# Patient Record
Sex: MALE | Race: WHITE | NOT HISPANIC OR LATINO | Employment: OTHER | URBAN - METROPOLITAN AREA
[De-identification: names, ages, dates, MRNs, and addresses within clinical notes are randomized per-mention and may not be internally consistent; named-entity substitution may affect disease eponyms.]

---

## 2017-02-08 ENCOUNTER — HOSPITAL ENCOUNTER (INPATIENT)
Facility: HOSPITAL | Age: 82
LOS: 2 days | Discharge: HOME | DRG: 683 | End: 2017-02-10
Attending: EMERGENCY MEDICINE | Admitting: HOSPITALIST
Payer: COMMERCIAL

## 2017-02-08 ENCOUNTER — GENERIC CONVERSION - ENCOUNTER (OUTPATIENT)
Dept: OTHER | Facility: OTHER | Age: 82
End: 2017-02-08

## 2017-02-08 ENCOUNTER — APPOINTMENT (EMERGENCY)
Dept: RADIOLOGY | Facility: HOSPITAL | Age: 82
DRG: 683 | End: 2017-02-08
Payer: COMMERCIAL

## 2017-02-08 DIAGNOSIS — R77.8 ELEVATED TROPONIN LEVEL: ICD-10-CM

## 2017-02-08 DIAGNOSIS — R53.1 GENERALIZED WEAKNESS: Primary | ICD-10-CM

## 2017-02-08 DIAGNOSIS — R19.7 DIARRHEA IN ADULT PATIENT: ICD-10-CM

## 2017-02-08 PROBLEM — N40.0 BPH (BENIGN PROSTATIC HYPERPLASIA): Status: ACTIVE | Noted: 2017-02-08

## 2017-02-08 PROBLEM — I10 ESSENTIAL HYPERTENSION: Chronic | Status: ACTIVE | Noted: 2017-02-08

## 2017-02-08 PROBLEM — N40.0 BPH (BENIGN PROSTATIC HYPERPLASIA): Chronic | Status: ACTIVE | Noted: 2017-02-08

## 2017-02-08 PROBLEM — I42.9 CARDIOMYOPATHY (HCC): Status: ACTIVE | Noted: 2017-02-08

## 2017-02-08 PROBLEM — I10 ESSENTIAL HYPERTENSION: Status: ACTIVE | Noted: 2017-02-08

## 2017-02-08 PROBLEM — N17.9 ACUTE RENAL FAILURE SUPERIMPOSED ON STAGE 3 CHRONIC KIDNEY DISEASE (HCC): Status: ACTIVE | Noted: 2017-02-08

## 2017-02-08 PROBLEM — I42.9 CARDIOMYOPATHY (HCC): Chronic | Status: ACTIVE | Noted: 2017-02-08

## 2017-02-08 PROBLEM — E86.0 DEHYDRATION: Status: ACTIVE | Noted: 2017-02-08

## 2017-02-08 PROBLEM — N18.30 ACUTE RENAL FAILURE SUPERIMPOSED ON STAGE 3 CHRONIC KIDNEY DISEASE (HCC): Status: ACTIVE | Noted: 2017-02-08

## 2017-02-08 PROBLEM — I48.91 A-FIB (HCC): Status: ACTIVE | Noted: 2017-02-08

## 2017-02-08 LAB
ALBUMIN SERPL BCP-MCNC: 3.8 G/DL (ref 3.5–5)
ALP SERPL-CCNC: 60 U/L (ref 46–116)
ALT SERPL W P-5'-P-CCNC: 33 U/L (ref 12–78)
ANION GAP SERPL CALCULATED.3IONS-SCNC: 12 MMOL/L (ref 4–13)
AST SERPL W P-5'-P-CCNC: 38 U/L (ref 5–45)
BACTERIA UR QL AUTO: ABNORMAL /HPF
BASOPHILS # BLD AUTO: 0.02 THOUSANDS/ΜL (ref 0–0.1)
BASOPHILS NFR BLD AUTO: 0 % (ref 0–1)
BILIRUB SERPL-MCNC: 0.7 MG/DL (ref 0.2–1)
BILIRUB UR QL STRIP: NEGATIVE
BUN SERPL-MCNC: 27 MG/DL (ref 5–25)
CALCIUM SERPL-MCNC: 8.9 MG/DL (ref 8.3–10.1)
CHLORIDE SERPL-SCNC: 98 MMOL/L (ref 100–108)
CLARITY UR: CLEAR
CO2 SERPL-SCNC: 25 MMOL/L (ref 21–32)
COLOR UR: YELLOW
CREAT SERPL-MCNC: 2.07 MG/DL (ref 0.6–1.3)
EOSINOPHIL # BLD AUTO: 0.01 THOUSAND/ΜL (ref 0–0.61)
EOSINOPHIL NFR BLD AUTO: 0 % (ref 0–6)
ERYTHROCYTE [DISTWIDTH] IN BLOOD BY AUTOMATED COUNT: 14 % (ref 11.6–15.1)
FINE GRAN CASTS URNS QL MICRO: ABNORMAL /LPF
FLUAV AG SPEC QL IA: NEGATIVE
FLUBV AG SPEC QL IA: NEGATIVE
GFR SERPL CREATININE-BSD FRML MDRD: 30.4 ML/MIN/1.73SQ M
GLUCOSE SERPL-MCNC: 111 MG/DL (ref 65–140)
GLUCOSE UR STRIP-MCNC: NEGATIVE MG/DL
HCT VFR BLD AUTO: 47.2 % (ref 36.5–49.3)
HGB BLD-MCNC: 15.3 G/DL (ref 12–17)
HGB UR QL STRIP.AUTO: ABNORMAL
HYALINE CASTS #/AREA URNS LPF: ABNORMAL /LPF
KETONES UR STRIP-MCNC: NEGATIVE MG/DL
LACTATE SERPL-SCNC: 2.1 MMOL/L (ref 0.5–2)
LEUKOCYTE ESTERASE UR QL STRIP: NEGATIVE
LYMPHOCYTES # BLD AUTO: 1.25 THOUSANDS/ΜL (ref 0.6–4.47)
LYMPHOCYTES NFR BLD AUTO: 13 % (ref 14–44)
MCH RBC QN AUTO: 32.4 PG (ref 26.8–34.3)
MCHC RBC AUTO-ENTMCNC: 32.4 G/DL (ref 31.4–37.4)
MCV RBC AUTO: 100 FL (ref 82–98)
MONOCYTES # BLD AUTO: 0.92 THOUSAND/ΜL (ref 0.17–1.22)
MONOCYTES NFR BLD AUTO: 9 % (ref 4–12)
MUCOUS THREADS UR QL AUTO: ABNORMAL
NEUTROPHILS # BLD AUTO: 7.65 THOUSANDS/ΜL (ref 1.85–7.62)
NEUTS SEG NFR BLD AUTO: 78 % (ref 43–75)
NITRITE UR QL STRIP: NEGATIVE
NON-SQ EPI CELLS URNS QL MICRO: ABNORMAL /HPF
PH UR STRIP.AUTO: 5 [PH] (ref 4.5–8)
PLATELET # BLD AUTO: 138 THOUSANDS/UL (ref 149–390)
PMV BLD AUTO: 10 FL (ref 8.9–12.7)
POTASSIUM SERPL-SCNC: 4.2 MMOL/L (ref 3.5–5.3)
PROT SERPL-MCNC: 8.2 G/DL (ref 6.4–8.2)
PROT UR STRIP-MCNC: ABNORMAL MG/DL
RBC # BLD AUTO: 4.72 MILLION/UL (ref 3.88–5.62)
RBC #/AREA URNS AUTO: ABNORMAL /HPF
SODIUM SERPL-SCNC: 135 MMOL/L (ref 136–145)
SP GR UR STRIP.AUTO: >=1.03 (ref 1–1.03)
TROPONIN I SERPL-MCNC: 0.14 NG/ML
UROBILINOGEN UR QL STRIP.AUTO: 0.2 E.U./DL
WBC # BLD AUTO: 9.85 THOUSAND/UL (ref 4.31–10.16)
WBC #/AREA URNS AUTO: ABNORMAL /HPF

## 2017-02-08 PROCEDURE — 96360 HYDRATION IV INFUSION INIT: CPT

## 2017-02-08 PROCEDURE — 81001 URINALYSIS AUTO W/SCOPE: CPT | Performed by: EMERGENCY MEDICINE

## 2017-02-08 PROCEDURE — 36415 COLL VENOUS BLD VENIPUNCTURE: CPT | Performed by: EMERGENCY MEDICINE

## 2017-02-08 PROCEDURE — 93005 ELECTROCARDIOGRAM TRACING: CPT | Performed by: EMERGENCY MEDICINE

## 2017-02-08 PROCEDURE — 99285 EMERGENCY DEPT VISIT HI MDM: CPT

## 2017-02-08 PROCEDURE — 80053 COMPREHEN METABOLIC PANEL: CPT | Performed by: EMERGENCY MEDICINE

## 2017-02-08 PROCEDURE — 85025 COMPLETE CBC W/AUTO DIFF WBC: CPT | Performed by: EMERGENCY MEDICINE

## 2017-02-08 PROCEDURE — 87400 INFLUENZA A/B EACH AG IA: CPT | Performed by: EMERGENCY MEDICINE

## 2017-02-08 PROCEDURE — 87086 URINE CULTURE/COLONY COUNT: CPT | Performed by: EMERGENCY MEDICINE

## 2017-02-08 PROCEDURE — 84484 ASSAY OF TROPONIN QUANT: CPT | Performed by: EMERGENCY MEDICINE

## 2017-02-08 PROCEDURE — 87798 DETECT AGENT NOS DNA AMP: CPT | Performed by: EMERGENCY MEDICINE

## 2017-02-08 PROCEDURE — 71020 HB CHEST X-RAY 2VW FRONTAL&LATL: CPT

## 2017-02-08 PROCEDURE — 83605 ASSAY OF LACTIC ACID: CPT | Performed by: EMERGENCY MEDICINE

## 2017-02-08 RX ORDER — ONDANSETRON 2 MG/ML
4 INJECTION INTRAMUSCULAR; INTRAVENOUS EVERY 6 HOURS PRN
Status: DISCONTINUED | OUTPATIENT
Start: 2017-02-08 | End: 2017-02-10 | Stop reason: HOSPADM

## 2017-02-08 RX ORDER — HYDROCHLOROTHIAZIDE 12.5 MG/1
6.25 TABLET ORAL DAILY
Status: DISCONTINUED | OUTPATIENT
Start: 2017-02-09 | End: 2017-02-10 | Stop reason: HOSPADM

## 2017-02-08 RX ORDER — FINASTERIDE 5 MG/1
5 TABLET, FILM COATED ORAL DAILY
COMMUNITY

## 2017-02-08 RX ORDER — FINASTERIDE 5 MG/1
5 TABLET, FILM COATED ORAL DAILY
Status: DISCONTINUED | OUTPATIENT
Start: 2017-02-09 | End: 2017-02-10 | Stop reason: HOSPADM

## 2017-02-08 RX ORDER — SODIUM CHLORIDE 9 MG/ML
75 INJECTION, SOLUTION INTRAVENOUS CONTINUOUS
Status: DISPENSED | OUTPATIENT
Start: 2017-02-09 | End: 2017-02-09

## 2017-02-08 RX ORDER — BISOPROLOL FUMARATE 5 MG/1
2.5 TABLET ORAL DAILY
Status: DISCONTINUED | OUTPATIENT
Start: 2017-02-09 | End: 2017-02-10 | Stop reason: HOSPADM

## 2017-02-08 RX ORDER — BISOPROLOL FUMARATE AND HYDROCHLOROTHIAZIDE 2.5; 6.25 MG/1; MG/1
1 TABLET ORAL DAILY
Status: DISCONTINUED | OUTPATIENT
Start: 2017-02-09 | End: 2017-02-08 | Stop reason: SDUPTHER

## 2017-02-08 RX ORDER — BISOPROLOL FUMARATE AND HYDROCHLOROTHIAZIDE 2.5; 6.25 MG/1; MG/1
1 TABLET ORAL DAILY
COMMUNITY
End: 2018-05-03

## 2017-02-08 RX ADMIN — SODIUM CHLORIDE 1000 ML: 0.9 INJECTION, SOLUTION INTRAVENOUS at 19:27

## 2017-02-09 LAB
ANION GAP SERPL CALCULATED.3IONS-SCNC: 7 MMOL/L (ref 4–13)
ATRIAL RATE: 288 BPM
BACTERIA UR CULT: NORMAL
BUN SERPL-MCNC: 27 MG/DL (ref 5–25)
C DIFF TOX GENS STL QL NAA+PROBE: NORMAL
CALCIUM SERPL-MCNC: 7.9 MG/DL (ref 8.3–10.1)
CHLORIDE SERPL-SCNC: 103 MMOL/L (ref 100–108)
CO2 SERPL-SCNC: 26 MMOL/L (ref 21–32)
CREAT SERPL-MCNC: 1.81 MG/DL (ref 0.6–1.3)
ERYTHROCYTE [DISTWIDTH] IN BLOOD BY AUTOMATED COUNT: 13.9 % (ref 11.6–15.1)
FLUAV AG SPEC QL: DETECTED
FLUBV AG SPEC QL: ABNORMAL
GFR SERPL CREATININE-BSD FRML MDRD: 35.5 ML/MIN/1.73SQ M
GLUCOSE SERPL-MCNC: 76 MG/DL (ref 65–140)
GLUCOSE SERPL-MCNC: 96 MG/DL (ref 65–140)
HCT VFR BLD AUTO: 39.4 % (ref 36.5–49.3)
HGB BLD-MCNC: 12.5 G/DL (ref 12–17)
INR PPP: 1.28 (ref 0.86–1.16)
MCH RBC QN AUTO: 32.2 PG (ref 26.8–34.3)
MCHC RBC AUTO-ENTMCNC: 31.7 G/DL (ref 31.4–37.4)
MCV RBC AUTO: 102 FL (ref 82–98)
PLATELET # BLD AUTO: 115 THOUSANDS/UL (ref 149–390)
PMV BLD AUTO: 9.6 FL (ref 8.9–12.7)
POTASSIUM SERPL-SCNC: 3.8 MMOL/L (ref 3.5–5.3)
PROTHROMBIN TIME: 15.7 SECONDS (ref 12–14.3)
QRS AXIS: -72 DEGREES
QRSD INTERVAL: 152 MS
QT INTERVAL: 428 MS
QTC INTERVAL: 458 MS
RBC # BLD AUTO: 3.88 MILLION/UL (ref 3.88–5.62)
RSV B RNA SPEC QL NAA+PROBE: ABNORMAL
SODIUM SERPL-SCNC: 136 MMOL/L (ref 136–145)
T WAVE AXIS: 90 DEGREES
TROPONIN I SERPL-MCNC: 0.14 NG/ML
TROPONIN I SERPL-MCNC: 0.18 NG/ML
TROPONIN I SERPL-MCNC: 0.19 NG/ML
VENTRICULAR RATE: 69 BPM
WBC # BLD AUTO: 6.4 THOUSAND/UL (ref 4.31–10.16)
WBC STL QL MICRO: NORMAL

## 2017-02-09 PROCEDURE — 85610 PROTHROMBIN TIME: CPT | Performed by: HOSPITALIST

## 2017-02-09 PROCEDURE — 84484 ASSAY OF TROPONIN QUANT: CPT | Performed by: HOSPITALIST

## 2017-02-09 PROCEDURE — G8979 MOBILITY GOAL STATUS: HCPCS

## 2017-02-09 PROCEDURE — 97163 PT EVAL HIGH COMPLEX 45 MIN: CPT

## 2017-02-09 PROCEDURE — G8978 MOBILITY CURRENT STATUS: HCPCS

## 2017-02-09 PROCEDURE — 82948 REAGENT STRIP/BLOOD GLUCOSE: CPT

## 2017-02-09 PROCEDURE — 85027 COMPLETE CBC AUTOMATED: CPT | Performed by: HOSPITALIST

## 2017-02-09 PROCEDURE — 97116 GAIT TRAINING THERAPY: CPT

## 2017-02-09 PROCEDURE — 80048 BASIC METABOLIC PNL TOTAL CA: CPT | Performed by: HOSPITALIST

## 2017-02-09 PROCEDURE — 87493 C DIFF AMPLIFIED PROBE: CPT | Performed by: HOSPITALIST

## 2017-02-09 PROCEDURE — 87205 SMEAR GRAM STAIN: CPT | Performed by: HOSPITALIST

## 2017-02-09 RX ORDER — CIPROFLOXACIN 2 MG/ML
200 INJECTION, SOLUTION INTRAVENOUS EVERY 24 HOURS
Status: DISCONTINUED | OUTPATIENT
Start: 2017-02-09 | End: 2017-02-10

## 2017-02-09 RX ADMIN — BISOPROLOL FUMARATE 2.5 MG: 5 TABLET ORAL at 09:08

## 2017-02-09 RX ADMIN — SODIUM CHLORIDE 75 ML/HR: 0.9 INJECTION, SOLUTION INTRAVENOUS at 00:00

## 2017-02-09 RX ADMIN — APIXABAN 2.5 MG: 2.5 TABLET, FILM COATED ORAL at 09:08

## 2017-02-09 RX ADMIN — HYDROCHLOROTHIAZIDE 6.25 MG: 12.5 TABLET ORAL at 09:08

## 2017-02-09 RX ADMIN — METRONIDAZOLE 500 MG: 500 INJECTION, SOLUTION INTRAVENOUS at 06:43

## 2017-02-09 RX ADMIN — METRONIDAZOLE 500 MG: 500 INJECTION, SOLUTION INTRAVENOUS at 10:53

## 2017-02-09 RX ADMIN — APIXABAN 2.5 MG: 2.5 TABLET, FILM COATED ORAL at 17:10

## 2017-02-09 RX ADMIN — CIPROFLOXACIN 200 MG: 2 INJECTION, SOLUTION INTRAVENOUS at 04:39

## 2017-02-09 RX ADMIN — FINASTERIDE 5 MG: 5 TABLET, FILM COATED ORAL at 09:08

## 2017-02-10 VITALS
TEMPERATURE: 98.2 F | RESPIRATION RATE: 18 BRPM | BODY MASS INDEX: 29.71 KG/M2 | SYSTOLIC BLOOD PRESSURE: 122 MMHG | WEIGHT: 200.62 LBS | HEIGHT: 69 IN | HEART RATE: 86 BPM | DIASTOLIC BLOOD PRESSURE: 67 MMHG | OXYGEN SATURATION: 94 %

## 2017-02-10 LAB
ANION GAP SERPL CALCULATED.3IONS-SCNC: 9 MMOL/L (ref 4–13)
BUN SERPL-MCNC: 27 MG/DL (ref 5–25)
CALCIUM SERPL-MCNC: 8.2 MG/DL (ref 8.3–10.1)
CHLORIDE SERPL-SCNC: 103 MMOL/L (ref 100–108)
CO2 SERPL-SCNC: 23 MMOL/L (ref 21–32)
CREAT SERPL-MCNC: 1.61 MG/DL (ref 0.6–1.3)
GFR SERPL CREATININE-BSD FRML MDRD: 40.6 ML/MIN/1.73SQ M
GLUCOSE SERPL-MCNC: 81 MG/DL (ref 65–140)
POTASSIUM SERPL-SCNC: 3.9 MMOL/L (ref 3.5–5.3)
SODIUM SERPL-SCNC: 135 MMOL/L (ref 136–145)

## 2017-02-10 PROCEDURE — 80048 BASIC METABOLIC PNL TOTAL CA: CPT | Performed by: FAMILY MEDICINE

## 2017-02-10 RX ORDER — OSELTAMIVIR PHOSPHATE 30 MG/1
30 CAPSULE ORAL EVERY 24 HOURS
Qty: 5 CAPSULE | Refills: 0 | Status: SHIPPED | OUTPATIENT
Start: 2017-02-10 | End: 2017-02-15

## 2017-02-10 RX ORDER — OSELTAMIVIR PHOSPHATE 30 MG/1
30 CAPSULE ORAL EVERY 24 HOURS
Status: DISCONTINUED | OUTPATIENT
Start: 2017-02-10 | End: 2017-02-10 | Stop reason: HOSPADM

## 2017-02-10 RX ADMIN — FINASTERIDE 5 MG: 5 TABLET, FILM COATED ORAL at 08:18

## 2017-02-10 RX ADMIN — OSELTAMIVIR PHOSPHATE 30 MG: 30 CAPSULE ORAL at 12:56

## 2017-02-10 RX ADMIN — BISOPROLOL FUMARATE 2.5 MG: 5 TABLET ORAL at 08:17

## 2017-02-10 RX ADMIN — APIXABAN 2.5 MG: 2.5 TABLET, FILM COATED ORAL at 08:17

## 2017-02-10 RX ADMIN — HYDROCHLOROTHIAZIDE 6.25 MG: 12.5 TABLET ORAL at 08:18

## 2017-02-13 LAB
ATRIAL RATE: 86 BPM
QRS AXIS: -51 DEGREES
QRSD INTERVAL: 166 MS
QT INTERVAL: 448 MS
QTC INTERVAL: 536 MS
T WAVE AXIS: 84 DEGREES
VENTRICULAR RATE: 86 BPM

## 2017-03-08 ENCOUNTER — ALLSCRIPTS OFFICE VISIT (OUTPATIENT)
Dept: OTHER | Facility: OTHER | Age: 82
End: 2017-03-08

## 2017-04-12 ENCOUNTER — ALLSCRIPTS OFFICE VISIT (OUTPATIENT)
Dept: OTHER | Facility: OTHER | Age: 82
End: 2017-04-12

## 2017-05-12 ENCOUNTER — ALLSCRIPTS OFFICE VISIT (OUTPATIENT)
Dept: OTHER | Facility: OTHER | Age: 82
End: 2017-05-12

## 2017-05-30 ENCOUNTER — ALLSCRIPTS OFFICE VISIT (OUTPATIENT)
Dept: OTHER | Facility: OTHER | Age: 82
End: 2017-05-30

## 2017-06-13 ENCOUNTER — ALLSCRIPTS OFFICE VISIT (OUTPATIENT)
Dept: OTHER | Facility: OTHER | Age: 82
End: 2017-06-13

## 2017-08-01 ENCOUNTER — ALLSCRIPTS OFFICE VISIT (OUTPATIENT)
Dept: OTHER | Facility: OTHER | Age: 82
End: 2017-08-01

## 2017-09-21 ENCOUNTER — ALLSCRIPTS OFFICE VISIT (OUTPATIENT)
Dept: OTHER | Facility: OTHER | Age: 82
End: 2017-09-21

## 2017-09-21 ENCOUNTER — GENERIC CONVERSION - ENCOUNTER (OUTPATIENT)
Dept: OTHER | Facility: OTHER | Age: 82
End: 2017-09-21

## 2017-09-26 ENCOUNTER — GENERIC CONVERSION - ENCOUNTER (OUTPATIENT)
Dept: OTHER | Facility: OTHER | Age: 82
End: 2017-09-26

## 2017-09-28 LAB
A/G RATIO (HISTORICAL): 1.2 (CALC) (ref 1–2.5)
ALBUMIN SERPL BCP-MCNC: 4.2 G/DL (ref 3.6–5.1)
ALP SERPL-CCNC: 59 U/L (ref 40–115)
ALT SERPL W P-5'-P-CCNC: 14 U/L (ref 9–46)
AST SERPL W P-5'-P-CCNC: 23 U/L (ref 10–35)
BILIRUB SERPL-MCNC: 0.5 MG/DL (ref 0.2–1.2)
BILIRUBIN DIRECT (HISTORICAL): 0.1 MG/DL
GAMMA GLOBULIN (HISTORICAL): 3.4 G/DL (CALC) (ref 1.9–3.7)
INDIRECT BILIRUBIN (HISTORICAL): 0.4 MG/DL (CALC) (ref 0.2–1.2)
TOTAL PROTEIN (HISTORICAL): 7.6 G/DL (ref 6.1–8.1)

## 2017-12-14 ENCOUNTER — ALLSCRIPTS OFFICE VISIT (OUTPATIENT)
Dept: OTHER | Facility: OTHER | Age: 82
End: 2017-12-14

## 2017-12-14 ENCOUNTER — GENERIC CONVERSION - ENCOUNTER (OUTPATIENT)
Dept: OTHER | Facility: OTHER | Age: 82
End: 2017-12-14

## 2018-01-09 NOTE — MISCELLANEOUS
To Whom It May Concern:    Mr Israel Pruitt is under my professional care for management of his cardiac status  He recently underwent a carotid Doppler which was unremarkable for significant findings  Mr Patito Sahni  is cleared to return to the gym from a cardiac perspective  If you have any further questions, please do not hesitate to contact my office        Sincerely,    DO [facility]  Cardiac Electrophysiology      Electronically signed by:Kan Kapoor DO  Aug 26 2016  5:09PM EST

## 2018-01-13 VITALS
BODY MASS INDEX: 30.78 KG/M2 | SYSTOLIC BLOOD PRESSURE: 140 MMHG | WEIGHT: 203.06 LBS | DIASTOLIC BLOOD PRESSURE: 68 MMHG | HEIGHT: 68 IN | HEART RATE: 70 BPM

## 2018-01-22 VITALS
DIASTOLIC BLOOD PRESSURE: 76 MMHG | HEIGHT: 68 IN | WEIGHT: 204.5 LBS | SYSTOLIC BLOOD PRESSURE: 142 MMHG | BODY MASS INDEX: 30.99 KG/M2 | HEART RATE: 72 BPM

## 2018-02-01 ENCOUNTER — TELEPHONE (OUTPATIENT)
Dept: NON INVASIVE DIAGNOSTICS | Facility: HOSPITAL | Age: 83
End: 2018-02-01

## 2018-02-01 ENCOUNTER — CLINICAL SUPPORT (OUTPATIENT)
Dept: CARDIOLOGY CLINIC | Facility: CLINIC | Age: 83
End: 2018-02-01
Payer: COMMERCIAL

## 2018-02-01 DIAGNOSIS — Z95.0 PRESENCE OF PERMANENT CARDIAC PACEMAKER: ICD-10-CM

## 2018-02-01 DIAGNOSIS — I50.22 CHRONIC SYSTOLIC CONGESTIVE HEART FAILURE (HCC): Primary | ICD-10-CM

## 2018-02-01 PROCEDURE — 93294 REM INTERROG EVL PM/LDLS PM: CPT | Performed by: INTERNAL MEDICINE

## 2018-02-01 PROCEDURE — 93296 REM INTERROG EVL PM/IDS: CPT | Performed by: INTERNAL MEDICINE

## 2018-02-01 NOTE — PROGRESS NOTES
MERLIN TRANSMISSION - CORVUE ONLY:  CORVUE IMPEDANCE THRESHOLD CROSSED LAST 10 DAYS   TASKED TO NP   BATTERY VOLTAGE ADEQUATE (7 3 YR)   BP 98%   ALL AVAILABLE LEAD PARAMETERS WITHIN NORMAL LIMITS   2 VHR EPISODES WITH EGMS SHOWING NSVT (7 @ 187 BPM, 7 @ 194 BPM)   EF "NORMAL" (ECHO 2016)   PT TAKES BISOPROLOL AND ELIQUIS   NORMAL DEVICE FUNCTION   RG       Current Outpatient Prescriptions:     apixaban (ELIQUIS) 2 5 mg, Take by mouth 2 (two) times a day, Disp: , Rfl:     bisoprolol-hydrochlorothiazide (ZIAC) 2 5-6 25 MG per tablet, Take 1 tablet by mouth daily, Disp: , Rfl:     finasteride (PROSCAR) 5 mg tablet, Take 5 mg by mouth daily, Disp: , Rfl:        Study date:  2016     Patient: Deja Horne  MR number: [id number]  Account number: [[id number]]  : 1927  Age: 80 years  Gender: Male  Status: Outpatient  Location: [address] [place] [address] [facility]  Height: 68 in  Weight: 204 lb  BP: 128/ 68 mmHg     Indications: Cardiomyopathy     Diagnoses: I42 0 - Dilated cardiomyopathy     Sonographer:  Sandie Jalloh  Primary Physician:  Jesusita Shaikh MD  Referring Physician:  Jose Antonio Rojas DO  Group:  [facility]  Interpreting Physician:  Elaine Cheng MD     SUMMARY     LEFT VENTRICLE:  Size was at the upper limits of normal   Systolic function was at the lower limits of normal   There were no regional wall motion abnormalities  Wall thickness was mildly increased

## 2018-03-07 NOTE — PROGRESS NOTES
"  Discussion/Summary  Normal device function      Results/Data  Results   Cardiac Device Remote [id number] 06:00AM Neita Halsted     Test Name Result Flag Reference   MISCELLANEOUS COMMENT      MERLIN TRANSMISSION: BATTERY VOLTAGE ADEQUATE  (8 1 YRS)  NO SIGNIFICANT HIGH RATE EPISODES  ALL AVAILABLE LEAD PARAMETERS WITHIN NORMAL LIMITS  CORVUE IMPEDANCE MONITORING WITHIN NORMAL LIMITS  BVP 99%  NORMAL DEVICE FUNCTION  ---GUILLEN   Cardiac Electrophysiology Report      mctfqcwuvlbbyxzmynrqrnirhe4jevb5j62hz9f30j4e48ty4zmd70hprfld6237v63f33s271105cw4pt8532c4tdddimb  pdf   DEVICE TYPE CRT-P       Cardiac Electrophysiology Report [id number] 06:00AM Neita Halsted     Test Name Result Flag Reference   Cardiac Electrophysiology Report      hxcvleecyivqgyribwwuqioggt8oype5d18ru7y09t3q65ys1qqk82jlyxvr8257i49m25x417369qg5am0883s4k  pdf     Signatures   Electronically signed by : Husam Marte, ; Apr 15 2016 10:23AM EST                       (Author)    Electronically signed by : Geri Alejandra DO;  Apr 17 2016 11:18AM EST                       (Author)    "

## 2018-03-07 NOTE — PROGRESS NOTES
"  Discussion/Summary  Normal device function      Results/Data  Cardiac Device Remote [id number] 08:53AM Darien Boyer     Test Name Result Flag Reference   MISCELLANEOUS COMMENT      MERLIN TRANSMISSION - CORVUE ONLY: CORVUE IMPEDANCE MONITORING WITHIN NORMAL LIMITS  BATTERY VOLTAGE ADEQUATE (7 2-7 8 YRS)  BVP>99%  NO SIGNIFICANT HIGH RATE EPISODES  ALL AVAILABLE LEAD PARAMETERS WITHIN NORMAL LIMITS  NORMAL DEVICE FUNCTION  GV   Cardiac Electrophysiology Report      mmuxiurawenugfsnxqlueccnhp7buoi7c96tu6k93b6m21qr7eeo08cxyz36eixq5ll518v92wmb3948e4n79866mdinttll  pdf   DEVICE TYPE CRT-P       Cardiac Electrophysiology Report [id number] 08:53AM Darien Boyer     Test Name Result Flag Reference   Cardiac Electrophysiology Report      cfpmpmfguummvjerwqabklqmrj4cjnz9c49xe6n03f6f81np9rmy78ptwh16ejci1ux324u09bll4078i7x54841f  pdf     Signatures   Electronically signed by : Golden Wilkinson RN; Aug  1 2016 10:23AM EST                       (Author)    Electronically signed by : Eliza Coronado DO; Aug  1 2016  3:23PM EST                       (Author)    "

## 2018-03-07 NOTE — PROGRESS NOTES
"  Discussion/Summary  Normal device function      Results/Data  Cardiac Device Remote [id number] 05:12AM Jimmy Car     Test Name Result Flag Reference   MISCELLANEOUS COMMENT (Report)     MERLIN TRANSMISSION - CORVUE ONLY: CORVUE IMPEDANCE MONITORING WITHIN NORMAL LIMITS  BVP 97%  ALL AVAILABLE LEAD PARAMETERS WITHIN NORMAL LIMITS  1 VHR EPISODE DETECTED 7 BEATS @ 309ms  EF 29% (ECHO 3/3/15)  PATIENT ON ELIQUIS AND BISOPROLOL  NORMAL DEVICE FUNCTION  ---GUILLEN   Cardiac Electrophysiology Report      sriwodzfvxdhyllewbjfrnpiyv8bwou1e28kc5w05g6g89wl6zoc64riy35e550h727z56h9q2e60fr5b8w52i98moyikluo  pdf   DEVICE TYPE CRT-P       Cardiac Electrophysiology Report [id number] 05:12AM Jimmy Car     Test Name Result Flag Reference   Cardiac Electrophysiology Report      rjpwemlnthgpxalmqeqalhlzhe5rjxz8v49zv6s13y5o15ev5xpp85zvl46b501y699z56u5u3p07uf2w5i21g63w  pdf     Signatures   Electronically signed by : Gela Machuca, ; Jun 13 2017  8:56AM EST                       (Author)    Electronically signed by : Steffen Rodríguez DO; Jun 14 2017  7:59AM EST                       (Author)    "

## 2018-03-07 NOTE — PROGRESS NOTES
"  Discussion/Summary  Normal device function      Results/Data  Results   Cardiac Device Remote [id number] 07:00AM Michelle Hoar     Test Name Result Flag Reference   MISCELLANEOUS COMMENT (Report)     MERLIN TRANSMISSION - CORVUE ONLY: CORVUE IMPEDANCE MONITORING WITHIN NORMAL LIMITS  X0A0A*** BATTERY VOLTAGE ADEQUATE [ 7 4-8 YEARS]  ALL AVAILABLE LEAD PARAMETERS WITHIN NORMAL LIMITS  BVP = 99%  NO SIGNIFICANT HIGH RATE EPISODES  NORMAL DEVICE FUNCTION  DL/CP   Cardiac Electrophysiology Report      sunyjrepsibvishihdxahctruz5jipf7r91rl7x82h3x29yl1nwv50mst3l9972z0r1jx3y14877sby234752gb47dpynmih donald  pdf   DEVICE TYPE CRT-P       Cardiac Electrophysiology Report [id number] 07:00AM Michelle Hoar     Test Name Result Flag Reference   Cardiac Electrophysiology Report      xolbarwyubfhlfnzstjdgwrfcw2qkvi7d71rh8v05j2w94pm5nta18wlf9x4807v8y3ye0u50605dkr923035wb62  pdf     Signatures   Electronically signed by : Veda Vasquez, ; Feb 25 2016  9:51AM EST                       (Author)    Electronically signed by : GEE Kuo ; Feb 25 2016  1:47PM EST                       (Author)    "

## 2018-03-07 NOTE — PROGRESS NOTES
"  Discussion/Summary  Normal device function      Results/Data  Results   Cardiac Device In Clinic 28Jun2016 02:11PM Deepak Wilsonpiter     Test Name Result Flag Reference   MISCELLANEOUS COMMENT (Report)     DEVICE INTERROGATED IN THE [facility] OFFICE  BATTERY VOLTAGE ADEQUATE  (8 3 YRS)  BVP 99%  ALL LEAD PARAMETERS WITHIN NORMAL LIMITS  NO SIGNIFICANT HIGH RATE EPISODES  CORVUE IMPEDANCE MONITORING WITHIN NORMAL LIMITS HOWEVER SLIGHTLY ELEVATED  RECHECK IN ONE MONTH  NO PROGRAMMING CHANGES MADE TO DEVICE PARAMETERS  NORMAL DEVICE FUNCTION  ----GUILLEN   Cardiac Electrophysiology Report      njqreylyijufdmucnkyjxmlbvf6aowv1e77ea6x15q2u60oa8oox43kkc5n1n69266oc56ngvd989oqj6l5q11b62Cqnamy-Bycdgj([id number]  pdf   DEVICE TYPE CRT-P       [facility] Electrophysiology [facility] [id number] 02:11PM [facility]     Test Name Result Flag Reference   Cardiac Electrophysiology Report      qbtlfnkhxiwqfcwfifryakycjo2paxg5o07wr2l44y0n10un6yxo70qta1z4p92953kx85odjt341kpu5a7f37g18  pdf     Signatures   Electronically signed by : Mary Parr, ; Jun 28 2016 10:14AM EST                       (Author)    Electronically signed by : Evan Gaytan DO; Jun 29 2016  3:20PM EST                       (Author)    "

## 2018-03-07 NOTE — PROGRESS NOTES
"  Discussion/Summary  Normal device function      Results/Data  Results   Cardiac Device Remote [id number] 07:00AM Marielos Mattson     Test Name Result Flag Reference   MISCELLANEOUS COMMENT      MERLIN TRANSMISSION - CORVUE ONLY: CORVUE IMPEDANCE MONITORING WITHIN NORMAL LIMITS  BVP>99%  ALL AVAILABLE LEAD PARAMETERS WITHIN NORMAL LIMITS  NORMAL DEVICE FUNCTION  GV   Cardiac Electrophysiology Report      lrqvnlbdujrbxdmxnuvrdoyyba4rwpi5x23mu4g40o5z57rr1rsr42knv6308j7s67g826c87sw02932fzdhklp3spbpoqnu  pdf   DEVICE TYPE CRT-P       Cardiac Electrophysiology Report [id number] 07:00AM Marielos Mattson     Test Name Result Flag Reference   Cardiac Electrophysiology Report      kduonxueyshxhhhwjffrhldukw5ghok6w93wt0u58p6j61jm4vdg01esf1675x5t35j753j96tj01487lauynag4l pdf     Signatures   Electronically signed by : Sarah Pizarro RN; Mar 10 2016 12:25PM EST                       (Author)    Electronically signed by : Jamie Marti DO; Mar 25 2016  2:15PM EST                       (Author)    "

## 2018-03-07 NOTE — PROGRESS NOTES
"  Discussion/Summary  Normal device function      Results/Data  Cardiac Device Remote [id number] 07:24AM Greenlots Golds     Test Name Result Flag Reference   MISCELLANEOUS COMMENT      MERLIN TRANSMISSION: BATTERY STATUS "OK"  %  NO SIGNIFICANT HIGH RATE EPISODES  CORVUE IMPEDANCE MONITORING WITHIN NORMAL LIMITS  ALL LEAD PARAMETERS WITHIN NORMAL LIMITS  NORMAL DEVICE FUNCTION  NC   Cardiac Electrophysiology Report      jiccjnanvcaijpjfxdwbmilztw2ezuu6h48lj2o82g0a74bu1pam13hkpojj5req18i5r47934fr7f0v4ro843rt1qqlhmdql  pdf   DEVICE TYPE CRT-P       Cardiac Electrophysiology Report [id number] 07:24AM [facility] G[facility]     Test Name Result Flag Reference   Cardiac Electrophysiology Report      czliymoitgtvqfvpeshybebubw5xque5q22lk3n46y1w03fq1bjz63liqwrs9qrz01k2w49417iv7y2d5rv290rt2  pdf     Signatures   Electronically signed by : Tiffanie Dunne, ; Oct 12 2016  3:06PM EST                       (Author)    Electronically signed by : GEE Smith ; Oct 22 2016  9:37AM EST                       (Author)    "

## 2018-03-07 NOTE — PROGRESS NOTES
"  Discussion/Summary  Normal device function      Results/Data  Cardiac Device Remote [id number] 07:00AM Olga Mcdermott     Test Name Result Flag Reference   MISCELLANEOUS COMMENT (Report)     MERLIN TRANSMISSION: BATTERY VOLTAGE ADEQUATE (7 6-8 2 YRS)  BVP-99%  1 NSVT EPISODE FOR 14 BEATS- HR ACCELERATING (CL FROM 395MS TO 281MS)  EF-29% (ECHO 3/3/15)  PT ON ELIQUIS & BISOPROLOL  CORVUE IMPEDANCE MONITORING WITHIN NORMAL LIMITS  ALL AVAILABLE LEAD PARAMETERS WITHIN NORMAL LIMITS  NORMAL DEVICE FUNCTION   GV   Cardiac Electrophysiology Report      gutnsghtqmtbcxkmitmmsmkbhn5bgwq9o12py5q56i7v54sd3fwj58mrh3s2xbclf9z7129w0o4579z61140j6y6aKROLBNT pdf   DEVICE TYPE CRT-P       Cardiac Electrophysiology Report [id number] 07:00AM Olga Mcdermott     Test Name Result Flag Reference   Cardiac Electrophysiology Report      qiphmuqdwuasvoiucwyamnhgyj1hpfh2l56cz2f17a4k26se0hdh80smt6m3ydnqr5t9422i6c1593i28999r7c2h pdf     Signatures   Electronically signed by : Alexia Harper RN; Mar  8 2017  2:22PM EST                       (Author)    Electronically signed by : Mel Barba DO; Mar 11 2017  4:29PM EST                       (Author)    "

## 2018-03-07 NOTE — PROGRESS NOTES
"  Discussion/Summary  Normal device function      Results/Data  Cardiac Device Remote [id number] 08:59AM Zmartinez Panda     Test Name Result Flag Reference   MISCELLANEOUS COMMENT (Report)     MERLIN TRANSMISSION - CORVUE ONLY: CORVUE IMPEDANCE MONITORING CROSSED BUT NOW WNL  WILL RECHECK IN 31 DAYS  NORMAL DEVICE FUNCTION  eb X0A** BATTERY VOLTAGE ADEQUATE (7 6 YRS)  BVP-89% (<90%)  ALL AVAILABLE LEAD PARAMETERS WITHIN NORMAL LIMITS  NO SIGNIFICANT HIGH RATE EPISODES  AF NOTED - (HX = AVN ABLATION) & PT ON ELIQUIS & BISOPROLOL  LMTKGDGFLW9LJDWSFUDMPJQ APPROPRIATELY  eb   Cardiac Electrophysiology Report      vlqambbkeajwmuvsuqztmpklct6nhfk2z29nq7d49o5g99lm5ipe83pof589d9093791v8w0i4489v8f3408z7155EDqsgjyw merlin  5 12 17 pdf   DEVICE TYPE CRT-P       Cardiac Electrophysiology Report [id number] 08:59AM Mulu Youngerin     Test Name Result Flag Reference   Cardiac Electrophysiology Report      chwkantkybuazhwowhrhcqxifd1rznt8e22tt0m46j9j68xb9zez17eas591y7657956y6e1z1297f0n1072z4563  pdf     Signatures   Electronically signed by : John Sepulveda, ; May 15 2017 12:25PM EST                       (Author)    Electronically signed by : Luis Gomez DO; May 24 2017  8:10AM EST                       (Author)    "

## 2018-03-07 NOTE — PROGRESS NOTES
"  Discussion/Summary  Normal device function      Results/Data  Cardiac Device Remote [id number] 08:03AM Mulu Mosqueda     Test Name Result Flag Reference   MISCELLANEOUS COMMENT (Report)     NONBILLABLE- MERLIN TRANSMISSION ALERT FOR 12 BEAT NSVT, AVG CL~320MS  EF-44% (ECHO 7/13/16)  PT ON ELIQUIS & BISOPROLOL  BATTERY VOLTAGE ADEQUATE (7 5-8 1 YRS)  BVP-96%  ALL AVAILABLE LEAD PARAMETERS WITHIN NORMAL LIMITS  CORVUE IMPEDANCE MONITORING WITHIN NORMAL LIMITS  NORMAL DEVICE FUNCTION   GV   Cardiac Electrophysiology Report      KGZOQCVNTCVK8ciqesrcsnqrnc58hy51a7789a5q4gfz6691hp2n59gqk8XUFSKPX pdf   DEVICE TYPE CRT-P       Cardiac Electrophysiology Report [id number] 08:03AM Mulu Mosqueda     Test Name Result Flag Reference   Cardiac Electrophysiology Report      NBXUEJZFGVND1hpbklgwphrdxv98qn06h8974m4w8kop9244np9x82ewf8 pdf     Signatures   Electronically signed by : Starr Patiño RN; Sep 26 2017  8:21AM EST                       (Author)    Electronically signed by : Luis Gomez DO; Oct  3 2017  5:58PM EST                       (Author)    "

## 2018-03-07 NOTE — PROGRESS NOTES
"  Discussion/Summary  Normal device function      Results/Data  Results   Cardiac Device Remote [id number] 01:49PM Olga Mcdermott     Test Name Result Flag Reference   MISCELLANEOUS COMMENT (Report)     MERLIN TRANSMISSION - CORVUE ONLY: CORVUE IMPEDANCE THRESHOLD CROSSED LAST 11 DAYS  PT NOT ON ANY DIURETIC  WILL RECHECK IN 1 MONTH  TASKED NP  BATTERY VOLTAGE ADEQUATE (7 3-7 9 YRS)  BVP>99%  NO SIGNIFICANT HIGH RATE EPISODES  ALL AVAILABLE LEAD PARAMETERS WITHIN NORMAL LIMITS  NORMAL DEVICE FUNCTION  GV   Cardiac Electrophysiology Report      swwvmwimxsskpqulxvtfigbfbp8bjfh7k64tv4s73z5u39mg2pvx20snodu6p5853t1716bv4gn2yo7qt5g78395zomaeeio  pdf   DEVICE TYPE CRT-P       Cardiac Electrophysiology Report [id number] 01:49PM Olga Mcdermott     Test Name Result Flag Reference   Cardiac Electrophysiology Report      ereyzmpqkkpewovlkhcheexzhn9jxsi2b25nk4v61x4b81aj7gxb37irgcu5f9083c5338dx5ui0lf5gl7h04354a  pdf     Signatures   Electronically signed by : Alexia Harper RN; May 24 2016 12:59PM EST                       (Author)    Electronically signed by : Mel Barba DO; May 28 2016  2:36PM EST                       (Author)    "

## 2018-03-07 NOTE — PROGRESS NOTES
"  Discussion/Summary  Normal device function      Results/Data  Cardiac Device Remote [id number] 06:00AM Sonia First     Test Name Result Flag Reference   MISCELLANEOUS COMMENT (Report)     MERLIN TRANSMISSION - CORVUE ONLY: CORVUE IMPEDANCE MONITORING WITHIN NORMAL LIMITS; BVP = >99%; NORMAL DEVICE FUNCTION  eb X0A0A**BATTERY VOLTAGE ADEQUATE (7 7 YRS); NO SIGNIFICANT HIGH RATE EPISODES; ALL AVAILABLE LEAD PARAMETERS APPEAR WITHIN NORMAL LIMITS/STABLE  eb   Cardiac Electrophysiology Report      hrbrfiaojtrfkicscmzonzvnpd8afwu9l64pj7f91a4t16tt6zhg42ehbx9526mm5n49w5657j548oz4222343608HNdugdof merlin  9 7  16 pdf   DEVICE TYPE CRT-P       Cardiac Electrophysiology Report [id number] 06:00AM Sonia First     Test Name Result Flag Reference   Cardiac Electrophysiology Report      mzzkhbpdjpmyxjxupuqncjndtj2eplf4t52jw6a62g6a10ti8akm12q[id number]  pdf     Signatures   Electronically signed by : Brittanie Ortiz, ; Sep  7 2016  9:48AM EST                       (Author)    Electronically signed by : GEE Waite ; Sep 17 2016 11:06PM EST                       (Author)    "

## 2018-03-07 NOTE — PROGRESS NOTES
"  Discussion/Summary  Normal device function      Results/Data  Cardiac Device Remote [id number] 07:00AM Sina Valencia     Test Name Result Flag Reference   MISCELLANEOUS COMMENT (Report)     MERLIN TRANSMISSION: BATTERY VOLTAGE ADEQUATE  (8 YRS)  BVP 98%  ALL AVAILABLE LEAD PARAMETERS WITHIN NORMAL LIMITS  1 VHR EPISODE DETECTED 7 BEATS @ 305ms  EF 44% (2016)  PATIENT IS ON ELIQUIS AND BISOPROLOL  CORVUE IMPEDANCE MONITORING WITHIN NORMAL LIMITS  NORMAL DEVICE FUNCTION  ---GUILLEN   Cardiac Electrophysiology Report      LGUGIMLAD[id number]mtrycf45-02-59  pdf   DEVICE TYPE CRT-P       Cardiac Electrophysiology Report [id number] 07:00AM Sina Valencia     Test Name Result Flag Reference   Cardiac Electrophysiology Report      KZGYIQVIMLCF0lwktwdwwkgoxd5[id number]  pdf     Signatures   Electronically signed by : Gonzalo Roger, ; Dec 18 2017  2:48PM EST                       (Author)    Electronically signed by : GEE Ferro ; Dec 18 2017  3:15PM EST                       (Author)    "

## 2018-03-07 NOTE — PROGRESS NOTES
"  Discussion/Summary  Normal device function      Results/Data  Cardiac Device Remote [id number] 06:00AM Marielos Mattson     Test Name Result Flag Reference   MISCELLANEOUS COMMENT (Report)     MERLIN TRANSMISSION - CORVUE ONLY: CORVUE IMPEDANCE MONITORING WITHIN NORMAL LIMITS  PACEMAKER FUNCTIONING APPROPRIATELY  EB X0A** BATTERY VOLTAGE ADEQUATE (7 4 YRS)  BVP-97%  3 NSVT EPISODE WITH MOST RECENT EPISODE @ 14 BEATS/AVG  MS  EF-29% (ECHO 3/3/15)  PT ON ELIQUIS & BISOPROLOL  ALL AVAILABLE LEAD PARAMETERS WITHIN NORMAL LIMITS  eb   Cardiac Electrophysiology Report      cptveqwpajqqmxcupaofadmwlq0uzgx3s26qn4i78l1h14hm4air05ebd39w739j35ib7386b4m1dqk4002510549CAkbidlh merlin  4 12 17 pdf   DEVICE TYPE CRT-P       Cardiac Electrophysiology Report [id number] 06:00AM Marielos Mattson     Test Name Result Flag Reference   Cardiac Electrophysiology Report      xdiauhaonkrcmosnisydxmrpkb8wrxg9r92zv6y74c0q11ck0jmr86s[id number]  pdf     Signatures   Electronically signed by : Quirino Davis, ; Apr 12 2017  3:48PM EST                       (Author)    Electronically signed by : Jamie Marti DO;  Apr 15 2017  9:10AM EST                       (Author)    "

## 2018-03-07 NOTE — PROGRESS NOTES
"  Discussion/Summary  Normal device function     Brief 7 beat run of VT  on beta blocker  Results/Data  Cardiac Device Remote [id number] 08:01AM Abdifatah Gross     Test Name Result Flag Reference   MISCELLANEOUS COMMENT      MERLIN TRANSMISSION - CORVUE ONLY: CORVUE IMPEDANCE MONITORING WITHIN NORMAL LIMITS  BVP 99%  ALL AVAILABLE LEAD PARAMETERS WITHIN NORMAL LIMITS  2 NSVT EPISODES DETECTED  6 BEATS @ 230ms  NO THERAPIES GIVEN  NORMAL DEVICE FUNCTION  ---GUILLEN   Cardiac Electrophysiology Report      hjwejkiskmnkwlmlnvrtlkrcze6dbyn7j21sp5b04k4d04qr7ltw37cxhb89j7s1733in7yc5999xss78w68uw0z6xuecbmcX  pdf   DEVICE TYPE CRT-P       Cardiac Electrophysiology Report [id number] 08:01AM Abdifatah Gross     Test Name Result Flag Reference   Cardiac Electrophysiology Report      uxhrazdfnaiepuncocohdnrqig2pbje3c65ax8d17q0g64mx5xul74yxuu68g5y5965gd8kb4244uhs28w54mt1z5  pdf     Signatures   Electronically signed by : Roxana Martinez, ; Jan 4 2017  3:48PM EST                       (Author)    Electronically signed by : GEE Mendoza ; Jan 4 2017  8:40PM EST                       (Author)    "

## 2018-03-07 NOTE — PROGRESS NOTES
"  Discussion/Summary  Normal device function     Adequate BiV pacing  Results/Data  Cardiac Device In Clinic [id number] 05:13PM Zachary Setswana     Test Name Result Flag Reference   MISCELLANEOUS COMMENT (Report)     DEVICE INTERROGATED IN THE [facility] OFFICE  BATTERY VOLTAGE ADEQUATE (7 1 YR)  BP 97%  ALL LEAD PARAMETERS WITHIN NORMAL LIMITS  NO SIGNIFICANT HIGH RATE EPISODES  CORVUE IMPEDANCE MONITORING WITHIN NORMAL LIMITS  NO PROGRAMMING CHANGES MADE TO DEVICE PARAMETERS  NORMAL BiV ICD FUNCTION  RG   Cardiac Electrophysiology Report      slhbiome[id number]-Quadra([id number]  pdf   DEVICE TYPE CRT-P       Cardiac Electrophysiology Report [id number] 05:13PM Zachary Setswana     Test Name Result Flag Reference   Cardiac Electrophysiology Report      dpclqezhtwijnfzbctepvaxmxj6sxge9c05uy9t[id number]  pdf     Signatures   Electronically signed by : Jojo Green, ; Aug  1 2017  1:25PM EST                       (Author)    Electronically signed by : GEE Haji ; Aug  6 2017  2:59PM EST                       (Author)    "

## 2018-04-24 PROCEDURE — 88305 TISSUE EXAM BY PATHOLOGIST: CPT | Performed by: PATHOLOGY

## 2018-04-25 RX ORDER — FUROSEMIDE 20 MG/1
20 TABLET ORAL DAILY
COMMUNITY
End: 2018-05-03 | Stop reason: SDUPTHER

## 2018-04-26 ENCOUNTER — LAB REQUISITION (OUTPATIENT)
Dept: LAB | Facility: HOSPITAL | Age: 83
End: 2018-04-26
Payer: COMMERCIAL

## 2018-04-26 DIAGNOSIS — D48.5 NEOPLASM OF UNCERTAIN BEHAVIOR OF SKIN: ICD-10-CM

## 2018-04-28 DIAGNOSIS — I48.0 PAROXYSMAL ATRIAL FIBRILLATION (HCC): Primary | ICD-10-CM

## 2018-04-28 RX ORDER — APIXABAN 2.5 MG/1
TABLET, FILM COATED ORAL
Qty: 180 TABLET | Refills: 2 | Status: SHIPPED | OUTPATIENT
Start: 2018-04-28 | End: 2018-05-03 | Stop reason: SDUPTHER

## 2018-05-01 ENCOUNTER — IN-CLINIC DEVICE VISIT (OUTPATIENT)
Dept: CARDIOLOGY CLINIC | Facility: CLINIC | Age: 83
End: 2018-05-01
Payer: COMMERCIAL

## 2018-05-01 DIAGNOSIS — I50.22 CHRONIC SYSTOLIC CONGESTIVE HEART FAILURE (HCC): ICD-10-CM

## 2018-05-01 DIAGNOSIS — I25.5 ISCHEMIC CARDIOMYOPATHY: ICD-10-CM

## 2018-05-01 DIAGNOSIS — Z95.0 PRESENCE OF PERMANENT CARDIAC PACEMAKER: ICD-10-CM

## 2018-05-01 DIAGNOSIS — I48.20 CHRONIC ATRIAL FIBRILLATION (HCC): Primary | ICD-10-CM

## 2018-05-01 PROCEDURE — 93294 REM INTERROG EVL PM/LDLS PM: CPT | Performed by: INTERNAL MEDICINE

## 2018-05-01 PROCEDURE — 93296 REM INTERROG EVL PM/IDS: CPT | Performed by: INTERNAL MEDICINE

## 2018-05-01 NOTE — PROGRESS NOTES
KOMAL CRT-P (VVIR - 70 PPM)  MERLIN CRT-P TRANSMISSION:  BATTERY VOLTAGE ADEQUATE (7 2 YR)   BP >99% (VVIR 70 PPM)   ALL AVAILABLE LEAD PARAMETERS WITHIN NORMAL LIMITS   1 NEW VHR FOR NSVT VS RVR (7 @ 190 BPM)    PT TAKES ELIQUIS AND FUROSEMIDE   CORVUE IMPEDANCE MONITORING WITHIN NORMAL LIMITS   NORMAL DEVICE FUNCTION  Rosio Urbina

## 2018-05-03 ENCOUNTER — OFFICE VISIT (OUTPATIENT)
Dept: CARDIOLOGY CLINIC | Facility: CLINIC | Age: 83
End: 2018-05-03
Payer: COMMERCIAL

## 2018-05-03 VITALS
DIASTOLIC BLOOD PRESSURE: 74 MMHG | SYSTOLIC BLOOD PRESSURE: 148 MMHG | BODY MASS INDEX: 31.4 KG/M2 | HEIGHT: 68 IN | WEIGHT: 207.2 LBS | HEART RATE: 70 BPM

## 2018-05-03 DIAGNOSIS — N17.9 ACUTE RENAL FAILURE SUPERIMPOSED ON STAGE 3 CHRONIC KIDNEY DISEASE, UNSPECIFIED ACUTE RENAL FAILURE TYPE: ICD-10-CM

## 2018-05-03 DIAGNOSIS — Z95.0 BIVENTRICULAR CARDIAC PACEMAKER IN SITU: ICD-10-CM

## 2018-05-03 DIAGNOSIS — N18.3 ACUTE RENAL FAILURE SUPERIMPOSED ON STAGE 3 CHRONIC KIDNEY DISEASE, UNSPECIFIED ACUTE RENAL FAILURE TYPE: ICD-10-CM

## 2018-05-03 DIAGNOSIS — Z79.01 ANTICOAGULATION ADEQUATE: ICD-10-CM

## 2018-05-03 DIAGNOSIS — I48.91 ATRIAL FIBRILLATION, UNSPECIFIED TYPE (HCC): Primary | ICD-10-CM

## 2018-05-03 DIAGNOSIS — I10 ESSENTIAL HYPERTENSION: Chronic | ICD-10-CM

## 2018-05-03 DIAGNOSIS — I48.0 PAROXYSMAL ATRIAL FIBRILLATION (HCC): ICD-10-CM

## 2018-05-03 DIAGNOSIS — I42.0 DILATED CARDIOMYOPATHY (HCC): ICD-10-CM

## 2018-05-03 PROCEDURE — 99214 OFFICE O/P EST MOD 30 MIN: CPT | Performed by: INTERNAL MEDICINE

## 2018-05-03 PROCEDURE — 93000 ELECTROCARDIOGRAM COMPLETE: CPT | Performed by: INTERNAL MEDICINE

## 2018-05-03 RX ORDER — FUROSEMIDE 20 MG/1
20 TABLET ORAL DAILY
Qty: 90 TABLET | Refills: 3 | Status: SHIPPED | OUTPATIENT
Start: 2018-05-03 | End: 2019-03-25 | Stop reason: SDUPTHER

## 2018-05-03 RX ORDER — BISOPROLOL FUMARATE AND HYDROCHLOROTHIAZIDE 2.5; 6.25 MG/1; MG/1
1 TABLET ORAL DAILY
Qty: 90 TABLET | Refills: 3 | Status: CANCELLED | OUTPATIENT
Start: 2018-05-03

## 2018-05-03 RX ORDER — BISOPROLOL FUMARATE 5 MG/1
5 TABLET ORAL DAILY
Qty: 90 TABLET | Refills: 3 | Status: SHIPPED | OUTPATIENT
Start: 2018-05-03 | End: 2018-12-31

## 2018-05-03 NOTE — PROGRESS NOTES
Cardiology Follow Up    Daren Lagunas  6/16/1927  [id number]  HEART & VASCULAR [facility]  [address] [place] [address] [address]    1  Atrial fibrillation, unspecified type ([place] [address] )  POCT ECG    bisoprolol-hydrochlorothiazide (ZIAC) 2 5-6 25 MG per tablet   2  Dilated cardiomyopathy ([facility] )  POCT ECG   3  Paroxysmal atrial fibrillation (HCC)  apixaban (ELIQUIS) 2 5 mg   4  Essential hypertension     5  Biventricular cardiac pacemaker in situ     6  Anticoagulation adequate         Interval History: f/u for cardiomyopathy  Is getting more swelling  Patient Active Problem List   Diagnosis    Acute renal failure superimposed on stage 3 chronic kidney disease ([place] [address] )    Dehydration    Diarrhea    Essential hypertension    A-fib ([place] [address] )    Cardiomyopathy ([place] [address] )    BPH (benign prostatic hyperplasia)    Biventricular cardiac pacemaker in situ    Anticoagulation adequate     Past Medical History:   Diagnosis Date    BPH (benign prostatic hypertrophy)     Hypertension      Social History     Social History    Marital status:      Spouse name: N/A    Number of children: N/A    Years of education: N/A     Occupational History    Not on file  Social History Main Topics    Smoking status: Never Smoker    Smokeless tobacco: Never Used    Alcohol use Yes    Drug use: No    Sexual activity: Not on file     Other Topics Concern    Not on file     Social History Narrative    No narrative on file      History reviewed  No pertinent family history    Past Surgical History:   Procedure Laterality Date    CARDIAC PACEMAKER PLACEMENT      CARDIAC SURGERY         Current Outpatient Prescriptions:     bisoprolol-hydrochlorothiazide (ZIAC) 2 5-6 25 MG per tablet, Take 1 tablet by mouth daily, Disp: , Rfl:     ELIQUIS 2 5 MG, TAKE 1 TABLET TWICE A DAY, Disp: 180 tablet, Rfl: 2    finasteride (PROSCAR) 5 mg tablet, Take 5 mg by mouth daily, Disp: , Rfl:     furosemide (LASIX) 20 mg tablet, Take 20 mg by mouth Daily  , Disp: , Rfl:     loratadine (CLARITIN REDITABS) 10 MG dissolvable tablet, Take 1 tablet by mouth daily, Disp: , Rfl:     Multiple Vitamins-Minerals (CENTRUM SILVER 50+MEN PO), Take 1 tablet by mouth daily, Disp: , Rfl:     Omega-3 Fatty Acids (FISH OIL PO), Take 1 tablet by mouth daily, Disp: , Rfl:   Allergies   Allergen Reactions    Adhesive  [Medical Tape]     Neomycin-Bacitracin Zn-Polymyx     Neosporin [Neomycin-Polymyxin-Gramicidin]        Labs:  Lab Requisition on 04/24/2018   Component Date Value    Case Report 04/24/2018                      Value:Surgical Pathology Report                         Case: [id number]                                   Authorizing Provider:  Quentin Paiz MD            Collected:           04/24/2018                   Pathologist:           Dede Sweeney MD           Received:            04/26/2018 0943              Specimen:    Skin, Other, Lt back                                                                       Final Diagnosis 04/24/2018                      Value: This result contains rich text formatting which cannot be displayed here   Additional Information 04/24/2018                      Value: This result contains rich text formatting which cannot be displayed here  Cleo Joyce Gross Description 04/24/2018                      Value: This result contains rich text formatting which cannot be displayed here   Clinical Information 04/24/2018                      Value:D48 5     Imaging: No results found  Review of Systems:  Review of Systems  12 point ROS positive swelling otherwise no complaints  Physical Exam:  Physical Exam    GEN: NAD, Alert and oriented, well appearing  HEENT:Head, neck, ears, oral pharynx: Mucus membranes moist, oral pharynx clear, nares clear   External ears normal  EYES: Pupils equal, sclera anicteric  NECK: No JVD  CARDIOVASCULAR: RRR, No murmur, rub, gallops S1,S2  LUNGS: Clear To auscultation bilaterally, no rales, no rhonchi, no wheezes  ABDOMEN: Soft, non-distended, non-tender, positive bowel sounds  EXTREMITIES/VASCULAR: 1-2+ edema  PSYCH: Normal Affect  NEURO: Grossly intact, moving all extremiteis equal, face symetric  HEME: No bleeding, bruising, petechia  SKIN: No significant rashes      Discussion/Summary: 1) afib on eliquis s/p av node ablation 2) LE swelling will start lasix daily patient will monitor BP and if too low will skip 3) biv pacer in situ working appropriately  4) CKD - 1 61  Has RX from [facility] in one month 5) anticoagulation - continue eliquis

## 2018-05-22 PROCEDURE — 88305 TISSUE EXAM BY PATHOLOGIST: CPT | Performed by: PATHOLOGY

## 2018-05-23 ENCOUNTER — LAB REQUISITION (OUTPATIENT)
Dept: LAB | Facility: HOSPITAL | Age: 83
End: 2018-05-23
Payer: COMMERCIAL

## 2018-05-23 DIAGNOSIS — D23.9 OTHER BENIGN NEOPLASM OF SKIN, UNSPECIFIED: ICD-10-CM

## 2018-08-02 ENCOUNTER — IN-CLINIC DEVICE VISIT (OUTPATIENT)
Dept: CARDIOLOGY CLINIC | Facility: CLINIC | Age: 83
End: 2018-08-02
Payer: COMMERCIAL

## 2018-08-02 DIAGNOSIS — I50.22 CHRONIC SYSTOLIC HEART FAILURE (HCC): ICD-10-CM

## 2018-08-02 DIAGNOSIS — I48.20 CHRONIC ATRIAL FIBRILLATION (HCC): Primary | ICD-10-CM

## 2018-08-02 DIAGNOSIS — Z95.0 PRESENCE OF PERMANENT CARDIAC PACEMAKER: ICD-10-CM

## 2018-08-02 DIAGNOSIS — I25.5 ISCHEMIC CARDIOMYOPATHY: ICD-10-CM

## 2018-08-02 PROCEDURE — 93280 PM DEVICE PROGR EVAL DUAL: CPT | Performed by: INTERNAL MEDICINE

## 2018-08-02 NOTE — PROGRESS NOTES
Results for orders placed or performed in visit on 08/02/18   Cardiac EP device report    Narrative    SJM CRT-P (VVIR - 70 PPM)  DEVICE INTERROGATED IN THE [place] OFFICE  BATTERY VOLTAGE ADEQUATE  (7 1 YRS)  BVP 99%  ALL LEAD PARAMETERS WITHIN NORMAL LIMITS  2 NSVT EPISODES DETECTED 7 BEATS@ 305ms  CORVUE IMPEDANCE THRESHOLD CROSSED FOR 9 DAYS HOWEVER RETURNING TO NORMAL  WILL RECHECK IN 1 MONTH  NO PROGRAMMING CHANGES MADE TO DEVICE PARAMETERS  NORMAL DEVICE FUNCTION  ---GUILLEN

## 2018-09-06 ENCOUNTER — REMOTE DEVICE CLINIC VISIT (OUTPATIENT)
Dept: CARDIOLOGY CLINIC | Facility: CLINIC | Age: 83
End: 2018-09-06
Payer: COMMERCIAL

## 2018-09-06 DIAGNOSIS — I50.42 CHRONIC COMBINED SYSTOLIC AND DIASTOLIC CONGESTIVE HEART FAILURE (HCC): ICD-10-CM

## 2018-09-06 DIAGNOSIS — Z95.0 PACEMAKER: ICD-10-CM

## 2018-09-06 DIAGNOSIS — I48.20 CHRONIC ATRIAL FIBRILLATION (HCC): Primary | ICD-10-CM

## 2018-09-06 PROCEDURE — 93299 PR REM INTERROG ICPMS/SCRMS <30 D TECH REVIEW: CPT | Performed by: INTERNAL MEDICINE

## 2018-09-06 PROCEDURE — 93297 REM INTERROG DEV EVAL ICPMS: CPT | Performed by: INTERNAL MEDICINE

## 2018-09-06 NOTE — PROGRESS NOTES
Results for orders placed or performed in visit on 09/06/18   Cardiac EP device report    Narrative    SJGEE CRT-P (VVIR - 70 PPM)  [facility] TRANSMISSION - CORVUE ONLY: CORVUE IMPEDANCE MONITORING WITHIN NORMAL LIMITS  BATTERY ADEQUATE (7 YRS)  BVP 99%  ALL AVAILABLE LEAD PARAMETERS WITHIN NORMAL LIMITS  4 HVR EPISODES, 2 EGMs AVAILABLE SUGGESTING AF/RVR; CANNOT RULE OUT NSVT (LONGEST 18 B; HIGHEST RATE 195 BPM)  PT TAKES BISOPROLOL & ELIQUIS  NORMAL DEVICE FUNCTION   PL

## 2018-10-03 LAB
ALBUMIN SERPL-MCNC: 3.9 G/DL (ref 3.6–5.1)
ALBUMIN/GLOB SERPL: 1.2 (CALC) (ref 1–2.5)
ALP SERPL-CCNC: 62 U/L (ref 40–115)
ALT SERPL-CCNC: 16 U/L (ref 9–46)
AST SERPL-CCNC: 21 U/L (ref 10–35)
BASOPHILS # BLD AUTO: 47 CELLS/UL (ref 0–200)
BASOPHILS NFR BLD AUTO: 0.6 %
BILIRUB SERPL-MCNC: 0.6 MG/DL (ref 0.2–1.2)
BUN SERPL-MCNC: 29 MG/DL (ref 7–25)
BUN/CREAT SERPL: 16 (CALC) (ref 6–22)
CALCIUM SERPL-MCNC: 9 MG/DL (ref 8.6–10.3)
CHLORIDE SERPL-SCNC: 103 MMOL/L (ref 98–110)
CHOLEST SERPL-MCNC: 136 MG/DL
CHOLEST/HDLC SERPL: 4.9 (CALC)
CO2 SERPL-SCNC: 24 MMOL/L (ref 20–32)
CREAT SERPL-MCNC: 1.81 MG/DL (ref 0.7–1.11)
EOSINOPHIL # BLD AUTO: 450 CELLS/UL (ref 15–500)
EOSINOPHIL NFR BLD AUTO: 5.7 %
ERYTHROCYTE [DISTWIDTH] IN BLOOD BY AUTOMATED COUNT: 13.2 % (ref 11–15)
GLOBULIN SER CALC-MCNC: 3.2 G/DL (CALC) (ref 1.9–3.7)
GLUCOSE SERPL-MCNC: 108 MG/DL (ref 65–99)
HCT VFR BLD AUTO: 31.7 % (ref 38.5–50)
HDLC SERPL-MCNC: 28 MG/DL
HGB BLD-MCNC: 10 G/DL (ref 13.2–17.1)
LDLC SERPL CALC-MCNC: 85 MG/DL (CALC)
LYMPHOCYTES # BLD AUTO: 2291 CELLS/UL (ref 850–3900)
LYMPHOCYTES NFR BLD AUTO: 29 %
MCH RBC QN AUTO: 27.6 PG (ref 27–33)
MCHC RBC AUTO-ENTMCNC: 31.5 G/DL (ref 32–36)
MCV RBC AUTO: 87.6 FL (ref 80–100)
MONOCYTES # BLD AUTO: 766 CELLS/UL (ref 200–950)
MONOCYTES NFR BLD AUTO: 9.7 %
NEUTROPHILS # BLD AUTO: 4345 CELLS/UL (ref 1500–7800)
NEUTROPHILS NFR BLD AUTO: 55 %
NONHDLC SERPL-MCNC: 108 MG/DL (CALC)
PLATELET # BLD AUTO: 289 THOUSAND/UL (ref 140–400)
PMV BLD REES-ECKER: 9.9 FL (ref 7.5–12.5)
POTASSIUM SERPL-SCNC: 4.3 MMOL/L (ref 3.5–5.3)
PROT SERPL-MCNC: 7.1 G/DL (ref 6.1–8.1)
PSA SERPL-MCNC: 1.8 NG/ML
RBC # BLD AUTO: 3.62 MILLION/UL (ref 4.2–5.8)
SL AMB EGFR AFRICAN AMERICAN: 37 ML/MIN/1.73M2
SL AMB EGFR NON AFRICAN AMERICAN: 32 ML/MIN/1.73M2
SODIUM SERPL-SCNC: 136 MMOL/L (ref 135–146)
T4 FREE SERPL-MCNC: 1.2 NG/DL (ref 0.8–1.8)
TRIGL SERPL-MCNC: 126 MG/DL
TSH SERPL-ACNC: 2.29 MIU/L (ref 0.4–4.5)
WBC # BLD AUTO: 7.9 THOUSAND/UL (ref 3.8–10.8)

## 2018-10-08 ENCOUNTER — TELEPHONE (OUTPATIENT)
Dept: NON INVASIVE DIAGNOSTICS | Facility: HOSPITAL | Age: 83
End: 2018-10-08

## 2018-10-10 ENCOUNTER — TELEPHONE (OUTPATIENT)
Dept: CARDIOLOGY CLINIC | Facility: CLINIC | Age: 83
End: 2018-10-10

## 2018-10-10 ENCOUNTER — TELEPHONE (OUTPATIENT)
Dept: NON INVASIVE DIAGNOSTICS | Facility: HOSPITAL | Age: 83
End: 2018-10-10

## 2018-10-12 LAB
FERRITIN SERPL-MCNC: 9 NG/ML (ref 20–380)
FOLATE SERPL-MCNC: 16.6 NG/ML
IRON SATN MFR SERPL: 4 % (CALC) (ref 15–60)
IRON SERPL-MCNC: 21 MCG/DL (ref 50–180)
TIBC SERPL-MCNC: 509 MCG/DL (CALC) (ref 250–425)
TRANSFERRIN SERPL-MCNC: 363 MG/DL (ref 188–341)
VIT B12 SERPL-MCNC: 312 PG/ML (ref 200–1100)

## 2018-10-12 NOTE — TELEPHONE ENCOUNTER
Pt called back-said he should be home all day  Also, has some questions about his meds  Thanks    Call back # [phone]

## 2018-10-17 ENCOUNTER — TELEPHONE (OUTPATIENT)
Dept: NON INVASIVE DIAGNOSTICS | Facility: HOSPITAL | Age: 83
End: 2018-10-17

## 2018-10-18 PROBLEM — D50.9 IRON DEFICIENCY ANEMIA: Status: ACTIVE | Noted: 2018-10-18

## 2018-10-18 PROBLEM — Z12.11 SPECIAL SCREENING FOR MALIGNANT NEOPLASMS, COLON: Status: ACTIVE | Noted: 2018-10-18

## 2018-10-18 PROBLEM — C18.9 MALIGNANT NEOPLASM OF COLON (HCC): Status: ACTIVE | Noted: 2018-10-18

## 2018-10-31 ENCOUNTER — TELEPHONE (OUTPATIENT)
Dept: NON INVASIVE DIAGNOSTICS | Facility: HOSPITAL | Age: 83
End: 2018-10-31

## 2018-11-01 LAB
BASOPHILS # BLD AUTO: 37 CELLS/UL (ref 0–200)
BASOPHILS NFR BLD AUTO: 0.5 %
EOSINOPHIL # BLD AUTO: 540 CELLS/UL (ref 15–500)
EOSINOPHIL NFR BLD AUTO: 7.3 %
ERYTHROCYTE [DISTWIDTH] IN BLOOD BY AUTOMATED COUNT: 17.8 % (ref 11–15)
HCT VFR BLD AUTO: 33.1 % (ref 38.5–50)
HGB BLD-MCNC: 10.4 G/DL (ref 13.2–17.1)
LYMPHOCYTES # BLD AUTO: 1628 CELLS/UL (ref 850–3900)
LYMPHOCYTES NFR BLD AUTO: 22 %
MCH RBC QN AUTO: 28.4 PG (ref 27–33)
MCHC RBC AUTO-ENTMCNC: 31.4 G/DL (ref 32–36)
MCV RBC AUTO: 90.4 FL (ref 80–100)
MONOCYTES # BLD AUTO: 918 CELLS/UL (ref 200–950)
MONOCYTES NFR BLD AUTO: 12.4 %
NEUTROPHILS # BLD AUTO: 4277 CELLS/UL (ref 1500–7800)
NEUTROPHILS NFR BLD AUTO: 57.8 %
PLATELET # BLD AUTO: 274 THOUSAND/UL (ref 140–400)
PMV BLD REES-ECKER: 9.8 FL (ref 7.5–12.5)
RBC # BLD AUTO: 3.66 MILLION/UL (ref 4.2–5.8)
WBC # BLD AUTO: 7.4 THOUSAND/UL (ref 3.8–10.8)

## 2018-11-08 ENCOUNTER — REMOTE DEVICE CLINIC VISIT (OUTPATIENT)
Dept: CARDIOLOGY CLINIC | Facility: CLINIC | Age: 83
End: 2018-11-08
Payer: COMMERCIAL

## 2018-11-08 DIAGNOSIS — I48.21 PERMANENT ATRIAL FIBRILLATION (HCC): Primary | ICD-10-CM

## 2018-11-08 DIAGNOSIS — I50.22 CHRONIC SYSTOLIC CONGESTIVE HEART FAILURE (HCC): ICD-10-CM

## 2018-11-08 DIAGNOSIS — I25.5 ISCHEMIC CARDIOMYOPATHY: ICD-10-CM

## 2018-11-08 DIAGNOSIS — Z95.0 PRESENCE OF PERMANENT CARDIAC PACEMAKER: ICD-10-CM

## 2018-11-08 PROCEDURE — 93294 REM INTERROG EVL PM/LDLS PM: CPT | Performed by: INTERNAL MEDICINE

## 2018-11-08 PROCEDURE — 93296 REM INTERROG EVL PM/IDS: CPT | Performed by: INTERNAL MEDICINE

## 2018-11-08 NOTE — PROGRESS NOTES
KOMAL CRT-P (VVIR 70)  [facility] TRANSMISSION:  BATTERY VOLTAGE ADEQUATE 6 9 YR)   BP 99%   ALL AVAILABLE LEAD PARAMETERS WITHIN NORMAL LIMITS   NO NEW HIGH RATE EPISODES (3 HVR EPISODES PREVIOUSLY REPORTED)    CORVUE IMPEDANCE MONITORING WITHIN NORMAL LIMITS   NORMAL DEVICE FUNCTION  Rosio Urbina

## 2018-11-14 RX ORDER — TRIAMCINOLONE ACETONIDE 1 MG/G
CREAM TOPICAL
Refills: 0 | COMMUNITY
Start: 2018-11-13 | End: 2021-01-01

## 2018-11-19 ENCOUNTER — OFFICE VISIT (OUTPATIENT)
Dept: CARDIOLOGY CLINIC | Facility: CLINIC | Age: 83
End: 2018-11-19
Payer: COMMERCIAL

## 2018-11-19 VITALS
BODY MASS INDEX: 28.85 KG/M2 | HEIGHT: 70 IN | DIASTOLIC BLOOD PRESSURE: 82 MMHG | WEIGHT: 201.5 LBS | SYSTOLIC BLOOD PRESSURE: 126 MMHG | HEART RATE: 70 BPM

## 2018-11-19 DIAGNOSIS — I10 ESSENTIAL HYPERTENSION: Chronic | ICD-10-CM

## 2018-11-19 DIAGNOSIS — I48.91 ATRIAL FIBRILLATION, UNSPECIFIED TYPE (HCC): Primary | ICD-10-CM

## 2018-11-19 DIAGNOSIS — Z79.01 ANTICOAGULATION ADEQUATE: ICD-10-CM

## 2018-11-19 DIAGNOSIS — Z95.0 BIVENTRICULAR CARDIAC PACEMAKER IN SITU: ICD-10-CM

## 2018-11-19 DIAGNOSIS — I42.0 DILATED CARDIOMYOPATHY (HCC): Chronic | ICD-10-CM

## 2018-11-19 PROCEDURE — 99214 OFFICE O/P EST MOD 30 MIN: CPT | Performed by: INTERNAL MEDICINE

## 2018-11-19 PROCEDURE — 93000 ELECTROCARDIOGRAM COMPLETE: CPT | Performed by: INTERNAL MEDICINE

## 2018-11-19 NOTE — LETTER
November 19, 2018     Referral [address] [place] [id number]    Patient: Bacilio Peter   YOB: 1927   Date of Visit: 11/19/2018       Dear Dr Sarah Marcelo:    Thank you for referring Olga Samaniego to me for evaluation  Below are my notes for this consultation  If you have questions, please do not hesitate to call me  I look forward to following your patient along with you  Sincerely,        Maco Sharif DO        CC: MD Justin Miller MD Volney Goody, DO  11/19/2018 10:08 AM  Sign at close encounter  EPS Progress Note - Bacilio Peter 80 y o  male MRN: [id number]           ASSESSMENT:  1  Atrial fibrillation, unspecified type ([facility] )  POCT ECG   2  Essential hypertension     3  Dilated [facility] ([facility] )     4  Biventricular cardiac pacemaker in situ     5  Anticoagulation adequate             PLAN:  Lasix as needed for leg swelling  biv pacer in situ continue eliquis  For colonoscopy and EGD hold 48 hours prior to procedures  HPI:   Interim history blood count has been low and is due for colonoscopy and egd        ROS:  negative, palpitations, tachycardia, irregular heart beat, stable dyspnea on exertion  fatigue  Trouble sleeping, lack of energy, skin rash and anemia  All other 12 point ros negative     Objective:     Vitals: Blood pressure 126/82, pulse 70, height 5' 9 5" (1 765 m), weight 91 4 kg (201 lb 8 oz)  , Body mass index is 29 33 kg/m²  ,        Physical Exam:    GEN: Bacilio Peter appears well, alert and oriented x 3, pleasant and cooperative   HEENT: pupils equal, round, and reactive to light; extraocular muscles intact  NECK: supple, no carotid bruits   HEART: regular rhythm, normal S1 and S2, no murmurs, clicks, gallops or rubs   LUNGS: clear to auscultation bilaterally; no wheezes, rales, or rhonchi   ABDOMEN: normal bowel sounds, soft, no tenderness, no distention  EXTREMITIES: peripheral pulses normal; no clubbing, cyanosis, or edema  NEURO: no focal findings   SKIN: normal without suspicious lesions on exposed skin    Medications:      Current Outpatient Prescriptions:     apixaban (ELIQUIS) 2 5 mg, Take 1 tablet (2 5 mg total) by mouth 2 (two) times a day, Disp: 180 tablet, Rfl: 3    bisoprolol (ZEBETA) 5 mg tablet, Take 1 tablet (5 mg total) by mouth daily (Patient taking differently: Take 2 5 mg by mouth daily  ), Disp: 90 tablet, Rfl: 3    finasteride (PROSCAR) 5 mg tablet, Take 5 mg by mouth daily, Disp: , Rfl:     IRON PO, Take 1 tablet by mouth 2 (two) times a day  , Disp: , Rfl:     loratadine (CLARITIN REDITABS) 10 MG dissolvable tablet, Take 1 tablet by mouth daily, Disp: , Rfl:     Multiple Vitamins-Minerals (CENTRUM SILVER 50+MEN PO), Take 1 tablet by mouth daily, Disp: , Rfl:     Omega-3 Fatty Acids (FISH OIL PO), Take 1 tablet by mouth daily, Disp: , Rfl:     triamcinolone (KENALOG) 0 1 % cream, APPLY TO SKIN TWICE DAILY TO AFFECTED AREAS ON BODY THROUGHOUT, Disp: , Rfl: 0    furosemide (LASIX) 20 mg tablet, Take 1 tablet (20 mg total) by mouth daily (Patient not taking: Reported on 11/19/2018 ), Disp: 90 tablet, Rfl: 3     Family History   Problem Relation Age of Onset    Heart disease Father     Heart disease Brother      Social History     Social History    Marital status:      Spouse name: N/A    Number of children: N/A    Years of education: N/A     Occupational History    Not on file       Social History Main Topics    Smoking status: Never Smoker    Smokeless tobacco: Never Used    Alcohol use Yes    Drug use: No    Sexual activity: Not on file     Other Topics Concern    Not on file     Social History Narrative    No narrative on file     History   Smoking Status    Never Smoker   Smokeless Tobacco    Never Used     History   Alcohol Use    Yes       Labs & Results:  Below is the patient's most recent value for Albumin, ALT, AST, BUN, Calcium, Chloride, Cholesterol, CO2, Creatinine, GFR, Glucose, HDL, Hematocrit, Hemoglobin, Hemoglobin A1C, LDL, Magnesium, Phosphorus, Platelets, Potassium, PSA, Sodium, Triglycerides, and WBC  Lab Results   Component Value Date    ALT 14 2017    AST 23 2017    BUN 29 (H) 10/02/2018    CALCIUM 9 0 10/02/2018     10/02/2018    CHOL 157 2015    CO2 24 10/02/2018    CREATININE 1 61 (H) 02/10/2017    HDL 28 (L) 10/02/2018    HCT 33 1 (L) 10/31/2018    HGB 10 4 (L) 10/31/2018    MG 2 0 2015     10/31/2018    K 4 3 10/02/2018    PSA 1 8 10/02/2018     2015    TRIG 126 10/02/2018    WBC 7 4 10/31/2018     Note: for a comprehensive list of the patient's lab results, access the Results Review activity  Results for orders placed during the hospital encounter of 16   Echo complete with contrast if indicated    Narrative [facility]  [facility], [address] [facility]  [phone]    Transthoracic Echocardiogram  2D, M-mode, Doppler, and Color Doppler    Study date:  2016    Patient: Valentina Trevino  MR number: [id number]  Account number: [de-identified]  : 1927  Age: 80 years  Gender: Male  Status: Outpatient  Location: [address] [place] [address] [facility] [place]  Height: 68 in  Weight: 204 lb  BP: 128/ 68 mmHg    Indications: Cardiomyopathy    Diagnoses: I42 0 - Dilated cardiomyopathy    Sonographer:  Florencio Gray  Primary Physician:  Christian Bailey MD  Referring Physician:  Mel Barba DO  Group:  [facility]  Interpreting Physician:  Silver Rodrigez MD    SUMMARY    LEFT VENTRICLE:  Size was at the upper limits of normal   Systolic function was at the lower limits of normal   There were no regional wall motion abnormalities  Wall thickness was mildly increased  LEFT ATRIUM:  The atrium was dilated  RIGHT ATRIUM:  The atrium was mildly dilated  MITRAL VALVE:  There was trace regurgitation      AORTIC VALVE:  There was trace regurgitation  TRICUSPID VALVE:  There was trace regurgitation  PULMONIC VALVE:  There was trace regurgitation  HISTORY: PRIOR HISTORY: HTN, dyslipidemia, atrial fibrillation, ventricular  tachycardia, pacemaker, cardiomyopathy, chronic kidney disease    PROCEDURE: The study was performed in the [address] [facility] [place]  This was a routine study  The transthoracic approach was used  The study  included complete 2D imaging, M-mode, complete spectral Doppler, and color  Doppler  Image quality was adequate  LEFT VENTRICLE: Size was at the upper limits of normal  Systolic function was  at the lower limits of normal  There were no regional wall motion  abnormalities  Wall thickness was mildly increased  DOPPLER: The study was not  technically sufficient to allow evaluation of LV diastolic function  RIGHT VENTRICLE: The size was normal  Systolic function was normal  Wall  thickness was normal     LEFT ATRIUM: The atrium was dilated  RIGHT ATRIUM: The atrium was mildly dilated  MITRAL VALVE: There was mild thickening  DOPPLER: There was trace  regurgitation  AORTIC VALVE: Leaflets exhibited mildly increased thickness and mild  calcification  DOPPLER: There was trace regurgitation  TRICUSPID VALVE: DOPPLER: There was trace regurgitation  PULMONIC VALVE: DOPPLER: There was trace regurgitation  PERICARDIUM: There was no pericardial effusion  The pericardium was normal in  appearance  AORTA: The root exhibited upper limit of normal size      SYSTEM MEASUREMENT TABLES    2D  %FS: 22 19 %  Ao Diam: 3 8 cm  EDV(Teich): 130 15 ml  EF Biplane: 48 34 %  EF(Cube): 52 89 %  EF(Teich): 44 45 %  ESV(Cube): 66 66 ml  ESV(Teich): 72 29 ml  IVSd: 1 26 cm  LA Area: 23 66 cm2  LA Diam: 4 12 cm  LVEDV MOD A2C: 210 13 ml  LVEDV MOD A4C: 162 58 ml  LVEDV MOD BP: 186 05 ml  LVEF MOD A2C: 53 95 %  LVEF MOD A4C: 44 19 %  LVESV MOD A2C: 96 76 ml  LVESV MOD A4C: 90 73 ml  LVESV MOD BP: 96 12 ml  LVIDd: 5 21 cm  LVIDs: 4 05 cm  LVLd A2C: 8 91 cm  LVLd A4C: 9 17 cm  LVLs A2C: 7 63 cm  LVLs A4C: 8 15 cm  LVPWd: 1 31 cm  RA Area: 24 24 cm2  RV Diam : 4 19 cm  SV MOD A2C: 113 37 ml  SV MOD A4C: 71 84 ml  SV(Cube): 74 85 ml  SV(Teich): 57 86 ml    CW  TR Vmax: 2 75 m/s  TR maxP 29 mmHg    MM  TAPSE: 2 37 cm    PW  AVC: 365 04 ms  E': 0 07 m/s  E/E': 9 52  MV A Jacob: 0 26 m/s  MV Dec Barrow: 3 81 m/s2  MV DecT: 173 14 ms  MV E Jacob: 0 66 m/s  MV E/A Ratio: 2 5    [facility] Accredited Echocardiography Laboratory    Prepared and electronically signed by    Marina Barakat MD  Signed 2016 09:55:04           Cardiac testing:   Results for orders placed during the hospital encounter of 16   Echo complete with contrast if indicated    Narrative [facility] 175  [facility] [place] [place], [address] [facility]  [phone]    Transthoracic Echocardiogram  2D, M-mode, Doppler, and Color Doppler    Study date:  2016    Patient: Shiloh Oneil  MR number: [id number]  Account number: [de-identified]  : 1927  Age: 80 years  Gender: Male  Status: Outpatient  Location: [address] [place] [address] [facility] [place]  Height: 68 in  Weight: 204 lb  BP: 128/ 68 mmHg    Indications: Cardiomyopathy    Diagnoses: I42 0 - Dilated cardiomyopathy    Sonographer:  Teofilo Doherty  Primary Physician:  Letty Fernandez MD  Referring Physician:  Steffen Rodríguez DO  Group:  [facility]  Interpreting Physician:  Marina Barakat MD    SUMMARY    LEFT VENTRICLE:  Size was at the upper limits of normal   Systolic function was at the lower limits of normal   There were no regional wall motion abnormalities  Wall thickness was mildly increased  LEFT ATRIUM:  The atrium was dilated  RIGHT ATRIUM:  The atrium was mildly dilated  MITRAL VALVE:  There was trace regurgitation  AORTIC VALVE:  There was trace regurgitation      TRICUSPID VALVE:  There was trace regurgitation  PULMONIC VALVE:  There was trace regurgitation  HISTORY: PRIOR HISTORY: HTN, dyslipidemia, atrial fibrillation, ventricular  tachycardia, pacemaker, cardiomyopathy, chronic kidney disease    PROCEDURE: The study was performed in the [address] [facility] [place]  This was a routine study  The transthoracic approach was used  The study  included complete 2D imaging, M-mode, complete spectral Doppler, and color  Doppler  Image quality was adequate  LEFT VENTRICLE: Size was at the upper limits of normal  Systolic function was  at the lower limits of normal  There were no regional wall motion  abnormalities  Wall thickness was mildly increased  DOPPLER: The study was not  technically sufficient to allow evaluation of LV diastolic function  RIGHT VENTRICLE: The size was normal  Systolic function was normal  Wall  thickness was normal     LEFT ATRIUM: The atrium was dilated  RIGHT ATRIUM: The atrium was mildly dilated  MITRAL VALVE: There was mild thickening  DOPPLER: There was trace  regurgitation  AORTIC VALVE: Leaflets exhibited mildly increased thickness and mild  calcification  DOPPLER: There was trace regurgitation  TRICUSPID VALVE: DOPPLER: There was trace regurgitation  PULMONIC VALVE: DOPPLER: There was trace regurgitation  PERICARDIUM: There was no pericardial effusion  The pericardium was normal in  appearance  AORTA: The root exhibited upper limit of normal size      SYSTEM MEASUREMENT TABLES    2D  %FS: 22 19 %  Ao Diam: 3 8 cm  EDV(Teich): 130 15 ml  EF Biplane: 48 34 %  EF(Cube): 52 89 %  EF(Teich): 44 45 %  ESV(Cube): 66 66 ml  ESV(Teich): 72 29 ml  IVSd: 1 26 cm  LA Area: 23 66 cm2  LA Diam: 4 12 cm  LVEDV MOD A2C: 210 13 ml  LVEDV MOD A4C: 162 58 ml  LVEDV MOD BP: 186 05 ml  LVEF MOD A2C: 53 95 %  LVEF MOD A4C: 44 19 %  LVESV MOD A2C: 96 76 ml  LVESV MOD A4C: 90 73 ml  LVESV MOD BP: 96 12 ml  LVIDd: 5 21 cm  LVIDs: 4 05 cm  LVLd A2C: 8 91 cm  LVLd A4C: 9 17 cm  LVLs A2C: 7 63 cm  LVLs A4C: 8 15 cm  LVPWd: 1 31 cm  RA Area: 24 24 cm2  RV Diam : 4 19 cm  SV MOD A2C: 113 37 ml  SV MOD A4C: 71 84 ml  SV(Cube): 74 85 ml  SV(Teich): 57 86 ml    CW  TR Vmax: 2 75 m/s  TR maxP 29 mmHg    MM  TAPSE: 2 37 cm    PW  AVC: 365 04 ms  E': 0 07 m/s  E/E': 9 52  MV A Jacob: 0 26 m/s  MV Dec Tucker: 3 81 m/s2  MV DecT: 173 14 ms  MV E Jacob: 0 66 m/s  MV E/A Ratio: 2 5    [employer] Accredited Echocardiography Laboratory    Prepared and electronically signed by    Asmita Andersen MD  Signed 2016 09:55:04       No results found for this or any previous visit  No results found for this or any previous visit  No results found for this or any previous visit        EKG personally reviewed by Royce Blas DO  afib with biv pacing

## 2018-11-19 NOTE — PROGRESS NOTES
EPS Progress Note - Onofre Luther 80 y o  male MRN: [id number]           ASSESSMENT:  1  Atrial fibrillation, unspecified type ([facility] [address] )  POCT ECG   2  Essential hypertension     3  Dilated cardiomyopathy ([facility] )     4  Biventricular cardiac pacemaker in situ     5  Anticoagulation adequate             PLAN:  Lasix as needed for leg swelling  biv pacer in situ continue eliquis  For colonoscopy and EGD hold 48 hours prior to procedures  HPI:   Interim history blood count has been low and is due for colonoscopy and egd        ROS:  negative, palpitations, tachycardia, irregular heart beat, stable dyspnea on exertion  fatigue  Trouble sleeping, lack of energy, skin rash and anemia  All other 12 point ros negative     Objective:     Vitals: Blood pressure 126/82, pulse 70, height 5' 9 5" (1 765 m), weight 91 4 kg (201 lb 8 oz)  , Body mass index is 29 33 kg/m²  ,        Physical Exam:    GEN: Onofre Luther appears well, alert and oriented x 3, pleasant and cooperative   HEENT: pupils equal, round, and reactive to light; extraocular muscles intact  NECK: supple, no carotid bruits   HEART: regular rhythm, normal S1 and S2, no murmurs, clicks, gallops or rubs   LUNGS: clear to auscultation bilaterally; no wheezes, rales, or rhonchi   ABDOMEN: normal bowel sounds, soft, no tenderness, no distention  EXTREMITIES: peripheral pulses normal; no clubbing, cyanosis, or edema  NEURO: no focal findings   SKIN: normal without suspicious lesions on exposed skin    Medications:      Current Outpatient Prescriptions:     apixaban (ELIQUIS) 2 5 mg, Take 1 tablet (2 5 mg total) by mouth 2 (two) times a day, Disp: 180 tablet, Rfl: 3    bisoprolol (ZEBETA) 5 mg tablet, Take 1 tablet (5 mg total) by mouth daily (Patient taking differently: Take 2 5 mg by mouth daily  ), Disp: 90 tablet, Rfl: 3    finasteride (PROSCAR) 5 mg tablet, Take 5 mg by mouth daily, Disp: , Rfl:     IRON PO, Take 1 tablet by mouth 2 (two) times a day  , Disp: , Rfl:     loratadine (CLARITIN REDITABS) 10 MG dissolvable tablet, Take 1 tablet by mouth daily, Disp: , Rfl:     Multiple Vitamins-Minerals (CENTRUM SILVER 50+MEN PO), Take 1 tablet by mouth daily, Disp: , Rfl:     Omega-3 Fatty Acids (FISH OIL PO), Take 1 tablet by mouth daily, Disp: , Rfl:     triamcinolone (KENALOG) 0 1 % cream, APPLY TO SKIN TWICE DAILY TO AFFECTED AREAS ON BODY THROUGHOUT, Disp: , Rfl: 0    furosemide (LASIX) 20 mg tablet, Take 1 tablet (20 mg total) by mouth daily (Patient not taking: Reported on 11/19/2018 ), Disp: 90 tablet, Rfl: 3     Family History   Problem Relation Age of Onset    Heart disease Father     Heart disease Brother      Social History     Social History    Marital status:      Spouse name: N/A    Number of children: N/A    Years of education: N/A     Occupational History    Not on file  Social History Main Topics    Smoking status: Never Smoker    Smokeless tobacco: Never Used    Alcohol use Yes    Drug use: No    Sexual activity: Not on file     Other Topics Concern    Not on file     Social History Narrative    No narrative on file     History   Smoking Status    Never Smoker   Smokeless Tobacco    Never Used     History   Alcohol Use    Yes       Labs & Results:  Below is the patient's most recent value for Albumin, ALT, AST, BUN, Calcium, Chloride, Cholesterol, CO2, Creatinine, GFR, Glucose, HDL, Hematocrit, Hemoglobin, Hemoglobin A1C, LDL, Magnesium, Phosphorus, Platelets, Potassium, PSA, Sodium, Triglycerides, and WBC     Lab Results   Component Value Date    ALT 14 09/27/2017    AST 23 09/27/2017    BUN 29 (H) 10/02/2018    CALCIUM 9 0 10/02/2018     10/02/2018    CHOL 157 01/09/2015    CO2 24 10/02/2018    CREATININE 1 61 (H) 02/10/2017    HDL 28 (L) 10/02/2018    HCT 33 1 (L) 10/31/2018    HGB 10 4 (L) 10/31/2018    MG 2 0 03/04/2015     10/31/2018    K 4 3 10/02/2018    PSA 1 8 10/02/2018     03/05/2015 TRIG 126 10/02/2018    WBC 7 4 10/31/2018     Note: for a comprehensive list of the patient's lab results, access the Results Review activity  Results for orders placed during the hospital encounter of 16   Echo complete with contrast if indicated    Narrative Jeannie 175  [facility], [address] [facility]  [phone]    Transthoracic Echocardiogram  2D, M-mode, Doppler, and Color Doppler    Study date:  2016    Patient: Hilary Stokes  MR number: [id number]  Account number: [de-identified]  : 1927  Age: 80 years  Gender: Male  Status: Outpatient  Location: [address] [place] [address] [facility] [place]  Height: 68 in  Weight: 204 lb  BP: 128/ 68 mmHg    Indications: Cardiomyopathy    Diagnoses: I42 0 - Dilated cardiomyopathy    Sonographer:  Delmy Savage  Primary Physician:  Lulu Fernandes MD  Referring Physician:  Cleveland James DO  Group:  [facility]  Interpreting Physician:  Wendy Vela MD    SUMMARY    LEFT VENTRICLE:  Size was at the upper limits of normal   Systolic function was at the lower limits of normal   There were no regional wall motion abnormalities  Wall thickness was mildly increased  LEFT ATRIUM:  The atrium was dilated  RIGHT ATRIUM:  The atrium was mildly dilated  MITRAL VALVE:  There was trace regurgitation  AORTIC VALVE:  There was trace regurgitation  TRICUSPID VALVE:  There was trace regurgitation  PULMONIC VALVE:  There was trace regurgitation  HISTORY: PRIOR HISTORY: HTN, dyslipidemia, atrial fibrillation, ventricular  tachycardia, pacemaker, cardiomyopathy, chronic kidney disease    PROCEDURE: The study was performed in the [address]  This was a routine study  The transthoracic approach was used  The study  included complete 2D imaging, M-mode, complete spectral Doppler, and color  Doppler  Image quality was adequate      LEFT VENTRICLE: Size was at the upper limits of normal  Systolic function was  at the lower limits of normal  There were no regional wall motion  abnormalities  Wall thickness was mildly increased  DOPPLER: The study was not  technically sufficient to allow evaluation of LV diastolic function  RIGHT VENTRICLE: The size was normal  Systolic function was normal  Wall  thickness was normal     LEFT ATRIUM: The atrium was dilated  RIGHT ATRIUM: The atrium was mildly dilated  MITRAL VALVE: There was mild thickening  DOPPLER: There was trace  regurgitation  AORTIC VALVE: Leaflets exhibited mildly increased thickness and mild  calcification  DOPPLER: There was trace regurgitation  TRICUSPID VALVE: DOPPLER: There was trace regurgitation  PULMONIC VALVE: DOPPLER: There was trace regurgitation  PERICARDIUM: There was no pericardial effusion  The pericardium was normal in  appearance  AORTA: The root exhibited upper limit of normal size      SYSTEM MEASUREMENT TABLES    2D  %FS: 22 19 %  Ao Diam: 3 8 cm  EDV(Teich): 130 15 ml  EF Biplane: 48 34 %  EF(Cube): 52 89 %  EF(Teich): 44 45 %  ESV(Cube): 66 66 ml  ESV(Teich): 72 29 ml  IVSd: 1 26 cm  LA Area: 23 66 cm2  LA Diam: 4 12 cm  LVEDV MOD A2C: 210 13 ml  LVEDV MOD A4C: 162 58 ml  LVEDV MOD BP: 186 05 ml  LVEF MOD A2C: 53 95 %  LVEF MOD A4C: 44 19 %  LVESV MOD A2C: 96 76 ml  LVESV MOD A4C: 90 73 ml  LVESV MOD BP: 96 12 ml  LVIDd: 5 21 cm  LVIDs: 4 05 cm  LVLd A2C: 8 91 cm  LVLd A4C: 9 17 cm  LVLs A2C: 7 63 cm  LVLs A4C: 8 15 cm  LVPWd: 1 31 cm  RA Area: 24 24 cm2  RV Diam : 4 19 cm  SV MOD A2C: 113 37 ml  SV MOD A4C: 71 84 ml  SV(Cube): 74 85 ml  SV(Teich): 57 86 ml    CW  TR Vmax: 2 75 m/s  TR maxP 29 mmHg    MM  TAPSE: 2 37 cm    PW  AVC: 365 04 ms  E': 0 07 m/s  E/E': 9 52  MV A Jacob: 0 26 m/s  MV Dec [place]: 3 81 m/s2  MV DecT: 173 14 ms  MV E Jacob: 0 66 m/s  MV E/A Ratio: 2 5    [employer] Accredited Echocardiography Laboratory    Prepared and electronically signed by    Emma Mcconnell MD  Signed 2016 09:55:04           Cardiac testing:   Results for orders placed during the hospital encounter of 16   Echo complete with contrast if indicated    Narrative Jeannie 175  [facility] [place], [address] [facility]  [phone]    Transthoracic Echocardiogram  2D, M-mode, Doppler, and Color Doppler    Study date:  2016    Patient: Jun Hicks  MR number: [id number]  Account number: [de-identified]  : 1927  Age: 80 years  Gender: Male  Status: Outpatient  Location: [address] [place] [address] [facility] [place]  Height: 68 in  Weight: 204 lb  BP: 128/ 68 mmHg    Indications: Cardiomyopathy    Diagnoses: I42 0 - Dilated cardiomyopathy    Sonographer:  Frieda Stewart  Primary Physician:  Phyllis Castorena MD  Referring Physician:  Patricia Carnes DO  Group:  [facility]  Interpreting Physician:  Emma Mcconnell MD    SUMMARY    LEFT VENTRICLE:  Size was at the upper limits of normal   Systolic function was at the lower limits of normal   There were no regional wall motion abnormalities  Wall thickness was mildly increased  LEFT ATRIUM:  The atrium was dilated  RIGHT ATRIUM:  The atrium was mildly dilated  MITRAL VALVE:  There was trace regurgitation  AORTIC VALVE:  There was trace regurgitation  TRICUSPID VALVE:  There was trace regurgitation  PULMONIC VALVE:  There was trace regurgitation  HISTORY: PRIOR HISTORY: HTN, dyslipidemia, atrial fibrillation, ventricular  tachycardia, pacemaker, cardiomyopathy, chronic kidney disease    PROCEDURE: The study was performed in the [address]  This was a routine study  The transthoracic approach was used  The study  included complete 2D imaging, M-mode, complete spectral Doppler, and color  Doppler  Image quality was adequate      LEFT VENTRICLE: Size was at the upper limits of normal  Systolic function was  at the lower limits of normal  There were no regional wall motion  abnormalities  Wall thickness was mildly increased  DOPPLER: The study was not  technically sufficient to allow evaluation of LV diastolic function  RIGHT VENTRICLE: The size was normal  Systolic function was normal  Wall  thickness was normal     LEFT ATRIUM: The atrium was dilated  RIGHT ATRIUM: The atrium was mildly dilated  MITRAL VALVE: There was mild thickening  DOPPLER: There was trace  regurgitation  AORTIC VALVE: Leaflets exhibited mildly increased thickness and mild  calcification  DOPPLER: There was trace regurgitation  TRICUSPID VALVE: DOPPLER: There was trace regurgitation  PULMONIC VALVE: DOPPLER: There was trace regurgitation  PERICARDIUM: There was no pericardial effusion  The pericardium was normal in  appearance  AORTA: The root exhibited upper limit of normal size      SYSTEM MEASUREMENT TABLES    2D  %FS: 22 19 %  Ao Diam: 3 8 cm  EDV(Teich): 130 15 ml  EF Biplane: 48 34 %  EF(Cube): 52 89 %  EF(Teich): 44 45 %  ESV(Cube): 66 66 ml  ESV(Teich): 72 29 ml  IVSd: 1 26 cm  LA Area: 23 66 cm2  LA Diam: 4 12 cm  LVEDV MOD A2C: 210 13 ml  LVEDV MOD A4C: 162 58 ml  LVEDV MOD BP: 186 05 ml  LVEF MOD A2C: 53 95 %  LVEF MOD A4C: 44 19 %  LVESV MOD A2C: 96 76 ml  LVESV MOD A4C: 90 73 ml  LVESV MOD BP: 96 12 ml  LVIDd: 5 21 cm  LVIDs: 4 05 cm  LVLd A2C: 8 91 cm  LVLd A4C: 9 17 cm  LVLs A2C: 7 63 cm  LVLs A4C: 8 15 cm  LVPWd: 1 31 cm  RA Area: 24 24 cm2  RV Diam : 4 19 cm  SV MOD A2C: 113 37 ml  SV MOD A4C: 71 84 ml  SV(Cube): 74 85 ml  SV(Teich): 57 86 ml    CW  TR Vmax: 2 75 m/s  TR maxP 29 mmHg    MM  TAPSE: 2 37 cm    PW  AVC: 365 04 ms  E': 0 07 m/s  E/E': 9 52  MV A Jacob: 0 26 m/s  MV Dec [place]: 3 81 m/s2  MV DecT: 173 14 ms  MV E Jacob: 0 66 m/s  MV E/A Ratio: 2 5    [employer] Accredited Echocardiography Laboratory    Prepared and electronically signed by    Nano Bradford MD  Signed 2016 09:55:04       No results found for this or any previous visit  No results found for this or any previous visit  No results found for this or any previous visit        EKG personally reviewed by Royce Blas, DO  afib with biv pacing

## 2018-11-29 ENCOUNTER — TELEPHONE (OUTPATIENT)
Dept: GASTROENTEROLOGY | Facility: CLINIC | Age: 83
End: 2018-11-29

## 2018-11-30 ENCOUNTER — HOSPITAL ENCOUNTER (OUTPATIENT)
Facility: HOSPITAL | Age: 83
Setting detail: OUTPATIENT SURGERY
Discharge: HOME | End: 2018-11-30
Attending: COLON & RECTAL SURGERY | Admitting: COLON & RECTAL SURGERY
Payer: COMMERCIAL

## 2018-11-30 ENCOUNTER — ANESTHESIA (OUTPATIENT)
Dept: GASTROENTEROLOGY | Facility: HOSPITAL | Age: 83
End: 2018-11-30
Payer: COMMERCIAL

## 2018-11-30 ENCOUNTER — ANESTHESIA EVENT (OUTPATIENT)
Dept: GASTROENTEROLOGY | Facility: HOSPITAL | Age: 83
End: 2018-11-30
Payer: COMMERCIAL

## 2018-11-30 VITALS
SYSTOLIC BLOOD PRESSURE: 120 MMHG | HEIGHT: 70 IN | BODY MASS INDEX: 28.63 KG/M2 | TEMPERATURE: 97.1 F | DIASTOLIC BLOOD PRESSURE: 60 MMHG | HEART RATE: 70 BPM | RESPIRATION RATE: 16 BRPM | OXYGEN SATURATION: 97 % | WEIGHT: 200 LBS

## 2018-11-30 DIAGNOSIS — C18.9 MALIGNANT NEOPLASM OF COLON, UNSPECIFIED PART OF COLON (HCC): ICD-10-CM

## 2018-11-30 DIAGNOSIS — C18.5 MALIGNANT NEOPLASM OF SPLENIC FLEXURE (HCC): Primary | ICD-10-CM

## 2018-11-30 DIAGNOSIS — Z12.11 SPECIAL SCREENING FOR MALIGNANT NEOPLASMS, COLON: ICD-10-CM

## 2018-11-30 PROCEDURE — 88305 TISSUE EXAM BY PATHOLOGIST: CPT | Performed by: PATHOLOGY

## 2018-11-30 PROCEDURE — 88342 IMHCHEM/IMCYTCHM 1ST ANTB: CPT | Performed by: PATHOLOGY

## 2018-11-30 PROCEDURE — 43239 EGD BIOPSY SINGLE/MULTIPLE: CPT | Performed by: INTERNAL MEDICINE

## 2018-11-30 PROCEDURE — 88341 IMHCHEM/IMCYTCHM EA ADD ANTB: CPT | Performed by: PATHOLOGY

## 2018-11-30 PROCEDURE — 45381 COLONOSCOPY SUBMUCOUS NJX: CPT | Performed by: COLON & RECTAL SURGERY

## 2018-11-30 PROCEDURE — 45380 COLONOSCOPY AND BIOPSY: CPT | Performed by: COLON & RECTAL SURGERY

## 2018-11-30 RX ORDER — SODIUM CHLORIDE, SODIUM LACTATE, POTASSIUM CHLORIDE, CALCIUM CHLORIDE 600; 310; 30; 20 MG/100ML; MG/100ML; MG/100ML; MG/100ML
INJECTION, SOLUTION INTRAVENOUS CONTINUOUS PRN
Status: DISCONTINUED | OUTPATIENT
Start: 2018-11-30 | End: 2018-11-30 | Stop reason: SURG

## 2018-11-30 RX ORDER — PROPOFOL 10 MG/ML
INJECTION, EMULSION INTRAVENOUS AS NEEDED
Status: DISCONTINUED | OUTPATIENT
Start: 2018-11-30 | End: 2018-11-30 | Stop reason: SURG

## 2018-11-30 RX ADMIN — PROPOFOL 20 MG: 10 INJECTION, EMULSION INTRAVENOUS at 07:39

## 2018-11-30 RX ADMIN — PROPOFOL 100 MG: 10 INJECTION, EMULSION INTRAVENOUS at 08:00

## 2018-11-30 RX ADMIN — PROPOFOL 70 MG: 10 INJECTION, EMULSION INTRAVENOUS at 07:29

## 2018-11-30 RX ADMIN — SODIUM CHLORIDE, SODIUM LACTATE, POTASSIUM CHLORIDE, AND CALCIUM CHLORIDE: .6; .31; .03; .02 INJECTION, SOLUTION INTRAVENOUS at 07:00

## 2018-11-30 RX ADMIN — PROPOFOL 30 MG: 10 INJECTION, EMULSION INTRAVENOUS at 07:45

## 2018-11-30 RX ADMIN — PROPOFOL 30 MG: 10 INJECTION, EMULSION INTRAVENOUS at 07:35

## 2018-11-30 NOTE — H&P (VIEW-ONLY)
EPS Progress Note - Twin Hurtado 80 y o  male MRN: [id number]           ASSESSMENT:  1  Atrial fibrillation, unspecified type ([facility] [address] )  POCT ECG   2  Essential hypertension     3  Dilated cardiomyopathy ([facility] 75 )     4  Biventricular cardiac pacemaker in situ     5  Anticoagulation adequate             PLAN:  Lasix as needed for leg swelling  biv pacer in situ continue eliquis  For colonoscopy and EGD hold 48 hours prior to procedures  HPI:   Interim history blood count has been low and is due for colonoscopy and egd        ROS:  negative, palpitations, tachycardia, irregular heart beat, stable dyspnea on exertion  fatigue  Trouble sleeping, lack of energy, skin rash and anemia  All other 12 point ros negative     Objective:     Vitals: Blood pressure 126/82, pulse 70, height 5' 9 5" (1 765 m), weight 91 4 kg (201 lb 8 oz)  , Body mass index is 29 33 kg/m²  ,        Physical Exam:    GEN: Twin Hurtado appears well, alert and oriented x 3, pleasant and cooperative   HEENT: pupils equal, round, and reactive to light; extraocular muscles intact  NECK: supple, no carotid bruits   HEART: regular rhythm, normal S1 and S2, no murmurs, clicks, gallops or rubs   LUNGS: clear to auscultation bilaterally; no wheezes, rales, or rhonchi   ABDOMEN: normal bowel sounds, soft, no tenderness, no distention  EXTREMITIES: peripheral pulses normal; no clubbing, cyanosis, or edema  NEURO: no focal findings   SKIN: normal without suspicious lesions on exposed skin    Medications:      Current Outpatient Prescriptions:     apixaban (ELIQUIS) 2 5 mg, Take 1 tablet (2 5 mg total) by mouth 2 (two) times a day, Disp: 180 tablet, Rfl: 3    bisoprolol (ZEBETA) 5 mg tablet, Take 1 tablet (5 mg total) by mouth daily (Patient taking differently: Take 2 5 mg by mouth daily  ), Disp: 90 tablet, Rfl: 3    finasteride (PROSCAR) 5 mg tablet, Take 5 mg by mouth daily, Disp: , Rfl:     IRON PO, Take 1 tablet by mouth 2 (two) times a day  , Disp: , Rfl:     loratadine (CLARITIN REDITABS) 10 MG dissolvable tablet, Take 1 tablet by mouth daily, Disp: , Rfl:     Multiple Vitamins-Minerals (CENTRUM SILVER 50+MEN PO), Take 1 tablet by mouth daily, Disp: , Rfl:     Omega-3 Fatty Acids (FISH OIL PO), Take 1 tablet by mouth daily, Disp: , Rfl:     triamcinolone (KENALOG) 0 1 % cream, APPLY TO SKIN TWICE DAILY TO AFFECTED AREAS ON BODY THROUGHOUT, Disp: , Rfl: 0    furosemide (LASIX) 20 mg tablet, Take 1 tablet (20 mg total) by mouth daily (Patient not taking: Reported on 11/19/2018 ), Disp: 90 tablet, Rfl: 3     Family History   Problem Relation Age of Onset    Heart disease Father     Heart disease Brother      Social History     Social History    Marital status:      Spouse name: N/A    Number of children: N/A    Years of education: N/A     Occupational History    Not on file  Social History Main Topics    Smoking status: Never Smoker    Smokeless tobacco: Never Used    Alcohol use Yes    Drug use: No    Sexual activity: Not on file     Other Topics Concern    Not on file     Social History Narrative    No narrative on file     History   Smoking Status    Never Smoker   Smokeless Tobacco    Never Used     History   Alcohol Use    Yes       Labs & Results:  Below is the patient's most recent value for Albumin, ALT, AST, BUN, Calcium, Chloride, Cholesterol, CO2, Creatinine, GFR, Glucose, HDL, Hematocrit, Hemoglobin, Hemoglobin A1C, LDL, Magnesium, Phosphorus, Platelets, Potassium, PSA, Sodium, Triglycerides, and WBC     Lab Results   Component Value Date    ALT 14 09/27/2017    AST 23 09/27/2017    BUN 29 (H) 10/02/2018    CALCIUM 9 0 10/02/2018     10/02/2018    CHOL 157 01/09/2015    CO2 24 10/02/2018    CREATININE 1 61 (H) 02/10/2017    HDL 28 (L) 10/02/2018    HCT 33 1 (L) 10/31/2018    HGB 10 4 (L) 10/31/2018    MG 2 0 03/04/2015     10/31/2018    K 4 3 10/02/2018    PSA 1 8 10/02/2018     03/05/2015 TRIG 126 10/02/2018    WBC 7 4 10/31/2018     Note: for a comprehensive list of the patient's lab results, access the Results Review activity  Results for orders placed during the hospital encounter of 16   Echo complete with contrast if indicated    Narrative Jeannie 175  [facility], 210 [facility]  [phone]    Transthoracic Echocardiogram  2D, M-mode, Doppler, and Color Doppler    Study date:  2016    Patient: Tri Joy  MR number: [id number]  Account number: [de-identified]  : 1927  Age: 80 years  Gender: Male  Status: Outpatient  Location: [address] [place] [address] [facility] [place]  Height: 68 in  Weight: 204 lb  BP: 128/ 68 mmHg    Indications: Cardiomyopathy    Diagnoses: I42 0 - Dilated cardiomyopathy    Sonographer:  Aamdo Hugo  Primary Physician:  Keenan Stark MD  Referring Physician:  Fuentes Kim DO  [facility]  [facility]  Interpreting Physician:  Randy Bonilla MD    SUMMARY    LEFT VENTRICLE:  Size was at the upper limits of normal   Systolic function was at the lower limits of normal   There were no regional wall motion abnormalities  Wall thickness was mildly increased  LEFT ATRIUM:  The atrium was dilated  RIGHT ATRIUM:  The atrium was mildly dilated  MITRAL VALVE:  There was trace regurgitation  AORTIC VALVE:  There was trace regurgitation  TRICUSPID VALVE:  There was trace regurgitation  PULMONIC VALVE:  There was trace regurgitation  HISTORY: PRIOR HISTORY: HTN, dyslipidemia, atrial fibrillation, ventricular  tachycardia, pacemaker, cardiomyopathy, chronic kidney disease    PROCEDURE: The study was performed in the [address]  This was a routine study  The transthoracic approach was used  The study  included complete 2D imaging, M-mode, complete spectral Doppler, and color  Doppler  Image quality was adequate      LEFT VENTRICLE: Size was at the upper limits of normal  Systolic function was  at the lower limits of normal  There were no regional wall motion  abnormalities  Wall thickness was mildly increased  DOPPLER: The study was not  technically sufficient to allow evaluation of LV diastolic function  RIGHT VENTRICLE: The size was normal  Systolic function was normal  Wall  thickness was normal     LEFT ATRIUM: The atrium was dilated  RIGHT ATRIUM: The atrium was mildly dilated  MITRAL VALVE: There was mild thickening  DOPPLER: There was trace  regurgitation  AORTIC VALVE: Leaflets exhibited mildly increased thickness and mild  calcification  DOPPLER: There was trace regurgitation  TRICUSPID VALVE: DOPPLER: There was trace regurgitation  PULMONIC VALVE: DOPPLER: There was trace regurgitation  PERICARDIUM: There was no pericardial effusion  The pericardium was normal in  appearance  AORTA: The root exhibited upper limit of normal size      SYSTEM MEASUREMENT TABLES    2D  %FS: 22 19 %  Ao Diam: 3 8 cm  EDV(Teich): 130 15 ml  EF Biplane: 48 34 %  EF(Cube): 52 89 %  EF(Teich): 44 45 %  ESV(Cube): 66 66 ml  ESV(Teich): 72 29 ml  IVSd: 1 26 cm  LA Area: 23 66 cm2  LA Diam: 4 12 cm  LVEDV MOD A2C: 210 13 ml  LVEDV MOD A4C: 162 58 ml  LVEDV MOD BP: 186 05 ml  LVEF MOD A2C: 53 95 %  LVEF MOD A4C: 44 19 %  LVESV MOD A2C: 96 76 ml  LVESV MOD A4C: 90 73 ml  LVESV MOD BP: 96 12 ml  LVIDd: 5 21 cm  LVIDs: 4 05 cm  LVLd A2C: 8 91 cm  LVLd A4C: 9 17 cm  LVLs A2C: 7 63 cm  LVLs A4C: 8 15 cm  LVPWd: 1 31 cm  RA Area: 24 24 cm2  RV Diam : 4 19 cm  SV MOD A2C: 113 37 ml  SV MOD A4C: 71 84 ml  SV(Cube): 74 85 ml  SV(Teich): 57 86 ml    CW  TR Vmax: 2 75 m/s  TR maxP 29 mmHg    MM  TAPSE: 2 37 cm    PW  AVC: 365 04 ms  E': 0 07 m/s  E/E': 9 52  MV A Jacob: 0 26 m/s  MV Dec [place]: 3 81 m/s2  MV DecT: 173 14 ms  MV E Jacob: 0 66 m/s  MV E/A Ratio: 2 5    [employer] Accredited Echocardiography Laboratory    Prepared and electronically signed by    Marina Barakat MD  Signed 2016 09:55:04           Cardiac testing:   Results for orders placed during the hospital encounter of 16   Echo complete with contrast if indicated    Narrative Jeannie 175  [facility], 210 [facility]  [phone]    Transthoracic Echocardiogram  2D, M-mode, Doppler, and Color Doppler    Study date:  2016    Patient: Shiloh Oneil  MR number: [id number]  Account number: [de-identified]  : 1927  Age: 80 years  Gender: Male  Status: Outpatient  Location: [address] [place] [address] [facility] [place]  Height: 68 in  Weight: 204 lb  BP: 128/ 68 mmHg    Indications: Cardiomyopathy    Diagnoses: I42 0 - Dilated cardiomyopathy    Sonographer:  Teofilo Doherty  Primary Physician:  Letty Fernandez MD  Referring Physician:  Steffen Rodríguez DO  Group:  [facility]  Interpreting Physician:  Marina Barakat MD    SUMMARY    LEFT VENTRICLE:  Size was at the upper limits of normal   Systolic function was at the lower limits of normal   There were no regional wall motion abnormalities  Wall thickness was mildly increased  LEFT ATRIUM:  The atrium was dilated  RIGHT ATRIUM:  The atrium was mildly dilated  MITRAL VALVE:  There was trace regurgitation  AORTIC VALVE:  There was trace regurgitation  TRICUSPID VALVE:  There was trace regurgitation  PULMONIC VALVE:  There was trace regurgitation  HISTORY: PRIOR HISTORY: HTN, dyslipidemia, atrial fibrillation, ventricular  tachycardia, pacemaker, cardiomyopathy, chronic kidney disease    PROCEDURE: The study was performed in the [address]  This was a routine study  The transthoracic approach was used  The study  included complete 2D imaging, M-mode, complete spectral Doppler, and color  Doppler  Image quality was adequate      LEFT VENTRICLE: Size was at the upper limits of normal  Systolic function was  at the lower limits of normal  There were no regional wall motion  abnormalities  Wall thickness was mildly increased  DOPPLER: The study was not  technically sufficient to allow evaluation of LV diastolic function  RIGHT VENTRICLE: The size was normal  Systolic function was normal  Wall  thickness was normal     LEFT ATRIUM: The atrium was dilated  RIGHT ATRIUM: The atrium was mildly dilated  MITRAL VALVE: There was mild thickening  DOPPLER: There was trace  regurgitation  AORTIC VALVE: Leaflets exhibited mildly increased thickness and mild  calcification  DOPPLER: There was trace regurgitation  TRICUSPID VALVE: DOPPLER: There was trace regurgitation  PULMONIC VALVE: DOPPLER: There was trace regurgitation  PERICARDIUM: There was no pericardial effusion  The pericardium was normal in  appearance  AORTA: The root exhibited upper limit of normal size      SYSTEM MEASUREMENT TABLES    2D  %FS: 22 19 %  Ao Diam: 3 8 cm  EDV(Teich): 130 15 ml  EF Biplane: 48 34 %  EF(Cube): 52 89 %  EF(Teich): 44 45 %  ESV(Cube): 66 66 ml  ESV(Teich): 72 29 ml  IVSd: 1 26 cm  LA Area: 23 66 cm2  LA Diam: 4 12 cm  LVEDV MOD A2C: 210 13 ml  LVEDV MOD A4C: 162 58 ml  LVEDV MOD BP: 186 05 ml  LVEF MOD A2C: 53 95 %  LVEF MOD A4C: 44 19 %  LVESV MOD A2C: 96 76 ml  LVESV MOD A4C: 90 73 ml  LVESV MOD BP: 96 12 ml  LVIDd: 5 21 cm  LVIDs: 4 05 cm  LVLd A2C: 8 91 cm  LVLd A4C: 9 17 cm  LVLs A2C: 7 63 cm  LVLs A4C: 8 15 cm  LVPWd: 1 31 cm  RA Area: 24 24 cm2  RV Diam : 4 19 cm  SV MOD A2C: 113 37 ml  SV MOD A4C: 71 84 ml  SV(Cube): 74 85 ml  SV(Teich): 57 86 ml    CW  TR Vmax: 2 75 m/s  TR maxP 29 mmHg    MM  TAPSE: 2 37 cm    PW  AVC: 365 04 ms  E': 0 07 m/s  E/E': 9 52  MV A Jacob: 0 26 m/s  MV Dec [place]: 3 81 m/s2  MV DecT: 173 14 ms  MV E Jacob: 0 66 m/s  MV E/A Ratio: 2 5    [employer] Accredited Echocardiography Laboratory    Prepared and electronically signed by    Seven Barrientos MD  Signed 2016 09:55:04       No results found for this or any previous visit  No results found for this or any previous visit  No results found for this or any previous visit        EKG personally reviewed by Zelda James DO  afib with biv pacing

## 2018-11-30 NOTE — ANESTHESIA PREPROCEDURE EVALUATION
Review of Systems/Medical History  Patient summary reviewed  Chart reviewed  No history of anesthetic complications     Cardiovascular  EKG reviewed, Exercise tolerance (METS): <4,  Hypertension , Dysrhythmias (s/p ablation and PPM) , atrial fibrillation,    Pulmonary  Negative pulmonary ROS        GI/Hepatic      Comment: H/o R colectomy for adenoCA      Negative  ROS Chronic kidney disease ,        Endo/Other  Negative endo/other ROS      GYN       Hematology  Anemia ,     Musculoskeletal       Neurology  Negative neurology ROS      Psychology         Lab Results   Component Value Date    WBC 7 4 10/31/2018    HGB 10 4 (L) 10/31/2018    HCT 33 1 (L) 10/31/2018    MCV 90 4 10/31/2018     10/31/2018     Lab Results   Component Value Date     03/05/2015    K 4 3 10/02/2018    CO2 24 10/02/2018     10/02/2018    BUN 29 (H) 10/02/2018    CREATININE 1 61 (H) 02/10/2017     Lab Results   Component Value Date    INR 1 28 (H) 02/09/2017    INR 1 26 (H) 03/04/2015    INR 1 23 (H) 03/02/2015    PROTIME 15 7 (H) 02/09/2017    PROTIME 15 5 (H) 03/04/2015    PROTIME 15 2 (H) 03/02/2015     No results found for: PTT    Lab Results   Component Value Date    GLUCOSE 93 03/05/2015    GLUCOSE 98 03/04/2015    GLUCOSE 86 03/02/2015       No results found for: HGBA1C    TTE 7/13/2016  SUMMARY     LEFT VENTRICLE:  Size was at the upper limits of normal   Systolic function was at the lower limits of normal   There were no regional wall motion abnormalities  Wall thickness was mildly increased      LEFT ATRIUM:  The atrium was dilated      RIGHT ATRIUM:  The atrium was mildly dilated      MITRAL VALVE:  There was trace regurgitation      AORTIC VALVE:  There was trace regurgitation      TRICUSPID VALVE:  There was trace regurgitation      PULMONIC VALVE:  There was trace regurgitation      Physical Exam    Airway    Mallampati score: II  TM Distance: >3 FB  Neck ROM: full     Dental   lower dentures, Cardiovascular  Rhythm: regular, Rate: normal,     Pulmonary      Other Findings  Partial lower dentures      Anesthesia Plan  ASA Score- 2     Anesthesia Type- IV sedation with anesthesia with ASA Monitors  Additional Monitors:   Airway Plan:         Plan Factors-    Induction- intravenous  Postoperative Plan-     Informed Consent- Anesthetic plan and risks discussed with patient  I personally reviewed this patient with the CRNA  Discussed and agreed on the Anesthesia Plan with the CRNA  Debbie Hernandez

## 2018-11-30 NOTE — INTERVAL H&P NOTE
H&P reviewed  After examining the patient I find no changes in the patients condition since the H&P had been written  This any me a hemoglobin 10 scheduled for colonoscopy an EGD on further evaluation for cause of potential occult GI bleed as cause of the anemia  Dr Tavon Mercado will do the EGD after the colonoscopy is completed  Patient agrees and consented  Karrie Nissen

## 2018-11-30 NOTE — DISCHARGE INSTRUCTIONS
opneGastroesophageal Reflux Disease   Colonoscopy Procedure Note  PATIENT NAME: Cris Gilbert    :  1927  MRN: [id number]  Pt Location: [facility]    SURGERY DATE: 2018    Surgeon(s) and Role:     * Renata Ortiz MD - Primary     * Day Kent MD    Preop Diagnosis:  Special screening for malignant neoplasms, colon [Z12 11]  Malignant neoplasm of colon, unspecified part of colon ([address] ) [C18 9]    Post-Op Diagnosis Codes: * Special screening for malignant neoplasms, colon [Z12 11]     * Malignant neoplasm of colon, unspecified part of colon ([address] ) [C18 9]    Procedure(s) (LRB):  COLONOSCOPY w egd  by dr Mary Kong (N/A)    Specimen(s):  ID Type Source Tests Collected by Time Destination   1 : splenic flexure mass Bx Tissue Colon TISSUE EXAM Renata Ortiz MD 2018 [id number]        Estimated Blood Loss:   Minimal    Drains:       Anesthesia Type:   IV Sedation with Anesthesia    Operative Indications:  Special screening for malignant neoplasms, colon [Z12 11]  Malignant neoplasm of colon, unspecified part of colon ([facility] [address] ) [C18 9]    Procedure Details     Informed consent was obtained for the procedure, including sedation  Risks of perforation, hemorrhage, adverse drug reaction and aspiration were discussed  The patient was placed in the left lateral decubitus position  Based on the pre-procedure assessment, including review of the patient's medical history, medications, allergies, and review of systems, he had been deemed to be an appropriate candidate for conscious sedation; he was therefore sedated with the medications listed below  The patient was monitored continuously with ECG tracing, pulse oximetry, blood pressure monitoring, and direct observations  A rectal examination was performed  The variable-stiffness pediatric colonoscope was inserted into the rectum and advanced under direct vision to the mid transverse colon ileo colon anastomosis and into terminal ileum    The quality of the colonic preparation was excellent  A careful inspection was made as the colonoscope was withdrawn, including a retroflexed view of the rectum; findings and interventions are described below  Appropriate photodocumentation was obtained  Findings:  -diverticulosis, mild in degree, involving the sigmoid  -tumor/mass located in the splenic flexure ; biopsies were obtained tumor 3/4 circumference of the colon involved with bleeding on contact  After biopsies the site was tattooed for preparation of laparoscopic surgery   Complications:  None; patient tolerated the procedure well  Disposition: PACU            Condition: stable    Attending Attestation: I was present for the entire procedure    Impression:    -grossly malignant looking tumor splenic flexure biopsied and tattooed for preparation for surgery as will be discussed with the patient  -mild diverticular disease sigmoid colon    Recommendations:  -Await pathology  ,   -CT chest abdomen and pelvis, CEA, cardiac clearance, preparation for surgery on a later date as will be discussed with the patient  SIGNATURE: Dwayne Childers MD  DATE: 2018  TIME: 7:50 AM            OPERATIVE REPORT  PATIENT NAME: Michael Munroe    :  1927  MRN: [id number]  Pt Location:  GI ROOM 03    SURGERY DATE: 2018    Surgeon(s) and Role:     * Addison Owen MD  ESOPHAGOGASTRODUODENOSCOPY    PROCEDURE: EGD    SEDATION: Monitored anesthesia care, check anesthesia records    ASA Class: 3    INDICATIONS:  Iron deficiency anemia    CONSENT:  Informed consent was obtained for the procedure, including sedation after explaining the risks and benefits of the procedure  Risks including but not limited to bleeding, perforation, infection, and missed lesion  PREPARATION:   Telemetry, pulse oximetry, blood pressure were monitored throughout the procedure    Patient was identified by myself both verbally and by visual inspection of ID band  DESCRIPTION:   Patient was placed in the left lateral decubitus position and was sedated with the above medication  The gastroscope was introduced in to the oropharynx and the esophagus was intubated under direct visualization  Scope was passed down the esophagus up to 2nd part of the duodenum  A careful inspection was made as the gastroscope was withdrawn, including a retroflexed view of the stomach; findings and interventions are described below  FINDINGS:    #1  Esophagus- LA grade B esophagitis in the distal esophagus  Nodular mucosa at GE junction likely secondary to inflammatory bowel polyp  Multiple cold biopsies were taken  2 cm hiatal hernia noted  #2  Stomach- normal stomach    #3  Duodenum- normal bulb and 2nd portion of the duodenum         IMPRESSIONS:      Mild esophagitis with nodular mucosa at the GE junction likely secondary to uncontrolled reflux disease  Multiple cold biopsies were taken from this nodular area  Small hiatal hernia  Normal stomach and duodenum  RECOMMENDATIONS:     Start taking Protonix 40 mg daily for 2 months  Reflux precautions  Follow-up biopsy  COMPLICATIONS:  None; patient tolerated the procedure well  DISPOSITION: PACU           CONDITION: Stable        SIGNATURE: Debora Avila MD  DATE: November 30, 2018  TIME: 8:08 AM        AMBULATORY CARE:   Gastroesophageal reflux  reflux occurs when acid and food in the stomach back up into the esophagus  Gastroesophageal reflux disease (GERD) is reflux that occurs more than twice a week for a few weeks  It usually causes heartburn and other symptoms  GERD can cause other health problems over time if it is not treated  Common symptoms include:  Heartburn is the most common symptom of GERD  You may feel burning pain in your chest or below the breast bone  This usually occurs after meals and spreads to your neck, jaw, or shoulder   The pain gets better when you change positions  You may also have any of the following:  · Bitter or acid taste in your mouth    · Dry cough    · Trouble swallowing or pain with swallowing    · Hoarseness or sore throat    · Frequent burping or hiccups    · Feeling of fullness soon after you start eating  Seek care immediately if:  · You feel full and cannot burp or vomit  · You have severe chest pain and sudden trouble breathing  · Your bowel movements are black, bloody, or tarry-looking  · Your vomit looks like coffee grounds or has blood in it  Contact your healthcare provider if:   · You vomit large amounts, or you vomit often  · You have trouble breathing after you vomit  · You have trouble swallowing, or pain with swallowing  · You are losing weight without trying  · Your symptoms get worse or do not improve with treatment  · You have questions or concerns about your condition or care  Treatment for GERD:  Your healthcare provider may prescribe medicine to decrease stomach acid  He may also prescribe medicine that help your esophagus and stomach move food and liquid to your intestines  Surgery may be done if other treatments do not work  You may need surgery to wrap the upper part of the stomach around the esophageal sphincter  This will strengthen the sphincter and prevent reflux  Manage GERD:   · Do not have foods or drinks that may increase heartburn  These include chocolate, peppermint, fried or fatty foods, drinks that contain caffeine, or carbonated drinks (soda)  Other foods include spicy foods, onions, tomatoes, and tomato-based foods  Do not have foods or drinks that can irritate your esophagus, such as citrus fruits, juices, and alcohol  · Do not eat large meals  When you eat a lot of food at one time, your stomach needs more acid to digest it  Eat 6 small meals each day instead of 3 large ones, and eat slowly  Do not eat meals 2 to 3 hours before bedtime  · Elevate the head of your bed    Place 6-inch blocks under the head of your bed frame  You may also use more than one pillow under your head and shoulders while you sleep  · Maintain a healthy weight  If you are overweight, weight loss may help relieve symptoms of GERD  · Do not smoke  Smoking weakens the lower esophageal sphincter and increases the risk of GERD  Ask your healthcare provider for information if you currently smoke and need help to quit  E-cigarettes or smokeless tobacco still contain nicotine  Talk to your healthcare provider before you use these products  · Do not wear clothing that is tight around your waist   Tight clothing can put pressure on your stomach and cause or worsen GERD symptoms  Follow up with your healthcare provider as directed:  Write down your questions so you remember to ask them during your visits  © 2017 [facility] Information is for End User's use only and may not be sold, redistributed or otherwise used for commercial purposes  All illustrations and images included in CareNotes® are the copyrighted property of A D A M [employer] Inc  or Olayinka Cortez  The above information is an  only  It is not intended as medical advice for individual conditions or treatments  Talk to your doctor, nurse or pharmacist before following any medical regimen to see if it is safe and effective for you

## 2018-11-30 NOTE — OP NOTE
Colonoscopy Procedure Note  PATIENT NAME: Sharri Deng    :  1927  MRN: [id number]  Pt Location: [facility] 03    SURGERY DATE: 2018    Surgeon(s) and Role:     * Agnieszka Ryder MD - Primary     * Luanne Pierce MD    Preop Diagnosis:  Special screening for malignant neoplasms, colon [Z12 11]  Malignant neoplasm of colon, unspecified part of colon ([facility] [address] ) [C18 9]    Post-Op Diagnosis Codes: * Special screening for malignant neoplasms, colon [Z12 11]     * Malignant neoplasm of colon, unspecified part of colon ([facility] [address] ) [C18 9]    Procedure(s) (LRB):  COLONOSCOPY w egd  by dr Chau Modi (N/A)    Specimen(s):  ID Type Source Tests Collected by Time Destination   1 : splenic flexure mass Bx Tissue Colon TISSUE EXAM Agnieszka Ryder MD 2018 [id number]        Estimated Blood Loss:   Minimal    Drains:       Anesthesia Type:   IV Sedation with Anesthesia    Operative Indications:  Special screening for malignant neoplasms, colon [Z12 11]  Malignant neoplasm of colon, unspecified part of [facility] ([facility] ) [C18 9]    Procedure Details     Informed consent was obtained for the procedure, including sedation  Risks of perforation, hemorrhage, adverse drug reaction and aspiration were discussed  The patient was placed in the left lateral decubitus position  Based on the pre-procedure assessment, including review of the patient's medical history, medications, allergies, and review of systems, he had been deemed to be an appropriate candidate for conscious sedation; he was therefore sedated with the medications listed below  The patient was monitored continuously with ECG tracing, pulse oximetry, blood pressure monitoring, and direct observations  A rectal examination was performed  The variable-stiffness pediatric colonoscope was inserted into the rectum and advanced under direct vision to the mid transverse colon ileo colon anastomosis and into terminal ileum    The quality of the colonic preparation was excellent  A careful inspection was made as the colonoscope was withdrawn, including a retroflexed view of the rectum; findings and interventions are described below  Appropriate photodocumentation was obtained  Findings:  -diverticulosis, mild in degree, involving the sigmoid  -tumor/mass located in the splenic flexure ; biopsies were obtained tumor 3/4 circumference of the colon involved with bleeding on contact  After biopsies the site was tattooed for preparation of laparoscopic surgery   Complications:  None; patient tolerated the procedure well  Disposition: PACU            Condition: stable    Attending Attestation: I was present for the entire procedure    Impression:    -grossly malignant looking tumor splenic flexure biopsied and tattooed for preparation for surgery as will be discussed with the patient  -mild diverticular disease sigmoid colon    Recommendations:  -Await pathology  ,   -CT chest abdomen and pelvis, CEA, cardiac clearance, preparation for surgery on a later date as will be discussed with the patient        SIGNATURE: Olga Gibbs MD  DATE: November 30, 2018  TIME: 7:50 AM

## 2018-11-30 NOTE — OP NOTE
OPERATIVE REPORT  PATIENT NAME: Onofre Luther    :  1927  MRN: [id number]  Pt Location: [facility] 03    SURGERY DATE: 2018    Surgeon(s) and Role:     * Claudette Asher MD  ESOPHAGOGASTRODUODENOSCOPY    PROCEDURE: EGD    SEDATION: Monitored anesthesia care, check anesthesia records    ASA Class: 3    INDICATIONS:  Iron deficiency anemia    CONSENT:  Informed consent was obtained for the procedure, including sedation after explaining the risks and benefits of the procedure  Risks including but not limited to bleeding, perforation, infection, and missed lesion  PREPARATION:   Telemetry, pulse oximetry, blood pressure were monitored throughout the procedure  Patient was identified by myself both verbally and by visual inspection of ID band  DESCRIPTION:   Patient was placed in the left lateral decubitus position and was sedated with the above medication  The gastroscope was introduced in to the oropharynx and the esophagus was intubated under direct visualization  Scope was passed down the esophagus up to 2nd part of the duodenum  A careful inspection was made as the gastroscope was withdrawn, including a retroflexed view of the stomach; findings and interventions are described below  FINDINGS:    #1  Esophagus- LA grade B esophagitis in the distal esophagus  Nodular mucosa at GE junction likely secondary to inflammatory bowel polyp  Multiple cold biopsies were taken  2 cm hiatal hernia noted  #2  Stomach- normal stomach    #3  Duodenum- normal bulb and 2nd portion of the duodenum         IMPRESSIONS:      Mild esophagitis with nodular mucosa at the GE junction likely secondary to uncontrolled reflux disease  Multiple cold biopsies were taken from this nodular area  Small hiatal hernia  Normal stomach and duodenum  RECOMMENDATIONS:     Start taking Protonix 40 mg daily for 2 months  Reflux precautions  Follow-up biopsy      COMPLICATIONS:  None; patient tolerated the procedure well           DISPOSITION: PACU           CONDITION: Stable        SIGNATURE: Shani Morales MD  DATE: November 30, 2018  TIME: 8:08 AM

## 2018-12-01 ENCOUNTER — TRANSCRIBE ORDERS (OUTPATIENT)
Dept: ADMINISTRATIVE | Age: 83
End: 2018-12-01

## 2018-12-01 ENCOUNTER — APPOINTMENT (OUTPATIENT)
Dept: LAB | Age: 83
End: 2018-12-01
Payer: COMMERCIAL

## 2018-12-01 DIAGNOSIS — N18.9 CHRONIC KIDNEY DISEASE, UNSPECIFIED CKD STAGE: ICD-10-CM

## 2018-12-01 DIAGNOSIS — D64.9 ANEMIA, UNSPECIFIED TYPE: ICD-10-CM

## 2018-12-01 DIAGNOSIS — D64.9 ANEMIA, UNSPECIFIED TYPE: Primary | ICD-10-CM

## 2018-12-01 DIAGNOSIS — C18.5 MALIGNANT NEOPLASM OF SPLENIC FLEXURE (HCC): ICD-10-CM

## 2018-12-01 LAB
ANION GAP SERPL CALCULATED.3IONS-SCNC: 7 MMOL/L (ref 4–13)
BASOPHILS # BLD AUTO: 0.04 THOUSANDS/ΜL (ref 0–0.1)
BASOPHILS NFR BLD AUTO: 1 % (ref 0–1)
BUN SERPL-MCNC: 17 MG/DL (ref 5–25)
CALCIUM SERPL-MCNC: 9 MG/DL (ref 8.3–10.1)
CEA SERPL-MCNC: 1.3 NG/ML (ref 0–3)
CHLORIDE SERPL-SCNC: 107 MMOL/L (ref 100–108)
CO2 SERPL-SCNC: 27 MMOL/L (ref 21–32)
CREAT SERPL-MCNC: 1.6 MG/DL (ref 0.6–1.3)
EOSINOPHIL # BLD AUTO: 0.43 THOUSAND/ΜL (ref 0–0.61)
EOSINOPHIL NFR BLD AUTO: 6 % (ref 0–6)
ERYTHROCYTE [DISTWIDTH] IN BLOOD BY AUTOMATED COUNT: 20 % (ref 11.6–15.1)
GFR SERPL CREATININE-BSD FRML MDRD: 37 ML/MIN/1.73SQ M
GLUCOSE SERPL-MCNC: 81 MG/DL (ref 65–140)
HCT VFR BLD AUTO: 41.2 % (ref 36.5–49.3)
HGB BLD-MCNC: 12.3 G/DL (ref 12–17)
IMM GRANULOCYTES # BLD AUTO: 0.03 THOUSAND/UL (ref 0–0.2)
IMM GRANULOCYTES NFR BLD AUTO: 0 % (ref 0–2)
LYMPHOCYTES # BLD AUTO: 1.55 THOUSANDS/ΜL (ref 0.6–4.47)
LYMPHOCYTES NFR BLD AUTO: 22 % (ref 14–44)
MCH RBC QN AUTO: 29.6 PG (ref 26.8–34.3)
MCHC RBC AUTO-ENTMCNC: 29.9 G/DL (ref 31.4–37.4)
MCV RBC AUTO: 99 FL (ref 82–98)
MONOCYTES # BLD AUTO: 0.72 THOUSAND/ΜL (ref 0.17–1.22)
MONOCYTES NFR BLD AUTO: 10 % (ref 4–12)
NEUTROPHILS # BLD AUTO: 4.38 THOUSANDS/ΜL (ref 1.85–7.62)
NEUTS SEG NFR BLD AUTO: 61 % (ref 43–75)
NRBC BLD AUTO-RTO: 0 /100 WBCS
PLATELET # BLD AUTO: 223 THOUSANDS/UL (ref 149–390)
PMV BLD AUTO: 9.8 FL (ref 8.9–12.7)
POTASSIUM SERPL-SCNC: 3.8 MMOL/L (ref 3.5–5.3)
RBC # BLD AUTO: 4.16 MILLION/UL (ref 3.88–5.62)
SODIUM SERPL-SCNC: 141 MMOL/L (ref 136–145)
WBC # BLD AUTO: 7.15 THOUSAND/UL (ref 4.31–10.16)

## 2018-12-01 PROCEDURE — 36415 COLL VENOUS BLD VENIPUNCTURE: CPT

## 2018-12-01 PROCEDURE — 85025 COMPLETE CBC W/AUTO DIFF WBC: CPT

## 2018-12-01 PROCEDURE — 82378 CARCINOEMBRYONIC ANTIGEN: CPT

## 2018-12-01 PROCEDURE — 80048 BASIC METABOLIC PNL TOTAL CA: CPT

## 2018-12-04 ENCOUNTER — HOSPITAL ENCOUNTER (OUTPATIENT)
Dept: RADIOLOGY | Age: 83
Discharge: HOME | End: 2018-12-04
Payer: COMMERCIAL

## 2018-12-04 DIAGNOSIS — C18.5 MALIGNANT NEOPLASM OF SPLENIC FLEXURE (HCC): ICD-10-CM

## 2018-12-04 PROCEDURE — 71250 CT THORAX DX C-: CPT

## 2018-12-04 PROCEDURE — 74150 CT ABDOMEN W/O CONTRAST: CPT

## 2018-12-13 ENCOUNTER — TELEPHONE (OUTPATIENT)
Dept: CARDIOLOGY CLINIC | Facility: CLINIC | Age: 83
End: 2018-12-13

## 2018-12-13 PROBLEM — C18.5 CANCER OF SPLENIC FLEXURE (HCC): Status: ACTIVE | Noted: 2018-12-13

## 2018-12-13 NOTE — TELEPHONE ENCOUNTER
P/C from [address] from Dr Marinda Kocher office-pt needs cardiac clearance for colon resection laparoscopic surgery  He was just here on 11/19/18-can he be cleared without another visit? LMOM for pt to call me back to see how he is doing  Thank you

## 2018-12-21 ENCOUNTER — HOSPITAL ENCOUNTER (OUTPATIENT)
Dept: NON INVASIVE DIAGNOSTICS | Facility: CLINIC | Age: 83
Discharge: HOME | End: 2018-12-21
Payer: COMMERCIAL

## 2018-12-21 DIAGNOSIS — I48.91 ATRIAL FIBRILLATION, UNSPECIFIED TYPE (HCC): ICD-10-CM

## 2018-12-21 DIAGNOSIS — I42.0 DILATED CARDIOMYOPATHY (HCC): Chronic | ICD-10-CM

## 2018-12-21 DIAGNOSIS — I10 ESSENTIAL HYPERTENSION: Chronic | ICD-10-CM

## 2018-12-21 PROCEDURE — 93306 TTE W/DOPPLER COMPLETE: CPT | Performed by: INTERNAL MEDICINE

## 2018-12-21 PROCEDURE — 93306 TTE W/DOPPLER COMPLETE: CPT

## 2018-12-26 ENCOUNTER — APPOINTMENT (OUTPATIENT)
Dept: LAB | Facility: HOSPITAL | Age: 83
End: 2018-12-26
Attending: COLON & RECTAL SURGERY
Payer: COMMERCIAL

## 2018-12-26 DIAGNOSIS — C18.5 CANCER OF SPLENIC FLEXURE (HCC): ICD-10-CM

## 2018-12-26 LAB
ABO GROUP BLD: NORMAL
BLD GP AB SCN SERPL QL: NEGATIVE
EST. AVERAGE GLUCOSE BLD GHB EST-MCNC: 108 MG/DL
HBA1C MFR BLD: 5.4 % (ref 4.2–6.3)
RH BLD: POSITIVE
SPECIMEN EXPIRATION DATE: NORMAL

## 2018-12-26 PROCEDURE — 36415 COLL VENOUS BLD VENIPUNCTURE: CPT

## 2018-12-26 PROCEDURE — 86901 BLOOD TYPING SEROLOGIC RH(D): CPT

## 2018-12-26 PROCEDURE — 86850 RBC ANTIBODY SCREEN: CPT

## 2018-12-26 PROCEDURE — 83036 HEMOGLOBIN GLYCOSYLATED A1C: CPT

## 2018-12-26 PROCEDURE — 86900 BLOOD TYPING SEROLOGIC ABO: CPT

## 2018-12-28 ENCOUNTER — TELEPHONE (OUTPATIENT)
Dept: PREADMISSION TESTING | Facility: HOSPITAL | Age: 83
End: 2018-12-28

## 2018-12-31 ENCOUNTER — ANESTHESIA EVENT (OUTPATIENT)
Dept: PERIOP | Facility: HOSPITAL | Age: 83
DRG: 330 | End: 2018-12-31
Payer: COMMERCIAL

## 2018-12-31 NOTE — PRE-PROCEDURE INSTRUCTIONS
Pre-Surgery Instructions:   Medication Instructions    apixaban (ELIQUIS) 2 5 mg Patient was instructed by Physician and understands   BISOPROLOL FUMARATE PO Instructed patient per Anesthesia Guidelines   CHLORPHENIRAMINE MALEATE PO Instructed patient per Anesthesia Guidelines   finasteride (PROSCAR) 5 mg tablet Instructed patient per Anesthesia Guidelines   furosemide (LASIX) 20 mg tablet Instructed patient per Anesthesia Guidelines   IRON PO Instructed patient per Anesthesia Guidelines   Multiple Vitamins-Minerals (CENTRUM SILVER 50+MEN PO) Instructed patient per Anesthesia Guidelines   triamcinolone (KENALOG) 0 1 % cream Instructed patient per Anesthesia Guidelines

## 2018-12-31 NOTE — PRE-PROCEDURE INSTRUCTIONS
Pre-Surgery Instructions:   Medication Instructions    apixaban (ELIQUIS) 2 5 mg Patient was instructed by Physician and understands   BISOPROLOL FUMARATE PO Instructed patient per Anesthesia Guidelines   CHLORPHENIRAMINE MALEATE PO Instructed patient per Anesthesia Guidelines   finasteride (PROSCAR) 5 mg tablet Instructed patient per Anesthesia Guidelines   furosemide (LASIX) 20 mg tablet Instructed patient per Anesthesia Guidelines   IRON PO Instructed patient per Anesthesia Guidelines   Multiple Vitamins-Minerals (CENTRUM SILVER 50+MEN PO) Instructed patient per Anesthesia Guidelines   triamcinolone (KENALOG) 0 1 % cream Instructed patient per Anesthesia Guidelines  Continue this medication up to the evening before surgery/procedure, but do not take the morning of the day of surgery  Beta blocker Med Class     Continue to take this heart medication on your normal schedule  If this is an oral medication and you take it in the morning, then you may take this medicine with a sip of water  Direct Xa Inhibitor Med Class     Stop taking this medication at least 3 days prior to surgery/procedure with prescribing Physician and Surgeon consultation

## 2019-01-08 ENCOUNTER — ANESTHESIA (OUTPATIENT)
Dept: PERIOP | Facility: HOSPITAL | Age: 84
DRG: 330 | End: 2019-01-08
Payer: COMMERCIAL

## 2019-01-08 ENCOUNTER — HOSPITAL ENCOUNTER (INPATIENT)
Facility: HOSPITAL | Age: 84
LOS: 5 days | Discharge: NURSING, REHAB OR LONG-TERM CARE | DRG: 330 | End: 2019-01-13
Attending: COLON & RECTAL SURGERY | Admitting: COLON & RECTAL SURGERY
Payer: COMMERCIAL

## 2019-01-08 DIAGNOSIS — C18.5 CANCER OF SPLENIC FLEXURE (HCC): ICD-10-CM

## 2019-01-08 DIAGNOSIS — Z98.890 STATUS POST SURGERY: ICD-10-CM

## 2019-01-08 DIAGNOSIS — N18.3 ACUTE RENAL FAILURE SUPERIMPOSED ON STAGE 3 CHRONIC KIDNEY DISEASE, UNSPECIFIED ACUTE RENAL FAILURE TYPE: Primary | ICD-10-CM

## 2019-01-08 DIAGNOSIS — N17.9 ACUTE RENAL FAILURE SUPERIMPOSED ON STAGE 3 CHRONIC KIDNEY DISEASE, UNSPECIFIED ACUTE RENAL FAILURE TYPE: Primary | ICD-10-CM

## 2019-01-08 LAB — GLUCOSE SERPL-MCNC: 142 MG/DL (ref 65–140)

## 2019-01-08 PROCEDURE — 44207 L COLECTOMY/COLOPROCTOSTOMY: CPT | Performed by: COLON & RECTAL SURGERY

## 2019-01-08 PROCEDURE — C9290 INJ, BUPIVACAINE LIPOSOME: HCPCS | Performed by: ANESTHESIOLOGY

## 2019-01-08 PROCEDURE — 45330 DIAGNOSTIC SIGMOIDOSCOPY: CPT | Performed by: COLON & RECTAL SURGERY

## 2019-01-08 PROCEDURE — 82948 REAGENT STRIP/BLOOD GLUCOSE: CPT

## 2019-01-08 PROCEDURE — 44213 LAP MOBIL SPLENIC FL ADD-ON: CPT | Performed by: COLON & RECTAL SURGERY

## 2019-01-08 PROCEDURE — 0DTG0ZZ RESECTION OF LEFT LARGE INTESTINE, OPEN APPROACH: ICD-10-PCS | Performed by: COLON & RECTAL SURGERY

## 2019-01-08 PROCEDURE — 99223 1ST HOSP IP/OBS HIGH 75: CPT | Performed by: SURGERY

## 2019-01-08 PROCEDURE — 88309 TISSUE EXAM BY PATHOLOGIST: CPT | Performed by: PATHOLOGY

## 2019-01-08 RX ORDER — SODIUM CHLORIDE 9 MG/ML
INJECTION, SOLUTION INTRAVENOUS CONTINUOUS PRN
Status: DISCONTINUED | OUTPATIENT
Start: 2019-01-08 | End: 2019-01-08 | Stop reason: SURG

## 2019-01-08 RX ORDER — SODIUM CHLORIDE, SODIUM LACTATE, POTASSIUM CHLORIDE, CALCIUM CHLORIDE 600; 310; 30; 20 MG/100ML; MG/100ML; MG/100ML; MG/100ML
50 INJECTION, SOLUTION INTRAVENOUS CONTINUOUS
Status: DISCONTINUED | OUTPATIENT
Start: 2019-01-08 | End: 2019-01-09

## 2019-01-08 RX ORDER — PROPOFOL 10 MG/ML
INJECTION, EMULSION INTRAVENOUS CONTINUOUS PRN
Status: DISCONTINUED | OUTPATIENT
Start: 2019-01-08 | End: 2019-01-08 | Stop reason: SURG

## 2019-01-08 RX ORDER — HYDROMORPHONE HCL/PF 1 MG/ML
1 SYRINGE (ML) INJECTION
Status: DISCONTINUED | OUTPATIENT
Start: 2019-01-08 | End: 2019-01-09

## 2019-01-08 RX ORDER — ONDANSETRON 2 MG/ML
INJECTION INTRAMUSCULAR; INTRAVENOUS AS NEEDED
Status: DISCONTINUED | OUTPATIENT
Start: 2019-01-08 | End: 2019-01-08 | Stop reason: SURG

## 2019-01-08 RX ORDER — NEOSTIGMINE METHYLSULFATE 1 MG/ML
INJECTION INTRAVENOUS AS NEEDED
Status: DISCONTINUED | OUTPATIENT
Start: 2019-01-08 | End: 2019-01-08 | Stop reason: SURG

## 2019-01-08 RX ORDER — HYDROMORPHONE HCL/PF 1 MG/ML
0.2 SYRINGE (ML) INJECTION
Status: DISCONTINUED | OUTPATIENT
Start: 2019-01-08 | End: 2019-01-08 | Stop reason: HOSPADM

## 2019-01-08 RX ORDER — FENTANYL CITRATE 50 UG/ML
INJECTION, SOLUTION INTRAMUSCULAR; INTRAVENOUS AS NEEDED
Status: DISCONTINUED | OUTPATIENT
Start: 2019-01-08 | End: 2019-01-08 | Stop reason: SURG

## 2019-01-08 RX ORDER — SODIUM CHLORIDE 9 MG/ML
100 INJECTION, SOLUTION INTRAVENOUS CONTINUOUS
Status: DISCONTINUED | OUTPATIENT
Start: 2019-01-08 | End: 2019-01-09

## 2019-01-08 RX ORDER — ROCURONIUM BROMIDE 10 MG/ML
INJECTION, SOLUTION INTRAVENOUS AS NEEDED
Status: DISCONTINUED | OUTPATIENT
Start: 2019-01-08 | End: 2019-01-08 | Stop reason: SURG

## 2019-01-08 RX ORDER — MAGNESIUM HYDROXIDE 1200 MG/15ML
LIQUID ORAL AS NEEDED
Status: DISCONTINUED | OUTPATIENT
Start: 2019-01-08 | End: 2019-01-08 | Stop reason: HOSPADM

## 2019-01-08 RX ORDER — HEPARIN SODIUM 5000 [USP'U]/ML
5000 INJECTION, SOLUTION INTRAVENOUS; SUBCUTANEOUS EVERY 8 HOURS SCHEDULED
Status: DISCONTINUED | OUTPATIENT
Start: 2019-01-08 | End: 2019-01-13 | Stop reason: HOSPADM

## 2019-01-08 RX ORDER — CEFAZOLIN SODIUM 2 G/50ML
2000 SOLUTION INTRAVENOUS ONCE
Status: COMPLETED | OUTPATIENT
Start: 2019-01-08 | End: 2019-01-09

## 2019-01-08 RX ORDER — PROPOFOL 10 MG/ML
INJECTION, EMULSION INTRAVENOUS AS NEEDED
Status: DISCONTINUED | OUTPATIENT
Start: 2019-01-08 | End: 2019-01-08 | Stop reason: SURG

## 2019-01-08 RX ORDER — HEPARIN SODIUM 5000 [USP'U]/ML
5000 INJECTION, SOLUTION INTRAVENOUS; SUBCUTANEOUS ONCE
Status: COMPLETED | OUTPATIENT
Start: 2019-01-08 | End: 2019-01-08

## 2019-01-08 RX ORDER — LIDOCAINE HYDROCHLORIDE 10 MG/ML
INJECTION, SOLUTION INFILTRATION; PERINEURAL AS NEEDED
Status: DISCONTINUED | OUTPATIENT
Start: 2019-01-08 | End: 2019-01-08 | Stop reason: SURG

## 2019-01-08 RX ORDER — ONDANSETRON 2 MG/ML
4 INJECTION INTRAMUSCULAR; INTRAVENOUS EVERY 6 HOURS PRN
Status: DISCONTINUED | OUTPATIENT
Start: 2019-01-08 | End: 2019-01-13 | Stop reason: HOSPADM

## 2019-01-08 RX ORDER — FENTANYL CITRATE/PF 50 MCG/ML
25 SYRINGE (ML) INJECTION
Status: DISCONTINUED | OUTPATIENT
Start: 2019-01-08 | End: 2019-01-08 | Stop reason: HOSPADM

## 2019-01-08 RX ORDER — GLYCOPYRROLATE 0.2 MG/ML
INJECTION INTRAMUSCULAR; INTRAVENOUS AS NEEDED
Status: DISCONTINUED | OUTPATIENT
Start: 2019-01-08 | End: 2019-01-08 | Stop reason: SURG

## 2019-01-08 RX ORDER — HYDROMORPHONE HCL/PF 1 MG/ML
0.5 SYRINGE (ML) INJECTION
Status: DISCONTINUED | OUTPATIENT
Start: 2019-01-08 | End: 2019-01-09

## 2019-01-08 RX ORDER — HYDROMORPHONE HYDROCHLORIDE 2 MG/ML
INJECTION, SOLUTION INTRAMUSCULAR; INTRAVENOUS; SUBCUTANEOUS AS NEEDED
Status: DISCONTINUED | OUTPATIENT
Start: 2019-01-08 | End: 2019-01-08 | Stop reason: SURG

## 2019-01-08 RX ADMIN — SODIUM CHLORIDE: 0.9 INJECTION, SOLUTION INTRAVENOUS at 18:00

## 2019-01-08 RX ADMIN — CEFAZOLIN SODIUM 2000 MG: 2 SOLUTION INTRAVENOUS at 18:12

## 2019-01-08 RX ADMIN — FENTANYL CITRATE 25 MCG: 50 INJECTION INTRAMUSCULAR; INTRAVENOUS at 21:48

## 2019-01-08 RX ADMIN — SODIUM CHLORIDE, SODIUM LACTATE, POTASSIUM CHLORIDE, AND CALCIUM CHLORIDE: .6; .31; .03; .02 INJECTION, SOLUTION INTRAVENOUS at 17:41

## 2019-01-08 RX ADMIN — PROPOFOL 90 MCG/KG/MIN: 10 INJECTION, EMULSION INTRAVENOUS at 18:26

## 2019-01-08 RX ADMIN — ROCURONIUM BROMIDE 20 MG: 10 INJECTION INTRAVENOUS at 19:27

## 2019-01-08 RX ADMIN — FENTANYL CITRATE 25 MCG: 50 INJECTION INTRAMUSCULAR; INTRAVENOUS at 22:22

## 2019-01-08 RX ADMIN — SODIUM CHLORIDE 100 ML/HR: 0.9 INJECTION, SOLUTION INTRAVENOUS at 21:53

## 2019-01-08 RX ADMIN — PROPOFOL 30 MG: 10 INJECTION, EMULSION INTRAVENOUS at 18:10

## 2019-01-08 RX ADMIN — ROCURONIUM BROMIDE 30 MG: 10 INJECTION INTRAVENOUS at 18:07

## 2019-01-08 RX ADMIN — ROCURONIUM BROMIDE 10 MG: 10 INJECTION INTRAVENOUS at 19:57

## 2019-01-08 RX ADMIN — DEXAMETHASONE SODIUM PHOSPHATE 4 MG: 10 INJECTION INTRAMUSCULAR; INTRAVENOUS at 20:41

## 2019-01-08 RX ADMIN — HEPARIN SODIUM 5000 UNITS: 5000 INJECTION INTRAVENOUS; SUBCUTANEOUS at 18:26

## 2019-01-08 RX ADMIN — ROCURONIUM BROMIDE 20 MG: 10 INJECTION INTRAVENOUS at 18:30

## 2019-01-08 RX ADMIN — METRONIDAZOLE 500 MG: 500 INJECTION, SOLUTION INTRAVENOUS at 18:12

## 2019-01-08 RX ADMIN — ROCURONIUM BROMIDE 20 MG: 10 INJECTION INTRAVENOUS at 18:48

## 2019-01-08 RX ADMIN — FENTANYL CITRATE 50 MCG: 50 INJECTION, SOLUTION INTRAMUSCULAR; INTRAVENOUS at 18:07

## 2019-01-08 RX ADMIN — HYDROMORPHONE HYDROCHLORIDE 0.5 MG: 2 INJECTION, SOLUTION INTRAMUSCULAR; INTRAVENOUS; SUBCUTANEOUS at 20:45

## 2019-01-08 RX ADMIN — ONDANSETRON 4 MG: 2 INJECTION INTRAMUSCULAR; INTRAVENOUS at 20:41

## 2019-01-08 RX ADMIN — PROPOFOL 50 MCG/KG/MIN: 10 INJECTION, EMULSION INTRAVENOUS at 18:07

## 2019-01-08 RX ADMIN — PROPOFOL 80 MG: 10 INJECTION, EMULSION INTRAVENOUS at 18:07

## 2019-01-08 RX ADMIN — SODIUM CHLORIDE, SODIUM LACTATE, POTASSIUM CHLORIDE, AND CALCIUM CHLORIDE 50 ML/HR: .6; .31; .03; .02 INJECTION, SOLUTION INTRAVENOUS at 14:10

## 2019-01-08 RX ADMIN — GLYCOPYRROLATE 0.7 MG: 0.2 INJECTION, SOLUTION INTRAMUSCULAR; INTRAVENOUS at 21:00

## 2019-01-08 RX ADMIN — NEOSTIGMINE METHYLSULFATE 4 MG: 1 INJECTION INTRAVENOUS at 21:00

## 2019-01-08 RX ADMIN — LIDOCAINE HYDROCHLORIDE 50 MG: 10 INJECTION, SOLUTION INFILTRATION; PERINEURAL at 18:07

## 2019-01-08 RX ADMIN — NOREPINEPHRINE BITARTRATE 1 MCG/MIN: 1 INJECTION, SOLUTION, CONCENTRATE INTRAVENOUS at 18:07

## 2019-01-08 NOTE — H&P (VIEW-ONLY)
Colon and Rectal Surgery   Bacilio Peter 80 y o  male MRN: [id number]   Encounter: [id number]  12/12/18   11:49 AM      ASSESSMENT:  Bacilio Peter is a 80 y o  male who presents with known ca of splenic flexure, with MMR with mismatch repair in 2/4  Family informed to get genetic counseling and repeat colonoscopy  We discussed the surgery laparoscopic left hemicolectomy with anastomosis, with daughter present, considering risks and benefits involved, and the short coming of his age of 80  To hold Eliquis three days prior to surgery nce he gets clearance from cardiogist, Dr Sandrine Hughes  PLAN:    Laparoscopic left hemicolectomy as risks and benefits described        HPI  Bacilio Peter is a 80 y o  male here today to discuss surgery  Today he reports feeling well  He denies any abdominal pain, constipation, diarrhea, rectal bleeding, fever, nausea or vomiting  He has 2-3 bowel movements daily with soft and formed stool but black due to iron pills  He had a colonoscopy/EGD on 11/30/18 which showed grossly malignant looking tumor splenic flexure biopsied and tattooed for preparation for surgery and mild diverticular disease in the sigmoid colon  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  Mismatch repair noted in MLH1 and PMS2, MMR study and informed to the patient and daughter    His most recent CEA on 12/1/18 was 1 3  CT of the chest and abdomen on 12/4/18, showed mid descending colonic mass   No signs of metastatic disease to the chest or abdomen        Historical Information   Past Medical History:   Diagnosis Date    Anemia     BPH (benign prostatic hypertrophy)     Hypertension     Irregular heart beat     afib     Past Surgical History:   Procedure Laterality Date    CARDIAC PACEMAKER PLACEMENT      CARDIAC SURGERY      CARDIOVERSION      CARPAL TUNNEL RELEASE      CATARACT EXTRACTION      COLON SURGERY      COLONOSCOPY      AR COLONOSCOPY FLX DX W/COLLJ SPEC WHEN PFRMD N/A 11/30/2018    Procedure: COLONOSCOPY w egd  by dr Bunny Alba;  Surgeon: Leilani Diaz MD;  Location: [facility]; Service: Colorectal       Meds/Allergies       Current Outpatient Prescriptions:     apixaban (ELIQUIS) 2 5 mg, Take 1 tablet (2 5 mg total) by mouth 2 (two) times a day, Disp: 180 tablet, Rfl: 3    bisoprolol (ZEBETA) 5 mg tablet, Take 1 tablet (5 mg total) by mouth daily (Patient taking differently: Take 2 5 mg by mouth daily  ), Disp: 90 tablet, Rfl: 3    finasteride (PROSCAR) 5 mg tablet, Take 5 mg by mouth daily, Disp: , Rfl:     furosemide (LASIX) 20 mg tablet, Take 1 tablet (20 mg total) by mouth daily (Patient not taking: Reported on 11/19/2018 ), Disp: 90 tablet, Rfl: 3    IRON PO, Take 1 tablet by mouth 2 (two) times a day  , Disp: , Rfl:     loratadine (CLARITIN REDITABS) 10 MG dissolvable tablet, Take 1 tablet by mouth daily, Disp: , Rfl:     Multiple Vitamins-Minerals (CENTRUM SILVER 50+MEN PO), Take 1 tablet by mouth daily, Disp: , Rfl:     Omega-3 Fatty Acids (FISH OIL PO), Take 1 tablet by mouth daily, Disp: , Rfl:     triamcinolone (KENALOG) 0 1 % cream, APPLY TO SKIN TWICE DAILY TO AFFECTED AREAS ON BODY THROUGHOUT, Disp: , Rfl: 0      Allergies   Allergen Reactions    Adhesive [Medical Tape] Rash    Neomycin-Bacitracin Zn-Polymyx Rash    Neosporin [Neomycin-Polymyxin-Gramicidin] Rash         Social History   History   Alcohol Use    Yes     History   Drug Use No     History   Smoking Status    Never Smoker   Smokeless Tobacco    Never Used         Family History:   Family History   Problem Relation Age of Onset    Heart disease Father     Heart disease Brother        Review of Systems    Objective     Current Vitals: There were no vitals filed for this visit        Physical Exam:  General:no distress  Eyes:perrla/eomi  ENT:moist mucus membranes  Neck:supple  Pulm:no increased work of breathing  CV:sinus  Abdomen:soft,nontender  Rectal:normal perianal skin/sphincter tone, no masses palpated  Extremities:no edema  Lymphatics:no neck/axillary/groin lymphadenopathy

## 2019-01-08 NOTE — ANESTHESIA PREPROCEDURE EVALUATION
Review of Systems/Medical History    Chart reviewed  No history of anesthetic complications     Cardiovascular  EKG reviewed, Exercise tolerance (METS): >4,  Pacemaker/AICD, Hypertension , Dysrhythmias , atrial fibrillation,   Comment: 12/2018:  SUMMARY     LEFT VENTRICLE:  Systolic function was mildly reduced  Ejection fraction was estimated to be 45 %  There was mild diffuse hypokinesis with regional variations including basal to mid inferolateral and basal inferior hypokinesis  There was mild concentric hypertrophy      RIGHT VENTRICLE:  The ventricle was mildly dilated  Systolic function was reduced      LEFT ATRIUM:  The atrium was mildly to moderately dilated      RIGHT ATRIUM:  The atrium was moderately dilated      TRICUSPID VALVE:  There was moderate regurgitation  Estimated peak PA pressure was 42 mmHg ,  Pulmonary  Negative pulmonary ROS        GI/Hepatic    GI malignancy,        Chronic kidney disease , Prostatic disorder, benign prostatic hyperplasia       Endo/Other     GYN       Hematology  No anemia ,    Comment: eliquis 3 days ago Musculoskeletal       Neurology   Psychology           Physical Exam    Airway    Mallampati score: II  TM Distance: >3 FB  Neck ROM: limited     Dental   Comment: No loose teeth  + crowns  Partial dentures,     Cardiovascular  Rhythm: regular, Rate: normal, Peripheral edema,     Pulmonary  Breath sounds clear to auscultation, No wheezes, No rales,     Other Findings        Anesthesia Plan  ASA Score- 4     Anesthesia Type- general with ASA Monitors  Additional Monitors: arterial line  Airway Plan: ETT  Comment: +/- CVL  Plan Factors-    Induction- intravenous  Postoperative Plan-     Informed Consent- Anesthetic plan and risks discussed with patient  I personally reviewed this patient with the CRNA  Discussed and agreed on the Anesthesia Plan with the CRNA  Erwin Ferrell

## 2019-01-08 NOTE — ANESTHESIA PROCEDURE NOTES
Arterial Line Insertion  Date/Time: 1/8/2019 6:00 PM  Performed by: Gabriela Munroe by: Tee Munoz   Consent: Written consent obtained  Risks and benefits: risks, benefits and alternatives were discussed (anesthesia consent)  Consent given by: patient  Required items: required blood products, implants, devices, and special equipment available  Patient identity confirmed: verbally with patient and arm band  Time out: Immediately prior to procedure a "time out" was called to verify the correct patient, procedure, equipment, support staff and site/side marked as required  Preparation: Patient was prepped and draped in the usual sterile fashion    Indications: multiple ABGs and hemodynamic monitoring  Orientation:  Left  Location: radial artery  Anesthesia: local infiltration  Local Anesthetic: lidocaine 1% without epinephrine    Sedation:  Patient sedated: no    Procedure Details:  Needle gauge: 20  Number of attempts: 2    Post-procedure:  Post-procedure: dressing applied  Waveform: good waveform  Post-procedure CNS: normal  Patient tolerance: Patient tolerated the procedure well with no immediate complications

## 2019-01-09 PROBLEM — N18.30 HYPERTENSIVE KIDNEY DISEASE WITH CHRONIC KIDNEY DISEASE STAGE III (HCC): Status: ACTIVE | Noted: 2017-02-08

## 2019-01-09 PROBLEM — I12.9 HYPERTENSIVE KIDNEY DISEASE WITH CHRONIC KIDNEY DISEASE STAGE III (HCC): Status: ACTIVE | Noted: 2017-02-08

## 2019-01-09 LAB
ANION GAP SERPL CALCULATED.3IONS-SCNC: 12 MMOL/L (ref 4–13)
ANION GAP SERPL CALCULATED.3IONS-SCNC: 9 MMOL/L (ref 4–13)
ATRIAL RATE: 71 BPM
BASOPHILS # BLD AUTO: 0.01 THOUSANDS/ΜL (ref 0–0.1)
BASOPHILS # BLD AUTO: 0.02 THOUSANDS/ΜL (ref 0–0.1)
BASOPHILS NFR BLD AUTO: 0 % (ref 0–1)
BASOPHILS NFR BLD AUTO: 0 % (ref 0–1)
BUN SERPL-MCNC: 22 MG/DL (ref 5–25)
BUN SERPL-MCNC: 23 MG/DL (ref 5–25)
CALCIUM SERPL-MCNC: 8.1 MG/DL (ref 8.3–10.1)
CALCIUM SERPL-MCNC: 8.4 MG/DL (ref 8.3–10.1)
CHLORIDE SERPL-SCNC: 106 MMOL/L (ref 100–108)
CHLORIDE SERPL-SCNC: 108 MMOL/L (ref 100–108)
CO2 SERPL-SCNC: 21 MMOL/L (ref 21–32)
CO2 SERPL-SCNC: 21 MMOL/L (ref 21–32)
CREAT SERPL-MCNC: 1.71 MG/DL (ref 0.6–1.3)
CREAT SERPL-MCNC: 1.79 MG/DL (ref 0.6–1.3)
EOSINOPHIL # BLD AUTO: 0 THOUSAND/ΜL (ref 0–0.61)
EOSINOPHIL # BLD AUTO: 0.01 THOUSAND/ΜL (ref 0–0.61)
EOSINOPHIL NFR BLD AUTO: 0 % (ref 0–6)
EOSINOPHIL NFR BLD AUTO: 0 % (ref 0–6)
ERYTHROCYTE [DISTWIDTH] IN BLOOD BY AUTOMATED COUNT: 16.3 % (ref 11.6–15.1)
ERYTHROCYTE [DISTWIDTH] IN BLOOD BY AUTOMATED COUNT: 16.4 % (ref 11.6–15.1)
GFR SERPL CREATININE-BSD FRML MDRD: 32 ML/MIN/1.73SQ M
GFR SERPL CREATININE-BSD FRML MDRD: 34 ML/MIN/1.73SQ M
GLUCOSE SERPL-MCNC: 129 MG/DL (ref 65–140)
GLUCOSE SERPL-MCNC: 136 MG/DL (ref 65–140)
HCT VFR BLD AUTO: 38.4 % (ref 36.5–49.3)
HCT VFR BLD AUTO: 38.5 % (ref 36.5–49.3)
HGB BLD-MCNC: 11.8 G/DL (ref 12–17)
HGB BLD-MCNC: 11.9 G/DL (ref 12–17)
IMM GRANULOCYTES # BLD AUTO: 0.03 THOUSAND/UL (ref 0–0.2)
IMM GRANULOCYTES # BLD AUTO: 0.04 THOUSAND/UL (ref 0–0.2)
IMM GRANULOCYTES NFR BLD AUTO: 0 % (ref 0–2)
IMM GRANULOCYTES NFR BLD AUTO: 0 % (ref 0–2)
LYMPHOCYTES # BLD AUTO: 0.5 THOUSANDS/ΜL (ref 0.6–4.47)
LYMPHOCYTES # BLD AUTO: 0.64 THOUSANDS/ΜL (ref 0.6–4.47)
LYMPHOCYTES NFR BLD AUTO: 4 % (ref 14–44)
LYMPHOCYTES NFR BLD AUTO: 7 % (ref 14–44)
MAGNESIUM SERPL-MCNC: 2.3 MG/DL (ref 1.6–2.6)
MCH RBC QN AUTO: 31.3 PG (ref 26.8–34.3)
MCH RBC QN AUTO: 31.8 PG (ref 26.8–34.3)
MCHC RBC AUTO-ENTMCNC: 30.6 G/DL (ref 31.4–37.4)
MCHC RBC AUTO-ENTMCNC: 31 G/DL (ref 31.4–37.4)
MCV RBC AUTO: 102 FL (ref 82–98)
MCV RBC AUTO: 103 FL (ref 82–98)
MONOCYTES # BLD AUTO: 0.51 THOUSAND/ΜL (ref 0.17–1.22)
MONOCYTES # BLD AUTO: 0.65 THOUSAND/ΜL (ref 0.17–1.22)
MONOCYTES NFR BLD AUTO: 5 % (ref 4–12)
MONOCYTES NFR BLD AUTO: 6 % (ref 4–12)
NEUTROPHILS # BLD AUTO: 10.28 THOUSANDS/ΜL (ref 1.85–7.62)
NEUTROPHILS # BLD AUTO: 8.44 THOUSANDS/ΜL (ref 1.85–7.62)
NEUTS SEG NFR BLD AUTO: 88 % (ref 43–75)
NEUTS SEG NFR BLD AUTO: 90 % (ref 43–75)
NRBC BLD AUTO-RTO: 0 /100 WBCS
NRBC BLD AUTO-RTO: 0 /100 WBCS
PLATELET # BLD AUTO: 191 THOUSANDS/UL (ref 149–390)
PLATELET # BLD AUTO: 194 THOUSANDS/UL (ref 149–390)
PMV BLD AUTO: 9.4 FL (ref 8.9–12.7)
PMV BLD AUTO: 9.8 FL (ref 8.9–12.7)
POTASSIUM SERPL-SCNC: 4.4 MMOL/L (ref 3.5–5.3)
POTASSIUM SERPL-SCNC: 4.6 MMOL/L (ref 3.5–5.3)
QRS AXIS: -80 DEGREES
QRSD INTERVAL: 167 MS
QT INTERVAL: 504 MS
QTC INTERVAL: 548 MS
RBC # BLD AUTO: 3.74 MILLION/UL (ref 3.88–5.62)
RBC # BLD AUTO: 3.77 MILLION/UL (ref 3.88–5.62)
SODIUM SERPL-SCNC: 138 MMOL/L (ref 136–145)
SODIUM SERPL-SCNC: 139 MMOL/L (ref 136–145)
T WAVE AXIS: 89 DEGREES
VENTRICULAR RATE: 71 BPM
WBC # BLD AUTO: 11.5 THOUSAND/UL (ref 4.31–10.16)
WBC # BLD AUTO: 9.63 THOUSAND/UL (ref 4.31–10.16)

## 2019-01-09 PROCEDURE — G8979 MOBILITY GOAL STATUS: HCPCS

## 2019-01-09 PROCEDURE — 93005 ELECTROCARDIOGRAM TRACING: CPT

## 2019-01-09 PROCEDURE — 80048 BASIC METABOLIC PNL TOTAL CA: CPT | Performed by: SURGERY

## 2019-01-09 PROCEDURE — 85025 COMPLETE CBC W/AUTO DIFF WBC: CPT | Performed by: SURGERY

## 2019-01-09 PROCEDURE — 99222 1ST HOSP IP/OBS MODERATE 55: CPT | Performed by: INTERNAL MEDICINE

## 2019-01-09 PROCEDURE — 99232 SBSQ HOSP IP/OBS MODERATE 35: CPT | Performed by: SURGERY

## 2019-01-09 PROCEDURE — 80048 BASIC METABOLIC PNL TOTAL CA: CPT | Performed by: EMERGENCY MEDICINE

## 2019-01-09 PROCEDURE — 94762 N-INVAS EAR/PLS OXIMTRY CONT: CPT

## 2019-01-09 PROCEDURE — 85025 COMPLETE CBC W/AUTO DIFF WBC: CPT | Performed by: EMERGENCY MEDICINE

## 2019-01-09 PROCEDURE — 97116 GAIT TRAINING THERAPY: CPT

## 2019-01-09 PROCEDURE — 93010 ELECTROCARDIOGRAM REPORT: CPT | Performed by: INTERNAL MEDICINE

## 2019-01-09 PROCEDURE — 97163 PT EVAL HIGH COMPLEX 45 MIN: CPT

## 2019-01-09 PROCEDURE — G8978 MOBILITY CURRENT STATUS: HCPCS

## 2019-01-09 PROCEDURE — 83735 ASSAY OF MAGNESIUM: CPT | Performed by: SURGERY

## 2019-01-09 RX ORDER — SODIUM CHLORIDE 9 MG/ML
40 INJECTION, SOLUTION INTRAVENOUS CONTINUOUS
Status: DISCONTINUED | OUTPATIENT
Start: 2019-01-09 | End: 2019-01-10

## 2019-01-09 RX ORDER — ACETAMINOPHEN 325 MG/1
650 TABLET ORAL EVERY 6 HOURS PRN
Status: DISCONTINUED | OUTPATIENT
Start: 2019-01-09 | End: 2019-01-13 | Stop reason: HOSPADM

## 2019-01-09 RX ORDER — OXYCODONE HYDROCHLORIDE 10 MG/1
10 TABLET ORAL EVERY 6 HOURS PRN
Status: DISCONTINUED | OUTPATIENT
Start: 2019-01-09 | End: 2019-01-13 | Stop reason: HOSPADM

## 2019-01-09 RX ORDER — HYDROMORPHONE HCL/PF 1 MG/ML
0.5 SYRINGE (ML) INJECTION
Status: DISCONTINUED | OUTPATIENT
Start: 2019-01-09 | End: 2019-01-13 | Stop reason: HOSPADM

## 2019-01-09 RX ORDER — OXYCODONE HYDROCHLORIDE 5 MG/1
5 TABLET ORAL EVERY 6 HOURS PRN
Status: DISCONTINUED | OUTPATIENT
Start: 2019-01-09 | End: 2019-01-13 | Stop reason: HOSPADM

## 2019-01-09 RX ORDER — SODIUM CHLORIDE, SODIUM GLUCONATE, SODIUM ACETATE, POTASSIUM CHLORIDE, MAGNESIUM CHLORIDE, SODIUM PHOSPHATE, DIBASIC, AND POTASSIUM PHOSPHATE .53; .5; .37; .037; .03; .012; .00082 G/100ML; G/100ML; G/100ML; G/100ML; G/100ML; G/100ML; G/100ML
500 INJECTION, SOLUTION INTRAVENOUS ONCE
Status: COMPLETED | OUTPATIENT
Start: 2019-01-09 | End: 2019-01-09

## 2019-01-09 RX ADMIN — HEPARIN SODIUM 5000 UNITS: 5000 INJECTION INTRAVENOUS; SUBCUTANEOUS at 00:10

## 2019-01-09 RX ADMIN — HEPARIN SODIUM 5000 UNITS: 5000 INJECTION INTRAVENOUS; SUBCUTANEOUS at 21:15

## 2019-01-09 RX ADMIN — HEPARIN SODIUM 5000 UNITS: 5000 INJECTION INTRAVENOUS; SUBCUTANEOUS at 15:00

## 2019-01-09 RX ADMIN — SODIUM CHLORIDE, SODIUM GLUCONATE, SODIUM ACETATE, POTASSIUM CHLORIDE, MAGNESIUM CHLORIDE, SODIUM PHOSPHATE, DIBASIC, AND POTASSIUM PHOSPHATE 500 ML: .53; .5; .37; .037; .03; .012; .00082 INJECTION, SOLUTION INTRAVENOUS at 05:24

## 2019-01-09 RX ADMIN — HYDROMORPHONE HYDROCHLORIDE 0.5 MG: 1 INJECTION, SOLUTION INTRAMUSCULAR; INTRAVENOUS; SUBCUTANEOUS at 07:30

## 2019-01-09 RX ADMIN — HEPARIN SODIUM 5000 UNITS: 5000 INJECTION INTRAVENOUS; SUBCUTANEOUS at 05:25

## 2019-01-09 RX ADMIN — SODIUM CHLORIDE 75 ML/HR: 0.9 INJECTION, SOLUTION INTRAVENOUS at 19:48

## 2019-01-09 NOTE — PHYSICAL THERAPY NOTE
Physical Therapy Evaluation     Patient's Name: Fernanda Cota    Admitting Diagnosis  Cancer of splenic flexure [facility]) [C18 5]    Problem List  Patient Active Problem List   Diagnosis    Stage 3 chronic kidney disease ([place] [address] )    Dehydration    Diarrhea    Hypertensive kidney disease with chronic kidney disease stage III ([place] [address] )    A-fib ([place] [address] )    Cardiomyopathy ([place] [address] )    BPH (benign prostatic hyperplasia)    Biventricular cardiac pacemaker in situ    Anticoagulation adequate    Iron deficiency anemia    Special screening for malignant neoplasms, colon    Malignant neoplasm of colon ([place] [address] )    Cancer of splenic flexure ([place] [address] )       Past Medical History  Past Medical History:   Diagnosis Date    Anemia     Atrial fibrillation ([place] [address] )     BPH (benign prostatic hypertrophy)     Chronic kidney disease     Hypertension     Irregular heart beat     afib       Past Surgical History  Past Surgical History:   Procedure Laterality Date    BASAL CELL CARCINOMA EXCISION      CARDIAC PACEMAKER PLACEMENT      CARDIAC SURGERY      CARDIOVERSION      CARPAL TUNNEL RELEASE      CATARACT EXTRACTION      COLON SURGERY      COLONOSCOPY      W[facility] COLONOSCOPY FLX DX W/COLLJ SPEC WHEN PFRMD N/A 11/30/2018    Procedure: COLONOSCOPY w egd  by dr Jefe Ngo;  Surgeon: Brain Torres MD;  [facility]: [facility]; Service: Colorectal    [place] LAP,SURG,COLECTOMY, PARTIAL, W/ANAST N/A 1/8/2019    Procedure: Left hemicolectomy, takedown splenic flexure, end to end transverse to sigmoid colon anastamosis;   Surgeon: Brain Torres MD;  [facility]: [facility];  Service: Colorectal        01/09/19 1526   Note Type   Note type Eval/Treat   Pain Assessment   Pain Assessment FLACC   Pain Score No Pain   Pain Type Surgical pain   Pain Location Abdomen   Pain Rating: FLACC (Rest) - Face 0   Pain Rating: FLACC (Rest) - Legs 0   Pain Rating: FLACC (Rest) - Activity 0   Pain Rating: FLACC (Rest) - Cry 0   Pain Rating: FLACC (Rest) - Consolability 0   Score: FLACC (Rest) 0   Pain Rating: FLACC (Activity) - Face 1   Pain Rating: FLACC (Activity) - Legs 1   Pain Rating: FLACC (Activity) - Activity 0   Pain Rating: FLACC (Activity) - Cry 1   Pain Rating: FLACC (Activity) - Consolability 0   Score: FLACC (Activity) 3   Home Living   Type of Home House   Home Layout Two level  (2 CELESTINO)   Bathroom Shower/Tub Walk-in shower  (tub/shower)   Bathroom Toilet Standard   Bathroom Equipment Grab bars in shower;Grab bars around toilet  (vertical grab bar between shower and toilet)   [address] [facility]  (Belonged to his wife, doesn't use at baseline)   Additional Comments Pt reports he prefers to use the tub/shower with the grab bar to the walk-in shower, but sponge bathes the majority of the time  Prior Function   Level of [place] Independent with ADLs and functional mobility   Lives With Alone   Receives Help From Family  (One son resides in the area but limited in availability)   ADL Assistance Independent   IADLs Independent   Falls in the last 6 months 0   Vocational Retired   Comments PTA, pt was I with ADLs, IADls, and functional mobility without the use of an AD  He drives and enjoys golfing 2-3 days per week  Restrictions/Precautions   Weight Bearing Precautions Per Order No   Other Precautions Cognitive;Multiple lines;Telemetry; Fall Risk;Pain   General   Family/Caregiver Present No   Cognition   Overall Cognitive Status Impaired   Arousal/Participation Alert   Orientation Level Oriented X4   Memory Decreased recall of precautions   Following Commands Follows multistep commands with increased time or repetition   RLE Assessment   RLE Assessment WFL  (3+/5)   LLE Assessment   LLE Assessment WFL  (3+/5)   Bed Mobility   Additional Comments Seated in bedside chair on arrival   Transfers   Sit to Stand 4  Minimal assistance   Additional items Assist x 1; Increased time required;Verbal cues   Stand to Sit 4  Minimal assistance   Additional items Assist x 1; Increased time required;Verbal cues   Ambulation/Elevation   Gait pattern Excessively slow; Short stride; Shuffling;Decreased foot clearance; Wide KRISH   Gait Assistance 4  Minimal assist   Additional items Assist x 1;Assist x 3  (A x1 for stability, A x2 for lines and chair follow)   Assistive Device Rolling walker   Distance 100'x2  (seated rest break)   Balance   Static Sitting Fair +   Dynamic Sitting Fair   Static Standing Fair -   Dynamic Standing Poor +   Ambulatory Poor +   Endurance Deficit   Endurance Deficit Yes   Endurance Deficit Description fatigue, weakness   Activity Tolerance   Activity Tolerance Patient tolerated treatment well   Nurse Made Aware Yes, RN cleared pt for PT eval   Assessment   Prognosis Good   Problem List Decreased strength;Decreased endurance; Impaired balance;Decreased mobility; Decreased safety awareness;Pain   Assessment Pt is 80 y o  male seen for PT evaluation s/p admit to [facility] on 1/8/2019 w/ Cancer of splenic flexure ([facility] [address] )  Pt with hx of splenic flexure tumor and mild diverticular disease presented for elective surgery  S/p left hemicolectomy, takedown of splenic flexure, end to end transverse to sigmoid colon anastomosis on 1/8/2019  PT consulted to assess pt's functional mobility and d/c needs  Order placed for PT eval and tx, w/ up w/ A order  Comorbidities affecting pt's physical performance at time of assessment include: malignant neoplasm of colon, BPH, cardiomyopathy, Afib, CKD stage 3  PTA, pt was ambulates community distances and elevations, lives alone in 2 level house and retired  Personal factors affecting pt at time of IE include: ambulating w/ assistive device, stairs to enter home, inability to navigate community distances, limited home support, inability to perform IADLs, inability to perform ADLs and inability to live alone   Please find objective findings from PT assessment regarding body systems outlined above with impairments and limitations including weakness, impaired balance, decreased endurance, gait deviations, pain, decreased activity tolerance, decreased functional mobility tolerance and fall risk  The following objective measures performed on IE also reveal limitations: Barthel Index: 45/100  Pt's clinical presentation is currently unstable/unpredictable seen in pt's presentation of abnormal labs, urinary catheter, lines, telemetry  Pt demonstrated impaired balance and mobility through ambulation requiring min A x1 with chair follow  He is expected to progress very well and may been appropriate for d/c to home with HHPT, but as of now, pt resides alone without family support and would benefit from STR  Pt to benefit from continued PT tx to address deficits as defined above and maximize level of functional independent mobility and consistency  From PT/mobility standpoint, recommendation at time of d/c would be STR vs  Home PT pending progress in order to facilitate return to PLOF  Barriers to Discharge Inaccessible home environment;Decreased caregiver support  (Alone in [address])   Goals   Patient Goals To get back to playing golf   STG Expiration Date 01/19/19   Short Term Goal #1 Pt will demonstrate: 1) increased BLE strength >/= 1 grade for improved transfers 2) supine <> sit transfer with mod I 3) sit <> stand transfer with mod I 4) increased dynamic balance >/= 1 grade to decrease risk for falls 5) Amb >/= 600' with mod I using least restrictive AD for return to golf 6) up/down 1 flight of stairs with mod I   Treatment Day 1   Plan   Treatment/Interventions Functional transfer training;LE strengthening/ROM; Elevations; Therapeutic exercise; Endurance training;Patient/family training;Equipment eval/education; Bed mobility;Gait training;Spoke to nursing   PT Frequency (3-5x/wk)   Recommendation   Recommendation (STR vs HHPT pending progress)   Equipment Recommended Walker   PT - OK to Discharge No   Additional Comments Pending progress Barthel Index   Feeding 10   Bathing 0   Grooming Score 5   Dressing Score 5   Bladder Score 0   Bowels Score 10   Toilet Use Score 5   Transfers (Bed/Chair) Score 10   Mobility (Level Surface) Score 0   Stairs Score 0   Barthel Index Score 45     PT Treatment  Time In: 1502  Time Out: 1526  Total Time: 24    Pt was agreeable to PT treatment session  Pt ambulated with A x1-3 for stability, lines, and chair follow  He demonstrated an excessively slow chantelle with forward flexion and poor posture and required a seated rest break at 100'  For the return to his room, he was educated on improved postures, increased chantelle, and increased heel strike  He demonstrated improved mobility with less A for stability  Pt would continue to benefit from skilled PT to improve strength, endurance, balance, and mobility to return to [facility]      Malik Ashford, PT, DPT

## 2019-01-09 NOTE — PLAN OF CARE
Problem: PHYSICAL THERAPY ADULT  Goal: Performs mobility at highest level of function for planned discharge setting  See evaluation for individualized goals  Treatment/Interventions: Functional transfer training, LE strengthening/ROM, Elevations, Therapeutic exercise, Endurance training, Patient/family training, Equipment eval/education, Bed mobility, Gait training, Spoke to nursing  Equipment Recommended: Scarlett Zimmer       See flowsheet documentation for full assessment, interventions and recommendations  Prognosis: Good  Problem List: Decreased strength, Decreased endurance, Impaired balance, Decreased mobility, Decreased safety awareness, Pain  Assessment: Pt is 80 y o  male seen for PT evaluation s/p admit to [facility] on 1/8/2019 w/ Cancer of splenic flexure ([facility] [address] )  Pt with hx of splenic flexure tumor and mild diverticular disease presented for elective surgery  S/p left hemicolectomy, takedown of splenic flexure, end to end transverse to sigmoid colon anastomosis on 1/8/2019  PT consulted to assess pt's functional mobility and d/c needs  Order placed for PT eval and tx, w/ up w/ A order  Comorbidities affecting pt's physical performance at time of assessment include: malignant neoplasm of colon, BPH, cardiomyopathy, Afib, CKD stage 3  PTA, pt was ambulates community distances and elevations, lives alone in 2 level house and retired  Personal factors affecting pt at time of IE include: ambulating w/ assistive device, stairs to enter home, inability to navigate community distances, limited home support, inability to perform IADLs, inability to perform ADLs and inability to live alone  Please find objective findings from PT assessment regarding body systems outlined above with impairments and limitations including weakness, impaired balance, decreased endurance, gait deviations, pain, decreased activity tolerance, decreased functional mobility tolerance and fall risk   The following objective measures performed on IE also reveal limitations: Barthel Index: 45/100  Pt's clinical presentation is currently unstable/unpredictable seen in pt's presentation of abnormal labs, urinary catheter, lines, telemetry  Pt demonstrated impaired balance and mobility through ambulation requiring min A x1 with chair follow  He is expected to progress very well and may been appropriate for d/c to home with HHPT, but as of now, pt resides alone without family support and would benefit from STR  Pt to benefit from continued PT tx to address deficits as defined above and maximize level of functional independent mobility and consistency  From PT/mobility standpoint, recommendation at time of d/c would be STR vs  Home PT pending progress in order to facilitate return to PLOF  Barriers to Discharge: Inaccessible home environment, Decreased caregiver support (Alone in 2 SH)     Recommendation:  (STR vs HHPT pending progress)     PT - OK to Discharge: No    See flowsheet documentation for full assessment

## 2019-01-09 NOTE — PROGRESS NOTES
01/09/19 1300   Clinical Encounter Type   Visited With Patient   Routine Visit Introduction   Continue Visiting Yes   Sacramental Encounters   Communion Patient wants communion

## 2019-01-09 NOTE — PROGRESS NOTES
Progress Note - Colorectal Surgery  Swetha Bobby 80 y o  male MRN: [id number]  Unit/Bed#: [facility] 513-01 Encounter: [id number]    Assessment:  80 y o  male w/splenic flexure colon CA s/p L hemicolectomy, takedown splenic flexure, end to end hand sewn anastomosis 1/8  Plan:  - d/c NGT  - advance to clears  - maintenance IVF at 80  - consider d/c crockett later today  - prn pain control, will add PO meds once NGT removed  - f/u nephrology  - OOB/ambulate  - PT/OT  - SQH/SCDs  - ok to come out of unit today to med surg      Subjective/Objective   Subjective: doing well, denies nausea/vomiting    Objective:    Blood pressure 149/80, pulse 68, temperature 97 7 °F (36 5 °C), temperature source Axillary, resp  rate 17, height 5' 10" (1 778 m), weight 91 5 kg (201 lb 11 5 oz), SpO2 99 %  ,Body mass index is 28 94 kg/m²  I/O last 24 hours: In: 2650 4 [I V :2500 4; IV Piggyback:150]  Out: 700 [Urine:700]    Invasive Devices     Peripheral Intravenous Line            Peripheral IV 01/08/19 Left Hand less than 1 day    Peripheral IV 01/08/19 Left Wrist less than 1 day          Arterial Line            Arterial Line 01/08/19 Left Radial less than 1 day          Drain            NG/OG/Enteral Tube Nasogastric Right nares less than 1 day    Urethral Catheter Non-latex; Double-lumen 16 Fr  less than 1 day                Physical Exam:   NAD, alert and oriented x3  Normocephalic, atraumatic  MMM, EOMI, PERRLA  Norm resp effort on RA  RRR  Abd soft, appropriately tender, ND, incisions cdi closed with histoacryl  Crockett in place with yellow urine  No calf tenderness or peripheral edema  Motor/sensation intact in distal extremities  CN grossly intact  -rash/lesions      Lab, Imaging and other studies:  Lab Results   Component Value Date    WBC 9 63 01/09/2019    HGB 11 8 (L) 01/09/2019    HCT 38 5 01/09/2019     (H) 01/09/2019     01/09/2019      Lab Results   Component Value Date    GLUCOSE 93 03/05/2015    CALCIUM 8 1 (L) 01/09/2019     03/05/2015    K 4 6 01/09/2019    CO2 21 01/09/2019     01/09/2019    BUN 22 01/09/2019    CREATININE 1 71 (H) 01/09/2019       VTE Pharmacologic Prophylaxis: Sequential compression device (Venodyne)   VTE Mechanical Prophylaxis: sequential compression device

## 2019-01-09 NOTE — PROGRESS NOTES
ICU Accept Note - Critical Care   Vladimir Espinoza 80 y o  male MRN: [id number]  Unit/Bed#: [facility] 756-53 Encounter: [id number]    Assessment: 81yo M with h/o a  Fib on eliquis and splenc flexure ca coming to stepdown unit after laprascopic flexure resection with transverse-sigmoid anastamosis  Plan:          Neuro:    Pain Control:   - dilaudid 0 5mg and 1mg   - CAM-ICU   - sleep-wake cycle                 CV:    - in a  Fib, eliquis held 3d preop   - no acute issues                 Lung:    - successfully extubated post-op   - no acute issues                 GI:    - s/p laprascopic splenic flexure resection                 FEN:    - LR 50cc/hr   - repeat lytes in am   - NPO per colorectal                 :    - no acute issues                 ID:    - check wbc in am, afebrile                 Heme:    - check cbc in am   - heparin post operative                 Endo:    - no issues                            Msk/Skin:    - frequent turning, OOB as tolerated with PT/OT                 Disposition: stepdown overnight, potentially floors in am    Chief Complaint: minimal L sided abd pain    HPI/24hr events: laprascopic colon resection    Physical Exam:   Physical Exam   Constitutional: He is oriented to person, place, and time  He appears well-developed and well-nourished  HENT:   Head: Normocephalic and atraumatic  Eyes: Conjunctivae and EOM are normal  No scleral icterus  Neck: Normal range of motion  Neck supple  Cardiovascular: Normal rate and regular rhythm  No murmur heard  Pulmonary/Chest: Effort normal and breath sounds normal    Abdominal: Soft  Bowel sounds are normal  There is tenderness  Musculoskeletal: Normal range of motion  Neurological: He is alert and oriented to person, place, and time  Skin: Skin is warm and dry  Psychiatric: He has a normal mood and affect  His behavior is normal    Nursing note and vitals reviewed          Vitals:    01/08/19 2245 01/08/19 2300 01/08/19 2315 19 0002   BP: 148/79 153/75 152/83 155/83   BP Location:    Right arm   Pulse: 68 68 68    Resp: 12 18 12 20   Temp: (!) 97 1 °F (36 2 °C)   (!) 97 3 °F (36 3 °C)   TempSrc:    Oral   SpO2: 99% 100% 100% 100%   Weight:    91 5 kg (201 lb 11 5 oz)   Height:    5' 10" (1 778 m)     Arterial Line BP: 162/68  Arterial Line MAP (mmHg): 102 mmHg    Temperature:   Temp (24hrs), Av 5 °F (36 4 °C), Min:96 8 °F (36 °C), Max:99 °F (37 2 °C)    Current: Temperature: (!) 97 3 °F (36 3 °C)    Weights:   IBW: 73 kg    Body mass index is 28 94 kg/m²  Weight (last 2 days)     Date/Time   Weight    19 0002  91 5 (201 72)    19 1332  93 (205)              Hemodynamic Monitoring:  N/A     Non-Invasive/Invasive Ventilation Settings:  Respiratory    Lab Data (Last 4 hours)    None         O2/Vent Data (Last 4 hours)    None              No results found for: PHART, CGS2ISC, PO2ART, LQI8TOH, V6FUSZYR, BEART, SOURCE  SpO2: SpO2: 100 %    Intake and Outputs:  I/O        07 -  0700 701 -  0700    I V  (mL/kg)  1500 (16 1)    Total Intake(mL/kg)  1500 (16 1)    Urine (mL/kg/hr)  450    Total Output   450    Net   +1050                 Nutrition:        Diet Orders            Start     Ordered    19 0009  Diet NPO  Diet effective now     Question Answer Comment   Diet Type NPO    RD to adjust diet per protocol? Yes        19 000          Labs: Invalid input(s):  EOSPCT       Invalid input(s): LABALBU                     0  Lab Value Date/Time   TROPONINI 0 14 (H) 2017 0920   TROPONINI 0 18 (H) 2017 0753   TROPONINI 0 19 (H) 2017 0446   TROPONINI 0 14 (H) 2017 1925       Imaging:  I have personally reviewed pertinent reports  Micro:  Lab Results   Component Value Date    URINECX <10,000 cfu/ml Mixed Contaminants X2 2017       Allergies:    Allergies   Allergen Reactions    Adhesive [Medical Tape] Rash    Neomycin-Bacitracin Zn-Polymyx Rash    Neosporin [Neomycin-Polymyxin-Gramicidin] Rash       Medications:   Scheduled Meds:    Current Facility-Administered Medications:  bupivacaine liposomal 20 mL Infiltration Once Mark Rogers MD    heparin (porcine) 5,000 Units Subcutaneous Q8H [address] Semaj Vikas Burnham    HYDROmorphone 0 5 mg Intravenous Q3H PRN Celi Lissette    HYDROmorphone 1 mg Intravenous Q3H PRN Semaj Bean Burnham    lactated ringers 50 mL/hr Intravenous Continuous Mark Rogers MD Last Rate: Stopped (01/09/19 0000)   ondansetron 4 mg Intravenous Q6H PRN Semaj Bean Burnham    sodium chloride 100 mL/hr Intravenous Continuous Celi Lissette Last Rate: 100 mL/hr (01/08/19 2153)     Continuous Infusions:    lactated ringers 50 mL/hr Last Rate: Stopped (01/09/19 0000)   sodium chloride 100 mL/hr Last Rate: 100 mL/hr (01/08/19 2153)     PRN Meds:    HYDROmorphone 0 5 mg Q3H PRN   HYDROmorphone 1 mg Q3H PRN   ondansetron 4 mg Q6H PRN       VTE Pharmacologic Prophylaxis: Heparin  VTE Mechanical Prophylaxis: sequential compression device    Invasive lines and devices: Invasive Devices     Peripheral Intravenous Line            Peripheral IV 01/08/19 Left Hand less than 1 day    Peripheral IV 01/08/19 Left Wrist less than 1 day          Arterial Line            Arterial Line 01/08/19 Left Radial less than 1 day          Drain            NG/OG/Enteral Tube Nasogastric Right nares less than 1 day    Urethral Catheter Non-latex; Double-lumen 16 Fr  less than 1 day                   Counseling / Coordination of Care  Total time spent today 30 minutes  Greater than 50% of total time was spent with the patient and / or family counseling and / or coordination of care  A description of the counseling / coordination of care: 30     Code Status: Level 1 - Full Code     Portions of the record may have been created with voice recognition software    Occasional wrong word or "sound a like" substitutions may have occurred due to the inherent limitations of voice recognition software  Read the chart carefully and recognize, using context, where substitutions have occurred       Mya Miller MD

## 2019-01-09 NOTE — PROGRESS NOTES
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, splenic flexure takedown and end to end hand sewn anastamosis  2  Atrial fibrillation with biventricular pacemaker  3  Dilated cardiomyopathy  4  Iron deficiency anemia  5  Hypertension  6  BPH  7  Chronic kidney disease stage 3    Neuro:  Frequent neurologic monitoring, continue current pain management    CV:  Close hemodynamic monitoring, could consider beginning beta-blocker given history of bisoprolol, presently appears euvolemic can hold home Lasix    Pulm:  Encourage good pulmonary hygiene    GI:  Serial abdominal exams, postoperative management per the colorectal service    :  Close intake and output, daily weights, trend serum creatinine, maintain Alas per primary service    F/E/N:  Continue gentle fluid hydration while NPO, replete electrolytes as necessary    ID:  Follow temperature and white count    Heme:  Continue heparin for DVT prophylaxis, would resume Eliquis when able to take oral and appropriate with primary service    Endo: Follow blood sugars Q 6 while NPO     Msk/Skin:  Frequent turning and repositioning, out of bed as tolerated    Disposition:  Improving    Code Status: Level 1 - Full Code    Counseling / Coordination of Care  Total time spent today 25 minutes  Greater than 50% of total time was spent with the patient and / or family counseling and / or coordination of care   A description of the counseling / coordination of care: Plan of care for today  ______________________________________________________________________    Chief Complaint:  I am having a little bit of pain still, specially when I get moving, however all in all I got the best of the worst    24 Hour Events:  Patient is a 45-year-old male presenting on  for a left hemicolectomy, splenic flexure takedown, and hand-sewn anastomosis for known splenic flexure cancer  He has a past medical history of hypertension, atrial fibrillation on Eliquis with a biventricular pacer, dilated cardiomyopathy, and BPH  Referred to the step-down for close postoperative management and has had no acute issues overnight   ______________________________________________________________________    Physical Exam:   Gen:  Awake, alert, cooperative, pleasant, no acute distress  Neuro:  GCS 15  HEENT:  Atraumatic, pupils equal and reactive, conjugate gaze, trachea midline, no JVD  CV:  Regular rate and rhythm  Pulm:  Lungs clear to auscultation bilaterally  GI:  Abdomen soft, distended, diffusely tender, worse in the bilateral lower quadrants, midline incision with minimal surrounding erythema, NG tube in place with 50 mL of drainage  :  Alas in place with minimal yellow urine  MSK:  Atraumatic with no clubbing/cyanosis/edema  Skin:  Warm, dry  ______________________________________________________________________  Vitals:    19 0002 19 0100 19 0235 19 0400   BP: 155/83 149/80     BP Location: Right arm      Pulse:  70  68   Resp: 20 12  17   Temp: (!) 97 3 °F (36 3 °C)  97 7 °F (36 5 °C)    TempSrc: Oral  Axillary    SpO2: 100% 95%  99%   Weight: 91 5 kg (201 lb 11 5 oz)      Height: 5' 10" (1 778 m)          Temperature:   Temp (24hrs), Av 6 °F (36 4 °C), Min:96 8 °F (36 °C), Max:99 °F (37 2 °C)    Current Temperature: 97 7 °F (36 5 °C)  Weights:   IBW: 73 kg    Body mass index is 28 94 kg/m²    Weight (last 2 days)     Date/Time   Weight    19 0002  91 5 (201 72)    19 1332  93 (205)            Hemodynamic Monitoring:  N/A     Non-Invasive/Invasive Ventilation Settings:  Respiratory    Lab Data (Last 4 hours)    None         O2/Vent Data (Last 4 hours)    None              No results found for: PHART, PPK4ESV, PO2ART, ZCW9AYS, K3NAHBJC, BEART, SOURCE  SpO2: SpO2: 99 %, SpO2 Activity: SpO2 Activity: At Rest, SpO2 Device: O2 Device: None (Room air)  Intake and Outputs:  I/O       01/07 0701 - 01/08 0700 01/08 0701 - 01/09 0700    I V  (mL/kg)  2500 4 (27 3)    NG/GT  0    IV Piggyback  150    Total Intake(mL/kg)  2650 4 (29)    Urine (mL/kg/hr)  700    Emesis/NG output  0    Total Output   700    Net   +1950 4                UOP: 30/hour     Nutrition:        Diet Orders            Start     Ordered    01/09/19 0009  Diet NPO  Diet effective now     Question Answer Comment   Diet Type NPO    RD to adjust diet per protocol? Yes        01/09/19 0008        Labs:     Results from last 7 days  Lab Units 01/09/19  0523 01/09/19  0058   WBC Thousand/uL 9 63 11 50*   HEMOGLOBIN g/dL 11 8* 11 9*   HEMATOCRIT % 38 5 38 4   PLATELETS Thousands/uL 191 194   NEUTROS PCT % 88* 90*   MONOS PCT % 5 6       Results from last 7 days  Lab Units 01/09/19  0523 01/09/19  0058   POTASSIUM mmol/L 4 6 4 4   CHLORIDE mmol/L 108 106   CO2 mmol/L 21 21   BUN mg/dL 22 23   CREATININE mg/dL 1 71* 1 79*   CALCIUM mg/dL 8 1* 8 4       Results from last 7 days  Lab Units 01/09/19  0523   MAGNESIUM mg/dL 2 3     No results found for: PHOS         0  Lab Value Date/Time   TROPONINI 0 14 (H) 02/09/2017 0920   TROPONINI 0 18 (H) 02/09/2017 0753   TROPONINI 0 19 (H) 02/09/2017 0446   TROPONINI 0 14 (H) 02/08/2017 1925         ABG:No results found for: PHART, VSH5ACX, PO2ART, DJU6BZV, I7NCAAKL, BEART, SOURCE    Imaging:  None    EKG:  Review of telemetry demonstrates a paced rhythm    Micro:  Lab Results   Component Value Date    URINECX <10,000 cfu/ml Mixed Contaminants X2 02/08/2017       Allergies:    Allergies   Allergen Reactions    Adhesive [Medical Tape] Rash    Neomycin-Bacitracin Zn-Polymyx Rash    Neosporin [Neomycin-Polymyxin-Gramicidin] Rash       Medications:   Scheduled Meds:  Current Facility-Administered Medications:  bupivacaine liposomal 20 mL Infiltration Once Elizabeth Rodriguez MD    heparin (porcine) 5,000 Units Subcutaneous Q8H [facility] Malvern Burnham    HYDROmorphone 0 5 mg Intravenous Q3H PRN Lizette Belts    HYDROmorphone 1 mg Intravenous Q3H PRN Malvern Burnham    lactated ringers 50 mL/hr Intravenous Continuous Elizabeth Rodriguez MD Last Rate: Stopped (01/09/19 0000)   multi-electrolyte 500 mL Intravenous Once Brittney Kay MD    ondansetron 4 mg Intravenous Q6H PRN Malvern Burnham    sodium chloride 100 mL/hr Intravenous Continuous Lizette Belts Last Rate: Stopped (01/09/19 0523)     Continuous Infusions:  lactated ringers 50 mL/hr Last Rate: Stopped (01/09/19 0000)   sodium chloride 100 mL/hr Last Rate: Stopped (01/09/19 0523)     PRN Meds:    HYDROmorphone 0 5 mg Q3H PRN   HYDROmorphone 1 mg Q3H PRN   ondansetron 4 mg Q6H PRN       VTE Pharmacologic Prophylaxis: Heparin  VTE Mechanical Prophylaxis: sequential compression device    Invasive lines and devices: Invasive Devices     Peripheral Intravenous Line            Peripheral IV 01/08/19 Left Hand less than 1 day    Peripheral IV 01/08/19 Left Wrist less than 1 day          Arterial Line            Arterial Line 01/08/19 Left Radial less than 1 day          Drain            NG/OG/Enteral Tube Nasogastric Right nares less than 1 day    Urethral Catheter Non-latex; Double-lumen 16 Fr  less than 1 day                     Portions of the record may have been created with voice recognition software  Occasional wrong word or "sound a like" substitutions may have occurred due to the inherent limitations of voice recognition software  Read the chart carefully and recognize, using context, where substitutions have occurred      IAIN Andres

## 2019-01-09 NOTE — PERIOPERATIVE NURSING NOTE
VSS, pt denies pain or nausea, resting quietly, assessment unchanged, report called, no questions, pt transferred to floor via bed with monitor

## 2019-01-09 NOTE — ANESTHESIA POSTPROCEDURE EVALUATION
Post-Op Assessment Note      CV Status:  Stable    Mental Status:  Alert and awake    Hydration Status:  Euvolemic    PONV Controlled:  Controlled    Airway Patency:  Patent    Post Op Vitals Reviewed: Yes          Staff: CRNA           BP   142/61   Temp 96 8   Pulse 70   Resp 12   SpO2 99

## 2019-01-09 NOTE — OP NOTE
OPERATIVE REPORT  PATIENT NAME: Jorge Castorena    :  1927  MRN: [id number]  Pt Location: BE OR ROOM 10    SURGERY DATE: 2019    Surgeon(s) and Role:     * Coretta Wilson MD - Primary     * Pedro Tran - Assisting    Preop Diagnosis:  Cancer of splenic flexure ([address] ) [C18 5]    Post-Op Diagnosis Codes:     * Cancer of splenic flexure ([address] ) [C18 5]    Procedure(s) (LRB):  Left hemicolectomy, takedown splenic flexure, end to end transverse to sigmoid colon anastamosis (N/A)    Specimen(s):  ID Type Source Tests Collected by Time Destination   1 : stitch at rectosigmoid distal side  Tissue Large Intestine, Splenic Flexure TISSUE EXAM Coretta Wilson MD 2019        Estimated Blood Loss:   Minimal    Drains:  Urethral Catheter Non-latex; Double-lumen 16 Fr  (Active)   Number of days: 0       Anesthesia Type:   General    Operative Indications:  Cancer of splenic flexure ([address] ) [C18 5]      Operative Findings:  Tumor was seen laparoscopic evaluation at the splenic flexure by the tattoo marked on colonoscopy  Once the tumor was dissected free from the splenic area descending and sigmoid colon were freed up as well as the transverse colon  Resection between the transverse colon and sigmoid colon was completed and end-to-end anastomosis was performed using 4 0 PDS running suture in 1 layer  Both ends of the bowel showed satisfactory arterial blood supply and no tension was noted on the anastomosis  The colon was replaced anatomically behind the small bowel mesentery without any tension  Complications:   None    Procedure and Technique:  Patient after proper facial nerve identification on supine position was placed under satisfactory general anesthesia with endotracheal tube  Abdomen was prepped by ChloraPrep and he was draped in sterile usual manner  Time-out was performed  Through a midline umbilical 7 cm incision abdominal cavity was opened and the hand port was inserted    Three other 5 mm ports were placed in the right upper quadrant right lower quadrant and left lower quadrant area  Through these ports using a 30 degree 5 mm camera dissection of the sigmoid colon was started and extended to the retroperitoneal area and and descending colon was freed up from the retroperitoneal area and the kidney  The left ureter was identified throughout the length and protected  The tumor was at the splenic flexure very close to the spleen  This was dissected free from the retroperitoneal area as well as from the spleen maintaining the omentum on the tumor on dissection and excision  The gastrocolic ligament was opened and the dissection of the transverse colon was completed  The transverse and descending colon as well as the splenic flexure area could be brought out through the Port-A-Cath at resection was made between the mid transverse colon and the sigmoid colon area with good arterial blood supply  Corresponding omentum was also removed with the bowel  The 2 ends of the bowel was anastomosed using 4 0 PDS running suture in 1 layer satisfactorily without any tension  Once the anastomosis was completed was replaced anatomically in the abdominal cavity  Sponge and counts were all correct gloves were changed  Abdominal cavity was irrigated clean with saline solution and suctioned out  No bleeding was encountered  Abdominal cavity was closed using 1  PDS running suture on the midline incision and after irrigation of the wound all wounds were closed using 4 O Monocryl subcuticular sutures       I was present for the entire procedure    Patient Disposition:  PACU     SIGNATURE: Landrum Kayser, MD  DATE: January 8, 2019  TIME: 9:17 PM

## 2019-01-09 NOTE — PROGRESS NOTES
01/09/19 [address]  [employer])   Spiritual Beliefs/Perceptions   Concept of God Accepting   Support Systems Children   Stress Factors   Patient Stress Factors None identified   Coping Responses   Patient Coping Accepting   Plan of Care   Comments Cultivated a caring and supportive presence     Assessment Completed by: Unit visit

## 2019-01-09 NOTE — UTILIZATION REVIEW
[address]  Utilization Review Department  Phone: [phone]; Fax [phone]  [email or web address]  ATTENTION: Please call with any questions or concerns to [phone]  and carefully listen to the prompts so that you are directed to the right person  Send all requests for admission clinical reviews, approved or denied determinations and any other requests to fax [phone]  All voicemails are confidential         Initial Clinical Review  SCHEDULED INPATIENT PROCEDURE 19 / Charmayne Flores [id number] / Antonella Goncalves     Age/Sex: 80 y o  male    PATIENT NAME: Jose Cunha    :  1927  MRN: [id number]  Pt Location: BE OR ROOM 10     SURGERY DATE: 2019     Preop Diagnosis:  Cancer of splenic flexure ([address] ) [C18 5]     Post-Op Diagnosis Codes:     * Cancer of splenic flexure ([address] ) [C18 5]     Procedure(s) (LRB):  Left hemicolectomy, takedown splenic flexure, end to end transverse to sigmoid colon anastamosis (N/A)       Anesthesia Type: General     Operative Indications:  Cancer of splenic flexure ([address] ) [C18 5]        Operative Findings:  Tumor was seen laparoscopic evaluation at the splenic flexure by the tattoo marked on colonoscopy  Once the tumor was dissected free from the splenic area descending and sigmoid colon were freed up as well as the transverse colon  Resection between the transverse colon and sigmoid colon was completed and end-to-end anastomosis was performed using 4 0 PDS running suture in 1 layer  Both ends of the bowel showed satisfactory arterial blood supply and no tension was noted on the anastomosis    The colon was replaced anatomically behind the small bowel mesentery without any tension      Complications: None           Admission Orders: Date/Time/Statement: 19 AT 2114 ADMIT INPATIENT TO LEVEL 1 STEPDOWN    Orders Placed This Encounter   Procedures    Inpatient Admission     Standing Status:   Standing Number of Occurrences:   1     Order Specific Question:   Admitting Physician     Answer:   Ruben Piper [[id number]]     Order Specific Question:   Level of Care     Answer:   Level 1 Stepdown [13]     Order Specific Question:   Estimated length of stay     Answer:   More than 2 Midnights     Order Specific Question:   Certification     Answer:   I certify that inpatient services are medically necessary for this patient for a duration of greater than two midnights  See H&P and MD Progress Notes for additional information about the patient's course of treatment  Vital Signs: /80   Pulse 70   Temp 97 7 °F (36 5 °C) (Oral)   Resp (!) 42   Ht 5' 10" (1 778 m)   Wt 91 5 kg (201 lb 11 5 oz)   SpO2 97%   BMI 28 94 kg/m²     Diet:        Diet Orders            Start     Ordered    01/09/19 5221  Diet Clear Liquid  Diet effective now     Question Answer Comment   Diet Type Clear Liquid    RD to adjust diet per protocol? Yes        01/09/19 0721          Continuous IV Infusions:  sodium chloride 100 mL/hr Last Rate: Stopped (01/09/19 0523)       Mobility:  Up with Assistance      DVT Prophylaxis: Heparin SQ; SCD      Scheduled Meds:  Current Facility-Administered Medications:  acetaminophen 650 mg Oral Q6H PRN     bupivacaine liposomal 20 mL Infiltration Once     heparin (porcine) 5,000 Units Subcutaneous Q8H [address]     HYDROmorphone 0 5 mg Intravenous Q3H PRN     ondansetron 4 mg Intravenous Q6H PRN     oxyCODONE 10 mg Oral Q6H PRN     oxyCODONE 5 mg Oral Q6H PRN     sodium chloride 100 mL/hr Intravenous Continuous  Last Rate: Stopped (01/09/19 0523)       PRN Meds:     acetaminophen    IV HYDROmorphone 0 5 mg q3hrs prn given x 1    ondansetron    oxyCODONE    oxyCODONE      Colorectal  Progress Notes  POD # 1 Date of Service: 1/9/2019  6:26 AM      Assessment:  80 y  o  male w/splenic flexure colon CA s/p L hemicolectomy, takedown splenic flexure, end to end hand sewn anastomosis 1/8      Plan:  - d/c NGT  - advance to clears  - maintenance IVF at 80  - consider d/c crockett later today  - prn pain control, will add PO meds once NGT removed  - f/u nephrology  - OOB/ambulate  - PT/OT  - SQH/SCDs  - ok to come out of unit today to med surg      Subjective/Objective   Subjective: doing well, denies nausea/vomiting     Objective:   Blood pressure 149/80, pulse 68, temperature 97 7 °F (36 5 °C), temperature source Axillary, resp  rate 17, height 5' 10" (1 778 m), weight 91 5 kg (201 lb 11 5 oz), SpO2 99 %  ,Body mass index is 28 94 kg/m²      I/O last 24 hours: In: 2650 4 [I V :2500 4; IV Piggyback:150]  Out: 700 [Urine:700]         Invasive Devices            Peripheral Intravenous Line                     Peripheral IV 01/08/19 Left Hand less than 1 day      Peripheral IV 01/08/19 Left Wrist less than 1 day                   Arterial Line                     Arterial Line 01/08/19 Left Radial less than 1 day                   Drain                     NG/OG/Enteral Tube Nasogastric Right nares less than 1 day      Urethral Catheter Non-latex; Double-lumen 16 Fr  less than 1 day                     Physical Exam:   NAD, alert and oriented x3  Normocephalic, atraumatic  MMM, EOMI, PERRLA  Norm resp effort on RA  RRR  Abd soft, appropriately tender, ND, incisions cdi closed with histoacryl  Crockett in place with yellow urine  No calf tenderness or peripheral edema  Motor/sensation intact in distal extremities  CN grossly intact  -rash/lesions        Lab, Imaging and other studies:        Lab Results   Component Value Date     WBC 9 63 01/09/2019     HGB 11 8 (L) 01/09/2019     HCT 38 5 01/09/2019      (H) 01/09/2019      01/09/2019            Lab Results   Component Value Date     GLUCOSE 93 03/05/2015     CALCIUM 8 1 (L) 01/09/2019      03/05/2015     K 4 6 01/09/2019     CO2 21 01/09/2019      01/09/2019     BUN 22 01/09/2019     CREATININE 1 71 (H) 01/09/2019         VTE Pharmacologic Prophylaxis: Sequential compression device (Venodyne)   VTE Mechanical Prophylaxis: sequential compression device

## 2019-01-09 NOTE — ANESTHESIA PROCEDURE NOTES
Peripheral Block    Patient location during procedure: OR  Start time: 1/8/2019 9:15 PM  Reason for block: at surgeon's request and post-op pain management  Staffing  Resident/CRNA: Bita Pérez  Performed: resident/CRNA   Preanesthetic Checklist  Completed: patient identified, site marked, surgical consent, pre-op evaluation, timeout performed, IV checked, risks and benefits discussed and monitors and equipment checked  Peripheral Block  Patient position: supine  Prep: ChloraPrep  Patient monitoring: heart rate, cardiac monitor, continuous pulse ox and frequent blood pressure checks  Block type: TAP  Laterality: bilateral  Injection technique: single-shot  Procedures: ultrasound guided  Ultrasound permanent image saved  Anesthesia block local: Exparal 10ml + 10ml 0 25% bupiv on each side    Needle  Needle type: Stimuplex   Needle gauge: 25 G  Needle localization: ultrasound guidance  Assessment  Injection assessment: incremental injection, local visualized surrounding nerve on ultrasound and negative aspiration for heme  Slow fractionated injection: yes  Post-procedure:  site cleaned  patient tolerated the procedure well with no immediate complications

## 2019-01-09 NOTE — SOCIAL WORK
CM met with pt to discuss DCP and cm role  Pt lives alone in a capecod house with 2+3 jacki  Prior to admission pt was independent with ambulation and with ADLS  No prior hx of VNA  Pt's preference for pharmacy is [facility] in [facility]  Pt states that daughter Evelyn Nieves is his POA  Pt denies any hx of drug/ETOH or inpt psych stays  Patient/caregiver received discharge checklist  Content reviewed  Patient/caregiver encouraged to participate in discharge plan of care prior to discharge home  Cm reviewed d/c planning process including the following: identifying help at home, patient preference for d/c planning needs, Discharge Lounge, [facility] Meds to Bed program, availability of treatment team to discuss questions or concerns patient and/or family may have regarding understanding medications and recognizing signs and symptoms once discharged  Cm also encouraged patient to follow up with all recommended appointments after discharge  Patient advised of importance for patient and family to participate in managing patients medical well being  Pt would like to go to rehab provided choices:  1  [facility]  2  [facility]  3  [facility]  4  [facility]  Awaiting therapy evaluations

## 2019-01-09 NOTE — CONSULTS
Consultation - Nephrology   Virgil Moreno 80 y o  male MRN: [id number]  Unit/Bed#: [facility] 513-01 Encounter: [id number]      ASSESSMENT / PLAN:     1  Chronic kidney disease stage 3 with baseline creatinine of 1 6-1 8  · Workup:  Urinalysis (02/08/2017)-moderate heme, trace protein // urinalysis in 2015 had no hematuria or proteinuria // CT (12/04/2018) scan revealed stable cyst in subcentimeter hypodensities  · Creatinine at baseline  · Avoid relative hypotension  · Avoid nephrotoxins  · Continue intravenous fluids and monitor volume status closely given his decreased ejection fraction  Will decrease intravenous fluid rate later today  · Keep Alas catheter in place for now  2  Hypertension  · Given his advanced age, CKD and periods of MAP less than 70 during his procedure, he is at high risk for SUDHIR  Would therefore, keep his systolic blood pressure [phone] if acceptable to the surgical team  3  Status post left hemicolectomy  4  Anemia with elevated MCV and RDW  · TSH 2 29/ferritin 9/iron 21 and iron saturation 4%/folate 16 6 and vitamin B12 312 in October of this year  · Recheck iron stores                  History of Present Illness   Physician Requesting Consult: Umm Matute MD  Reason for Consult / Principal Problem -  chronic kidney disease stage III with baseline creatinine of 1 6-1 8  HPI- Virgil Moreno is a 80 y o  y o  male who we are asked to see because of chronic kidney disease stage 3 with baseline creatinine of 1 6-1 8  The patient is 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  He has done well postoperatively  His urine output has intermittently been decreased  He has received maintenance fluids as well as fluid boluses  He does have a history of a decreased ejection fraction  Currently, the patient denies any shortness of breath, paroxysmal nocturnal dyspnea or orthopnea    He does have chronic peripheral edema and states that his legs are at or better than baseline at present  He denies any chills, sweats, vomiting, diarrhea  His overnight course was reviewed with his bedside nurse and is and seizure records were reviewed  The patient follows with his PCP for his chronic kidney disease  The etiology is uncertain to him  He denies nonsteroidal usage, proteinuria or hematuria  He does have a history of enlarged prostate but has not had major difficulty urinating  History obtained from chart review and the patient    Consults    Review of Systems    Historical Information   Patient Active Problem List   Diagnosis    Stage 3 chronic kidney disease ([place] [address] )    Dehydration    Diarrhea    Essential hypertension    A-fib ([place] [address] )    Cardiomyopathy ([place] [address] )    BPH (benign prostatic hyperplasia)    Biventricular cardiac pacemaker in situ    Anticoagulation adequate    Iron deficiency anemia    Special screening for malignant neoplasms, colon    Malignant neoplasm of colon ([place] [address] )    Cancer of splenic flexure ([place] [address] )     Past Medical History:   Diagnosis Date    Anemia     Atrial fibrillation ([place] [address] )     BPH (benign prostatic hypertrophy)     Chronic kidney disease     Hypertension     Irregular heart beat     afib     Past Surgical History:   Procedure Laterality Date    BASAL CELL CARCINOMA EXCISION      CARDIAC PACEMAKER PLACEMENT      CARDIAC SURGERY      CARDIOVERSION      CARPAL TUNNEL RELEASE      CATARACT EXTRACTION      COLON SURGERY      COLONOSCOPY      IN COLONOSCOPY FLX DX W/COLLJ SPEC WHEN PFRMD N/A 11/30/2018    Procedure: COLONOSCOPY w egd  by dr Janelle Ortega;  Surgeon: Nikolai Mg MD;  Location: [facility]; Service: Colorectal    IN LAP,SURG,COLECTOMY, PARTIAL, W/ANAST N/A 1/8/2019    Procedure: Left hemicolectomy, takedown splenic flexure, end to end transverse to sigmoid colon anastamosis;   Surgeon: Nikolai Mg MD;  Location: [facility];  Service: Colorectal     Social History   History   Alcohol Use No     History   Drug Use No     History   Smoking Status    Never Smoker   Smokeless Tobacco    Never Used     Family History   Problem Relation Age of Onset    Heart disease Father     Heart disease Brother        Meds/Allergies   all current active meds have been reviewed, current meds:   Current Facility-Administered Medications   Medication Dose Route Frequency    acetaminophen (TYLENOL) tablet 650 mg  650 mg Oral Q6H PRN    bupivacaine liposomal (EXPAREL) 1 3 % injection 20 mL  20 mL Infiltration Once    heparin (porcine) subcutaneous injection 5,000 Units  5,000 Units Subcutaneous Q8H [address]    HYDROmorphone (DILAUDID) injection 0 5 mg  0 5 mg Intravenous Q3H PRN    ondansetron (ZOFRAN) injection 4 mg  4 mg Intravenous Q6H PRN    oxyCODONE (ROXICODONE) immediate release tablet 10 mg  10 mg Oral Q6H PRN    oxyCODONE (ROXICODONE) IR tablet 5 mg  5 mg Oral Q6H PRN    sodium chloride 0 9 % infusion  100 mL/hr Intravenous Continuous    and PTA meds:   Prior to Admission Medications   Prescriptions Last Dose Informant Patient Reported? Taking?    BISOPROLOL FUMARATE PO 1/6/2019 at 2100  Yes Yes   Sig: Take 2 5 mg by mouth daily   CHLORPHENIRAMINE MALEATE PO 1/1/2019  Yes Yes   Sig: Take by mouth every 4 (four) hours as needed   IRON PO 1/7/2019 Self Yes Yes   Sig: Take 1 tablet by mouth 2 (two) times a day     Multiple Vitamins-Minerals (CENTRUM SILVER 50+MEN PO) 1/1/2019 Self Yes Yes   Sig: Take 1 tablet by mouth daily   apixaban (ELIQUIS) 2 5 mg 1/4/2019 at 0900 Self No Yes   Sig: Take 1 tablet (2 5 mg total) by mouth 2 (two) times a day   finasteride (PROSCAR) 5 mg tablet 1/4/2019 at 0900 Self Yes Yes   Sig: Take 5 mg by mouth daily   furosemide (LASIX) 20 mg tablet 1/7/2019 at 0900 Self No Yes   Sig: Take 1 tablet (20 mg total) by mouth daily   Patient taking differently: Take 20 mg by mouth as needed     triamcinolone (KENALOG) 0 1 % cream More than a month at Unknown time Self Yes No   Sig: APPLY TO SKIN TWICE DAILY TO AFFECTED AREAS ON BODY THROUGHOUT      Facility-Administered Medications: None       Scheduled Meds:  Current Facility-Administered Medications:  acetaminophen 650 mg Oral Q6H PRN Debbe Karyn, CRNP    bupivacaine liposomal 20 mL Infiltration Once Cade Rivas MD    heparin (porcine) 5,000 Units Subcutaneous Q8H [address] Kellen Rupinder Burnham    HYDROmorphone 0 5 mg Intravenous Q3H PRN Debbe Solian, CRNP    ondansetron 4 mg Intravenous Q6H PRN Florance Arms    oxyCODONE 10 mg Oral Q6H PRN Debbe Solian, CRNP    oxyCODONE 5 mg Oral Q6H PRN Debbe Solian, CRNP    sodium chloride 100 mL/hr Intravenous Continuous Florance Arms Last Rate: 100 mL/hr (19 100)       PRN Meds:   acetaminophen    HYDROmorphone    ondansetron    oxyCODONE    oxyCODONE    Continuous Infusions:  sodium chloride 100 mL/hr Last Rate: 100 mL/hr (19 1001)       Allergies   Allergen Reactions    Adhesive [Medical Tape] Rash    Neomycin-Bacitracin Zn-Polymyx Rash    Neosporin [Neomycin-Polymyxin-Gramicidin] Rash             /67   Pulse 72   Temp 97 7 °F (36 5 °C) (Oral)   Resp (!) 31   Ht 5' 10" (1 778 m)   Wt 91 5 kg (201 lb 11 5 oz)   SpO2 97%   BMI 28 94 kg/m²   Temp (24hrs), Av 6 °F (36 4 °C), Min:96 8 °F (36 °C), Max:99 °F (37 2 °C)      Objective     Intake/Output Summary (Last 24 hours) at 19 1244  Last data filed at 19 1201   Gross per 24 hour   Intake          3863 69 ml   Output              985 ml   Net          2878 69 ml       I/O last 24 hours: In: 3863 7 [P O :275;  I V :3438 7; IV Piggyback:150]  Out: 985 [Urine:935; Emesis/NG output:50]      Current Weight: Weight - Scale: 91 5 kg (201 lb 11 5 oz)  First Weight: Weight - Scale: 93 kg (205 lb)    PHYSICAL EXAM:     General -      the patient is awake, appears younger than the stated age, no apparent distress, cooperative and pleasant  HENT  -        normocephalic/atraumatic, mucous membranes were dry  Eyes    - extraocular movements were intact, no scleral icterus was noted  Neck    -        no overt jugular venous distention, trachea midline, no overt thyromegaly was noted  Chest  -         clear to auscultation and percussion, decreased breath sounds at bases, no rales/rhonchi or wheezing was noted  CVS  -           S1-S2, no pericardial friction rub or S3 were appreciated  Abdomen -    soft, slight tenderness with palpation, no rebound or guarding, decreased bowel sounds  Extremities-   there was pitting edema noted bilaterally, 1-2 mm 1/3 the way up to the knees bilaterally  Skin   -           no hives were noted  Neuro   -        alert and oriented  Psych   -        mood affect were appropriate      Invasive Devices     Peripheral Intravenous Line            Peripheral IV 01/08/19 Left Hand less than 1 day    Peripheral IV 01/08/19 Left Wrist less than 1 day          Drain            Urethral Catheter Non-latex; Double-lumen 16 Fr  less than 1 day                Lab Results:      Results from last 7 days  Lab Units 01/09/19  0523 01/09/19  0058   WBC Thousand/uL 9 63 11 50*   HEMOGLOBIN g/dL 11 8* 11 9*   HEMATOCRIT % 38 5 38 4   PLATELETS Thousands/uL 191 194   NEUTROS PCT % 88* 90*   LYMPHS PCT % 7* 4*   MONOS PCT % 5 6   EOS PCT % 0 0   POTASSIUM mmol/L 4 6 4 4   CHLORIDE mmol/L 108 106   CO2 mmol/L 21 21   BUN mg/dL 22 23   CREATININE mg/dL 1 71* 1 79*   CALCIUM mg/dL 8 1* 8 4   MAGNESIUM mg/dL 2 3  --        CUTURES:  Lab Results   Component Value Date    URINECX <10,000 cfu/ml Mixed Contaminants X2 02/08/2017         Pertinent studies were reviewed          Portions of the record may have been created with voice recognition software  Occasional wrong word or "sound a like" substitutions may have occurred due to the inherent limitations of voice recognition software  Read the chart carefully and recognize, using context, where substitutions have occurred  If you have any questions, please contact the dictating provider

## 2019-01-09 NOTE — PROGRESS NOTES
PGY2 Post-Op Check Note    S: complains only of pain when stretching and dry mouth  Denies nausea/vomiting, no flatus or BM yet    O:   Vitals:    01/09/19 0100   BP: 149/80   Pulse: 70   Resp: 12   Temp:    SpO2: 95%       No intake/output data recorded    I/O this shift:  In: 2193 7 [I V :2043 7; IV Piggyback:150]  Out: 500 [Urine:500]    PE:  NAD, alert and oriented x3  Normocephalic, atraumatic  MMM, EOMI, PERRLA  Norm resp effort on 3LNC  RRR  Abd soft, appropriately tender, ND, incisions cdi  Crockett in place with marc urine  No calf tenderness or peripheral edema  Motor/sensation intact in distal extremities  CN grossly intact  -rash/lesions      Lab Results   Component Value Date    WBC 11 50 (H) 01/09/2019    HGB 11 9 (L) 01/09/2019    HCT 38 4 01/09/2019     (H) 01/09/2019     01/09/2019     Lab Results   Component Value Date    GLUCOSE 93 03/05/2015    CALCIUM 8 4 01/09/2019     03/05/2015    K 4 4 01/09/2019    CO2 21 01/09/2019     01/09/2019    BUN 23 01/09/2019    CREATININE 1 79 (H) 01/09/2019         A/P: 80 y o  male w/splenic flexure colon CA s/p L hemicolectomy, takedown splenic flexure, end to end hand sewn anastomosis  - NPO/NGT  - IVF hydration  - prn pain control IV  - crockett until at least tomorrow  - f/u nephro consult for CKD  - OOB/ambulate  - PT/OT  - SQH/SCDs

## 2019-01-10 LAB
ALBUMIN SERPL BCP-MCNC: 3.3 G/DL (ref 3.5–5)
ALP SERPL-CCNC: 60 U/L (ref 46–116)
ALT SERPL W P-5'-P-CCNC: 18 U/L (ref 12–78)
ANION GAP SERPL CALCULATED.3IONS-SCNC: 8 MMOL/L (ref 4–13)
AST SERPL W P-5'-P-CCNC: 21 U/L (ref 5–45)
BASOPHILS # BLD AUTO: 0.02 THOUSANDS/ΜL (ref 0–0.1)
BASOPHILS NFR BLD AUTO: 0 % (ref 0–1)
BILIRUB SERPL-MCNC: 0.65 MG/DL (ref 0.2–1)
BUN SERPL-MCNC: 23 MG/DL (ref 5–25)
CALCIUM SERPL-MCNC: 8.5 MG/DL (ref 8.3–10.1)
CHLORIDE SERPL-SCNC: 106 MMOL/L (ref 100–108)
CO2 SERPL-SCNC: 24 MMOL/L (ref 21–32)
CREAT SERPL-MCNC: 1.64 MG/DL (ref 0.6–1.3)
EOSINOPHIL # BLD AUTO: 0.11 THOUSAND/ΜL (ref 0–0.61)
EOSINOPHIL NFR BLD AUTO: 1 % (ref 0–6)
ERYTHROCYTE [DISTWIDTH] IN BLOOD BY AUTOMATED COUNT: 16.4 % (ref 11.6–15.1)
FERRITIN SERPL-MCNC: 46 NG/ML (ref 8–388)
GFR SERPL CREATININE-BSD FRML MDRD: 36 ML/MIN/1.73SQ M
GLUCOSE SERPL-MCNC: 88 MG/DL (ref 65–140)
HCT VFR BLD AUTO: 38.2 % (ref 36.5–49.3)
HGB BLD-MCNC: 11.4 G/DL (ref 12–17)
IMM GRANULOCYTES # BLD AUTO: 0.04 THOUSAND/UL (ref 0–0.2)
IMM GRANULOCYTES NFR BLD AUTO: 0 % (ref 0–2)
IRON SATN MFR SERPL: 7 %
IRON SERPL-MCNC: 27 UG/DL (ref 65–175)
LYMPHOCYTES # BLD AUTO: 1.53 THOUSANDS/ΜL (ref 0.6–4.47)
LYMPHOCYTES NFR BLD AUTO: 17 % (ref 14–44)
MAGNESIUM SERPL-MCNC: 2.4 MG/DL (ref 1.6–2.6)
MCH RBC QN AUTO: 31.2 PG (ref 26.8–34.3)
MCHC RBC AUTO-ENTMCNC: 29.8 G/DL (ref 31.4–37.4)
MCV RBC AUTO: 105 FL (ref 82–98)
MONOCYTES # BLD AUTO: 1.1 THOUSAND/ΜL (ref 0.17–1.22)
MONOCYTES NFR BLD AUTO: 12 % (ref 4–12)
NEUTROPHILS # BLD AUTO: 6.13 THOUSANDS/ΜL (ref 1.85–7.62)
NEUTS SEG NFR BLD AUTO: 70 % (ref 43–75)
NRBC BLD AUTO-RTO: 0 /100 WBCS
PHOSPHATE SERPL-MCNC: 2.4 MG/DL (ref 2.3–4.1)
PLATELET # BLD AUTO: 175 THOUSANDS/UL (ref 149–390)
PMV BLD AUTO: 10.1 FL (ref 8.9–12.7)
POTASSIUM SERPL-SCNC: 4.4 MMOL/L (ref 3.5–5.3)
PROT SERPL-MCNC: 6.8 G/DL (ref 6.4–8.2)
RBC # BLD AUTO: 3.65 MILLION/UL (ref 3.88–5.62)
SODIUM SERPL-SCNC: 138 MMOL/L (ref 136–145)
TIBC SERPL-MCNC: 398 UG/DL (ref 250–450)
WBC # BLD AUTO: 8.93 THOUSAND/UL (ref 4.31–10.16)

## 2019-01-10 PROCEDURE — 97110 THERAPEUTIC EXERCISES: CPT

## 2019-01-10 PROCEDURE — 83540 ASSAY OF IRON: CPT | Performed by: INTERNAL MEDICINE

## 2019-01-10 PROCEDURE — 97167 OT EVAL HIGH COMPLEX 60 MIN: CPT

## 2019-01-10 PROCEDURE — 83735 ASSAY OF MAGNESIUM: CPT | Performed by: PHYSICIAN ASSISTANT

## 2019-01-10 PROCEDURE — 97116 GAIT TRAINING THERAPY: CPT

## 2019-01-10 PROCEDURE — 99232 SBSQ HOSP IP/OBS MODERATE 35: CPT | Performed by: INTERNAL MEDICINE

## 2019-01-10 PROCEDURE — G8988 SELF CARE GOAL STATUS: HCPCS

## 2019-01-10 PROCEDURE — 85025 COMPLETE CBC W/AUTO DIFF WBC: CPT | Performed by: PHYSICIAN ASSISTANT

## 2019-01-10 PROCEDURE — 80053 COMPREHEN METABOLIC PANEL: CPT | Performed by: INTERNAL MEDICINE

## 2019-01-10 PROCEDURE — G8987 SELF CARE CURRENT STATUS: HCPCS

## 2019-01-10 PROCEDURE — 83550 IRON BINDING TEST: CPT | Performed by: INTERNAL MEDICINE

## 2019-01-10 PROCEDURE — 84100 ASSAY OF PHOSPHORUS: CPT | Performed by: INTERNAL MEDICINE

## 2019-01-10 PROCEDURE — 82728 ASSAY OF FERRITIN: CPT | Performed by: INTERNAL MEDICINE

## 2019-01-10 RX ADMIN — HEPARIN SODIUM 5000 UNITS: 5000 INJECTION INTRAVENOUS; SUBCUTANEOUS at 21:45

## 2019-01-10 RX ADMIN — ACETAMINOPHEN 650 MG: 325 TABLET, FILM COATED ORAL at 08:31

## 2019-01-10 RX ADMIN — HEPARIN SODIUM 5000 UNITS: 5000 INJECTION INTRAVENOUS; SUBCUTANEOUS at 05:20

## 2019-01-10 RX ADMIN — HEPARIN SODIUM 5000 UNITS: 5000 INJECTION INTRAVENOUS; SUBCUTANEOUS at 13:54

## 2019-01-10 RX ADMIN — IRON SUCROSE 100 MG: 20 INJECTION, SOLUTION INTRAVENOUS at 12:29

## 2019-01-10 NOTE — PLAN OF CARE
Problem: OCCUPATIONAL THERAPY ADULT  Goal: Performs self-care activities at highest level of function for planned discharge setting  See evaluation for individualized goals  Treatment Interventions: ADL retraining, Functional transfer training, UE strengthening/ROM, Endurance training, Patient/family training, Equipment evaluation/education, Compensatory technique education, Fine motor coordination activities, Activityengagement, Energy conservation          See flowsheet documentation for full assessment, interventions and recommendations  Limitation: Decreased ADL status, Decreased UE ROM, Decreased UE strength, Decreased Safe judgement during ADL, Decreased cognition, Decreased endurance, Decreased high-level ADLs  Prognosis: Fair  Assessment: Pt is a 81 yo male seen for OT eval s/p adm to [facility] dx'd w/ cancer of splenis flexure  Pt with hx of splenic flexure tumor and mild diverticular disease presented for elective surgery  S/p left hemicolectomy, takedown of splenic flexure, end to end transverse to sigmoid colon anastomosis on 1/8/2019  Comorbidities include a h/o CKD stage 3, A-fib, cardiomyopathy, BPH, malignant neoplasm of colon  Pt with active OT orders and up with assistance  orders  Pt is retired and lives alone in [facility] w/ 3STE  Pt was I w/  ADLS and IADLS, drove, & required no use of DME PTA  Pt is currently demonstrating the following occupational deficits: Min-Mod A ADLS and Min A functional mobility w/ RW  These deficits that are impacting pt's baseline areas of occupation are a result of the following impairments: pain, endurance, activity tolerance, functional mobility, forward functional reach, balance, functional standing tolerance, unsupportive home environment, decreased I w/ ADLS/IADLS, strength and ROM   The following Occupational Performance Areas to address include: grooming, bathing/shower, toilet hygiene, dressing, health maintenance, functional mobility, clothing management, cleaning, meal prep and household maintenance  Pt scored overall 50/100 on the Barthel Index  Based on the aforementioned OT evaluation, functional performance deficits, and assessments, pt has been identified as a high complexity evaluation  Recommend STR upon D/C   Pt to continue to benefit from acute immediate OT services to address the following goals 3-5x/week to  w/in 7-10 days:      OT Discharge Recommendation: Short Term Rehab  OT - OK to Discharge: Yes (When medically cleared)

## 2019-01-10 NOTE — PROGRESS NOTES
Progress Note - Nephrology   Julio Currie 80 y o  male MRN: [id number]  Unit/Bed#: [facility] 806-01 Encounter: [id number]      Assessment / Plan:    1  Chronic kidney disease stage 3 with baseline creatinine of 1 6-1 8  · Workup:  Urinalysis (2017)-moderate heme, trace protein // urinalysis in  had no hematuria or proteinuria // CT (2018) scan revealed stable cyst in subcentimeter hypodensities  · Creatinine at baseline and has improved over the last 48 hr  · Avoid relative hypotension- blood pressure is acceptable  · Avoid nephrotoxins  · Hep-Lock intravenous fluids  · Alas discontinued  · Check postvoid residual  2  Hypertension  3  Given his advanced age, CKD and periods of MAP less than 70 during his procedure, he is at high risk for SUDHIR  blood pressure currently acceptable in renal function stable  4  Status post left hemicolectomy  5  Anemia with elevated MCV and RDW  · TSH 2 29/ferritin 9/iron 21 and iron saturation 4%/folate 16 6 and vitamin B12 312 in October of this year  · Current iron stores:  Ferritin 46/iron sat 7/iron 27  · Will give 1 dose of intravenous iron  He is unlikely to tolerate oral iron at this time           Subjective: The patient was seen examined  He denied any shortness of breath, chest pain or pressure, vomiting or diarrhea  He does feel weak  He denied any paroxysmal nocturnal dyspnea orthopnea  Objective:     Vitals: Blood pressure 131/76, pulse 70, temperature 97 9 °F (36 6 °C), temperature source Oral, resp  rate 18, height 5' 10" (1 778 m), weight 91 5 kg (201 lb 11 5 oz), SpO2 95 %  ,Body mass index is 28 94 kg/m²  Temp (24hrs), Av 9 °F (36 6 °C), Min:97 4 °F (36 3 °C), Max:98 6 °F (37 °C)      Weight (last 2 days)     Date/Time   Weight    19 0002  91 5 (201 72)    19 1332  93 (205)                     Intake/Output Summary (Last 24 hours) at 01/10/19 1042  Last data filed at 01/10/19 0800   Gross per 24 hour   Intake 1873 ml   Output             1270 ml   Net              603 ml     I/O last 24 hours: In: 2886 3 [P O :393; I V :2493 3]  Out: 1460 [Urine:1460]        Physical Exam    Physical Exam   Constitutional: He appears well-developed  No distress  Neck: No JVD present  Cardiovascular: Normal heart sounds  Exam reveals no gallop and no friction rub  Pulmonary/Chest: Effort normal and breath sounds normal  No respiratory distress  He has no wheezes  He has no rales  Abdominal: Soft  He exhibits distension  There is no tenderness  There is no rebound and no guarding  Musculoskeletal: He exhibits edema  Neurological: He is alert  Skin: He is not diaphoretic  Vitals reviewed  Invasive Devices     Peripheral Intravenous Line            Peripheral IV 01/08/19 Left Hand 1 day    Peripheral IV 01/08/19 Left Wrist 1 day          Drain            Urethral Catheter Non-latex; Double-lumen 16 Fr  1 day                Medications:    Scheduled Meds:  Current Facility-Administered Medications:  acetaminophen 650 mg Oral Q6H PRN Lees Deems, CRNP   bupivacaine liposomal 20 mL Infiltration Once Margret Arshad MD   heparin (porcine) 5,000 Units Subcutaneous Q8H [address] Ruth Ann Loach [place]   HYDROmorphone 0 5 mg Intravenous Q3H PRN Lees Deems, CRNP   ondansetron 4 mg Intravenous Q6H PRN Yoni Sequin   oxyCODONE 10 mg Oral Q6H PRN Lees Deems, CRNP   oxyCODONE 5 mg Oral Q6H PRN Lees Deems, CRNP       PRN Meds:   acetaminophen    HYDROmorphone    ondansetron    oxyCODONE    oxyCODONE    Continuous Infusions:         LAB RESULTS:        Results from last 7 days  Lab Units 01/10/19  0533 01/09/19  0523 01/09/19  0058   WBC Thousand/uL 8 93 9 63 11 50*   HEMOGLOBIN g/dL 11 4* 11 8* 11 9*   HEMATOCRIT % 38 2 38 5 38 4   PLATELETS Thousands/uL 175 191 194   NEUTROS PCT % 70 88* 90*   LYMPHS PCT % 17 7* 4*   MONOS PCT % 12 5 6   EOS PCT % 1 0 0   POTASSIUM mmol/L 4 4 4 6 4 4   CHLORIDE mmol/L 106 108 106   CO2 mmol/L 24 21 21   BUN mg/dL 23 22 23   CREATININE mg/dL 1 64* 1 71* 1 79*   CALCIUM mg/dL 8 5 8 1* 8 4   ALK PHOS U/L 60  --   --    ALT U/L 18  --   --    AST U/L 21  --   --    MAGNESIUM mg/dL 2 4 2 3  --    PHOSPHORUS mg/dL 2 4  --   --        CUTURES:  Lab Results   Component Value Date    URINECX <10,000 cfu/ml Mixed Contaminants X2 02/08/2017                 Portions of the record may have been created with voice recognition software  Occasional wrong word or "sound a like" substitutions may have occurred due to the inherent limitations of voice recognition software  Read the chart carefully and recognize, using context, where substitutions have occurred  If you have any questions, please contact the dictating provider

## 2019-01-10 NOTE — OCCUPATIONAL THERAPY NOTE
Occupational Therapy Evaluation      Elinor Salomon    1/10/2019    Patient Active Problem List   Diagnosis    Stage 3 chronic kidney disease ([facility] )    Dehydration    Diarrhea    Hypertensive kidney disease with chronic kidney disease stage III (HCC)    A-fib (HCC)    Cardiomyopathy ([facility] )    BPH (benign prostatic hyperplasia)    Biventricular cardiac pacemaker in situ    Anticoagulation adequate    Iron deficiency anemia    Special screening for malignant neoplasms, colon    Malignant neoplasm of colon ([facility] )    Cancer of splenic flexure ([facility] )       Past Medical History:   Diagnosis Date    Anemia     Atrial fibrillation ([facility] )     BPH (benign prostatic hypertrophy)     Chronic kidney disease     Hypertension     Irregular heart beat     afib       Past Surgical History:   Procedure Laterality Date    BASAL CELL CARCINOMA EXCISION      CARDIAC PACEMAKER PLACEMENT      CARDIAC SURGERY      CARDIOVERSION      CARPAL TUNNEL RELEASE      CATARACT EXTRACTION      COLON SURGERY      COLONOSCOPY      ME COLONOSCOPY FLX DX W/COLLJ SPEC WHEN PFRMD N/A 11/30/2018    Procedure: COLONOSCOPY w egd  by dr Kate Samuel;  Surgeon: Edson Gary MD;  [facility]: [facility]; Service: Colorectal    ME LAP,SURG,COLECTOMY, PARTIAL, W/ANAST N/A 1/8/2019    Procedure: Left hemicolectomy, takedown splenic flexure, end to end transverse to sigmoid colon anastamosis; Surgeon: Edson Gary MD;  [facility];  Service: Colorectal      01/10/19 1450   Note Type   Note type Eval/Treat   Restrictions/Precautions   Weight Bearing Precautions Per Order No   Other Precautions Multiple lines;Telemetry; Fall Risk;Pain   Pain Assessment   Pain Assessment No/denies pain   Pain Score No Pain   Home Living   Type of Home House   Home Layout Two level;Performs ADLs on one level; Able to live on main level with bedroom/bathroom;Stairs to enter with rails  (3STE w/ HR)   Bathroom Shower/Tub Walk-in shower   Bathroom Toilet Raised   Bathroom Equipment Grab bars in shower;Commode; Shower chair   [address] Walker   Additional Comments Pt denies utilizing DME PTA   Prior Function   Level of [place] Independent with ADLs and functional mobility   Lives With Alone   Receives Help From Family   ADL Assistance Independent   IADLs Independent   Falls in the last 6 months 0   Vocational Retired   Lifestyle   Autonomy Pt reports being I w/ all ADLS and IADLS and (+) drives PTA   Reciprocal Relationships Pt reports living alone  Pt reports limited social support  Service to Others Pt is retired   Intrinsic Gratification Pt reports enjoying staying active and socializing   Psychosocial   Psychosocial (WDL) WDL   ADL   Where Keri Kieran Valentin [address] 7  3 [facility] 5  [address] [facility] 4  Minimal Assistance   [address] 3  Moderate Assistance   [address] 4  Minimal Assistance    [facility] 3  Moderate [address]  3  Moderate Assistance   Bed Mobility   Additional Comments Pt found OOB in chair upon OT arrival  Pt left in bed w/ all needs in reach s/p OT session   Transfers   Sit to Stand 4  Minimal assistance   Additional items Assist x 1; Increased time required;Verbal cues;Armrests   Stand to Sit 4  Minimal assistance   Additional items Assist x 1; Increased time required;Verbal cues;Armrests   Stand pivot 4  Minimal assistance   Additional items Assist x 1; Increased time required;Verbal cues   Additional Comments All transfers completed w/ RW  Pt required VC for safe/proper hand placement and for controlled descent during transfers  Functional Mobility   Functional Mobility 4  Minimal assistance   Additional Comments Pt demonstrated ability to perform short household distance ambulation into hallway w/ Min A w/ RW  Slow gait pattern     Additional items Rolling walker   Balance Static Sitting Good   Dynamic Sitting Fair +   Static Standing Fair   Dynamic Standing Fair -   Ambulatory Fair -   Activity Tolerance   Activity Tolerance Patient limited by fatigue   Nurse Made Aware KEY Spence cleared Pt for OT eval   RUE Assessment   RUE Assessment X  (AROM <90 degrees 2' abdominal incision pain)   LUE Assessment   LUE Assessment X  (AROM <90* 2' abdominal incision pain )   Hand Function   Gross Motor Coordination Functional   Fine Motor Coordination Functional   Sensation   Light Touch Partial deficits in the RUE;Partial deficits in the LUE   Additional Comments Reports chronic intermittent numbness and R and L hands 2' previous CTS   Vision-Basic Assessment   Current Vision No visual deficits   Cognition   Overall Cognitive Status WFL   Arousal/Participation Alert; Cooperative   Attention Within functional limits   Orientation Level Oriented X4   Memory Within functional limits   Following Commands Follows multistep commands with increased time or repetition   Comments Pt pleasant and agreeable to participate in therapy   Assessment   Limitation Decreased ADL status; Decreased UE ROM; Decreased UE strength;Decreased Safe judgement during ADL;Decreased cognition;Decreased endurance;Decreased high-level ADLs   Prognosis Fair   Assessment Pt is a 79 yo male seen for OT eval s/p adm to [facility] dx'd w/ cancer of splenis flexure  Pt with hx of splenic flexure tumor and mild diverticular disease presented for elective surgery  S/p left hemicolectomy, takedown of splenic flexure, end to end transverse to sigmoid colon anastomosis on 1/8/2019  Comorbidities include a h/o CKD stage 3, A-fib, cardiomyopathy, BPH, malignant neoplasm of colon  Pt with active OT orders and up with assistance  orders  Pt is retired and lives alone in [facility] w/ 3STE  Pt was I w/  ADLS and IADLS, drove, & required no use of DME PTA   Pt is currently demonstrating the following occupational deficits: Min-Mod A ADLS and Min A functional mobility w/ RW  These deficits that are impacting pt's baseline areas of occupation are a result of the following impairments: pain, endurance, activity tolerance, functional mobility, forward functional reach, balance, functional standing tolerance, unsupportive home environment, decreased I w/ ADLS/IADLS, strength and ROM  The following Occupational Performance Areas to address include: grooming, bathing/shower, toilet hygiene, dressing, health maintenance, functional mobility, clothing management, cleaning, meal prep and household maintenance  Pt scored overall 50/100 on the Barthel Index  Based on the aforementioned OT evaluation, functional performance deficits, and assessments, pt has been identified as a high complexity evaluation  Recommend STR upon D/C  Pt to continue to benefit from acute immediate OT services to address the following goals 3-5x/week to  w/in 7-10 days:    Goals   Patient Goals To increase strength   LTG Time Frame 7-10   Long Term Goal #1 Refer to goals below   Plan   Treatment Interventions ADL retraining;Functional transfer training;UE strengthening/ROM; Endurance training;Patient/family training;Equipment evaluation/education; Compensatory technique education; Fine motor coordination activities; Activityengagement; Energy conservation   Goal Expiration Date 19   OT Frequency 3-5x/wk   Recommendation   OT Discharge Recommendation Short Term Rehab   OT - OK to Discharge Yes  (When medically cleared)   Barthel Index   Feeding 10   Bathing 0   Grooming Score 0   Dressing Score 5   Bladder Score 10   Bowels Score 10   Toilet Use Score 5   Transfers (Bed/Chair) Score 10   Mobility (Level Surface) Score 0   Stairs Score 0   Barthel Index Score 50   Modified [place] Scale   Modified [place] Scale 4       GOALS    1) Pt will improve activity tolerance to G for min 30 min txment sessions for increase engagement in functional tasks    2) Pt will complete UB/LB dressing/self care w/ mod I using adaptive device and DME as needed    3) Pt will complete bathing w/ Mod I w/ use of AE and DME as needed    4) Pt will complete toileting w/ mod I w/ G hygiene/thoroughness using DME as needed    5) Pt will improve functional transfers to Mod I on/off all surfaces using DME as needed w/ G balance/safety     6) Pt will improve functional mobility during ADL/IADL/leisure tasks to Mod I using DME as needed w/ G balance/safety     7) Pt will participate in simulated IADL management task to increase independence to Mod I w/ G safety and endurance    8) Pt will be attentive 100% of the time during ongoing cognitive assessment w/ G participation to assist w/ safe d/c planning/recommendations    9) Pt will demonstrate G carryover of pt/caregiver education and training as appropriate w/o cues w/ good tolerance to increase safety during functional tasks    10) Pt will demonstrate 100% carryover of energy conservation techniques t/o functional I/ADL/leisure tasks w/o cues s/p skilled education to increase endurance during functional tasks       Chel Zimmerman MS, OTR/L

## 2019-01-10 NOTE — PROGRESS NOTES
Spoke with Shakir Velasquez MD that patient had only voided 150 mL since crockett catheter eight hours earlier  Patient bladder scanned for 244 mL  Per Andria Robert, no intervention at this time but continue to monitor  Bladder scan again if patient has not voided in eight hours

## 2019-01-10 NOTE — SOCIAL WORK
Cm reviewed patient during care coordination rounds with [facility]  Patient not stable for discharge today  Patient is on house diet and crockett to be discontinued  Patient will need SNF placement upon discharge and requested referrals to [facility], [address] [place] [address], [facility], and [facility] placed referrals and [facility] is 1st choice  Awaiting determinations  Patient will need Mae Lozano was bed is available  Cm following

## 2019-01-10 NOTE — PROGRESS NOTES
Progress Note - Colorectal   Stephanie Shantanu 80 y o  male MRN: [id number]  Unit/Bed#: [facility] 806-01 Encounter: [id number]      Objective:  Patient is doing extremely well  He was out of bed from 10:00 a m  To 10:00 p m  yesterday patient needs assistance ambulating  No nausea, no vomiting, tolerating clear liquids  Feels better since he has had the Lasix and is able to dorsiflex his feet and walk better  Patient has been pain controlled  No flatus as of yet, no bowel movements  [address]    Blood pressure 142/67, pulse 71, temperature 97 8 °F (36 6 °C), temperature source Oral, resp  rate 20, height 5' 10" (1 778 m), weight 91 5 kg (201 lb 11 5 oz), SpO2 92 %  ,Body mass index is 28 94 kg/m²  Intake/Output Summary (Last 24 hours) at 01/10/19 0517  Last data filed at 01/10/19 0300   Gross per 24 hour   Intake          2267 17 ml   Output             1510 ml   Net           757 17 ml       Invasive Devices     Peripheral Intravenous Line            Peripheral IV 01/08/19 Left Hand 1 day    Peripheral IV 01/08/19 Left Wrist 1 day          Drain            Urethral Catheter Non-latex; Double-lumen 16 Fr  1 day                Physical Exam:   Abdomen:  Soft, does not appear distended, midline surgical wound has some crusted dried blood which we will wash off today, incisional tenderness  Extremities:  1+ pedal edema bilaterally, no calf tenderness, vena dynes on and functioning    Lab, Imaging and other studies:  Pending  VTE Pharmacologic Prophylaxis: Heparin  VTE Mechanical Prophylaxis: sequential compression device    Assessment:  POD # 2 laparoscopic hand assisted left hemicolectomy  AFib  Pacemaker AICD  Chronic kidney disease  Right hemicolectomy in the 1990s    Plan:  1  House diet  2  Continue with PT OT  3  Out of bed and ambulating  4  Continue incentive spirometry  5  Saline lock IV fluids unless Nephrology wants fluids on board  6  Case management working on the rehab choices patient requested  7  Creatine down to 1 64 this am (baseline appears to be 1 7-1 8)  8  Telemetry  9   Off step down 2 to med/surg

## 2019-01-10 NOTE — PLAN OF CARE
Problem: PHYSICAL THERAPY ADULT  Goal: Performs mobility at highest level of function for planned discharge setting  See evaluation for individualized goals  Treatment/Interventions: Functional transfer training, LE strengthening/ROM, Elevations, Therapeutic exercise, Endurance training, Patient/family training, Equipment eval/education, Bed mobility, Gait training, Spoke to nursing  Equipment Recommended: Velia Cooper       See flowsheet documentation for full assessment, interventions and recommendations  Outcome: Progressing  Prognosis: Good  Problem List: Decreased strength, Decreased endurance, Impaired balance, Decreased mobility  Assessment: Pt presents to PT treatment session on 1/10/2019 sitting in bedside chair  Pt is pleasant & agreeable to working with PT  Pt initiated PT session with bilateral LE AROM exercises to promote LE strengthening & functional mobility  Pt performed seated LAQ (2 sets of 10) to promote LE strengthening  Pt required verbal instructions to complete these exercises with proper form  Pt performed sit<->stand transfer with min A x1 due to LE weakness & trunk weakness  Pt ambulated 80 ft x2 with the use of a RW with S & a chair follow  Pt required chair follow due to pt fatigue & feeling SOB at times  Pt gait pattern can be described as excessively slow, decreased foot clearance, forward flexed, & short stride  Pt required seated rest break following ambulation due to feeling fatigued  Pt required ~3 min seated rest break  Pt ambulated 50 ft x2 with the use of a RW with S  Pt was educated to stand with upright posture to proper better breathing mechanics  Pt performed 3x sit-to-stand from bedside chair with the use of his UE for support  Pt sit<->stand progressed from min Ax1 to S  Pt required increased time to perform transfer due to LE weakness & decreased aerobic endurance  Pt was educated to perform HEP 2-3x a day to promote functional mobility & strength   Pt was educated to take rest breaks when he fatigues due to his report of SOB with ambulation at times  Pt was left in bedside chair with all needs within reach & denies any further questions from PT at this time  PT will continue to follow to promote functional mobility  Once medically cleared PT recommends STR for D/C  Barriers to Discharge: Inaccessible home environment, Decreased caregiver support (CELESTINO & lives alone)     Recommendation: Short-term skilled PT     PT - OK to Discharge: Yes (once medically cleared to rehab)    See flowsheet documentation for full assessment

## 2019-01-10 NOTE — PHYSICAL THERAPY NOTE
PHYSICAL THERAPY TREATMENT          Patient Name: Sandie Cruz  EFTVB'V Date: 1/10/2019     01/10/19 4871   Pain Assessment   Pain Assessment 0-10   Pain Score No Pain   Restrictions/Precautions   Weight Bearing Precautions Per Order No   Other Precautions Telemetry; Fall Risk   General   Chart Reviewed Yes   Response to Previous Treatment Patient with no complaints from previous session  Family/Caregiver Present No   Cognition   Overall Cognitive Status WFL   Arousal/Participation Alert   Attention Within functional limits   Orientation Level Oriented X4   Memory Within functional limits   Following Commands Follows one step commands without difficulty   Comments pt is pleasant & agreeable to work with PT   Subjective   Subjective I would like to go for a walk   Bed Mobility   Supine to Sit Unable to assess  (pt initiated PT session sitting in bedside chair)   Sit to Supine Unable to assess  (pt completed PT session in bedside chair )   Transfers   Sit to Stand 4  Minimal assistance   Additional items Assist x 1; Increased time required;Armrests   Stand to Sit 4  Minimal assistance   Additional items Assist x 1; Increased time required;Armrests   Additional Comments pt required increased time to perform transfer   Ambulation/Elevation   Gait pattern Excessively slow; Short stride; Foward flexed; Forward Flexion;Decreased foot clearance   Gait Assistance 5  Supervision  (with a chair follow)   Additional items Assist x 1   Assistive Device Rolling walker   Distance 80 ft x2, 50 ft x2   Stair Management Assistance Not tested   Balance   Static Sitting Good   Dynamic Sitting Fair +   Static Standing Fair   Dynamic Standing Fair -   Ambulatory Fair -   Endurance Deficit   Endurance Deficit Yes   Endurance Deficit Description primarily limited by fatigue   Activity Tolerance   Activity Tolerance Patient limited by fatigue   Nurse Made Kat Hurtado RN   Exercises   Knee AROM Long Arc Quad Sitting;10 reps;AROM; Bilateral  (2 sets)   Balance training  3x sit-to-stand with S    Assessment   Prognosis Good   Problem List Decreased strength;Decreased endurance; Impaired balance;Decreased mobility   Assessment Pt presents to PT treatment session on 1/10/2019 sitting in bedside chair  Pt is pleasant & agreeable to working with PT  Pt initiated PT session with bilateral LE AROM exercises to promote LE strengthening & functional mobility  Pt performed seated LAQ (2 sets of 10) to promote LE strengthening  Pt required verbal instructions to complete these exercises with proper form  Pt performed sit<->stand transfer with min A x1 due to LE weakness & trunk weakness  Pt ambulated 80 ft x2 with the use of a RW with S & a chair follow  Pt required chair follow due to pt fatigue & feeling SOB at times  Pt gait pattern can be described as excessively slow, decreased foot clearance, forward flexed, & short stride  Pt required seated rest break following ambulation due to feeling fatigued  Pt required ~3 min seated rest break  Pt ambulated 50 ft x2 with the use of a RW with S  Pt was educated to stand with upright posture to proper better breathing mechanics  Pt performed 3x sit-to-stand from bedside chair with the use of his UE for support  Pt sit<->stand progressed from min Ax1 to S  Pt required increased time to perform transfer due to LE weakness & decreased aerobic endurance  Pt was educated to perform HEP 2-3x a day to promote functional mobility & strength  Pt was educated to take rest breaks when he fatigues due to his report of SOB with ambulation at times  Pt was left in bedside chair with all needs within reach & denies any further questions from PT at this time  PT will continue to follow to promote functional mobility  Once medically cleared PT recommends STR for D/C      Barriers to Discharge Inaccessible home environment;Decreased caregiver support  (CELESTINO & lives alone)   Goals   Patient Goals to get stronger   STG Expiration Date 01/19/19   Treatment Day 2   Plan   Treatment/Interventions Functional transfer training;LE strengthening/ROM; Elevations; Therapeutic exercise; Endurance training;Patient/family training;Equipment eval/education; Bed mobility;Gait training;Spoke to nursing   Progress Progressing toward goals   PT Frequency Other (Comment)  (3-5x a week)   Recommendation   Recommendation Short-term skilled PT   Equipment Recommended Walker  (RW)   PT - OK to Discharge Yes  (once medically cleared to rehab)   Ameena Shepard, SPT

## 2019-01-11 LAB
ANION GAP SERPL CALCULATED.3IONS-SCNC: 6 MMOL/L (ref 4–13)
BUN SERPL-MCNC: 24 MG/DL (ref 5–25)
CALCIUM SERPL-MCNC: 8.3 MG/DL (ref 8.3–10.1)
CHLORIDE SERPL-SCNC: 106 MMOL/L (ref 100–108)
CO2 SERPL-SCNC: 25 MMOL/L (ref 21–32)
CREAT SERPL-MCNC: 1.59 MG/DL (ref 0.6–1.3)
GFR SERPL CREATININE-BSD FRML MDRD: 37 ML/MIN/1.73SQ M
GLUCOSE SERPL-MCNC: 89 MG/DL (ref 65–140)
POTASSIUM SERPL-SCNC: 4.2 MMOL/L (ref 3.5–5.3)
SODIUM SERPL-SCNC: 137 MMOL/L (ref 136–145)

## 2019-01-11 PROCEDURE — 97116 GAIT TRAINING THERAPY: CPT

## 2019-01-11 PROCEDURE — 97110 THERAPEUTIC EXERCISES: CPT

## 2019-01-11 PROCEDURE — 99232 SBSQ HOSP IP/OBS MODERATE 35: CPT | Performed by: INTERNAL MEDICINE

## 2019-01-11 PROCEDURE — 80048 BASIC METABOLIC PNL TOTAL CA: CPT | Performed by: INTERNAL MEDICINE

## 2019-01-11 PROCEDURE — 97535 SELF CARE MNGMENT TRAINING: CPT

## 2019-01-11 PROCEDURE — 97112 NEUROMUSCULAR REEDUCATION: CPT

## 2019-01-11 RX ADMIN — HEPARIN SODIUM 5000 UNITS: 5000 INJECTION INTRAVENOUS; SUBCUTANEOUS at 13:08

## 2019-01-11 RX ADMIN — IRON SUCROSE 100 MG: 20 INJECTION, SOLUTION INTRAVENOUS at 08:52

## 2019-01-11 RX ADMIN — ACETAMINOPHEN 650 MG: 325 TABLET, FILM COATED ORAL at 13:09

## 2019-01-11 RX ADMIN — HEPARIN SODIUM 5000 UNITS: 5000 INJECTION INTRAVENOUS; SUBCUTANEOUS at 21:31

## 2019-01-11 RX ADMIN — HEPARIN SODIUM 5000 UNITS: 5000 INJECTION INTRAVENOUS; SUBCUTANEOUS at 05:36

## 2019-01-11 NOTE — PROGRESS NOTES
Upon arrival to shift pt still had active order for telemetry, but per report given by Reba Lino RN tele was d/c'd already on nightshift prior to her taking pt  Paged and spoke with India Bonnerdale from surgery and made her aware of situation, will await d/c order

## 2019-01-11 NOTE — PLAN OF CARE
Problem: PHYSICAL THERAPY ADULT  Goal: Performs mobility at highest level of function for planned discharge setting  See evaluation for individualized goals  Treatment/Interventions: Functional transfer training, LE strengthening/ROM, Elevations, Therapeutic exercise, Endurance training, Patient/family training, Equipment eval/education, Bed mobility, Gait training, Spoke to nursing  Equipment Recommended: Hugh Kapadia       See flowsheet documentation for full assessment, interventions and recommendations  Outcome: Progressing  Prognosis: Good  Problem List: Decreased strength, Decreased endurance, Impaired balance, Decreased mobility  Assessment: PT INITIATED TREATMENT SESSION IN ORDER TO ASSIST PATIENT IN ACHIEVING GOALS TO IMPROVE TRANSFERS, AMBULATION, AND STRENGTH  PER PATIENT HIS GOAL IS TO GO TO REHAB  DENIES PAIN  DURING TRANSFER TRAINING PATIENT PERFORMED TWO SIT<-->STAND TRANSFERS S LEVEL REQUIRING VERBAL CUING FOR SAFE MECHANICS/SEQUENCING (PUSH FROM CHAIR SIT-->STAND AND REACH BACK FOR CHAIR STAND-->SIT)  DURING GAIT TRAINING PATIENT AMBULATED 50 FEET X 2 W/ RW PRESENTING WITH REDUCED GAIT SPEED  VC TO ENCOURAGE UPRIGHT POSTURE AND INCREASED GAIT SPEED  PATIENT PERFORMED TIMED UP AND GO (TUG) ASSESSMENT IN 30 SECONDS DURING 1ST ATTEMPT AND 26 SECONDS DURING 2ND ATTEMPT (>13 5 INDICATIVE OF FALLS RISK)  HE DEMONSTRATED GOOD TOLERANCE FOR ACTIVE PERFORMANCE OF B/L LE THERE-EX  PT D/C RECOMMENDATION FOR STR UPON D/C REMAINS APPROPRIATE AS THIS PATIENT CONTINUES TO FUNCTION BELOW BASELINE POST-OP AND RESIDES ALONE  HE WILL BENEFIT FROM CONTINUED SKILLED PT THIS ADMISSION TO ACHIEVE MAXIMAL FUNCTION AND SAFETY  Barriers to Discharge: Inaccessible home environment, Decreased caregiver support (CELESTINO & lives alone)     Recommendation: (S) Short-term skilled PT     PT - OK to Discharge: (S) Yes (TO STR WHEN MED KRISTY )    See flowsheet documentation for full assessment     Brad Manning, PT

## 2019-01-11 NOTE — OCCUPATIONAL THERAPY NOTE
[address] Progress Note     Patient Name: Meka FITZGERALD Date: 1/11/2019  Problem List  Patient Active Problem List   Diagnosis    Stage 3 chronic kidney disease ([place] [address] )    Dehydration    Diarrhea    Hypertensive kidney disease with chronic kidney disease stage III ([place] [address] )    A-fib ([place] [address] )    Cardiomyopathy ([place] [address] )    BPH (benign prostatic hyperplasia)    Biventricular cardiac pacemaker in situ    Anticoagulation adequate    Iron deficiency anemia    Special screening for malignant neoplasms, colon    Malignant neoplasm of colon ([place] [address] )    Cancer of splenic flexure ([place] [address] )             01/11/19 1542   Restrictions/Precautions   Weight Bearing Precautions Per Order No   Other Precautions Fall Risk   Lifestyle   Autonomy Pt reports being I w/ all ADLS and IADLS and (+) drives PTA   Reciprocal Relationships Pt reports living alone  Pt reports limited social support  Service to Others Pt is retired   Intrinsic Gratification Pt reports enjoying staying active and socializing   Pain Assessment   Pain Assessment 0-10   Pain Score No Pain   ADL   Grooming Assistance 5  Supervision/Setup   Grooming Deficit Supervision/safety; Increased time to complete;Standing with assistive device  (Grooming standing at sink)   LB Dressing Assistance 3  Moderate Assistance   LB Dressing Deficit Don/doff R sock; Don/doff L sock; Requires assistive device for steadying   Toileting Assistance  5  Supervision/Setup   Toileting Deficit Supervison/safety; Increased time to complete;Use of adaptive equipment  (Use of commode over toilet; RW)   Bed Mobility   Additional Comments Pt OOB in chair upon arrival   Transfers   Sit to Stand 5  Supervision   Additional items Increased time required   Stand to Sit 5  Supervision   Additional items Increased time required   Additional Comments RW   Functional Mobility   Functional Mobility 4  Minimal assistance   Additional Comments CGA; to bathroom   Additional items Rolling walker Toilet Transfers   Toilet Transfers Supervision   Toilet Transfers Comments RW; increased time; to commode over toilet   Cognition   Overall Cognitive Status WFL   Assessment   Assessment Patient participated in Skilled OT session this date with interventions consisting of ADL re-training and functional transfer training  Patient agreeable to OT treatment session, upon arrival patient was found seated OOB to Chair  In comparison to previous session, patient with improvements in functional transfers and toileting  Pt performed toileting and toilet transfer with SUP, LB dressing with MOD A 2' to pain during activity, and grooming standing at sink with SUP  Pt performed all ADLs with RW - pt demonstrated safe use of RW  Pt able to tolerate 5 minute grooming activity standing at sink  Patient continues to be functioning below baseline level, occupational performance remains limited secondary to factors listed above and increased risk for falls and injury  From OT standpoint, recommendation at time of d/c would be Short Term Rehab  Patient to benefit from continued Occupational Therapy treatment while in the hospital to address deficits as defined above and maximize level of functional independence with ADLs and functional mobility  Plan   Treatment Interventions ADL retraining;Functional transfer training;UE strengthening/ROM; Endurance training;Patient/family training;Equipment evaluation/education; Energy conservation; Activityengagement   Goal Expiration Date 01/20/19   Treatment Day 1   OT Frequency 3-5x/wk   Recommendation   OT Discharge Recommendation Short Term Rehab   OT - OK to Discharge Yes  (when medically appropriate)         Iram Gipson OT

## 2019-01-11 NOTE — PLAN OF CARE
Problem: OCCUPATIONAL THERAPY ADULT  Goal: Performs self-care activities at highest level of function for planned discharge setting  See evaluation for individualized goals  Treatment Interventions: ADL retraining, Functional transfer training, UE strengthening/ROM, Endurance training, Patient/family training, Equipment evaluation/education, Compensatory technique education, Fine motor coordination activities, Activityengagement, Energy conservation          See flowsheet documentation for full assessment, interventions and recommendations  Outcome: Progressing  Limitation: Decreased ADL status, Decreased UE ROM, Decreased UE strength, Decreased Safe judgement during ADL, Decreased cognition, Decreased endurance, Decreased high-level ADLs  Prognosis: Fair  Assessment: Patient participated in Skilled OT session this date with interventions consisting of ADL re-training and functional transfer training  Patient agreeable to OT treatment session, upon arrival patient was found seated OOB to Chair  In comparison to previous session, patient with improvements in functional transfers and toileting  Pt performed toileting and toilet transfer with SUP, LB dressing with MOD A 2' to pain during activity, and grooming standing at sink with SUP  Pt performed all ADLs with RW - pt demonstrated safe use of RW  Pt able to tolerate 5 minute grooming activity standing at sink  Patient continues to be functioning below baseline level, occupational performance remains limited secondary to factors listed above and increased risk for falls and injury  From OT standpoint, recommendation at time of d/c would be Short Term Rehab  Patient to benefit from continued Occupational Therapy treatment while in the hospital to address deficits as defined above and maximize level of functional independence with ADLs and functional mobility        OT Discharge Recommendation: Short Term Rehab  OT - OK to Discharge: Yes (when medically appropriate)

## 2019-01-11 NOTE — PROGRESS NOTES
Progress Note - Colorectal   Bette Desai 80 y o  male MRN: [id number]  Unit/Bed#: [facility] 806-01 Encounter: [id number]      Objective:  Patient is doing extremely well  He is tolerating a house diet without nausea, no emesis  Passing flatus, small bowel movements  Working with physical therapy and occupational therapy, recommending short-term rehab  Patient states he has very little incisional pain  1000 + 2 UA  2 BM's     Blood pressure 134/63, pulse 70, temperature 98 °F (36 7 °C), temperature source Oral, resp  rate 20, height 5' 10" (1 778 m), weight 91 5 kg (201 lb 11 5 oz), SpO2 96 %  ,Body mass index is 28 94 kg/m²  Intake/Output Summary (Last 24 hours) at 01/11/19 0537  Last data filed at 01/10/19 2201   Gross per 24 hour   Intake           791 67 ml   Output             1000 ml   Net          -208 33 ml       Invasive Devices     Peripheral Intravenous Line            Peripheral IV 01/08/19 Left Hand 2 days    Peripheral IV 01/08/19 Left Wrist 2 days                Physical Exam:   Abdomen:  Soft, nondistended, small midline incision is clean and dry, washed and dried this morning to remove crusted blood    Minimal incisional tenderness  Extremities:  1+ pedal edema bilaterally, no calf edema, no calf tenderness    Lab, Imaging and other studies:    VTE Pharmacologic Prophylaxis: Heparin  VTE Mechanical Prophylaxis: sequential compression device    Assessment:  POD # 3  Laparoscopic hand assisted left hemicolectomy  AFib  Pacemaker AICD  Chronic kidney disease  Right hemicolectomy in the 1990s    Plan:  Awaiting authorization for discharge to [facility] possible discharge Saturday 1/12/19

## 2019-01-11 NOTE — SOCIAL WORK
Cm reviewed patient during care coordination rounds with [facility]  Patient not stable for discharge today but anticipated for discharge tomorrow  Patient will need [facility] and [facility] able to accept  Cm updated referral for [facility] as Mike Winn with [address] informed they are able to submit for [facility] aware patient is anticipated for discharge tomorrow  Patient will need [facility] transport arranged vs family to transport  Cm to discuss with patient  Cm received update from NATASHA with [facility] that authorization has been obtained  (REF # [id number] 100 BED DAYS [address] P: [phone] H:[phone])  Cm following  No [facility] transports available  Cm informed patient and patient to call family to see if children able to transport around 2PM   Cm following

## 2019-01-11 NOTE — PHYSICAL THERAPY NOTE
PT TREATMENT       01/11/19 1505   Pain Assessment   Pain Assessment No/denies pain   Pain Score No Pain   Restrictions/Precautions   Other Precautions Fall Risk   General   Chart Reviewed Yes   Response to Previous Treatment Patient with no complaints from previous session  Family/Caregiver Present No   Cognition   Overall Cognitive Status WFL   Subjective   Subjective "IM GOING TO REHAB TOMORROW"   Transfers   Sit to Stand 5  Supervision   Additional items Increased time required;Verbal cues   Stand to Sit 5  Supervision   Additional items Increased time required;Verbal cues   Ambulation/Elevation   Gait pattern Excessively slow; Foward flexed   Gait Assistance 4  Minimal assist  (CONTACT GUARD)   Additional items Assist x 1   Assistive Device Rolling walker   Distance 10 FEET + 20 FEET X 2 + 50 FEET X 2    Balance   Static Sitting Good   Static Standing Fair   Ambulatory Fair -   Endurance Deficit   Endurance Deficit Yes   Endurance Deficit Description DECONDITIONED   Activity Tolerance   Activity Tolerance Patient limited by fatigue   Nurse Made Aware RN JUDSON    Exercises   Knee AROM Long Arc Quad Sitting;20 reps;Bilateral;AROM   Ankle Pumps Sitting;20 reps;Bilateral;AROM   Marching Sitting;20 reps;Bilateral;AROM   Assessment   Prognosis Good   Problem List Decreased strength;Decreased endurance; Impaired balance;Decreased mobility   Assessment PT INITIATED TREATMENT SESSION IN ORDER TO ASSIST PATIENT IN ACHIEVING GOALS TO IMPROVE TRANSFERS, AMBULATION, AND STRENGTH  PER PATIENT HIS GOAL IS TO GO TO REHAB  DENIES PAIN  DURING TRANSFER TRAINING PATIENT PERFORMED TWO SIT<-->STAND TRANSFERS S LEVEL REQUIRING VERBAL CUING FOR SAFE MECHANICS/SEQUENCING (PUSH FROM CHAIR SIT-->STAND AND REACH BACK FOR CHAIR STAND-->SIT)  DURING GAIT TRAINING PATIENT AMBULATED 50 FEET X 2 W/ RW PRESENTING WITH REDUCED GAIT SPEED  VC TO ENCOURAGE UPRIGHT POSTURE AND INCREASED GAIT SPEED   PATIENT PERFORMED TIMED UP AND GO (TUG) ASSESSMENT IN 30 SECONDS DURING 1ST ATTEMPT AND 26 SECONDS DURING 2ND ATTEMPT (>13 5 INDICATIVE OF FALLS RISK)  HE DEMONSTRATED GOOD TOLERANCE FOR ACTIVE PERFORMANCE OF B/L LE THERE-EX  PT D/C RECOMMENDATION FOR STR UPON D/C REMAINS APPROPRIATE AS THIS PATIENT CONTINUES TO FUNCTION BELOW BASELINE POST-OP AND RESIDES ALONE  HE WILL BENEFIT FROM CONTINUED SKILLED PT THIS ADMISSION TO ACHIEVE MAXIMAL FUNCTION AND SAFETY  Goals   Patient Goals TO GO TO REHAB TOMORROW    STG Expiration Date 01/19/19   Treatment Day 3   Plan   Treatment/Interventions Functional transfer training;LE strengthening/ROM; Therapeutic exercise; Endurance training;Patient/family training;Equipment eval/education; Bed mobility;Gait training;OT;Spoke to nursing;Elevations   Progress Progressing toward goals   PT Frequency Other (Comment)  (3-5X/WK)   Recommendation   Recommendation Short-term skilled PT   Equipment Recommended Walker  (RW)   PT - OK to Discharge Yes  (TO STR WHEN MED KRISTY )   Po Matute, PT

## 2019-01-11 NOTE — PROGRESS NOTES
Progress Note - Nephrology   Devon Inman 80 y o  male MRN: [id number]  Unit/Bed#: [facility] 806-01 Encounter: [id number]      Assessment / Plan:    1  Chronic kidney disease stage 3 with baseline creatinine of 1 6-1 8  · Workup:  Urinalysis (2017)-moderate heme, trace protein // urinalysis in  had no hematuria or proteinuria // CT (2018) scan revealed stable cyst in subcentimeter hypodensities  · Creatinine at baseline and has improved over the last 48 hr  · Avoid relative hypotension- blood pressure is acceptable  · Avoid nephrotoxins  · Elevated postvoid residual - 244 mL after Alas catheter discontinued  · Check follow-up PVRs - in-situ urinary retention protocol  2  Hypertension  · Given his advanced age, CKD and periods of MAP less than 70 during his procedure, he is at high risk for SUDHIR    blood pressure currently acceptable in renal function stable  4  Status post left hemicolectomy  5  Anemia with elevated MCV and RDW  · TSH 2 29/ferritin 9/iron 21 and iron saturation 4%/folate 16 6 and vitamin B12 312 in October of this year  · Current iron stores:  Ferritin 46/iron sat 7/iron 27  · Status post 1 dose of intravenous iron, 100 mg,   He is unlikely to tolerate oral iron at this time    Disposition:  Okay to discharge to rehab as planned  Should receive a folic G follow up as an outpatient  He has not seen a nephrologist in the past    Subjective: The patient was seen examined  He denied any shortness of breath, chest pain or pressure  He does feel weak and has difficulty ambulating  Denies any chills or sweats  He denied any paroxysmal nocturnal dyspnea orthopnea      Objective:     Vitals: Blood pressure 150/70, pulse 76, temperature 98 5 °F (36 9 °C), temperature source Oral, resp  rate 18, height 5' 10" (1 778 m), weight 91 5 kg (201 lb 11 5 oz), SpO2 98 %  ,Body mass index is 28 94 kg/m²  Temp (24hrs), Av 3 °F (36 8 °C), Min:97 6 °F (36 4 °C), Max:99 1 °F (37 3 °C)      Weight (last 2 days)     Date/Time   Weight    01/09/19 0002  91 5 (201 72)                     Intake/Output Summary (Last 24 hours) at 01/11/19 1516  Last data filed at 01/11/19 1349   Gross per 24 hour   Intake              920 ml   Output             1500 ml   Net             -580 ml     I/O last 24 hours: In: 1491 7 [P O :1280; I V :106 7; IV Piggyback:105]  Out: 1850 [Urine:1850]        Physical Exam    Physical Exam   Constitutional: He appears well-developed  No distress  Neck: JVD present  Cardiovascular: Normal heart sounds  Exam reveals no gallop and no friction rub  Pulmonary/Chest: Effort normal and breath sounds normal  No respiratory distress  He has no wheezes  He has no rales  Abdominal: Soft  He exhibits no distension  There is no tenderness  There is no rebound and no guarding  Musculoskeletal: He exhibits edema (Edema seems to have worsened somewhat today)  Neurological: He is alert  Skin: He is not diaphoretic  Vitals reviewed                Invasive Devices     Peripheral Intravenous Line            Peripheral IV 01/08/19 Left Wrist 2 days                Medications:    Scheduled Meds:  Current Facility-Administered Medications:  acetaminophen 650 mg Oral Q6H PRN IAIN Cuevas    bupivacaine liposomal 20 mL Infiltration Once Betzaida Bradford MD    heparin (porcine) 5,000 Units Subcutaneous Q8H [address]    HYDROmorphone 0 5 mg Intravenous Q3H PRN IAIN Cuevas    iron sucrose 100 mg Intravenous Daily Mireya Ortiz MD Last Rate: 100 mg (01/11/19 0852)   ondansetron 4 mg Intravenous Q6H PRN Derotha Given    oxyCODONE 10 mg Oral Q6H PRN IAIN Cuevas    oxyCODONE 5 mg Oral Q6H PRN IAIN Cuevas        PRN Meds:   acetaminophen    HYDROmorphone    ondansetron    oxyCODONE    oxyCODONE    Continuous Infusions:         LAB RESULTS:        Results from last 7 days  Lab Units 01/11/19  0443 01/10/19  0533 01/09/19  4937 01/09/19  0058   WBC Thousand/uL  --  8 93 9 63 11 50*   HEMOGLOBIN g/dL  --  11 4* 11 8* 11 9*   HEMATOCRIT %  --  38 2 38 5 38 4   PLATELETS Thousands/uL  --  175 191 194   NEUTROS PCT %  --  70 88* 90*   LYMPHS PCT %  --  17 7* 4*   MONOS PCT %  --  12 5 6   EOS PCT %  --  1 0 0   POTASSIUM mmol/L 4 2 4 4 4 6 4 4   CHLORIDE mmol/L 106 106 108 106   CO2 mmol/L 25 24 21 21   BUN mg/dL 24 23 22 23   CREATININE mg/dL 1 59* 1 64* 1 71* 1 79*   CALCIUM mg/dL 8 3 8 5 8 1* 8 4   ALK PHOS U/L  --  60  --   --    ALT U/L  --  18  --   --    AST U/L  --  21  --   --    MAGNESIUM mg/dL  --  2 4 2 3  --    PHOSPHORUS mg/dL  --  2 4  --   --        CUTURES:  Lab Results   Component Value Date    URINECX <10,000 cfu/ml Mixed Contaminants X2 02/08/2017                 Portions of the record may have been created with voice recognition software  Occasional wrong word or "sound a like" substitutions may have occurred due to the inherent limitations of voice recognition software  Read the chart carefully and recognize, using context, where substitutions have occurred  If you have any questions, please contact the dictating provider

## 2019-01-12 LAB
ANION GAP SERPL CALCULATED.3IONS-SCNC: 7 MMOL/L (ref 4–13)
BUN SERPL-MCNC: 22 MG/DL (ref 5–25)
CALCIUM SERPL-MCNC: 8 MG/DL (ref 8.3–10.1)
CHLORIDE SERPL-SCNC: 108 MMOL/L (ref 100–108)
CO2 SERPL-SCNC: 24 MMOL/L (ref 21–32)
CREAT SERPL-MCNC: 1.38 MG/DL (ref 0.6–1.3)
GFR SERPL CREATININE-BSD FRML MDRD: 44 ML/MIN/1.73SQ M
GLUCOSE SERPL-MCNC: 82 MG/DL (ref 65–140)
POTASSIUM SERPL-SCNC: 3.9 MMOL/L (ref 3.5–5.3)
SODIUM SERPL-SCNC: 139 MMOL/L (ref 136–145)

## 2019-01-12 PROCEDURE — 80048 BASIC METABOLIC PNL TOTAL CA: CPT | Performed by: INTERNAL MEDICINE

## 2019-01-12 PROCEDURE — 99232 SBSQ HOSP IP/OBS MODERATE 35: CPT | Performed by: INTERNAL MEDICINE

## 2019-01-12 RX ADMIN — HEPARIN SODIUM 5000 UNITS: 5000 INJECTION INTRAVENOUS; SUBCUTANEOUS at 22:34

## 2019-01-12 RX ADMIN — HEPARIN SODIUM 5000 UNITS: 5000 INJECTION INTRAVENOUS; SUBCUTANEOUS at 05:31

## 2019-01-12 RX ADMIN — HEPARIN SODIUM 5000 UNITS: 5000 INJECTION INTRAVENOUS; SUBCUTANEOUS at 13:42

## 2019-01-12 RX ADMIN — IRON SUCROSE 100 MG: 20 INJECTION, SOLUTION INTRAVENOUS at 08:58

## 2019-01-12 NOTE — DISCHARGE SUMMARY
Discharge Summary - Gissel Sample 80 y o  male MRN: [id number]    Unit/Bed#: [facility] 806-01 Encounter: [id number]    Admission Date:   Admission Orders     Ordered        01/08/19 2114  Inpatient Admission  Once             Discharge Date: 1/13    Admitting Diagnosis: Cancer of splenic flexure ([facility] [address] ) [C18 5]    HPI:  89M s/p colonoscopy/EGD on 11/30/18 which showed grossly malignant looking tumor splenic flexure biopsied and tattooed for preparation for surgery and mild diverticular disease in the sigmoid colon  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  Mismatch repair noted in MLH1 and PMS2, MMR study     Procedures Performed:   1/8 Left hemicolectomy with splenic flexure takedown, with an end to end transverse to sigmoid colon anastomosis (Dr Nahid Oliva)    Summary of Hospital Course: The patient underwent a Left hemicolectomy with splenic flexure takedown, with an end to end transverse to sigmoid colon anastomosis  There were no immediate post op complications  His NGT remained in place until POD 1 until he was started on liquids  Nephrology was consulted during his admission to help manage his CKD  His creatinine remained at his baseline of 1 4 - 1 6  His home lasix was resumed on POD 2 (20 mg PRN) and crockett removed as output was adequate  He began to pass gas and had small BMs, and was tolerating a regular diet  He was evaluated by PT/OT and deemed appropriate for rehab  CM was consulted and he was placed at [facility], with transportation available on 1/13  He was discharged on this date in good condition, with instructions to follow up in 4 weeks  He is to resume Eliquis on discharge      Significant Findings, Care, Treatment and Services Provided: As above    Complications: none    Discharge Diagnosis: Colon Ca of the splenic flexure    Resolved Problems  Date Reviewed: 1/9/2019    None          Condition at Discharge: good         Discharge instructions/Information to patient and family:   See after visit summary for information provided to patient and family  Provisions for Follow-Up Care:  See after visit summary for information related to follow-up care and any pertinent home health orders  PCP: Alcides Rod MD    Disposition: Short-term rehab at [facility] Readmission: No    Discharge Statement   I spent 30 minutes discharging the patient  This time was spent on the day of discharge  I had direct contact with the patient on the day of discharge  Additional documentation is required if more than 30 minutes were spent on discharge  Discharge Medications:  See after visit summary for reconciled discharge medications provided to patient and family

## 2019-01-12 NOTE — SOCIAL WORK
CM met with patient regarding d/c transportation, per pt he does not have anyone to transport him to [facility], and he would like to go via [address]  CM explained the cost of the [address], and explained the insurance company may not cover cost, pt acknowledge understanding, and was in agreement of continuing to set up transport  CM arranged transport via [address] with [place] on 1/13/2019 at 4pm  CM notified pt, RN, physician and facility  Report# [phone], Fax# [phone]

## 2019-01-12 NOTE — PROGRESS NOTES
NEPHROLOGY PROGRESS NOTE   Virgil Moreno 80 y o  male MRN: [id number]  Unit/Bed#: [facility] 806-01 Encounter: [id number]      ASSESSMENT & PLAN:    1  Stage 3 chronic kidney disease with a baseline creatinine 1 6-1 point  2  Hypertension  3  Status post left hemicolectomy  4   Macrocytic anemia    -creatinine continues to improve and is now down less than 1 4  -blood pressures are acceptable keeping mean arterial pressures greater than 70  -currently off blood pressure medication  -symptomatically stable  -continue to avoid hypotension given age  -okay for rehab and discharge planning on going  -outpatient renal follow-up  -urine output acceptable    SUBJECTIVE:    Patient seen today denies any chest pain shortness of breath fevers or chills    OBJECTIVE:  Current Weight: Weight - Scale: 91 5 kg (201 lb 11 5 oz)  Vitals:    01/12/19 0844   BP: 146/67   Pulse: 74   Resp: 18   Temp: 97 5 °F (36 4 °C)   SpO2: 97%       Intake/Output Summary (Last 24 hours) at 01/12/19 1311  Last data filed at 01/12/19 7182   Gross per 24 hour   Intake              470 ml   Output             1300 ml   Net             -830 ml       General: conscious, cooperative, in not acute distress  Eyes: conjunctivae pink, anicteric sclerae  ENT: lips and mucous membranes moist  Neck: supple, no JVD  Chest: clear breath sounds bilateral, no crackles, ronchus or wheezings  CVS: distinct S1 & S2, normal rate, regular rhythm  Abdomen: soft, non-tender, non-distended, normoactive bowel sounds  Extremities: no edema of both legs  Skin: no rash  Neuro: awake, alert, oriented        Medications:    Current Facility-Administered Medications:     acetaminophen (TYLENOL) tablet 650 mg, 650 mg, Oral, Q6H PRN, IAIN Roland, 650 mg at 01/11/19 1309    bupivacaine liposomal (EXPAREL) 1 3 % injection 20 mL, 20 mL, Infiltration, Once, Nanette Martins MD    heparin (porcine) subcutaneous injection 5,000 Units, 5,000 Units, Subcutaneous, Q8H [address], 5,000 Units at 01/12/19 0531 **AND** [CANCELED] Platelet count, , , Once, Kamran Cramp    HYDROmorphone (DILAUDID) injection 0 5 mg, 0 5 mg, Intravenous, Q3H PRN, Samella Phyllis, CRNP, 0 5 mg at 01/09/19 0730    ondansetron (ZOFRAN) injection 4 mg, 4 mg, Intravenous, Q6H PRN, Kamran Cramp    oxyCODONE (ROXICODONE) immediate release tablet 10 mg, 10 mg, Oral, Q6H PRN, Samella Phyllis, CRNP    oxyCODONE (ROXICODONE) IR tablet 5 mg, 5 mg, Oral, Q6H PRN, Samella Phyllis, CRNP    Invasive Devices:      Lab Results:     Results from last 7 days  Lab Units 01/12/19  0509 01/11/19  0443 01/10/19  0533 01/09/19  0523 01/09/19  0058   WBC Thousand/uL  --   --  8 93 9 63 11 50*   HEMOGLOBIN g/dL  --   --  11 4* 11 8* 11 9*   HEMATOCRIT %  --   --  38 2 38 5 38 4   PLATELETS Thousands/uL  --   --  175 191 194   POTASSIUM mmol/L 3 9 4 2 4 4 4 6 4 4   CHLORIDE mmol/L 108 106 106 108 106   CO2 mmol/L 24 25 24 21 21   BUN mg/dL 22 24 23 22 23   CREATININE mg/dL 1 38* 1 59* 1 64* 1 71* 1 79*   CALCIUM mg/dL 8 0* 8 3 8 5 8 1* 8 4   MAGNESIUM mg/dL  --   --  2 4 2 3  --    PHOSPHORUS mg/dL  --   --  2 4  --   --    ALK PHOS U/L  --   --  60  --   --    ALT U/L  --   --  18  --   --    AST U/L  --   --  21  --   --        Previous work up:  Please see previous notes

## 2019-01-12 NOTE — PROGRESS NOTES
Progress Note - Colorectal Surgery   Vladimir Espinoza 80 y o  male MRN: [id number]  Unit/Bed#: [facility] 806-01 Encounter: [id number]    Assessment/Plan:  80 y o  male with splenic flexure colon Ca s/p laparoscopic hand assisted left hemicolectomy 1/8     - diet as tolerated  - OOB, ambulate with walker  - analgesia  - cleared by nephrology to go to rehab  - discharge planning    Subjective/Objective     Subjective: Patient reports feeling abdominal soreness, had difficulty getting to wheelchair yesterday but ambulated with walker  Tolerating diet and passing gas  Objective:     Vitals: Blood pressure 152/72, pulse 71, temperature 97 9 °F (36 6 °C), temperature source Oral, resp  rate 16, height 5' 10" (1 778 m), weight 91 5 kg (201 lb 11 5 oz), SpO2 97 %  ,Body mass index is 28 94 kg/m²  I/O       01/10 0701 - 01/11 0700 01/11 0701 - 01/12 0700    P  O  580 990    I V  (mL/kg) 106 7 (1 2)     IV Piggyback 105     Total Intake(mL/kg) 791 7 (8 7) 990 (10 8)    Urine (mL/kg/hr) 1000 (0 5) 1750 (0 8)    Total Output 1000 1750    Net -208 3 -760          Unmeasured Urine Occurrence 2 x 1 x    Unmeasured Stool Occurrence 2 x 1 x          Physical Exam:  GEN: NAD  HEENT: MMM  CV: RRR  Lung: Normal effort  Ab: Soft, NT/ND, incisions C/D/I  Extrem: No CCE  Neuro:  A+Ox3    Lab, Imaging and other studies: CBC with diff: No results found for: WBC, HGB, HCT, MCV, PLT, ADJUSTEDWBC, MCH, MCHC, RDW, MPV, NRBC, BMP/CMP: No results found for: SODIUM, K, CL, CO2, ANIONGAP, BUN, CREATININE, GLUCOSE, CALCIUM, AST, ALT, ALKPHOS, PROT, BILITOT, EGFR, Magnesium: No components found for: MAG  VTE Pharmacologic Prophylaxis: Heparin  VTE Mechanical Prophylaxis: sequential compression device

## 2019-01-12 NOTE — PLAN OF CARE
Nutrition/Hydration-ADULT     Nutrient/Hydration intake appropriate for improving, restoring or maintaining nutritional needs Completed          DISCHARGE PLANNING     Discharge to home or other facility with appropriate resources Progressing        INFECTION - ADULT     Absence or prevention of progression during hospitalization Progressing     Absence of fever/infection during neutropenic period Progressing        Knowledge Deficit     Patient/family/caregiver demonstrates understanding of disease process, treatment plan, medications, and discharge instructions Progressing        PAIN - ADULT     Verbalizes/displays adequate comfort level or baseline comfort level Progressing        Potential for Falls     Patient will remain free of falls Progressing        Prexisting or High Potential for Compromised Skin Integrity     Skin integrity is maintained or improved Progressing        SAFETY ADULT     Patient will remain free of falls Progressing     Maintain or return to baseline ADL function Progressing     Maintain or return mobility status to optimal level Progressing

## 2019-01-13 VITALS
SYSTOLIC BLOOD PRESSURE: 137 MMHG | HEART RATE: 70 BPM | RESPIRATION RATE: 18 BRPM | DIASTOLIC BLOOD PRESSURE: 75 MMHG | BODY MASS INDEX: 28.88 KG/M2 | HEIGHT: 70 IN | OXYGEN SATURATION: 96 % | WEIGHT: 201.72 LBS | TEMPERATURE: 98.5 F

## 2019-01-13 RX ADMIN — HEPARIN SODIUM 5000 UNITS: 5000 INJECTION INTRAVENOUS; SUBCUTANEOUS at 13:53

## 2019-01-13 RX ADMIN — HEPARIN SODIUM 5000 UNITS: 5000 INJECTION INTRAVENOUS; SUBCUTANEOUS at 06:05

## 2019-01-13 NOTE — PROGRESS NOTES
Progress Note - Colorectal Surgery   Nancyann Goodpasture 80 y o  male MRN: [id number]  Unit/Bed#: [facility] 806-01 Encounter: [id number]    Assessment:  80 y o  male with splenic flexure colon Ca s/p laparoscopic hand assisted left hemicolectomy 1/8  On regular diet and with bowel function    Plan:  Regular diet  PRN pain control  Planned for discharge to [facility] today at 4 PM  Resume plavix on discharge    Subjective/Objective   Chief Complaint:     Subjective: No complaints, tolerating PO  Ambulating w/ walker  Feels that LE swelling has decreased w/ elevation  Had a large BM overnight    Objective:     Blood pressure 137/75, pulse 70, temperature 98 5 °F (36 9 °C), temperature source Oral, resp  rate 18, height 5' 10" (1 778 m), weight 91 5 kg (201 lb 11 5 oz), SpO2 96 %  ,Body mass index is 28 94 kg/m²        Intake/Output Summary (Last 24 hours) at 01/13/19 0755  Last data filed at 01/13/19 0300   Gross per 24 hour   Intake              600 ml   Output             1075 ml   Net             -475 ml       Invasive Devices          No matching active lines, drains, or airways          Physical Exam:   Gen: A&O, NAD  Cardio: RRR  Lungs: CTAB  Abd: Soft, non distended, minimally tender      Lab, Imaging and other studies:CBC: No results found for: WBC, HGB, HCT, MCV, PLT, ADJUSTEDWBC, MCH, MCHC, RDW, MPV, NRBC, CMP: No results found for: SODIUM, K, CL, CO2, ANIONGAP, BUN, CREATININE, GLUCOSE, CALCIUM, AST, ALT, ALKPHOS, PROT, BILITOT, EGFR  VTE Pharmacologic Prophylaxis: Heparin  VTE Mechanical Prophylaxis: sequential compression device

## 2019-02-07 ENCOUNTER — REMOTE DEVICE CLINIC VISIT (OUTPATIENT)
Dept: CARDIOLOGY CLINIC | Facility: CLINIC | Age: 84
End: 2019-02-07
Payer: COMMERCIAL

## 2019-02-07 DIAGNOSIS — I50.22 CHRONIC SYSTOLIC CONGESTIVE HEART FAILURE (HCC): ICD-10-CM

## 2019-02-07 DIAGNOSIS — I48.21 PERMANENT ATRIAL FIBRILLATION (HCC): ICD-10-CM

## 2019-02-07 DIAGNOSIS — Z95.0 CARDIAC PACEMAKER: ICD-10-CM

## 2019-02-07 DIAGNOSIS — I47.2 NSVT (NONSUSTAINED VENTRICULAR TACHYCARDIA) (HCC): ICD-10-CM

## 2019-02-07 DIAGNOSIS — I42.0 DILATED CARDIOMYOPATHY (HCC): Primary | ICD-10-CM

## 2019-02-07 PROCEDURE — 93294 REM INTERROG EVL PM/LDLS PM: CPT | Performed by: INTERNAL MEDICINE

## 2019-02-07 PROCEDURE — 93296 REM INTERROG EVL PM/IDS: CPT | Performed by: INTERNAL MEDICINE

## 2019-02-07 NOTE — PROGRESS NOTES
Results for orders placed or performed in visit on 02/07/19   Cardiac EP device report    Narrative    SJGEE CRT-P (VVIR 70)  MERLIN TRANSMISSION: BATTERY VOLTAGE ADEQUATE (6 9-7 5 YRS)  BVP - 99%  ALL AVAILABLE LEAD PARAMETERS WITHIN NORMAL LIMITS  [facility] NOTED WAS PREV  ADDRESSED ON 1/24/19  NO OTHER SIGNIFICANT HIGH RATE EPISODES  CORVUE IMPEDANCE THRESHOLD CROSSED x7 DAYS  PT ON FUROSEMIDE  TASK TO HF RN  WILL NISA IN 31 DAYS  BI-V PACEMAKER FUNCTIONING APPROPRIATELY    EB

## 2019-02-10 NOTE — PROGRESS NOTES
Heart Failure Follow Up Office Visit Note -     Keaton Rosales   80 y o    male   MRN: [id number]  [address]  [address]  [phone]    PCP: Son Thomas MD  Cardiologist: Dr Grayson Motley        Impression/plan  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 ;Recent  hosp 1/8/19-1/13/19 for hemicolectomy  DCd to Son for [address]  He was discharged to home, about 3 weeks ago, with VNA/Pt/OT  VNA on board through the end of the month  Chronic combined (systolic, Rt)  heart failure  EF 45   12/18  --Dry weight:   201 lb  Wt today 199  I suspect he may have lost some caloric weight given #1  --Baseline creatinine: 1 7  --Last labs: None since discharge  --Educated regarding adherence to a 1 5g-- 2g sodium diet/ 1500 ml fluid restriction, daily weights and to call us if she gains 2-3 lbs in    2-3 days or 5 lbs in 1 day  --Beta-Blocker: bisporolol  --ACEi, ARB or ARNi:  None  --Aldosterone Receptor Blocker:None  --Diuretic:Lasix 20 mg/ d p r n  However, he has not been taking it and has not been taking it for some time  Afib  S/P AV hiram ablation with resynchronization therapy /SJM BiV pacemaker  On [address] [place] [address] with Eliquis 2 5 BID  Normal device fxn checked 2/7  CHB, S/P AVN ablation  DCM, EF 45%   TTE 12/18 Tachycardia induced  --There was mild diffuse hypokinesis with regional variations including basal to mid inferolateral and basal inferior hypokinesis with mild concentric hypertrophy  The RV was mildly dilated with mildly reduced function  + MAREK  CKD 3  Baseline Cr 1 7  Creatinine was better than his baseline, at 1 38   1/12/19  Hypertension  On bisoprolol BP  130/62, controlled today        Recommendations  Lasix 20 mg daily x3 days, with BMP in about 10 days  I suspect he had some volume overload related to colon surgery  He is essentially diuretic naive so should diurese fairly well  Daily weights    He is advised he could take the Lasix 20 mg p r n  For weight gain of 2-3 lb in 1 day or 5 lb in 2 days  VNA to monitor his lower extremity edema and call if no improvement  I gave him a brochure about how to monitor his food intake to adhere to a low-sodium diet  Advised to call me if he has any symptoms    Follow up will be scheduled with Dr Ronaldo Pimentel in 2 months, with a BMP prior to           HPI:   Mr Solange Mejia is a 79 yo gentleman with a hx of atrial fibrillation with rapid ventricular response and a tachycardia-induced cardiomyopathy, S/P AV node ablation with cardiac resynchronization therapy pacemaker  He also has CKD3 ( 1 7 baseline)  He has a hx of chronic systolic HF, maintained on furosemide 20 mg po/d PRN  In the past, he has had some LE edema related to Na dietary indiscretion  He follows with cardiologist Dr Ronaldo Pimentel  He recently has been anemic, and underwent a colonoscopy and EGD 11/30/18  This showed a splenic flexure tumor; pathology was consistent with a splenic flexure colon Ca  He was cleared for surgery by Dr Ronaldo Pimentel and his [address] was held for 48 hrs  He was  admitted 1/8 and underwent a laparoscopic left hemicolectomy by Dr Ne Trevino  Chemotherapy was not felt to be needed  Nephrology was consulted; his home lasix  ( 20 mg/d PRN) was restarted on POD 2  He recovered uneventfully and was DCd to [facility] 1/13/19; he returned home about 3 weeks ago, with services/VNA,PT/PT  Eliquis was resumed upon discharge  His device was checked remotely 2/7/19- with normal device fxn, BVP 99%  The corvue impedance threshold was crossed x 7 days, for which he is here today  He has company by his son who came up from [place] [address] to care for him after his surgery  He is planning on leaving in a few days  He and his wife have been doing the cooking; adhering to a low-sodium diet recently    He denies chest pain or shortness of breath  No PND or orthopnea    Reports he has been coughing a fair amount last 24 hr after having difficulty swallowing an iron pill  Reports his weight are stable  He has had significant lower extremity edema, but since his weight been stable he did report this  He has not been taking any Lasix in several months  After he took it for about 10 days last May he developed some itching and so he stopped it  He has chronic itching as noted by scarring on his arms          Assessment  Diagnoses and all orders for this visit:    Chronic systolic HF (heart failure) (HCC)    Biventricular cardiac pacemaker in situ    Hypertensive kidney disease with chronic kidney disease stage III ([facility] )    Stage 3 chronic kidney disease ([place] [address] )    Dilated cardiomyopathy (HCC)    Chronic atrial fibrillation (HCC)    Cancer of splenic flexure ([facility] )        Past Medical History:   Diagnosis Date    Anemia     Atrial fibrillation (HCC)     BPH (benign prostatic hypertrophy)     Chronic kidney disease     Hypertension     Irregular heart beat     afib       Review of Systems   Constitution: Negative for chills  Cardiovascular: Negative for chest pain, claudication, cyanosis, dyspnea on exertion, irregular heartbeat, leg swelling, near-syncope, orthopnea, palpitations, paroxysmal nocturnal dyspnea and syncope  Respiratory: Negative for cough and shortness of breath  Musculoskeletal:        He is walking with a walker   Gastrointestinal: Negative for heartburn and nausea  Neurological: Negative for dizziness, focal weakness, headaches, light-headedness and weakness  All other systems reviewed and are negative  Allergies   Allergen Reactions    Lasix [Furosemide]     Adhesive [Medical Tape] Rash    Neomycin-Bacitracin Zn-Polymyx Rash    Neosporin [Neomycin-Polymyxin-Gramicidin] Rash           Current Outpatient Medications:     apixaban (ELIQUIS) 2 5 mg, Take 1 tablet (2 5 mg total) by mouth 2 (two) times a day, Disp: 180 tablet, Rfl: 3    BISOPROLOL FUMARATE PO, Take 2 5 mg by mouth daily, Disp: , Rfl:     CHLORPHENIRAMINE MALEATE PO, Take by mouth every 4 (four) hours as needed, Disp: , Rfl:     finasteride (PROSCAR) 5 mg tablet, Take 5 mg by mouth daily, Disp: , Rfl:     furosemide (LASIX) 20 mg tablet, Take 1 tablet (20 mg total) by mouth daily (Patient taking differently: Take 20 mg by mouth as needed  ), Disp: 90 tablet, Rfl: 3    IRON PO, Take 1 tablet by mouth 2 (two) times a day  , Disp: , Rfl:     Multiple Vitamins-Minerals (CENTRUM SILVER 50+MEN PO), Take 1 tablet by mouth daily, Disp: , Rfl:     triamcinolone (KENALOG) 0 1 % cream, APPLY TO SKIN TWICE DAILY TO AFFECTED AREAS ON BODY THROUGHOUT, Disp: , Rfl: 0    Social History     Socioeconomic History    Marital status:       Spouse name: Not on file    Number of children: Not on file    Years of education: Not on file    Highest education level: Not on file   Occupational History    Not on file   Social Needs    Financial resource strain: Not on file    Food insecurity:     Worry: Not on file     Inability: Not on file    Transportation needs:     Medical: Not on file     Non-medical: Not on file   Tobacco Use    Smoking status: Never Smoker    Smokeless tobacco: Never Used   Substance and Sexual Activity    Alcohol use: No    Drug use: No    Sexual activity: Not on file   Lifestyle    Physical activity:     Days per week: Not on file     Minutes per session: Not on file    Stress: Not on file   Relationships    Social connections:     Talks on phone: Not on file     Gets together: Not on file     Attends Worship service: Not on file     Active member of club or organization: Not on file     Attends meetings of clubs or organizations: Not on file     Relationship status: Not on file    Intimate partner violence:     Fear of current or ex partner: Not on file     Emotionally abused: Not on file     Physically abused: Not on file     Forced sexual activity: Not on file   Other Topics Concern    Not on file   Social History Narrative    Not on file       Family History   Problem Relation Age of Onset    Heart disease Father     Heart disease Brother        Physical Exam   Constitutional: He is oriented to person, place, and time  No distress  HENT:   Head: Normocephalic and atraumatic  Eyes: Conjunctivae and EOM are normal    Neck: Normal range of motion  Neck supple  Cardiovascular: Normal rate, regular rhythm, S1 normal, S2 normal, normal heart sounds and intact distal pulses  Pulmonary/Chest: Effort normal  He has rhonchi  He has  a few coarse breath sounds at bilateral bases   Abdominal: Soft  Bowel sounds are normal    Musculoskeletal: Normal range of motion  He exhibits edema  Plus three bilateral pitting edema knees down   Neurological: He is alert and oriented to person, place, and time  Skin: Skin is warm and dry  Some excoriations noted on both of his arms   Psychiatric: He has a normal mood and affect  Nursing note and vitals reviewed  Vitals: There were no vitals taken for this visit     Wt Readings from Last 3 Encounters:   19 88 9 kg (196 lb)   19 91 5 kg (201 lb 11 5 oz)   18 91 6 kg (202 lb)         Labs & Results:  Lab Results   Component Value Date    WBC 8 93 01/10/2019    HGB 11 4 (L) 01/10/2019    HCT 38 2 01/10/2019     (H) 01/10/2019     01/10/2019     No results found for: BNP  No components found for: CHEM    Results for orders placed during the hospital encounter of 18   Echo complete with contrast if indicated    Narrative [address]  [phone]    Transthoracic Echocardiogram  2D, M-mode, Doppler, and Color Doppler    Study date:  21-Dec-2018    Patient: Misty Quiroz  MR number: [id number]  Account number: [de-identified]  : 1927  Age: 80 years  Gender: Male  Status: Outpatient  [facility]: [address] [place] [address] [facility]  Height: 69 in  Weight: 202 lb  BP: 102/ 64 mmHg    Indications: Atrial fibrillation    Diagnoses: I48 0 - Atrial fibrillation    Sonographer:  JOSEFINA Thompson  Primary Physician:  Aida Rodriguez MD  Referring Physician:  Giovanny Ross DO  Group:  [id number]  Cardiology Fellow:  Magy Carmona MD  Interpreting Physician:  Any Rivera DO    SUMMARY    LEFT VENTRICLE:  Systolic function was mildly reduced  Ejection fraction was estimated to be 45 %  There was mild diffuse hypokinesis with regional variations including basal to mid inferolateral and basal inferior hypokinesis  There was mild concentric hypertrophy  RIGHT VENTRICLE:  The ventricle was mildly dilated  Systolic function was reduced  LEFT ATRIUM:  The atrium was mildly to moderately dilated  RIGHT ATRIUM:  The atrium was moderately dilated  TRICUSPID VALVE:  There was moderate regurgitation  Estimated peak PA pressure was 42 mmHg  HISTORY: PRIOR HISTORY: Hypertension, atrial fibrillation, pacemaker, cardiomyopathy, chronic kidney disease    PROCEDURE: The study was performed in the [address] [facility]  This was a routine study  The transthoracic approach was used  The study included complete 2D imaging, M-mode, complete spectral Doppler, and color Doppler  Image  quality was adequate  LEFT VENTRICLE: Size was normal  Systolic function was mildly reduced  Ejection fraction was estimated to be 45 %  There was mild diffuse hypokinesis with regional variations including basal to mid inferolateral and basal inferior  hypokinesis Wall thickness was mildly increased  There was mild concentric hypertrophy  DOPPLER: Transmitral flow pattern: atrial fibrillation  RIGHT VENTRICLE: The ventricle was mildly dilated  Systolic function was reduced  Wall thickness was normal  A pacing wire was present  LEFT ATRIUM: The atrium was mildly to moderately dilated  RIGHT ATRIUM: The atrium was moderately dilated  A pacing wire was present      MITRAL VALVE: Valve structure was normal  There was normal leaflet separation  DOPPLER: The transmitral velocity was within the normal range  There was no evidence for stenosis  There was no significant regurgitation  AORTIC VALVE: The valve was trileaflet  Leaflets exhibited mildly increased thickness, mild calcification, and normal cuspal separation  DOPPLER: Transaortic velocity was within the normal range  There was no evidence for stenosis  There  was no significant regurgitation  TRICUSPID VALVE: The valve structure was normal  There was normal leaflet separation  DOPPLER: The transtricuspid velocity was within the normal range  There was no evidence for stenosis  There was moderate regurgitation  Estimated peak PA  pressure was 42 mmHg  PULMONIC VALVE: Leaflets exhibited normal thickness, no calcification, and normal cuspal separation  DOPPLER: The transpulmonic velocity was within the normal range  There was trace regurgitation  PERICARDIUM: There was no pericardial effusion  The pericardium was normal in appearance  AORTA: The root exhibited normal size  The ascending aorta was upper normal in size  SYSTEMIC VEINS: IVC: The inferior vena cava was normal in size   Respirophasic changes were normal     SYSTEM MEASUREMENT TABLES    2D  %FS: 17 03 %  Ao Diam: 3 79 cm  EDV(Teich): 129 52 ml  EF Biplane: 42 45 %  EF(Cube): 42 88 %  EF(Teich): 35 35 %  ESV(Cube): 80 33 ml  ESV(Teich): 83 74 ml  IVSd: 1 31 cm  LA Area: 24 22 cm2  LA Diam: 4 28 cm  LVEDV MOD A2C: 220 9 ml  LVEDV MOD A4C: 205 11 ml  LVEDV MOD BP: 215 2 ml  LVEF MOD A2C: 40 58 %  LVEF MOD A4C: 44 97 %  LVESV MOD A2C: 131 27 ml  LVESV MOD A4C: 112 87 ml  LVESV MOD BP: 123 84 ml  LVIDd: 5 2 cm  LVIDs: 4 31 cm  LVLd A2C: 9 46 cm  LVLd A4C: 9 25 cm  LVLs A2C: 8 73 cm  LVLs A4C: 8 41 cm  LVPWd: 1 31 cm  RA Area: 23 9 cm2  RV Diam : 5 16 cm  SV MOD A2C: 89 63 ml  SV MOD A4C: 92 23 ml  SV(Cube): 60 3 ml  SV(Teich): 45 78 ml    CW  TR Vmax: 3 08 m/s  TR maxPG: 37 97 mmHg    MM  TAPSE: 1 41 cm    [facility] Accredited Echocardiography Laboratory    Prepared and electronically signed by    Natacha Casey DO  Signed 21-Dec-2018 13:06:44

## 2019-02-11 ENCOUNTER — OFFICE VISIT (OUTPATIENT)
Dept: CARDIOLOGY CLINIC | Facility: CLINIC | Age: 84
End: 2019-02-11
Payer: COMMERCIAL

## 2019-02-11 VITALS
BODY MASS INDEX: 29.49 KG/M2 | WEIGHT: 199.1 LBS | SYSTOLIC BLOOD PRESSURE: 130 MMHG | HEIGHT: 69 IN | HEART RATE: 77 BPM | DIASTOLIC BLOOD PRESSURE: 62 MMHG

## 2019-02-11 DIAGNOSIS — I42.0 DILATED CARDIOMYOPATHY (HCC): ICD-10-CM

## 2019-02-11 DIAGNOSIS — I48.20 CHRONIC ATRIAL FIBRILLATION (HCC): ICD-10-CM

## 2019-02-11 DIAGNOSIS — I12.9 HYPERTENSIVE KIDNEY DISEASE WITH CHRONIC KIDNEY DISEASE STAGE III (HCC): ICD-10-CM

## 2019-02-11 DIAGNOSIS — Z95.0 BIVENTRICULAR CARDIAC PACEMAKER IN SITU: ICD-10-CM

## 2019-02-11 DIAGNOSIS — N18.30 STAGE 3 CHRONIC KIDNEY DISEASE (HCC): ICD-10-CM

## 2019-02-11 DIAGNOSIS — C18.5 CANCER OF SPLENIC FLEXURE (HCC): ICD-10-CM

## 2019-02-11 DIAGNOSIS — I50.22 CHRONIC SYSTOLIC HF (HEART FAILURE) (HCC): Primary | ICD-10-CM

## 2019-02-11 DIAGNOSIS — N18.30 HYPERTENSIVE KIDNEY DISEASE WITH CHRONIC KIDNEY DISEASE STAGE III (HCC): ICD-10-CM

## 2019-02-11 PROCEDURE — 99214 OFFICE O/P EST MOD 30 MIN: CPT | Performed by: NURSE PRACTITIONER

## 2019-02-11 PROCEDURE — 93000 ELECTROCARDIOGRAM COMPLETE: CPT | Performed by: NURSE PRACTITIONER

## 2019-02-11 NOTE — PATIENT INSTRUCTIONS
1  Lasix 20 mg daily x3 days  2  Daily weights  3  If her weight goes up 2-3 lb in 1 day or 5 lb in a week, he can take Lasix 20 mg daily x3 days and call me  4  Blood work around the 21st of February  5  Blood work in about 2 months, prior to seeing Dr Zoltan Colby  6  Visiting nurses can call me if your swelling persists  7   Adhere to low-sodium diet

## 2019-02-11 NOTE — LETTER
February 11, 2019     Van Pate MD  [facility]  [address] [address]  [address] [address]    Patient: Concepcion Zarco   YOB: 1927   Date of Visit: 2/11/2019       Dear Dr Kj Goodman: Thank you for referring Ekaterina Grubbs to me for evaluation  Below are my notes for this consultation  If you have questions, please do not hesitate to call me  I look forward to following your patient along with you  Sincerely,        IAIN Bailey        CC: DO Shyanne Capps CRNP Doyne Pastel, [address]  2/11/2019  3:35 PM  Sign at close encounter  Heart Failure Follow Up Office Visit Note -     Concepcion Zarco   80 y o    male   MRN: [id number]  [address]  [address] [place] [address]  [phone]  [phone]    PCP: Van Pate MD  Cardiologist: Dr Gonsalo Peña        Impression/plan  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 ;Recent  hosp 1/8/19-1/13/19 for hemicolectomy  DCd to Son for [address] [place]  He was discharged to home, about 3 weeks ago, with VNA/Pt/OT  VNA on board through the end of the month  Chronic combined (systolic, Rt)  heart failure  EF 45   12/18  --Dry weight:   201 lb  Wt today 199  I suspect he may have lost some caloric weight given #1  --Baseline creatinine: 1 7  --Last labs: None since discharge  --Educated regarding adherence to a 1 5g-- 2g sodium diet/ 1500 ml fluid restriction, daily weights and to call us if she gains 2-3 lbs in    2-3 days or 5 lbs in 1 day  --Beta-Blocker: bisporolol  --ACEi, ARB or ARNi:  None  --Aldosterone Receptor Blocker:None  --Diuretic:Lasix 20 mg/ d p r n  However, he has not been taking it and has not been taking it for some time  Afib  S/P AV hiram ablation with resynchronization therapy /SJM BiV pacemaker  On [address] [place] [address] with Eliquis 2 5 BID  Normal device fxn checked 2/7  CHB, S/P AVN ablation  DCM, EF 45%    TTE 12/18 Tachycardia induced  --There was mild diffuse hypokinesis with regional variations including basal to mid inferolateral and basal inferior hypokinesis with mild concentric hypertrophy  The RV was mildly dilated with mildly reduced function  + MAREK  CKD 3  Baseline Cr 1 7  Creatinine was better than his baseline, at 1 38   1/12/19  Hypertension  On bisoprolol BP  130/62, controlled today        Recommendations  Lasix 20 mg daily x3 days, with BMP in about 10 days  I suspect he had some volume overload related to colon surgery  He is essentially diuretic naive so should diurese fairly well  Daily weights  He is advised he could take the Lasix 20 mg p r n  For weight gain of 2-3 lb in 1 day or 5 lb in 2 days  VNA to monitor his lower extremity edema and call if no improvement  I gave him a brochure about how to monitor his food intake to adhere to a low-sodium diet  Advised to call me if he has any symptoms    Follow up will be scheduled with Dr Amira Guadalupe in 2 months, with a BMP prior to           HPI:   Mr Rasheeda Galicia is a 81 yo gentleman with a hx of atrial fibrillation with rapid ventricular response and a tachycardia-induced cardiomyopathy, S/P AV node ablation with cardiac resynchronization therapy pacemaker  He also has CKD3 ( 1 7 baseline)  He has a hx of chronic systolic HF, maintained on furosemide 20 mg po/d PRN  In the past, he has had some LE edema related to Na dietary indiscretion  He follows with cardiologist Dr Amira Guadalupe  He recently has been anemic, and underwent a colonoscopy and EGD 11/30/18  This showed a splenic flexure tumor; pathology was consistent with a splenic flexure colon Ca  He was cleared for surgery by Dr Amira Guadalupe and his [facility] AUTHORITY was held for 48 hrs  He was  admitted 1/8 and underwent a laparoscopic left hemicolectomy by Dr Makayla Zepeda  Chemotherapy was not felt to be needed  Nephrology was consulted; his home lasix  ( 20 mg/d PRN) was restarted on POD 2   He recovered uneventfully and was DCd to [facility] 1[facility]13[facility]; he returned home about 3 weeks ago, with services/VNA,PT/PT  Eliquis was resumed upon discharge  His device was checked remotely 2/7/19- with normal device fxn, BVP 99%  The corvue impedance threshold was crossed x 7 days, for which he is here today  He has company by his son who came up from [place] [id number] to care for him after his surgery  He is planning on leaving in a few days  He and his wife have been doing the cooking; adhering to a low-sodium diet recently    He denies chest pain or shortness of breath  No PND or orthopnea  Reports he has been coughing a fair amount last 24 hr after having difficulty swallowing an iron pill  Reports his weight are stable  He has had significant lower extremity edema, but since his weight been stable he did report this  He has not been taking any Lasix in several months  After he took it for about 10 days last May he developed some itching and so he stopped it  He has chronic itching as noted by scarring on his arms          Assessment  Diagnoses and all orders for this visit:    Chronic systolic HF (heart failure) (HCC)    Biventricular cardiac pacemaker in situ    Hypertensive kidney disease with chronic kidney disease stage III ([facility] )    Stage 3 chronic kidney disease ([facility] )    Dilated cardiomyopathy (HCC)    Chronic atrial fibrillation (HCC)    Cancer of spl[facility] flexure ([facility] )        Past Medical History:   Diagnosis Date    Anemia     Atrial fibrillation (HCC)     BPH (benign prostatic hypertrophy)     Chronic kidney disease     Hypertension     Irregular heart beat     afib       Review of Systems   Constitution: Negative for chills  Cardiovascular: Negative for chest pain, claudication, cyanosis, dyspnea on exertion, irregular heartbeat, leg swelling, near-syncope, orthopnea, palpitations, paroxysmal nocturnal dyspnea and syncope  Respiratory: Negative for cough and shortness of breath      Musculoskeletal:        He is walking with a walker   Gastrointestinal: Negative for heartburn and nausea  Neurological: Negative for dizziness, focal weakness, headaches, light-headedness and weakness  All other systems reviewed and are negative  Allergies   Allergen Reactions    Lasix [Furosemide]     Adhesive [Medical Tape] Rash    Neomycin-Bacitracin Zn-Polymyx Rash    Neosporin [Neomycin-Polymyxin-Gramicidin] Rash       Current Outpatient Medications:     apixaban (ELIQUIS) 2 5 mg, Take 1 tablet (2 5 mg total) by mouth 2 (two) times a day, Disp: 180 tablet, Rfl: 3    BISOPROLOL FUMARATE PO, Take 2 5 mg by mouth daily, Disp: , Rfl:     CHLORPHENIRAMINE MALEATE PO, Take by mouth every 4 (four) hours as needed, Disp: , Rfl:     finasteride (PROSCAR) 5 mg tablet, Take 5 mg by mouth daily, Disp: , Rfl:     furosemide (LASIX) 20 mg tablet, Take 1 tablet (20 mg total) by mouth daily (Patient taking differently: Take 20 mg by mouth as needed  ), Disp: 90 tablet, Rfl: 3    IRON PO, Take 1 tablet by mouth 2 (two) times a day  , Disp: , Rfl:     Multiple Vitamins-Minerals (CENTRUM SILVER 50+MEN PO), Take 1 tablet by mouth daily, Disp: , Rfl:     triamcinolone (KENALOG) 0 1 % cream, APPLY TO SKIN TWICE DAILY TO AFFECTED AREAS ON BODY THROUGHOUT, Disp: , Rfl: 0    Social History     Socioeconomic History    Marital status:       Spouse name: Not on file    Number of children: Not on file    Years of education: Not on file    Highest education level: Not on file   Occupational History    Not on file   Social Needs    Financial resource strain: Not on file    Food insecurity:     Worry: Not on file     Inability: Not on file    Transportation needs:     Medical: Not on file     Non-medical: Not on file   Tobacco Use    Smoking status: Never Smoker    Smokeless tobacco: Never Used   Substance and Sexual Activity    Alcohol use: No    Drug use: No    Sexual activity: Not on file   Lifestyle    Physical activity:     Days per week: Not on file     Minutes per session: Not on file  Stress: Not on file   Relationships    Social connections:     Talks on phone: Not on file     Gets together: Not on file     Attends Taoism service: Not on file     Active member of club or organization: Not on file     Attends meetings of clubs or organizations: Not on file     Relationship status: Not on file    Intimate partner violence:     Fear of current or ex partner: Not on file     Emotionally abused: Not on file     Physically abused: Not on file     Forced sexual activity: Not on file   Other Topics Concern    Not on file   Social History Narrative    Not on file       Family History   Problem Relation Age of Onset    Heart disease Father     Heart disease Brother        Physical Exam   Constitutional: He is oriented to person, place, and time  No distress  HENT:   Head: Normocephalic and atraumatic  Eyes: Conjunctivae and EOM are normal    Neck: Normal range of motion  Neck supple  Cardiovascular: Normal rate, regular rhythm, S1 normal, S2 normal, normal heart sounds and intact distal pulses  Pulmonary/Chest: Effort normal  He has rhonchi  He has  a few coarse breath sounds at bilateral bases   Abdominal: Soft  Bowel sounds are normal    Musculoskeletal: Normal range of motion  He exhibits edema  Plus three bilateral pitting edema knees down   Neurological: He is alert and oriented to person, place, and time  Skin: Skin is warm and dry  Some excoriations noted on both of his arms   Psychiatric: He has a normal mood and affect  Nursing note and vitals reviewed  Vitals: There were no vitals taken for this visit     Wt Readings from Last 3 Encounters:   01/24/19 88 9 kg (196 lb)   01/09/19 91 5 kg (201 lb 11 5 oz)   12/13/18 91 6 kg (202 lb)         Labs & Results:  Lab Results   Component Value Date    WBC 8 93 01/10/2019    HGB 11 4 (L) 01/10/2019    HCT 38 2 01/10/2019     (H) 01/10/2019     01/10/2019     No results found for: BNP  No components found for: CHEM    Results for orders placed during the hospital encounter of 18   Echo complete with contrast if indicated    Narrative [address]  [facility], [facility]  [phone]    Transthoracic Echocardiogram  2D, M-mode, Doppler, and Color Doppler    Study date:  21-Dec-2018    Patient: Carol Zafar  MR number: [id number]  Account number: [de-identified]  : 1927  Age: 80 years  Gender: Male  Status: Outpatient  Location: [address] [place] [address] [facility]  Height: 69 in  Weight: 202 lb  BP: 102/ 64 mmHg    Indications: Atrial fibrillation    Diagnoses: I48 0 - Atrial fibrillation    Sonographer:  JOSEFINA Gaines  Primary Physician:  Neville Carvalho MD  Referring Physician:  Noreen Leiva DO  Group:  [id number]  Cardiology Fellow:  Melecio Lares MD  Interpreting Physician:  Nba Barrera DO    SUMMARY    LEFT VENTRICLE:  Systolic function was mildly reduced  Ejection fraction was estimated to be 45 %  There was mild diffuse hypokinesis with regional variations including basal to mid inferolateral and basal inferior hypokinesis  There was mild concentric hypertrophy  RIGHT VENTRICLE:  The ventricle was mildly dilated  Systolic function was reduced  LEFT ATRIUM:  The atrium was mildly to moderately dilated  RIGHT ATRIUM:  The atrium was moderately dilated  TRICUSPID VALVE:  There was moderate regurgitation  Estimated peak PA pressure was 42 mmHg  HISTORY: PRIOR HISTORY: Hypertension, atrial fibrillation, pacemaker, cardiomyopathy, chronic kidney disease    PROCEDURE: The study was performed in the [address]  This was a routine study  The transthoracic approach was used  The study included complete 2D imaging, M-mode, complete spectral Doppler, and color Doppler  Image  quality was adequate  LEFT VENTRICLE: Size was normal  Systolic function was mildly reduced   Ejection fraction was estimated to be 45 %  There was mild diffuse hypokinesis with regional variations including basal to mid inferolateral and basal inferior  hypokinesis Wall thickness was mildly increased  There was mild concentric hypertrophy  DOPPLER: Transmitral flow pattern: atrial fibrillation  RIGHT VENTRICLE: The ventricle was mildly dilated  Systolic function was reduced  Wall thickness was normal  A pacing wire was present  LEFT ATRIUM: The atrium was mildly to moderately dilated  RIGHT ATRIUM: The atrium was moderately dilated  A pacing wire was present  MITRAL VALVE: Valve structure was normal  There was normal leaflet separation  DOPPLER: The transmitral velocity was within the normal range  There was no evidence for stenosis  There was no significant regurgitation  AORTIC VALVE: The valve was trileaflet  Leaflets exhibited mildly increased thickness, mild calcification, and normal cuspal separation  DOPPLER: Transaortic velocity was within the normal range  There was no evidence for stenosis  There  was no significant regurgitation  TRICUSPID VALVE: The valve structure was normal  There was normal leaflet separation  DOPPLER: The transtricuspid velocity was within the normal range  There was no evidence for stenosis  There was moderate regurgitation  Estimated peak PA  pressure was 42 mmHg  PULMONIC VALVE: Leaflets exhibited normal thickness, no calcification, and normal cuspal separation  DOPPLER: The transpulmonic velocity was within the normal range  There was trace regurgitation  PERICARDIUM: There was no pericardial effusion  The pericardium was normal in appearance  AORTA: The root exhibited normal size  The ascending aorta was upper normal in size  SYSTEMIC VEINS: IVC: The inferior vena cava was normal in size   Respirophasic changes were normal     SYSTEM MEASUREMENT TABLES    2D  %FS: 17 03 %  Ao Diam: 3 79 cm  EDV(Teich): 129 52 ml  EF Biplane: 42 45 %  EF(Cube): 42 88 %  EF(Teich): 35 35 %  ESV(Cube): 80 33 ml  ESV(Teich): 83 74 ml  IVSd: 1 31 cm  LA Area: 24 22 cm2  LA Diam: 4 28 cm  LVEDV MOD A2C: 220 9 ml  LVEDV MOD A4C: 205 11 ml  LVEDV MOD BP: 215 2 ml  LVEF MOD A2C: 40 58 %  LVEF MOD A4C: 44 97 %  LVESV MOD A2C: 131 27 ml  LVESV MOD A4C: 112 87 ml  LVESV MOD BP: 123 84 ml  LVIDd: 5 2 cm  LVIDs: 4 31 cm  LVLd A2C: 9 46 cm  LVLd A4C: 9 25 cm  LVLs A2C: 8 73 cm  LVLs A4C: 8 41 cm  LVPWd: 1 31 cm  RA Area: 23 9 cm2  RV Diam : 5 16 cm  SV MOD A2C: 89 63 ml  SV MOD A4C: 92 23 ml  SV(Cube): 60 3 ml  SV(Teich): 45 78 ml    CW  TR Vmax: 3 08 m/s  TR maxP 97 mmHg    MM  TAPSE: 1 41 cm    [facility] Accredited Echocardiography [facility]    Prepared and electronically signed by    Juan Avila DO  Signed 21-Dec-2018 13:06:44

## 2019-02-27 ENCOUNTER — APPOINTMENT (OUTPATIENT)
Dept: RADIOLOGY | Age: 84
End: 2019-02-27
Payer: COMMERCIAL

## 2019-02-27 ENCOUNTER — TRANSCRIBE ORDERS (OUTPATIENT)
Dept: ADMINISTRATIVE | Age: 84
End: 2019-02-27

## 2019-02-27 DIAGNOSIS — N18.9 CHRONIC KIDNEY DISEASE, UNSPECIFIED CKD STAGE: ICD-10-CM

## 2019-02-27 DIAGNOSIS — D64.9 ANEMIA, UNSPECIFIED TYPE: ICD-10-CM

## 2019-02-27 DIAGNOSIS — R05.9 COUGH: ICD-10-CM

## 2019-02-27 DIAGNOSIS — R05.9 COUGH: Primary | ICD-10-CM

## 2019-02-27 DIAGNOSIS — C18.6 MALIGNANT NEOPLASM OF DESCENDING COLON (HCC): ICD-10-CM

## 2019-02-27 PROCEDURE — 71046 X-RAY EXAM CHEST 2 VIEWS: CPT

## 2019-03-04 ENCOUNTER — APPOINTMENT (OUTPATIENT)
Dept: LAB | Facility: CLINIC | Age: 84
End: 2019-03-04
Payer: COMMERCIAL

## 2019-03-04 DIAGNOSIS — N18.30 HYPERTENSIVE KIDNEY DISEASE WITH CHRONIC KIDNEY DISEASE STAGE III (HCC): Primary | ICD-10-CM

## 2019-03-04 DIAGNOSIS — N18.30 STAGE 3 CHRONIC KIDNEY DISEASE (HCC): ICD-10-CM

## 2019-03-04 DIAGNOSIS — I12.9 HYPERTENSIVE KIDNEY DISEASE WITH CHRONIC KIDNEY DISEASE STAGE III (HCC): Primary | ICD-10-CM

## 2019-03-04 DIAGNOSIS — I12.9 HYPERTENSIVE KIDNEY DISEASE WITH CHRONIC KIDNEY DISEASE STAGE III (HCC): ICD-10-CM

## 2019-03-04 DIAGNOSIS — C18.6 MALIGNANT NEOPLASM OF DESCENDING COLON (HCC): ICD-10-CM

## 2019-03-04 DIAGNOSIS — D64.9 ANEMIA, UNSPECIFIED TYPE: ICD-10-CM

## 2019-03-04 DIAGNOSIS — N18.9 CHRONIC KIDNEY DISEASE, UNSPECIFIED CKD STAGE: ICD-10-CM

## 2019-03-04 DIAGNOSIS — I50.22 CHRONIC SYSTOLIC HF (HEART FAILURE) (HCC): ICD-10-CM

## 2019-03-04 DIAGNOSIS — N18.30 HYPERTENSIVE KIDNEY DISEASE WITH CHRONIC KIDNEY DISEASE STAGE III (HCC): ICD-10-CM

## 2019-03-04 LAB
ANION GAP SERPL CALCULATED.3IONS-SCNC: 3 MMOL/L (ref 4–13)
BUN SERPL-MCNC: 32 MG/DL (ref 5–25)
CALCIUM SERPL-MCNC: 9.2 MG/DL (ref 8.3–10.1)
CHLORIDE SERPL-SCNC: 100 MMOL/L (ref 100–108)
CO2 SERPL-SCNC: 30 MMOL/L (ref 21–32)
CREAT SERPL-MCNC: 1.63 MG/DL (ref 0.6–1.3)
ERYTHROCYTE [DISTWIDTH] IN BLOOD BY AUTOMATED COUNT: 12.9 % (ref 11.6–15.1)
GFR SERPL CREATININE-BSD FRML MDRD: 36 ML/MIN/1.73SQ M
GLUCOSE SERPL-MCNC: 84 MG/DL (ref 65–140)
HCT VFR BLD AUTO: 47.8 % (ref 36.5–49.3)
HGB BLD-MCNC: 14.8 G/DL (ref 12–17)
MCH RBC QN AUTO: 30.8 PG (ref 26.8–34.3)
MCHC RBC AUTO-ENTMCNC: 31 G/DL (ref 31.4–37.4)
MCV RBC AUTO: 99 FL (ref 82–98)
PLATELET # BLD AUTO: 301 THOUSANDS/UL (ref 149–390)
PMV BLD AUTO: 9.6 FL (ref 8.9–12.7)
POTASSIUM SERPL-SCNC: 3.9 MMOL/L (ref 3.5–5.3)
RBC # BLD AUTO: 4.81 MILLION/UL (ref 3.88–5.62)
SODIUM SERPL-SCNC: 133 MMOL/L (ref 136–145)
WBC # BLD AUTO: 10.56 THOUSAND/UL (ref 4.31–10.16)

## 2019-03-04 PROCEDURE — 85027 COMPLETE CBC AUTOMATED: CPT

## 2019-03-04 PROCEDURE — 36415 COLL VENOUS BLD VENIPUNCTURE: CPT

## 2019-03-04 PROCEDURE — 80048 BASIC METABOLIC PNL TOTAL CA: CPT | Performed by: INTERNAL MEDICINE

## 2019-03-12 ENCOUNTER — REMOTE DEVICE CLINIC VISIT (OUTPATIENT)
Dept: CARDIOLOGY CLINIC | Facility: CLINIC | Age: 84
End: 2019-03-12
Payer: COMMERCIAL

## 2019-03-12 DIAGNOSIS — I48.21 PERMANENT ATRIAL FIBRILLATION (HCC): Primary | ICD-10-CM

## 2019-03-12 DIAGNOSIS — I42.0 DILATED CARDIOMYOPATHY (HCC): ICD-10-CM

## 2019-03-12 DIAGNOSIS — Z95.0 PRESENCE OF PERMANENT CARDIAC PACEMAKER: ICD-10-CM

## 2019-03-12 PROCEDURE — 93299 PR REM INTERROG ICPMS/SCRMS <30 D TECH REVIEW: CPT | Performed by: INTERNAL MEDICINE

## 2019-03-12 PROCEDURE — 93297 REM INTERROG DEV EVAL ICPMS: CPT | Performed by: INTERNAL MEDICINE

## 2019-03-12 NOTE — PROGRESS NOTES
KOMAL CRT-P ([facility])  MERLIN TRANSMISSION:  BATTERY VOLTAGE ADEQUATE (2 98V, 7 1 - 7 8 YR)    BP 99%   ALL AVAILABLE LEAD PARAMETERS WITHIN NORMAL LIMITS   NO HIGH RATE EPISODES SINCE HVR REPORTED ON 3/4/19   CORVUE IMPEDANCE MONITORING WITHIN NORMAL LIMITS   NORMAL DEVICE FUNCTION  Rosio Urbina

## 2019-03-21 NOTE — PROGRESS NOTES
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; Recent  hosp 1/8/19-1/13/19 for hemicolectomy  Chronic combined heart failure(systolic, Rt)   EF 45%     --Dry weight:   201 lb  Weight today: 190 lb 6 4 oz  Home weights have been about 187  --Baseline creatinine: 1 4--1 7  --Last labs 3/22/19   Cr 1 47, K 4 3  --Educated regarding adherence to a 1 5g-- 2g sodium diet/ 2000 ml fluid restriction, daily weights and to call us if he gains 2-3 lbs in 2 days or 5 lbs in 5 d  --Beta-Blocker: bisporolol  --Diuretic:  He had been on Lasix daily, lost some weight and for the last 2 days has not taken any as he has been a little lightheaded  we mutually agreed for him to take Lasix 20 mg every other day and p r n  For weight gain  Afib  S/P AV hiram ablation with resynchronization therapy /[facility] BiV pacemaker  On [address] [place] [address] Road with Eliquis 2 5 BID  Normal device fxn --checked 2/7/19  complete heart block; S/P AVN ablation  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%, tachycardia induced  Hypertension  On bisoprolol BP  128/66, controlled today  CKD 3  Baseline Cr 1 4-1 7   cardiac testing  -- TTE 12/ 18:   EF 45%  Mild diffuse hypokinesis regional variations including basal to mid inferolateral and basal inferior hypokinesis with mild concentric hypertrophy  The RV was mildly dilated with mildly reduced function  + MAREK         Summary of Recommendations   continue low-sodium diet, fluid restriction,daily weights   Lasix 20 mg every other day and p r n   For weight gain 2-3 lb in 1 day or 5 lb in 5 days  Follow up will be scheduled with Dr Deandre Godoy in 6 months; sooner if needed              HPI:   Mr Nancy Ahumada is a 81 yo gentleman with a hx of atrial fibrillation with rapid ventricular response and a tachycardia-induced cardiomyopathy, S/P AV node ablation with cardiac resynchronization therapy pacemaker  He also has CKD3 ( 1 7 baseline)  He has a hx of chronic systolic HF, maintained on furosemide 20 mg po/d PRN  In the past, he has had some LE edema related to Na dietary indiscretion  He follows with cardiologist Dr Sandrine Hughes  He recently has been anemic, and underwent a colonoscopy and EGD 11/30/18  This showed a splenic flexure tumor; pathology was consistent with a splenic flexure colon Ca  He was cleared for surgery by Dr Sandrine Hughes and his [facility] AUTHORITY was held for 48 hrs  He was  admitted 1/8 and underwent a laparoscopic left hemicolectomy by Dr Brandi Chavez  Chemotherapy was not felt to be needed  Nephrology was consulted; his home lasix  ( 20 mg/d PRN) was restarted on POD 2  He recovered uneventfully and was DCd to [address] [place] 1/13/19; he returned home about 3 weeks ago, with services/VNA,PT/PT  Eliquis was resumed upon discharge  Interval history  His device was checked remotely 2/7/19- with normal device fxn, BVP 99%  The corvue impedance threshold was crossed x 7 days, for which he is here today, 2/11 2/11/19  He is accompanied by his son who came up from [facility] to care for him after his surgery  He is planning on leaving in a few days  He and his wife have been doing the cooking; with the pt adhering to a low-sodium diet, recently    He denies chest pain or shortness of breath  No PND or orthopnea  Reports he has been coughing a fair amount last 24 hr after having difficulty swallowing an iron pill  Reports his weight are stable  He has had significant lower extremity edema, but since his weight been stable he did report this  He has not been taking any Lasix in several months  After he took it for about 10 days last May ; he developed some itching and so he stopped it    He has chronic itching as noted by scarring on his arms     I suspect he was volume overloaded and asked him to take Lasix 20/d for few days in a row, monitor his weights get some follow-up labs and follow-up in short interval       3/25/19 he is here for cardiology follow-up  His EKG shows a ventricular paced rhythm at 72 beats per minute  He is doing well  He is adhering to the low-sodium diet  He has significant bout of URI/bronchitis requiring treatment with antibiotic by his PCP  He had a chest x-ray done February 27; it showed no evidence of pneumonia  He seeing his PCP tomorrow who he tells me will manage his blood work  He has lost some weight taking his Lasix every day, to the point where for the last few days he has been feeling a little bit off balance requiring him to use his walker  We mutually agreed to take Lasix 20 mg every other day routinely and for him to monitor his weights  If his weights go up he can take Lasix 20 mg daily x3 days  We talked about his blood work  His creatinine is 1 47/in a euvolemic state          Assessment  Diagnoses and all orders for this visit:    Chronic systolic heart failure (HCC)    Chronic atrial fibrillation (HCC)  -     POCT ECG    Dilated cardiomyopathy (HCC)  -     furosemide (LASIX) 20 mg tablet; Take 1 tablet every other day and 1 tablet p r n  For weight gain 2-3 lb in 1 day or 5 lb in    Stage 3 chronic kidney disease ([facility] [address] )    Hypertensive kidney disease with chronic kidney disease stage III (HCC)    Biventricular cardiac pacemaker in situ    Anticoagulation adequate    Cancer of splenic flexure (HCC)    Chronic right-sided heart failure (HCC)    Atrial fibrillation, unspecified type (HCC)  -     furosemide (LASIX) 20 mg tablet; Take 1 tablet every other day and 1 tablet p r n   For weight gain 2-3 lb in 1 day or 5 lb in    Other orders  -     benzonatate (TESSALON PERLES) 100 mg capsule; TAKE AS DIRECTED UPTO 6 CAPSULES PER DAY AS NEEDED FOR COUGH        Past Medical History:   Diagnosis Date    Anemia     Atrial fibrillation ([facility] [address] )  BPH (benign prostatic hypertrophy)     Chronic kidney disease     Hypertension     Irregular heart beat     afib       Review of Systems   Constitution: Negative for chills  Cardiovascular: Negative for chest pain, claudication, cyanosis, dyspnea on exertion, irregular heartbeat, leg swelling, near-syncope, orthopnea, palpitations, paroxysmal nocturnal dyspnea and syncope  Respiratory: Negative for cough and shortness of breath  Musculoskeletal:        He is walking with a walker   Gastrointestinal: Negative for heartburn and nausea  Neurological: Negative for dizziness, focal weakness, headaches, light-headedness and weakness  All other systems reviewed and are negative  Allergies   Allergen Reactions    Lasix [Furosemide] Other (See Comments)     Weakness, decreased BP    Adhesive [Medical Tape] Rash    Neomycin-Bacitracin Zn-Polymyx Rash    Neosporin [Neomycin-Polymyxin-Gramicidin] Rash       Current Outpatient Medications:     apixaban (ELIQUIS) 2 5 mg, Take 1 tablet (2 5 mg total) by mouth 2 (two) times a day, Disp: 180 tablet, Rfl: 3    benzonatate (TESSALON PERLES) 100 mg capsule, TAKE AS DIRECTED UPTO 6 CAPSULES PER DAY AS NEEDED FOR COUGH, Disp: , Rfl: 0    BISOPROLOL FUMARATE PO, Take 2 5 mg by mouth daily, Disp: , Rfl:     CHLORPHENIRAMINE MALEATE PO, Take by mouth every 4 (four) hours as needed, Disp: , Rfl:     finasteride (PROSCAR) 5 mg tablet, Take 5 mg by mouth daily, Disp: , Rfl:     furosemide (LASIX) 20 mg tablet, Take 1 tablet every other day and 1 tablet p r n   For weight gain 2-3 lb in 1 day or 5 lb in, Disp: 90 tablet, Rfl: 3    IRON PO, Take 1 tablet by mouth 2 (two) times a day  , Disp: , Rfl:     Multiple Vitamins-Minerals (CENTRUM SILVER 50+MEN PO), Take 1 tablet by mouth daily, Disp: , Rfl:     triamcinolone (KENALOG) 0 1 % cream, APPLY TO SKIN TWICE DAILY TO AFFECTED AREAS ON BODY THROUGHOUT, Disp: , Rfl: 0    Social History     Socioeconomic History    Marital status:      Spouse name: Not on file    Number of children: Not on file    Years of education: Not on file    Highest education level: Not on file   Occupational History    Not on file   Social Needs    Financial resource strain: Not on file    Food insecurity:     Worry: Not on file     Inability: Not on file    Transportation needs:     Medical: Not on file     Non-medical: Not on file   Tobacco Use    Smoking status: Never Smoker    Smokeless tobacco: Never Used   Substance and Sexual Activity    Alcohol use: No    Drug use: No    Sexual activity: Not on file   Lifestyle    Physical activity:     Days per week: Not on file     Minutes per session: Not on file    Stress: Not on file   Relationships    Social connections:     Talks on phone: Not on file     Gets together: Not on file     Attends [employer] service: Not on file     Active member of club or organization: Not on file     Attends meetings of clubs or organizations: Not on file     Relationship status: Not on file    Intimate partner violence:     Fear of current or ex partner: Not on file     Emotionally abused: Not on file     Physically abused: Not on file     Forced sexual activity: Not on file   Other Topics Concern    Not on file   Social History Narrative    Not on file       Family History   Problem Relation Age of Onset    Heart disease Father     Heart attack Father     Hypertension Father     Heart disease Brother     Heart attack Brother 64    Hypertension Brother     Heart attack Brother 67    Hypertension Brother     Arrhythmia Neg Hx     Asthma Neg Hx     Clotting disorder Neg Hx     Heart failure Neg Hx        Physical Exam   Constitutional: He is oriented to person, place, and time  No distress  HENT:   Head: Normocephalic and atraumatic  Eyes: Conjunctivae and EOM are normal    Neck: Normal range of motion  Neck supple     Cardiovascular: Normal rate, regular rhythm, S1 normal, S2 normal, normal heart sounds and intact distal pulses  Pulmonary/Chest: Effort normal  He has no rhonchi  Abdominal: Soft  Bowel sounds are normal    Musculoskeletal: Normal range of motion  He exhibits edema  Neurological: He is alert and oriented to person, place, and time  Skin: Skin is warm and dry  Some excoriations noted on both of his arms   Psychiatric: He has a normal mood and affect  Nursing note and vitals reviewed  Vitals: Blood pressure 128/64, pulse 76, height 5' 9" (1 753 m), weight 86 4 kg (190 lb 6 4 oz), SpO2 99 %  Wt Readings from Last 3 Encounters:   19 86 4 kg (190 lb 6 4 oz)   19 90 3 kg (199 lb 1 6 oz)   19 88 9 kg (196 lb)         Labs & Results:  Lab Results   Component Value Date    WBC 10 56 (H) 2019    HGB 14 8 2019    HCT 47 8 2019    MCV 99 (H) 2019     2019     No results found for: BNP  No components found for: CHEM    Results for orders placed during the hospital encounter of 18   Echo complete with contrast if indicated    Narrative [facility]  [address] [place] [address], [address] [place] [address]  [phone]    Transthoracic Echocardiogram  2D, M-mode, Doppler, and Color Doppler    Study date:  21-Dec-2018    Patient: Axel Coello  MR number: [id number]  Account number: [de-identified]  : 1927  Age: 80 years  Gender: Male  Status: Outpatient  Location: [address] [place] [address] and [facility] [place]  Height: 69 in  Weight: 202 lb  BP: 102/ 64 mmHg    Indications: Atrial fibrillation    Diagnoses: I48 0 - Atrial fibrillation    Sonographer:  Sunny Boxer, RCS  Primary Physician:  Eleno De Leon MD  Referring Physician:  Duncan Salas DO  Group:  [facility]  Cardiology Fellow:  Julia Acosta MD  Interpreting Physician:  Sam Matamoros DO    SUMMARY    LEFT VENTRICLE:  Systolic function was mildly reduced   Ejection fraction was estimated to be 45 %   There was mild diffuse hypokinesis with regional variations including basal to mid inferolateral and basal inferior hypokinesis  There was mild concentric hypertrophy  RIGHT VENTRICLE:  The ventricle was mildly dilated  Systolic function was reduced  LEFT ATRIUM:  The atrium was mildly to moderately dilated  RIGHT ATRIUM:  The atrium was moderately dilated  TRICUSPID VALVE:  There was moderate regurgitation  Estimated peak PA pressure was 42 mmHg  HISTORY: PRIOR HISTORY: Hypertension, atrial fibrillation, pacemaker, cardiomyopathy, chronic kidney disease    PROCEDURE: The study was performed in the [address]  This was a routine study  The transthoracic approach was used  The study included complete 2D imaging, M-mode, complete spectral Doppler, and color Doppler  Image  quality was adequate  LEFT VENTRICLE: Size was normal  Systolic function was mildly reduced  Ejection fraction was estimated to be 45 %  There was mild diffuse hypokinesis with regional variations including basal to mid inferolateral and basal inferior  hypokinesis Wall thickness was mildly increased  There was mild concentric hypertrophy  DOPPLER: Transmitral flow pattern: atrial fibrillation  RIGHT VENTRICLE: The ventricle was mildly dilated  Systolic function was reduced  Wall thickness was normal  A pacing wire was present  LEFT ATRIUM: The atrium was mildly to moderately dilated  RIGHT ATRIUM: The atrium was moderately dilated  A pacing wire was present  MITRAL VALVE: Valve structure was normal  There was normal leaflet separation  DOPPLER: The transmitral velocity was within the normal range  There was no evidence for stenosis  There was no significant regurgitation  AORTIC VALVE: The valve was trileaflet  Leaflets exhibited mildly increased thickness, mild calcification, and normal cuspal separation  DOPPLER: Transaortic velocity was within the normal range   There was no evidence for stenosis  There  was no significant regurgitation  TRICUSPID VALVE: The valve structure was normal  There was normal leaflet separation  DOPPLER: The transtricuspid velocity was within the normal range  There was no evidence for stenosis  There was moderate regurgitation  Estimated peak PA  pressure was 42 mmHg  PULMONIC VALVE: Leaflets exhibited normal thickness, no calcification, and normal cuspal separation  DOPPLER: The transpulmonic velocity was within the normal range  There was trace regurgitation  PERICARDIUM: There was no pericardial effusion  The pericardium was normal in appearance  AORTA: The root exhibited normal size  The ascending aorta was upper normal in size  SYSTEMIC VEINS: IVC: The inferior vena cava was normal in size   Respirophasic changes were normal     SYSTEM MEASUREMENT TABLES    2D  %FS: 17 03 %  Ao Diam: 3 79 cm  EDV(Teich): 129 52 ml  EF Biplane: 42 45 %  EF(Cube): 42 88 %  EF(Teich): 35 35 %  ESV(Cube): 80 33 ml  ESV(Teich): 83 74 ml  IVSd: 1 31 cm  LA Area: 24 22 cm2  LA Diam: 4 28 cm  LVEDV MOD A2C: 220 9 ml  LVEDV MOD A4C: 205 11 ml  LVEDV MOD BP: 215 2 ml  LVEF MOD A2C: 40 58 %  LVEF MOD A4C: 44 97 %  LVESV MOD A2C: 131 27 ml  LVESV MOD A4C: 112 87 ml  LVESV MOD BP: 123 84 ml  LVIDd: 5 2 cm  LVIDs: 4 31 cm  LVLd A2C: 9 46 cm  LVLd A4C: 9 25 cm  LVLs A2C: 8 73 cm  LVLs A4C: 8 41 cm  LVPWd: 1 31 cm  RA Area: 23 9 cm2  RV Diam : 5 16 cm  SV MOD A2C: 89 63 ml  SV MOD A4C: 92 23 ml  SV(Cube): 60 3 ml  SV(Teich): 45 78 ml    CW  TR Vmax: 3 08 m/s  TR maxP 97 mmHg    MM  TAPSE: 1 41 cm    [employer] Accredited Echocardiography Laboratory    Prepared and electronically signed by    Ирина Savage DO  Signed 21-Dec-2018 13:06:44

## 2019-03-22 ENCOUNTER — APPOINTMENT (OUTPATIENT)
Dept: LAB | Facility: CLINIC | Age: 84
End: 2019-03-22
Payer: COMMERCIAL

## 2019-03-22 DIAGNOSIS — C18.6 MALIGNANT NEOPLASM OF DESCENDING COLON (HCC): ICD-10-CM

## 2019-03-22 DIAGNOSIS — I12.9 HYPERTENSIVE KIDNEY DISEASE WITH CHRONIC KIDNEY DISEASE STAGE III (HCC): ICD-10-CM

## 2019-03-22 DIAGNOSIS — N18.30 HYPERTENSIVE KIDNEY DISEASE WITH CHRONIC KIDNEY DISEASE STAGE III (HCC): ICD-10-CM

## 2019-03-22 DIAGNOSIS — N18.9 CHRONIC KIDNEY DISEASE, UNSPECIFIED CKD STAGE: ICD-10-CM

## 2019-03-22 LAB
ANION GAP SERPL CALCULATED.3IONS-SCNC: 5 MMOL/L (ref 4–13)
BUN SERPL-MCNC: 28 MG/DL (ref 5–25)
CALCIUM SERPL-MCNC: 9.6 MG/DL (ref 8.3–10.1)
CHLORIDE SERPL-SCNC: 104 MMOL/L (ref 100–108)
CO2 SERPL-SCNC: 27 MMOL/L (ref 21–32)
CREAT SERPL-MCNC: 1.47 MG/DL (ref 0.6–1.3)
GFR SERPL CREATININE-BSD FRML MDRD: 41 ML/MIN/1.73SQ M
GLUCOSE SERPL-MCNC: 80 MG/DL (ref 65–140)
POTASSIUM SERPL-SCNC: 4.3 MMOL/L (ref 3.5–5.3)
SODIUM SERPL-SCNC: 136 MMOL/L (ref 136–145)

## 2019-03-22 PROCEDURE — 36415 COLL VENOUS BLD VENIPUNCTURE: CPT

## 2019-03-22 PROCEDURE — 80048 BASIC METABOLIC PNL TOTAL CA: CPT

## 2019-03-25 ENCOUNTER — OFFICE VISIT (OUTPATIENT)
Dept: CARDIOLOGY CLINIC | Facility: CLINIC | Age: 84
End: 2019-03-25
Payer: COMMERCIAL

## 2019-03-25 VITALS
OXYGEN SATURATION: 99 % | DIASTOLIC BLOOD PRESSURE: 64 MMHG | SYSTOLIC BLOOD PRESSURE: 128 MMHG | WEIGHT: 190.4 LBS | HEART RATE: 76 BPM | HEIGHT: 69 IN | BODY MASS INDEX: 28.2 KG/M2

## 2019-03-25 DIAGNOSIS — I42.0 DILATED CARDIOMYOPATHY (HCC): ICD-10-CM

## 2019-03-25 DIAGNOSIS — I50.22 CHRONIC SYSTOLIC HEART FAILURE (HCC): Primary | ICD-10-CM

## 2019-03-25 DIAGNOSIS — Z95.0 BIVENTRICULAR CARDIAC PACEMAKER IN SITU: ICD-10-CM

## 2019-03-25 DIAGNOSIS — Z79.01 ANTICOAGULATION ADEQUATE: ICD-10-CM

## 2019-03-25 DIAGNOSIS — N18.30 STAGE 3 CHRONIC KIDNEY DISEASE (HCC): ICD-10-CM

## 2019-03-25 DIAGNOSIS — N18.30 HYPERTENSIVE KIDNEY DISEASE WITH CHRONIC KIDNEY DISEASE STAGE III (HCC): ICD-10-CM

## 2019-03-25 DIAGNOSIS — I48.20 CHRONIC ATRIAL FIBRILLATION (HCC): ICD-10-CM

## 2019-03-25 DIAGNOSIS — I50.812 CHRONIC RIGHT-SIDED HEART FAILURE (HCC): ICD-10-CM

## 2019-03-25 DIAGNOSIS — I48.91 ATRIAL FIBRILLATION, UNSPECIFIED TYPE (HCC): ICD-10-CM

## 2019-03-25 DIAGNOSIS — C18.5 CANCER OF SPLENIC FLEXURE (HCC): ICD-10-CM

## 2019-03-25 DIAGNOSIS — I12.9 HYPERTENSIVE KIDNEY DISEASE WITH CHRONIC KIDNEY DISEASE STAGE III (HCC): ICD-10-CM

## 2019-03-25 PROCEDURE — 93000 ELECTROCARDIOGRAM COMPLETE: CPT | Performed by: NURSE PRACTITIONER

## 2019-03-25 PROCEDURE — 99213 OFFICE O/P EST LOW 20 MIN: CPT | Performed by: NURSE PRACTITIONER

## 2019-03-25 RX ORDER — FUROSEMIDE 20 MG/1
TABLET ORAL
Qty: 90 TABLET | Refills: 3 | Status: SHIPPED | OUTPATIENT
Start: 2019-03-25 | End: 2019-07-25 | Stop reason: ALTCHOICE

## 2019-03-25 RX ORDER — BENZONATATE 100 MG/1
CAPSULE ORAL
Refills: 0 | COMMUNITY
Start: 2019-02-18 | End: 2019-06-06 | Stop reason: ALTCHOICE

## 2019-03-25 NOTE — LETTER
March 25, 2019     Referral [address] [place] [address]    Patient: Wilber Bynum   YOB: 1927   Date of Visit: 3/25/2019       Dear Dr Kavya Horvath:    Thank you for referring Prisca Lopez to me for evaluation  Below are my notes for this consultation  If you have questions, please do not hesitate to call me  I look forward to following your patient along with you  Sincerely,        IAIN Cochran        CC: Francesco Garcia, DO Courtney Longoria, [address]  3/25/2019 11:24 AM  Sign at close encounter  Heart Failure Follow Up Office Visit Note -     Wilber Bynum   80 y o    male   MRN: [id number]  [address] [facility] [place] [address] [address] [place] [address]  [phone]    PCP: Sae Camp MD  Cardiologist: Dr Juan Fernandez        Impression/plan  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; Recent  hosp 1/8/19-1/13/19 for hemicolectomy  Chronic combined heart failure(systolic, Rt)   EF 45%     --Dry weight:   201 lb  Weight today: 190 lb 6 4 oz  Home weights have been about 187  --Baseline creatinine: 1 4--1 7  --Last labs 3/22/19   Cr 1 47, K 4 3  --Educated regarding adherence to a 1 5g-- 2g sodium diet/ 2000 ml fluid restriction, daily weights and to call us if he gains 2-3 lbs in 2 days or 5 lbs in 5 d  --Beta-Blocker: bisporolol  --Diuretic:  He had been on Lasix daily, lost some weight and for the last 2 days has not taken any as he has been a little lightheaded  we mutually agreed for him to take Lasix 20 mg every other day and p r n  For weight gain  Afib  S/P AV hiram ablation with resynchronization therapy /SJM BiV pacemaker  On [address] [place] [address] with Eliquis 2 5 BID  Normal device fxn --checked 2/7/19  complete heart block; S/P AVN ablation  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%, tachycardia induced  Hypertension  On bisoprolol BP  128/66, controlled today  CKD 3  Baseline Cr 1 7    Creatinine was better than his baseline, at 1 38 1/12/19   cardiac testing  -- TTE 12/ 18:   EF 45%  Mild diffuse hypokinesis regional variations including basal to mid inferolateral and basal inferior hypokinesis with mild concentric hypertrophy  The RV was mildly dilated with mildly reduced function  + MAREK         Summary of Recommendations   continue low-sodium diet, fluid restriction,daily weights   Lasix 20 mg every other day and p r n  For weight gain 2-3 lb in 1 day or 5 lb in 5 days  Follow up will be scheduled with Dr Gilbert Rueda in 6 months; sooner if needed              HPI:   Mr Milli Bush is a 79 yo gentleman with a hx of atrial fibrillation with rapid ventricular response and a tachycardia-induced cardiomyopathy, S/P AV node ablation with cardiac resynchronization therapy pacemaker  He also has CKD3 ( 1 7 baseline)  He has a hx of chronic systolic HF, maintained on furosemide 20 mg po/d PRN  In the past, he has had some LE edema related to Na dietary indiscretion  He follows with cardiologist Dr Gilbert Rueda  He recently has been anemic, and underwent a colonoscopy and EGD 11/30/18  This showed a splenic flexure tumor; pathology was consistent with a splenic flexure colon Ca  He was cleared for surgery by Dr Gilbert Rueda and his [facility] AUTHORITY was held for 48 hrs  He was  admitted 1/8 and underwent a laparoscopic left hemicolectomy by Dr Windy Stevens  Chemotherapy was not felt to be needed  Nephrology was consulted; his home lasix  ( 20 mg/d PRN) was restarted on POD 2  He recovered uneventfully and was DCd to [facility] 1/13/19; he returned home about 3 weeks ago, with services/VNA,PT/PT  Eliquis was resumed upon discharge  Interval history  His device was checked remotely 2/7/19- with normal device fxn, BVP 99%  The corvue impedance threshold was crossed x 7 days, for which he is here today, 2/11 2/11/19  He is accompanied by his son who came up from [facility] to care for him after his surgery  He is planning on leaving in a few days    He and his wife have been doing the cooking; with the pt adhering to a low-sodium diet, recently    He denies chest pain or shortness of breath  No PND or orthopnea  Reports he has been coughing a fair amount last 24 hr after having difficulty swallowing an iron pill  Reports his weight are stable  He has had significant lower extremity edema, but since his weight been stable he did report this  He has not been taking any Lasix in several months  After he took it for about 10 days last May ; he developed some itching and so he stopped it  He has chronic itching as noted by scarring on his arms     I suspect he was volume overloaded and asked him to take Lasix 20/d for few days in a row, monitor his weights get some follow-up labs and follow-up in short interval       3/25/19 he is here for cardiology follow-up  His EKG shows a ventricular paced rhythm at 72 beats per minute  He is doing well  He is adhering to the low-sodium diet  He has significant bout of URI/bronchitis requiring treatment with antibiotic by his PCP  He had a chest x-ray done February 27; it showed no evidence of pneumonia  He seeing his PCP tomorrow who he tells me will manage his blood work  He has lost some weight taking his Lasix every day, to the point where for the last few days he has been feeling a little bit off balance requiring him to use his walker  We mutually agreed to take Lasix 20 mg every other day routinely and for him to monitor his weights  If his weights go up he can take Lasix 20 mg daily x3 days  We talked about his blood work  His creatinine is 1 47/in a euvolemic state          Assessment  Diagnoses and all orders for this visit:    Chronic systolic heart failure (HCC)    Chronic atrial fibrillation (HCC)  -     POCT ECG    Dilated cardiomyopathy (HCC)  -     furosemide (LASIX) 20 mg tablet; Take 1 tablet every other day and 1 tablet p r n   For weight gain 2-3 lb in 1 day or 5 lb in    Stage 3 chronic kidney disease (HCC)    Hypertensive kidney disease with chronic kidney disease stage III ([facility] [address] )    Biventricular cardiac pacemaker in situ    Anticoagulation adequate    Cancer of splenic flexure (HCC)    Chronic right-sided heart failure (HCC)    Atrial fibrillation, unspecified type (HCC)  -     furosemide (LASIX) 20 mg tablet; Take 1 tablet every other day and 1 tablet p r n  For weight gain 2-3 lb in 1 day or 5 lb in    Other orders  -     benzonatate (TESSALON PERLES) 100 mg capsule; TAKE AS DIRECTED UPTO 6 CAPSULES PER DAY AS NEEDED FOR COUGH        Past Medical History:   Diagnosis Date    Anemia     Atrial fibrillation (HCC)     BPH (benign prostatic hypertrophy)     Chronic kidney disease     Hypertension     Irregular heart beat     afib       Review of Systems   Constitution: Negative for chills  Cardiovascular: Negative for chest pain, claudication, cyanosis, dyspnea on exertion, irregular heartbeat, leg swelling, near-syncope, orthopnea, palpitations, paroxysmal nocturnal dyspnea and syncope  Respiratory: Negative for cough and shortness of breath  Musculoskeletal:        He is walking with a walker   Gastrointestinal: Negative for heartburn and nausea  Neurological: Negative for dizziness, focal weakness, headaches, light-headedness and weakness  All other systems reviewed and are negative  Allergies   Allergen Reactions    Lasix [Furosemide] Other (See Comments)     Weakness, decreased BP    Adhesive [Medical Tape] Rash    Neomycin-Bacitracin Zn-Polymyx Rash    Neosporin [Neomycin-Polymyxin-Gramicidin] Rash           Current Outpatient Medications:     apixaban (ELIQUIS) 2 5 mg, Take 1 tablet (2 5 mg total) by mouth 2 (two) times a day, Disp: 180 tablet, Rfl: 3    benzonatate (TESSALON PERLES) 100 mg capsule, TAKE AS DIRECTED UPTO 6 CAPSULES PER DAY AS NEEDED FOR COUGH, Disp: , Rfl: 0    BISOPROLOL FUMARATE PO, Take 2 5 mg by mouth daily, Disp: , Rfl:     CHLORPHENIRAMINE MALEATE PO, Take by mouth every 4 (four) hours as needed, Disp: , Rfl:     finasteride (PROSCAR) 5 mg tablet, Take 5 mg by mouth daily, Disp: , Rfl:     furosemide (LASIX) 20 mg tablet, Take 1 tablet every other day and 1 tablet p r n  For weight gain 2-3 lb in 1 day or 5 lb in, Disp: 90 tablet, Rfl: 3    IRON PO, Take 1 tablet by mouth 2 (two) times a day  , Disp: , Rfl:     Multiple Vitamins-Minerals (CENTRUM SILVER 50+MEN PO), Take 1 tablet by mouth daily, Disp: , Rfl:     triamcinolone (KENALOG) 0 1 % cream, APPLY TO SKIN TWICE DAILY TO AFFECTED AREAS ON BODY THROUGHOUT, Disp: , Rfl: 0    Social History     Socioeconomic History    Marital status:       Spouse name: Not on file    Number of children: Not on file    Years of education: Not on file    Highest education level: Not on file   Occupational History    Not on file   Social Needs    Financial resource strain: Not on file    Food insecurity:     Worry: Not on file     Inability: Not on file    Transportation needs:     Medical: Not on file     Non-medical: Not on file   Tobacco Use    Smoking status: Never Smoker    Smokeless tobacco: Never Used   Substance and Sexual Activity    Alcohol use: No    Drug use: No    Sexual activity: Not on file   Lifestyle    Physical activity:     Days per week: Not on file     Minutes per session: Not on file    Stress: Not on file   Relationships    Social connections:     Talks on phone: Not on file     Gets together: Not on file     Attends Muslim service: Not on file     Active member of club or organization: Not on file     Attends meetings of clubs or organizations: Not on file     Relationship status: Not on file    Intimate partner violence:     Fear of current or ex partner: Not on file     Emotionally abused: Not on file     Physically abused: Not on file     Forced sexual activity: Not on file   Other Topics Concern    Not on file   Social History Narrative    Not on file       Family History   Problem Relation Age of Onset    Heart disease Father     Heart attack Father     Hypertension Father     Heart disease Brother     Heart attack Brother 64    Hypertension Brother     Heart attack Brother 67    Hypertension Brother     Arrhythmia Neg Hx     Asthma Neg Hx     Clotting disorder Neg Hx     Heart failure Neg Hx        Physical Exam   Constitutional: He is oriented to person, place, and time  No distress  HENT:   Head: Normocephalic and atraumatic  Eyes: Conjunctivae and EOM are normal    Neck: Normal range of motion  Neck supple  Cardiovascular: Normal rate, regular rhythm, S1 normal, S2 normal, normal heart sounds and intact distal pulses  Pulmonary/Chest: Effort normal  He has no rhonchi  Abdominal: Soft  Bowel sounds are normal    Musculoskeletal: Normal range of motion  He exhibits edema  Neurological: He is alert and oriented to person, place, and time  Skin: Skin is warm and dry  Some excoriations noted on both of his arms   Psychiatric: He has a normal mood and affect  Nursing note and vitals reviewed  Vitals: Blood pressure 128/64, pulse 76, height 5' 9" (1 753 m), weight 86 4 kg (190 lb 6 4 oz), SpO2 99 %     Wt Readings from Last 3 Encounters:   19 86 4 kg (190 lb 6 4 oz)   19 90 3 kg (199 lb 1 6 oz)   19 88 9 kg (196 lb)         Labs & Results:  Lab Results   Component Value Date    WBC 10 56 (H) 2019    HGB 14 8 2019    HCT 47 8 2019    MCV 99 (H) 2019     2019     No results found for: BNP  No components found for: CHEM    Results for orders placed during the hospital encounter of 18   Echo complete with contrast if indicated    Narrative [address]  [facility], [address] [facility]  [phone]    Transthoracic Echocardiogram  2D, M-mode, Doppler, and Color Doppler    Study date:  21-Dec-2018    Patient: Valentina Trevino  MR number: [id number]  Account number: [de-identified]  : 16-Jun-1927  Age: 80 years  Gender: Male  Status: Outpatient  Location: [address] [place] [address] and [facility]  Height: 69 in  Weight: 202 lb  BP: 102/ 64 mmHg    Indications: Atrial fibrillation    Diagnoses: I48 0 - Atrial fibrillation    Sonographer:  JOSEFINA Oliver  Primary Physician:  Xiao Jackson MD  Referring Physician:  Luis Gomez DO  Group:  [id number]  Cardiology Fellow:  Hilary Tapia MD  Interpreting Physician:  Ирина Savage DO    SUMMARY    LEFT VENTRICLE:  Systolic function was mildly reduced  Ejection fraction was estimated to be 45 %  There was mild diffuse hypokinesis with regional variations including basal to mid inferolateral and basal inferior hypokinesis  There was mild concentric hypertrophy  RIGHT VENTRICLE:  The ventricle was mildly dilated  Systolic function was reduced  LEFT ATRIUM:  The atrium was mildly to moderately dilated  RIGHT ATRIUM:  The atrium was moderately dilated  TRICUSPID VALVE:  There was moderate regurgitation  Estimated peak PA pressure was 42 mmHg  HISTORY: PRIOR HISTORY: Hypertension, atrial fibrillation, pacemaker, cardiomyopathy, chronic kidney disease    PROCEDURE: The study was performed in the [address] [facility]  This was a routine study  The transthoracic approach was used  The study included complete 2D imaging, M-mode, complete spectral Doppler, and color Doppler  Image  quality was adequate  LEFT VENTRICLE: Size was normal  Systolic function was mildly reduced  Ejection fraction was estimated to be 45 %  There was mild diffuse hypokinesis with regional variations including basal to mid inferolateral and basal inferior  hypokinesis Wall thickness was mildly increased  There was mild concentric hypertrophy  DOPPLER: Transmitral flow pattern: atrial fibrillation  RIGHT VENTRICLE: The ventricle was mildly dilated  Systolic function was reduced   Wall thickness was normal  A pacing wire was present  LEFT ATRIUM: The atrium was mildly to moderately dilated  RIGHT ATRIUM: The atrium was moderately dilated  A pacing wire was present  MITRAL VALVE: Valve structure was normal  There was normal leaflet separation  DOPPLER: The transmitral velocity was within the normal range  There was no evidence for stenosis  There was no significant regurgitation  AORTIC VALVE: The valve was trileaflet  Leaflets exhibited mildly increased thickness, mild calcification, and normal cuspal separation  DOPPLER: Transaortic velocity was within the normal range  There was no evidence for stenosis  There  was no significant regurgitation  TRICUSPID VALVE: The valve structure was normal  There was normal leaflet separation  DOPPLER: The transtricuspid velocity was within the normal range  There was no evidence for stenosis  There was moderate regurgitation  Estimated peak PA  pressure was 42 mmHg  PULMONIC VALVE: Leaflets exhibited normal thickness, no calcification, and normal cuspal separation  DOPPLER: The transpulmonic velocity was within the normal range  There was trace regurgitation  PERICARDIUM: There was no pericardial effusion  The pericardium was normal in appearance  AORTA: The root exhibited normal size  The ascending aorta was upper normal in size  SYSTEMIC VEINS: IVC: The inferior vena cava was normal in size   Respirophasic changes were normal     SYSTEM MEASUREMENT TABLES    2D  %FS: 17 03 %  Ao Diam: 3 79 cm  EDV(Teich): 129 52 ml  EF Biplane: 42 45 %  EF(Cube): 42 88 %  EF(Teich): 35 35 %  ESV(Cube): 80 33 ml  ESV(Teich): 83 74 ml  IVSd: 1 31 cm  LA Area: 24 22 cm2  LA Diam: 4 28 cm  LVEDV MOD A2C: 220 9 ml  LVEDV MOD A4C: 205 11 ml  LVEDV MOD BP: 215 2 ml  LVEF MOD A2C: 40 58 %  LVEF MOD A4C: 44 97 %  LVESV MOD A2C: 131 27 ml  LVESV MOD A4C: 112 87 ml  LVESV MOD BP: 123 84 ml  LVIDd: 5 2 cm  LVIDs: 4 31 cm  LVLd A2C: 9 46 cm  LVLd A4C: 9 25 cm  LVLs A2C: 8 73 cm  LVLs A4C: 8 41 cm  LVPWd: 1 31 cm  RA Area: 23 9 cm2  RV Diam : 5 16 cm  SV MOD A2C: 89 63 ml  SV MOD A4C: 92 23 ml  SV(Cube): 60 3 ml  SV(Teich): 45 78 ml    CW  TR Vmax: 3 08 m/s  TR maxP 97 mmHg    MM  TAPSE: 1 41 cm    [facility] Accredited Echocardiography Laboratory    Prepared and electronically signed by    Val Man DO  Signed 21-Dec-2018 13:06:44

## 2019-05-06 ENCOUNTER — TRANSCRIBE ORDERS (OUTPATIENT)
Dept: LAB | Facility: CLINIC | Age: 84
End: 2019-05-06

## 2019-05-06 ENCOUNTER — APPOINTMENT (OUTPATIENT)
Dept: LAB | Facility: CLINIC | Age: 84
End: 2019-05-06
Payer: COMMERCIAL

## 2019-05-06 DIAGNOSIS — N18.9 CHRONIC KIDNEY DISEASE, UNSPECIFIED CKD STAGE: Primary | ICD-10-CM

## 2019-05-06 DIAGNOSIS — N18.9 CHRONIC KIDNEY DISEASE, UNSPECIFIED CKD STAGE: ICD-10-CM

## 2019-05-06 LAB
ANION GAP SERPL CALCULATED.3IONS-SCNC: 6 MMOL/L (ref 4–13)
BUN SERPL-MCNC: 30 MG/DL (ref 5–25)
CALCIUM SERPL-MCNC: 8.8 MG/DL (ref 8.3–10.1)
CHLORIDE SERPL-SCNC: 107 MMOL/L (ref 100–108)
CO2 SERPL-SCNC: 25 MMOL/L (ref 21–32)
CREAT SERPL-MCNC: 1.55 MG/DL (ref 0.6–1.3)
GFR SERPL CREATININE-BSD FRML MDRD: 39 ML/MIN/1.73SQ M
GLUCOSE P FAST SERPL-MCNC: 98 MG/DL (ref 65–99)
POTASSIUM SERPL-SCNC: 4.1 MMOL/L (ref 3.5–5.3)
SODIUM SERPL-SCNC: 138 MMOL/L (ref 136–145)

## 2019-05-06 PROCEDURE — 80048 BASIC METABOLIC PNL TOTAL CA: CPT

## 2019-05-06 PROCEDURE — 36415 COLL VENOUS BLD VENIPUNCTURE: CPT

## 2019-05-09 ENCOUNTER — REMOTE DEVICE CLINIC VISIT (OUTPATIENT)
Dept: CARDIOLOGY CLINIC | Facility: CLINIC | Age: 84
End: 2019-05-09
Payer: COMMERCIAL

## 2019-05-09 DIAGNOSIS — I42.8 OTHER CARDIOMYOPATHY (HCC): Primary | ICD-10-CM

## 2019-05-09 DIAGNOSIS — Z95.0 PACEMAKER: ICD-10-CM

## 2019-05-09 PROCEDURE — 93296 REM INTERROG EVL PM/IDS: CPT | Performed by: INTERNAL MEDICINE

## 2019-05-09 PROCEDURE — 93294 REM INTERROG EVL PM/LDLS PM: CPT | Performed by: INTERNAL MEDICINE

## 2019-06-06 ENCOUNTER — OFFICE VISIT (OUTPATIENT)
Dept: CARDIOLOGY CLINIC | Facility: CLINIC | Age: 84
End: 2019-06-06
Payer: COMMERCIAL

## 2019-06-06 VITALS
DIASTOLIC BLOOD PRESSURE: 76 MMHG | HEIGHT: 69 IN | BODY MASS INDEX: 29.38 KG/M2 | SYSTOLIC BLOOD PRESSURE: 124 MMHG | WEIGHT: 198.4 LBS | HEART RATE: 70 BPM

## 2019-06-06 DIAGNOSIS — I48.20 CHRONIC ATRIAL FIBRILLATION (HCC): Primary | ICD-10-CM

## 2019-06-06 DIAGNOSIS — I42.0 DILATED CARDIOMYOPATHY (HCC): Chronic | ICD-10-CM

## 2019-06-06 DIAGNOSIS — I12.9 HYPERTENSIVE KIDNEY DISEASE WITH CHRONIC KIDNEY DISEASE STAGE III (HCC): ICD-10-CM

## 2019-06-06 DIAGNOSIS — N18.30 HYPERTENSIVE KIDNEY DISEASE WITH CHRONIC KIDNEY DISEASE STAGE III (HCC): ICD-10-CM

## 2019-06-06 DIAGNOSIS — Z95.0 BIVENTRICULAR CARDIAC PACEMAKER IN SITU: ICD-10-CM

## 2019-06-06 DIAGNOSIS — N18.30 STAGE 3 CHRONIC KIDNEY DISEASE (HCC): ICD-10-CM

## 2019-06-06 PROCEDURE — 93000 ELECTROCARDIOGRAM COMPLETE: CPT | Performed by: INTERNAL MEDICINE

## 2019-06-06 PROCEDURE — 99214 OFFICE O/P EST MOD 30 MIN: CPT | Performed by: INTERNAL MEDICINE

## 2019-06-06 RX ORDER — BISOPROLOL FUMARATE 5 MG/1
2.5 TABLET ORAL DAILY
Qty: 90 TABLET | Refills: 3 | Status: SHIPPED | OUTPATIENT
Start: 2019-06-06 | End: 2020-06-24

## 2019-06-14 ENCOUNTER — TELEPHONE (OUTPATIENT)
Dept: GASTROENTEROLOGY | Facility: AMBULARY SURGERY CENTER | Age: 84
End: 2019-06-14

## 2019-06-17 ENCOUNTER — APPOINTMENT (OUTPATIENT)
Dept: RADIOLOGY | Age: 84
End: 2019-06-17
Payer: COMMERCIAL

## 2019-06-17 ENCOUNTER — TRANSCRIBE ORDERS (OUTPATIENT)
Dept: ADMINISTRATIVE | Age: 84
End: 2019-06-17

## 2019-06-17 DIAGNOSIS — J18.9 UNRESOLVED PNEUMONIA: Primary | ICD-10-CM

## 2019-06-17 DIAGNOSIS — J18.9 UNRESOLVED PNEUMONIA: ICD-10-CM

## 2019-06-17 PROCEDURE — 71046 X-RAY EXAM CHEST 2 VIEWS: CPT

## 2019-07-06 DIAGNOSIS — I48.0 PAROXYSMAL ATRIAL FIBRILLATION (HCC): ICD-10-CM

## 2019-07-07 RX ORDER — APIXABAN 2.5 MG/1
TABLET, FILM COATED ORAL
Qty: 180 TABLET | Refills: 2 | Status: SHIPPED | OUTPATIENT
Start: 2019-07-07 | End: 2020-09-09 | Stop reason: SDUPTHER

## 2019-07-29 ENCOUNTER — APPOINTMENT (OUTPATIENT)
Dept: LAB | Age: 84
End: 2019-07-29
Payer: COMMERCIAL

## 2019-07-29 DIAGNOSIS — C18.5 CANCER OF SPLENIC FLEXURE (HCC): ICD-10-CM

## 2019-07-29 LAB
ALBUMIN SERPL BCP-MCNC: 3.4 G/DL (ref 3.5–5)
ALP SERPL-CCNC: 80 U/L (ref 46–116)
ALT SERPL W P-5'-P-CCNC: 22 U/L (ref 12–78)
ANION GAP SERPL CALCULATED.3IONS-SCNC: 9 MMOL/L (ref 4–13)
AST SERPL W P-5'-P-CCNC: 23 U/L (ref 5–45)
BASOPHILS # BLD AUTO: 0.03 THOUSANDS/ΜL (ref 0–0.1)
BASOPHILS NFR BLD AUTO: 1 % (ref 0–1)
BILIRUB SERPL-MCNC: 0.57 MG/DL (ref 0.2–1)
BUN SERPL-MCNC: 34 MG/DL (ref 5–25)
CALCIUM SERPL-MCNC: 9.3 MG/DL (ref 8.3–10.1)
CEA SERPL-MCNC: 1 NG/ML (ref 0–3)
CHLORIDE SERPL-SCNC: 105 MMOL/L (ref 100–108)
CO2 SERPL-SCNC: 26 MMOL/L (ref 21–32)
CREAT SERPL-MCNC: 1.69 MG/DL (ref 0.6–1.3)
EOSINOPHIL # BLD AUTO: 0.27 THOUSAND/ΜL (ref 0–0.61)
EOSINOPHIL NFR BLD AUTO: 4 % (ref 0–6)
ERYTHROCYTE [DISTWIDTH] IN BLOOD BY AUTOMATED COUNT: 13.2 % (ref 11.6–15.1)
GFR SERPL CREATININE-BSD FRML MDRD: 34 ML/MIN/1.73SQ M
GLUCOSE P FAST SERPL-MCNC: 150 MG/DL (ref 65–99)
HCT VFR BLD AUTO: 47.8 % (ref 36.5–49.3)
HGB BLD-MCNC: 15.3 G/DL (ref 12–17)
IMM GRANULOCYTES # BLD AUTO: 0.02 THOUSAND/UL (ref 0–0.2)
IMM GRANULOCYTES NFR BLD AUTO: 0 % (ref 0–2)
LYMPHOCYTES # BLD AUTO: 1.66 THOUSANDS/ΜL (ref 0.6–4.47)
LYMPHOCYTES NFR BLD AUTO: 25 % (ref 14–44)
MCH RBC QN AUTO: 31.8 PG (ref 26.8–34.3)
MCHC RBC AUTO-ENTMCNC: 32 G/DL (ref 31.4–37.4)
MCV RBC AUTO: 99 FL (ref 82–98)
MONOCYTES # BLD AUTO: 0.75 THOUSAND/ΜL (ref 0.17–1.22)
MONOCYTES NFR BLD AUTO: 11 % (ref 4–12)
NEUTROPHILS # BLD AUTO: 3.93 THOUSANDS/ΜL (ref 1.85–7.62)
NEUTS SEG NFR BLD AUTO: 59 % (ref 43–75)
NRBC BLD AUTO-RTO: 0 /100 WBCS
PLATELET # BLD AUTO: 180 THOUSANDS/UL (ref 149–390)
PMV BLD AUTO: 10.3 FL (ref 8.9–12.7)
POTASSIUM SERPL-SCNC: 3.9 MMOL/L (ref 3.5–5.3)
PROT SERPL-MCNC: 7.9 G/DL (ref 6.4–8.2)
RBC # BLD AUTO: 4.81 MILLION/UL (ref 3.88–5.62)
SODIUM SERPL-SCNC: 140 MMOL/L (ref 136–145)
WBC # BLD AUTO: 6.66 THOUSAND/UL (ref 4.31–10.16)

## 2019-07-29 PROCEDURE — 36415 COLL VENOUS BLD VENIPUNCTURE: CPT

## 2019-07-29 PROCEDURE — 82378 CARCINOEMBRYONIC ANTIGEN: CPT

## 2019-07-29 PROCEDURE — 80053 COMPREHEN METABOLIC PANEL: CPT

## 2019-07-29 PROCEDURE — 85025 COMPLETE CBC W/AUTO DIFF WBC: CPT

## 2019-07-31 ENCOUNTER — HOSPITAL ENCOUNTER (OUTPATIENT)
Dept: RADIOLOGY | Age: 84
Discharge: HOME | End: 2019-07-31
Payer: COMMERCIAL

## 2019-07-31 DIAGNOSIS — C18.5 CANCER OF SPLENIC FLEXURE (HCC): ICD-10-CM

## 2019-07-31 PROCEDURE — 71250 CT THORAX DX C-: CPT

## 2019-07-31 PROCEDURE — 74176 CT ABD & PELVIS W/O CONTRAST: CPT

## 2019-08-02 ENCOUNTER — IN-CLINIC DEVICE VISIT (OUTPATIENT)
Dept: CARDIOLOGY CLINIC | Facility: CLINIC | Age: 84
End: 2019-08-02
Payer: COMMERCIAL

## 2019-08-02 DIAGNOSIS — Z95.0 CARDIAC PACEMAKER IN SITU: Primary | ICD-10-CM

## 2019-08-02 PROCEDURE — 93280 PM DEVICE PROGR EVAL DUAL: CPT | Performed by: INTERNAL MEDICINE

## 2019-08-02 NOTE — PROGRESS NOTES
Results for orders placed or performed in visit on 08/02/19   Cardiac EP device report    Narrative    SJM CRT-P (VVIR 70)  DEVICE INTERROGATED IN THE [place] OFFICE  BATTERY VOLTAGE ADEQUATE (6 8-7 4 YRS)  BVP>99%  ALL LEAD PARAMETERS WITHIN NORMAL LIMITS  1 NSVT EPISODE FOR 10 BEATS, AVG CL~330MS  PT ASYMPTOMATIC  EF-45% (ECHO 12/21/18)  PT ON ELIQUIS & BISOPROLOL  CORVUE IMPEDANCE MONITORING WITHIN NORMAL LIMITS  NORMAL DEVICE FUNCTION   GV

## 2019-08-27 ENCOUNTER — OFFICE VISIT (OUTPATIENT)
Dept: GASTROENTEROLOGY | Facility: CLINIC | Age: 84
End: 2019-08-27
Payer: COMMERCIAL

## 2019-08-27 VITALS
SYSTOLIC BLOOD PRESSURE: 151 MMHG | WEIGHT: 194 LBS | DIASTOLIC BLOOD PRESSURE: 75 MMHG | BODY MASS INDEX: 27.77 KG/M2 | HEIGHT: 70 IN | HEART RATE: 70 BPM | TEMPERATURE: 96.8 F

## 2019-08-27 DIAGNOSIS — C18.5 CANCER OF SPLENIC FLEXURE (HCC): ICD-10-CM

## 2019-08-27 DIAGNOSIS — K21.00 GASTROESOPHAGEAL REFLUX DISEASE WITH ESOPHAGITIS: Primary | ICD-10-CM

## 2019-08-27 PROBLEM — Z12.11 SPECIAL SCREENING FOR MALIGNANT NEOPLASMS, COLON: Status: RESOLVED | Noted: 2018-10-18 | Resolved: 2019-08-27

## 2019-08-27 PROBLEM — D50.9 IRON DEFICIENCY ANEMIA: Status: RESOLVED | Noted: 2018-10-18 | Resolved: 2019-08-27

## 2019-08-27 PROBLEM — R19.7 DIARRHEA: Status: RESOLVED | Noted: 2017-02-08 | Resolved: 2019-08-27

## 2019-08-27 PROCEDURE — 99214 OFFICE O/P EST MOD 30 MIN: CPT | Performed by: INTERNAL MEDICINE

## 2019-08-27 NOTE — PROGRESS NOTES
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, pN0, G2  He had left hemicolectomy, takedown of splenic flexure and end to end transverse to sigmoid colon anastomosis with Dr Vida Villegas 1/8/19  Mr Karena Green will have surveillance colonoscopy with Dr Argentina Grey in November or December 2019      2  Gastroesophageal reflux disease with esophagitis: I will perform EGD when he has surveillance colonoscopy in November or December 2019 due to 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  We will coordinate with Dr Joey Woodruff will need to be held for the procedure  His epigastric burning pain is well controlled with pantoprazole 40 mg QD  He will continue pantoprazole 40mg QD and avoid GERD trigger foods  Follow-up in 1 year    ______________________________________________________________________    SUBJECTIVE:  Andrew Doyle is a 80 y o  male with cardiomyopathy, A-fib, HTN, BPH, CKD and a hx of cardioversion and cardiac stent placement in the office today for management of GERD with mild esophagitis and colon cancer  He is feeling well at this time  He denies nausea, vomiting, diarrhea, abdominal pain or hematochezia  EGD on 11/30/18 indicated mild esophagitis with nodular mucosa at the GE junction likely secondary to uncontrolled reflux disease and a small hiatal hernia  Esophageal nodules were biopsied and determined to be cardiac-type and esophageal mucosa with adjacent intestinal metaplasia, negative for dysplasia  I recommended pantoprazole 40 mg QD for 2 months  His epigastric burning pain is well controlled with this medication  He avoids coffee and melon, as these cause reflux   He reports he had some difficulty swallowing pills, but this has resolved  Colonoscopy 11/30/18 indicated a grossly malignant looking tumor splenic flexure  Biopsy indicated at least intramucosal adenocarcinoma  On 1/8/19, he had a left hemicolectomy, takedown of splenic flexure and end to end transverse to sigmoid colon anastomosis  Mild diverticular disease of the sigmoid colon was also noted 11/30/18  He will have repeat colonoscopy with Dr Juliet Washington in November or December 2019  He was negative for Seals syndrome  CT chest abdomen and pelvis 7/31/19 showed no evidence of metastatic disease in these regions  Both of his sons have ulcerative colitis  REVIEW OF SYSTEMS IS OTHERWISE NEGATIVE  Historical Information   Past Medical History:   Diagnosis Date    Anemia     Atrial fibrillation ([address] )     BPH (benign prostatic hypertrophy)     Cancer (HCC)     COLON    Chronic kidney disease     Hypertension     Irregular heart beat     afib     Past Surgical History:   Procedure Laterality Date    BASAL CELL CARCINOMA EXCISION      CARDIAC PACEMAKER PLACEMENT      CARDIAC SURGERY      CARDIOVERSION      CARPAL TUNNEL RELEASE      CATARACT EXTRACTION      COLON SURGERY      COLONOSCOPY      [place] COLONOSCOPY FLX DX W/COLLJ SPEC WHEN PFRMD N/A 11/30/2018    Procedure: COLONOSCOPY w egd  by dr Bonnie Forrester;  Surgeon: Scottie Vergara MD;  Location: [facility]; Service: Colorectal    [place] LAP,SURG,COLECTOMY, PARTIAL, W/ANAST N/A 1/8/2019    Procedure: Left hemicolectomy, takedown splenic flexure, end to end transverse to sigmoid colon anastamosis;   Surgeon: Scottie Vergara MD;  Location: [facility];  Service: Colorectal     Social History   Social History     Substance and Sexual Activity   Alcohol Use No     Social History     Substance and Sexual Activity   Drug Use No     Social History     Tobacco Use   Smoking Status Never Smoker   Smokeless Tobacco Never Used     Family History   Problem Relation Age of Onset    Heart disease Father     Heart attack Father     Hypertension Father     Heart disease Brother     Heart attack Brother 64    Hypertension Brother     Heart attack Brother 67    Hypertension Brother     Arrhythmia Neg Hx     Asthma Neg Hx     Clotting disorder Neg Hx     Heart failure Neg Hx        Meds/Allergies       Current Outpatient Medications:     bisoprolol (ZEBETA) 5 mg tablet    CHLORPHENIRAMINE MALEATE PO    ELIQUIS 2 5 MG    finasteride (PROSCAR) 5 mg tablet    Multiple Vitamins-Minerals (CENTRUM SILVER 50+MEN PO)    pantoprazole (PROTONIX) 40 mg tablet    torsemide (DEMADEX) 10 mg tablet    triamcinolone (KENALOG) 0 1 % cream    Allergies   Allergen Reactions    Lasix [Furosemide] Other (See Comments)     Weakness, decreased BP    Adhesive [Medical Tape] Rash    Neomycin-Bacitracin Zn-Polymyx Rash    Neosporin [Neomycin-Polymyxin-Gramicidin] Rash           Objective     Blood pressure 151/75, pulse 70, temperature (!) 96 8 °F (36 °C), height 5' 9 5" (1 765 m), weight 88 kg (194 lb)  Body mass index is 28 24 kg/m²  PHYSICAL EXAM:      General Appearance:   Alert, cooperative, no distress   HEENT:   Normocephalic, atraumatic, anicteric      Neck:  Supple, symmetrical, trachea midline   Lungs:   Clear to auscultation bilaterally; no rales, rhonchi or wheezing; respirations unlabored    Heart[de-identified]   Regular rate and rhythm; no murmur, rub, or gallop  Abdomen:   Soft, non-tender, non-distended; normal bowel sounds; no masses, no organomegaly  Surgical scar well healed  Genitalia:   Deferred    Rectal:   Deferred    Extremities:  No cyanosis, clubbing or edema    Pulses:  2+ and symmetric    Skin:  No jaundice, rashes, or lesions    Lymph nodes:  No palpable cervical lymphadenopathy        Lab Results:   No visits with results within 1 Day(s) from this visit     Latest known visit with results is:   Appointment on 07/29/2019   Component Date Value    WBC 07/29/2019 6 66     RBC 07/29/2019 4 81     Hemoglobin 07/29/2019 15 3     Hematocrit 07/29/2019 47 8     MCV 07/29/2019 99*    MCH 07/29/2019 31 8     MCHC 07/29/2019 32 0     RDW 07/29/2019 13 2     MPV 07/29/2019 10 3     Platelets 50/48/8162 180     nRBC 07/29/2019 0     Neutrophils Relative 07/29/2019 59     Immat GRANS % 07/29/2019 0     Lymphocytes Relative 07/29/2019 25     Monocytes Relative 07/29/2019 11     Eosinophils Relative 07/29/2019 4     Basophils Relative 07/29/2019 1     Neutrophils Absolute 07/29/2019 3 93     Immature Grans Absolute 07/29/2019 0 02     Lymphocytes Absolute 07/29/2019 1 66     Monocytes Absolute 07/29/2019 0 75     Eosinophils Absolute 07/29/2019 0 27     Basophils Absolute 07/29/2019 0 03     Sodium 07/29/2019 140     Potassium 07/29/2019 3 9     Chloride 07/29/2019 105     CO2 07/29/2019 26     ANION GAP 07/29/2019 9     BUN 07/29/2019 34*    Creatinine 07/29/2019 1 69*    Glucose, Fasting 07/29/2019 150*    Calcium 07/29/2019 9 3     AST 07/29/2019 23     ALT 07/29/2019 22     Alkaline Phosphatase 07/29/2019 80     Total Protein 07/29/2019 7 9     Albumin 07/29/2019 3 4*    Total Bilirubin 07/29/2019 0 57     eGFR 07/29/2019 34     CEA 07/29/2019 1 0          Radiology Results:   Ct Chest Abdomen Pelvis Wo Contrast    Result Date: 8/3/2019  Narrative: CT CHEST, ABDOMEN AND PELVIS WITHOUT IV CONTRAST INDICATION:   C18 5: Malignant neoplasm of splenic flexure  COMPARISON:  12/4/2018 TECHNIQUE: CT examination of the chest, abdomen and pelvis was performed without intravenous contrast   Axial, sagittal, and coronal 2D reformatted images were created from the source data and submitted for interpretation  Radiation dose length product (DLP) for this visit:  594 mGy-cm     This examination, like all CT scans performed in the [facility], was performed utilizing techniques to minimize radiation dose exposure, including the use of iterative reconstruction and automated exposure control  Enteric contrast was administered  FINDINGS: CHEST LUNGS:  Lungs are clear  There is no tracheal or endobronchial lesion  PLEURA:  Unremarkable  HEART/GREAT VESSELS:  Cardiomegaly is noted  MEDIASTINUM AND SONG:  Unremarkable  CHEST WALL AND LOWER NECK:   Left chest wall pacemaker is present  ABDOMEN LIVER/BILIARY TREE:  Unremarkable  GALLBLADDER:  No calcified gallstones  No pericholecystic inflammatory change  SPLEEN:  Unremarkable  PANCREAS:  Unremarkable  ADRENAL GLANDS:  Unremarkable  KIDNEYS/URETERS:  One or more simple renal cyst(s) is noted  Otherwise unremarkable kidneys  No hydronephrosis  STOMACH AND BOWEL:  Previous noted descending colon mass appears decreased in size, there remains subtle mural thickening series 601 images 64 through 66  APPENDIX:  No findings to suggest appendicitis  ABDOMINOPELVIC CAVITY:  No ascites or free intraperitoneal air  No lymphadenopathy  VESSELS:  Unremarkable for patient's age  PELVIS REPRODUCTIVE ORGANS:  Unremarkable for patient's age  URINARY BLADDER:  Unremarkable  ABDOMINAL WALL/INGUINAL REGIONS:  Unremarkable  OSSEOUS STRUCTURES:  No acute fracture or destructive osseous lesion  Grade 1 anterolisthesis of L5 and S1 is noted with associated degenerative change  Impression: No CT evidence of metastatic disease within the chest abdomen or pelvis  Interval decrease in size of previously seen descending colon mass  Workstation performed: [id number]     Attestation:   By signing my name below, Rod Parham, attest that this documentation has been prepared under the direction and in the presence of GEE Overton  Electronically Signed: Marcello Ivory  8/27/19       I, Valentina Elena, personally performed the services described in this documentation  All medical record entries made by the johannaibpoonam were at my direction and in my presence   I have reviewed the chart and discharge instructions and agree that the record reflects my personal performance and is accurate and complete   GEE Andersen  8/27/19

## 2019-09-18 ENCOUNTER — TRANSCRIBE ORDERS (OUTPATIENT)
Dept: LAB | Facility: CLINIC | Age: 84
End: 2019-09-18

## 2019-09-18 ENCOUNTER — APPOINTMENT (OUTPATIENT)
Dept: LAB | Facility: CLINIC | Age: 84
End: 2019-09-18
Payer: COMMERCIAL

## 2019-09-18 DIAGNOSIS — Z00.00 ROUTINE GENERAL MEDICAL EXAMINATION AT A HEALTH CARE FACILITY: ICD-10-CM

## 2019-09-18 DIAGNOSIS — R53.83 OTHER FATIGUE: ICD-10-CM

## 2019-09-18 DIAGNOSIS — Z79.899 ENCOUNTER FOR LONG-TERM (CURRENT) USE OF MEDICATIONS: ICD-10-CM

## 2019-09-18 DIAGNOSIS — G11.4 AUTOSOMAL RECESSIVE SPASTIC PARAPLEGIA ASSOCIATED WITH MUTATION IN C19ORF12 GENE (HCC): ICD-10-CM

## 2019-09-18 DIAGNOSIS — K22.70 BARRETT'S ESOPHAGUS WITHOUT DYSPLASIA: ICD-10-CM

## 2019-09-18 DIAGNOSIS — I10 HYPERTENSION, UNSPECIFIED TYPE: Primary | ICD-10-CM

## 2019-09-18 DIAGNOSIS — N42.9 DISEASE OF PROSTATE: ICD-10-CM

## 2019-09-18 DIAGNOSIS — R60.9 EDEMA, UNSPECIFIED TYPE: ICD-10-CM

## 2019-09-18 DIAGNOSIS — D64.9 ANEMIA, UNSPECIFIED TYPE: ICD-10-CM

## 2019-09-18 DIAGNOSIS — I48.20 CHRONIC ATRIAL FIBRILLATION (HCC): ICD-10-CM

## 2019-09-18 DIAGNOSIS — I42.0 CONGESTIVE CARDIOMYOPATHY (HCC): ICD-10-CM

## 2019-09-18 DIAGNOSIS — N18.9 CHRONIC KIDNEY DISEASE, UNSPECIFIED CKD STAGE: ICD-10-CM

## 2019-09-18 DIAGNOSIS — I10 HYPERTENSION, UNSPECIFIED TYPE: ICD-10-CM

## 2019-09-18 LAB
ALBUMIN SERPL BCP-MCNC: 3.9 G/DL (ref 3.5–5)
ALP SERPL-CCNC: 88 U/L (ref 46–116)
ALT SERPL W P-5'-P-CCNC: 22 U/L (ref 12–78)
ANION GAP SERPL CALCULATED.3IONS-SCNC: 9 MMOL/L (ref 4–13)
AST SERPL W P-5'-P-CCNC: 20 U/L (ref 5–45)
BASOPHILS # BLD AUTO: 0.05 THOUSANDS/ΜL (ref 0–0.1)
BASOPHILS NFR BLD AUTO: 1 % (ref 0–1)
BILIRUB SERPL-MCNC: 0.83 MG/DL (ref 0.2–1)
BUN SERPL-MCNC: 30 MG/DL (ref 5–25)
CALCIUM SERPL-MCNC: 9.1 MG/DL (ref 8.3–10.1)
CHLORIDE SERPL-SCNC: 106 MMOL/L (ref 100–108)
CHOLEST SERPL-MCNC: 128 MG/DL (ref 50–200)
CO2 SERPL-SCNC: 27 MMOL/L (ref 21–32)
CREAT SERPL-MCNC: 1.89 MG/DL (ref 0.6–1.3)
EOSINOPHIL # BLD AUTO: 0.27 THOUSAND/ΜL (ref 0–0.61)
EOSINOPHIL NFR BLD AUTO: 4 % (ref 0–6)
ERYTHROCYTE [DISTWIDTH] IN BLOOD BY AUTOMATED COUNT: 14.5 % (ref 11.6–15.1)
GFR SERPL CREATININE-BSD FRML MDRD: 30 ML/MIN/1.73SQ M
GLUCOSE P FAST SERPL-MCNC: 99 MG/DL (ref 65–99)
HCT VFR BLD AUTO: 42.5 % (ref 36.5–49.3)
HDLC SERPL-MCNC: 28 MG/DL (ref 40–60)
HGB BLD-MCNC: 13.6 G/DL (ref 12–17)
IMM GRANULOCYTES # BLD AUTO: 0.01 THOUSAND/UL (ref 0–0.2)
IMM GRANULOCYTES NFR BLD AUTO: 0 % (ref 0–2)
LDLC SERPL CALC-MCNC: 73 MG/DL (ref 0–100)
LYMPHOCYTES # BLD AUTO: 1.41 THOUSANDS/ΜL (ref 0.6–4.47)
LYMPHOCYTES NFR BLD AUTO: 20 % (ref 14–44)
MCH RBC QN AUTO: 31.7 PG (ref 26.8–34.3)
MCHC RBC AUTO-ENTMCNC: 32 G/DL (ref 31.4–37.4)
MCV RBC AUTO: 99 FL (ref 82–98)
MONOCYTES # BLD AUTO: 0.77 THOUSAND/ΜL (ref 0.17–1.22)
MONOCYTES NFR BLD AUTO: 11 % (ref 4–12)
NEUTROPHILS # BLD AUTO: 4.49 THOUSANDS/ΜL (ref 1.85–7.62)
NEUTS SEG NFR BLD AUTO: 64 % (ref 43–75)
NONHDLC SERPL-MCNC: 100 MG/DL
NRBC BLD AUTO-RTO: 0 /100 WBCS
PLATELET # BLD AUTO: 202 THOUSANDS/UL (ref 149–390)
PMV BLD AUTO: 9.9 FL (ref 8.9–12.7)
POTASSIUM SERPL-SCNC: 4 MMOL/L (ref 3.5–5.3)
PROT SERPL-MCNC: 7.9 G/DL (ref 6.4–8.2)
PSA SERPL-MCNC: 1.7 NG/ML (ref 0–4)
RBC # BLD AUTO: 4.29 MILLION/UL (ref 3.88–5.62)
SODIUM SERPL-SCNC: 142 MMOL/L (ref 136–145)
T4 FREE SERPL-MCNC: 1.13 NG/DL (ref 0.76–1.46)
TRIGL SERPL-MCNC: 135 MG/DL
TSH SERPL DL<=0.05 MIU/L-ACNC: 2.65 UIU/ML (ref 0.36–3.74)
WBC # BLD AUTO: 7 THOUSAND/UL (ref 4.31–10.16)

## 2019-09-18 PROCEDURE — 85025 COMPLETE CBC W/AUTO DIFF WBC: CPT

## 2019-09-18 PROCEDURE — 80061 LIPID PANEL: CPT

## 2019-09-18 PROCEDURE — 80053 COMPREHEN METABOLIC PANEL: CPT

## 2019-09-18 PROCEDURE — 84439 ASSAY OF FREE THYROXINE: CPT

## 2019-09-18 PROCEDURE — 84443 ASSAY THYROID STIM HORMONE: CPT

## 2019-09-18 PROCEDURE — 36415 COLL VENOUS BLD VENIPUNCTURE: CPT

## 2019-09-18 PROCEDURE — 84153 ASSAY OF PSA TOTAL: CPT

## 2019-11-06 ENCOUNTER — REMOTE DEVICE CLINIC VISIT (OUTPATIENT)
Dept: CARDIOLOGY CLINIC | Facility: CLINIC | Age: 84
End: 2019-11-06
Payer: COMMERCIAL

## 2019-11-06 DIAGNOSIS — Z95.0 PACEMAKER: Primary | ICD-10-CM

## 2019-11-06 PROCEDURE — 93294 REM INTERROG EVL PM/LDLS PM: CPT | Performed by: INTERNAL MEDICINE

## 2019-11-06 PROCEDURE — 93296 REM INTERROG EVL PM/IDS: CPT | Performed by: INTERNAL MEDICINE

## 2019-11-06 NOTE — PROGRESS NOTES
Results for orders placed or performed in visit on 11/06/19   Cardiac EP device report    Narrative    KOMAL CRT-P ([facility]IR 70)  MERLIN TRANSMISSION: BATTERY ADEQUATE (6 8 YRS)  BVP 98%  ALL AVAILABLE LEAD PARAMETERS WITHIN NORMAL LIMITS  1 HVR EPISODE (UP  BPM & 6 BEATS)  PT  TAKES ELIQUS & BISOPROLOL  NORMAL DEVICE FUNCTION   PL

## 2019-11-07 ENCOUNTER — ANESTHESIA EVENT (OUTPATIENT)
Dept: GASTROENTEROLOGY | Facility: HOSPITAL | Age: 84
End: 2019-11-07

## 2019-11-07 RX ORDER — SODIUM CHLORIDE, SODIUM LACTATE, POTASSIUM CHLORIDE, CALCIUM CHLORIDE 600; 310; 30; 20 MG/100ML; MG/100ML; MG/100ML; MG/100ML
125 INJECTION, SOLUTION INTRAVENOUS CONTINUOUS
Status: CANCELLED | OUTPATIENT
Start: 2019-11-07

## 2019-11-08 ENCOUNTER — ANESTHESIA (OUTPATIENT)
Dept: GASTROENTEROLOGY | Facility: HOSPITAL | Age: 84
End: 2019-11-08

## 2019-11-08 ENCOUNTER — HOSPITAL ENCOUNTER (OUTPATIENT)
Dept: GASTROENTEROLOGY | Facility: HOSPITAL | Age: 84
Setting detail: OUTPATIENT SURGERY
Discharge: HOME | End: 2019-11-08
Attending: COLON & RECTAL SURGERY | Admitting: COLON & RECTAL SURGERY
Payer: COMMERCIAL

## 2019-11-08 VITALS
HEIGHT: 70 IN | DIASTOLIC BLOOD PRESSURE: 59 MMHG | HEART RATE: 65 BPM | RESPIRATION RATE: 16 BRPM | WEIGHT: 195 LBS | OXYGEN SATURATION: 95 % | SYSTOLIC BLOOD PRESSURE: 110 MMHG | BODY MASS INDEX: 27.92 KG/M2 | TEMPERATURE: 97.7 F

## 2019-11-08 DIAGNOSIS — Z85.038 PERSONAL HISTORY OF COLON CANCER: ICD-10-CM

## 2019-11-08 DIAGNOSIS — C18.5 CANCER OF SPLENIC FLEXURE (HCC): Primary | ICD-10-CM

## 2019-11-08 DIAGNOSIS — K21.00 GASTROESOPHAGEAL REFLUX DISEASE WITH ESOPHAGITIS: ICD-10-CM

## 2019-11-08 PROCEDURE — 88305 TISSUE EXAM BY PATHOLOGIST: CPT | Performed by: PATHOLOGY

## 2019-11-08 PROCEDURE — 99213 OFFICE O/P EST LOW 20 MIN: CPT | Performed by: COLON & RECTAL SURGERY

## 2019-11-08 PROCEDURE — 43239 EGD BIOPSY SINGLE/MULTIPLE: CPT | Performed by: INTERNAL MEDICINE

## 2019-11-08 PROCEDURE — 45385 COLONOSCOPY W/LESION REMOVAL: CPT | Performed by: COLON & RECTAL SURGERY

## 2019-11-08 RX ORDER — PROPOFOL 10 MG/ML
INJECTION, EMULSION INTRAVENOUS AS NEEDED
Status: DISCONTINUED | OUTPATIENT
Start: 2019-11-08 | End: 2019-11-08 | Stop reason: SURG

## 2019-11-08 RX ORDER — LIDOCAINE HYDROCHLORIDE 10 MG/ML
INJECTION, SOLUTION INFILTRATION; PERINEURAL AS NEEDED
Status: DISCONTINUED | OUTPATIENT
Start: 2019-11-08 | End: 2019-11-08 | Stop reason: SURG

## 2019-11-08 RX ORDER — SODIUM CHLORIDE 9 MG/ML
INJECTION, SOLUTION INTRAVENOUS CONTINUOUS PRN
Status: DISCONTINUED | OUTPATIENT
Start: 2019-11-08 | End: 2019-11-08 | Stop reason: SURG

## 2019-11-08 RX ORDER — PROPOFOL 10 MG/ML
INJECTION, EMULSION INTRAVENOUS CONTINUOUS PRN
Status: DISCONTINUED | OUTPATIENT
Start: 2019-11-08 | End: 2019-11-08 | Stop reason: SURG

## 2019-11-08 RX ADMIN — LIDOCAINE HYDROCHLORIDE 50 MG: 10 INJECTION, SOLUTION INFILTRATION; PERINEURAL at 09:22

## 2019-11-08 RX ADMIN — SODIUM CHLORIDE: 0.9 INJECTION, SOLUTION INTRAVENOUS at 09:14

## 2019-11-08 RX ADMIN — PROPOFOL 90 MCG/KG/MIN: 10 INJECTION, EMULSION INTRAVENOUS at 09:22

## 2019-11-08 RX ADMIN — PROPOFOL 80 MG: 10 INJECTION, EMULSION INTRAVENOUS at 09:22

## 2019-11-08 NOTE — ANESTHESIA POSTPROCEDURE EVALUATION
Post-Op Assessment Note    CV Status:  Stable  Pain Score: 0    Pain management: satisfactory to patient     Mental Status:  Alert and awake   Hydration Status:  Stable   PONV Controlled:  None   Airway Patency:  Patent and adequate   Post Op Vitals Reviewed: Yes      Staff: CRNA           BP 90/55 (11/08/19 1001)    Temp      Pulse 70 (11/08/19 1001)   Resp 16 (11/08/19 1001)    SpO2 95 % (11/08/19 1001)

## 2019-11-08 NOTE — H&P
History and Physical   Colon and Rectal Surgery   Holli May 80 y o  male MRN: [id number]  Unit/Bed#:  Encounter: [id number]  11/08/19   9:09 AM      No chief complaint on file  History of Present Illness   HPI:  Holli May is a 80 y o  male, history  metachronous Ca of splenic flexure, s/p laparoscopic left hemicolectomy with anastomosis history of right hemicolectomy Stage 0 1997  Mismatch repair noted in MLH1 and PMS2, MMR study and informed to the patient and daughter  Post laparoscopic, left kaylin-coletomy, end to end transverse to sigmoid colon anastamosis on 1/8/19  Pathology showed at least Stage IIA - pT3, pN0, G2        For surveillance colonoscopy, EGD for reflux, question Sargent's  Historical Information   Past Medical History:   Diagnosis Date    Anemia     Atrial fibrillation ([address] )     BPH (benign prostatic hypertrophy)     Cancer (HCC)     COLON    Chronic kidney disease     Hypertension     Irregular heart beat     afib     Past Surgical History:   Procedure Laterality Date    BASAL CELL CARCINOMA EXCISION      CARDIAC PACEMAKER PLACEMENT      CARDIAC SURGERY      CARDIOVERSION      CARPAL TUNNEL RELEASE      CATARACT EXTRACTION      COLON SURGERY      COLONOSCOPY      [place] COLONOSCOPY FLX DX W/COLLJ SPEC WHEN PFRMD N/A 11/30/2018    Procedure: COLONOSCOPY w egd  by dr Jazmyne White;  Surgeon: Wiliam Morales MD;  [facility]; Service: Colorectal    [place] LAP,SURG,COLECTOMY, PARTIAL, W/ANAST N/A 1/8/2019    Procedure: Left hemicolectomy, takedown splenic flexure, end to end transverse to sigmoid colon anastamosis;   Surgeon: Wiliam Morales MD;  [facility];  Service: Colorectal       Meds/Allergies       (Not in a hospital admission)      Current Outpatient Medications:     bisoprolol (ZEBETA) 5 mg tablet, Take 0 5 tablets (2 5 mg total) by mouth daily, Disp: 90 tablet, Rfl: 3    CHLORPHENIRAMINE MALEATE PO, Take by mouth every 4 (four) hours as needed, Disp: , Rfl:     ELIQUIS 2 5 MG, TAKE 1 TABLET TWICE A DAY, Disp: 180 tablet, Rfl: 2    finasteride (PROSCAR) 5 mg tablet, Take 5 mg by mouth daily, Disp: , Rfl:     Multiple Vitamins-Minerals (CENTRUM SILVER 50+MEN PO), Take 1 tablet by mouth daily, Disp: , Rfl:     pantoprazole (PROTONIX) 40 mg tablet, Take 40 mg by mouth daily, Disp: , Rfl:     torsemide (DEMADEX) 10 mg tablet, take 1 tablet by mouth once daily for 3 days then as directed, Disp: , Rfl: 0    triamcinolone (KENALOG) 0 1 % cream, APPLY TO SKIN TWICE DAILY TO AFFECTED AREAS ON BODY THROUGHOUT, Disp: , Rfl: 0    Allergies   Allergen Reactions    Lasix [Furosemide] Other (See Comments)     Weakness, decreased BP    Adhesive [Medical Tape] Rash    Neomycin-Bacitracin Zn-Polymyx Rash    Neosporin [Neomycin-Polymyxin-Gramicidin] Rash         Social History   Social History     Substance and Sexual Activity   Alcohol Use Yes    Comment: occasional     Social History     Substance and Sexual Activity   Drug Use No     Social History     Tobacco Use   Smoking Status Never Smoker   Smokeless Tobacco Never Used         Family History:   Family History   Problem Relation Age of Onset    Heart disease Father     Heart attack Father     Hypertension Father     Heart disease Brother     Heart attack Brother 64    Hypertension Brother     Heart attack Brother 67    Hypertension Brother     Arrhythmia Neg Hx     Asthma Neg Hx     Clotting disorder Neg Hx     Heart failure Neg Hx          Objective     Current Vitals:   Blood Pressure: 131/63 (11/08/19 0850)  Pulse: 72 (11/08/19 0850)  Temperature: 97 7 °F (36 5 °C) (11/08/19 0850)  Temp Source: Oral (11/08/19 0850)  Respirations: 18 (11/08/19 0850)  Height: 5' 9 5" (176 5 cm) (11/08/19 0850)  Weight - Scale: 88 5 kg (195 lb) (11/08/19 0850)  SpO2: 96 % (11/08/19 0850)  No intake or output data in the 24 hours ending 11/08/19 0909    Physical Exam:  General:no distress  Eyes:perrla/eomi  ENT:moist mucus membranes  Neck:supple  Pulm:no increased work of breathing  CV:sinus    ASSESSMENT:    Metachronous colon cancer, mismatch repair abnormality    PLAN:  Colonoscopy/EGD

## 2019-11-17 NOTE — PROGRESS NOTES
Cardiology Follow Up    Dora Pinzon  6/16/1927  [id number]  HEART & VASCULAR [facility] [place]  [address]        Assessment/Plan     1  Chronic atrial fibrillation  POCT ECG   2  Other cardiomyopathy ([facility] [address] )     3  Stage 3 chronic kidney disease ([facility] [address] )     4  Biventricular cardiac pacemaker in situ     5  Anticoagulation adequate         ASSESSMENT:  1  Chronic atrial fibrillation with rapid ventricular response, status post AV node ablation and St  David biventricular pacemaker 3/2015   A ) on low dose Eliquis anticoagulation  2  Nonischemic cardiomyopathy with LVEF 45% per echo 12/2018   A ) thought to be tachy-mediated   B ) no obstructive CAD per cath 2005    C ) on low-dose beta-blocker, not on Ace inhibitor or Arb due to CKD  3  Chronic systolic congestive heart failure  4  Hypertension  5  CKD  6  Colon cancer status post hemicolectomy 1/2019    DISCUSSION/SUMMARY:  1  Chronic atrial fibrillation status post prior AV node ablation with St  David BiV pacemaker in situ - he is maintained on low dose Eliquis, which he is tolerating well  He denies recent falls or balance issues  Most recent device interrogation shows appropriate function  He was noted to have a 7 beat run of NSVT with which he was asymptomatic  He is maintained on bisoprolol, and for now he will continue this regimen  We will continue to monitor for further arrhythmias through routine device interrogations  2  Nonischemic cardiomyopathy with chronic systolic congestive heart failure - he reports a 1 lb weight gain along with lower extremity edema  He typically takes torsemide every 3rd day, and is due to take this today  His most recent creatinine from 9/2019 was elevated (1 86), so I am hesitant to increase this further  He knows to take this 2-3 days straight if his symptoms do not improve  He weighs himself daily, and monitors his symptoms closely   He knows to contact us if his edema does not improve or if other symptoms present  3  Hypertension - His blood pressure today is 132/70, and he will continue his current regimen of low-dose bisoprolol  We will continue to monitor this moving forward  4  CKD -  As noted above, his most recent creatinine was 1 86 per labs 09/2019  This is primarily managed by his PCP, and he is due for repeat labs within the next few weeks  He has follow-up with Dr Lashon Callahan in December for ongoing monitoring  He will continue to follow up every 6 months, and is due for upcoming labs through his PCP within the next several weeks  He will continue to monitor his weight daily, and will contact us if he has worsening edema or other signs of heart failure  He otherwise knows to contact us with any questions, concerns, or other changes      History of Present Illness     HPI/INTERVAL HISTORY: Holli May is a 80 y o  male with a history of persistent atrial fibrillation with rapid ventricular response status post AV node ablation and St  David biventricular pacemaker placement, chronic systolic congestive heart failure with most recent LVEF 45%, nonischemic cardiomyopathy, hypertension, CKD, and colon cancer status post hemicolectomy 1/2019  He typically follows with Dr Francisco J Tejada  He apparently has had a nonischemic cardiomyopathy dating back to 2005, at which time he underwent a cardiac cath which showed no obstructive CAD with LVEF in the high 20s  His EF eventually improved while on optimal medical therapy, with EF as high as 50% per echo 11/2014  He developed atrial flutter with rapid ventricular response in 2012, and spontaneously converted back to normal sinus rhythm  While different treatment options were discussed, no definite therapy was pursued at that time  He remained in sinus rhythm until December 2014, when he reverted back to afib/flutter with rapid ventricular response   He was originally placed on amiodarone, and while this helped with rate control he remained in afib  This was eventually discontinued and changed to bisoprolol (he had a prior intolerance to metoprolol)  He began to experience symptoms consistent  In symptoms  with heart failure, and his EF was found to have dropped back down to 30%  Rhythm versus rate controlling strategies were discussed, and an AV node ablation/biventricular pacemaker implantation was ultimately performed so he did not have to take as many medications moving forward  He underwent this procedure 3/2015  Most recent echo 12/2018 showed LVEF 45%  He was last seen by Dr Cabrera Angry 6/2019, at which time he was volume overloaded and his diuretics were increased for a short period  He presents today in routine follow up  In general, he reports doing well since he was last seen  He denies chest pain or pressure, palpitations, dizziness, lightheadedness, presyncope, or syncope  He admits to lower extremity edema, for which he takes torsemide every 3rd day  After taking this, he does notice improvement in his edema  He sleeps on 2 pillows, but has been doing this for at least the past year  He denies paroxysmal nocturnal dyspnea, and in general denies significant shortness of breath  He weighs himself daily, and monitors his fluid and salt intake  He has had no issues with his prior pacemaker site, and is compliant with his medications as well as schedule device follow-ups  Review of Systems  ROS as noted above, otherwise 12 point review of systems was performed and is negative  Historical Information   Social History     Socioeconomic History    Marital status:       Spouse name: Not on file    Number of children: Not on file    Years of education: Not on file    Highest education level: Not on file   Occupational History    Not on file   Social Needs    Financial resource strain: Not on file    Food insecurity:     Worry: Not on file     Inability: Not on file    Transportation needs: Medical: Not on file     Non-medical: Not on file   Tobacco Use    Smoking status: Never Smoker    Smokeless tobacco: Never Used   Substance and Sexual Activity    Alcohol use: Yes     Comment: occasional    Drug use: No    Sexual activity: Not on file   Lifestyle    Physical activity:     Days per week: Not on file     Minutes per session: Not on file    Stress: Not on file   Relationships    Social connections:     Talks on phone: Not on file     Gets together: Not on file     Attends Orthodoxy service: Not on file     Active member of club or organization: Not on file     Attends meetings of clubs or organizations: Not on file     Relationship status: Not on file    Intimate partner violence:     Fear of current or ex partner: Not on file     Emotionally abused: Not on file     Physically abused: Not on file     Forced sexual activity: Not on file   Other Topics Concern    Not on file   Social History Narrative    Not on file     Past Medical History:   Diagnosis Date    Anemia     Atrial fibrillation ([facility] [address] )     BPH (benign prostatic hypertrophy)     Cancer ([facility] [address] )     COLON    Chronic kidney disease     Hypertension     Irregular heart beat     afib     Past Surgical History:   Procedure Laterality Date    BASAL CELL CARCINOMA EXCISION      CARDIAC PACEMAKER PLACEMENT      CARDIAC SURGERY      CARDIOVERSION      CARPAL TUNNEL RELEASE      CATARACT EXTRACTION      COLON SURGERY      COLONOSCOPY      UT COLONOSCOPY FLX DX W/COLLJ [address] PFRMD N/A 11/30/2018    Procedure: COLONOSCOPY w egd  by dr Isi Herrera;  Surgeon: Dennie Pang, MD;  Location: [facility]; Service: Colorectal    UT LAP,SURG,COLECTOMY, PARTIAL, W/ANAST N/A 1/8/2019    Procedure: Left hemicolectomy, takedown splenic flexure, end to end transverse to sigmoid colon anastamosis;   Surgeon: Dennie Pang, MD;  Location: [facility];  Service: Colorectal     Social History     Substance and Sexual Activity   Alcohol Use Yes    Comment: occasional     Social History     Substance and Sexual Activity   Drug Use No     Social History     Tobacco Use   Smoking Status Never Smoker   Smokeless Tobacco Never Used     Family History   Problem Relation Age of Onset    Heart disease Father     Heart attack Father     Hypertension Father     Heart disease Brother     Heart attack Brother 64    Hypertension Brother     Heart attack Brother 67    Hypertension Brother     Arrhythmia Neg Hx     Asthma Neg Hx     Clotting disorder Neg Hx     Heart failure Neg Hx        Meds/Allergies       Current Outpatient Medications:     bisoprolol (ZEBETA) 5 mg tablet, Take 0 5 tablets (2 5 mg total) by mouth daily, Disp: 90 tablet, Rfl: 3    CHLORPHENIRAMINE MALEATE PO, Take by mouth every 4 (four) hours as needed, Disp: , Rfl:     ELIQUIS 2 5 MG, TAKE 1 TABLET TWICE A DAY, Disp: 180 tablet, Rfl: 2    finasteride (PROSCAR) 5 mg tablet, Take 5 mg by mouth daily, Disp: , Rfl:     Multiple Vitamins-Minerals (CENTRUM SILVER 50+MEN PO), Take 1 tablet by mouth daily, Disp: , Rfl:     pantoprazole (PROTONIX) 40 mg tablet, Take 40 mg by mouth daily, Disp: , Rfl:     torsemide (DEMADEX) 10 mg tablet, take 1 tablet by mouth once daily for 3 days then as directed, Disp: , Rfl: 0    triamcinolone (KENALOG) 0 1 % cream, APPLY TO SKIN TWICE DAILY TO AFFECTED AREAS ON BODY THROUGHOUT, Disp: , Rfl: 0    Allergies   Allergen Reactions    Lasix [Furosemide] Other (See Comments)     Weakness, decreased BP    Adhesive [Medical Tape] Rash    Neomycin-Bacitracin Zn-Polymyx Rash    Neosporin [Neomycin-Polymyxin-Gramicidin] Rash       Objective   Vitals: Blood pressure 132/70, pulse 70, height 5' 9 5" (1 765 m), weight 89 1 kg (196 lb 8 oz)          Physical Exam  GEN: NAD, alert and oriented, well appearing  SKIN: dry without significant lesions or rashes  HEENT: NCAT, PERRL, EOMs intact  NECK: No JVD appreciated  CARDIOVASCULAR: RRR, normal S1, S2 without murmurs, rubs, or gallops appreciated  LUNGS: Clear to auscultation bilaterally without wheezes, rhonchi, or rales; good overall effort  ABDOMEN: Soft, nontender, nondistended  EXTREMITIES/VASCULAR: perfused without clubbing, cyanosis; 2+ pitting LE edema b/l  PSYCH: Normal mood and affect  NEURO: CN ll-Xll grossly intact        Labs:  Hospital Outpatient Visit on 11/08/2019   Component Date Value    Case Report 11/08/2019                      Value:Surgical Pathology Report                         Case: [id number]                                   Authorizing Provider:  Baltazar Greer MD           Collected:           11/08/2019 3104              Ordering Location:     [address] [place] [address]      Received:            11/08/2019 2005 [facility]                                                           Pathologist:           Nasra Mario MD                                                                 Specimens:   A) - Esophagus, esophagus bx at 43 cm-cold                                                          B) - Polyp, Colorectal, Transverse colon polyp-cold snare                                           C) - Polyp, Colorectal, Transverse colonat 60 cm polyp-cold snare                          Final Diagnosis 11/08/2019                      Value: This result contains rich text formatting which cannot be displayed here   Additional Information 11/08/2019                      Value: This result contains rich text formatting which cannot be displayed here   Synoptic Checklist 11/08/2019                      Value:  (COLON/RECTUM POLYP FORM-GI - All Specimens)                                                                                                                 : Adenoma(s)     Teresa Lovell Description 11/08/2019                      Value: This result contains rich text formatting which cannot be displayed here      Clinical Information 11/08/2019 Value:R/O Barretts    Case Report 11/08/2019                      Value:Surgical Pathology Report                         Case: [id number]                                   Authorizing Provider:  [place] MD           Collected:           11/08/2019 6693              Ordering Location:     [address] [place] [address]      Received:            11/08/2019 [address] [facility]                                                           Pathologist:           Kaushik Ordonez MD                                                                 Specimens:   A) - Esophagus, esophagus bx at 43 cm-cold                                                          B) - Polyp, Colorectal, Transverse colon polyp-cold snare                                           C) - Polyp, Colorectal, Transverse colonat 60 cm polyp-cold snare                          Final Diagnosis 11/08/2019                      Value: This result contains rich text formatting which cannot be displayed here   Additional Information 11/08/2019                      Value: This result contains rich text formatting which cannot be displayed here   Synoptic Checklist 11/08/2019                      Value:  (COLON/RECTUM POLYP FORM-GI - All Specimens)                                                                                                                 : Adenoma(s)     Doug Gamma Description 11/08/2019                      Value: This result contains rich text formatting which cannot be displayed here      Clinical Information 11/08/2019                      Value:R/O Barretts   Appointment on 09/18/2019   Component Date Value    WBC 09/18/2019 7 00     RBC 09/18/2019 4 29     Hemoglobin 09/18/2019 13 6     Hematocrit 09/18/2019 42 5     MCV 09/18/2019 99*    MCH 09/18/2019 31 7     MCHC 09/18/2019 32 0     RDW 09/18/2019 14 5     MPV 09/18/2019 9 9     Platelets 23/09/9528 202     nRBC 09/18/2019 0     Neutrophils Relative 09/18/2019 64     Immat GRANS % 09/18/2019 0     Lymphocytes Relative 09/18/2019 20     Monocytes Relative 09/18/2019 11     Eosinophils Relative 09/18/2019 4     Basophils Relative 09/18/2019 1     Neutrophils Absolute 09/18/2019 4 49     Immature Grans Absolute 09/18/2019 0 01     Lymphocytes Absolute 09/18/2019 1 41     Monocytes Absolute 09/18/2019 0 77     Eosinophils Absolute 09/18/2019 0 27     Basophils Absolute 09/18/2019 0 05     Sodium 09/18/2019 142     Potassium 09/18/2019 4 0     Chloride 09/18/2019 106     CO2 09/18/2019 27     ANION GAP 09/18/2019 9     BUN 09/18/2019 30*    Creatinine 09/18/2019 1 89*    Glucose, Fasting 09/18/2019 99     Calcium 09/18/2019 9 1     AST 09/18/2019 20     ALT 09/18/2019 22     Alkaline Phosphatase 09/18/2019 88     Total Protein 09/18/2019 7 9     Albumin 09/18/2019 3 9     Total Bilirubin 09/18/2019 0 83     eGFR 09/18/2019 30     Cholesterol 09/18/2019 128     Triglycerides 09/18/2019 135     HDL, Direct 09/18/2019 28*    LDL Calculated 09/18/2019 73     Non-HDL-Chol (CHOL-HDL) 09/18/2019 100     Free T4 09/18/2019 1 13     TSH 3RD GENERATON 09/18/2019 2 650     PSA, Diagnostic 09/18/2019 1 7    Appointment on 07/29/2019   Component Date Value    WBC 07/29/2019 6 66     RBC 07/29/2019 4 81     Hemoglobin 07/29/2019 15 3     Hematocrit 07/29/2019 47 8     MCV 07/29/2019 99*    MCH 07/29/2019 31 8     MCHC 07/29/2019 32 0     RDW 07/29/2019 13 2     MPV 07/29/2019 10 3     Platelets 47/02/0328 180     nRBC 07/29/2019 0     Neutrophils Relative 07/29/2019 59     Immat GRANS % 07/29/2019 0     Lymphocytes Relative 07/29/2019 25     Monocytes Relative 07/29/2019 11     Eosinophils Relative 07/29/2019 4     Basophils Relative 07/29/2019 1     Neutrophils Absolute 07/29/2019 3 93     Immature Grans Absolute 07/29/2019 0 02     Lymphocytes Absolute 07/29/2019 1 66     Monocytes Absolute 07/29/2019 0 75     Eosinophils Absolute 2019 0 27     Basophils Absolute 2019 0 03     Sodium 2019 140     Potassium 2019 3 9     Chloride 2019 105     CO2 2019 26     ANION GAP 2019 9     BUN 2019 34*    Creatinine 2019 1 69*    Glucose, Fasting 2019 150*    Calcium 2019 9 3     AST 2019 23     ALT 2019 22     Alkaline Phosphatase 2019 80     Total Protein 2019 7 9     Albumin 2019 3 4*    Total Bilirubin 2019 0 57     eGFR 2019 34     CEA 2019 1 0        Imaging: I have personally reviewed pertinent reports  ECHO:   Results for orders placed during the hospital encounter of 18   Echo complete with contrast if indicated    N[facility]ative [facility]  [facility], [address] [facility]  [phone]    Transthoracic Echocardiogram  2D, M-mode, Doppler, and Color Doppler    Study date:  21-Dec-2018    Patient: Gigi Aparicio  MR number: [id number]  Account number: [de-identified]  : 1927  Age: 80 years  Gender: Male  Status: Outpatient  Location: [address] [place] [address] [facility] [place]  Height: 69 in  Weight: 202 lb  BP: 102/ 64 mmHg    Indications: Atrial fibrillation    Diagnoses: I48 0 - Atrial fibrillation    Sonographer:  JOSEFINA Sullivan  Primary Physician:  Jillian Pyle MD  Referring Physician:  Neal Madison DO  Group:  [id number]  Cardiology Fellow:  Jez Pickard MD  Interpreting Physician:  Michael Silva DO    SUMMARY    LEFT VENTRICLE:  Systolic function was mildly reduced  Ejection fraction was estimated to be 45 %  There was mild diffuse hypokinesis with regional variations including basal to mid inferolateral and basal inferior hypokinesis  There was mild concentric hypertrophy  RIGHT VENTRICLE:  The ventricle was mildly dilated  Systolic function was reduced      LEFT ATRIUM:  The atrium was mildly to moderately dilated  RIGHT ATRIUM:  The atrium was moderately dilated  TRICUSPID VALVE:  There was moderate regurgitation  Estimated peak PA pressure was 42 mmHg  HISTORY: PRIOR HISTORY: Hypertension, atrial fibrillation, pacemaker, cardiomyopathy, chronic kidney disease    PROCEDURE: The study was performed in the [address] [facility] [place]  This was a routine study  The transthoracic approach was used  The study included complete 2D imaging, M-mode, complete spectral Doppler, and color Doppler  Image  quality was adequate  LEFT VENTRICLE: Size was normal  Systolic function was mildly reduced  Ejection fraction was estimated to be 45 %  There was mild diffuse hypokinesis with regional variations including basal to mid inferolateral and basal inferior  hypokinesis Wall thickness was mildly increased  There was mild concentric hypertrophy  DOPPLER: Transmitral flow pattern: atrial fibrillation  RIGHT VENTRICLE: The ventricle was mildly dilated  Systolic function was reduced  Wall thickness was normal  A pacing wire was present  LEFT ATRIUM: The atrium was mildly to moderately dilated  RIGHT ATRIUM: The atrium was moderately dilated  A pacing wire was present  MITRAL VALVE: Valve structure was normal  There was normal leaflet separation  DOPPLER: The transmitral velocity was within the normal range  There was no evidence for stenosis  There was no significant regurgitation  AORTIC VALVE: The valve was trileaflet  Leaflets exhibited mildly increased thickness, mild calcification, and normal cuspal separation  DOPPLER: Transaortic velocity was within the normal range  There was no evidence for stenosis  There  was no significant regurgitation  TRICUSPID VALVE: The valve structure was normal  There was normal leaflet separation  DOPPLER: The transtricuspid velocity was within the normal range  There was no evidence for stenosis  There was moderate regurgitation   Estimated peak PA  pressure was 42 mmHg  PULMONIC VALVE: Leaflets exhibited normal thickness, no calcification, and normal cuspal separation  DOPPLER: The transpulmonic velocity was within the normal range  There was trace regurgitation  PERICARDIUM: There was no pericardial effusion  The pericardium was normal in appearance  AORTA: The root exhibited normal size  The ascending aorta was upper normal in size  SYSTEMIC VEINS: IVC: The inferior vena cava was normal in size  Respirophasic changes were normal     SYSTEM MEASUREMENT TABLES    2D  %FS: 17 03 %  Ao Diam: 3 79 cm  EDV(Teich): 129 52 ml  EF Biplane: 42 45 %  EF(Cube): 42 88 %  EF(Teich): 35 35 %  ESV(Cube): 80 33 ml  ESV(Teich): 83 74 ml  IVSd: 1 31 cm  LA Area: 24 22 cm2  LA Diam: 4 28 cm  LVEDV MOD A2C: 220 9 ml  LVEDV MOD A4C: 205 11 ml  LVEDV MOD BP: 215 2 ml  LVEF MOD A2C: 40 58 %  LVEF MOD A4C: 44 97 %  LVESV MOD A2C: 131 27 ml  LVESV MOD A4C: 112 87 ml  LVESV MOD BP: 123 84 ml  LVIDd: 5 2 cm  LVIDs: 4 31 cm  LVLd A2C: 9 46 cm  LVLd A4C: 9 25 cm  LVLs A2C: 8 73 cm  LVLs A4C: 8 41 cm  LVPWd: 1 31 cm  RA Area: 23 9 cm2  RV Diam : 5 16 cm  SV MOD A2C: 89 63 ml  SV MOD A4C: 92 23 ml  SV(Cube): 60 3 ml  SV(Teich): 45 78 ml    CW  TR Vmax: 3 08 m/s  TR maxP 97 mmHg    MM  TAPSE: 1 41 cm    [facility]    Prepared and electronically signed by    Howard Ramos DO  Signed 21-Dec-2018 13:06:44         CATH 2005:         EKG: Suspect atrial fibrillation with biventricular pacing, heart rate 70 beats per minute      DEVICE INTERROGATION 2019: KOMAL CRT-P (VVIR 70)  MERLIN TRANSMISSION: BATTERY ADEQUATE (6 8 YRS)  BVP 98%  ALL AVAILABLE LEAD PARAMETERS WITHIN NORMAL LIMITS  1 HVR EPISODE (UP  BPM & 6 BEATS)  PT  TAKES ELIQUS & BISOPROLOL  NORMAL DEVICE FUNCTION   PL

## 2019-11-19 ENCOUNTER — OFFICE VISIT (OUTPATIENT)
Dept: CARDIOLOGY CLINIC | Facility: CLINIC | Age: 84
End: 2019-11-19
Payer: COMMERCIAL

## 2019-11-19 ENCOUNTER — TELEPHONE (OUTPATIENT)
Dept: GASTROENTEROLOGY | Facility: CLINIC | Age: 84
End: 2019-11-19

## 2019-11-19 VITALS
DIASTOLIC BLOOD PRESSURE: 70 MMHG | SYSTOLIC BLOOD PRESSURE: 132 MMHG | HEIGHT: 70 IN | HEART RATE: 70 BPM | BODY MASS INDEX: 28.13 KG/M2 | WEIGHT: 196.5 LBS

## 2019-11-19 DIAGNOSIS — Z95.0 BIVENTRICULAR CARDIAC PACEMAKER IN SITU: ICD-10-CM

## 2019-11-19 DIAGNOSIS — I48.20 CHRONIC ATRIAL FIBRILLATION (HCC): Primary | ICD-10-CM

## 2019-11-19 DIAGNOSIS — I42.8 OTHER CARDIOMYOPATHY (HCC): Chronic | ICD-10-CM

## 2019-11-19 DIAGNOSIS — Z79.01 ANTICOAGULATION ADEQUATE: ICD-10-CM

## 2019-11-19 DIAGNOSIS — N18.30 STAGE 3 CHRONIC KIDNEY DISEASE (HCC): ICD-10-CM

## 2019-11-19 PROCEDURE — 93000 ELECTROCARDIOGRAM COMPLETE: CPT | Performed by: PHYSICIAN ASSISTANT

## 2019-11-19 PROCEDURE — 99214 OFFICE O/P EST MOD 30 MIN: CPT | Performed by: PHYSICIAN ASSISTANT

## 2019-12-02 ENCOUNTER — APPOINTMENT (OUTPATIENT)
Dept: LAB | Facility: CLINIC | Age: 84
End: 2019-12-02
Payer: COMMERCIAL

## 2019-12-02 ENCOUNTER — TRANSCRIBE ORDERS (OUTPATIENT)
Dept: LAB | Facility: CLINIC | Age: 84
End: 2019-12-02

## 2019-12-02 DIAGNOSIS — N18.9 CHRONIC KIDNEY DISEASE, UNSPECIFIED CKD STAGE: Primary | ICD-10-CM

## 2019-12-02 DIAGNOSIS — N18.9 CHRONIC KIDNEY DISEASE, UNSPECIFIED CKD STAGE: ICD-10-CM

## 2019-12-02 LAB
BUN SERPL-MCNC: 30 MG/DL (ref 5–25)
CREAT SERPL-MCNC: 1.91 MG/DL (ref 0.6–1.3)
GFR SERPL CREATININE-BSD FRML MDRD: 30 ML/MIN/1.73SQ M

## 2019-12-02 PROCEDURE — 84520 ASSAY OF UREA NITROGEN: CPT

## 2019-12-02 PROCEDURE — 82565 ASSAY OF CREATININE: CPT

## 2019-12-02 PROCEDURE — 36415 COLL VENOUS BLD VENIPUNCTURE: CPT

## 2020-01-01 ENCOUNTER — APPOINTMENT (OUTPATIENT)
Dept: LAB | Facility: CLINIC | Age: 85
End: 2020-01-01
Payer: COMMERCIAL

## 2020-01-01 ENCOUNTER — REMOTE DEVICE CLINIC VISIT (OUTPATIENT)
Dept: CARDIOLOGY CLINIC | Facility: CLINIC | Age: 85
End: 2020-01-01
Payer: COMMERCIAL

## 2020-01-01 DIAGNOSIS — R53.83 FATIGUE, UNSPECIFIED TYPE: ICD-10-CM

## 2020-01-01 DIAGNOSIS — I48.20 CHRONIC ATRIAL FIBRILLATION (HCC): ICD-10-CM

## 2020-01-01 DIAGNOSIS — D64.9 ANEMIA, UNSPECIFIED TYPE: ICD-10-CM

## 2020-01-01 DIAGNOSIS — Z79.899 ENCOUNTER FOR LONG-TERM (CURRENT) USE OF OTHER MEDICATIONS: ICD-10-CM

## 2020-01-01 DIAGNOSIS — E78.6 FAMILIAL LIPOPROTEIN DEFICIENCY: ICD-10-CM

## 2020-01-01 DIAGNOSIS — I42.0 PRIMARY DILATED CARDIOMYOPATHY (HCC): ICD-10-CM

## 2020-01-01 DIAGNOSIS — N18.30 STAGE 3 CHRONIC KIDNEY DISEASE, UNSPECIFIED WHETHER STAGE 3A OR 3B CKD (HCC): ICD-10-CM

## 2020-01-01 DIAGNOSIS — K22.719 BARRETT'S ESOPHAGUS WITH DYSPLASIA: ICD-10-CM

## 2020-01-01 DIAGNOSIS — R60.9 BODY FLUID RETENTION: ICD-10-CM

## 2020-01-01 DIAGNOSIS — N42.9 PROSTATIC DISORDER: ICD-10-CM

## 2020-01-01 DIAGNOSIS — I10 ESSENTIAL HYPERTENSION, MALIGNANT: Primary | ICD-10-CM

## 2020-01-01 DIAGNOSIS — I50.9 CHRONIC HEART FAILURE, UNSPECIFIED HEART FAILURE TYPE (HCC): ICD-10-CM

## 2020-01-01 DIAGNOSIS — Z95.0 CARDIAC PACEMAKER IN SITU: Primary | ICD-10-CM

## 2020-01-01 LAB
ALBUMIN SERPL BCP-MCNC: 4.1 G/DL (ref 3.5–5)
ALP SERPL-CCNC: 106 U/L (ref 46–116)
ALT SERPL W P-5'-P-CCNC: 21 U/L (ref 12–78)
ANION GAP SERPL CALCULATED.3IONS-SCNC: 5 MMOL/L (ref 4–13)
AST SERPL W P-5'-P-CCNC: 20 U/L (ref 5–45)
BASOPHILS # BLD AUTO: 0.05 THOUSANDS/ΜL (ref 0–0.1)
BASOPHILS NFR BLD AUTO: 1 % (ref 0–1)
BILIRUB SERPL-MCNC: 1.27 MG/DL (ref 0.2–1)
BUN SERPL-MCNC: 31 MG/DL (ref 5–25)
CALCIUM SERPL-MCNC: 10.2 MG/DL (ref 8.3–10.1)
CEA SERPL-MCNC: 1.2 NG/ML (ref 0–3)
CHLORIDE SERPL-SCNC: 105 MMOL/L (ref 100–108)
CHOLEST SERPL-MCNC: 147 MG/DL (ref 50–200)
CO2 SERPL-SCNC: 30 MMOL/L (ref 21–32)
CREAT SERPL-MCNC: 1.94 MG/DL (ref 0.6–1.3)
EOSINOPHIL # BLD AUTO: 0.29 THOUSAND/ΜL (ref 0–0.61)
EOSINOPHIL NFR BLD AUTO: 5 % (ref 0–6)
ERYTHROCYTE [DISTWIDTH] IN BLOOD BY AUTOMATED COUNT: 14.6 % (ref 11.6–15.1)
GFR SERPL CREATININE-BSD FRML MDRD: 29 ML/MIN/1.73SQ M
GLUCOSE P FAST SERPL-MCNC: 98 MG/DL (ref 65–99)
HCT VFR BLD AUTO: 44 % (ref 36.5–49.3)
HDLC SERPL-MCNC: 30 MG/DL
HGB BLD-MCNC: 14 G/DL (ref 12–17)
IMM GRANULOCYTES # BLD AUTO: 0.01 THOUSAND/UL (ref 0–0.2)
IMM GRANULOCYTES NFR BLD AUTO: 0 % (ref 0–2)
LDLC SERPL CALC-MCNC: 92 MG/DL (ref 0–100)
LYMPHOCYTES # BLD AUTO: 1.84 THOUSANDS/ΜL (ref 0.6–4.47)
LYMPHOCYTES NFR BLD AUTO: 33 % (ref 14–44)
MCH RBC QN AUTO: 32.9 PG (ref 26.8–34.3)
MCHC RBC AUTO-ENTMCNC: 31.8 G/DL (ref 31.4–37.4)
MCV RBC AUTO: 103 FL (ref 82–98)
MONOCYTES # BLD AUTO: 0.69 THOUSAND/ΜL (ref 0.17–1.22)
MONOCYTES NFR BLD AUTO: 12 % (ref 4–12)
NEUTROPHILS # BLD AUTO: 2.74 THOUSANDS/ΜL (ref 1.85–7.62)
NEUTS SEG NFR BLD AUTO: 49 % (ref 43–75)
NONHDLC SERPL-MCNC: 117 MG/DL
NRBC BLD AUTO-RTO: 0 /100 WBCS
PLATELET # BLD AUTO: 180 THOUSANDS/UL (ref 149–390)
PMV BLD AUTO: 10.3 FL (ref 8.9–12.7)
POTASSIUM SERPL-SCNC: 4 MMOL/L (ref 3.5–5.3)
PROT SERPL-MCNC: 8.5 G/DL (ref 6.4–8.2)
PSA SERPL-MCNC: 2.9 NG/ML (ref 0–4)
RBC # BLD AUTO: 4.26 MILLION/UL (ref 3.88–5.62)
SODIUM SERPL-SCNC: 140 MMOL/L (ref 136–145)
T4 FREE SERPL-MCNC: 1.33 NG/DL (ref 0.76–1.46)
TRIGL SERPL-MCNC: 124 MG/DL
TSH SERPL DL<=0.05 MIU/L-ACNC: 2.93 UIU/ML (ref 0.36–3.74)
WBC # BLD AUTO: 5.62 THOUSAND/UL (ref 4.31–10.16)

## 2020-01-01 PROCEDURE — 80053 COMPREHEN METABOLIC PANEL: CPT

## 2020-01-01 PROCEDURE — 93294 REM INTERROG EVL PM/LDLS PM: CPT | Performed by: INTERNAL MEDICINE

## 2020-01-01 PROCEDURE — 80061 LIPID PANEL: CPT

## 2020-01-01 PROCEDURE — 85025 COMPLETE CBC W/AUTO DIFF WBC: CPT

## 2020-01-01 PROCEDURE — 84439 ASSAY OF FREE THYROXINE: CPT

## 2020-01-01 PROCEDURE — 84443 ASSAY THYROID STIM HORMONE: CPT

## 2020-01-01 PROCEDURE — 82378 CARCINOEMBRYONIC ANTIGEN: CPT

## 2020-01-01 PROCEDURE — 93296 REM INTERROG EVL PM/IDS: CPT | Performed by: INTERNAL MEDICINE

## 2020-01-01 PROCEDURE — 36415 COLL VENOUS BLD VENIPUNCTURE: CPT

## 2020-01-01 PROCEDURE — 84153 ASSAY OF PSA TOTAL: CPT

## 2020-02-04 ENCOUNTER — REMOTE DEVICE CLINIC VISIT (OUTPATIENT)
Dept: CARDIOLOGY CLINIC | Facility: CLINIC | Age: 85
End: 2020-02-04
Payer: COMMERCIAL

## 2020-02-04 DIAGNOSIS — Z95.0 PRESENCE OF CARDIAC PACEMAKER: Primary | ICD-10-CM

## 2020-02-04 PROCEDURE — 93296 REM INTERROG EVL PM/IDS: CPT | Performed by: INTERNAL MEDICINE

## 2020-02-04 PROCEDURE — 93294 REM INTERROG EVL PM/LDLS PM: CPT | Performed by: INTERNAL MEDICINE

## 2020-02-04 NOTE — PROGRESS NOTES
Results for orders placed or performed in visit on 02/04/20   Cardiac EP device report    Narrative    SJM CRT-P (VVIR 70)  MERLIN TRANSMISSION: BATTERY VOLTAGE ADEQUATE (6 8 YRS)  BP: 98%  ALL AVAILABLE LEAD PARAMETERS WITHIN NORMAL LIMITS  1 HVR EPISODE W/ EGRAM SHOWING NSVT 7 BEATS @ 192 BPM   PT TAKES ELIQUIS, ZEBETA  EF: 45% (ECHO 12/21/18)  CORVUE IMPEDANCE MONITORING WITHIN NORMAL LIMITS  TASK CO-SIGN DT  DT  PACEMAKER FUNCTIONING APPROPRIATELY    [facility]

## 2020-03-10 ENCOUNTER — APPOINTMENT (OUTPATIENT)
Dept: LAB | Facility: CLINIC | Age: 85
End: 2020-03-10
Payer: COMMERCIAL

## 2020-03-10 ENCOUNTER — TRANSCRIBE ORDERS (OUTPATIENT)
Dept: LAB | Facility: CLINIC | Age: 85
End: 2020-03-10

## 2020-03-10 DIAGNOSIS — N18.2 CHRONIC KIDNEY DISEASE, STAGE II (MILD): Primary | ICD-10-CM

## 2020-03-10 DIAGNOSIS — N18.2 CHRONIC KIDNEY DISEASE, STAGE II (MILD): ICD-10-CM

## 2020-03-10 DIAGNOSIS — C18.5 CANCER OF SPLENIC FLEXURE (HCC): ICD-10-CM

## 2020-03-10 LAB
BUN SERPL-MCNC: 32 MG/DL (ref 5–25)
CEA SERPL-MCNC: 1.8 NG/ML (ref 0–3)
CREAT SERPL-MCNC: 1.46 MG/DL (ref 0.6–1.3)
GFR SERPL CREATININE-BSD FRML MDRD: 41 ML/MIN/1.73SQ M

## 2020-03-10 PROCEDURE — 84520 ASSAY OF UREA NITROGEN: CPT

## 2020-03-10 PROCEDURE — 82565 ASSAY OF CREATININE: CPT

## 2020-03-10 PROCEDURE — 82378 CARCINOEMBRYONIC ANTIGEN: CPT

## 2020-03-10 PROCEDURE — 36415 COLL VENOUS BLD VENIPUNCTURE: CPT

## 2020-04-03 ENCOUNTER — TELEPHONE (OUTPATIENT)
Dept: CARDIOLOGY CLINIC | Facility: CLINIC | Age: 85
End: 2020-04-03

## 2020-04-03 ENCOUNTER — REMOTE DEVICE CLINIC VISIT (OUTPATIENT)
Dept: CARDIOLOGY CLINIC | Facility: CLINIC | Age: 85
End: 2020-04-03
Payer: COMMERCIAL

## 2020-04-03 DIAGNOSIS — Z95.810 PRESENCE OF AUTOMATIC CARDIOVERTER/DEFIBRILLATOR (AICD): Primary | ICD-10-CM

## 2020-04-03 PROCEDURE — G2066 INTER DEVC REMOTE 30D: HCPCS | Performed by: INTERNAL MEDICINE

## 2020-04-03 PROCEDURE — 93297 REM INTERROG DEV EVAL ICPMS: CPT | Performed by: INTERNAL MEDICINE

## 2020-05-06 ENCOUNTER — REMOTE DEVICE CLINIC VISIT (OUTPATIENT)
Dept: CARDIOLOGY CLINIC | Facility: CLINIC | Age: 85
End: 2020-05-06
Payer: COMMERCIAL

## 2020-05-06 DIAGNOSIS — Z95.0 CARDIAC PACEMAKER IN SITU: Primary | ICD-10-CM

## 2020-05-06 PROCEDURE — 93294 REM INTERROG EVL PM/LDLS PM: CPT | Performed by: INTERNAL MEDICINE

## 2020-05-06 PROCEDURE — 93296 REM INTERROG EVL PM/IDS: CPT | Performed by: INTERNAL MEDICINE

## 2020-06-23 ENCOUNTER — TRANSCRIBE ORDERS (OUTPATIENT)
Dept: LAB | Facility: CLINIC | Age: 85
End: 2020-06-23

## 2020-06-23 ENCOUNTER — APPOINTMENT (OUTPATIENT)
Dept: LAB | Facility: CLINIC | Age: 85
End: 2020-06-23
Payer: COMMERCIAL

## 2020-06-23 DIAGNOSIS — C18.6 MALIGNANT NEOPLASM OF DESCENDING COLON (HCC): ICD-10-CM

## 2020-06-23 DIAGNOSIS — N18.9 CHRONIC KIDNEY DISEASE, UNSPECIFIED CKD STAGE: Primary | ICD-10-CM

## 2020-06-23 DIAGNOSIS — I50.22 CHRONIC SYSTOLIC HEART FAILURE (HCC): ICD-10-CM

## 2020-06-23 DIAGNOSIS — N18.9 CHRONIC KIDNEY DISEASE, UNSPECIFIED CKD STAGE: ICD-10-CM

## 2020-06-23 LAB
BASOPHILS # BLD AUTO: 0.04 THOUSANDS/ΜL (ref 0–0.1)
BASOPHILS NFR BLD AUTO: 1 % (ref 0–1)
BUN SERPL-MCNC: 33 MG/DL (ref 5–25)
CREAT SERPL-MCNC: 1.86 MG/DL (ref 0.6–1.3)
EOSINOPHIL # BLD AUTO: 0.25 THOUSAND/ΜL (ref 0–0.61)
EOSINOPHIL NFR BLD AUTO: 4 % (ref 0–6)
ERYTHROCYTE [DISTWIDTH] IN BLOOD BY AUTOMATED COUNT: 15.3 % (ref 11.6–15.1)
GFR SERPL CREATININE-BSD FRML MDRD: 31 ML/MIN/1.73SQ M
HCT VFR BLD AUTO: 46.5 % (ref 36.5–49.3)
HGB BLD-MCNC: 14.4 G/DL (ref 12–17)
IMM GRANULOCYTES # BLD AUTO: 0.01 THOUSAND/UL (ref 0–0.2)
IMM GRANULOCYTES NFR BLD AUTO: 0 % (ref 0–2)
LYMPHOCYTES # BLD AUTO: 1.52 THOUSANDS/ΜL (ref 0.6–4.47)
LYMPHOCYTES NFR BLD AUTO: 26 % (ref 14–44)
MCH RBC QN AUTO: 32.1 PG (ref 26.8–34.3)
MCHC RBC AUTO-ENTMCNC: 31 G/DL (ref 31.4–37.4)
MCV RBC AUTO: 104 FL (ref 82–98)
MONOCYTES # BLD AUTO: 0.71 THOUSAND/ΜL (ref 0.17–1.22)
MONOCYTES NFR BLD AUTO: 12 % (ref 4–12)
NEUTROPHILS # BLD AUTO: 3.36 THOUSANDS/ΜL (ref 1.85–7.62)
NEUTS SEG NFR BLD AUTO: 57 % (ref 43–75)
NRBC BLD AUTO-RTO: 0 /100 WBCS
PLATELET # BLD AUTO: 179 THOUSANDS/UL (ref 149–390)
PMV BLD AUTO: 10.7 FL (ref 8.9–12.7)
RBC # BLD AUTO: 4.49 MILLION/UL (ref 3.88–5.62)
WBC # BLD AUTO: 5.89 THOUSAND/UL (ref 4.31–10.16)

## 2020-06-23 PROCEDURE — 82565 ASSAY OF CREATININE: CPT

## 2020-06-23 PROCEDURE — 85025 COMPLETE CBC W/AUTO DIFF WBC: CPT

## 2020-06-23 PROCEDURE — 84520 ASSAY OF UREA NITROGEN: CPT

## 2020-06-23 PROCEDURE — 36415 COLL VENOUS BLD VENIPUNCTURE: CPT

## 2020-06-24 ENCOUNTER — OFFICE VISIT (OUTPATIENT)
Dept: CARDIOLOGY CLINIC | Facility: CLINIC | Age: 85
End: 2020-06-24
Payer: COMMERCIAL

## 2020-06-24 VITALS
BODY MASS INDEX: 28.55 KG/M2 | HEIGHT: 70 IN | SYSTOLIC BLOOD PRESSURE: 128 MMHG | DIASTOLIC BLOOD PRESSURE: 78 MMHG | WEIGHT: 199.4 LBS

## 2020-06-24 DIAGNOSIS — I48.0 PAROXYSMAL ATRIAL FIBRILLATION (HCC): ICD-10-CM

## 2020-06-24 DIAGNOSIS — E86.0 DEHYDRATION: ICD-10-CM

## 2020-06-24 DIAGNOSIS — Z79.01 ANTICOAGULATION ADEQUATE: ICD-10-CM

## 2020-06-24 DIAGNOSIS — Z95.0 BIVENTRICULAR CARDIAC PACEMAKER IN SITU: ICD-10-CM

## 2020-06-24 DIAGNOSIS — I42.0 DILATED CARDIOMYOPATHY (HCC): Primary | Chronic | ICD-10-CM

## 2020-06-24 PROCEDURE — 99214 OFFICE O/P EST MOD 30 MIN: CPT | Performed by: INTERNAL MEDICINE

## 2020-08-06 ENCOUNTER — IN-CLINIC DEVICE VISIT (OUTPATIENT)
Dept: CARDIOLOGY CLINIC | Facility: CLINIC | Age: 85
End: 2020-08-06
Payer: COMMERCIAL

## 2020-08-06 DIAGNOSIS — Z95.0 PRESENCE OF PERMANENT CARDIAC PACEMAKER: Primary | ICD-10-CM

## 2020-08-06 PROCEDURE — 93281 PM DEVICE PROGR EVAL MULTI: CPT | Performed by: INTERNAL MEDICINE

## 2020-08-06 NOTE — PROGRESS NOTES
Results for orders placed or performed in visit on 08/06/20   Cardiac EP device report    Narrative    [facility] CRT-P (VVIR 70)  DEVICE INTERROGATED IN THE [place] OFFICE  BATTERY VOLTAGE ADEQUATE  (6 5 YRS) BVP 98%  ALL LEAD PARAMETERS WITHIN NORMAL LIMITS  NO SIGNIFICANT HIGH RATE EPISODES  CORVUE IMPEDANCE MONITORING WITHIN NORMAL LIMITS  NO PROGRAMMING CHANGES MADE TO DEVICE PARAMETERS  NORMAL DEVICE FUNCTION  ----GUILLEN

## 2020-08-07 ENCOUNTER — TELEPHONE (OUTPATIENT)
Dept: CARDIOLOGY CLINIC | Facility: CLINIC | Age: 85
End: 2020-08-07

## 2020-08-21 ENCOUNTER — ANESTHESIA (OUTPATIENT)
Dept: GASTROENTEROLOGY | Facility: HOSPITAL | Age: 85
End: 2020-08-21

## 2020-08-21 ENCOUNTER — HOSPITAL ENCOUNTER (OUTPATIENT)
Dept: GASTROENTEROLOGY | Facility: HOSPITAL | Age: 85
Setting detail: OUTPATIENT SURGERY
Discharge: HOME | End: 2020-08-21
Attending: COLON & RECTAL SURGERY | Admitting: COLON & RECTAL SURGERY
Payer: COMMERCIAL

## 2020-08-21 ENCOUNTER — ANESTHESIA EVENT (OUTPATIENT)
Dept: GASTROENTEROLOGY | Facility: HOSPITAL | Age: 85
End: 2020-08-21

## 2020-08-21 VITALS
SYSTOLIC BLOOD PRESSURE: 122 MMHG | BODY MASS INDEX: 26.2 KG/M2 | RESPIRATION RATE: 18 BRPM | HEART RATE: 71 BPM | TEMPERATURE: 98.3 F | DIASTOLIC BLOOD PRESSURE: 66 MMHG | OXYGEN SATURATION: 98 % | WEIGHT: 180 LBS

## 2020-08-21 DIAGNOSIS — C18.5 CANCER OF SPLENIC FLEXURE (HCC): ICD-10-CM

## 2020-08-21 PROCEDURE — 45385 COLONOSCOPY W/LESION REMOVAL: CPT | Performed by: COLON & RECTAL SURGERY

## 2020-08-21 PROCEDURE — 88305 TISSUE EXAM BY PATHOLOGIST: CPT | Performed by: PATHOLOGY

## 2020-08-21 RX ORDER — SODIUM CHLORIDE 9 MG/ML
INJECTION, SOLUTION INTRAVENOUS CONTINUOUS PRN
Status: DISCONTINUED | OUTPATIENT
Start: 2020-08-21 | End: 2020-08-21

## 2020-08-21 RX ORDER — PROPOFOL 10 MG/ML
INJECTION, EMULSION INTRAVENOUS CONTINUOUS PRN
Status: DISCONTINUED | OUTPATIENT
Start: 2020-08-21 | End: 2020-08-21

## 2020-08-21 RX ORDER — PROPOFOL 10 MG/ML
INJECTION, EMULSION INTRAVENOUS AS NEEDED
Status: DISCONTINUED | OUTPATIENT
Start: 2020-08-21 | End: 2020-08-21

## 2020-08-21 RX ADMIN — PROPOFOL 50 MG: 10 INJECTION, EMULSION INTRAVENOUS at 11:46

## 2020-08-21 RX ADMIN — SODIUM CHLORIDE: 0.9 INJECTION, SOLUTION INTRAVENOUS at 11:36

## 2020-08-21 RX ADMIN — PHENYLEPHRINE HYDROCHLORIDE 100 MCG: 10 INJECTION INTRAVENOUS at 12:02

## 2020-08-21 RX ADMIN — PROPOFOL 50 MCG/KG/MIN: 10 INJECTION, EMULSION INTRAVENOUS at 11:47

## 2020-08-21 RX ADMIN — Medication 15 MG: at 11:52

## 2020-08-21 NOTE — ANESTHESIA PREPROCEDURE EVALUATION
Review of Systems/Medical History    Chart reviewed  No history of anesthetic complications     Cardiovascular  EKG reviewed , Exercise tolerance (METS): >4 ,  Pacemaker/AICD, Hypertension , Dysrhythmias , atrial fibrillation,   Comment: 12/2018:  SUMMARY     LEFT VENTRICLE:  Systolic function was mildly reduced  Ejection fraction was estimated to be 45 %  There was mild diffuse hypokinesis with regional variations including basal to mid inferolateral and basal inferior hypokinesis  There was mild concentric hypertrophy      RIGHT VENTRICLE:  The ventricle was mildly dilated  Systolic function was reduced      LEFT ATRIUM:  The atrium was mildly to moderately dilated      RIGHT ATRIUM:  The atrium was moderately dilated      TRICUSPID VALVE:  There was moderate regurgitation  Estimated peak PA pressure was 42 mmHg ,  Pulmonary  Negative pulmonary ROS        GI/Hepatic    GI malignancy,        Chronic kidney disease , Prostatic disorder, benign prostatic hyperplasia       Endo/Other     GYN       Hematology  No anemia ,    Comment: eliquis 3 days ago Musculoskeletal       Neurology   Psychology           Physical Exam    Airway    Mallampati score: II  TM Distance: >3 FB  Neck ROM: limited     Dental   Comment: No loose teeth  + crowns  Partial dentures,     Cardiovascular  Rhythm: regular, Rate: normal, Peripheral edema, Cardiovascular exam normal    Pulmonary  Pulmonary exam normal Breath sounds clear to auscultation, No wheezes, No rales,     Other Findings        Anesthesia Plan  ASA Score- 3     Anesthesia Type- IV sedation with anesthesia with ASA Monitors  Additional Monitors:   Airway Plan:     Comment: +/- CVL  Plan Factors-Exercise tolerance (METS): >4     Chart reviewed  EKG reviewed  Induction- intravenous  Postoperative Plan-     Informed Consent- Anesthetic plan and risks discussed with patient  I personally reviewed this patient with the CRNA   Discussed and agreed on the Anesthesia Plan with the FORTUNATO Freed

## 2020-08-21 NOTE — H&P
History and Physical   Colon and Rectal Surgery   Jose Cunha 80 y o  male MRN: [id number]  Unit/Bed#:  Encounter: [id number]  08/21/20   11:00 AM      No chief complaint on file  ASSESSMENT:    Personal history colon polyps, metachronous colon cancer, mismatch repair    PLAN:  Colonoscopy      HPI:  Jose Cunha is a 80 y o  male, history  metachronous Ca of splenic flexure, s/p laparoscopic left hemicolectomy with anastomosis history of right hemicolectomy Stage 0 1997   Mismatch repair noted in MLH1 and PMS2, MMR study and informed to the patient and daughter   Post laparoscopic, left kaylin-coletomy, end to end transverse to sigmoid colon anastamosis on 1/8/19  Pathology showed at least Stage IIA - pT3, pN0, G2        For surveillance colonoscopy, last colonoscopy 11/2019 adenoma with high-grade dysplasia       Historical Information   Past Medical History:   Diagnosis Date    Anemia     Atrial fibrillation ([address] )     BPH (benign prostatic hypertrophy)     Cancer (HCC)     COLON    Chronic kidney disease     Hypertension     Irregular heart beat     afib     Past Surgical History:   Procedure Laterality Date    BASAL CELL CARCINOMA EXCISION      CARDIAC PACEMAKER PLACEMENT      CARDIAC SURGERY      CARDIOVERSION      CARPAL TUNNEL RELEASE      CATARACT EXTRACTION      COLON SURGERY      COLONOSCOPY      CT COLONOSCOPY FLX DX W/COLLJ SPEC WHEN PFRMD N/A 11/30/2018    Procedure: COLONOSCOPY w egd  by dr Josse Velarde;  Surgeon: Ventura Persaud MD;  Location: [facility]; Service: Colorectal    CT LAP,SURG,COLECTOMY, PARTIAL, W/ANAST N/A 1/8/2019    Procedure: Left hemicolectomy, takedown splenic flexure, end to end transverse to sigmoid colon anastamosis;   Surgeon: Ventura Persaud MD;  Location: [facility];  Service: Colorectal       Meds/Allergies     (Not in a hospital admission)        Current Outpatient Medications:     CHLORPHENIRAMINE MALEATE PO, Take by mouth every 4 (four) hours as needed, Disp: , Rfl:     ELIQUIS 2 5 MG, TAKE 1 TABLET TWICE A DAY, Disp: 180 tablet, Rfl: 2    finasteride (PROSCAR) 5 mg tablet, Take 5 mg by mouth daily, Disp: , Rfl:     Multiple Vitamins-Minerals (CENTRUM SILVER 50+MEN PO), Take 1 tablet by mouth daily, Disp: , Rfl:     pantoprazole (PROTONIX) 40 mg tablet, Take 40 mg by mouth daily, Disp: , Rfl:     torsemide (DEMADEX) 10 mg tablet, 10 mg every other day , Disp: , Rfl: 0    triamcinolone (KENALOG) 0 1 % cream, APPLY TO SKIN TWICE DAILY TO AFFECTED AREAS ON BODY THROUGHOUT, Disp: , Rfl: 0    Allergies   Allergen Reactions    Lasix [Furosemide] Other (See Comments)     Weakness, decreased BP    Adhesive [Medical Tape] Rash    Neomycin-Bacitracin Zn-Polymyx Rash    Neosporin [Neomycin-Polymyxin-Gramicidin] Rash         Social History   Social History     Substance and Sexual Activity   Alcohol Use Yes    Comment: occasional     Social History     Substance and Sexual Activity   Drug Use No     Social History     Tobacco Use   Smoking Status Never Smoker   Smokeless Tobacco Never Used         Family History:   Family History   Problem Relation Age of Onset    Heart disease Father     Heart attack Father     Hypertension Father     Heart disease Brother     Heart attack Brother 64    Hypertension Brother     Heart attack Brother 67    Hypertension Brother     Arrhythmia Neg Hx     Asthma Neg Hx     Clotting disorder Neg Hx     Heart failure Neg Hx          Objective     Current Vitals:      No intake or output data in the 24 hours ending 08/21/20 1100    Physical Exam:  General:no distress  Eyes:perrla/eomi  ENT:moist mucus membranes  Neck:supple  Pulm:no increased work of breathing  CV:sinus  Abdomen:soft,nontender

## 2020-08-21 NOTE — ANESTHESIA POSTPROCEDURE EVALUATION
Post-Op Assessment Note    CV Status:  Stable  Pain Score: 0    Pain management: adequate     Mental Status:  Sleepy   Hydration Status:  Stable   PONV Controlled:  None   Airway Patency:  Patent      Post Op Vitals Reviewed: Yes      Staff: CRNA   Comments: hr 70, bp 102/59, rr18, O2 sat 96%        No complications documented      BP      Temp      Pulse     Resp      SpO2

## 2020-09-09 ENCOUNTER — OFFICE VISIT (OUTPATIENT)
Dept: CARDIOLOGY CLINIC | Facility: CLINIC | Age: 85
End: 2020-09-09
Payer: COMMERCIAL

## 2020-09-09 VITALS
BODY MASS INDEX: 26.45 KG/M2 | TEMPERATURE: 98.4 F | DIASTOLIC BLOOD PRESSURE: 70 MMHG | WEIGHT: 184.8 LBS | SYSTOLIC BLOOD PRESSURE: 128 MMHG | HEART RATE: 70 BPM | HEIGHT: 70 IN

## 2020-09-09 DIAGNOSIS — I42.0 DILATED CARDIOMYOPATHY (HCC): Chronic | ICD-10-CM

## 2020-09-09 DIAGNOSIS — Z79.01 ANTICOAGULATION ADEQUATE: ICD-10-CM

## 2020-09-09 DIAGNOSIS — Z95.0 BIVENTRICULAR CARDIAC PACEMAKER IN SITU: ICD-10-CM

## 2020-09-09 DIAGNOSIS — I48.0 PAROXYSMAL ATRIAL FIBRILLATION (HCC): Primary | ICD-10-CM

## 2020-09-09 DIAGNOSIS — I87.2 VENOUS STASIS DERMATITIS OF BOTH LOWER EXTREMITIES: ICD-10-CM

## 2020-09-09 DIAGNOSIS — R60.0 LOWER EXTREMITY EDEMA: ICD-10-CM

## 2020-09-09 PROCEDURE — 93000 ELECTROCARDIOGRAM COMPLETE: CPT | Performed by: INTERNAL MEDICINE

## 2020-09-09 PROCEDURE — 99214 OFFICE O/P EST MOD 30 MIN: CPT | Performed by: INTERNAL MEDICINE

## 2020-09-09 NOTE — PATIENT INSTRUCTIONS
Obtained compression stockings for LE edema   Continue all current medications     Follow up in 6 months

## 2020-09-09 NOTE — PROGRESS NOTES
EPS Progress Note - Stephanie Fournier 80 y o  male MRN: [id number]           ASSESSMENT:  1  Paroxysmal atrial fibrillation (HCC)  POCT ECG   2  Dilated cardiomyopathy ([address] )     3  Anticoagulation adequate     4  Biventricular cardiac pacemaker in situ     5  Lower extremity edema  Jobst Stockings   6  Venous stasis dermatitis of both lower extremities  Jobst Stockings           PLAN:  1  Chronic atrial fibrillation  S/p AV node ablation   Adequately anticoagulated with Eliquis     2  Dilated cardiomyopathy   S/p SJM BiV pacemaker in situ, working appropriately   LVEF 45% from echo in December 2018    3  AC   Maintained on Eliquis 2 5 mg twice daily for stroke prevention    4  Hypertension   Well controlled     5  LE edema  On Torsemide two days in a row with a break in between  Otherwise he gets hypotensive    Obtained compression stockings         Follow up in 6 months       HPI:   Stephanie Fournier is a 80 y o  male who presents to the office today for a 3 month follow up  Patient reports experiencing some lightheadedness and dizziness  He has been using a cane to ambulate  It was also noticed some bilateral LE edema  He has been taking his torsemide two days in a row with a break in between  He denies any chest pain, increased shortness of breath, palpitations, fatigue, or syncope  ROS:   Review of Systems   Constitution: Negative  HENT: Negative  Eyes: Negative for blurred vision and double vision  Cardiovascular: Negative for chest pain, dyspnea on exertion, near-syncope, palpitations and syncope  Respiratory: Negative for cough, shortness of breath and wheezing  Endocrine: Negative  Hematologic/Lymphatic: Negative  Skin: Negative  Musculoskeletal: Negative  Gastrointestinal: Negative  Genitourinary: Negative  Neurological: Negative  Psychiatric/Behavioral: Negative  Allergic/Immunologic: Negative             Objective:     Vitals: Blood pressure 128/70, pulse 70, temperature 98 4 °F (36 9 °C), temperature source Temporal, height 5' 9 5" (1 765 m), weight 83 8 kg (184 lb 12 8 oz)  , Body mass index is 26 9 kg/m²  ,        Physical Exam:    GEN: Bacilio Peter appears well, alert and oriented x 3, pleasant and cooperative   HEENT: pupils equal, round, and reactive to light; extraocular muscles intact  NECK: supple, no carotid bruits   HEART: regular rhythm, normal S1 and S2, no murmurs, clicks, gallops or rubs   LUNGS: clear to auscultation bilaterally; no wheezes, rales, or rhonchi   ABDOMEN: normal bowel sounds, soft, no tenderness, no distention  EXTREMITIES:   no clubbing, cyanosis, or  2+edema bilateral  NEURO: no focal findings   SKIN: normal without suspicious lesions on exposed skin    Medications:      Current Outpatient Medications:     CHLORPHENIRAMINE MALEATE PO, Take by mouth every 4 (four) hours as needed, Disp: , Rfl:     ELIQUIS 2 5 MG, TAKE 1 TABLET TWICE A DAY, Disp: 180 tablet, Rfl: 2    finasteride (PROSCAR) 5 mg tablet, Take 5 mg by mouth daily, Disp: , Rfl:     Multiple Vitamins-Minerals (CENTRUM SILVER 50+MEN PO), Take 1 tablet by mouth daily, Disp: , Rfl:     pantoprazole (PROTONIX) 40 mg tablet, Take 40 mg by mouth daily, Disp: , Rfl:     torsemide (DEMADEX) 10 mg tablet, 10 mg every other day , Disp: , Rfl: 0    triamcinolone (KENALOG) 0 1 % cream, APPLY TO SKIN TWICE DAILY TO AFFECTED AREAS ON BODY THROUGHOUT, Disp: , Rfl: 0     Family History   Problem Relation Age of Onset    Heart disease Father     Heart attack Father     Hypertension Father     Heart disease Brother     Heart attack Brother 64    Hypertension Brother     Heart attack Brother 67    Hypertension Brother     Arrhythmia Neg Hx     Asthma Neg Hx     Clotting disorder Neg Hx     Heart failure Neg Hx      Social History     Socioeconomic History    Marital status:       Spouse name: Not on file    Number of children: Not on file    Years of education: Not on file    Highest education level: Not on file   Occupational History    Not on file   Social Needs    Financial resource strain: Not on file    Food insecurity     Worry: Not on file     Inability: Not on file    Transportation needs     Medical: Not on file     Non-medical: Not on file   Tobacco Use    Smoking status: Never Smoker    Smokeless tobacco: Never Used   Substance and Sexual Activity    Alcohol use: Yes     Comment: occasional    Drug use: No    Sexual activity: Not on file   Lifestyle    Physical activity     Days per week: Not on file     Minutes per session: Not on file    Stress: Not on file   Relationships    Social connections     Talks on phone: Not on file     Gets together: Not on file     Attends [employer] service: Not on file     Active member of club or organization: Not on file     Attends meetings of clubs or organizations: Not on file     Relationship status: Not on file    Intimate partner violence     Fear of current or ex partner: Not on file     Emotionally abused: Not on file     Physically abused: Not on file     Forced sexual activity: Not on file   Other Topics Concern    Not on file   Social History Narrative    Not on file     Social History     Tobacco Use   Smoking Status Never Smoker   Smokeless Tobacco Never Used     Social History     Substance and Sexual Activity   Alcohol Use Yes    Comment: occasional       Labs & Results:  Below is the patient's most recent value for Albumin, ALT, AST, BUN, Calcium, Chloride, Cholesterol, CO2, Creatinine, GFR, Glucose, HDL, Hematocrit, Hemoglobin, Hemoglobin A1C, LDL, Magnesium, Phosphorus, Platelets, Potassium, PSA, Sodium, Triglycerides, and WBC     Lab Results   Component Value Date    ALT 22 09/18/2019    AST 20 09/18/2019    BUN 33 (H) 06/23/2020    CALCIUM 9 1 09/18/2019     09/18/2019    CHOL 157 01/09/2015    CO2 27 09/18/2019    CREATININE 1 86 (H) 06/23/2020    HDL 28 (L) 09/18/2019    HCT 46 5 2020    HGB 14 4 2020    HGBA1C 5 4 2018    MG 2 4 01/10/2019    PHOS 2 4 01/10/2019     2020    K 4 0 2019    PSA 1 7 2019     2015    TRIG 135 2019    WBC 5 89 2020     Note: for a comprehensive list of the patient's lab results, access the Results Review activity  Cardiac testing:   Results for orders placed during the hospital encounter of 18   Echo complete with contrast if indicated    Narrative [address]  [address] [facility], [address] [facility]  [phone]    Transthoracic Echocardiogram  2D, M-mode, Doppler, and Color Doppler    Study date:  21-Dec-2018    Patient: Carmella Perez  MR number: [id number]  Account number: [de-identified]  : 1927  Age: 80 years  Gender: Male  Status: Outpatient  Location: [address] [place] [address] [facility] [place]  Height: 69 in  Weight: 202 lb  BP: 102/ 64 mmHg    Indications: Atrial fibrillation    Diagnoses: I48 0 - Atrial fibrillation    Sonographer:  JOSEFINA Huggins  Primary Physician:  Lucy Santana MD  Referring Physician:  Ese Esocbar DO  Group:  [id number]  Cardiology Fellow:  Kevin Paul MD  Interpreting Physician:  Migdalia Cruz DO    SUMMARY    LEFT VENTRICLE:  Systolic function was mildly reduced  Ejection fraction was estimated to be 45 %  There was mild diffuse hypokinesis with regional variations including basal to mid inferolateral and basal inferior hypokinesis  There was mild concentric hypertrophy  RIGHT VENTRICLE:  The ventricle was mildly dilated  Systolic function was reduced  LEFT ATRIUM:  The atrium was mildly to moderately dilated  RIGHT ATRIUM:  The atrium was moderately dilated  TRICUSPID VALVE:  There was moderate regurgitation  Estimated peak PA pressure was 42 mmHg      HISTORY: PRIOR HISTORY: Hypertension, atrial fibrillation, pacemaker, cardiomyopathy, chronic kidney disease    PROCEDURE: The study was performed in the [address] [facility]  This was a routine study  The transthoracic approach was used  The study included complete 2D imaging, M-mode, complete spectral Doppler, and color Doppler  Image  quality was adequate  LEFT VENTRICLE: Size was normal  Systolic function was mildly reduced  Ejection fraction was estimated to be 45 %  There was mild diffuse hypokinesis with regional variations including basal to mid inferolateral and basal inferior  hypokinesis Wall thickness was mildly increased  There was mild concentric hypertrophy  DOPPLER: Transmitral flow pattern: atrial fibrillation  RIGHT VENTRICLE: The ventricle was mildly dilated  Systolic function was reduced  Wall thickness was normal  A pacing wire was present  LEFT ATRIUM: The atrium was mildly to moderately dilated  RIGHT ATRIUM: The atrium was moderately dilated  A pacing wire was present  MITRAL VALVE: Valve structure was normal  There was normal leaflet separation  DOPPLER: The transmitral velocity was within the normal range  There was no evidence for stenosis  There was no significant regurgitation  AORTIC VALVE: The valve was trileaflet  Leaflets exhibited mildly increased thickness, mild calcification, and normal cuspal separation  DOPPLER: Transaortic velocity was within the normal range  There was no evidence for stenosis  There  was no significant regurgitation  TRICUSPID VALVE: The valve structure was normal  There was normal leaflet separation  DOPPLER: The transtricuspid velocity was within the normal range  There was no evidence for stenosis  There was moderate regurgitation  Estimated peak PA  pressure was 42 mmHg  PULMONIC VALVE: Leaflets exhibited normal thickness, no calcification, and normal cuspal separation  DOPPLER: The transpulmonic velocity was within the normal range  There was trace regurgitation  PERICARDIUM: There was no pericardial effusion   The pericardium was normal in appearance  AORTA: The root exhibited normal size  The ascending aorta was upper normal in size  SYSTEMIC VEINS: IVC: The inferior vena cava was normal in size  Respirophasic changes were normal     SYSTEM MEASUREMENT TABLES    2D  %FS: 17 03 %  Ao Diam: 3 79 cm  EDV(Teich): 129 52 ml  EF Biplane: 42 45 %  EF(Cube): 42 88 %  EF(Teich): 35 35 %  ESV(Cube): 80 33 ml  ESV(Teich): 83 74 ml  IVSd: 1 31 cm  LA Area: 24 22 cm2  LA Diam: 4 28 cm  LVEDV MOD A2C: 220 9 ml  LVEDV MOD A4C: 205 11 ml  LVEDV MOD BP: 215 2 ml  LVEF MOD A2C: 40 58 %  LVEF MOD A4C: 44 97 %  LVESV MOD A2C: 131 27 ml  LVESV MOD A4C: 112 87 ml  LVESV MOD BP: 123 84 ml  LVIDd: 5 2 cm  LVIDs: 4 31 cm  LVLd A2C: 9 46 cm  LVLd A4C: 9 25 cm  LVLs A2C: 8 73 cm  LVLs A4C: 8 41 cm  LVPWd: 1 31 cm  RA Area: 23 9 cm2  RV Diam : 5 16 cm  SV MOD A2C: 89 63 ml  SV MOD A4C: 92 23 ml  SV(Cube): 60 3 ml  SV(Teich): 45 78 ml    CW  TR Vmax: 3 08 m/s  TR maxP 97 mmHg    MM  TAPSE: 1 41 cm    [facility]    Prepared and electronically signed by    Hawa Martin DO  Signed 21-Dec-2018 13:06:44       No results found for this or any previous visit  No results found for this or any previous visit  No results found for this or any previous visit            Scribe Attestation    I,:   Tiffanie Britton am acting as a scribe while in the presence of the attending physician :        I,:    personally performed the services described in this documentation    as scribed in my presence :

## 2021-01-01 ENCOUNTER — APPOINTMENT (OUTPATIENT)
Dept: LAB | Facility: CLINIC | Age: 86
End: 2021-01-01
Payer: COMMERCIAL

## 2021-01-01 ENCOUNTER — APPOINTMENT (OUTPATIENT)
Dept: CARDIAC REHAB | Age: 86
End: 2021-01-01
Payer: COMMERCIAL

## 2021-01-01 ENCOUNTER — OFFICE VISIT (OUTPATIENT)
Dept: CARDIOLOGY CLINIC | Facility: CLINIC | Age: 86
End: 2021-01-01
Payer: COMMERCIAL

## 2021-01-01 ENCOUNTER — LAB (OUTPATIENT)
Dept: LAB | Facility: CLINIC | Age: 86
End: 2021-01-01
Payer: COMMERCIAL

## 2021-01-01 ENCOUNTER — TRANSCRIBE ORDERS (OUTPATIENT)
Dept: LAB | Facility: CLINIC | Age: 86
End: 2021-01-01

## 2021-01-01 ENCOUNTER — APPOINTMENT (INPATIENT)
Dept: NON INVASIVE DIAGNOSTICS | Facility: HOSPITAL | Age: 86
DRG: 602 | End: 2021-01-01
Payer: COMMERCIAL

## 2021-01-01 ENCOUNTER — TELEPHONE (OUTPATIENT)
Dept: NON INVASIVE DIAGNOSTICS | Facility: HOSPITAL | Age: 86
End: 2021-01-01

## 2021-01-01 ENCOUNTER — TELEPHONE (OUTPATIENT)
Dept: CARDIOLOGY CLINIC | Facility: CLINIC | Age: 86
End: 2021-01-01

## 2021-01-01 ENCOUNTER — OFFICE VISIT (OUTPATIENT)
Dept: NEPHROLOGY | Facility: CLINIC | Age: 86
End: 2021-01-01
Payer: COMMERCIAL

## 2021-01-01 ENCOUNTER — TELEPHONE (OUTPATIENT)
Dept: NEPHROLOGY | Facility: CLINIC | Age: 86
End: 2021-01-01

## 2021-01-01 ENCOUNTER — CLINICAL SUPPORT (OUTPATIENT)
Dept: CARDIAC REHAB | Age: 86
End: 2021-01-01
Payer: COMMERCIAL

## 2021-01-01 ENCOUNTER — IN-CLINIC DEVICE VISIT (OUTPATIENT)
Dept: CARDIOLOGY CLINIC | Facility: CLINIC | Age: 86
End: 2021-01-01
Payer: COMMERCIAL

## 2021-01-01 ENCOUNTER — NURSING HOME VISIT (OUTPATIENT)
Dept: GERIATRICS | Facility: OTHER | Age: 86
End: 2021-01-01
Payer: COMMERCIAL

## 2021-01-01 ENCOUNTER — REMOTE DEVICE CLINIC VISIT (OUTPATIENT)
Dept: CARDIOLOGY CLINIC | Facility: CLINIC | Age: 86
End: 2021-01-01
Payer: COMMERCIAL

## 2021-01-01 ENCOUNTER — TELEMEDICINE (OUTPATIENT)
Dept: NEPHROLOGY | Facility: HOSPITAL | Age: 86
End: 2021-01-01
Payer: COMMERCIAL

## 2021-01-01 ENCOUNTER — TELEPHONE (OUTPATIENT)
Dept: OTHER | Facility: OTHER | Age: 86
End: 2021-01-01

## 2021-01-01 ENCOUNTER — HOSPITAL ENCOUNTER (INPATIENT)
Facility: HOSPITAL | Age: 86
LOS: 16 days | Discharge: HOME HEALTH | DRG: 602 | End: 2021-03-25
Attending: EMERGENCY MEDICINE | Admitting: INTERNAL MEDICINE
Payer: COMMERCIAL

## 2021-01-01 ENCOUNTER — NURSING HOME VISIT (OUTPATIENT)
Dept: GERIATRICS | Age: 86
End: 2021-01-01
Payer: COMMERCIAL

## 2021-01-01 ENCOUNTER — APPOINTMENT (EMERGENCY)
Dept: RADIOLOGY | Facility: HOSPITAL | Age: 86
DRG: 291 | End: 2021-01-01
Payer: COMMERCIAL

## 2021-01-01 ENCOUNTER — HOSPITAL ENCOUNTER (INPATIENT)
Facility: HOSPITAL | Age: 86
LOS: 7 days | Discharge: NURSING, REHAB OR LONG-TERM CARE | DRG: 291 | End: 2021-08-03
Attending: EMERGENCY MEDICINE | Admitting: INTERNAL MEDICINE
Payer: COMMERCIAL

## 2021-01-01 ENCOUNTER — TELEMEDICINE (OUTPATIENT)
Dept: NEPHROLOGY | Facility: CLINIC | Age: 86
End: 2021-01-01
Payer: COMMERCIAL

## 2021-01-01 ENCOUNTER — APPOINTMENT (EMERGENCY)
Dept: RADIOLOGY | Facility: HOSPITAL | Age: 86
DRG: 602 | End: 2021-01-01
Payer: COMMERCIAL

## 2021-01-01 ENCOUNTER — NURSING HOME VISIT (OUTPATIENT)
Dept: WOUND CARE | Facility: HOSPITAL | Age: 86
End: 2021-01-01
Payer: COMMERCIAL

## 2021-01-01 VITALS
HEIGHT: 69 IN | BODY MASS INDEX: 24.03 KG/M2 | HEART RATE: 72 BPM | RESPIRATION RATE: 20 BRPM | WEIGHT: 162.26 LBS | SYSTOLIC BLOOD PRESSURE: 108 MMHG | OXYGEN SATURATION: 100 % | DIASTOLIC BLOOD PRESSURE: 57 MMHG | TEMPERATURE: 97.1 F

## 2021-01-01 VITALS
HEART RATE: 70 BPM | HEIGHT: 69 IN | SYSTOLIC BLOOD PRESSURE: 106 MMHG | WEIGHT: 160.3 LBS | DIASTOLIC BLOOD PRESSURE: 58 MMHG | BODY MASS INDEX: 23.74 KG/M2

## 2021-01-01 VITALS
WEIGHT: 180.2 LBS | HEIGHT: 70 IN | HEART RATE: 78 BPM | SYSTOLIC BLOOD PRESSURE: 116 MMHG | BODY MASS INDEX: 25.8 KG/M2 | DIASTOLIC BLOOD PRESSURE: 64 MMHG

## 2021-01-01 VITALS
TEMPERATURE: 97.4 F | DIASTOLIC BLOOD PRESSURE: 76 MMHG | HEART RATE: 76 BPM | SYSTOLIC BLOOD PRESSURE: 104 MMHG | BODY MASS INDEX: 25.87 KG/M2 | WEIGHT: 175.2 LBS | RESPIRATION RATE: 18 BRPM

## 2021-01-01 VITALS
TEMPERATURE: 97.8 F | RESPIRATION RATE: 18 BRPM | SYSTOLIC BLOOD PRESSURE: 92 MMHG | HEART RATE: 77 BPM | WEIGHT: 159.4 LBS | DIASTOLIC BLOOD PRESSURE: 51 MMHG | OXYGEN SATURATION: 95 % | BODY MASS INDEX: 23.54 KG/M2

## 2021-01-01 VITALS
HEART RATE: 69 BPM | WEIGHT: 163.8 LBS | SYSTOLIC BLOOD PRESSURE: 117 MMHG | BODY MASS INDEX: 24.19 KG/M2 | DIASTOLIC BLOOD PRESSURE: 75 MMHG

## 2021-01-01 VITALS
WEIGHT: 161.8 LBS | TEMPERATURE: 97.8 F | BODY MASS INDEX: 23.89 KG/M2 | DIASTOLIC BLOOD PRESSURE: 53 MMHG | RESPIRATION RATE: 18 BRPM | SYSTOLIC BLOOD PRESSURE: 101 MMHG | HEART RATE: 74 BPM

## 2021-01-01 VITALS
BODY MASS INDEX: 26.19 KG/M2 | HEART RATE: 75 BPM | HEIGHT: 69 IN | OXYGEN SATURATION: 98 % | DIASTOLIC BLOOD PRESSURE: 46 MMHG | SYSTOLIC BLOOD PRESSURE: 100 MMHG | WEIGHT: 176.8 LBS

## 2021-01-01 VITALS
RESPIRATION RATE: 18 BRPM | WEIGHT: 175 LBS | DIASTOLIC BLOOD PRESSURE: 58 MMHG | HEART RATE: 54 BPM | SYSTOLIC BLOOD PRESSURE: 106 MMHG | HEIGHT: 69 IN | BODY MASS INDEX: 25.92 KG/M2

## 2021-01-01 VITALS
SYSTOLIC BLOOD PRESSURE: 100 MMHG | BODY MASS INDEX: 25.24 KG/M2 | HEART RATE: 72 BPM | DIASTOLIC BLOOD PRESSURE: 56 MMHG | HEIGHT: 69 IN | WEIGHT: 170.4 LBS | OXYGEN SATURATION: 98 %

## 2021-01-01 VITALS
RESPIRATION RATE: 16 BRPM | WEIGHT: 170 LBS | DIASTOLIC BLOOD PRESSURE: 55 MMHG | TEMPERATURE: 97.1 F | BODY MASS INDEX: 25.1 KG/M2 | HEART RATE: 60 BPM | SYSTOLIC BLOOD PRESSURE: 103 MMHG

## 2021-01-01 VITALS
SYSTOLIC BLOOD PRESSURE: 100 MMHG | WEIGHT: 167.6 LBS | DIASTOLIC BLOOD PRESSURE: 48 MMHG | HEART RATE: 76 BPM | BODY MASS INDEX: 24.82 KG/M2 | HEIGHT: 69 IN | OXYGEN SATURATION: 96 %

## 2021-01-01 VITALS
RESPIRATION RATE: 18 BRPM | TEMPERATURE: 97.4 F | SYSTOLIC BLOOD PRESSURE: 101 MMHG | WEIGHT: 176 LBS | DIASTOLIC BLOOD PRESSURE: 60 MMHG | BODY MASS INDEX: 25.99 KG/M2 | HEART RATE: 69 BPM

## 2021-01-01 DIAGNOSIS — I12.9 HYPERTENSIVE KIDNEY DISEASE WITH STAGE 3 CHRONIC KIDNEY DISEASE, UNSPECIFIED WHETHER STAGE 3A OR 3B CKD (HCC): ICD-10-CM

## 2021-01-01 DIAGNOSIS — L89.301 PRESSURE INJURY OF BUTTOCK, STAGE 1, UNSPECIFIED LATERALITY: Primary | ICD-10-CM

## 2021-01-01 DIAGNOSIS — Z95.0 BIVENTRICULAR CARDIAC PACEMAKER IN SITU: ICD-10-CM

## 2021-01-01 DIAGNOSIS — C18.5 MALIGNANT NEOPLASM OF SPLENIC FLEXURE (HCC): ICD-10-CM

## 2021-01-01 DIAGNOSIS — I50.23 ACUTE ON CHRONIC SYSTOLIC CHF (CONGESTIVE HEART FAILURE) (HCC): ICD-10-CM

## 2021-01-01 DIAGNOSIS — I50.43 ACUTE ON CHRONIC HEART FAILURE WITH REDUCED EJECTION FRACTION AND DIASTOLIC DYSFUNCTION (HCC): ICD-10-CM

## 2021-01-01 DIAGNOSIS — K59.00 CONSTIPATION: ICD-10-CM

## 2021-01-01 DIAGNOSIS — N17.9 ACUTE KIDNEY INJURY SUPERIMPOSED ON CHRONIC KIDNEY DISEASE (HCC): ICD-10-CM

## 2021-01-01 DIAGNOSIS — N18.9 ACUTE KIDNEY INJURY SUPERIMPOSED ON CHRONIC KIDNEY DISEASE (HCC): ICD-10-CM

## 2021-01-01 DIAGNOSIS — R26.2 AMBULATORY DYSFUNCTION: ICD-10-CM

## 2021-01-01 DIAGNOSIS — R60.0 BILATERAL LOWER EXTREMITY EDEMA: ICD-10-CM

## 2021-01-01 DIAGNOSIS — Z71.89 GOALS OF CARE, COUNSELING/DISCUSSION: ICD-10-CM

## 2021-01-01 DIAGNOSIS — Z95.0 PACEMAKER: Primary | ICD-10-CM

## 2021-01-01 DIAGNOSIS — R77.8 ELEVATED TROPONIN: ICD-10-CM

## 2021-01-01 DIAGNOSIS — N18.9 CHRONIC KIDNEY DISEASE, UNSPECIFIED CKD STAGE: Primary | ICD-10-CM

## 2021-01-01 DIAGNOSIS — E87.6 HYPOPOTASSEMIA: ICD-10-CM

## 2021-01-01 DIAGNOSIS — I48.21 PERMANENT ATRIAL FIBRILLATION (HCC): ICD-10-CM

## 2021-01-01 DIAGNOSIS — R60.9 PERIPHERAL EDEMA: ICD-10-CM

## 2021-01-01 DIAGNOSIS — N18.30 HYPERTENSIVE KIDNEY DISEASE WITH STAGE 3 CHRONIC KIDNEY DISEASE, UNSPECIFIED WHETHER STAGE 3A OR 3B CKD (HCC): ICD-10-CM

## 2021-01-01 DIAGNOSIS — I42.0 DILATED CARDIOMYOPATHY (HCC): Chronic | ICD-10-CM

## 2021-01-01 DIAGNOSIS — L03.116 CELLULITIS OF LEFT LEG: Primary | ICD-10-CM

## 2021-01-01 DIAGNOSIS — N18.9 ACUTE KIDNEY INJURY SUPERIMPOSED ON CHRONIC KIDNEY DISEASE (HCC): Primary | ICD-10-CM

## 2021-01-01 DIAGNOSIS — E83.52 HYPERCALCEMIA: ICD-10-CM

## 2021-01-01 DIAGNOSIS — N18.9 CHRONIC KIDNEY DISEASE, UNSPECIFIED CKD STAGE: ICD-10-CM

## 2021-01-01 DIAGNOSIS — L03.119 CELLULITIS AND ABSCESS OF LOWER EXTREMITY: ICD-10-CM

## 2021-01-01 DIAGNOSIS — I42.9 CARDIOMYOPATHY, UNSPECIFIED TYPE (HCC): ICD-10-CM

## 2021-01-01 DIAGNOSIS — N17.9 ACUTE KIDNEY INJURY (HCC): ICD-10-CM

## 2021-01-01 DIAGNOSIS — N17.9 ACUTE KIDNEY INJURY SUPERIMPOSED ON CHRONIC KIDNEY DISEASE (HCC): Primary | ICD-10-CM

## 2021-01-01 DIAGNOSIS — Z79.01 ANTICOAGULATION ADEQUATE: ICD-10-CM

## 2021-01-01 DIAGNOSIS — E87.6 HYPOKALEMIA: ICD-10-CM

## 2021-01-01 DIAGNOSIS — N18.9 SCLEROSING GLOMERULONEPHRITIS: ICD-10-CM

## 2021-01-01 DIAGNOSIS — N18.4 STAGE 4 CHRONIC KIDNEY DISEASE (HCC): ICD-10-CM

## 2021-01-01 DIAGNOSIS — I50.20 HEART FAILURE WITH REDUCED EJECTION FRACTION (HCC): Primary | ICD-10-CM

## 2021-01-01 DIAGNOSIS — D64.9 ANEMIA, UNSPECIFIED TYPE: ICD-10-CM

## 2021-01-01 DIAGNOSIS — C18.6 MALIGNANT NEOPLASM OF DESCENDING COLON (HCC): ICD-10-CM

## 2021-01-01 DIAGNOSIS — I50.20 SYSTOLIC HEART FAILURE, UNSPECIFIED HF CHRONICITY (HCC): ICD-10-CM

## 2021-01-01 DIAGNOSIS — K22.719 BARRETT'S ESOPHAGUS WITH DYSPLASIA: ICD-10-CM

## 2021-01-01 DIAGNOSIS — N18.4 STAGE 4 CHRONIC KIDNEY DISEASE (HCC): Primary | ICD-10-CM

## 2021-01-01 DIAGNOSIS — I50.21 ACUTE SYSTOLIC CONGESTIVE HEART FAILURE (HCC): ICD-10-CM

## 2021-01-01 DIAGNOSIS — N18.30 STAGE 3 CHRONIC KIDNEY DISEASE (HCC): ICD-10-CM

## 2021-01-01 DIAGNOSIS — R78.9 FINDING OF UNSPECIFIED SUBSTANCE, NOT NORMALLY FOUND IN BLOOD: ICD-10-CM

## 2021-01-01 DIAGNOSIS — E83.52 HYPERCALCEMIA: Primary | ICD-10-CM

## 2021-01-01 DIAGNOSIS — L89.301 PRESSURE INJURY OF BUTTOCK, STAGE 1, UNSPECIFIED LATERALITY: ICD-10-CM

## 2021-01-01 DIAGNOSIS — I12.9 HYPERTENSIVE CHRONIC KIDNEY DISEASE WITH STAGE 1 THROUGH STAGE 4 CHRONIC KIDNEY DISEASE, OR UNSPECIFIED CHRONIC KIDNEY DISEASE: ICD-10-CM

## 2021-01-01 DIAGNOSIS — I50.9 CONGESTIVE HEART DISEASE (HCC): ICD-10-CM

## 2021-01-01 DIAGNOSIS — R26.2 AMBULATORY DYSFUNCTION: Primary | ICD-10-CM

## 2021-01-01 DIAGNOSIS — E87.1 HYPONATREMIA: ICD-10-CM

## 2021-01-01 DIAGNOSIS — R60.0 LOWER EXTREMITY EDEMA: ICD-10-CM

## 2021-01-01 DIAGNOSIS — Z95.0 CARDIAC PACEMAKER IN SITU: Primary | ICD-10-CM

## 2021-01-01 DIAGNOSIS — N18.30 STAGE 3 CHRONIC KIDNEY DISEASE, UNSPECIFIED WHETHER STAGE 3A OR 3B CKD (HCC): ICD-10-CM

## 2021-01-01 DIAGNOSIS — N25.81 SECONDARY HYPERPARATHYROIDISM OF RENAL ORIGIN (HCC): ICD-10-CM

## 2021-01-01 DIAGNOSIS — L02.419 CELLULITIS AND ABSCESS OF LOWER EXTREMITY: ICD-10-CM

## 2021-01-01 DIAGNOSIS — I50.23 ACUTE ON CHRONIC SYSTOLIC CHF (CONGESTIVE HEART FAILURE) (HCC): Primary | ICD-10-CM

## 2021-01-01 DIAGNOSIS — I48.21 PERMANENT ATRIAL FIBRILLATION (HCC): Primary | ICD-10-CM

## 2021-01-01 DIAGNOSIS — N40.0 BENIGN PROSTATIC HYPERPLASIA, UNSPECIFIED WHETHER LOWER URINARY TRACT SYMPTOMS PRESENT: ICD-10-CM

## 2021-01-01 DIAGNOSIS — I48.0 PAROXYSMAL ATRIAL FIBRILLATION (HCC): Primary | ICD-10-CM

## 2021-01-01 LAB
ALBUMIN SERPL BCP-MCNC: 2.7 G/DL (ref 3.5–5)
ALBUMIN SERPL BCP-MCNC: 3.1 G/DL (ref 3.5–5)
ALBUMIN SERPL BCP-MCNC: 3.2 G/DL (ref 3.5–5)
ALBUMIN SERPL BCP-MCNC: 3.3 G/DL (ref 3.5–5)
ALP SERPL-CCNC: 102 U/L (ref 46–116)
ALP SERPL-CCNC: 132 U/L (ref 46–116)
ALP SERPL-CCNC: 145 U/L (ref 46–116)
ALT SERPL W P-5'-P-CCNC: 18 U/L (ref 12–78)
ALT SERPL W P-5'-P-CCNC: 30 U/L (ref 12–78)
ALT SERPL W P-5'-P-CCNC: 34 U/L (ref 12–78)
ANION GAP SERPL CALCULATED.3IONS-SCNC: 10 MMOL/L (ref 4–13)
ANION GAP SERPL CALCULATED.3IONS-SCNC: 10 MMOL/L (ref 4–13)
ANION GAP SERPL CALCULATED.3IONS-SCNC: 11 MMOL/L (ref 4–13)
ANION GAP SERPL CALCULATED.3IONS-SCNC: 12 MMOL/L (ref 4–13)
ANION GAP SERPL CALCULATED.3IONS-SCNC: 13 MMOL/L (ref 4–13)
ANION GAP SERPL CALCULATED.3IONS-SCNC: 13 MMOL/L (ref 4–13)
ANION GAP SERPL CALCULATED.3IONS-SCNC: 4 MMOL/L (ref 4–13)
ANION GAP SERPL CALCULATED.3IONS-SCNC: 6 MMOL/L (ref 4–13)
ANION GAP SERPL CALCULATED.3IONS-SCNC: 7 MMOL/L (ref 4–13)
ANION GAP SERPL CALCULATED.3IONS-SCNC: 8 MMOL/L (ref 4–13)
ANION GAP SERPL CALCULATED.3IONS-SCNC: 9 MMOL/L (ref 4–13)
AST SERPL W P-5'-P-CCNC: 39 U/L (ref 5–45)
AST SERPL W P-5'-P-CCNC: 41 U/L (ref 5–45)
AST SERPL W P-5'-P-CCNC: 49 U/L (ref 5–45)
ATRIAL RATE: 75 BPM
ATRIAL RATE: 85 BPM
BACTERIA BLD CULT: NORMAL
BACTERIA UR QL AUTO: ABNORMAL /HPF
BASOPHILS # BLD AUTO: 0.02 THOUSANDS/ΜL (ref 0–0.1)
BASOPHILS # BLD AUTO: 0.02 THOUSANDS/ΜL (ref 0–0.1)
BASOPHILS # BLD AUTO: 0.04 THOUSANDS/ΜL (ref 0–0.1)
BASOPHILS NFR BLD AUTO: 0 % (ref 0–1)
BASOPHILS NFR BLD AUTO: 0 % (ref 0–1)
BASOPHILS NFR BLD AUTO: 1 % (ref 0–1)
BILIRUB SERPL-MCNC: 0.84 MG/DL (ref 0.2–1)
BILIRUB SERPL-MCNC: 0.98 MG/DL (ref 0.2–1)
BILIRUB SERPL-MCNC: 1.29 MG/DL (ref 0.2–1)
BILIRUB UR QL STRIP: NEGATIVE
BILIRUB UR QL STRIP: NEGATIVE
BUN SERPL-MCNC: 39 MG/DL (ref 5–25)
BUN SERPL-MCNC: 46 MG/DL (ref 5–25)
BUN SERPL-MCNC: 49 MG/DL (ref 5–25)
BUN SERPL-MCNC: 50 MG/DL (ref 5–25)
BUN SERPL-MCNC: 51 MG/DL (ref 5–25)
BUN SERPL-MCNC: 51 MG/DL (ref 5–25)
BUN SERPL-MCNC: 52 MG/DL (ref 5–25)
BUN SERPL-MCNC: 53 MG/DL (ref 5–25)
BUN SERPL-MCNC: 54 MG/DL (ref 5–25)
BUN SERPL-MCNC: 57 MG/DL (ref 5–25)
BUN SERPL-MCNC: 59 MG/DL (ref 5–25)
BUN SERPL-MCNC: 61 MG/DL (ref 5–25)
BUN SERPL-MCNC: 63 MG/DL (ref 5–25)
BUN SERPL-MCNC: 66 MG/DL (ref 5–25)
BUN SERPL-MCNC: 67 MG/DL (ref 5–25)
BUN SERPL-MCNC: 68 MG/DL (ref 5–25)
BUN SERPL-MCNC: 70 MG/DL (ref 5–25)
BUN SERPL-MCNC: 71 MG/DL (ref 5–25)
BUN SERPL-MCNC: 74 MG/DL (ref 5–25)
BUN SERPL-MCNC: 76 MG/DL (ref 5–25)
BUN SERPL-MCNC: 78 MG/DL (ref 5–25)
BUN SERPL-MCNC: 79 MG/DL (ref 5–25)
BUN SERPL-MCNC: 83 MG/DL (ref 5–25)
BUN SERPL-MCNC: 85 MG/DL (ref 5–25)
BUN SERPL-MCNC: 86 MG/DL (ref 5–25)
BUN SERPL-MCNC: 87 MG/DL (ref 5–25)
BUN SERPL-MCNC: 93 MG/DL (ref 5–25)
BUN SERPL-MCNC: 93 MG/DL (ref 5–25)
BUN SERPL-MCNC: 94 MG/DL (ref 5–25)
BUN SERPL-MCNC: 96 MG/DL (ref 5–25)
BUN SERPL-MCNC: 99 MG/DL (ref 5–25)
CA-I BLD-SCNC: 1.08 MMOL/L (ref 1.12–1.32)
CALCIUM ALBUM COR SERPL-MCNC: 10.5 MG/DL (ref 8.3–10.1)
CALCIUM ALBUM COR SERPL-MCNC: 9.7 MG/DL (ref 8.3–10.1)
CALCIUM ALBUM COR SERPL-MCNC: 9.7 MG/DL (ref 8.3–10.1)
CALCIUM SERPL-MCNC: 10 MG/DL (ref 8.3–10.1)
CALCIUM SERPL-MCNC: 10.2 MG/DL (ref 8.3–10.1)
CALCIUM SERPL-MCNC: 10.3 MG/DL (ref 8.3–10.1)
CALCIUM SERPL-MCNC: 9 MG/DL (ref 8.3–10.1)
CALCIUM SERPL-MCNC: 9.1 MG/DL (ref 8.3–10.1)
CALCIUM SERPL-MCNC: 9.2 MG/DL (ref 8.3–10.1)
CALCIUM SERPL-MCNC: 9.2 MG/DL (ref 8.3–10.1)
CALCIUM SERPL-MCNC: 9.3 MG/DL (ref 8.3–10.1)
CALCIUM SERPL-MCNC: 9.4 MG/DL (ref 8.3–10.1)
CALCIUM SERPL-MCNC: 9.4 MG/DL (ref 8.3–10.1)
CALCIUM SERPL-MCNC: 9.5 MG/DL (ref 8.3–10.1)
CALCIUM SERPL-MCNC: 9.6 MG/DL (ref 8.3–10.1)
CALCIUM SERPL-MCNC: 9.7 MG/DL (ref 8.3–10.1)
CALCIUM SERPL-MCNC: 9.7 MG/DL (ref 8.3–10.1)
CEA SERPL-MCNC: 2.4 NG/ML (ref 0–3)
CHLORIDE SERPL-SCNC: 100 MMOL/L (ref 100–108)
CHLORIDE SERPL-SCNC: 100 MMOL/L (ref 100–108)
CHLORIDE SERPL-SCNC: 101 MMOL/L (ref 100–108)
CHLORIDE SERPL-SCNC: 101 MMOL/L (ref 100–108)
CHLORIDE SERPL-SCNC: 102 MMOL/L (ref 100–108)
CHLORIDE SERPL-SCNC: 92 MMOL/L (ref 100–108)
CHLORIDE SERPL-SCNC: 92 MMOL/L (ref 100–108)
CHLORIDE SERPL-SCNC: 93 MMOL/L (ref 100–108)
CHLORIDE SERPL-SCNC: 94 MMOL/L (ref 100–108)
CHLORIDE SERPL-SCNC: 95 MMOL/L (ref 100–108)
CHLORIDE SERPL-SCNC: 96 MMOL/L (ref 100–108)
CHLORIDE SERPL-SCNC: 97 MMOL/L (ref 100–108)
CHLORIDE SERPL-SCNC: 98 MMOL/L (ref 100–108)
CHLORIDE SERPL-SCNC: 99 MMOL/L (ref 100–108)
CHLORIDE SERPL-SCNC: 99 MMOL/L (ref 100–108)
CLARITY UR: CLEAR
CLARITY UR: CLEAR
CO2 SERPL-SCNC: 27 MMOL/L (ref 21–32)
CO2 SERPL-SCNC: 28 MMOL/L (ref 21–32)
CO2 SERPL-SCNC: 28 MMOL/L (ref 21–32)
CO2 SERPL-SCNC: 29 MMOL/L (ref 21–32)
CO2 SERPL-SCNC: 30 MMOL/L (ref 21–32)
CO2 SERPL-SCNC: 31 MMOL/L (ref 21–32)
CO2 SERPL-SCNC: 32 MMOL/L (ref 21–32)
CO2 SERPL-SCNC: 33 MMOL/L (ref 21–32)
CO2 SERPL-SCNC: 34 MMOL/L (ref 21–32)
CO2 SERPL-SCNC: 36 MMOL/L (ref 21–32)
COLOR UR: YELLOW
COLOR UR: YELLOW
CREAT SERPL-MCNC: 1.94 MG/DL (ref 0.6–1.3)
CREAT SERPL-MCNC: 1.95 MG/DL (ref 0.6–1.3)
CREAT SERPL-MCNC: 1.97 MG/DL (ref 0.6–1.3)
CREAT SERPL-MCNC: 2.02 MG/DL (ref 0.6–1.3)
CREAT SERPL-MCNC: 2.04 MG/DL (ref 0.6–1.3)
CREAT SERPL-MCNC: 2.05 MG/DL (ref 0.6–1.3)
CREAT SERPL-MCNC: 2.06 MG/DL (ref 0.6–1.3)
CREAT SERPL-MCNC: 2.07 MG/DL (ref 0.6–1.3)
CREAT SERPL-MCNC: 2.1 MG/DL (ref 0.6–1.3)
CREAT SERPL-MCNC: 2.11 MG/DL (ref 0.6–1.3)
CREAT SERPL-MCNC: 2.12 MG/DL (ref 0.6–1.3)
CREAT SERPL-MCNC: 2.16 MG/DL (ref 0.6–1.3)
CREAT SERPL-MCNC: 2.17 MG/DL (ref 0.6–1.3)
CREAT SERPL-MCNC: 2.21 MG/DL (ref 0.6–1.3)
CREAT SERPL-MCNC: 2.27 MG/DL (ref 0.6–1.3)
CREAT SERPL-MCNC: 2.29 MG/DL (ref 0.6–1.3)
CREAT SERPL-MCNC: 2.33 MG/DL (ref 0.6–1.3)
CREAT SERPL-MCNC: 2.33 MG/DL (ref 0.6–1.3)
CREAT SERPL-MCNC: 2.34 MG/DL (ref 0.6–1.3)
CREAT SERPL-MCNC: 2.36 MG/DL (ref 0.6–1.3)
CREAT SERPL-MCNC: 2.37 MG/DL (ref 0.6–1.3)
CREAT SERPL-MCNC: 2.37 MG/DL (ref 0.6–1.3)
CREAT SERPL-MCNC: 2.43 MG/DL (ref 0.6–1.3)
CREAT SERPL-MCNC: 2.44 MG/DL (ref 0.6–1.3)
CREAT SERPL-MCNC: 2.48 MG/DL (ref 0.6–1.3)
CREAT SERPL-MCNC: 2.71 MG/DL (ref 0.6–1.3)
CREAT SERPL-MCNC: 3.06 MG/DL (ref 0.6–1.3)
CREAT SERPL-MCNC: 3.11 MG/DL (ref 0.6–1.3)
CREAT SERPL-MCNC: 3.19 MG/DL (ref 0.6–1.3)
CREAT SERPL-MCNC: 3.21 MG/DL (ref 0.6–1.3)
CREAT SERPL-MCNC: 3.36 MG/DL (ref 0.6–1.3)
CREAT SERPL-MCNC: 3.42 MG/DL (ref 0.6–1.3)
CREAT SERPL-MCNC: 3.64 MG/DL (ref 0.6–1.3)
CREAT UR-MCNC: 74.5 MG/DL
EOSINOPHIL # BLD AUTO: 0.22 THOUSAND/ΜL (ref 0–0.61)
EOSINOPHIL # BLD AUTO: 0.23 THOUSAND/ΜL (ref 0–0.61)
EOSINOPHIL # BLD AUTO: 0.24 THOUSAND/ΜL (ref 0–0.61)
EOSINOPHIL # BLD AUTO: 0.31 THOUSAND/ΜL (ref 0–0.61)
EOSINOPHIL # BLD AUTO: 0.37 THOUSAND/ΜL (ref 0–0.61)
EOSINOPHIL # BLD AUTO: 0.47 THOUSAND/ΜL (ref 0–0.61)
EOSINOPHIL NFR BLD AUTO: 3 % (ref 0–6)
EOSINOPHIL NFR BLD AUTO: 3 % (ref 0–6)
EOSINOPHIL NFR BLD AUTO: 4 % (ref 0–6)
EOSINOPHIL NFR BLD AUTO: 4 % (ref 0–6)
EOSINOPHIL NFR BLD AUTO: 6 % (ref 0–6)
EOSINOPHIL NFR BLD AUTO: 7 % (ref 0–6)
ERYTHROCYTE [DISTWIDTH] IN BLOOD BY AUTOMATED COUNT: 13.2 % (ref 11.6–15.1)
ERYTHROCYTE [DISTWIDTH] IN BLOOD BY AUTOMATED COUNT: 13.3 % (ref 11.6–15.1)
ERYTHROCYTE [DISTWIDTH] IN BLOOD BY AUTOMATED COUNT: 13.5 % (ref 11.6–15.1)
ERYTHROCYTE [DISTWIDTH] IN BLOOD BY AUTOMATED COUNT: 13.7 % (ref 11.6–15.1)
ERYTHROCYTE [DISTWIDTH] IN BLOOD BY AUTOMATED COUNT: 15.9 % (ref 11.6–15.1)
ERYTHROCYTE [DISTWIDTH] IN BLOOD BY AUTOMATED COUNT: 16 % (ref 11.6–15.1)
ERYTHROCYTE [DISTWIDTH] IN BLOOD BY AUTOMATED COUNT: 16.1 % (ref 11.6–15.1)
ERYTHROCYTE [DISTWIDTH] IN BLOOD BY AUTOMATED COUNT: 16.2 % (ref 11.6–15.1)
ERYTHROCYTE [DISTWIDTH] IN BLOOD BY AUTOMATED COUNT: 16.2 % (ref 11.6–15.1)
ERYTHROCYTE [DISTWIDTH] IN BLOOD BY AUTOMATED COUNT: 16.3 % (ref 11.6–15.1)
GFR SERPL CREATININE-BSD FRML MDRD: 13 ML/MIN/1.73SQ M
GFR SERPL CREATININE-BSD FRML MDRD: 15 ML/MIN/1.73SQ M
GFR SERPL CREATININE-BSD FRML MDRD: 15 ML/MIN/1.73SQ M
GFR SERPL CREATININE-BSD FRML MDRD: 16 ML/MIN/1.73SQ M
GFR SERPL CREATININE-BSD FRML MDRD: 17 ML/MIN/1.73SQ M
GFR SERPL CREATININE-BSD FRML MDRD: 19 ML/MIN/1.73SQ M
GFR SERPL CREATININE-BSD FRML MDRD: 22 ML/MIN/1.73SQ M
GFR SERPL CREATININE-BSD FRML MDRD: 23 ML/MIN/1.73SQ M
GFR SERPL CREATININE-BSD FRML MDRD: 24 ML/MIN/1.73SQ M
GFR SERPL CREATININE-BSD FRML MDRD: 24 ML/MIN/1.73SQ M
GFR SERPL CREATININE-BSD FRML MDRD: 25 ML/MIN/1.73SQ M
GFR SERPL CREATININE-BSD FRML MDRD: 26 ML/MIN/1.73SQ M
GFR SERPL CREATININE-BSD FRML MDRD: 27 ML/MIN/1.73SQ M
GFR SERPL CREATININE-BSD FRML MDRD: 28 ML/MIN/1.73SQ M
GFR SERPL CREATININE-BSD FRML MDRD: 29 ML/MIN/1.73SQ M
GFR SERPL CREATININE-BSD FRML MDRD: 29 ML/MIN/1.73SQ M
GLUCOSE P FAST SERPL-MCNC: 101 MG/DL (ref 65–99)
GLUCOSE P FAST SERPL-MCNC: 102 MG/DL (ref 65–99)
GLUCOSE P FAST SERPL-MCNC: 103 MG/DL (ref 65–99)
GLUCOSE P FAST SERPL-MCNC: 107 MG/DL (ref 65–99)
GLUCOSE P FAST SERPL-MCNC: 79 MG/DL (ref 65–99)
GLUCOSE P FAST SERPL-MCNC: 94 MG/DL (ref 65–99)
GLUCOSE P FAST SERPL-MCNC: 97 MG/DL (ref 65–99)
GLUCOSE SERPL-MCNC: 102 MG/DL (ref 65–140)
GLUCOSE SERPL-MCNC: 103 MG/DL (ref 65–140)
GLUCOSE SERPL-MCNC: 104 MG/DL (ref 65–140)
GLUCOSE SERPL-MCNC: 104 MG/DL (ref 65–140)
GLUCOSE SERPL-MCNC: 107 MG/DL (ref 65–140)
GLUCOSE SERPL-MCNC: 109 MG/DL (ref 65–140)
GLUCOSE SERPL-MCNC: 112 MG/DL (ref 65–140)
GLUCOSE SERPL-MCNC: 112 MG/DL (ref 65–140)
GLUCOSE SERPL-MCNC: 114 MG/DL (ref 65–140)
GLUCOSE SERPL-MCNC: 115 MG/DL (ref 65–140)
GLUCOSE SERPL-MCNC: 115 MG/DL (ref 65–140)
GLUCOSE SERPL-MCNC: 116 MG/DL (ref 65–140)
GLUCOSE SERPL-MCNC: 119 MG/DL (ref 65–140)
GLUCOSE SERPL-MCNC: 122 MG/DL (ref 65–140)
GLUCOSE SERPL-MCNC: 123 MG/DL (ref 65–140)
GLUCOSE SERPL-MCNC: 124 MG/DL (ref 65–140)
GLUCOSE SERPL-MCNC: 130 MG/DL (ref 65–140)
GLUCOSE SERPL-MCNC: 135 MG/DL (ref 65–140)
GLUCOSE SERPL-MCNC: 140 MG/DL (ref 65–140)
GLUCOSE SERPL-MCNC: 140 MG/DL (ref 65–140)
GLUCOSE SERPL-MCNC: 154 MG/DL (ref 65–140)
GLUCOSE SERPL-MCNC: 158 MG/DL (ref 65–140)
GLUCOSE SERPL-MCNC: 163 MG/DL (ref 65–140)
GLUCOSE SERPL-MCNC: 164 MG/DL (ref 65–140)
GLUCOSE SERPL-MCNC: 166 MG/DL (ref 65–140)
GLUCOSE SERPL-MCNC: 62 MG/DL (ref 65–140)
GLUCOSE SERPL-MCNC: 93 MG/DL (ref 65–140)
GLUCOSE SERPL-MCNC: 96 MG/DL (ref 65–140)
GLUCOSE SERPL-MCNC: 97 MG/DL (ref 65–140)
GLUCOSE SERPL-MCNC: 98 MG/DL (ref 65–140)
GLUCOSE SERPL-MCNC: 99 MG/DL (ref 65–140)
GLUCOSE SERPL-MCNC: 99 MG/DL (ref 65–140)
GLUCOSE UR STRIP-MCNC: NEGATIVE MG/DL
GLUCOSE UR STRIP-MCNC: NEGATIVE MG/DL
HCT VFR BLD AUTO: 33.3 % (ref 36.5–49.3)
HCT VFR BLD AUTO: 35.1 % (ref 36.5–49.3)
HCT VFR BLD AUTO: 35.8 % (ref 36.5–49.3)
HCT VFR BLD AUTO: 37.3 % (ref 36.5–49.3)
HCT VFR BLD AUTO: 39.3 % (ref 36.5–49.3)
HCT VFR BLD AUTO: 40.4 % (ref 36.5–49.3)
HCT VFR BLD AUTO: 40.8 % (ref 36.5–49.3)
HCT VFR BLD AUTO: 41 % (ref 36.5–49.3)
HCT VFR BLD AUTO: 41.4 % (ref 36.5–49.3)
HCT VFR BLD AUTO: 42.1 % (ref 36.5–49.3)
HCT VFR BLD AUTO: 42.8 % (ref 36.5–49.3)
HCT VFR BLD AUTO: 43.3 % (ref 36.5–49.3)
HCT VFR BLD AUTO: 43.4 % (ref 36.5–49.3)
HCT VFR BLD AUTO: 43.5 % (ref 36.5–49.3)
HGB BLD-MCNC: 11.1 G/DL (ref 12–17)
HGB BLD-MCNC: 11.5 G/DL (ref 12–17)
HGB BLD-MCNC: 11.8 G/DL (ref 12–17)
HGB BLD-MCNC: 12.1 G/DL (ref 12–17)
HGB BLD-MCNC: 12.5 G/DL (ref 12–17)
HGB BLD-MCNC: 12.8 G/DL (ref 12–17)
HGB BLD-MCNC: 13.1 G/DL (ref 12–17)
HGB BLD-MCNC: 13.2 G/DL (ref 12–17)
HGB BLD-MCNC: 13.5 G/DL (ref 12–17)
HGB BLD-MCNC: 13.5 G/DL (ref 12–17)
HGB BLD-MCNC: 13.7 G/DL (ref 12–17)
HGB BLD-MCNC: 13.9 G/DL (ref 12–17)
HGB BLD-MCNC: 13.9 G/DL (ref 12–17)
HGB BLD-MCNC: 14.3 G/DL (ref 12–17)
HGB UR QL STRIP.AUTO: NEGATIVE
HGB UR QL STRIP.AUTO: NEGATIVE
HYALINE CASTS #/AREA URNS LPF: ABNORMAL /LPF
IMM GRANULOCYTES # BLD AUTO: 0.01 THOUSAND/UL (ref 0–0.2)
IMM GRANULOCYTES # BLD AUTO: 0.01 THOUSAND/UL (ref 0–0.2)
IMM GRANULOCYTES # BLD AUTO: 0.02 THOUSAND/UL (ref 0–0.2)
IMM GRANULOCYTES # BLD AUTO: 0.03 THOUSAND/UL (ref 0–0.2)
IMM GRANULOCYTES NFR BLD AUTO: 0 % (ref 0–2)
KETONES UR STRIP-MCNC: NEGATIVE MG/DL
KETONES UR STRIP-MCNC: NEGATIVE MG/DL
LACTATE SERPL-SCNC: 2.1 MMOL/L (ref 0.5–2)
LACTATE SERPL-SCNC: 2.3 MMOL/L (ref 0.5–2)
LACTATE SERPL-SCNC: 2.4 MMOL/L (ref 0.5–2)
LACTATE SERPL-SCNC: 2.5 MMOL/L (ref 0.5–2)
LACTATE SERPL-SCNC: 2.6 MMOL/L (ref 0.5–2)
LACTATE SERPL-SCNC: 2.6 MMOL/L (ref 0.5–2)
LACTATE SERPL-SCNC: 2.7 MMOL/L (ref 0.5–2)
LACTATE SERPL-SCNC: 2.8 MMOL/L (ref 0.5–2)
LACTATE SERPL-SCNC: 2.9 MMOL/L (ref 0.5–2)
LACTATE SERPL-SCNC: 3 MMOL/L (ref 0.5–2)
LACTATE SERPL-SCNC: 3.6 MMOL/L (ref 0.5–2)
LEUKOCYTE ESTERASE UR QL STRIP: NEGATIVE
LEUKOCYTE ESTERASE UR QL STRIP: NEGATIVE
LYMPHOCYTES # BLD AUTO: 0.86 THOUSANDS/ΜL (ref 0.6–4.47)
LYMPHOCYTES # BLD AUTO: 0.93 THOUSANDS/ΜL (ref 0.6–4.47)
LYMPHOCYTES # BLD AUTO: 1 THOUSANDS/ΜL (ref 0.6–4.47)
LYMPHOCYTES # BLD AUTO: 1.29 THOUSANDS/ΜL (ref 0.6–4.47)
LYMPHOCYTES # BLD AUTO: 1.29 THOUSANDS/ΜL (ref 0.6–4.47)
LYMPHOCYTES # BLD AUTO: 1.42 THOUSANDS/ΜL (ref 0.6–4.47)
LYMPHOCYTES NFR BLD AUTO: 12 % (ref 14–44)
LYMPHOCYTES NFR BLD AUTO: 14 % (ref 14–44)
LYMPHOCYTES NFR BLD AUTO: 15 % (ref 14–44)
LYMPHOCYTES NFR BLD AUTO: 17 % (ref 14–44)
LYMPHOCYTES NFR BLD AUTO: 20 % (ref 14–44)
LYMPHOCYTES NFR BLD AUTO: 21 % (ref 14–44)
MAGNESIUM SERPL-MCNC: 1.9 MG/DL (ref 1.6–2.6)
MAGNESIUM SERPL-MCNC: 2 MG/DL (ref 1.6–2.6)
MAGNESIUM SERPL-MCNC: 2.1 MG/DL (ref 1.6–2.6)
MAGNESIUM SERPL-MCNC: 2.2 MG/DL (ref 1.6–2.6)
MAGNESIUM SERPL-MCNC: 2.3 MG/DL (ref 1.6–2.6)
MAGNESIUM SERPL-MCNC: 2.4 MG/DL (ref 1.6–2.6)
MAGNESIUM SERPL-MCNC: 2.6 MG/DL (ref 1.6–2.6)
MCH RBC QN AUTO: 30.3 PG (ref 26.8–34.3)
MCH RBC QN AUTO: 30.5 PG (ref 26.8–34.3)
MCH RBC QN AUTO: 30.7 PG (ref 26.8–34.3)
MCH RBC QN AUTO: 31.1 PG (ref 26.8–34.3)
MCH RBC QN AUTO: 31.2 PG (ref 26.8–34.3)
MCH RBC QN AUTO: 31.3 PG (ref 26.8–34.3)
MCH RBC QN AUTO: 31.3 PG (ref 26.8–34.3)
MCH RBC QN AUTO: 31.4 PG (ref 26.8–34.3)
MCH RBC QN AUTO: 31.5 PG (ref 26.8–34.3)
MCH RBC QN AUTO: 31.5 PG (ref 26.8–34.3)
MCH RBC QN AUTO: 31.7 PG (ref 26.8–34.3)
MCHC RBC AUTO-ENTMCNC: 31.4 G/DL (ref 31.4–37.4)
MCHC RBC AUTO-ENTMCNC: 31.6 G/DL (ref 31.4–37.4)
MCHC RBC AUTO-ENTMCNC: 31.8 G/DL (ref 31.4–37.4)
MCHC RBC AUTO-ENTMCNC: 32 G/DL (ref 31.4–37.4)
MCHC RBC AUTO-ENTMCNC: 32.1 G/DL (ref 31.4–37.4)
MCHC RBC AUTO-ENTMCNC: 32.2 G/DL (ref 31.4–37.4)
MCHC RBC AUTO-ENTMCNC: 32.4 G/DL (ref 31.4–37.4)
MCHC RBC AUTO-ENTMCNC: 32.5 G/DL (ref 31.4–37.4)
MCHC RBC AUTO-ENTMCNC: 32.6 G/DL (ref 31.4–37.4)
MCHC RBC AUTO-ENTMCNC: 32.8 G/DL (ref 31.4–37.4)
MCHC RBC AUTO-ENTMCNC: 32.9 G/DL (ref 31.4–37.4)
MCHC RBC AUTO-ENTMCNC: 33 G/DL (ref 31.4–37.4)
MCHC RBC AUTO-ENTMCNC: 33.3 G/DL (ref 31.4–37.4)
MCV RBC AUTO: 93 FL (ref 82–98)
MCV RBC AUTO: 96 FL (ref 82–98)
MCV RBC AUTO: 97 FL (ref 82–98)
MCV RBC AUTO: 98 FL (ref 82–98)
MONOCYTES # BLD AUTO: 0.7 THOUSAND/ΜL (ref 0.17–1.22)
MONOCYTES # BLD AUTO: 0.75 THOUSAND/ΜL (ref 0.17–1.22)
MONOCYTES # BLD AUTO: 0.76 THOUSAND/ΜL (ref 0.17–1.22)
MONOCYTES # BLD AUTO: 0.82 THOUSAND/ΜL (ref 0.17–1.22)
MONOCYTES # BLD AUTO: 0.89 THOUSAND/ΜL (ref 0.17–1.22)
MONOCYTES # BLD AUTO: 0.92 THOUSAND/ΜL (ref 0.17–1.22)
MONOCYTES NFR BLD AUTO: 11 % (ref 4–12)
MONOCYTES NFR BLD AUTO: 12 % (ref 4–12)
MONOCYTES NFR BLD AUTO: 13 % (ref 4–12)
MONOCYTES NFR BLD AUTO: 13 % (ref 4–12)
NEUTROPHILS # BLD AUTO: 4.02 THOUSANDS/ΜL (ref 1.85–7.62)
NEUTROPHILS # BLD AUTO: 4.03 THOUSANDS/ΜL (ref 1.85–7.62)
NEUTROPHILS # BLD AUTO: 4.53 THOUSANDS/ΜL (ref 1.85–7.62)
NEUTROPHILS # BLD AUTO: 4.85 THOUSANDS/ΜL (ref 1.85–7.62)
NEUTROPHILS # BLD AUTO: 5.21 THOUSANDS/ΜL (ref 1.85–7.62)
NEUTROPHILS # BLD AUTO: 5.36 THOUSANDS/ΜL (ref 1.85–7.62)
NEUTS SEG NFR BLD AUTO: 58 % (ref 43–75)
NEUTS SEG NFR BLD AUTO: 60 % (ref 43–75)
NEUTS SEG NFR BLD AUTO: 66 % (ref 43–75)
NEUTS SEG NFR BLD AUTO: 70 % (ref 43–75)
NEUTS SEG NFR BLD AUTO: 72 % (ref 43–75)
NEUTS SEG NFR BLD AUTO: 73 % (ref 43–75)
NITRITE UR QL STRIP: NEGATIVE
NITRITE UR QL STRIP: NEGATIVE
NON-SQ EPI CELLS URNS QL MICRO: ABNORMAL /HPF
NRBC BLD AUTO-RTO: 0 /100 WBCS
NT-PROBNP SERPL-MCNC: ABNORMAL PG/ML
NT-PROBNP SERPL-MCNC: ABNORMAL PG/ML
PH UR STRIP.AUTO: 6 [PH]
PH UR STRIP.AUTO: 6.5 [PH] (ref 4.5–8)
PHOSPHATE SERPL-MCNC: 3.2 MG/DL (ref 2.3–4.1)
PHOSPHATE SERPL-MCNC: 3.6 MG/DL (ref 2.3–4.1)
PHOSPHATE SERPL-MCNC: 3.6 MG/DL (ref 2.3–4.1)
PHOSPHATE SERPL-MCNC: 3.9 MG/DL (ref 2.3–4.1)
PHOSPHATE SERPL-MCNC: 4.2 MG/DL (ref 2.3–4.1)
PHOSPHATE SERPL-MCNC: 4.3 MG/DL (ref 2.3–4.1)
PHOSPHATE SERPL-MCNC: 4.5 MG/DL (ref 2.3–4.1)
PLATELET # BLD AUTO: 152 THOUSANDS/UL (ref 149–390)
PLATELET # BLD AUTO: 155 THOUSANDS/UL (ref 149–390)
PLATELET # BLD AUTO: 171 THOUSANDS/UL (ref 149–390)
PLATELET # BLD AUTO: 187 THOUSANDS/UL (ref 149–390)
PLATELET # BLD AUTO: 189 THOUSANDS/UL (ref 149–390)
PLATELET # BLD AUTO: 193 THOUSANDS/UL (ref 149–390)
PLATELET # BLD AUTO: 198 THOUSANDS/UL (ref 149–390)
PLATELET # BLD AUTO: 205 THOUSANDS/UL (ref 149–390)
PLATELET # BLD AUTO: 207 THOUSANDS/UL (ref 149–390)
PLATELET # BLD AUTO: 214 THOUSANDS/UL (ref 149–390)
PLATELET # BLD AUTO: 214 THOUSANDS/UL (ref 149–390)
PLATELET # BLD AUTO: 224 THOUSANDS/UL (ref 149–390)
PLATELET # BLD AUTO: 232 THOUSANDS/UL (ref 149–390)
PMV BLD AUTO: 10 FL (ref 8.9–12.7)
PMV BLD AUTO: 10.2 FL (ref 8.9–12.7)
PMV BLD AUTO: 10.2 FL (ref 8.9–12.7)
PMV BLD AUTO: 10.3 FL (ref 8.9–12.7)
PMV BLD AUTO: 10.5 FL (ref 8.9–12.7)
PMV BLD AUTO: 10.6 FL (ref 8.9–12.7)
PMV BLD AUTO: 10.7 FL (ref 8.9–12.7)
PMV BLD AUTO: 10.8 FL (ref 8.9–12.7)
PMV BLD AUTO: 10.9 FL (ref 8.9–12.7)
PMV BLD AUTO: 11 FL (ref 8.9–12.7)
PMV BLD AUTO: 11.7 FL (ref 8.9–12.7)
POTASSIUM SERPL-SCNC: 2.9 MMOL/L (ref 3.5–5.3)
POTASSIUM SERPL-SCNC: 3 MMOL/L (ref 3.5–5.3)
POTASSIUM SERPL-SCNC: 3.1 MMOL/L (ref 3.5–5.3)
POTASSIUM SERPL-SCNC: 3.2 MMOL/L (ref 3.5–5.3)
POTASSIUM SERPL-SCNC: 3.2 MMOL/L (ref 3.5–5.3)
POTASSIUM SERPL-SCNC: 3.3 MMOL/L (ref 3.5–5.3)
POTASSIUM SERPL-SCNC: 3.4 MMOL/L (ref 3.5–5.3)
POTASSIUM SERPL-SCNC: 3.5 MMOL/L (ref 3.5–5.3)
POTASSIUM SERPL-SCNC: 3.5 MMOL/L (ref 3.5–5.3)
POTASSIUM SERPL-SCNC: 3.6 MMOL/L (ref 3.5–5.3)
POTASSIUM SERPL-SCNC: 3.6 MMOL/L (ref 3.5–5.3)
POTASSIUM SERPL-SCNC: 3.7 MMOL/L (ref 3.5–5.3)
POTASSIUM SERPL-SCNC: 3.8 MMOL/L (ref 3.5–5.3)
POTASSIUM SERPL-SCNC: 3.9 MMOL/L (ref 3.5–5.3)
POTASSIUM SERPL-SCNC: 3.9 MMOL/L (ref 3.5–5.3)
POTASSIUM SERPL-SCNC: 4 MMOL/L (ref 3.5–5.3)
POTASSIUM SERPL-SCNC: 4.1 MMOL/L (ref 3.5–5.3)
POTASSIUM SERPL-SCNC: 4.2 MMOL/L (ref 3.5–5.3)
POTASSIUM SERPL-SCNC: 4.7 MMOL/L (ref 3.5–5.3)
PROCALCITONIN SERPL-MCNC: 0.07 NG/ML
PROCALCITONIN SERPL-MCNC: 0.09 NG/ML
PROT SERPL-MCNC: 7.2 G/DL (ref 6.4–8.2)
PROT SERPL-MCNC: 7.2 G/DL (ref 6.4–8.2)
PROT SERPL-MCNC: 7.5 G/DL (ref 6.4–8.2)
PROT SERPL-MCNC: 8 G/DL (ref 6.4–8.2)
PROT UR STRIP-MCNC: NEGATIVE MG/DL
PROT UR STRIP-MCNC: NEGATIVE MG/DL
PROT UR-MCNC: 15 MG/DL
PROT/CREAT UR: 0.2 MG/G{CREAT} (ref 0–0.1)
PTH-INTACT SERPL-MCNC: 121.6 PG/ML (ref 18.4–80.1)
PTH-INTACT SERPL-MCNC: 42.4 PG/ML (ref 18.4–80.1)
QRS AXIS: -76 DEGREES
QRS AXIS: -84 DEGREES
QRSD INTERVAL: 178 MS
QRSD INTERVAL: 190 MS
QT INTERVAL: 516 MS
QT INTERVAL: 526 MS
QTC INTERVAL: 565 MS
QTC INTERVAL: 587 MS
RBC # BLD AUTO: 3.57 MILLION/UL (ref 3.88–5.62)
RBC # BLD AUTO: 3.79 MILLION/UL (ref 3.88–5.62)
RBC # BLD AUTO: 3.84 MILLION/UL (ref 3.88–5.62)
RBC # BLD AUTO: 4 MILLION/UL (ref 3.88–5.62)
RBC # BLD AUTO: 4.16 MILLION/UL (ref 3.88–5.62)
RBC # BLD AUTO: 4.2 MILLION/UL (ref 3.88–5.62)
RBC # BLD AUTO: 4.25 MILLION/UL (ref 3.88–5.62)
RBC # BLD AUTO: 4.28 MILLION/UL (ref 3.88–5.62)
RBC # BLD AUTO: 4.33 MILLION/UL (ref 3.88–5.62)
RBC # BLD AUTO: 4.41 MILLION/UL (ref 3.88–5.62)
RBC # BLD AUTO: 4.43 MILLION/UL (ref 3.88–5.62)
RBC # BLD AUTO: 4.44 MILLION/UL (ref 3.88–5.62)
RBC # BLD AUTO: 4.51 MILLION/UL (ref 3.88–5.62)
RBC #/AREA URNS AUTO: ABNORMAL /HPF
SARS-COV-2 RNA RESP QL NAA+PROBE: NEGATIVE
SODIUM SERPL-SCNC: 131 MMOL/L (ref 136–145)
SODIUM SERPL-SCNC: 133 MMOL/L (ref 136–145)
SODIUM SERPL-SCNC: 134 MMOL/L (ref 136–145)
SODIUM SERPL-SCNC: 134 MMOL/L (ref 136–145)
SODIUM SERPL-SCNC: 135 MMOL/L (ref 136–145)
SODIUM SERPL-SCNC: 135 MMOL/L (ref 136–145)
SODIUM SERPL-SCNC: 136 MMOL/L (ref 136–145)
SODIUM SERPL-SCNC: 137 MMOL/L (ref 136–145)
SODIUM SERPL-SCNC: 138 MMOL/L (ref 136–145)
SODIUM SERPL-SCNC: 138 MMOL/L (ref 136–145)
SODIUM SERPL-SCNC: 139 MMOL/L (ref 136–145)
SODIUM SERPL-SCNC: 140 MMOL/L (ref 136–145)
SODIUM SERPL-SCNC: 140 MMOL/L (ref 136–145)
SODIUM SERPL-SCNC: 141 MMOL/L (ref 136–145)
SODIUM SERPL-SCNC: 142 MMOL/L (ref 136–145)
SP GR UR STRIP.AUTO: 1.01 (ref 1–1.03)
SP GR UR STRIP.AUTO: 1.01 (ref 1–1.03)
T WAVE AXIS: 102 DEGREES
T WAVE AXIS: 96 DEGREES
TROPONIN I SERPL-MCNC: 0.24 NG/ML
TROPONIN I SERPL-MCNC: 0.3 NG/ML
TROPONIN I SERPL-MCNC: 0.31 NG/ML
TROPONIN I SERPL-MCNC: 0.34 NG/ML
UROBILINOGEN UR QL STRIP.AUTO: 0.2 E.U./DL
UROBILINOGEN UR QL STRIP.AUTO: 0.2 E.U./DL
VENTRICULAR RATE: 72 BPM
VENTRICULAR RATE: 75 BPM
WBC # BLD AUTO: 6.51 THOUSAND/UL (ref 4.31–10.16)
WBC # BLD AUTO: 6.56 THOUSAND/UL (ref 4.31–10.16)
WBC # BLD AUTO: 6.65 THOUSAND/UL (ref 4.31–10.16)
WBC # BLD AUTO: 6.67 THOUSAND/UL (ref 4.31–10.16)
WBC # BLD AUTO: 6.8 THOUSAND/UL (ref 4.31–10.16)
WBC # BLD AUTO: 6.89 THOUSAND/UL (ref 4.31–10.16)
WBC # BLD AUTO: 6.95 THOUSAND/UL (ref 4.31–10.16)
WBC # BLD AUTO: 7 THOUSAND/UL (ref 4.31–10.16)
WBC # BLD AUTO: 7.08 THOUSAND/UL (ref 4.31–10.16)
WBC # BLD AUTO: 7.27 THOUSAND/UL (ref 4.31–10.16)
WBC # BLD AUTO: 7.52 THOUSAND/UL (ref 4.31–10.16)
WBC # BLD AUTO: 7.72 THOUSAND/UL (ref 4.31–10.16)
WBC # BLD AUTO: 7.75 THOUSAND/UL (ref 4.31–10.16)
WBC #/AREA URNS AUTO: ABNORMAL /HPF

## 2021-01-01 PROCEDURE — 99232 SBSQ HOSP IP/OBS MODERATE 35: CPT | Performed by: HOSPITALIST

## 2021-01-01 PROCEDURE — 82378 CARCINOEMBRYONIC ANTIGEN: CPT

## 2021-01-01 PROCEDURE — 80048 BASIC METABOLIC PNL TOTAL CA: CPT | Performed by: STUDENT IN AN ORGANIZED HEALTH CARE EDUCATION/TRAINING PROGRAM

## 2021-01-01 PROCEDURE — 83605 ASSAY OF LACTIC ACID: CPT

## 2021-01-01 PROCEDURE — 99285 EMERGENCY DEPT VISIT HI MDM: CPT | Performed by: EMERGENCY MEDICINE

## 2021-01-01 PROCEDURE — 80048 BASIC METABOLIC PNL TOTAL CA: CPT

## 2021-01-01 PROCEDURE — 97116 GAIT TRAINING THERAPY: CPT

## 2021-01-01 PROCEDURE — 97110 THERAPEUTIC EXERCISES: CPT

## 2021-01-01 PROCEDURE — 99223 1ST HOSP IP/OBS HIGH 75: CPT | Performed by: INTERNAL MEDICINE

## 2021-01-01 PROCEDURE — 93798 PHYS/QHP OP CAR RHAB W/ECG: CPT

## 2021-01-01 PROCEDURE — 83605 ASSAY OF LACTIC ACID: CPT | Performed by: INTERNAL MEDICINE

## 2021-01-01 PROCEDURE — 85025 COMPLETE CBC W/AUTO DIFF WBC: CPT

## 2021-01-01 PROCEDURE — 99232 SBSQ HOSP IP/OBS MODERATE 35: CPT | Performed by: INTERNAL MEDICINE

## 2021-01-01 PROCEDURE — 36415 COLL VENOUS BLD VENIPUNCTURE: CPT

## 2021-01-01 PROCEDURE — 93296 REM INTERROG EVL PM/IDS: CPT | Performed by: INTERNAL MEDICINE

## 2021-01-01 PROCEDURE — 84100 ASSAY OF PHOSPHORUS: CPT | Performed by: NURSE PRACTITIONER

## 2021-01-01 PROCEDURE — 97535 SELF CARE MNGMENT TRAINING: CPT

## 2021-01-01 PROCEDURE — 83735 ASSAY OF MAGNESIUM: CPT | Performed by: NURSE PRACTITIONER

## 2021-01-01 PROCEDURE — 85014 HEMATOCRIT: CPT | Performed by: INTERNAL MEDICINE

## 2021-01-01 PROCEDURE — 97530 THERAPEUTIC ACTIVITIES: CPT

## 2021-01-01 PROCEDURE — 99232 SBSQ HOSP IP/OBS MODERATE 35: CPT | Performed by: STUDENT IN AN ORGANIZED HEALTH CARE EDUCATION/TRAINING PROGRAM

## 2021-01-01 PROCEDURE — 80048 BASIC METABOLIC PNL TOTAL CA: CPT | Performed by: INTERNAL MEDICINE

## 2021-01-01 PROCEDURE — 99232 SBSQ HOSP IP/OBS MODERATE 35: CPT

## 2021-01-01 PROCEDURE — 99233 SBSQ HOSP IP/OBS HIGH 50: CPT | Performed by: INTERNAL MEDICINE

## 2021-01-01 PROCEDURE — 87040 BLOOD CULTURE FOR BACTERIA: CPT | Performed by: EMERGENCY MEDICINE

## 2021-01-01 PROCEDURE — 85018 HEMOGLOBIN: CPT | Performed by: INTERNAL MEDICINE

## 2021-01-01 PROCEDURE — 93281 PM DEVICE PROGR EVAL MULTI: CPT | Performed by: INTERNAL MEDICINE

## 2021-01-01 PROCEDURE — 99285 EMERGENCY DEPT VISIT HI MDM: CPT

## 2021-01-01 PROCEDURE — 97166 OT EVAL MOD COMPLEX 45 MIN: CPT

## 2021-01-01 PROCEDURE — 85027 COMPLETE CBC AUTOMATED: CPT | Performed by: HOSPITALIST

## 2021-01-01 PROCEDURE — 82570 ASSAY OF URINE CREATININE: CPT

## 2021-01-01 PROCEDURE — 99316 NF DSCHRG MGMT 30 MIN+: CPT | Performed by: NURSE PRACTITIONER

## 2021-01-01 PROCEDURE — 82040 ASSAY OF SERUM ALBUMIN: CPT

## 2021-01-01 PROCEDURE — 93797 PHYS/QHP OP CAR RHAB WO ECG: CPT

## 2021-01-01 PROCEDURE — 93306 TTE W/DOPPLER COMPLETE: CPT

## 2021-01-01 PROCEDURE — 83735 ASSAY OF MAGNESIUM: CPT | Performed by: STUDENT IN AN ORGANIZED HEALTH CARE EDUCATION/TRAINING PROGRAM

## 2021-01-01 PROCEDURE — 84155 ASSAY OF PROTEIN SERUM: CPT

## 2021-01-01 PROCEDURE — 99204 OFFICE O/P NEW MOD 45 MIN: CPT | Performed by: INTERNAL MEDICINE

## 2021-01-01 PROCEDURE — 82948 REAGENT STRIP/BLOOD GLUCOSE: CPT

## 2021-01-01 PROCEDURE — 84484 ASSAY OF TROPONIN QUANT: CPT | Performed by: NURSE PRACTITIONER

## 2021-01-01 PROCEDURE — 99213 OFFICE O/P EST LOW 20 MIN: CPT | Performed by: PHYSICIAN ASSISTANT

## 2021-01-01 PROCEDURE — 85025 COMPLETE CBC W/AUTO DIFF WBC: CPT | Performed by: STUDENT IN AN ORGANIZED HEALTH CARE EDUCATION/TRAINING PROGRAM

## 2021-01-01 PROCEDURE — 99214 OFFICE O/P EST MOD 30 MIN: CPT | Performed by: INTERNAL MEDICINE

## 2021-01-01 PROCEDURE — 99222 1ST HOSP IP/OBS MODERATE 55: CPT

## 2021-01-01 PROCEDURE — 93294 REM INTERROG EVL PM/LDLS PM: CPT | Performed by: INTERNAL MEDICINE

## 2021-01-01 PROCEDURE — 84100 ASSAY OF PHOSPHORUS: CPT

## 2021-01-01 PROCEDURE — 80048 BASIC METABOLIC PNL TOTAL CA: CPT | Performed by: HOSPITALIST

## 2021-01-01 PROCEDURE — 83970 ASSAY OF PARATHORMONE: CPT | Performed by: INTERNAL MEDICINE

## 2021-01-01 PROCEDURE — U0005 INFEC AGEN DETEC AMPLI PROBE: HCPCS | Performed by: INTERNAL MEDICINE

## 2021-01-01 PROCEDURE — 99308 SBSQ NF CARE LOW MDM 20: CPT | Performed by: NURSE PRACTITIONER

## 2021-01-01 PROCEDURE — 83605 ASSAY OF LACTIC ACID: CPT | Performed by: NURSE PRACTITIONER

## 2021-01-01 PROCEDURE — 96374 THER/PROPH/DIAG INJ IV PUSH: CPT

## 2021-01-01 PROCEDURE — 99231 SBSQ HOSP IP/OBS SF/LOW 25: CPT | Performed by: INTERNAL MEDICINE

## 2021-01-01 PROCEDURE — 71045 X-RAY EXAM CHEST 1 VIEW: CPT

## 2021-01-01 PROCEDURE — 83735 ASSAY OF MAGNESIUM: CPT | Performed by: INTERNAL MEDICINE

## 2021-01-01 PROCEDURE — 80053 COMPREHEN METABOLIC PANEL: CPT | Performed by: INTERNAL MEDICINE

## 2021-01-01 PROCEDURE — 93000 ELECTROCARDIOGRAM COMPLETE: CPT | Performed by: INTERNAL MEDICINE

## 2021-01-01 PROCEDURE — 83880 ASSAY OF NATRIURETIC PEPTIDE: CPT | Performed by: EMERGENCY MEDICINE

## 2021-01-01 PROCEDURE — 97163 PT EVAL HIGH COMPLEX 45 MIN: CPT

## 2021-01-01 PROCEDURE — 87040 BLOOD CULTURE FOR BACTERIA: CPT

## 2021-01-01 PROCEDURE — 84484 ASSAY OF TROPONIN QUANT: CPT | Performed by: EMERGENCY MEDICINE

## 2021-01-01 PROCEDURE — 99239 HOSP IP/OBS DSCHRG MGMT >30: CPT | Performed by: STUDENT IN AN ORGANIZED HEALTH CARE EDUCATION/TRAINING PROGRAM

## 2021-01-01 PROCEDURE — 84145 PROCALCITONIN (PCT): CPT

## 2021-01-01 PROCEDURE — 97167 OT EVAL HIGH COMPLEX 60 MIN: CPT

## 2021-01-01 PROCEDURE — 85025 COMPLETE CBC W/AUTO DIFF WBC: CPT | Performed by: INTERNAL MEDICINE

## 2021-01-01 PROCEDURE — 83735 ASSAY OF MAGNESIUM: CPT | Performed by: HOSPITALIST

## 2021-01-01 PROCEDURE — 84156 ASSAY OF PROTEIN URINE: CPT

## 2021-01-01 PROCEDURE — 99309 SBSQ NF CARE MODERATE MDM 30: CPT | Performed by: NURSE PRACTITIONER

## 2021-01-01 PROCEDURE — 85027 COMPLETE CBC AUTOMATED: CPT | Performed by: INTERNAL MEDICINE

## 2021-01-01 PROCEDURE — U0003 INFECTIOUS AGENT DETECTION BY NUCLEIC ACID (DNA OR RNA); SEVERE ACUTE RESPIRATORY SYNDROME CORONAVIRUS 2 (SARS-COV-2) (CORONAVIRUS DISEASE [COVID-19]), AMPLIFIED PROBE TECHNIQUE, MAKING USE OF HIGH THROUGHPUT TECHNOLOGIES AS DESCRIBED BY CMS-2020-01-R: HCPCS | Performed by: INTERNAL MEDICINE

## 2021-01-01 PROCEDURE — 93010 ELECTROCARDIOGRAM REPORT: CPT

## 2021-01-01 PROCEDURE — 84484 ASSAY OF TROPONIN QUANT: CPT

## 2021-01-01 PROCEDURE — 93010 ELECTROCARDIOGRAM REPORT: CPT | Performed by: INTERNAL MEDICINE

## 2021-01-01 PROCEDURE — 84100 ASSAY OF PHOSPHORUS: CPT | Performed by: INTERNAL MEDICINE

## 2021-01-01 PROCEDURE — 93005 ELECTROCARDIOGRAM TRACING: CPT

## 2021-01-01 PROCEDURE — 81003 URINALYSIS AUTO W/O SCOPE: CPT

## 2021-01-01 PROCEDURE — 80053 COMPREHEN METABOLIC PANEL: CPT

## 2021-01-01 PROCEDURE — 80048 BASIC METABOLIC PNL TOTAL CA: CPT | Performed by: NURSE PRACTITIONER

## 2021-01-01 PROCEDURE — 80053 COMPREHEN METABOLIC PANEL: CPT | Performed by: EMERGENCY MEDICINE

## 2021-01-01 PROCEDURE — 83735 ASSAY OF MAGNESIUM: CPT

## 2021-01-01 PROCEDURE — RECHECK: Performed by: INTERNAL MEDICINE

## 2021-01-01 PROCEDURE — 85025 COMPLETE CBC W/AUTO DIFF WBC: CPT | Performed by: EMERGENCY MEDICINE

## 2021-01-01 PROCEDURE — 99239 HOSP IP/OBS DSCHRG MGMT >30: CPT | Performed by: INTERNAL MEDICINE

## 2021-01-01 PROCEDURE — 99305 1ST NF CARE MODERATE MDM 35: CPT | Performed by: NURSE PRACTITIONER

## 2021-01-01 PROCEDURE — 99223 1ST HOSP IP/OBS HIGH 75: CPT | Performed by: NURSE PRACTITIONER

## 2021-01-01 PROCEDURE — 85025 COMPLETE CBC W/AUTO DIFF WBC: CPT | Performed by: NURSE PRACTITIONER

## 2021-01-01 PROCEDURE — 83880 ASSAY OF NATRIURETIC PEPTIDE: CPT

## 2021-01-01 PROCEDURE — 36415 COLL VENOUS BLD VENIPUNCTURE: CPT | Performed by: EMERGENCY MEDICINE

## 2021-01-01 PROCEDURE — 81001 URINALYSIS AUTO W/SCOPE: CPT | Performed by: INTERNAL MEDICINE

## 2021-01-01 PROCEDURE — 99284 EMERGENCY DEPT VISIT MOD MDM: CPT | Performed by: EMERGENCY MEDICINE

## 2021-01-01 PROCEDURE — 83970 ASSAY OF PARATHORMONE: CPT

## 2021-01-01 PROCEDURE — 99222 1ST HOSP IP/OBS MODERATE 55: CPT | Performed by: INTERNAL MEDICINE

## 2021-01-01 PROCEDURE — 82330 ASSAY OF CALCIUM: CPT | Performed by: INTERNAL MEDICINE

## 2021-01-01 PROCEDURE — 99306 1ST NF CARE HIGH MDM 50: CPT | Performed by: INTERNAL MEDICINE

## 2021-01-01 PROCEDURE — 93308 TTE F-UP OR LMTD: CPT

## 2021-01-01 RX ORDER — SPIRONOLACTONE 25 MG/1
12.5 TABLET ORAL DAILY
Qty: 30 TABLET | Refills: 0 | Status: SHIPPED | OUTPATIENT
Start: 2021-01-01 | End: 2021-01-01 | Stop reason: SDUPTHER

## 2021-01-01 RX ORDER — POTASSIUM CHLORIDE 20 MEQ/15ML
10 SOLUTION ORAL DAILY
COMMUNITY

## 2021-01-01 RX ORDER — AMOXICILLIN 250 MG
1 CAPSULE ORAL 2 TIMES DAILY
Status: DISCONTINUED | OUTPATIENT
Start: 2021-01-01 | End: 2021-01-01 | Stop reason: HOSPADM

## 2021-01-01 RX ORDER — AMOXICILLIN 250 MG
1 CAPSULE ORAL 2 TIMES DAILY
Qty: 60 TABLET | Refills: 0 | Status: SHIPPED | OUTPATIENT
Start: 2021-01-01

## 2021-01-01 RX ORDER — BUMETANIDE 0.25 MG/ML
1 INJECTION, SOLUTION INTRAMUSCULAR; INTRAVENOUS ONCE
Status: COMPLETED | OUTPATIENT
Start: 2021-01-01 | End: 2021-01-01

## 2021-01-01 RX ORDER — BUMETANIDE 1 MG/1
2 TABLET ORAL ONCE
Status: DISCONTINUED | OUTPATIENT
Start: 2021-01-01 | End: 2021-01-01

## 2021-01-01 RX ORDER — POTASSIUM CHLORIDE 20 MEQ/1
40 TABLET, EXTENDED RELEASE ORAL 2 TIMES DAILY
Status: COMPLETED | OUTPATIENT
Start: 2021-01-01 | End: 2021-01-01

## 2021-01-01 RX ORDER — BUMETANIDE 0.25 MG/ML
2 INJECTION, SOLUTION INTRAMUSCULAR; INTRAVENOUS 2 TIMES DAILY
Status: DISCONTINUED | OUTPATIENT
Start: 2021-01-01 | End: 2021-01-01

## 2021-01-01 RX ORDER — BISOPROLOL FUMARATE 5 MG/1
5 TABLET ORAL DAILY
Status: DISCONTINUED | OUTPATIENT
Start: 2021-01-01 | End: 2021-01-01

## 2021-01-01 RX ORDER — POTASSIUM CHLORIDE 750 MG/1
1 TABLET, EXTENDED RELEASE ORAL DAILY
COMMUNITY
End: 2021-01-01

## 2021-01-01 RX ORDER — POTASSIUM CHLORIDE 20MEQ/15ML
20 LIQUID (ML) ORAL ONCE
Status: COMPLETED | OUTPATIENT
Start: 2021-01-01 | End: 2021-01-01

## 2021-01-01 RX ORDER — POTASSIUM CHLORIDE 20 MEQ/1
40 TABLET, EXTENDED RELEASE ORAL ONCE
Status: COMPLETED | OUTPATIENT
Start: 2021-01-01 | End: 2021-01-01

## 2021-01-01 RX ORDER — BUMETANIDE 0.25 MG/ML
2 INJECTION, SOLUTION INTRAMUSCULAR; INTRAVENOUS 2 TIMES DAILY
Status: DISCONTINUED | OUTPATIENT
Start: 2021-01-01 | End: 2021-01-01 | Stop reason: HOSPADM

## 2021-01-01 RX ORDER — METOLAZONE 5 MG/1
5 TABLET ORAL WEEKLY
Qty: 30 TABLET | Refills: 3 | Status: SHIPPED | OUTPATIENT
Start: 2021-01-01

## 2021-01-01 RX ORDER — CEFAZOLIN SODIUM 1 G/50ML
1000 SOLUTION INTRAVENOUS EVERY 12 HOURS
Status: COMPLETED | OUTPATIENT
Start: 2021-01-01 | End: 2021-01-01

## 2021-01-01 RX ORDER — BUMETANIDE 0.25 MG/ML
1 INJECTION, SOLUTION INTRAMUSCULAR; INTRAVENOUS 2 TIMES DAILY
Status: DISCONTINUED | OUTPATIENT
Start: 2021-01-01 | End: 2021-01-01

## 2021-01-01 RX ORDER — HYDROXYZINE HYDROCHLORIDE 25 MG/1
25 TABLET, FILM COATED ORAL EVERY 6 HOURS PRN
COMMUNITY

## 2021-01-01 RX ORDER — TORSEMIDE 20 MG/1
TABLET ORAL
Qty: 60 TABLET | Refills: 1 | Status: SHIPPED | OUTPATIENT
Start: 2021-01-01 | End: 2021-01-01 | Stop reason: HOSPADM

## 2021-01-01 RX ORDER — POTASSIUM CHLORIDE 20MEQ/15ML
20 LIQUID (ML) ORAL
Status: DISCONTINUED | OUTPATIENT
Start: 2021-01-01 | End: 2021-01-01

## 2021-01-01 RX ORDER — BUMETANIDE 0.25 MG/ML
1 INJECTION, SOLUTION INTRAMUSCULAR; INTRAVENOUS
Status: DISCONTINUED | OUTPATIENT
Start: 2021-01-01 | End: 2021-01-01

## 2021-01-01 RX ORDER — POTASSIUM CHLORIDE 20 MEQ/1
20 TABLET, EXTENDED RELEASE ORAL ONCE
Status: DISCONTINUED | OUTPATIENT
Start: 2021-01-01 | End: 2021-01-01

## 2021-01-01 RX ORDER — POTASSIUM CHLORIDE 20 MEQ/1
40 TABLET, EXTENDED RELEASE ORAL DAILY
Status: DISCONTINUED | OUTPATIENT
Start: 2021-01-01 | End: 2021-01-01

## 2021-01-01 RX ORDER — TORSEMIDE 10 MG/1
30 TABLET ORAL DAILY
Qty: 90 TABLET | Refills: 1 | Status: SHIPPED | OUTPATIENT
Start: 2021-01-01 | End: 2021-01-01

## 2021-01-01 RX ORDER — POTASSIUM CHLORIDE 20 MEQ/1
20 TABLET, EXTENDED RELEASE ORAL ONCE
Status: COMPLETED | OUTPATIENT
Start: 2021-01-01 | End: 2021-01-01

## 2021-01-01 RX ORDER — BUMETANIDE 0.25 MG/ML
2 INJECTION, SOLUTION INTRAMUSCULAR; INTRAVENOUS
Status: DISCONTINUED | OUTPATIENT
Start: 2021-01-01 | End: 2021-01-01

## 2021-01-01 RX ORDER — LORATADINE 10 MG/1
10 TABLET ORAL DAILY
Status: DISCONTINUED | OUTPATIENT
Start: 2021-01-01 | End: 2021-01-01 | Stop reason: HOSPADM

## 2021-01-01 RX ORDER — SPIRONOLACTONE 25 MG/1
12.5 TABLET ORAL DAILY
Qty: 45 TABLET | Refills: 4 | Status: SHIPPED | OUTPATIENT
Start: 2021-01-01 | End: 2021-11-10

## 2021-01-01 RX ORDER — POTASSIUM CHLORIDE 1500 MG/1
TABLET, FILM COATED, EXTENDED RELEASE ORAL
COMMUNITY
Start: 2021-01-01 | End: 2021-01-01 | Stop reason: ALTCHOICE

## 2021-01-01 RX ORDER — POTASSIUM CHLORIDE 14.9 MG/ML
20 INJECTION INTRAVENOUS ONCE
Status: COMPLETED | OUTPATIENT
Start: 2021-01-01 | End: 2021-01-01

## 2021-01-01 RX ORDER — BUMETANIDE 0.25 MG/ML
1.5 INJECTION, SOLUTION INTRAMUSCULAR; INTRAVENOUS ONCE
Status: COMPLETED | OUTPATIENT
Start: 2021-01-01 | End: 2021-01-01

## 2021-01-01 RX ORDER — BUMETANIDE 0.25 MG/ML
2 INJECTION, SOLUTION INTRAMUSCULAR; INTRAVENOUS 2 TIMES DAILY
Status: DISPENSED | OUTPATIENT
Start: 2021-01-01 | End: 2021-01-01

## 2021-01-01 RX ORDER — BISOPROLOL FUMARATE 5 MG/1
2.5 TABLET ORAL DAILY
Status: DISCONTINUED | OUTPATIENT
Start: 2021-01-01 | End: 2021-01-01 | Stop reason: HOSPADM

## 2021-01-01 RX ORDER — SPIRONOLACTONE 25 MG/1
12.5 TABLET ORAL DAILY
Status: DISCONTINUED | OUTPATIENT
Start: 2021-01-01 | End: 2021-01-01 | Stop reason: HOSPADM

## 2021-01-01 RX ORDER — TORSEMIDE 20 MG/1
30 TABLET ORAL DAILY
COMMUNITY

## 2021-01-01 RX ORDER — BUMETANIDE 0.25 MG/ML
2 INJECTION, SOLUTION INTRAMUSCULAR; INTRAVENOUS 2 TIMES DAILY
Status: COMPLETED | OUTPATIENT
Start: 2021-01-01 | End: 2021-01-01

## 2021-01-01 RX ORDER — HYDROXYZINE HYDROCHLORIDE 25 MG/1
25 TABLET, FILM COATED ORAL EVERY 6 HOURS PRN
Status: DISCONTINUED | OUTPATIENT
Start: 2021-01-01 | End: 2021-01-01 | Stop reason: HOSPADM

## 2021-01-01 RX ORDER — METOLAZONE 5 MG/1
5 TABLET ORAL ONCE
Status: COMPLETED | OUTPATIENT
Start: 2021-01-01 | End: 2021-01-01

## 2021-01-01 RX ORDER — BUMETANIDE 0.25 MG/ML
0.5 INJECTION INTRAMUSCULAR; INTRAVENOUS CONTINUOUS
Status: DISCONTINUED | OUTPATIENT
Start: 2021-01-01 | End: 2021-01-01

## 2021-01-01 RX ORDER — AMILORIDE HYDROCHLORIDE 5 MG/1
2.5 TABLET ORAL DAILY
Status: DISCONTINUED | OUTPATIENT
Start: 2021-01-01 | End: 2021-01-01

## 2021-01-01 RX ORDER — TORSEMIDE 20 MG/1
40 TABLET ORAL DAILY
Status: DISCONTINUED | OUTPATIENT
Start: 2021-01-01 | End: 2021-01-01

## 2021-01-01 RX ORDER — PANTOPRAZOLE SODIUM 40 MG/1
40 TABLET, DELAYED RELEASE ORAL DAILY
Status: DISCONTINUED | OUTPATIENT
Start: 2021-01-01 | End: 2021-01-01 | Stop reason: HOSPADM

## 2021-01-01 RX ORDER — BISOPROLOL FUMARATE 5 MG/1
2.5 TABLET ORAL DAILY
Qty: 15 TABLET | Refills: 0 | Status: SHIPPED | OUTPATIENT
Start: 2021-01-01 | End: 2021-01-01 | Stop reason: ALTCHOICE

## 2021-01-01 RX ORDER — METOLAZONE 2.5 MG/1
2.5 TABLET ORAL ONCE
Status: COMPLETED | OUTPATIENT
Start: 2021-01-01 | End: 2021-01-01

## 2021-01-01 RX ORDER — FINASTERIDE 5 MG/1
5 TABLET, FILM COATED ORAL DAILY
Status: DISCONTINUED | OUTPATIENT
Start: 2021-01-01 | End: 2021-01-01 | Stop reason: HOSPADM

## 2021-01-01 RX ORDER — TORSEMIDE 10 MG/1
30 TABLET ORAL DAILY
Qty: 90 TABLET | Refills: 1
Start: 2021-01-01

## 2021-01-01 RX ORDER — TORSEMIDE 20 MG/1
20 TABLET ORAL 2 TIMES DAILY
Status: DISCONTINUED | OUTPATIENT
Start: 2021-01-01 | End: 2021-01-01

## 2021-01-01 RX ORDER — BUMETANIDE 0.25 MG/ML
1 INJECTION, SOLUTION INTRAMUSCULAR; INTRAVENOUS ONCE
Status: DISCONTINUED | OUTPATIENT
Start: 2021-01-01 | End: 2021-01-01 | Stop reason: HOSPADM

## 2021-01-01 RX ORDER — METOLAZONE 2.5 MG/1
2.5 TABLET ORAL ONCE
Status: DISCONTINUED | OUTPATIENT
Start: 2021-01-01 | End: 2021-01-01

## 2021-01-01 RX ORDER — TORSEMIDE 20 MG/1
40 TABLET ORAL DAILY
Qty: 60 TABLET | Refills: 0 | Status: SHIPPED | OUTPATIENT
Start: 2021-01-01 | End: 2021-01-01 | Stop reason: SDUPTHER

## 2021-01-01 RX ORDER — TORSEMIDE 20 MG/1
20 TABLET ORAL DAILY PRN
Qty: 30 TABLET | Refills: 0 | Status: SHIPPED | OUTPATIENT
Start: 2021-01-01

## 2021-01-01 RX ORDER — TORSEMIDE 20 MG/1
20 TABLET ORAL 2 TIMES DAILY
Qty: 60 TABLET | Refills: 1 | Status: SHIPPED | OUTPATIENT
Start: 2021-01-01 | End: 2021-01-01

## 2021-01-01 RX ORDER — TORSEMIDE 20 MG/1
TABLET ORAL
Qty: 60 TABLET | Refills: 1 | Status: SHIPPED | OUTPATIENT
Start: 2021-01-01 | End: 2021-01-01

## 2021-01-01 RX ORDER — TORSEMIDE 20 MG/1
40 TABLET ORAL ONCE
Status: DISCONTINUED | OUTPATIENT
Start: 2021-01-01 | End: 2021-01-01 | Stop reason: HOSPADM

## 2021-01-01 RX ADMIN — FINASTERIDE 5 MG: 5 TABLET, FILM COATED ORAL at 09:16

## 2021-01-01 RX ADMIN — BUMETANIDE 2 MG: 0.25 INJECTION INTRAMUSCULAR; INTRAVENOUS at 17:11

## 2021-01-01 RX ADMIN — APIXABAN 2.5 MG: 2.5 TABLET, FILM COATED ORAL at 21:09

## 2021-01-01 RX ADMIN — SPIRONOLACTONE 12.5 MG: 25 TABLET, FILM COATED ORAL at 09:39

## 2021-01-01 RX ADMIN — PANTOPRAZOLE SODIUM 40 MG: 40 TABLET, DELAYED RELEASE ORAL at 09:23

## 2021-01-01 RX ADMIN — PANTOPRAZOLE SODIUM 40 MG: 40 TABLET, DELAYED RELEASE ORAL at 05:54

## 2021-01-01 RX ADMIN — FINASTERIDE 5 MG: 5 TABLET, FILM COATED ORAL at 09:22

## 2021-01-01 RX ADMIN — PANTOPRAZOLE SODIUM 40 MG: 40 TABLET, DELAYED RELEASE ORAL at 05:44

## 2021-01-01 RX ADMIN — CEFAZOLIN SODIUM 1000 MG: 1 SOLUTION INTRAVENOUS at 05:20

## 2021-01-01 RX ADMIN — PANTOPRAZOLE SODIUM 40 MG: 40 TABLET, DELAYED RELEASE ORAL at 05:00

## 2021-01-01 RX ADMIN — PANTOPRAZOLE SODIUM 40 MG: 40 TABLET, DELAYED RELEASE ORAL at 05:25

## 2021-01-01 RX ADMIN — HYDROXYZINE HYDROCHLORIDE 25 MG: 25 TABLET, FILM COATED ORAL at 01:46

## 2021-01-01 RX ADMIN — PANTOPRAZOLE SODIUM 40 MG: 40 TABLET, DELAYED RELEASE ORAL at 05:29

## 2021-01-01 RX ADMIN — BISOPROLOL FUMARATE 2.5 MG: 5 TABLET ORAL at 10:28

## 2021-01-01 RX ADMIN — BUMETANIDE 2 MG: 0.25 INJECTION INTRAMUSCULAR; INTRAVENOUS at 10:06

## 2021-01-01 RX ADMIN — CEFTRIAXONE SODIUM 1000 MG: 10 INJECTION, POWDER, FOR SOLUTION INTRAVENOUS at 14:10

## 2021-01-01 RX ADMIN — BUMETANIDE 2 MG: 0.25 INJECTION INTRAMUSCULAR; INTRAVENOUS at 17:40

## 2021-01-01 RX ADMIN — LORATADINE 10 MG: 10 TABLET ORAL at 10:28

## 2021-01-01 RX ADMIN — APIXABAN 2.5 MG: 2.5 TABLET, FILM COATED ORAL at 09:23

## 2021-01-01 RX ADMIN — POTASSIUM CHLORIDE 40 MEQ: 1500 TABLET, EXTENDED RELEASE ORAL at 05:50

## 2021-01-01 RX ADMIN — FINASTERIDE 5 MG: 5 TABLET, FILM COATED ORAL at 09:17

## 2021-01-01 RX ADMIN — POTASSIUM CHLORIDE 40 MEQ: 1500 TABLET, EXTENDED RELEASE ORAL at 16:10

## 2021-01-01 RX ADMIN — SPIRONOLACTONE 12.5 MG: 25 TABLET, FILM COATED ORAL at 09:17

## 2021-01-01 RX ADMIN — FINASTERIDE 5 MG: 5 TABLET, FILM COATED ORAL at 09:39

## 2021-01-01 RX ADMIN — CEFAZOLIN SODIUM 1000 MG: 1 SOLUTION INTRAVENOUS at 04:59

## 2021-01-01 RX ADMIN — FINASTERIDE 5 MG: 5 TABLET, FILM COATED ORAL at 09:38

## 2021-01-01 RX ADMIN — BUMETANIDE 1.5 MG: 0.25 INJECTION INTRAMUSCULAR; INTRAVENOUS at 16:10

## 2021-01-01 RX ADMIN — AMILORIDE HYDROCLORIDE 2.5 MG: 5 TABLET ORAL at 14:22

## 2021-01-01 RX ADMIN — FINASTERIDE 5 MG: 5 TABLET, FILM COATED ORAL at 09:06

## 2021-01-01 RX ADMIN — BUMETANIDE 2 MG: 0.25 INJECTION INTRAMUSCULAR; INTRAVENOUS at 05:43

## 2021-01-01 RX ADMIN — POTASSIUM CHLORIDE 40 MEQ: 1500 TABLET, EXTENDED RELEASE ORAL at 17:40

## 2021-01-01 RX ADMIN — APIXABAN 2.5 MG: 2.5 TABLET, FILM COATED ORAL at 22:10

## 2021-01-01 RX ADMIN — BUMETANIDE 1.5 MG: 0.25 INJECTION INTRAMUSCULAR; INTRAVENOUS at 13:01

## 2021-01-01 RX ADMIN — BUMETANIDE 1 MG: 0.25 INJECTION INTRAMUSCULAR; INTRAVENOUS at 05:48

## 2021-01-01 RX ADMIN — FINASTERIDE 5 MG: 5 TABLET, FILM COATED ORAL at 10:27

## 2021-01-01 RX ADMIN — BUMETANIDE 2 MG: 0.25 INJECTION INTRAMUSCULAR; INTRAVENOUS at 09:23

## 2021-01-01 RX ADMIN — SPIRONOLACTONE 12.5 MG: 25 TABLET, FILM COATED ORAL at 10:05

## 2021-01-01 RX ADMIN — LORATADINE 10 MG: 10 TABLET ORAL at 09:16

## 2021-01-01 RX ADMIN — CEFAZOLIN SODIUM 1000 MG: 1 SOLUTION INTRAVENOUS at 05:10

## 2021-01-01 RX ADMIN — CEFAZOLIN SODIUM 1000 MG: 1 SOLUTION INTRAVENOUS at 16:20

## 2021-01-01 RX ADMIN — BUMETANIDE 2 MG: 0.25 INJECTION INTRAMUSCULAR; INTRAVENOUS at 19:02

## 2021-01-01 RX ADMIN — APIXABAN 2.5 MG: 2.5 TABLET, FILM COATED ORAL at 10:05

## 2021-01-01 RX ADMIN — BUMETANIDE 2 MG: 0.25 INJECTION INTRAMUSCULAR; INTRAVENOUS at 10:46

## 2021-01-01 RX ADMIN — APIXABAN 2.5 MG: 2.5 TABLET, FILM COATED ORAL at 09:36

## 2021-01-01 RX ADMIN — APIXABAN 2.5 MG: 2.5 TABLET, FILM COATED ORAL at 08:09

## 2021-01-01 RX ADMIN — POTASSIUM CHLORIDE 40 MEQ: 1500 TABLET, EXTENDED RELEASE ORAL at 12:09

## 2021-01-01 RX ADMIN — MULTIPLE VITAMINS W/ MINERALS TAB 1 TABLET: TAB ORAL at 08:38

## 2021-01-01 RX ADMIN — PANTOPRAZOLE SODIUM 40 MG: 40 TABLET, DELAYED RELEASE ORAL at 07:36

## 2021-01-01 RX ADMIN — BISOPROLOL FUMARATE 2.5 MG: 5 TABLET ORAL at 08:09

## 2021-01-01 RX ADMIN — BUMETANIDE 1 MG: 0.25 INJECTION INTRAMUSCULAR; INTRAVENOUS at 09:55

## 2021-01-01 RX ADMIN — FINASTERIDE 5 MG: 5 TABLET, FILM COATED ORAL at 08:38

## 2021-01-01 RX ADMIN — FINASTERIDE 5 MG: 5 TABLET, FILM COATED ORAL at 09:44

## 2021-01-01 RX ADMIN — BUMETANIDE 2 MG: 0.25 INJECTION INTRAMUSCULAR; INTRAVENOUS at 18:05

## 2021-01-01 RX ADMIN — APIXABAN 2.5 MG: 2.5 TABLET, FILM COATED ORAL at 09:44

## 2021-01-01 RX ADMIN — APIXABAN 2.5 MG: 2.5 TABLET, FILM COATED ORAL at 09:16

## 2021-01-01 RX ADMIN — APIXABAN 2.5 MG: 2.5 TABLET, FILM COATED ORAL at 08:34

## 2021-01-01 RX ADMIN — BISOPROLOL FUMARATE 5 MG: 5 TABLET ORAL at 15:16

## 2021-01-01 RX ADMIN — APIXABAN 2.5 MG: 2.5 TABLET, FILM COATED ORAL at 20:30

## 2021-01-01 RX ADMIN — LORATADINE 10 MG: 10 TABLET ORAL at 15:16

## 2021-01-01 RX ADMIN — APIXABAN 2.5 MG: 2.5 TABLET, FILM COATED ORAL at 10:28

## 2021-01-01 RX ADMIN — POTASSIUM CHLORIDE 40 MEQ: 1500 TABLET, EXTENDED RELEASE ORAL at 11:23

## 2021-01-01 RX ADMIN — LORATADINE 10 MG: 10 TABLET ORAL at 08:09

## 2021-01-01 RX ADMIN — PANTOPRAZOLE SODIUM 40 MG: 40 TABLET, DELAYED RELEASE ORAL at 05:31

## 2021-01-01 RX ADMIN — CEFAZOLIN SODIUM 1000 MG: 1 SOLUTION INTRAVENOUS at 04:50

## 2021-01-01 RX ADMIN — APIXABAN 2.5 MG: 2.5 TABLET, FILM COATED ORAL at 11:53

## 2021-01-01 RX ADMIN — APIXABAN 2.5 MG: 2.5 TABLET, FILM COATED ORAL at 21:28

## 2021-01-01 RX ADMIN — POTASSIUM CHLORIDE 40 MEQ: 1500 TABLET, EXTENDED RELEASE ORAL at 17:34

## 2021-01-01 RX ADMIN — BUMETANIDE 1 MG: 0.25 INJECTION INTRAMUSCULAR; INTRAVENOUS at 17:13

## 2021-01-01 RX ADMIN — APIXABAN 2.5 MG: 2.5 TABLET, FILM COATED ORAL at 08:30

## 2021-01-01 RX ADMIN — DOCUSATE SODIUM AND SENNOSIDES 1 TABLET: 8.6; 5 TABLET, FILM COATED ORAL at 18:25

## 2021-01-01 RX ADMIN — BISOPROLOL FUMARATE 2.5 MG: 5 TABLET ORAL at 12:59

## 2021-01-01 RX ADMIN — METOLAZONE 2.5 MG: 2.5 TABLET ORAL at 17:19

## 2021-01-01 RX ADMIN — HYDROXYZINE HYDROCHLORIDE 25 MG: 25 TABLET, FILM COATED ORAL at 21:10

## 2021-01-01 RX ADMIN — APIXABAN 2.5 MG: 2.5 TABLET, FILM COATED ORAL at 09:17

## 2021-01-01 RX ADMIN — CEFAZOLIN SODIUM 1000 MG: 1 SOLUTION INTRAVENOUS at 16:35

## 2021-01-01 RX ADMIN — CEFAZOLIN SODIUM 1000 MG: 1 SOLUTION INTRAVENOUS at 04:39

## 2021-01-01 RX ADMIN — BUMETANIDE 2 MG: 0.25 INJECTION INTRAMUSCULAR; INTRAVENOUS at 18:07

## 2021-01-01 RX ADMIN — BUMETANIDE 2 MG: 0.25 INJECTION INTRAMUSCULAR; INTRAVENOUS at 18:57

## 2021-01-01 RX ADMIN — BISOPROLOL FUMARATE 5 MG: 5 TABLET ORAL at 09:06

## 2021-01-01 RX ADMIN — SPIRONOLACTONE 12.5 MG: 25 TABLET, FILM COATED ORAL at 10:45

## 2021-01-01 RX ADMIN — BUMETANIDE 2 MG: 0.25 INJECTION INTRAMUSCULAR; INTRAVENOUS at 12:59

## 2021-01-01 RX ADMIN — FINASTERIDE 5 MG: 5 TABLET, FILM COATED ORAL at 09:36

## 2021-01-01 RX ADMIN — BUMETANIDE 1 MG: 0.25 INJECTION INTRAMUSCULAR; INTRAVENOUS at 03:44

## 2021-01-01 RX ADMIN — POTASSIUM CHLORIDE 20 MEQ: 1500 TABLET, EXTENDED RELEASE ORAL at 18:13

## 2021-01-01 RX ADMIN — BUMETANIDE 2 MG: 0.25 INJECTION INTRAMUSCULAR; INTRAVENOUS at 17:02

## 2021-01-01 RX ADMIN — PANTOPRAZOLE SODIUM 40 MG: 40 TABLET, DELAYED RELEASE ORAL at 05:11

## 2021-01-01 RX ADMIN — POTASSIUM CHLORIDE 20 MEQ: 20 SOLUTION ORAL at 14:23

## 2021-01-01 RX ADMIN — AMILORIDE HYDROCLORIDE 2.5 MG: 5 TABLET ORAL at 08:09

## 2021-01-01 RX ADMIN — FINASTERIDE 5 MG: 5 TABLET, FILM COATED ORAL at 09:55

## 2021-01-01 RX ADMIN — CEFAZOLIN SODIUM 1000 MG: 1 SOLUTION INTRAVENOUS at 15:36

## 2021-01-01 RX ADMIN — CEFAZOLIN SODIUM 1000 MG: 1 SOLUTION INTRAVENOUS at 18:53

## 2021-01-01 RX ADMIN — LORATADINE 10 MG: 10 TABLET ORAL at 09:44

## 2021-01-01 RX ADMIN — APIXABAN 2.5 MG: 2.5 TABLET, FILM COATED ORAL at 21:40

## 2021-01-01 RX ADMIN — LORATADINE 10 MG: 10 TABLET ORAL at 09:21

## 2021-01-01 RX ADMIN — APIXABAN 2.5 MG: 2.5 TABLET, FILM COATED ORAL at 21:06

## 2021-01-01 RX ADMIN — PANTOPRAZOLE SODIUM 40 MG: 40 TABLET, DELAYED RELEASE ORAL at 05:01

## 2021-01-01 RX ADMIN — FINASTERIDE 5 MG: 5 TABLET, FILM COATED ORAL at 08:35

## 2021-01-01 RX ADMIN — LORATADINE 10 MG: 10 TABLET ORAL at 09:23

## 2021-01-01 RX ADMIN — APIXABAN 2.5 MG: 2.5 TABLET, FILM COATED ORAL at 08:48

## 2021-01-01 RX ADMIN — POTASSIUM CHLORIDE 40 MEQ: 1500 TABLET, EXTENDED RELEASE ORAL at 08:30

## 2021-01-01 RX ADMIN — CEFAZOLIN SODIUM 1000 MG: 1 SOLUTION INTRAVENOUS at 16:23

## 2021-01-01 RX ADMIN — DOCUSATE SODIUM AND SENNOSIDES 1 TABLET: 8.6; 5 TABLET, FILM COATED ORAL at 12:09

## 2021-01-01 RX ADMIN — APIXABAN 2.5 MG: 2.5 TABLET, FILM COATED ORAL at 21:10

## 2021-01-01 RX ADMIN — LORATADINE 10 MG: 10 TABLET ORAL at 11:53

## 2021-01-01 RX ADMIN — APIXABAN 2.5 MG: 2.5 TABLET, FILM COATED ORAL at 08:38

## 2021-01-01 RX ADMIN — CEFAZOLIN SODIUM 1000 MG: 1 SOLUTION INTRAVENOUS at 03:12

## 2021-01-01 RX ADMIN — FINASTERIDE 5 MG: 5 TABLET, FILM COATED ORAL at 11:53

## 2021-01-01 RX ADMIN — PANTOPRAZOLE SODIUM 40 MG: 40 TABLET, DELAYED RELEASE ORAL at 06:37

## 2021-01-01 RX ADMIN — BUMETANIDE 2 MG: 0.25 INJECTION INTRAMUSCULAR; INTRAVENOUS at 10:29

## 2021-01-01 RX ADMIN — FINASTERIDE 5 MG: 5 TABLET, FILM COATED ORAL at 08:37

## 2021-01-01 RX ADMIN — MULTIPLE VITAMINS W/ MINERALS TAB 1 TABLET: TAB ORAL at 08:35

## 2021-01-01 RX ADMIN — HYDROXYZINE HYDROCHLORIDE 25 MG: 25 TABLET, FILM COATED ORAL at 14:52

## 2021-01-01 RX ADMIN — BUMETANIDE 2 MG: 0.25 INJECTION INTRAMUSCULAR; INTRAVENOUS at 09:37

## 2021-01-01 RX ADMIN — BUMETANIDE 2 MG: 0.25 INJECTION INTRAMUSCULAR; INTRAVENOUS at 11:58

## 2021-01-01 RX ADMIN — METOLAZONE 5 MG: 5 TABLET ORAL at 09:32

## 2021-01-01 RX ADMIN — POTASSIUM CHLORIDE 40 MEQ: 1500 TABLET, EXTENDED RELEASE ORAL at 09:18

## 2021-01-01 RX ADMIN — APIXABAN 2.5 MG: 2.5 TABLET, FILM COATED ORAL at 20:59

## 2021-01-01 RX ADMIN — Medication 0.5 MG/HR: at 13:35

## 2021-01-01 RX ADMIN — POTASSIUM CHLORIDE 20 MEQ: 14.9 INJECTION, SOLUTION INTRAVENOUS at 08:31

## 2021-01-01 RX ADMIN — APIXABAN 2.5 MG: 2.5 TABLET, FILM COATED ORAL at 21:26

## 2021-01-01 RX ADMIN — BISOPROLOL FUMARATE 2.5 MG: 5 TABLET ORAL at 09:39

## 2021-01-01 RX ADMIN — CEFAZOLIN SODIUM 1000 MG: 1 SOLUTION INTRAVENOUS at 15:19

## 2021-01-01 RX ADMIN — BUMETANIDE 2 MG: 0.25 INJECTION INTRAMUSCULAR; INTRAVENOUS at 12:45

## 2021-01-01 RX ADMIN — FINASTERIDE 5 MG: 5 TABLET, FILM COATED ORAL at 08:29

## 2021-01-01 RX ADMIN — BUMETANIDE 2 MG: 0.25 INJECTION INTRAMUSCULAR; INTRAVENOUS at 18:08

## 2021-01-01 RX ADMIN — APIXABAN 2.5 MG: 2.5 TABLET, FILM COATED ORAL at 09:32

## 2021-01-01 RX ADMIN — POTASSIUM CHLORIDE 40 MEQ: 1500 TABLET, EXTENDED RELEASE ORAL at 09:36

## 2021-01-01 RX ADMIN — BUMETANIDE 2 MG: 0.25 INJECTION INTRAMUSCULAR; INTRAVENOUS at 10:03

## 2021-01-01 RX ADMIN — LORATADINE 10 MG: 10 TABLET ORAL at 08:30

## 2021-01-01 RX ADMIN — BUMETANIDE 2 MG: 0.25 INJECTION INTRAMUSCULAR; INTRAVENOUS at 10:45

## 2021-01-01 RX ADMIN — HYDROXYZINE HYDROCHLORIDE 25 MG: 25 TABLET, FILM COATED ORAL at 21:09

## 2021-01-01 RX ADMIN — APIXABAN 2.5 MG: 2.5 TABLET, FILM COATED ORAL at 21:38

## 2021-01-01 RX ADMIN — PANTOPRAZOLE SODIUM 40 MG: 40 TABLET, DELAYED RELEASE ORAL at 11:23

## 2021-01-01 RX ADMIN — PANTOPRAZOLE SODIUM 40 MG: 40 TABLET, DELAYED RELEASE ORAL at 05:43

## 2021-01-01 RX ADMIN — FINASTERIDE 5 MG: 5 TABLET, FILM COATED ORAL at 08:30

## 2021-01-01 RX ADMIN — HYDROXYZINE HYDROCHLORIDE 25 MG: 25 TABLET, FILM COATED ORAL at 21:03

## 2021-01-01 RX ADMIN — LORATADINE 10 MG: 10 TABLET ORAL at 09:06

## 2021-01-01 RX ADMIN — BUMETANIDE 2 MG: 0.25 INJECTION INTRAMUSCULAR; INTRAVENOUS at 17:42

## 2021-01-01 RX ADMIN — FINASTERIDE 5 MG: 5 TABLET, FILM COATED ORAL at 08:48

## 2021-01-01 RX ADMIN — POTASSIUM CHLORIDE 40 MEQ: 1500 TABLET, EXTENDED RELEASE ORAL at 15:57

## 2021-01-01 RX ADMIN — PANTOPRAZOLE SODIUM 40 MG: 40 TABLET, DELAYED RELEASE ORAL at 06:48

## 2021-01-01 RX ADMIN — CEFAZOLIN SODIUM 1000 MG: 1 SOLUTION INTRAVENOUS at 05:44

## 2021-01-01 RX ADMIN — BUMETANIDE 2 MG: 0.25 INJECTION INTRAMUSCULAR; INTRAVENOUS at 12:43

## 2021-01-01 RX ADMIN — FINASTERIDE 5 MG: 5 TABLET, FILM COATED ORAL at 10:05

## 2021-01-01 RX ADMIN — CEFAZOLIN SODIUM 1000 MG: 1 SOLUTION INTRAVENOUS at 05:00

## 2021-01-01 RX ADMIN — BUMETANIDE 2 MG: 0.25 INJECTION INTRAMUSCULAR; INTRAVENOUS at 10:41

## 2021-01-01 RX ADMIN — FINASTERIDE 5 MG: 5 TABLET, FILM COATED ORAL at 08:09

## 2021-01-01 RX ADMIN — BUMETANIDE 2 MG: 0.25 INJECTION INTRAMUSCULAR; INTRAVENOUS at 18:26

## 2021-01-01 RX ADMIN — CEFAZOLIN SODIUM 1000 MG: 1 SOLUTION INTRAVENOUS at 04:43

## 2021-01-01 RX ADMIN — HYDROXYZINE HYDROCHLORIDE 25 MG: 25 TABLET, FILM COATED ORAL at 10:03

## 2021-01-01 RX ADMIN — Medication 0.5 MG/HR: at 03:12

## 2021-01-01 RX ADMIN — PANTOPRAZOLE SODIUM 40 MG: 40 TABLET, DELAYED RELEASE ORAL at 16:09

## 2021-01-01 RX ADMIN — PANTOPRAZOLE SODIUM 40 MG: 40 TABLET, DELAYED RELEASE ORAL at 05:46

## 2021-01-01 RX ADMIN — BISOPROLOL FUMARATE 2.5 MG: 5 TABLET ORAL at 11:22

## 2021-01-01 RX ADMIN — BUMETANIDE 1 MG: 0.25 INJECTION INTRAMUSCULAR; INTRAVENOUS at 12:20

## 2021-01-01 RX ADMIN — APIXABAN 2.5 MG: 2.5 TABLET, FILM COATED ORAL at 21:35

## 2021-01-01 RX ADMIN — APIXABAN 2.5 MG: 2.5 TABLET, FILM COATED ORAL at 08:29

## 2021-01-01 RX ADMIN — BUMETANIDE 2 MG: 0.25 INJECTION INTRAMUSCULAR; INTRAVENOUS at 08:33

## 2021-01-01 RX ADMIN — APIXABAN 2.5 MG: 2.5 TABLET, FILM COATED ORAL at 11:23

## 2021-01-01 RX ADMIN — CEFAZOLIN SODIUM 1000 MG: 1 SOLUTION INTRAVENOUS at 17:06

## 2021-01-01 RX ADMIN — LORATADINE 10 MG: 10 TABLET ORAL at 09:41

## 2021-01-01 RX ADMIN — APIXABAN 2.5 MG: 2.5 TABLET, FILM COATED ORAL at 09:21

## 2021-01-01 RX ADMIN — PANTOPRAZOLE SODIUM 40 MG: 40 TABLET, DELAYED RELEASE ORAL at 05:37

## 2021-01-01 RX ADMIN — PANTOPRAZOLE SODIUM 40 MG: 40 TABLET, DELAYED RELEASE ORAL at 05:23

## 2021-01-01 RX ADMIN — APIXABAN 2.5 MG: 2.5 TABLET, FILM COATED ORAL at 09:06

## 2021-01-01 RX ADMIN — HYDROXYZINE HYDROCHLORIDE 25 MG: 25 TABLET, FILM COATED ORAL at 21:26

## 2021-01-01 RX ADMIN — LORATADINE 10 MG: 10 TABLET ORAL at 11:22

## 2021-01-01 RX ADMIN — PANTOPRAZOLE SODIUM 40 MG: 40 TABLET, DELAYED RELEASE ORAL at 05:47

## 2021-01-01 RX ADMIN — FINASTERIDE 5 MG: 5 TABLET, FILM COATED ORAL at 09:21

## 2021-01-01 RX ADMIN — APIXABAN 2.5 MG: 2.5 TABLET, FILM COATED ORAL at 09:39

## 2021-01-01 RX ADMIN — APIXABAN 2.5 MG: 2.5 TABLET, FILM COATED ORAL at 21:36

## 2021-01-01 RX ADMIN — SPIRONOLACTONE 12.5 MG: 25 TABLET, FILM COATED ORAL at 10:41

## 2021-01-01 RX ADMIN — POTASSIUM CHLORIDE 20 MEQ: 1500 TABLET, EXTENDED RELEASE ORAL at 04:36

## 2021-01-01 RX ADMIN — BISOPROLOL FUMARATE 2.5 MG: 5 TABLET ORAL at 09:16

## 2021-01-01 RX ADMIN — BUMETANIDE 2 MG: 0.25 INJECTION INTRAMUSCULAR; INTRAVENOUS at 11:23

## 2021-01-01 RX ADMIN — CEFAZOLIN SODIUM 1000 MG: 1 SOLUTION INTRAVENOUS at 16:10

## 2021-01-01 RX ADMIN — BUMETANIDE 2 MG: 0.25 INJECTION INTRAMUSCULAR; INTRAVENOUS at 09:41

## 2021-01-01 RX ADMIN — APIXABAN 2.5 MG: 2.5 TABLET, FILM COATED ORAL at 21:32

## 2021-01-01 RX ADMIN — POTASSIUM CHLORIDE 40 MEQ: 1500 TABLET, EXTENDED RELEASE ORAL at 09:21

## 2021-01-01 RX ADMIN — FINASTERIDE 5 MG: 5 TABLET, FILM COATED ORAL at 16:09

## 2021-01-01 RX ADMIN — BUMETANIDE 2 MG: 0.25 INJECTION INTRAMUSCULAR; INTRAVENOUS at 17:54

## 2021-01-01 RX ADMIN — SPIRONOLACTONE 12.5 MG: 25 TABLET, FILM COATED ORAL at 14:52

## 2021-01-01 RX ADMIN — BUMETANIDE 2 MG: 0.25 INJECTION INTRAMUSCULAR; INTRAVENOUS at 18:24

## 2021-01-01 RX ADMIN — BUMETANIDE 2 MG: 0.25 INJECTION INTRAMUSCULAR; INTRAVENOUS at 19:16

## 2021-01-01 RX ADMIN — POTASSIUM CHLORIDE 40 MEQ: 1500 TABLET, EXTENDED RELEASE ORAL at 15:34

## 2021-01-01 RX ADMIN — MULTIPLE VITAMINS W/ MINERALS TAB 1 TABLET: TAB ORAL at 09:36

## 2021-01-01 RX ADMIN — APIXABAN 2.5 MG: 2.5 TABLET, FILM COATED ORAL at 20:25

## 2021-01-01 RX ADMIN — POTASSIUM CHLORIDE 40 MEQ: 1500 TABLET, EXTENDED RELEASE ORAL at 09:39

## 2021-01-01 RX ADMIN — CEFAZOLIN SODIUM 1000 MG: 1 SOLUTION INTRAVENOUS at 16:42

## 2021-01-01 RX ADMIN — POTASSIUM CHLORIDE 20 MEQ: 1500 TABLET, EXTENDED RELEASE ORAL at 13:01

## 2021-01-01 RX ADMIN — FINASTERIDE 5 MG: 5 TABLET, FILM COATED ORAL at 11:23

## 2021-01-01 RX ADMIN — HYDROXYZINE HYDROCHLORIDE 25 MG: 25 TABLET, FILM COATED ORAL at 09:16

## 2021-01-01 RX ADMIN — CEFAZOLIN SODIUM 1000 MG: 1 SOLUTION INTRAVENOUS at 18:09

## 2021-01-01 RX ADMIN — PANTOPRAZOLE SODIUM 40 MG: 40 TABLET, DELAYED RELEASE ORAL at 05:55

## 2021-01-01 RX ADMIN — APIXABAN 2.5 MG: 2.5 TABLET, FILM COATED ORAL at 09:41

## 2021-01-01 RX ADMIN — PANTOPRAZOLE SODIUM 40 MG: 40 TABLET, DELAYED RELEASE ORAL at 06:10

## 2021-01-01 RX ADMIN — PANTOPRAZOLE SODIUM 40 MG: 40 TABLET, DELAYED RELEASE ORAL at 06:00

## 2021-01-01 RX ADMIN — POTASSIUM CHLORIDE 40 MEQ: 1500 TABLET, EXTENDED RELEASE ORAL at 17:00

## 2021-01-01 RX ADMIN — APIXABAN 2.5 MG: 2.5 TABLET, FILM COATED ORAL at 09:55

## 2021-01-01 RX ADMIN — BISOPROLOL FUMARATE 5 MG: 5 TABLET ORAL at 08:30

## 2021-02-08 NOTE — PROGRESS NOTES
Results for orders placed or performed in visit on 02/05/21   Cardiac EP device report    Narrative    SJM CRT-P (VVIR 70)  MERLIN TRANSMISSION: BATTERY ADEQUATE (5 7 YRS)  BP 93%  ALL AVAILABLE LEAD PARAMETERS WITHIN NORMAL LIMITS  5 VHR EPISODES (UP  BPM & 30 BEATS/E-GRAMS AVAILABLE FOR REVIEW)  PT  TAKES ELIQUIS  CORVUE IMPEDANCE MONITORING WITHIN NORMAL LIMITS  NORMAL DEVICE FUNCTION   PL

## 2021-03-09 PROBLEM — N17.9 ACUTE KIDNEY INJURY SUPERIMPOSED ON CHRONIC KIDNEY DISEASE (HCC): Status: ACTIVE | Noted: 2021-01-01

## 2021-03-09 PROBLEM — N18.9 ACUTE KIDNEY INJURY SUPERIMPOSED ON CHRONIC KIDNEY DISEASE (HCC): Status: ACTIVE | Noted: 2021-01-01

## 2021-03-09 PROBLEM — L03.116 CELLULITIS OF LEFT LEG: Status: ACTIVE | Noted: 2021-01-01

## 2021-03-09 NOTE — SEPSIS NOTE
Sepsis Note   Dora Pinzon 80 y o  male MRN: [id number]  Unit/Bed#: ED 20 Encounter: [id number]      qSOFA     [address] Name 03/09/21 1256                Altered mental status GCS < 15  --        Respiratory Rate > / =82  0        Systolic BP < / =287  0        Q Sofa Score  0            Initial Sepsis Screening     Row Name 03/09/21 1350                Is the patient's history suggestive of a new or worsening infection? (!) Yes (Proceed)  -RM        Suspected source of infection  --        Are two or more of the following signs & symptoms of infection both present and new to the patient? No  -RM        Indicate SIRS criteria  --        If the answer is yes to both questions, suspicion of sepsis is present  --        If severe sepsis is present AND tissue hypoperfusion perists in the hour after fluid resuscitation or lactate > 4, the patient meets criteria for SEPTIC SHOCK  --        Are any of the following organ dysfunction criteria present within 6 hours of suspected infection and SIRS criteria that are NOT considered to be chronic conditions?   --        Organ dysfunction  --        Date of presentation of severe sepsis  --        Time of presentation of severe sepsis  --        Tissue hypoperfusion persists in the hour after crystalloid fluid administration, evidenced, by either:  --        Was hypotension present within one hour of the conclusion of crystalloid fluid administration?  --        Date of presentation of septic shock  --        Time of presentation of septic shock  --          User Key  (r) = Recorded By, (t) = Taken By, (c) = Cosigned By    [address] Name Provider Type    Lolita Roger MD Physician

## 2021-03-09 NOTE — ASSESSMENT & PLAN NOTE
· Due to lying venous stasis with venous stasis ulcers with superimposed cellulitis  · Infection is localized, he is not septic or toxic appearing  · Start cephazolin 1500 mg IV q 12 (renally dosed)  · Consult wound care regarding venous stasis ulcers  · He needs to have his legs elevated for edema control

## 2021-03-09 NOTE — PROGRESS NOTES
EPS Progress Note - Antonella Younger 80 y o  male MRN: [id number]           ASSESSMENT:  1  Paroxysmal atrial fibrillation (HCC)  POCT ECG   2  Cellulitis and abscess of lower extremity  Transfer to other facility   3  Acute systolic congestive heart failure ([facility] )  Transfer to other facility   4  Dilated cardiomyopathy ([facility] )     5  Anticoagulation adequate     6  Biventricular cardiac pacemaker in situ     7  Lower extremity edema     8  Stage 3 chronic kidney disease                   PLAN:   1  Possible cellulitis he has failed outpatient management of congestive heart failure complicating this is chronic kidney disease  My concern is that he needs IV diuretics and IV antibiotics and therefore I am sending him to [facility] [place] [address]  emergency room for evaluation and expected inpatient admission he is 80years old and clearly is failing outpatient treatment new    2   Longstanding atrial fibrillation status post AV node ablation and Bi V pacemaker    If 3  Cardiomyopathy improved with Bi V pacing    4  Chronic kidney disease tenuous renal status    5  Ambulatory dysfunction uses a cane    HPI:   Interim history  Sensing left lower extremity swelling greater than right lower extremity swelling for approximately one month it is weeping it is getting worse but titrated up than output including  Torsemide  And metolazone  Despite weight loss his legs continue to swell and the weeping is getting worse and turning red and skin is breaking down    Otherwise he is not specifically experiencing any chest pain or shortness of breath more than usual but he does have limited  Exercise tolerance        ROS:  Positive for shortness of breath with exertion positive for left leg weeping and right leg swelling despite increasing diuretics his legs continue to swell negative for chest pain no pruritus occasional lightheadedness ambulatory dysfunction present all other 12 point ROS negative       Objective:     Vitals: Blood pressure 116/64, pulse 78, height 5' 9 5" (1 765 m), weight 81 7 kg (180 lb 3 2 oz)  , Body mass index is 26 23 kg/m²  ,        Physical Exam:    GEN: Beau Mann appears well, alert and oriented x 3, pleasant and cooperative   HEENT: pupils equal, round, and reactive to light; extraocular muscles intact  NECK: supple, no carotid bruits   HEART: regular rhythm, normal S1 and S2, no murmurs, clicks, gallops or rubs   LUNGS: clear to auscultation bilaterally; no wheezes, rales, or rhonchi   ABDOMEN: normal bowel sounds, soft, no tenderness, no distention  EXTREMITIES: peripheral pulses normal; no clubbing, cyanosis, or edema  NEURO: no focal findings   SKIN: normal without suspicious lesions on exposed skin    Medications:      Current Outpatient Medications:     apixaban (Eliquis) 2 5 mg, Take 1 tablet (2 5 mg total) by mouth 2 (two) times a day, Disp: 180 tablet, Rfl: 3    CHLORPHENIRAMINE MALEATE PO, Take by mouth every 4 (four) hours as needed, Disp: , Rfl:     finasteride (PROSCAR) 5 mg tablet, Take 5 mg by mouth daily, Disp: , Rfl:     metolazone (ZAROXOLYN) 2 5 mg tablet, Take 2 5 mg by mouth once a week , Disp: , Rfl:     Multiple Vitamins-Minerals (CENTRUM SILVER 50+MEN PO), Take 1 tablet by mouth daily, Disp: , Rfl:     pantoprazole (PROTONIX) 40 mg tablet, Take 40 mg by mouth daily, Disp: , Rfl:     torsemide (DEMADEX) 10 mg tablet, 10 mg 2 (two) times a day , Disp: , Rfl: 0    triamcinolone (KENALOG) 0 1 % cream, APPLY TO SKIN TWICE DAILY TO AFFECTED AREAS ON BODY THROUGHOUT, Disp: , Rfl: 0     Family History   Problem Relation Age of Onset    Heart disease Father     Heart attack Father     Hypertension Father     Heart disease Brother     Heart attack Brother 64    Hypertension Brother     Heart attack Brother 67    Hypertension Brother     Arrhythmia Neg Hx     Asthma Neg Hx     Clotting disorder Neg Hx     Heart failure Neg Hx      Social History     Socioeconomic History  Marital status:      Spouse name: Not on file    Number of children: Not on file    Years of education: Not on file    Highest education level: Not on file   Occupational History    Not on file   Social Needs    Financial resource strain: Not on file    Food insecurity     Worry: Not on file     Inability: Not on file    Transportation needs     Medical: Not on file     Non-medical: Not on file   Tobacco Use    Smoking status: Never Smoker    Smokeless tobacco: Never Used   Substance and Sexual Activity    Alcohol use: Yes     Comment: occasional    Drug use: No    Sexual activity: Not on file   Lifestyle    Physical activity     Days per week: Not on file     Minutes per session: Not on file    Stress: Not on file   Relationships    Social connections     Talks on phone: Not on file     Gets together: Not on file     Attends [employer] service: Not on file     Active member of club or organization: Not on file     Attends meetings of clubs or organizations: Not on file     Relationship status: Not on file    Intimate partner violence     Fear of current or ex partner: Not on file     Emotionally abused: Not on file     Physically abused: Not on file     Forced sexual activity: Not on file   Other Topics Concern    Not on file   Social History Narrative    Not on file     Social History     Tobacco Use   Smoking Status Never Smoker   Smokeless Tobacco Never Used     Social History     Substance and Sexual Activity   Alcohol Use Yes    Comment: occasional       Labs & Results:  Below is the patient's most recent value for Albumin, ALT, AST, BUN, Calcium, Chloride, Cholesterol, CO2, Creatinine, GFR, Glucose, HDL, Hematocrit, Hemoglobin, Hemoglobin A1C, LDL, Magnesium, Phosphorus, Platelets, Potassium, PSA, Sodium, Triglycerides, and WBC     Lab Results   Component Value Date    ALT 21 10/06/2020    AST 20 10/06/2020    BUN 46 (H) 02/18/2021    CALCIUM 10 0 02/18/2021    CL 97 (L) 02/18/2021 CHOL 157 2015    CO2 34 (H) 2021    CREATININE 2 11 (H) 2021    HDL 30 (L) 10/06/2020    HCT 44 0 10/06/2020    HGB 14 0 10/06/2020    HGBA1C 5 4 2018    MG 2 4 01/10/2019    PHOS 3 6 2021     10/06/2020    K 3 5 2021    PSA 2 9 10/06/2020     2015    TRIG 124 10/06/2020    WBC 5 62 10/06/2020     Note: for a comprehensive list of the patient's lab results, access the Results Review activity  Cardiac testing:   Results for orders placed during the hospital encounter of 18   Echo complete with contrast if indicated    Narrative Jeannie [address]  [address] [place] [address]  [phone]    Transthoracic Echocardiogram  2D, M-mode, Doppler, and Color Doppler    Study date:  21-Dec-2018    Patient: Tayla Wolf  MR number: [id number]  Account number: [de-identified]  : 1927  Age: 80 years  Gender: Male  Status: Outpatient  Location: [address] [place] [address] [facility]  Height: 69 in  Weight: 202 lb  BP: 102/ 64 mmHg    Indications: Atrial fibrillation    Diagnoses: I48 0 - Atrial fibrillation    Sonographer:  JOSEFINA Hernandez  Primary Physician:  Samra Yoder MD  Referring Physician:  Page Pool DO  Group:  [facility]  Cardiology Fellow:  Delmer Jara MD  Interpreting Physician:  Aston Jo DO    SUMMARY    LEFT VENTRICLE:  Systolic function was mildly reduced  Ejection fraction was estimated to be 45 %  There was mild diffuse hypokinesis with regional variations including basal to mid inferolateral and basal inferior hypokinesis  There was mild concentric hypertrophy  RIGHT VENTRICLE:  The ventricle was mildly dilated  Systolic function was reduced  LEFT ATRIUM:  The atrium was mildly to moderately dilated  RIGHT ATRIUM:  The atrium was moderately dilated  TRICUSPID VALVE:  There was moderate regurgitation    Estimated peak PA pressure was 42 mmHg     HISTORY: PRIOR HISTORY: Hypertension, atrial fibrillation, pacemaker, cardiomyopathy, chronic kidney disease    PROCEDURE: The study was performed in the [address] [facility] [place]  This was a routine study  The transthoracic approach was used  The study included complete 2D imaging, M-mode, complete spectral Doppler, and color Doppler  Image  quality was adequate  LEFT VENTRICLE: Size was normal  Systolic function was mildly reduced  Ejection fraction was estimated to be 45 %  There was mild diffuse hypokinesis with regional variations including basal to mid inferolateral and basal inferior  hypokinesis Wall thickness was mildly increased  There was mild concentric hypertrophy  DOPPLER: Transmitral flow pattern: atrial fibrillation  RIGHT VENTRICLE: The ventricle was mildly dilated  Systolic function was reduced  Wall thickness was normal  A pacing wire was present  LEFT ATRIUM: The atrium was mildly to moderately dilated  RIGHT ATRIUM: The atrium was moderately dilated  A pacing wire was present  MITRAL VALVE: Valve structure was normal  There was normal leaflet separation  DOPPLER: The transmitral velocity was within the normal range  There was no evidence for stenosis  There was no significant regurgitation  AORTIC VALVE: The valve was trileaflet  Leaflets exhibited mildly increased thickness, mild calcification, and normal cuspal separation  DOPPLER: Transaortic velocity was within the normal range  There was no evidence for stenosis  There  was no significant regurgitation  TRICUSPID VALVE: The valve structure was normal  There was normal leaflet separation  DOPPLER: The transtricuspid velocity was within the normal range  There was no evidence for stenosis  There was moderate regurgitation  Estimated peak PA  pressure was 42 mmHg  PULMONIC VALVE: Leaflets exhibited normal thickness, no calcification, and normal cuspal separation   DOPPLER: The transpulmonic velocity was within the normal range  There was trace regurgitation  PERICARDIUM: There was no pericardial effusion  The pericardium was normal in appearance  AORTA: The root exhibited normal size  The ascending aorta was upper normal in size  SYSTEMIC VEINS: IVC: The inferior vena cava was normal in size  Respirophasic changes were normal     SYSTEM MEASUREMENT TABLES    2D  %FS: 17 03 %  Ao Diam: 3 79 cm  EDV(Teich): 129 52 ml  EF Biplane: 42 45 %  EF(Cube): 42 88 %  EF(Teich): 35 35 %  ESV(Cube): 80 33 ml  ESV(Teich): 83 74 ml  IVSd: 1 31 cm  LA Area: 24 22 cm2  LA Diam: 4 28 cm  LVEDV MOD A2C: 220 9 ml  LVEDV MOD A4C: 205 11 ml  LVEDV MOD BP: 215 2 ml  LVEF MOD A2C: 40 58 %  LVEF MOD A4C: 44 97 %  LVESV MOD A2C: 131 27 ml  LVESV MOD A4C: 112 87 ml  LVESV MOD BP: 123 84 ml  LVIDd: 5 2 cm  LVIDs: 4 31 cm  LVLd A2C: 9 46 cm  LVLd A4C: 9 25 cm  LVLs A2C: 8 73 cm  LVLs A4C: 8 41 cm  LVPWd: 1 31 cm  RA Area: 23 9 cm2  RV Diam : 5 16 cm  SV MOD A2C: 89 63 ml  SV MOD A4C: 92 23 ml  SV(Cube): 60 3 ml  SV(Teich): 45 78 ml    CW  TR Vmax: 3 08 m/s  TR maxP 97 mmHg    MM  TAPSE: 1 41 cm    [facility] Accredited Echocardiography Laboratory    Prepared and electronically signed by    Kleber Ho DO  Signed 21-Dec-2018 13:06:44       No results found for this or any previous visit  No results found for this or any previous visit  No results found for this or any previous visit

## 2021-03-09 NOTE — ED PROVIDER NOTES
History  Chief Complaint   Patient presents with    Edema     pt sent in by cardiology for leg edema and possible infection in leg, legs are wheeping - pt daughter reports family dog might have mites or scabies, pt denies cp, and sob     79 yo male with CHF, PM, PAF, CKD, referred to ED from Dr Elder Cheadle office with concern for increasing redness of the left lower leg, associated with pitting edema, that has caused some areas to open weep, worsening over 1 month, despite increasing his prescribed diuretics tosemide and metolazone as directed  Patient denies fever, chills, chest pain, dyspnea  History provided by:  Patient      Prior to Admission Medications   Prescriptions Last Dose Informant Patient Reported? Taking?    CHLORPHENIRAMINE MALEATE PO  Self Yes Yes   Sig: Take by mouth every 4 (four) hours as needed   Multiple Vitamins-Minerals (CENTRUM SILVER 50+MEN PO)  Self Yes Yes   Sig: Take 1 tablet by mouth daily   apixaban (Eliquis) 2 5 mg   No Yes   Sig: Take 1 tablet (2 5 mg total) by mouth 2 (two) times a day   finasteride (PROSCAR) 5 mg tablet  Self Yes Yes   Sig: Take 5 mg by mouth daily   metolazone (ZAROXOLYN) 2 5 mg tablet   Yes Yes   Sig: Take 2 5 mg by mouth once a week    pantoprazole (PROTONIX) 40 mg tablet  Self Yes Yes   Sig: Take 40 mg by mouth daily   torsemide (DEMADEX) 10 mg tablet  Self Yes Yes   Sig: 10 mg 2 (two) times a day       Facility-Administered Medications: None       Past Medical History:   Diagnosis Date    Anemia     Atrial fibrillation (HCC)     BPH (benign prostatic hypertrophy)     Cancer (HCC)     COLON    Chronic kidney disease     Hypertension     Irregular heart beat     afib       Past Surgical History:   Procedure Laterality Date    BASAL CELL CARCINOMA EXCISION      CARDIAC PACEMAKER PLACEMENT      CARDIAC SURGERY      CARDIOVERSION      CARPAL TUNNEL RELEASE      CATARACT EXTRACTION      COLON SURGERY      COLONOSCOPY      WI COLONOSCOPY FLX DX W/COLLJ SPEC WHEN PFRMD N/A 11/30/2018    Procedure: COLONOSCOPY w egd  by dr Pricilla Collado;  Surgeon: Regis Becerril MD;  [facility]: [facility]; Service: Colorectal    ME LAP,SURG,COLECTOMY, PARTIAL, W/ANAST N/A 1/8/2019    Procedure: Left hemicolectomy, takedown splenic flexure, end to end transverse to sigmoid colon anastamosis; Surgeon: Regis Becerril MD;  [facility];  Service: Colorectal       Family History   Problem Relation Age of Onset    Heart disease Father     Heart attack Father     Hypertension Father     Heart disease Brother     Heart attack Brother 64    Hypertension Brother     Heart attack Brother 67    Hypertension Brother     Arrhythmia Neg Hx     Asthma Neg Hx     Clotting disorder Neg Hx     Heart failure Neg Hx      I have reviewed and agree with the history as documented  E-Cigarette/Vaping    E-Cigarette Use Never User      E-Cigarette/Vaping Substances    Nicotine No     THC No     CBD No     Flavoring No     Other No     Unknown No      Social History     Tobacco Use    Smoking status: Never Smoker    Smokeless tobacco: Never Used   Substance Use Topics    Alcohol use: Yes     Comment: occasional    Drug use: No       Review of Systems   Constitutional: Negative for appetite change, chills and fever  HENT: Negative for sore throat  Respiratory: Negative for cough, shortness of breath and wheezing  Cardiovascular: Positive for leg swelling  Negative for chest pain and palpitations  Gastrointestinal: Negative for abdominal pain, diarrhea, nausea and vomiting  Genitourinary: Negative for dysuria and hematuria  Musculoskeletal: Negative for neck pain  Skin: Positive for color change and wound  Negative for rash  Neurological: Negative for dizziness, weakness and headaches  Psychiatric/Behavioral: Negative for suicidal ideas  All other systems reviewed and are negative        Physical Exam  Physical Exam  Vitals signs and nursing note reviewed  Constitutional:       Appearance: He is well-developed  Comments: Elderly appearing, c/w recorded age, but alert, nontoxic appearing   HENT:      Head: Normocephalic and atraumatic  Right Ear: External ear normal       Left Ear: External ear normal       Nose: Nose normal    Eyes:      Conjunctiva/sclera: Conjunctivae normal       Pupils: Pupils are equal, round, and reactive to light  Neck:      Musculoskeletal: Normal range of motion and neck supple  Cardiovascular:      Rate and Rhythm: Normal rate  Pulmonary:      Effort: Pulmonary effort is normal    Abdominal:      Tenderness: There is no abdominal tenderness  Musculoskeletal: Normal range of motion  Right lower leg: He exhibits swelling  He exhibits no tenderness  Edema present  Left lower leg: He exhibits swelling (weeping, ulcers, and significant erythema of lower leg and foot)  He exhibits no tenderness  Edema present  Skin:     General: Skin is warm and dry  Capillary Refill: Capillary refill takes less than 2 seconds  Neurological:      Mental Status: He is alert and oriented to person, place, and time  Cranial Nerves: No cranial nerve deficit  Coordination: Coordination normal    Psychiatric:         Behavior: Behavior normal          Thought Content:  Thought content normal          Judgment: Judgment normal          Vital Signs  ED Triage Vitals   Temperature Pulse Respirations Blood Pressure SpO2   03/09/21 1303 03/09/21 1256 03/09/21 1256 03/09/21 1256 03/09/21 1256   97 9 °F (36 6 °C) 72 16 121/61 100 %      Temp Source Heart Rate Source Patient Position - Orthostatic VS BP Location FiO2 (%)   03/09/21 1303 03/09/21 1256 03/09/21 1256 03/09/21 1256 --   Oral Monitor Sitting Left arm       Pain Score       --                  Vitals:    03/09/21 1256   BP: 121/61   Pulse: 72   Patient Position - Orthostatic VS: Sitting         Visual Acuity      ED Medications  Medications   ceftriaxone (ROCEPHIN) 1 g/50 mL in dextrose IVPB (1,000 mg Intravenous New Bag 3/9/21 1410)       Diagnostic Studies  Results Reviewed     Procedure Component Value Units Date/Time    Blood culture #2 [[id number]] Collected: 03/09/21 1409    Lab Status: In process Specimen: Blood from Arm, Right Updated: 03/09/21 1416    Blood culture #1 [[id number]] Collected: 03/09/21 1309    Lab Status:  In process Specimen: Blood from Arm, Right Updated: 03/09/21 1416    Troponin I [[id number]]  (Abnormal) Collected: 03/09/21 1309    Lab Status: Final result Specimen: Blood from Arm, Right Updated: 03/09/21 1345     Troponin I 0 24 ng/mL     NT-BNP PRO [[id number]]  (Abnormal) Collected: 03/09/21 1309    Lab Status: Final result Specimen: Blood from Arm, Right Updated: 03/09/21 1342     NT-proBNP 18,573 pg/mL     Comprehensive metabolic panel [[id number]]  (Abnormal) Collected: 03/09/21 1309    Lab Status: Final result Specimen: Blood from Arm, Right Updated: 03/09/21 1335     Sodium 135 mmol/L      Potassium 3 2 mmol/L      Chloride 93 mmol/L      CO2 30 mmol/L      ANION GAP 12 mmol/L      BUN 50 mg/dL      Creatinine 2 48 mg/dL      Glucose 115 mg/dL      Calcium 9 1 mg/dL      Corrected Calcium 9 7 mg/dL      AST 41 U/L      ALT 34 U/L      Alkaline Phosphatase 145 U/L      Total Protein 8 0 g/dL      Albumin 3 2 g/dL      Total Bilirubin 1 29 mg/dL      eGFR 22 ml/min/1 73sq m     Narrative:      [place] guidelines for Chronic Kidney Disease (CKD):     Stage 1 with normal or high GFR (GFR > 90 mL/min/1 73 square meters)    Stage 2 Mild CKD (GFR = 60-89 mL/min/1 73 square meters)    Stage 3A Moderate CKD (GFR = 45-59 mL/min/1 73 square meters)    Stage 3B Moderate CKD (GFR = 30-44 mL/min/1 73 square meters)    Stage 4 Severe CKD (GFR = 15-29 mL/min/1 73 square meters)    Stage 5 End Stage CKD (GFR <15 mL/min/1 73 square meters)  Note: GFR calculation is accurate only with a steady state creatinine    CBC and differential [[id number]]  (Abnormal) Collected: 03/09/21 1309    Lab Status: Final result Specimen: Blood from Arm, Right Updated: 03/09/21 1314     WBC 7 27 Thousand/uL      RBC 4 51 Million/uL      Hemoglobin 14 3 g/dL      Hematocrit 43 5 %      MCV 97 fL      MCH 31 7 pg      MCHC 32 9 g/dL      RDW 15 9 %      MPV 10 2 fL      Platelets 904 Thousands/uL      nRBC 0 /100 WBCs      Neutrophils Relative 73 %      Immat GRANS % 0 %      Lymphocytes Relative 12 %      Monocytes Relative 11 %      Eosinophils Relative 3 %      Basophils Relative 1 %      Neutrophils Absolute 5 36 Thousands/µL      Immature Grans Absolute 0 02 Thousand/uL      Lymphocytes Absolute 0 86 Thousands/µL      Monocytes Absolute 0 76 Thousand/µL      Eosinophils Absolute 0 23 Thousand/µL      Basophils Absolute 0 04 Thousands/µL                  XR chest 1 view portable    (Results Pending)              Procedures  ECG 12 Lead Documentation Only    Date/Time: 3/9/2021 1:10 PM  Performed by: Fuentes Whitley MD  Authorized by: Fuentes Whitley MD     Rate:     ECG rate:  72  Rhythm:     Rhythm: paced               ED Course  ED Course as of Mar 09 1507   Tue Mar 09, 2021   1506 Creatinine(!): 2 48   1506 Troponin I(!): 0 24                         Initial Sepsis Screening     Row Name 03/09/21 1350                Is the patient's history suggestive of a new or worsening infection? (!) Yes (Proceed)  -RM        Suspected source of infection  --        Are two or more of the following signs & symptoms of infection both present and new to the patient?   No  -RM        Indicate SIRS criteria  --        If the answer is yes to both questions, suspicion of sepsis is present  --        If severe sepsis is present AND tissue hypoperfusion perists in the hour after fluid resuscitation or lactate > 4, the patient meets criteria for SEPTIC SHOCK  --        Are any of the following organ dysfunction criteria present within 6 hours of suspected infection and SIRS criteria that are NOT considered to be chronic conditions? --        Organ dysfunction  --        Date of presentation of severe sepsis  --        Time of presentation of severe sepsis  --        Tissue hypoperfusion persists in the hour after crystalloid fluid administration, evidenced, by either:  --        Was hypotension present within one hour of the conclusion of crystalloid fluid administration?  --        Date of presentation of septic shock  --        Time of presentation of septic shock  --          User Key  (r) = Recorded By, (t) = Taken By, (c) = Cosigned By    [address] Name Provider Type    Sabra Salinas MD Physician          SBIRT 20yo+      Most Recent Value   SBIRT (23 yo +)   In order to provide better care to our patients, we are screening all of our patients for alcohol and drug use  Would it be okay to ask you these screening questions?   No Filed at: 03/09/2021 1304                    MDM    Disposition  Final diagnoses:   Cellulitis of left leg   Peripheral edema   Acute kidney injury (HCC)   Elevated troponin   Congestive heart disease ([facility] )     Time reflects when diagnosis was documented in both MDM as applicable and the Disposition within this note     Time User Action Codes Description Comment    3/9/2021  2:12 PM Alroy Reasons Add [L03 116] Cellulitis of left leg     3/9/2021  2:12 PM Ludivina Cower L Add [R60 9] Peripheral edema     3/9/2021  2:12 PM Ludivina Cower L Add [N17 9] Acute kidney injury ([facility] )     3/9/2021  2:12 PM Alroy Reasons Add [R77 8] Elevated troponin     3/9/2021  2:13 PM Ludivina Cower L Add [I50 9] Congestive heart disease ([facility] )     3/9/2021  2:56 PM Carvin Sieve Modify [W93 448] Cellulitis of left leg     3/9/2021  2:56 PM Carvin Sieve Add [I42 9] Cardiomyopathy, unspecified type ([facility] )     3/9/2021  2:56 PM Carvin Sieve Modify [Y03 386] Cellulitis of left leg     3/9/2021  2:57 PM Carvin Sieve Modify [V31 430] Cellulitis of left leg       ED Disposition     ED Disposition Condition Date/Time Comment    Admit Paul Gregg Mar 9, 2021  2:12 PM Case was discussed with Dr Betty Ortega and the patient's admission status was agreed to be Admission Status: inpatient status to the service of Dr Betty Ortega   Follow-up Information    None         Patient's Medications   Discharge Prescriptions    No medications on file     No discharge procedures on file      PDMP Review     None          ED Provider  Electronically Signed by           Nba Ho MD  03/09/21 2032

## 2021-03-09 NOTE — ASSESSMENT & PLAN NOTE
· Acute kidney injury on chronic kidney disease stage 3  · BUN to creatinine ratio greater than 20 consistent with prerenal azotemia likely due to diuretics and underlying cardiomyopathy  · Hold diuretic  · Consult nephrology  · Repeat labs in a m

## 2021-03-09 NOTE — ASSESSMENT & PLAN NOTE
· Appears stable with no worsening shortness of breath, clear lungs on exam, and no neck vein engorgement  · His weight has been trending down  · His edema has improved overall (used to be up to the lower abdomen and scrotal now just in the legs)  · Will hold off intravenous diuretics for now due to elevated creatinine, improved edema and decreased weight overall    · Consult cardiology

## 2021-03-09 NOTE — H&P
Ankur 48  H&P- Zia Looney 6/16/1927, 80 y o  male MRN: [id number]  Unit/Bed#: ED 20 Encounter: [id number]  Primary Care Provider: VARSHA Fish MD   Date and time admitted to hospital: 3/9/2021 12:52 PM    * Cellulitis of left leg  Assessment & Plan  · Due to lying venous stasis with venous stasis ulcers with superimposed cellulitis  · Infection is localized, he is not septic or toxic appearing  · Start cephazolin 1500 mg IV q 12 (renally dosed)  · Consult wound care regarding venous stasis ulcers  · He needs to have his legs elevated for edema control  Acute kidney injury superimposed on chronic kidney disease ([facility] )  Assessment & Plan  · Acute kidney injury on chronic kidney disease stage 3  · BUN to creatinine ratio greater than 20 consistent with prerenal azotemia likely due to diuretics and underlying cardiomyopathy  · Hold diuretic  · Consult nephrology  · Repeat labs in a m  Cardiomyopathy ([facility] )  Assessment & Plan  · Appears stable with no worsening shortness of breath, clear lungs on exam, and no neck vein engorgement  · His weight has been trending down  · His edema has improved overall (used to be up to the lower abdomen and scrotal now just in the legs)  · Will hold off intravenous diuretics for now due to elevated creatinine, improved edema and decreased weight overall    · Consult cardiology     BPH (benign prostatic hyperplasia)  Assessment & Plan  · Stable  · Continue finasteride daily    A-fib (HCC)  Assessment & Plan  · Status post biventricular pacemaker  · He is V paced and fully anticoagulated on Eliquis      VTE Prophylaxis: Apixaban (Eliquis)  / reason for no mechanical VTE prophylaxis Lower extremity cellulitis   Code Status:  Level 3 DNR  POLST: There is no POLST form on file for this patient (pre-hospital)  Discussion with family:  Daughter at bedside    Anticipated Length of Stay:  Patient will be admitted on an Inpatient basis with an anticipated length of stay of  at least 2 midnights  Justification for Hospital Stay:  Cellulitis, acute kidney injury, cardiomyopathy    Total Time for Visit, including Counseling / Coordination of Care: 45 minutes  Greater than 50% of this total time spent on direct patient counseling and coordination of care  Chief Complaint:   "Infection"    History of Present Illness:    Nabor Mccloud is a 80 y o  male who was sent to the hospital from the cardiology office due to concern for cellulitis of the left leg and rising creatinine  He has a known history of cardiomyopathy status post Bi V pacemaker, atrial fibrillation on chronic anticoagulation, chronic kidney disease stage 3, colon cancer status post resection in 1997 and 2019, and chronic kidney disease stage 3  For the past 6 weeks his PCP has been adjusting his diuretic regimen due to significant weight gain and edema  And his daughter noted that his edema was up to his scrotum and lower abdomen so his diuretics were increased to torsemide 40 mg daily and Zaroxolyn 5 mg twice a week  This regimen was effective in lowering his weight from 190 lb to 179 lb  This also improved his swelling  The swelling is down to the legs and feet and not in the thigh and scrotum  Due to the improvement, his PCP lowered his medications down to his usual torsemide 20 mg daily and Zaroxolyn 5 mg once a week  He did notice that the left leg was getting redder and uncomfortable  He felt that he had an infection  The area was itchy and warm  He had no fever or chills  He denied any worsening shortness of breath  He denies waking up at night short of breath although he does wake up at night to take his pills  Review of Systems:    Review of Systems   Constitutional: Negative for activity change, chills and fever  HENT: Negative  Eyes: Negative  Respiratory: Negative for cough, shortness of breath and wheezing      Cardiovascular: Positive for leg swelling  Negative for chest pain  Gastrointestinal: Negative  Musculoskeletal: Negative  Neurological: Negative  Hematological: Negative  Psychiatric/Behavioral: Negative  All other systems reviewed and are negative  Past Medical and Surgical History:     Past Medical History:   Diagnosis Date    Anemia     Atrial fibrillation ([address] )     BPH (benign prostatic hypertrophy)     Cancer (HCC)     COLON    Chronic kidney disease     Hypertension     Irregular heart beat     afib       Past Surgical History:   Procedure Laterality Date    BASAL CELL CARCINOMA EXCISION      CARDIAC PACEMAKER PLACEMENT      CARDIAC SURGERY      CARDIOVERSION      CARPAL TUNNEL RELEASE      CATARACT EXTRACTION      COLON SURGERY      COLONOSCOPY      OR COLONOSCOPY FLX DX W/COLLJ SPEC WHEN PFRMD N/A 11/30/2018    Procedure: COLONOSCOPY w egd  by dr Nora Salazar;  Surgeon: Hayes Yanes MD;  [facility]; Service: Colorectal    OR LAP,SURG,COLECTOMY, PARTIAL, W/ANAST N/A 1/8/2019    Procedure: Left hemicolectomy, takedown splenic flexure, end to end transverse to sigmoid colon anastamosis; Surgeon: Hayes Yanes MD;  [facility];  Service: Colorectal       Meds/Allergies:    Prior to Admission medications    Medication Sig Start Date End Date Taking?  Authorizing Provider   apixaban (Eliquis) 2 5 mg Take 1 tablet (2 5 mg total) by mouth 2 (two) times a day 9/9/20  Yes Kan Granados Drilling, DO   CHLORPHENIRAMINE MALEATE PO Take by mouth every 4 (four) hours as needed   Yes Historical Provider, MD   finasteride (PROSCAR) 5 mg tablet Take 5 mg by mouth daily   Yes Historical Provider, MD   metolazone (ZAROXOLYN) 2 5 mg tablet Take 2 5 mg by mouth once a week  2/5/21  Yes Historical Provider, MD   Multiple Vitamins-Minerals (CENTRUM SILVER 50+MEN PO) Take 1 tablet by mouth daily   Yes Historical Provider, MD   pantoprazole (PROTONIX) 40 mg tablet Take 40 mg by mouth daily   Yes Historical Provider, MD   torsemide (DEMADEX) 10 mg tablet 10 mg 2 (two) times a day  6/17/19  Yes Historical Provider, MD   bisoprolol (ZEBETA) 5 mg tablet Take 0 5 tablets (2 5 mg total) by mouth daily 6/6/19 6/24/20  Kan Cano Arrow,    triamcinolone (KENALOG) 0 1 % cream APPLY TO SKIN TWICE DAILY TO AFFECTED AREAS ON BODY THROUGHOUT 11/13/18 3/9/21  Historical Provider, MD     I have reviewed home medications with patient family member  Allergies: Allergies   Allergen Reactions    Lasix [Furosemide] Other (See Comments)     Weakness, decreased BP    Adhesive [Medical Tape] Rash    Neomycin-Bacitracin Zn-Polymyx Rash    Neosporin [Neomycin-Polymyxin-Gramicidin] Rash       Social History:     Marital Status:     Occupation:  Retired  Patient Pre-hospital Living Situation:  Lives with family  Patient Pre-hospital Level of Mobility:  Independent  Patient Pre-hospital Diet Restrictions:  Low-salt  Substance Use History:   Social History     Substance and Sexual Activity   Alcohol Use Yes    Comment: occasional     Social History     Tobacco Use   Smoking Status Never Smoker   Smokeless Tobacco Never Used     Social History     Substance and Sexual Activity   Drug Use No       Family History:    Family History   Problem Relation Age of Onset    Heart disease Father     Heart attack Father     Hypertension Father     Heart disease Brother     Heart attack Brother 64    Hypertension Brother     Heart attack Brother 67    Hypertension Brother     Arrhythmia Neg Hx     Asthma Neg Hx     Clotting disorder Neg Hx     Heart failure Neg Hx        Physical Exam:     Vitals:   Blood Pressure: 121/61 (03/09/21 1256)  Pulse: 72 (03/09/21 1256)  Temperature: 97 9 °F (36 6 °C) (03/09/21 1303)  Temp Source: Oral (03/09/21 1303)  Respirations: 16 (03/09/21 1256)  Weight - Scale: 82 6 kg (182 lb 1 6 oz) (03/09/21 1256)  SpO2: 100 % (03/09/21 1256)    Physical Exam  Constitutional:       Appearance: He is not ill-appearing or diaphoretic  HENT:      Head: Normocephalic and atraumatic  Nose: No rhinorrhea  Neck:      Musculoskeletal: Neck supple  Comments: No neck vein distention  Cardiovascular:      Rate and Rhythm: Regular rhythm  Heart sounds: No murmur  No gallop  Pulmonary:      Effort: Pulmonary effort is normal  No respiratory distress  Breath sounds: No wheezing  Abdominal:      General: Abdomen is flat  There is no distension  Palpations: Abdomen is soft  Tenderness: There is no abdominal tenderness  Comments: Healed abdominal scar   Genitourinary:     Comments: No scrotal swelling  Musculoskeletal:      Comments: Pitting edema of both legs   Skin:     Comments: Venous stasis dermatitis right leg and foot  Venous stasis dermatitis left leg  Lower leg area of redness down to the foot  Mid anterior shin small area of venous stasis ulcers    He has dry skin and small  eschars arms   Neurological:      Mental Status: He is alert and oriented to person, place, and time  Mental status is at baseline  Psychiatric:         Mood and Affect: Mood normal          Behavior: Behavior normal          Additional Data:     Lab Results: I have personally reviewed pertinent reports        Results from last 7 days   Lab Units 03/09/21  1309   WBC Thousand/uL 7 27   HEMOGLOBIN g/dL 14 3   HEMATOCRIT % 43 5   PLATELETS Thousands/uL 214   NEUTROS PCT % 73   LYMPHS PCT % 12*   MONOS PCT % 11   EOS PCT % 3     Results from last 7 days   Lab Units 03/09/21  1309   SODIUM mmol/L 135*   POTASSIUM mmol/L 3 2*   CHLORIDE mmol/L 93*   CO2 mmol/L 30   BUN mg/dL 50*   CREATININE mg/dL 2 48*   ANION GAP mmol/L 12   CALCIUM mg/dL 9 1   ALBUMIN g/dL 3 2*   TOTAL BILIRUBIN mg/dL 1 29*   ALK PHOS U/L 145*   ALT U/L 34   AST U/L 41   GLUCOSE RANDOM mg/dL 115                       Imaging: I have personally reviewed pertinent films in PACS    XR chest 1 view portable    (Results Pending)       EKG, Pathology, and Other Studies Reviewed on Admission:   · EKG:  Ventricular paced rhythm    Allscripts / Epic Records Reviewed: Yes     ** Please Note: This note has been constructed using a voice recognition system   **

## 2021-03-09 NOTE — CONSULTS
Consultation - Nephrology   Shaunna Resendez 80 y o  male MRN: [id number]  Unit/Bed#: ISELA Encounter: [id number]    ASSESSMENT AND PLAN:  Patient is 66-year-old male with significant medical issues of AFib, history of colon cancer, status post hemicolectomy, CKD, hypertension, presented with worsening leg edema, occasional exertional dyspnea with concern for cellulitis  We are consulted for SUDHIR/CKD management  SUDHIR POA on CKD stage 3 to stage IV, baseline creatinine 1 8 to 1 9 since mid 2020, prior 1 6 to 1 8  -no recent UA available  -SUDHIR suspected in the setting of cardiorenal, volume overload,? Component of cellulitis  -may have to accept higher creatinine to keep patient euvolemic  -check bladder scan for PVR  -check UA with microscopy  -apparently patient has allergy to furosemide? Skin rash, will give IV Bumex 1 5 mg once today  -BMP in a m , avoid nephrotoxins or NSAIDs, avoid hypotension    Systolic CHF, EF 60% in 0974  -consider repeat echocardiogram  -clinically seems volume overloaded  -he has lost about 10 lb with most recent weight 180 lb at home  Recently had up titration of his diuretics and more recently remains on torsemide 10 mg b i d , on metolazone 2 5 mg weekly  -IV Bumex as mentioned above   -daily weight, accurate intake and output  -proBNP 37174    Hypokalemia, serum potassium 3 2 below goal  -likely secondary to ongoing active diuresis  -will give potassium chloride 60 mEq one dose today  -check serum magnesium, BMP in a m      Hyponatremia, serum sodium 135 could be in the setting of hypervolemia  -fluid restriction 1 5 L per day, continue to monitor with diuresis    Concern for lower extremity cellulitis, antibiotic as per primary team    Discussed above plan in detail with primary team    HISTORY OF PRESENT ILLNESS:  Requesting Physician: Rere Cheung MD  Reason for Consult:  SUDHIR/CKD    Shaunna Resendez is a 80y o  year old male who was admitted to [facility] after presenting with worsening leg edema, concern for cellulitis  A renal consultation is requested today for assistance in the management of SUDHIR/CKD  Old medical records were reviewed  Patient's baseline serum creatinine seems to be 1 8 to 1 8 since mid 2020  He being closely followed by Cardiology, as an outpatient  Due to recent weight gain patient's diuretics were increased to torsemide 20 mg p o  B i d  Along with metolazone and with diet, weight started to slowly reduced  Recently diuretics were reduced and now remains on torsemide 10 mg b i d  Along with metolazone weekly  His weight reduced to 180 lb and has lost about 10 lb over last one month  He denies any urinary symptoms although did have decreased urine output recently  Denies any dysuria, lightheadedness or dizziness  Does have occasional exertional dyspnea  Has been having skin rash issues along with itching  PAST MEDICAL HISTORY:  Past Medical History:   Diagnosis Date    Anemia     Atrial fibrillation ([address] )     BPH (benign prostatic hypertrophy)     Cancer (HCC)     COLON    Chronic kidney disease     Hypertension     Irregular heart beat     afib       PAST SURGICAL HISTORY:  Past Surgical History:   Procedure Laterality Date    BASAL CELL CARCINOMA EXCISION      CARDIAC PACEMAKER PLACEMENT      CARDIAC SURGERY      CARDIOVERSION      CARPAL TUNNEL RELEASE      CATARACT EXTRACTION      COLON SURGERY      COLONOSCOPY      [place] COLONOSCOPY FLX DX W/COLLJ SPEC WHEN PFRMD N/A 11/30/2018    Procedure: COLONOSCOPY w egd  by dr Albert Bush;  Surgeon: Ishaan Maier MD;  [facility]; Service: Colorectal    [place] LAP,SURG,COLECTOMY, PARTIAL, W/ANAST N/A 1/8/2019    Procedure: Left hemicolectomy, takedown splenic flexure, end to end transverse to sigmoid colon anastamosis;   Surgeon: Ishaan Maier MD;  [facility];  Service: Colorectal       ALLERGIES:  Allergies   Allergen Reactions    Lasix [Furosemide] Other (See Comments) Weakness, decreased BP    Adhesive [Medical Tape] Rash    Neomycin-Bacitracin Zn-Polymyx Rash    Neosporin [Neomycin-Polymyxin-Gramicidin] Rash       SOCIAL HISTORY:  Social History     Substance and Sexual Activity   Alcohol Use Yes    Comment: occasional     Social History     Substance and Sexual Activity   Drug Use No     Social History     Tobacco Use   Smoking Status Never Smoker   Smokeless Tobacco Never Used       FAMILY HISTORY:  Family History   Problem Relation Age of Onset    Heart disease Father     Heart attack Father     Hypertension Father     Heart disease Brother     Heart attack Brother 64    Hypertension Brother     Heart attack Brother 67    Hypertension Brother     Arrhythmia Neg Hx     Asthma Neg Hx     Clotting disorder Neg Hx     Heart failure Neg Hx        MEDICATIONS:  No current facility-administered medications for this encounter  Current Outpatient Medications:     apixaban (Eliquis) 2 5 mg, Take 1 tablet (2 5 mg total) by mouth 2 (two) times a day, Disp: 180 tablet, Rfl: 3    CHLORPHENIRAMINE MALEATE PO, Take by mouth every 4 (four) hours as needed, Disp: , Rfl:     finasteride (PROSCAR) 5 mg tablet, Take 5 mg by mouth daily, Disp: , Rfl:     metolazone (ZAROXOLYN) 2 5 mg tablet, Take 2 5 mg by mouth once a week , Disp: , Rfl:     Multiple Vitamins-Minerals (CENTRUM SILVER 50+MEN PO), Take 1 tablet by mouth daily, Disp: , Rfl:     pantoprazole (PROTONIX) 40 mg tablet, Take 40 mg by mouth daily, Disp: , Rfl:     torsemide (DEMADEX) 10 mg tablet, 10 mg 2 (two) times a day , Disp: , Rfl: 0    REVIEW OF SYSTEMS:  Complete 10 point review of systems were obtained and discussed in length with the patient  Complete review of systems were negative / unremarkable except mentioned in the HPI section       PHYSICAL EXAM:  Current Weight: Weight - Scale: 82 6 kg (182 lb 1 6 oz)  First Weight: Weight - Scale: 82 6 kg (182 lb 1 6 oz)  Vitals:    03/09/21 1303   BP:    Pulse: Resp:    Temp: 97 9 °F (36 6 °C)   SpO2:      No intake or output data in the 24 hours ending 03/09/21 1515  Wt Readings from Last 3 Encounters:   03/09/21 82 6 kg (182 lb 1 6 oz)   03/09/21 81 7 kg (180 lb 3 2 oz)   02/26/21 83 5 kg (184 lb)     Temp Readings from Last 3 Encounters:   03/09/21 97 9 °F (36 6 °C) (Oral)   09/09/20 98 4 °F (36 9 °C) (Temporal)   08/21/20 98 3 °F (36 8 °C)     BP Readings from Last 3 Encounters:   03/09/21 121/61   03/09/21 116/64   09/09/20 128/70     Pulse Readings from Last 3 Encounters:   03/09/21 72   03/09/21 78   09/09/20 70        Physical Examination:  General:  Lying in bed, no acute distress  Eyes:  No conjunctival pallor present  ENT:  External examination of ears and nose unremarkable  Neck:  No obvious lymphadenopathy appreciated  Respiratory:  Bilateral air entry present  CVS:  S1, S2 present  GI:  Soft, nondistended  CNS:  Active alert oriented x3  Extremities:  2+ edema in legs  Psych:  Conscious, coherent oriented  Skin:  Skin rash, erythematous in lower extremities, concern for cellulitis    Invasive Devices:      Lab Results:   Results from last 7 days   Lab Units 03/09/21  1309   WBC Thousand/uL 7 27   HEMOGLOBIN g/dL 14 3   HEMATOCRIT % 43 5   PLATELETS Thousands/uL 214   POTASSIUM mmol/L 3 2*   CHLORIDE mmol/L 93*   CO2 mmol/L 30   BUN mg/dL 50*   CREATININE mg/dL 2 48*   CALCIUM mg/dL 9 1       Other Studies:   XR chest 1 view portable    (Results Pending)   Chest x-ray images personally reviewed with concern for mild pulmonary congestion, official report pending  Portions of the record may have been created with voice recognition software  Occasional wrong word or "sound a like" substitutions may have occurred due to the inherent limitations of voice recognition software  Read the chart carefully and recognize, using context, where substitutions have occurred

## 2021-03-09 NOTE — QUICK NOTE
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

## 2021-03-10 NOTE — PROGRESS NOTES
NEPHROLOGY PROGRESS NOTE   Pushpa Barkley 80 y o  male MRN: [id number]  Unit/Bed#: E4 -01 Encounter: [id number]  Reason for Consult: SUDHIR    ASSESSMENT AND PLAN:  Patient is 80-year-old male with significant medical issues of AFib, history of colon cancer, status post hemicolectomy, CKD, hypertension, presented with worsening leg edema, occasional exertional dyspnea with concern for cellulitis  We are consulted for SUDHIR/CKD management      SUDHIR POA on CKD stage 3 to IV, baseline creatinine 1 8 to 1 9 since mid 2020, prior 1 6 to 1 8  -creatinine peak level 2 4 slightly improved to 2 2 today  -UA results to follow  -SUDHIR suspected in the setting of cardiorenal, volume overload,? Component of cellulitis  -may have to accept higher creatinine to keep patient euvolemic  -check bladder scan for PVR  -apparently patient has allergy to furosemide? Skin rash, will give IV Bumex 1 5 mg once again today  -BMP in a m , avoid nephrotoxins or NSAIDs, avoid hypotension     Systolic CHF, EF 59% in 0835  -repeat echocardiogram results to follow  -clinically seems volume overloaded   -lost weight since yesterday  Continue IV Bumex one dose again today     -outpatient regimen: torsemide 10 mg b i d , on metolazone 2 5 mg weekly  -daily weight, accurate intake and output  -proBNP 35766     Hypokalemia, improved with replacement  Serum potassium 3 6  Will give 20 mEq potassium chloride once today    -likely secondary to ongoing active diuresis      Hyponatremia, overall improving with diuresis, serum sodium 137  -could be in the setting of hypervolemia  -fluid restriction 1 5 L per day     Concern for lower extremity cellulitis, antibiotic as per primary team     Discussed above plan in detail with primary team    SUBJECTIVE:  Patient seen and examined at bedside   No chest pain, shortness of breath, nausea, vomiting, abdominal pain  OBJECTIVE:  Current Weight: Weight - Scale: 79 7 kg (175 lb 11 3 oz)  Vitals:    03/10/21 6666 BP: 100/53   Pulse: 98   Resp: 18   Temp: 97 7 °F (36 5 °C)   SpO2: 94%     No intake or output data in the 24 hours ending 03/10/21 1125  Wt Readings from Last 3 Encounters:   03/10/21 79 7 kg (175 lb 11 3 oz)   03/09/21 81 7 kg (180 lb 3 2 oz)   02/26/21 83 5 kg (184 lb)     Temp Readings from Last 3 Encounters:   03/10/21 97 7 °F (36 5 °C) (Temporal)   09/09/20 98 4 °F (36 9 °C) (Temporal)   08/21/20 98 3 °F (36 8 °C)     BP Readings from Last 3 Encounters:   03/10/21 100/53   03/09/21 116/64   09/09/20 128/70     Pulse Readings from Last 3 Encounters:   03/10/21 98   03/09/21 78   09/09/20 70        Physical Examination:  General:  Lying in bed, no acute distress   Eyes:  No conjunctival pallor present  ENT:  External examination of ears and nose unremarkable  Neck:  No obvious lymphadenopathy appreciated  Respiratory:  Bilateral entry present  CVS:  S1, S2 present, 2+ edema in legs  GI: , nondistended, soft  CNS:  Active alert oriented x3  Skin:  Erythematous skin rash in lower extremities, concern for cellulitis  Musculoskeletal:  No obvious gross deformity noted    Medications:    Current Facility-Administered Medications:     apixaban (ELIQUIS) tablet 2 5 mg, 2 5 mg, Oral, BID, Suyl Fernandez MD, 2 5 mg at 03/10/21 0939    ceFAZolin (ANCEF) IVPB (premix in dextrose) 1,000 mg 50 mL, 1,000 mg, Intravenous, Q12H, Suly Fernandez MD, Last Rate: 100 mL/hr at 03/10/21 0510, 1,000 mg at 03/10/21 0510    finasteride (PROSCAR) tablet 5 mg, 5 mg, Oral, Daily, Suly Fernandez MD, 5 mg at 03/10/21 0938    pantoprazole (PROTONIX) EC tablet 40 mg, 40 mg, Oral, Daily, Suly Fernandez MD, 40 mg at 03/10/21 0511    potassium chloride (K-DUR,KLOR-CON) CR tablet 20 mEq, 20 mEq, Oral, Once, IAIN Mary    Laboratory Results:  Results from last 7 days   Lab Units 03/10/21  0540 03/09/21  1309   WBC Thousand/uL 6 56 7 27   HEMOGLOBIN g/dL 13 2 14 3   HEMATOCRIT % 41 0 43 5   PLATELETS Thousands/uL 189 214   SODIUM mmol/L 137 135*   POTASSIUM mmol/L 3 6 3 2*   CHLORIDE mmol/L 96* 93*   CO2 mmol/L 30 30   BUN mg/dL 51* 50*   CREATININE mg/dL 2 21* 2 48*   CALCIUM mg/dL 9 5 9 1   MAGNESIUM mg/dL 2 1  --    PHOSPHORUS mg/dL 3 6  --        XR chest 1 view portable   Final Result by Eleazar Laws DO (03/09 1842)      No acute cardiopulmonary disease  Workstation performed: [id number]             Portions of the record may have been created with voice recognition software  Occasional wrong word or "sound a like" substitutions may have occurred due to the inherent limitations of voice recognition software  Read the chart carefully and recognize, using context, where substitutions have occurred

## 2021-03-10 NOTE — PROGRESS NOTES
[address]  Progress Note - Diane Apple 1927, 80 y o  male MRN: [id number]  Unit/Bed#: E4 -01 Encounter: [id number]  Primary Care Provider: VARSHA Fisher MD   Date and time admitted to hospital: 3/9/2021 12:52 PM    * Cellulitis of left leg  Assessment & Plan  Improving with ancef    Will transition over to keflex when ready for discharge    Acute kidney injury superimposed on chronic kidney disease ([address] )  Assessment & Plan  · Appreciate renal help  · We have to watch his kidney function closely while we are diuresing    Cardiomyopathy [facility])  Assessment & Plan  Has significant leg edema that cards and renal are trying to get off  Will defer to them          Subjective:   Feels better  Less leg swelling  No sob  No fever nor chiills  Objective:     Vitals:   Temp (24hrs), Av 7 °F (36 5 °C), Min:97 2 °F (36 2 °C), Max:98 2 °F (36 8 °C)    Temp:  [97 2 °F (36 2 °C)-98 2 °F (36 8 °C)] 98 2 °F (36 8 °C)  HR:  [61-98] 71  Resp:  [18] 18  BP: (100-129)/(53-70) 124/65  SpO2:  [94 %-99 %] 96 %  Body mass index is 25 95 kg/m²  Input and Output Summary (last 24 hours):     No intake or output data in the 24 hours ending 03/10/21 1429    Physical Exam:     Physical Exam  Vitals signs and nursing note reviewed  HENT:      Head: Normocephalic and atraumatic  Eyes:      Pupils: Pupils are equal, round, and reactive to light  Cardiovascular:      Rate and Rhythm: Normal rate and regular rhythm  Heart sounds: No murmur  No friction rub  No gallop  Pulmonary:      Effort: Pulmonary effort is normal       Breath sounds: Normal breath sounds  No wheezing or rales  Abdominal:      General: Bowel sounds are normal       Palpations: Abdomen is soft  Tenderness: There is no abdominal tenderness  Musculoskeletal:      Right lower leg: Edema (2+) present  Left lower leg: Edema (2+) present     Skin:     Comments: Rash over arms and legs  Has had for years         Additional Data:     Labs:    Results from last 7 days   Lab Units 03/10/21  0540   WBC Thousand/uL 6 56   HEMOGLOBIN g/dL 13 2   HEMATOCRIT % 41 0   PLATELETS Thousands/uL 189   NEUTROS PCT % 60   LYMPHS PCT % 20   MONOS PCT % 13*   EOS PCT % 6     Results from last 7 days   Lab Units 03/10/21  0540 03/09/21  1309   POTASSIUM mmol/L 3 6 3 2*   CHLORIDE mmol/L 96* 93*   CO2 mmol/L 30 30   BUN mg/dL 51* 50*   CREATININE mg/dL 2 21* 2 48*   CALCIUM mg/dL 9 5 9 1   ALK PHOS U/L  --  145*   ALT U/L  --  34   AST U/L  --  41                       * I Have Reviewed All Lab Data     Recent Cultures (last 7 days):     Results from last 7 days   Lab Units 03/09/21  1409 03/09/21  1309   BLOOD CULTURE  Received in Microbiology Lab  Culture in Progress  Received in Microbiology Lab  Culture in Progress  Last 24 Hours Medication List:   Current Facility-Administered Medications   Medication Dose Route Frequency Provider Last Rate    apixaban  2 5 mg Oral BID Kalpesh Russo MD      cefazolin  1,000 mg Intravenous Q12H Kalpesh Russo MD 1,000 mg (03/10/21 0510)    finasteride  5 mg Oral Daily Kalpesh Russo MD      pantoprazole  40 mg Oral Daily Kalpesh Russo MD           VTE Pharmacologic Prophylaxis:   Pharmacologic: Apixaban (Eliquis)      Current Length of Stay: 1 day(s)    Current Patient Status: Inpatient       Discharge Plan: when ok with cards and renal    Code Status: Level 3 - DNAR and DNI           Today, Patient Was Seen By: Pierce Rausch DO    ** Please Note: Dictation voice to text software may have been used in the creation of this document   **

## 2021-03-10 NOTE — CONSULTS
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   7  Non MI troponin elevation 0 24, due to CHF and SUDHIR    PLAN/ DISCUSSION:     Still looks volume overloaded today with pitting edema up to his knees, creatinine did improve yesterday from 2 48 and a 2 21 with administration of 1 5 mg IV Bumex  · Weight decreased from 182 down to 175  I/O not recorded  · Would favor giving some further IV diuretics, he seems to be improving with Bumex and his numbers are going in the right direction, we will confer with Nephrology    · Eliquis for CVA risk reduction    · Echo pending    History Of Present Illness: This is a pleasant 80year-old gentleman who is cared for by Dr Molinda Saint of our group having was rescinded seen yesterday, 03/09/2021  He is pretty healthy and independent for his age  He still lives alone  He does have assistance from his family and children who live nearby  He generally ambulates with the help of a cane  He has been a longstanding patient of Dr Molinda Saint in [facility] for management of chronic atrial fibrillation, mild cardiomyopathy, and biventricular pacemaker  He had recently been gaining weight as an outpatient for treating torsemide was increased to 20 mg twice daily and metolazone was added to his regimen  With this he did have some improvement however when seen in the office yesterday he was noted to have 2+ pitting edema bilaterally and there was concern for cellulitis  As such he was advised to come directly to the ED for evaluation      Creatinine was 2 4 on arrival baseline around 1 9  He is seen by Nephrology given 1 5 mg of IV Bumex  This morning he is feeling improved but still edematous per      Past Medical History:        Past Medical History:   Diagnosis Date    Anemia     Atrial fibrillation (HCC)     BPH (benign prostatic hypertrophy)     Cancer (HCC)     COLON    Chronic kidney disease     Hypertension     Irregular heart beat     afib      Past Surgical History:   Procedure Laterality Date    BASAL CELL CARCINOMA EXCISION      CARDIAC PACEMAKER PLACEMENT      CARDIAC SURGERY      CARDIOVERSION      CARPAL TUNNEL RELEASE      CATARACT EXTRACTION      COLON SURGERY      COLONOSCOPY      WI COLONOSCOPY FLX DX W/COLLJ SPEC WHEN PFRMD N/A 11/30/2018    Procedure: COLONOSCOPY w egd  by dr Jonatan Tolliver;  Surgeon: Scarlette Gilford, MD;  Location: [facility]; Service: Colorectal    WI LAP,SURG,COLECTOMY, PARTIAL, W/ANAST N/A 1/8/2019    Procedure: Left hemicolectomy, takedown splenic flexure, end to end transverse to sigmoid colon anastamosis; Surgeon: Scarlette Gilford, MD;  Location: [facility];  Service: Colorectal        Allergy:        Allergies   Allergen Reactions    Lasix [Furosemide] Other (See Comments)     Weakness, decreased BP    Adhesive [Medical Tape] Rash    Neomycin-Bacitracin Zn-Polymyx Rash    Neosporin [Neomycin-Polymyxin-Gramicidin] Rash       Medications:       Prior to Admission medications    Medication Sig Start Date End Date Taking?  Authorizing Provider   apixaban (Eliquis) 2 5 mg Take 1 tablet (2 5 mg total) by mouth 2 (two) times a day 9/9/20  Yes Kan Flower,    CHLORPHENIRAMINE MALEATE PO Take by mouth every 4 (four) hours as needed   Yes Historical Provider, MD   finasteride (PROSCAR) 5 mg tablet Take 5 mg by mouth daily   Yes Historical Provider, MD   metolazone (ZAROXOLYN) 2 5 mg tablet Take 2 5 mg by mouth once a week  2/5/21  Yes Historical Provider, MD   Multiple Vitamins-Minerals (CENTRUM SILVER 50+MEN PO) Take 1 tablet by mouth daily   Yes Historical Provider, MD   pantoprazole (PROTONIX) 40 mg tablet Take 40 mg by mouth daily   Yes Historical Provider, MD   torsemide (DEMADEX) 10 mg tablet 10 mg 2 (two) times a day  6/17/19  Yes Historical Provider, MD   bisoprolol (ZEBETA) 5 mg tablet Take 0 5 tablets (2 5 mg total) by mouth daily 6/6/19 6/24/20  Kavitha Roland DO       Family History:     Family History   Problem Relation Age of Onset    Heart disease Father     Heart attack Father     Hypertension Father     Heart disease Brother     Heart attack Brother 64    Hypertension Brother     Heart attack Brother 67    Hypertension Brother     Arrhythmia Neg Hx     Asthma Neg Hx     Clotting disorder Neg Hx     Heart failure Neg Hx         Social History:       Social History     Socioeconomic History    Marital status:       Spouse name: None    Number of children: None    Years of education: None    Highest education level: None   Occupational History    None   Social Needs    Financial resource strain: None    Food insecurity     Worry: None     Inability: None    Transportation needs     Medical: None     Non-medical: None   Tobacco Use    Smoking status: Never Smoker    Smokeless tobacco: Never Used   Substance and Sexual Activity    Alcohol use: Yes     Comment: occasional    Drug use: No    Sexual activity: None   Lifestyle    Physical activity     Days per week: None     Minutes per session: None    Stress: None   Relationships    Social connections     Talks on phone: None     Gets together: None     Attends Buddhism service: None     Active member of club or organization: None     Attends meetings of clubs or organizations: None     Relationship status: None    Intimate partner violence     Fear of current or ex partner: None     Emotionally abused: None     Physically abused: None     Forced sexual activity: None   Other Topics Concern    None   Social History Narrative    None ROS:  Symptoms per hpi  Remainder review of systems is negative    Exam:  General:  alert, oriented and in no distress, cooperative  Head: Normocephalic, atraumatic  Eyes:  EOMI  Pupils - equal, round, reactive to accomodation  No icterus  Normal Conjunctiva  Oropharynx: moist and normal-appearing mucosa  Neck: supple, symmetrical, trachea midline and no JVD  Heart:  RRR, No: murmer, rub or gallop, S1 & S2 normal   Respiratory effort / Chest Inspection: unlabored  Lungs:  normal air entry, lungs clear to auscultation and no rales, rhonchi or wheezing   Abdomen: flat, normal findings: bowel sounds normal and soft, non-tender  Lower Limbs:  +pitting edema extending up to his knees  Pulses[de-identified]  RLE - DP: present 2+                 LLE - DP: present 2+  Musculoskeletal: ROM grossly normal        DATA:        Telemetry: Atrial fibrillation  HR Paced rhythm         Weights: Wt Readings from Last 3 Encounters:   03/10/21 79 7 kg (175 lb 11 3 oz)   03/09/21 81 7 kg (180 lb 3 2 oz)   02/26/21 83 5 kg (184 lb)   , Body mass index is 25 95 kg/m²           Lab Studies:    Results from last 7 days   Lab Units 03/09/21  1309   TROPONIN I ng/mL 0 24*          Results from last 7 days   Lab Units 03/10/21  0540 03/09/21  1309   WBC Thousand/uL 6 56 7 27   HEMOGLOBIN g/dL 13 2 14 3   HEMATOCRIT % 41 0 43 5   PLATELETS Thousands/uL 189 214   ,   Results from last 7 days   Lab Units 03/10/21  0540 03/09/21  1309   POTASSIUM mmol/L 3 6 3 2*   CHLORIDE mmol/L 96* 93*   CO2 mmol/L 30 30   BUN mg/dL 51* 50*   CREATININE mg/dL 2 21* 2 48*   CALCIUM mg/dL 9 5 9 1   ALK PHOS U/L  --  145*   ALT U/L  --  34   AST U/L  --  41

## 2021-03-10 NOTE — PLAN OF CARE
Problem: Potential for Falls  Goal: Patient will remain free of falls  Description: INTERVENTIONS:  - Assess patient frequently for physical needs  -  Identify cognitive and physical deficits and behaviors that affect risk of falls    -  Steele fall precautions as indicated by assessment   - Educate patient/family on patient safety including physical limitations  - Instruct patient to call for assistance with activity based on assessment  - Modify environment to reduce risk of injury  - Consider OT/PT consult to assist with strengthening/mobility  Outcome: Progressing

## 2021-03-10 NOTE — DISCHARGE INSTR - OTHER ORDERS
Wound Care Plan:   1-Hydraguard lotion to bilateral buttocks, sacrum, and heels twice daily and as needed for skin protection  2-Elevate bilateral lower extremities as often as possible to reduce swelling  Ensure heels are floating off of pillows to offload pressure  3-Offloading air cushion in chair when out of bed to offload pressure on sacrum/buttocks  4-Moisturize skin daily with lotion  5-Turn/reposition every 2 hours while in bed and weight shift frequently while in chair for pressure re-distribution on skin  6-Legs--Wash legs with soap and water, pat dry  Apply lotion to intact skin  Left lower extremity wound--apply Maxorb Ag (calcium alginate with silver) to wounds and cover with ABD  Wrap with mando  Change dressing every other day  Follow-up at the [facility]--[phone], option 1

## 2021-03-10 NOTE — UTILIZATION REVIEW
Initial Clinical Review    Admission: Date/Time/Statement:   Admission Orders (From admission, onward)     Ordered        03/09/21 1413  Inpatient Admission  Once                   Orders Placed This Encounter   Procedures    Inpatient Admission     Standing Status:   Standing     Number of Occurrences:   1     Order Specific Question:   Level of Care     Answer:   Med Surg [16]     Order Specific Question:   Estimated length of stay     Answer:   More than 2 Midnights     Order Specific Question:   Certification     Answer:   I certify that inpatient services are medically necessary for this patient for a duration of greater than two midnights  See H&P and MD Progress Notes for additional information about the patient's course of treatment  ED Arrival Information     Expected Arrival Acuity Means of Arrival Escorted By Service Admission Type    3/9/2021 12:09 3/9/2021 12:48 Urgent Walk-In Family Member Hospitalist Urgent    Arrival Complaint    atrial fib        Chief Complaint   Patient presents with    Edema     pt sent in by cardiology for leg edema and possible infection in leg, legs are wheeping - pt daughter reports family dog might have mites or scabies, pt denies cp, and sob     Assessment/Plan: 79 yo male referred to ED from [facility] due to concern for increasing redness LLE associated with pitting edema, that has caused some areas to open, weep, worsening over 1 month, despite increasing his diuretics tosemide and metolazone as directed  PMH of CMP,  S/p  Bi V pacemaker, atrial fib on chronic anticoagulation, CKD  3, colon cancer s/p resection in 1997 and 2019  PCP has been adjusting his diuretic regimen due to significant weight gain and edema - up to his scrotum and lower abdomen & was effective in lowering his weight from 190 lb to 179 lb & also improved his swelling  Due to the improvement, his PCP lowered his meds down to his usual dose   Left LE w/ swelling (weeping, ulcers, and significant erythema of lower leg and foot)  RLE with edema  NTproBNP 18,573,  Trop 0 24,  Na 135,  K 3 2,  Cr 2 48  CXR negative  EKG Paced  Admitted to Inpatient M/S with Cellulitis Left LE, SUDHIR and Cardiomyopathy and Plan is for IV Cefazolin (renally dosed), Wound Care consult for venous stasis ulers, Elevate LE's, Nephrology Consult, hold diuretic, Cardio consult  Per Nephrology: SUDHIR POA on CKD stage 3 to stage IV, baseline creatinine 1 8 to 1 9 since mid 2020  SUDHIR suspected in the setting of cardiorenal, volume overload, ? Component of cellulitis  May have to accept higher creatinine to keep patient euvolemic  Patient has allergy to furosemide? Skin rash, will give IV Bumex 1 5 mg once today-BMP in a m , avoid nephrotoxins or NSAIDs, avoid hypotension  F/U on echo  Replete K  Na 135- fluid restriction 1 5 L per day, continue to monitor with diuresis    3/10 Per Cardio:Still looks volume overloaded today with pitting edema up to his knees, creatinine did improve yesterday from 2 48 and a 2 21 with administration of 1 5 mg IV Bumex  Weight decreased from 182 to 175    Would favor giving some further IV diuretics, he seems to be improving with Bumex and his numbers are going in the right direction, we will confer with Nephrology  Per Nephrology: Cr slightly improved 2 2  IV Bumex 1 5 mg once again today   KCl 20 po x 1   ECHO - EF 25%  (see below)    ED Triage Vitals   Temperature Pulse Respirations Blood Pressure SpO2   03/09/21 1303 03/09/21 1256 03/09/21 1256 03/09/21 1256 03/09/21 1256   97 9 °F (36 6 °C) 72 16 121/61 100 %      Temp Source Heart Rate Source Patient Position - Orthostatic VS BP Location FiO2 (%)   03/09/21 1303 03/09/21 1256 03/09/21 1256 03/09/21 1256 --   Oral Monitor Sitting Left arm       Pain Score       03/09/21 1540       No Pain          Wt Readings from Last 1 Encounters:   03/10/21 79 7 kg (175 lb 11 3 oz)     Additional Vital Signs:   03/10/21 0723  97 7 °F (36 5 °C)  98  18 100/53  73  94 %  None (Room air) Lying   03/10/21 0716  97 7 °F (36 5 °C)  73  18  111/55  76  96 %  None (Room air) Lying   03/09/21 2246  97 7 °F (36 5 °C)  61  18  115/56  80  97 %  -- Lying   03/09/21 1900  97 2 °F (36 2 °C)   71  18  129/67  92  99 %  -- Lying   03/09/21 1540  97 5 °F (36 4 °C)  75  18  118/70  95  98 %  None (Room air) Sitting     Pertinent Labs/Diagnostic Test Results:   Results from last 7 days   Lab Units 03/10/21  0540 03/09/21  1309   WBC Thousand/uL 6 56 7 27   HEMOGLOBIN g/dL 13 2 14 3   HEMATOCRIT % 41 0 43 5   PLATELETS Thousands/uL 189 214   NEUTROS ABS Thousands/µL 4 03 5 36     Results from last 7 days   Lab Units 03/10/21  0540 03/09/21  1309   SODIUM mmol/L 137 135*   POTASSIUM mmol/L 3 6 3 2*   CHLORIDE mmol/L 96* 93*   CO2 mmol/L 30 30   ANION GAP mmol/L 11 12   BUN mg/dL 51* 50*   CREATININE mg/dL 2 21* 2 48*   EGFR ml/min/1 73sq m 25 22   CALCIUM mg/dL 9 5 9 1   MAGNESIUM mg/dL 2 1  --    PHOSPHORUS mg/dL 3 6  --      Results from last 7 days   Lab Units 03/09/21  1309   AST U/L 41   ALT U/L 34   ALK PHOS U/L 145*   TOTAL PROTEIN g/dL 8 0   ALBUMIN g/dL 3 2*   TOTAL BILIRUBIN mg/dL 1 29*     Results from last 7 days   Lab Units 03/10/21  0540 03/09/21  1309   GLUCOSE RANDOM mg/dL 109 115     Results from last 7 days   Lab Units 03/09/21  1309   TROPONIN I ng/mL 0 24*     Results from last 7 days   Lab Units 03/09/21  1309   NT-PRO BNP pg/mL 18,573*     Results from last 7 days   Lab Units 03/09/21  1409 03/09/21  1309   BLOOD CULTURE  Received in Microbiology Lab  Culture in Progress  Received in Microbiology Lab  Culture in Progress  3/9 EKG: ECG rate:  72    Rhythm: paced    3/9 CXR:   No acute cardiopulmonary disease  3/10 ECHO:    LEFT VENTRICLE:  The ventricle was mildly dilated  LVIDd 5 8 cm  Systolic function was markedly reduced by visual assessment  Ejection fraction was estimated to be 25 %    There was severe diffuse hypokinesis with distinct regional wall motion abnormalities  There was akinesis of the basal anteroseptal, basal inferior, basal inferolateral, and basal anterolateral wall(s)  Wall thickness was mildly increased  There was mild assymetrical hypertrophy of the septum  RIGHT VENTRICLE:  The ventricle was markedly dilated  RIGHT ATRIUM:  The atrium was markedly dilated  MITRAL VALVE:  There was moderate regurgitation  AORTIC VALVE:  The valve was trileaflet  Leaflets exhibited normal thickness, moderate to marked calcification, and normal cuspal separation  There was mild regurgitation  The valve was not well visualized  TRICUSPID VALVE:  There was moderate to severe regurgitation  Estimated peak PA pressure was at least 45- 50 mmHg, however this may be an underestimate given the severity of the TR seen on color Doppler investigation  PULMONIC VALVE:  There was mild regurgitation      ED Treatment:   Medication Administration from 03/09/2021 1209 to 03/09/2021 1522       Date/Time Order Dose Route Action     03/09/2021 1410 ceftriaxone (ROCEPHIN) 1 g/50 mL in dextrose IVPB 1,000 mg Intravenous New Bag        Past Medical History:   Diagnosis Date    Anemia     Atrial fibrillation (HCC)     BPH (benign prostatic hypertrophy)     Cancer (HCC)     COLON    Chronic kidney disease     Hypertension     Irregular heart beat     afib     Present on Admission:   A-fib ([place] [address] )   Cardiomyopathy ([place] [address] )   BPH (benign prostatic hyperplasia)      Admitting Diagnosis: Atrial fibrillation (HCC) [I48 91]  Congestive heart disease (HCC) [I50 9]  Peripheral edema [R60 9]  Elevated troponin [R77 8]  Cellulitis of left leg [L03 116]  Acute kidney injury ([place] [address] ) [N17 9]  Cardiomyopathy, unspecified type ([place] [address] ) [I42 9]  Age/Sex: 80 y o  male     Admission Orders:  Scheduled Medications:  apixaban, 2 5 mg, Oral, BID  cefazolin, 1,000 mg, Intravenous, Q12H  finasteride, 5 mg, Oral, Daily  pantoprazole, 40 mg, Oral, Daily  potassium chloride, 40 mEq, Oral, Once 3/9  potassium chloride, 20 mEq, Oral, Once  3/10  Bumex 1 5 mg IV x 1 3/9   And  X 1  3/10    Continuous IV Infusions:  PRN Meds:      TELEMETRY  IP CONSULT TO NEPHROLOGY  IP CONSULT TO CARDIOLOGY    Network Utilization Review Department  ATTENTION: Please call with any questions or concerns to [phone] and carefully listen to the prompts so that you are directed to the right person  All voicemails are confidential   Minda Muss all requests for admission clinical reviews, approved or denied determinations and any other requests to dedicated fax number below belonging to the campus where the patient is receiving treatment   List of dedicated fax numbers for the Facilities:  [address] DENIALS (Administrative/Medical Necessity) [phone]   [address] (Maternity/NICU/Pediatrics) [phone] [address] Ramila Munoz 4131 (  Wyatt Cerrato "Nan" 103) [address] [place] [address] Keri Ayala [address] [facility]) [address] [place] [address] [phone]

## 2021-03-10 NOTE — PLAN OF CARE
Problem: Potential for Falls  Goal: Patient will remain free of falls  Description: INTERVENTIONS:  - Assess patient frequently for physical needs  -  Identify cognitive and physical deficits and behaviors that affect risk of falls    -  [place] fall precautions as indicated by assessment   - Educate patient/family on patient safety including physical limitations  - Instruct patient to call for assistance with activity based on assessment  - Modify environment to reduce risk of injury  - Consider OT/PT consult to assist with strengthening/mobility  Outcome: Progressing     Problem: PAIN - ADULT  Goal: Verbalizes/displays adequate comfort level or baseline comfort level  Description: Interventions:  - Encourage patient to monitor pain and request assistance  - Assess pain using appropriate pain scale  - Administer analgesics based on type and severity of pain and evaluate response  - Implement non-pharmacological measures as appropriate and evaluate response  - Consider cultural and social influences on pain and pain management  - Notify physician/advanced practitioner if interventions unsuccessful or patient reports new pain  Outcome: Progressing     Problem: INFECTION - ADULT  Goal: Absence or prevention of progression during hospitalization  Description: INTERVENTIONS:  - Assess and monitor for signs and symptoms of infection  - Monitor lab/diagnostic results  - Monitor all insertion sites, i e  indwelling lines, tubes, and drains  - Monitor endotracheal if appropriate and nasal secretions for changes in amount and color  - [place] appropriate cooling/warming therapies per order  - Administer medications as ordered  - Instruct and encourage patient and family to use good hand hygiene technique  - Identify and instruct in appropriate isolation precautions for identified infection/condition  Outcome: Progressing  Goal: Absence of fever/infection during neutropenic period  Description: INTERVENTIONS:  - Monitor WBC    Outcome: Progressing     Problem: SAFETY ADULT  Goal: Maintain or return to baseline ADL function  Description: INTERVENTIONS:  -  Assess patient's ability to carry out ADLs; assess patient's baseline for ADL function and identify physical deficits which impact ability to perform ADLs (bathing, care of mouth/teeth, toileting, grooming, dressing, etc )  - Assess/evaluate cause of self-care deficits   - Assess range of motion  - Assess patient's mobility; develop plan if impaired  - Assess patient's need for assistive devices and provide as appropriate  - Encourage maximum independence but intervene and supervise when necessary  - Involve family in performance of ADLs  - Assess for home care needs following discharge   - Consider OT consult to assist with ADL evaluation and planning for discharge  - Provide patient education as appropriate  Outcome: Progressing  Goal: Maintain or return mobility status to optimal level  Description: INTERVENTIONS:  - Assess patient's baseline mobility status (ambulation, transfers, stairs, etc )    - Identify cognitive and physical deficits and behaviors that affect mobility  - Identify mobility aids required to assist with transfers and/or ambulation (gait belt, sit-to-stand, lift, walker, cane, etc )  - [place] fall precautions as indicated by assessment  - Record patient progress and toleration of activity level on Mobility SBAR; progress patient to next Phase/Stage  - Instruct patient to call for assistance with activity based on assessment  - Consider rehabilitation consult to assist with strengthening/weightbearing, etc   Outcome: Progressing     Problem: DISCHARGE PLANNING  Goal: Discharge to home or other facility with appropriate resources  Description: INTERVENTIONS:  - Identify barriers to discharge w/patient and caregiver  - Arrange for needed discharge resources and transportation as appropriate  - Identify discharge learning needs (meds, wound care, etc )  - Arrange for interpretive services to assist at discharge as needed  - Refer to Case Management Department for coordinating discharge planning if the patient needs post-hospital services based on physician/advanced practitioner order or complex needs related to functional status, cognitive ability, or social support system  Outcome: Progressing     Problem: Knowledge Deficit  Goal: Patient/family/caregiver demonstrates understanding of disease process, treatment plan, medications, and discharge instructions  Description: Complete learning assessment and assess knowledge base    Interventions:  - Provide teaching at level of understanding  - Provide teaching via preferred learning methods  Outcome: Progressing     Problem: SKIN/TISSUE INTEGRITY - ADULT  Goal: Skin integrity remains intact  Description: INTERVENTIONS  - Identify patients at risk for skin breakdown  - Assess and monitor skin integrity  - Assess and monitor nutrition and hydration status  - Monitor labs (i e  albumin)  - Assess for incontinence   - Turn and reposition patient  - Assist with mobility/ambulation  - Relieve pressure over bony prominences  - Avoid friction and shearing  - Provide appropriate hygiene as needed including keeping skin clean and dry  - Evaluate need for skin moisturizer/barrier cream  - Collaborate with interdisciplinary team (i e  Nutrition, Rehabilitation, etc )   - Patient/family teaching  Outcome: Progressing  Goal: Incision(s), wounds(s) or drain site(s) healing without S/S of infection  Description: INTERVENTIONS  - Assess and document risk factors for skin impairment   - Assess and document dressing, incision, wound bed, drain sites and surrounding tissue  - Consider nutrition services referral as needed  - Oral mucous membranes remain intact  - Provide patient/ family education  Outcome: Progressing  Goal: Oral mucous membranes remain intact  Description: INTERVENTIONS  - Assess oral mucosa and hygiene practices  - Implement preventative oral hygiene regimen  - Implement oral medicated treatments as ordered  - Initiate Nutrition services referral as needed  Outcome: Progressing

## 2021-03-10 NOTE — PLAN OF CARE
Problem: Potential for Falls  Goal: Patient will remain free of falls  Description: INTERVENTIONS:  - Assess patient frequently for physical needs  -  Identify cognitive and physical deficits and behaviors that affect risk of falls    -  [place] fall precautions as indicated by assessment   - Educate patient/family on patient safety including physical limitations  - Instruct patient to call for assistance with activity based on assessment  - Modify environment to reduce risk of injury  - Consider OT/PT consult to assist with strengthening/mobility  Outcome: Progressing     Problem: PAIN - ADULT  Goal: Verbalizes/displays adequate comfort level or baseline comfort level  Description: Interventions:  - Encourage patient to monitor pain and request assistance  - Assess pain using appropriate pain scale  - Administer analgesics based on type and severity of pain and evaluate response  - Implement non-pharmacological measures as appropriate and evaluate response  - Consider cultural and social influences on pain and pain management  - Notify physician/advanced practitioner if interventions unsuccessful or patient reports new pain  Outcome: Progressing     Problem: INFECTION - ADULT  Goal: Absence or prevention of progression during hospitalization  Description: INTERVENTIONS:  - Assess and monitor for signs and symptoms of infection  - Monitor lab/diagnostic results  - Monitor all insertion sites, i e  indwelling lines, tubes, and drains  - Monitor endotracheal if appropriate and nasal secretions for changes in amount and color  - [place] appropriate cooling/warming therapies per order  - Administer medications as ordered  - Instruct and encourage patient and family to use good hand hygiene technique  - Identify and instruct in appropriate isolation precautions for identified infection/condition  Outcome: Progressing  Goal: Absence of fever/infection during neutropenic period  Description: INTERVENTIONS:  - Monitor WBC    Outcome: Progressing     Problem: SAFETY ADULT  Goal: Maintain or return to baseline ADL function  Description: INTERVENTIONS:  -  Assess patient's ability to carry out ADLs; assess patient's baseline for ADL function and identify physical deficits which impact ability to perform ADLs (bathing, care of mouth/teeth, toileting, grooming, dressing, etc )  - Assess/evaluate cause of self-care deficits   - Assess range of motion  - Assess patient's mobility; develop plan if impaired  - Assess patient's need for assistive devices and provide as appropriate  - Encourage maximum independence but intervene and supervise when necessary  - Involve family in performance of ADLs  - Assess for home care needs following discharge   - Consider OT consult to assist with ADL evaluation and planning for discharge  - Provide patient education as appropriate  Outcome: Progressing  Goal: Maintain or return mobility status to optimal level  Description: INTERVENTIONS:  - Assess patient's baseline mobility status (ambulation, transfers, stairs, etc )    - Identify cognitive and physical deficits and behaviors that affect mobility  - Identify mobility aids required to assist with transfers and/or ambulation (gait belt, sit-to-stand, lift, walker, cane, etc )  - [place] fall precautions as indicated by assessment  - Record patient progress and toleration of activity level on Mobility SBAR; progress patient to next Phase/Stage  - Instruct patient to call for assistance with activity based on assessment  - Consider rehabilitation consult to assist with strengthening/weightbearing, etc   Outcome: Progressing     Problem: DISCHARGE PLANNING  Goal: Discharge to home or other facility with appropriate resources  Description: INTERVENTIONS:  - Identify barriers to discharge w/patient and caregiver  - Arrange for needed discharge resources and transportation as appropriate  - Identify discharge learning needs (meds, wound care, etc )  - Arrange for interpretive services to assist at discharge as needed  - Refer to Case Management Department for coordinating discharge planning if the patient needs post-hospital services based on physician/advanced practitioner order or complex needs related to functional status, cognitive ability, or social support system  Outcome: Progressing     Problem: Knowledge Deficit  Goal: Patient/family/caregiver demonstrates understanding of disease process, treatment plan, medications, and discharge instructions  Description: Complete learning assessment and assess knowledge base    Interventions:  - Provide teaching at level of understanding  - Provide teaching via preferred learning methods  Outcome: Progressing     Problem: SKIN/TISSUE INTEGRITY - ADULT  Goal: Skin integrity remains intact  Description: INTERVENTIONS  - Identify patients at risk for skin breakdown  - Assess and monitor skin integrity  - Assess and monitor nutrition and hydration status  - Monitor labs (i e  albumin)  - Assess for incontinence   - Turn and reposition patient  - Assist with mobility/ambulation  - Relieve pressure over bony prominences  - Avoid friction and shearing  - Provide appropriate hygiene as needed including keeping skin clean and dry  - Evaluate need for skin moisturizer/barrier cream  - Collaborate with interdisciplinary team (i e  Nutrition, Rehabilitation, etc )   - Patient/family teaching  Outcome: Progressing  Goal: Incision(s), wounds(s) or drain site(s) healing without S/S of infection  Description: INTERVENTIONS  - Assess and document risk factors for skin impairment   - Assess and document dressing, incision, wound bed, drain sites and surrounding tissue  - Consider nutrition services referral as needed  - Oral mucous membranes remain intact  - Provide patient/ family education  Outcome: Progressing  Goal: Oral mucous membranes remain intact  Description: INTERVENTIONS  - Assess oral mucosa and hygiene practices  - Implement preventative oral hygiene regimen  - Implement oral medicated treatments as ordered  - Initiate Nutrition services referral as needed  Outcome: Progressing

## 2021-03-10 NOTE — WOUND OSTOMY CARE
Consult Note - Wound   Taisha Hint 80 y o  male MRN: [id number]  Unit/Bed#: E4 -01 Encounter: [id number]      History and Present Illness:  80year old male presented to the hospital from his cardiology office with concern for left lower extremity wound and cellulitis  Patient's history significant for colon cancer with resection  Assessment Findings:   Patient agreeable to assessment and photography  He is ambulatory in the room with a cane  Denies incontinence  Buttocks, sacrum, and heels intact with blanchable erythema  Feet and lower legs dry, flaky  1   Generalized rash of unclear etiology--papular (raised) rash to forehead  Scattered scabbed areas to arms with underlying macular rash  Scattered scar tissue and pustules to lower extremities (folliculitis?)  Patient denies any pruritis  States the rash began 2 years ago after taking lasix  However, he has not been on lasix and it persists  He reports having to take allergy medication in order to tolerate his diuretics  Cardiologist at bedside while patient explaining this  2   Left pretibial venous ulceration--six open areas measured together with pink and yellow wound bed  Kay[place]-wound with erythema and edema  Significant edema to bilateral lower extremities up to thighs  See flowsheet and media for wound details  Wound Care Plan:   1-Hydraguard lotion to bilateral buttocks, sacrum, and heels twice daily and as needed  2-Elevate bilateral lower extremities as often as possible  Ensure heels are floating off of pillows to offload pressure  3-Offloading air cushion in chair when out of bed  4-Moisturize skin daily with skin nourishing cream   5-Encourage/remind patient to turn/reposition every 2 hours while in bed and weight shift frequently while in chair for pressure re-distribution on skin  6-Legs--Wash legs with soap and water, pat dry  Apply lotion to intact skin    Left lower extremity wound--apply Maxorb Ag and Allevyn Life foam dressing  Change dressing every other day  Wound care team to follow  Plan of care reviewed with primary RN  Discussed assessment findings with Dr Aditya Ball etiology of rash  Patient would benefit from VNA or follow-up at the wound center on discharge  Wound 03/10/21 Venous Ulcer Pretibial Left (Active)   Wound Image   03/10/21 0917   Wound Description Pink;Yellow;Slough 03/10/21 0917   Kaylin-wound Assessment Edema; Erythema 03/10/21 0917   Wound Length (cm) 6 5 cm 03/10/21 0917   Wound Width (cm) 7 cm 03/10/21 0917   Wound Depth (cm) 0 1 cm 03/10/21 0917   Wound Surface Area (cm^2) 45 5 cm^2 03/10/21 0917   Wound Volume (cm^3) 4 55 cm^3 03/10/21 0917   Calculated Wound Volume (cm^3) 4 55 cm^3 03/10/21 0917   Drainage Amount Moderate 03/10/21 0917   Drainage Description Serous 03/10/21 0917   Treatments Cleansed;Elevated 03/10/21 0917   Dressing Calcium Alginate with Silver; Foam, Silicon (eg  Allevyn, etc) 03/10/21 0917   Dressing Changed New 03/10/21 0917   Patient Tolerance Tolerated well 03/10/21 0917   Dressing Status Clean;Dry; Intact 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

## 2021-03-11 PROBLEM — I50.23 ACUTE ON CHRONIC SYSTOLIC CHF (CONGESTIVE HEART FAILURE) (HCC): Status: ACTIVE | Noted: 2021-01-01

## 2021-03-11 NOTE — PROGRESS NOTES
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 Component of cellulitis  -may have to accept higher creatinine to keep patient euvolemic  -bladder scan nonsignificant  -apparently patient has allergy to furosemide?  Skin rash, Bumex as below  -BMP in a m , avoid nephrotoxins or NSAIDs, avoid hypotension     Systolic CHF  -echo this admission shows EF 25%, moderate MR, moderate to severe TR, dilated IVC  -clinically volume overloaded although seems to be slowly improving   -daily standing weight  Will give IV Bumex 2 mg b i d  today   -outpatient regimen: torsemide 10 mg b i d , on metolazone 2 5 mg weekly  -daily weight, accurate intake and output  -proBNP [id number]     Hypokalemia, serum potassium 3 4 slightly below goal   Will give potassium chloride 60 mEq once today      -likely secondary to ongoing active diuresis      Hyponatremia, overall improving with diuresis, serum sodium 137  -could be in the setting of hypervolemia  -fluid restriction 1 5 L per day     Concern for lower extremity cellulitis, antibiotic as per primary team     Discussed above plan in detail with Cardiology team     SUBJECTIVE:  Patient seen and examined at bedside  No chest pain, denies worsening shortness of breath, nausea, vomiting, abdominal pain or diarrhea  No urinary complaints  OBJECTIVE:  Current Weight: Weight - Scale: 81 2 kg (179 lb 0 2 oz)  Vitals:    03/11/21 0700   BP: 110/56   Pulse: 79   Resp: 18   Temp: 97 9 °F (36 6 °C)   SpO2: 96%       Intake/Output Summary (Last 24 hours) at 3/11/2021 1055  Last data filed at 3/11/2021 0900  Gross per 24 hour   Intake 120 ml   Output 787 ml   Net -667 ml     Wt Readings from Last 3 Encounters:   03/11/21 81 2 kg (179 lb 0 2 oz)   03/09/21 81 7 kg (180 lb 3 2 oz)   02/26/21 83 5 kg (184 lb)     Temp Readings from Last 3 Encounters:   03/11/21 97 9 °F (36 6 °C) (Temporal)   09/09/20 98 4 °F (36 9 °C) (Temporal)   08/21/20 98 3 °F (36 8 °C)     BP Readings from Last 3 Encounters:   03/11/21 110/56   03/09/21 116/64   09/09/20 128/70     Pulse Readings from Last 3 Encounters:   03/11/21 79   03/09/21 78   09/09/20 70        Physical Examination:  General:  Sitting in chair, no acute distress   Eyes:  No conjunctival pallor present  ENT:  External examination of ears and nose unremarkable  Neck:  No obvious lymphadenopathy appreciated  Respiratory:  Bilateral entry present  CVS:  S1, S2 present, 2+ edema in legs, slowly improving  GI:  Soft, nondistended  CNS:  Active, alert, oriented x3  Skin:  Lower extremity cellulitis  Musculoskeletal:  No obvious gross deformity noted    Medications:    Current Facility-Administered Medications:     apixaban (ELIQUIS) tablet 2 5 mg, 2 5 mg, Oral, BID, Rubia Weinberg MD, 2 5 mg at 03/11/21 0829    ceFAZolin (ANCEF) IVPB (premix in dextrose) 1,000 mg 50 mL, 1,000 mg, Intravenous, Q12H, Rubia Weinberg MD, Last Rate: 100 mL/hr at 03/11/21 0544, 1,000 mg at 03/11/21 0544    finasteride (PROSCAR) tablet 5 mg, 5 mg, Oral, Daily, Rubia Weinberg MD, 5 mg at 03/11/21 0829    pantoprazole (PROTONIX) EC tablet 40 mg, 40 mg, Oral, Daily, Rubia Weinberg MD, 40 mg at 03/11/21 0546    Laboratory Results:  Results from last 7 days   Lab Units 03/11/21  0442 03/10/21  0540 03/09/21  1309   WBC Thousand/uL 6 95 6 56 7 27 HEMOGLOBIN g/dL 13 1 13 2 14 3   HEMATOCRIT % 40 4 41 0 43 5   PLATELETS Thousands/uL 205 189 214   SODIUM mmol/L 137 137 135*   POTASSIUM mmol/L 3 4* 3 6 3 2*   CHLORIDE mmol/L 96* 96* 93*   CO2 mmol/L 29 30 30   BUN mg/dL 53* 51* 50*   CREATININE mg/dL 2 06* 2 21* 2 48*   CALCIUM mg/dL 9 5 9 5 9 1   MAGNESIUM mg/dL 2 0 2 1  --    PHOSPHORUS mg/dL  --  3 6  --        XR chest 1 view portable   Final Result by Justin Hartley DO (03/09 1842)      No acute cardiopulmonary disease  Workstation performed: [id number]             Portions of the record may have been created with voice recognition software  Occasional wrong word or "sound a like" substitutions may have occurred due to the inherent limitations of voice recognition software  Read the chart carefully and recognize, using context, where substitutions have occurred

## 2021-03-11 NOTE — PLAN OF CARE
Problem: Potential for Falls  Goal: Patient will remain free of falls  Description: INTERVENTIONS:  - Assess patient frequently for physical needs  -  Identify cognitive and physical deficits and behaviors that affect risk of falls    -  [place] fall precautions as indicated by assessment   - Educate patient/family on patient safety including physical limitations  - Instruct patient to call for assistance with activity based on assessment  - Modify environment to reduce risk of injury  - Consider OT/PT consult to assist with strengthening/mobility  Outcome: Progressing     Problem: PAIN - ADULT  Goal: Verbalizes/displays adequate comfort level or baseline comfort level  Description: Interventions:  - Encourage patient to monitor pain and request assistance  - Assess pain using appropriate pain scale  - Administer analgesics based on type and severity of pain and evaluate response  - Implement non-pharmacological measures as appropriate and evaluate response  - Consider cultural and social influences on pain and pain management  - Notify physician/advanced practitioner if interventions unsuccessful or patient reports new pain  Outcome: Progressing     Problem: INFECTION - ADULT  Goal: Absence or prevention of progression during hospitalization  Description: INTERVENTIONS:  - Assess and monitor for signs and symptoms of infection  - Monitor lab/diagnostic results  - Monitor all insertion sites, i e  indwelling lines, tubes, and drains  - Monitor endotracheal if appropriate and nasal secretions for changes in amount and color  - [place] appropriate cooling/warming therapies per order  - Administer medications as ordered  - Instruct and encourage patient and family to use good hand hygiene technique  - Identify and instruct in appropriate isolation precautions for identified infection/condition  Outcome: Progressing  Goal: Absence of fever/infection during neutropenic period  Description: INTERVENTIONS:  - Monitor WBC    Outcome: Progressing     Problem: SAFETY ADULT  Goal: Maintain or return to baseline ADL function  Description: INTERVENTIONS:  -  Assess patient's ability to carry out ADLs; assess patient's baseline for ADL function and identify physical deficits which impact ability to perform ADLs (bathing, care of mouth/teeth, toileting, grooming, dressing, etc )  - Assess/evaluate cause of self-care deficits   - Assess range of motion  - Assess patient's mobility; develop plan if impaired  - Assess patient's need for assistive devices and provide as appropriate  - Encourage maximum independence but intervene and supervise when necessary  - Involve family in performance of ADLs  - Assess for home care needs following discharge   - Consider OT consult to assist with ADL evaluation and planning for discharge  - Provide patient education as appropriate  Outcome: Progressing  Goal: Maintain or return mobility status to optimal level  Description: INTERVENTIONS:  - Assess patient's baseline mobility status (ambulation, transfers, stairs, etc )    - Identify cognitive and physical deficits and behaviors that affect mobility  - Identify mobility aids required to assist with transfers and/or ambulation (gait belt, sit-to-stand, lift, walker, cane, etc )  - [place] fall precautions as indicated by assessment  - Record patient progress and toleration of activity level on Mobility SBAR; progress patient to next Phase/Stage  - Instruct patient to call for assistance with activity based on assessment  - Consider rehabilitation consult to assist with strengthening/weightbearing, etc   Outcome: Progressing     Problem: DISCHARGE PLANNING  Goal: Discharge to home or other facility with appropriate resources  Description: INTERVENTIONS:  - Identify barriers to discharge w/patient and caregiver  - Arrange for needed discharge resources and transportation as appropriate  - Identify discharge learning needs (meds, wound care, etc )  - Arrange for interpretive services to assist at discharge as needed  - Refer to Case Management Department for coordinating discharge planning if the patient needs post-hospital services based on physician/advanced practitioner order or complex needs related to functional status, cognitive ability, or social support system  Outcome: Progressing     Problem: Knowledge Deficit  Goal: Patient/family/caregiver demonstrates understanding of disease process, treatment plan, medications, and discharge instructions  Description: Complete learning assessment and assess knowledge base    Interventions:  - Provide teaching at level of understanding  - Provide teaching via preferred learning methods  Outcome: Progressing     Problem: SKIN/TISSUE INTEGRITY - ADULT  Goal: Skin integrity remains intact  Description: INTERVENTIONS  - Identify patients at risk for skin breakdown  - Assess and monitor skin integrity  - Assess and monitor nutrition and hydration status  - Monitor labs (i e  albumin)  - Assess for incontinence   - Turn and reposition patient  - Assist with mobility/ambulation  - Relieve pressure over bony prominences  - Avoid friction and shearing  - Provide appropriate hygiene as needed including keeping skin clean and dry  - Evaluate need for skin moisturizer/barrier cream  - Collaborate with interdisciplinary team (i e  Nutrition, Rehabilitation, etc )   - Patient/family teaching  Outcome: Progressing  Goal: Incision(s), wounds(s) or drain site(s) healing without S/S of infection  Description: INTERVENTIONS  - Assess and document risk factors for skin impairment   - Assess and document dressing, incision, wound bed, drain sites and surrounding tissue  - Consider nutrition services referral as needed  - Oral mucous membranes remain intact  - Provide patient/ family education  Outcome: Progressing  Goal: Oral mucous membranes remain intact  Description: INTERVENTIONS  - Assess oral mucosa and hygiene practices  - Implement preventative oral hygiene regimen  - Implement oral medicated treatments as ordered  - Initiate Nutrition services referral as needed  Outcome: Progressing

## 2021-03-11 NOTE — PLAN OF CARE
Problem: Potential for Falls  Goal: Patient will remain free of falls  Description: INTERVENTIONS:  - Assess patient frequently for physical needs  -  Identify cognitive and physical deficits and behaviors that affect risk of falls    -  [place] fall precautions as indicated by assessment   - Educate patient/family on patient safety including physical limitations  - Instruct patient to call for assistance with activity based on assessment  - Modify environment to reduce risk of injury  - Consider OT/PT consult to assist with strengthening/mobility  Outcome: Progressing     Problem: PAIN - ADULT  Goal: Verbalizes/displays adequate comfort level or baseline comfort level  Description: Interventions:  - Encourage patient to monitor pain and request assistance  - Assess pain using appropriate pain scale  - Administer analgesics based on type and severity of pain and evaluate response  - Implement non-pharmacological measures as appropriate and evaluate response  - Consider cultural and social influences on pain and pain management  - Notify physician/advanced practitioner if interventions unsuccessful or patient reports new pain  Outcome: Progressing     Problem: INFECTION - ADULT  Goal: Absence or prevention of progression during hospitalization  Description: INTERVENTIONS:  - Assess and monitor for signs and symptoms of infection  - Monitor lab/diagnostic results  - Monitor all insertion sites, i e  indwelling lines, tubes, and drains  - Monitor endotracheal if appropriate and nasal secretions for changes in amount and color  - [place] appropriate cooling/warming therapies per order  - Administer medications as ordered  - Instruct and encourage patient and family to use good hand hygiene technique  - Identify and instruct in appropriate isolation precautions for identified infection/condition  Outcome: Progressing  Goal: Absence of fever/infection during neutropenic period  Description: INTERVENTIONS:  - Monitor WBC    Outcome: Progressing     Problem: SAFETY ADULT  Goal: Maintain or return to baseline ADL function  Description: INTERVENTIONS:  -  Assess patient's ability to carry out ADLs; assess patient's baseline for ADL function and identify physical deficits which impact ability to perform ADLs (bathing, care of mouth/teeth, toileting, grooming, dressing, etc )  - Assess/evaluate cause of self-care deficits   - Assess range of motion  - Assess patient's mobility; develop plan if impaired  - Assess patient's need for assistive devices and provide as appropriate  - Encourage maximum independence but intervene and supervise when necessary  - Involve family in performance of ADLs  - Assess for home care needs following discharge   - Consider OT consult to assist with ADL evaluation and planning for discharge  - Provide patient education as appropriate  Outcome: Progressing  Goal: Maintain or return mobility status to optimal level  Description: INTERVENTIONS:  - Assess patient's baseline mobility status (ambulation, transfers, stairs, etc )    - Identify cognitive and physical deficits and behaviors that affect mobility  - Identify mobility aids required to assist with transfers and/or ambulation (gait belt, sit-to-stand, lift, walker, cane, etc )  - [place] fall precautions as indicated by assessment  - Record patient progress and toleration of activity level on Mobility SBAR; progress patient to next Phase/Stage  - Instruct patient to call for assistance with activity based on assessment  - Consider rehabilitation consult to assist with strengthening/weightbearing, etc   Outcome: Progressing     Problem: DISCHARGE PLANNING  Goal: Discharge to home or other facility with appropriate resources  Description: INTERVENTIONS:  - Identify barriers to discharge w/patient and caregiver  - Arrange for needed discharge resources and transportation as appropriate  - Identify discharge learning needs (meds, wound care, etc )  - Arrange for interpretive services to assist at discharge as needed  - Refer to Case Management Department for coordinating discharge planning if the patient needs post-hospital services based on physician/advanced practitioner order or complex needs related to functional status, cognitive ability, or social support system  Outcome: Progressing     Problem: Knowledge Deficit  Goal: Patient/family/caregiver demonstrates understanding of disease process, treatment plan, medications, and discharge instructions  Description: Complete learning assessment and assess knowledge base    Interventions:  - Provide teaching at level of understanding  - Provide teaching via preferred learning methods  Outcome: Progressing     Problem: SKIN/TISSUE INTEGRITY - ADULT  Goal: Skin integrity remains intact  Description: INTERVENTIONS  - Identify patients at risk for skin breakdown  - Assess and monitor skin integrity  - Assess and monitor nutrition and hydration status  - Monitor labs (i e  albumin)  - Assess for incontinence   - Turn and reposition patient  - Assist with mobility/ambulation  - Relieve pressure over bony prominences  - Avoid friction and shearing  - Provide appropriate hygiene as needed including keeping skin clean and dry  - Evaluate need for skin moisturizer/barrier cream  - Collaborate with interdisciplinary team (i e  Nutrition, Rehabilitation, etc )   - Patient/family teaching  Outcome: Progressing  Goal: Incision(s), wounds(s) or drain site(s) healing without S/S of infection  Description: INTERVENTIONS  - Assess and document risk factors for skin impairment   - Assess and document dressing, incision, wound bed, drain sites and surrounding tissue  - Consider nutrition services referral as needed  - Oral mucous membranes remain intact  - Provide patient/ family education  Outcome: Progressing  Goal: Oral mucous membranes remain intact  Description: INTERVENTIONS  - Assess oral mucosa and hygiene practices  - Implement preventative oral hygiene regimen  - Implement oral medicated treatments as ordered  - Initiate Nutrition services referral as needed  Outcome: Progressing

## 2021-03-11 NOTE — PROGRESS NOTES
[facility]  Progress Note - Diane Apple 1927, 80 y o  male MRN: [id number]  Unit/Bed#: E4 -01 Encounter: [id number]  Primary Care Provider: VARSHA Fisher MD   Date and time admitted to hospital: 3/9/2021 12:52 PM    * Cellulitis of left leg  Assessment & Plan  Improving with ancef    Will transition over to keflex when ready for discharge    Acute on chronic systolic CHF (congestive heart failure) (HCC)  Assessment & Plan  Wt Readings from Last 3 Encounters:   21 81 2 kg (179 lb 0 2 oz)   21 81 7 kg (180 lb 3 2 oz)   21 83 5 kg (184 lb)       LVEF 25%    Cards increasing diuretics    His leg edema is about 40% improved      Acute kidney injury superimposed on chronic kidney disease ([facility] [address] )  Assessment & Plan  · Appreciate renal help  · We have to watch his kidney function closely while we are diuresing          Subjective:   Feels better  Less leg swelling  No SOB today    He complaiins of itching throughout the night  Objective:     Vitals:   Temp (24hrs), Av °F (36 7 °C), Min:97 8 °F (36 6 °C), Max:98 2 °F (36 8 °C)    Temp:  [97 8 °F (36 6 °C)-98 2 °F (36 8 °C)] 97 9 °F (36 6 °C)  HR:  [71-79] 71  Resp:  [18] 18  BP: ()/(54-68) 118/68  SpO2:  [94 %-97 %] 96 %  Body mass index is 26 44 kg/m²  Input and Output Summary (last 24 hours): Intake/Output Summary (Last 24 hours) at 3/11/2021 1409  Last data filed at 3/11/2021 0900  Gross per 24 hour   Intake 120 ml   Output 787 ml   Net -667 ml       Physical Exam:     Physical Exam  Vitals signs and nursing note reviewed  HENT:      Head: Normocephalic and atraumatic  Eyes:      Pupils: Pupils are equal, round, and reactive to light  Cardiovascular:      Rate and Rhythm: Normal rate and regular rhythm  Heart sounds: No murmur  No friction rub  No gallop  Pulmonary:      Effort: Pulmonary effort is normal       Breath sounds: Normal breath sounds  No wheezing or rales  Abdominal:      General: Bowel sounds are normal       Palpations: Abdomen is soft  Tenderness: There is no abdominal tenderness  Musculoskeletal:         General: Deformity: 2+2+      Right lower leg: Edema present  Left lower leg: Edema present          Additional Data:     Labs:    Results from last 7 days   Lab Units 03/11/21  0442 03/10/21  0540   WBC Thousand/uL 6 95 6 56   HEMOGLOBIN g/dL 13 1 13 2   HEMATOCRIT % 40 4 41 0   PLATELETS Thousands/uL 205 189   NEUTROS PCT %  --  60   LYMPHS PCT %  --  20   MONOS PCT %  --  13*   EOS PCT %  --  6     Results from last 7 days   Lab Units 03/11/21  0442  03/09/21  1309   POTASSIUM mmol/L 3 4*   < > 3 2*   CHLORIDE mmol/L 96*   < > 93*   CO2 mmol/L 29   < > 30   BUN mg/dL 53*   < > 50*   CREATININE mg/dL 2 06*   < > 2 48*   CALCIUM mg/dL 9 5   < > 9 1   ALK PHOS U/L  --   --  145*   ALT U/L  --   --  34   AST U/L  --   --  41    < > = values in this interval not displayed  * I Have Reviewed All Lab Data     Recent Cultures (last 7 days):     Results from last 7 days   Lab Units 03/09/21  1409 03/09/21  1309   BLOOD CULTURE  No Growth at 24 hrs  No Growth at 24 hrs           Last 24 Hours Medication List:   Current Facility-Administered Medications   Medication Dose Route Frequency Provider Last Rate    apixaban  2 5 mg Oral BID Suly Fernandez MD      bisoprolol  5 mg Oral Daily Rao Pérez DO      bumetanide  2 mg Intravenous BID Arabella Mendez MD      cefazolin  1,000 mg Intravenous Q12H Suly Fernandez MD 1,000 mg (03/11/21 0544)    finasteride  5 mg Oral Daily Suly Fernandez MD      pantoprazole  40 mg Oral Daily Suly Fernandez MD      potassium chloride  20 mEq Oral Once Arabella Mendez MD           VTE Pharmacologic Prophylaxis:   Pharmacologic: Apixaban (Eliquis)      Current Length of Stay: 2 day(s)    Current Patient Status: Inpatient       Discharge Plan: home when ok with cardiology    Code Status: Level 3 - DNAR and DNI           Today, Patient Was Seen By: Liyah Bautista DO    ** Please Note: Dictation voice to text software may have been used in the creation of this document   **

## 2021-03-11 NOTE — ASSESSMENT & PLAN NOTE
Wt Readings from Last 3 Encounters:   03/11/21 81 2 kg (179 lb 0 2 oz)   03/09/21 81 7 kg (180 lb 3 2 oz)   02/26/21 83 5 kg (184 lb)       LVEF 25%    Cards increasing diuretics    His leg edema is about 40% improved

## 2021-03-11 NOTE — PROGRESS NOTES
Progress Note - Cardiology   Sophie Swan 80 y o  male MRN: [id number]  Unit/Bed#: E4 -01 Encounter: [id number]      Assessment/Recommendations/Discussion:   · Acute exacerbation of systolic heart failure, EF 25%, down from 40% prior  · Left lower extremity cellulitis   · Acute on chronic renal failure   · Chronic atrial fibrillation   · [place] biventricular pacemaker   ·  Non MI troponin elevation    PLAN    renal function improving with IV diuretics    Discussed case with Nephrology today  We will continue with IV Bumex   Echo shows severely reduced EF, 25%, moderate to severe tricuspid regurgitation -in part due to incomplete coaptation of valve leaflets around his device lead, moderate mitral regurgitation, IVC is dilated  Would favor medical management given his advanced age and underlying renal failure  Already has biventricular pacemaker    hold off on adding ACE-inhibitor or angiotensin receptor blocker at this time given acute kidney injury    Will add a low-dose beta-blocker however with data for benefit in systolic heart failure,  Will start bisoprolol 5 mg    continue with IV diuretics    continue with Eliquis 2 5 mg for Afib    Ancef for cellulitis    Subjective:   HPI  Feels well today  Lower extremity edema has improved somewhat, but still there was significant swelling  Weight initially down to 175, now up to 179      Review of Systems: As noted in HPI  Rest of ROS is negative      Vitals:   /68 (BP Location: Left arm)   Pulse 71   Temp 97 9 °F (36 6 °C) (Temporal)   Resp 18   Ht 5' 9" (1 753 m)   Wt 81 2 kg (179 lb 0 2 oz)   SpO2 96%   BMI 26 44 kg/m²   Vitals:    03/10/21 0536 03/11/21 0600   Weight: 79 7 kg (175 lb 11 3 oz) 81 2 kg (179 lb 0 2 oz)       Intake/Output Summary (Last 24 hours) at 3/11/2021 1209  Last data filed at 3/11/2021 0900  Gross per 24 hour   Intake 120 ml   Output 787 ml   Net -667 ml       Physical Exam:   General appearance: alert and oriented, in no acute distress  Head: Normocephalic, without obvious abnormality, atraumatic  Eyes: conjunctivae/corneas clear  Anicteric  Neck: no adenopathy, no carotid bruit, no JVD  Lungs: clear to auscultation bilaterally  Heart: regular rate and rhythm, S1, S2 normal, no murmur, no click, rub or gallop  Abdomen:  soft, non-tender; bowel sounds normal; no masses,  no organomegaly  Extremities:  Bilateral lower extremity 3+ edema  Skin: Skin color, texture, turgor normal  No rashes or lesions     Lab Results:  Results from last 7 days   Lab Units 03/11/21  0442   WBC Thousand/uL 6 95   HEMOGLOBIN g/dL 13 1   HEMATOCRIT % 40 4   PLATELETS Thousands/uL 205     Results from last 7 days   Lab Units 03/11/21 0442  03/09/21  1309   POTASSIUM mmol/L 3 4*   < > 3 2*   CHLORIDE mmol/L 96*   < > 93*   CO2 mmol/L 29   < > 30   BUN mg/dL 53*   < > 50*   CREATININE mg/dL 2 06*   < > 2 48*   CALCIUM mg/dL 9 5   < > 9 1   ALK PHOS U/L  --   --  145*   ALT U/L  --   --  34   AST U/L  --   --  41    < > = values in this interval not displayed       Results from last 7 days   Lab Units 03/11/21  0442   POTASSIUM mmol/L 3 4*   CHLORIDE mmol/L 96*   CO2 mmol/L 29   BUN mg/dL 53*   CREATININE mg/dL 2 06*   CALCIUM mg/dL 9 5           Medications:    Current Facility-Administered Medications:     apixaban (ELIQUIS) tablet 2 5 mg, 2 5 mg, Oral, BID, Sunitha Davis MD, 2 5 mg at 03/11/21 0829    bumetanide (BUMEX) injection 2 mg, 2 mg, Intravenous, BID, Radha Hernandez MD, 2 mg at 03/11/21 1123    ceFAZolin (ANCEF) IVPB (premix in dextrose) 1,000 mg 50 mL, 1,000 mg, Intravenous, Q12H, Sunitha Davis MD, Last Rate: 100 mL/hr at 03/11/21 0544, 1,000 mg at 03/11/21 0544    finasteride (PROSCAR) tablet 5 mg, 5 mg, Oral, Daily, Sunitha Davis MD, 5 mg at 03/11/21 0829    pantoprazole (PROTONIX) EC tablet 40 mg, 40 mg, Oral, Daily, Sunitha Davis MD, 40 mg at 03/11/21 0546    potassium chloride (K-DUR,KLOR-CON) CR tablet 20 mEq, 20 mEq, Oral, Once, Davina Jeffries MD    This note was completed in part utilizing M-Modal Fluency Direct Software  Grammatical errors, random word insertions, spelling mistakes, and incomplete sentences may be an occasional consequence of this system secondary to software limitations, ambient noise, and hardware issues  If you have any questions or concerns about the content, text, or information contained within the body of this dictation, please contact the provider for clarification      Brenda Euceda DO, [facility]  3/11/2021 12:09 PM

## 2021-03-12 NOTE — PLAN OF CARE
Problem: Potential for Falls  Goal: Patient will remain free of falls  Description: INTERVENTIONS:  - Assess patient frequently for physical needs  -  Identify cognitive and physical deficits and behaviors that affect risk of falls    -  [place] fall precautions as indicated by assessment   - Educate patient/family on patient safety including physical limitations  - Instruct patient to call for assistance with activity based on assessment  - Modify environment to reduce risk of injury  - Consider OT/PT consult to assist with strengthening/mobility  Outcome: Progressing     Problem: PAIN - ADULT  Goal: Verbalizes/displays adequate comfort level or baseline comfort level  Description: Interventions:  - Encourage patient to monitor pain and request assistance  - Assess pain using appropriate pain scale  - Administer analgesics based on type and severity of pain and evaluate response  - Implement non-pharmacological measures as appropriate and evaluate response  - Consider cultural and social influences on pain and pain management  - Notify physician/advanced practitioner if interventions unsuccessful or patient reports new pain  Outcome: Progressing     Problem: INFECTION - ADULT  Goal: Absence or prevention of progression during hospitalization  Description: INTERVENTIONS:  - Assess and monitor for signs and symptoms of infection  - Monitor lab/diagnostic results  - Monitor all insertion sites, i e  indwelling lines, tubes, and drains  - Monitor endotracheal if appropriate and nasal secretions for changes in amount and color  - [place] appropriate cooling/warming therapies per order  - Administer medications as ordered  - Instruct and encourage patient and family to use good hand hygiene technique  - Identify and instruct in appropriate isolation precautions for identified infection/condition  Outcome: Progressing  Goal: Absence of fever/infection during neutropenic period  Description: INTERVENTIONS:  - Monitor WBC    Outcome: Progressing     Problem: SAFETY ADULT  Goal: Maintain or return to baseline ADL function  Description: INTERVENTIONS:  -  Assess patient's ability to carry out ADLs; assess patient's baseline for ADL function and identify physical deficits which impact ability to perform ADLs (bathing, care of mouth/teeth, toileting, grooming, dressing, etc )  - Assess/evaluate cause of self-care deficits   - Assess range of motion  - Assess patient's mobility; develop plan if impaired  - Assess patient's need for assistive devices and provide as appropriate  - Encourage maximum independence but intervene and supervise when necessary  - Involve family in performance of ADLs  - Assess for home care needs following discharge   - Consider OT consult to assist with ADL evaluation and planning for discharge  - Provide patient education as appropriate  Outcome: Progressing  Goal: Maintain or return mobility status to optimal level  Description: INTERVENTIONS:  - Assess patient's baseline mobility status (ambulation, transfers, stairs, etc )    - Identify cognitive and physical deficits and behaviors that affect mobility  - Identify mobility aids required to assist with transfers and/or ambulation (gait belt, sit-to-stand, lift, walker, cane, etc )  - [place] fall precautions as indicated by assessment  - Record patient progress and toleration of activity level on Mobility SBAR; progress patient to next Phase/Stage  - Instruct patient to call for assistance with activity based on assessment  - Consider rehabilitation consult to assist with strengthening/weightbearing, etc   Outcome: Progressing     Problem: DISCHARGE PLANNING  Goal: Discharge to home or other facility with appropriate resources  Description: INTERVENTIONS:  - Identify barriers to discharge w/patient and caregiver  - Arrange for needed discharge resources and transportation as appropriate  - Identify discharge learning needs (meds, wound care, etc )  - Arrange for interpretive services to assist at discharge as needed  - Refer to Case Management Department for coordinating discharge planning if the patient needs post-hospital services based on physician/advanced practitioner order or complex needs related to functional status, cognitive ability, or social support system  Outcome: Progressing     Problem: Knowledge Deficit  Goal: Patient/family/caregiver demonstrates understanding of disease process, treatment plan, medications, and discharge instructions  Description: Complete learning assessment and assess knowledge base    Interventions:  - Provide teaching at level of understanding  - Provide teaching via preferred learning methods  Outcome: Progressing     Problem: SKIN/TISSUE INTEGRITY - ADULT  Goal: Skin integrity remains intact  Description: INTERVENTIONS  - Identify patients at risk for skin breakdown  - Assess and monitor skin integrity  - Assess and monitor nutrition and hydration status  - Monitor labs (i e  albumin)  - Assess for incontinence   - Turn and reposition patient  - Assist with mobility/ambulation  - Relieve pressure over bony prominences  - Avoid friction and shearing  - Provide appropriate hygiene as needed including keeping skin clean and dry  - Evaluate need for skin moisturizer/barrier cream  - Collaborate with interdisciplinary team (i e  Nutrition, Rehabilitation, etc )   - Patient/family teaching  Outcome: Progressing  Goal: Incision(s), wounds(s) or drain site(s) healing without S/S of infection  Description: INTERVENTIONS  - Assess and document risk factors for skin impairment   - Assess and document dressing, incision, wound bed, drain sites and surrounding tissue  - Consider nutrition services referral as needed  - Oral mucous membranes remain intact  - Provide patient/ family education  Outcome: Progressing  Goal: Oral mucous membranes remain intact  Description: INTERVENTIONS  - Assess oral mucosa and hygiene practices  - Implement preventative oral hygiene regimen  - Implement oral medicated treatments as ordered  - Initiate Nutrition services referral as needed  Outcome: Progressing

## 2021-03-12 NOTE — PLAN OF CARE
Problem: Potential for Falls  Goal: Patient will remain free of falls  Description: INTERVENTIONS:  - Assess patient frequently for physical needs  -  Identify cognitive and physical deficits and behaviors that affect risk of falls    -  [place] fall precautions as indicated by assessment   - Educate patient/family on patient safety including physical limitations  - Instruct patient to call for assistance with activity based on assessment  - Modify environment to reduce risk of injury  - Consider OT/PT consult to assist with strengthening/mobility  Outcome: Progressing     Problem: PAIN - ADULT  Goal: Verbalizes/displays adequate comfort level or baseline comfort level  Description: Interventions:  - Encourage patient to monitor pain and request assistance  - Assess pain using appropriate pain scale  - Administer analgesics based on type and severity of pain and evaluate response  - Implement non-pharmacological measures as appropriate and evaluate response  - Consider cultural and social influences on pain and pain management  - Notify physician/advanced practitioner if interventions unsuccessful or patient reports new pain  Outcome: Progressing     Problem: INFECTION - ADULT  Goal: Absence or prevention of progression during hospitalization  Description: INTERVENTIONS:  - Assess and monitor for signs and symptoms of infection  - Monitor lab/diagnostic results  - Monitor all insertion sites, i e  indwelling lines, tubes, and drains  - Monitor endotracheal if appropriate and nasal secretions for changes in amount and color  - [place] appropriate cooling/warming therapies per order  - Administer medications as ordered  - Instruct and encourage patient and family to use good hand hygiene technique  - Identify and instruct in appropriate isolation precautions for identified infection/condition  Outcome: Progressing  Goal: Absence of fever/infection during neutropenic period  Description: INTERVENTIONS:  - Monitor WBC    Outcome: Progressing     Problem: SAFETY ADULT  Goal: Maintain or return to baseline ADL function  Description: INTERVENTIONS:  -  Assess patient's ability to carry out ADLs; assess patient's baseline for ADL function and identify physical deficits which impact ability to perform ADLs (bathing, care of mouth/teeth, toileting, grooming, dressing, etc )  - Assess/evaluate cause of self-care deficits   - Assess range of motion  - Assess patient's mobility; develop plan if impaired  - Assess patient's need for assistive devices and provide as appropriate  - Encourage maximum independence but intervene and supervise when necessary  - Involve family in performance of ADLs  - Assess for home care needs following discharge   - Consider OT consult to assist with ADL evaluation and planning for discharge  - Provide patient education as appropriate  Outcome: Progressing  Goal: Maintain or return mobility status to optimal level  Description: INTERVENTIONS:  - Assess patient's baseline mobility status (ambulation, transfers, stairs, etc )    - Identify cognitive and physical deficits and behaviors that affect mobility  - Identify mobility aids required to assist with transfers and/or ambulation (gait belt, sit-to-stand, lift, walker, cane, etc )  - [place] fall precautions as indicated by assessment  - Record patient progress and toleration of activity level on Mobility SBAR; progress patient to next Phase/Stage  - Instruct patient to call for assistance with activity based on assessment  - Consider rehabilitation consult to assist with strengthening/weightbearing, etc   Outcome: Progressing     Problem: DISCHARGE PLANNING  Goal: Discharge to home or other facility with appropriate resources  Description: INTERVENTIONS:  - Identify barriers to discharge w/patient and caregiver  - Arrange for needed discharge resources and transportation as appropriate  - Identify discharge learning needs (meds, wound care, etc )  - Arrange for interpretive services to assist at discharge as needed  - Refer to Case Management Department for coordinating discharge planning if the patient needs post-hospital services based on physician/advanced practitioner order or complex needs related to functional status, cognitive ability, or social support system  Outcome: Progressing     Problem: Knowledge Deficit  Goal: Patient/family/caregiver demonstrates understanding of disease process, treatment plan, medications, and discharge instructions  Description: Complete learning assessment and assess knowledge base    Interventions:  - Provide teaching at level of understanding  - Provide teaching via preferred learning methods  Outcome: Progressing     Problem: SKIN/TISSUE INTEGRITY - ADULT  Goal: Skin integrity remains intact  Description: INTERVENTIONS  - Identify patients at risk for skin breakdown  - Assess and monitor skin integrity  - Assess and monitor nutrition and hydration status  - Monitor labs (i e  albumin)  - Assess for incontinence   - Turn and reposition patient  - Assist with mobility/ambulation  - Relieve pressure over bony prominences  - Avoid friction and shearing  - Provide appropriate hygiene as needed including keeping skin clean and dry  - Evaluate need for skin moisturizer/barrier cream  - Collaborate with interdisciplinary team (i e  Nutrition, Rehabilitation, etc )   - Patient/family teaching  Outcome: Progressing  Goal: Incision(s), wounds(s) or drain site(s) healing without S/S of infection  Description: INTERVENTIONS  - Assess and document risk factors for skin impairment   - Assess and document dressing, incision, wound bed, drain sites and surrounding tissue  - Consider nutrition services referral as needed  - Oral mucous membranes remain intact  - Provide patient/ family education  Outcome: Progressing  Goal: Oral mucous membranes remain intact  Description: INTERVENTIONS  - Assess oral mucosa and hygiene practices  - Implement preventative oral hygiene regimen  - Implement oral medicated treatments as ordered  - Initiate Nutrition services referral as needed  Outcome: Progressing

## 2021-03-12 NOTE — PROGRESS NOTES
NEPHROLOGY PROGRESS NOTE   Cheo Tran 80 y o  male MRN: [id number]  Unit/Bed#: E4 -01 Encounter: [id number]      ASSESSMENT/PLAN:  Acute kidney injury (POA) on chronic kidney disease stage III/IV:  - acute kidney injury suspect secondary to cardiorenal syndrome with volume overload, possible underlying component of cellulitis  - upon review of medical records, baseline creatinine 1 8-1 9 since mid 2020, prior creatinine 1 6-1 8 mg/dL  - creatinine 2 48 mg/dL upon admission   - most recent creatinine 2 07 mg/dL today  - continued on Bumex 2 mg IV b i d    - UA: No protein, 0-1 RBC, 1-2 WBC, occasional bacteria, 0-1 hyaline casts  - imaging: CT scan completed in 2019 revealed 1 or more simple renal cyst   Otherwise unremarkable kidneys, no hydronephrosis  - avoid NSAIDs, nephrotoxic agents, IV contrast   - adjust medications to appropriate GFR  - monitor volume status with strict intake/output, daily weight    - most recent weight: 80 2 kg   - will check BMP, magnesium, phosphorus in a m  Electrolytes, acid/base:  - mild hyponatremia improving with most recent sodium 137    - continue diuretics, fluid restriction  - hypokalemia improving with most recent potassium 4 1 from 3 4 post replacement  - will continue to monitor with repeat lab studies  Blood pressure, hemodynamics:  - blood pressure with fluctuations  - currently on: Bisoprolol 5 mg daily  - optimize hemodynamics; avoid hypotension and fluctuations of blood pressure   - maintain MAP > 65  Systolic CHF:  - most recent echo revealed EF of 25% with moderate MR, moderate to severe TR, dilated IVC  - outpatient regimen: Torsemide 10 mg b i d , metolazone 2 5 mg weekly  - continue diuretics as above, strict intake/output, daily weight  - cardiology following  Concern for lower extremity cellulitis:  - on antibiotics per primary team     SUBJECTIVE:  Patient resting in chair comfortably    Patient without acute shortness of breath or chest pain  Patient is eating and drinking well  Patient notes lower extremity edema minimally unchanged from yesterday      OBJECTIVE:  Current Weight: Weight - Scale: 80 2 kg (176 lb 12 9 oz)  Vitals:    03/12/21 1100   BP: 103/58   Pulse: 70   Resp: 18   Temp: 97 7 °F (36 5 °C)   SpO2: 97%       Intake/Output Summary (Last 24 hours) at 3/12/2021 1143  Last data filed at 3/12/2021 0501  Gross per 24 hour   Intake --   Output 300 ml   Net -300 ml       General:  No acute distress  Skin:  Warm, no rash  Eyes:  Sclerae anicteric  ENT:  Moist lips mucous membranes  Neck:  Supple trachea midline  Chest:  Clear breath sounds bilaterally  CVS:  Regular rate regular rhythm  Abdomen:  Soft, nontender, normoactive bowel sounds  Extremities:  +2 bilateral lower extremity edema   Neuro:  Awake and interactive  Psych:  Appropriate affect      Medications:  Scheduled Meds:  Current Facility-Administered Medications   Medication Dose Route Frequency Provider Last Rate    apixaban  2 5 mg Oral BID Shasta Schuler MD      bisoprolol  5 mg Oral Daily Aydee Zamorano, DO      bumetanide  2 mg Intravenous BID Luz Marina Hernandez, DO      cefazolin  1,000 mg Intravenous Q12H Shasta Schuler MD 1,000 mg (03/12/21 0500)    finasteride  5 mg Oral Daily Shasta Schuler MD      hydrOXYzine HCL  25 mg Oral Q6H PRN Orest Money Prechtel, DO      loratadine  10 mg Oral Daily Viral S Prechtel, DO      pantoprazole  40 mg Oral Daily Shasta Schuler MD         PRN Meds: hydrOXYzine HCL    Continuous Infusions:     Laboratory Results:  Results from last 7 days   Lab Units 03/12/21  0501 03/11/21  0442 03/10/21  0540 03/09/21  1309   WBC Thousand/uL 7 52 6 95 6 56 7 27   HEMOGLOBIN g/dL 13 5 13 1 13 2 14 3   HEMATOCRIT % 42 1 40 4 41 0 43 5   PLATELETS Thousands/uL 198 205 189 214   SODIUM mmol/L 137 137 137 135*   POTASSIUM mmol/L 4 1 3 4* 3 6 3 2*   CHLORIDE mmol/L 97* 96* 96* 93*   CO2 mmol/L 29 29 30 30   BUN mg/dL 52* 53* 51* 50* CREATININE mg/dL 2 07* 2 06* 2 21* 2 48*   CALCIUM mg/dL 9 4 9 5 9 5 9 1   MAGNESIUM mg/dL 2 0 2 0 2 1  --    PHOSPHORUS mg/dL  --   --  3 6  --

## 2021-03-12 NOTE — ASSESSMENT & PLAN NOTE
Wt Readings from Last 3 Encounters:   03/12/21 80 2 kg (176 lb 12 9 oz)   03/09/21 81 7 kg (180 lb 3 2 oz)   02/26/21 83 5 kg (184 lb)       LVEF 25%    Cards keeping him on IV Bumex and renal added metalazone, one dose today    His leg edema is about 40% improved

## 2021-03-12 NOTE — CASE MANAGEMENT
LOS: 2 DAYS  BUNDLE: NO   READMISSION: NO  READMISSION RISK SCORE: 17    Cm met pt at bedside to discuss cm role and dcp needs  Cm obtained the following information from the patient  HOME: 2 SH with 3 jacki to enter home  Pt uses 1st floor set up only  LIVES WITH: was living alone but daughter moved in now after selling her house  She will live with pt temporarily until she finds her home  ADLS: independent with all ADL's  Daughter has been helping with groceries since the covid  DME: RW and a cane  SNF: Hx of [facility] Jan 2019  VNA/HHC: Hx of [facility]  MH/DRUG&ETOH HX: Denies hx  PCP: Tank Soria  EMPLOYMENT STATUS: Retired [address] [place] [address] Pkwy: [facility] on [address] in [place]  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  POA/LW: Yes  Daughter Rani Guthrie and son Debbie Hdez are [place]  Copy of LW was requested  DRIVES: Yes  TRANSPORT AT D/C: Children will transport at d/c    CM reviewed d/c planning process including the following: identifying help at home, patient preference for d/c planning needs, Discharge Lounge, [facility] Meds to Bed program, availability of treatment team to discuss questions or concerns patient and/or family may have regarding understanding medications and recognizing signs and symptoms once discharged  CM also encouraged patient to follow up with all recommended appointments after discharge  Patient advised of importance for patient and family to participate in managing patients medical well being      Cm department will continue to follow through discharge

## 2021-03-12 NOTE — PROGRESS NOTES
Cardiology Progress Note   Melodie Shiver, MD Garth Simmonds, MD Charise Red, DO, MD Claudine Maravilla DO, Liz Colby DO, [facility]  ----------------------------------------------------------------  [address] [facility]  [address] [place] [address], [address] [facility] Martinet 80 y o  male MRN: [id number]  Unit/Bed#: E4 -01 Encounter: [id number]      ASSESSMENT:    Acute on chronic systolic heart failure   LVEF 25%, mild LVH, mild ANGEL, regional wall motion abnormalities consistent with CAD, severe RV dilatation severe RA dilatation, moderate MR, AV sclerosis with mild AR, moderate to severe TR w/ PASP 45-50 mmHg, March 2021   Left lower extremity cellulitis   NSVT 5 beats, March 2020   Acute kidney injury on CKD stage III   Non MI troponin elevation secondary to acute heart failure   Permanent atrial fibrillation s/p AVN ablation with [facility] CRT-P on Eliquis    GERD    PLAN:  Overall, weight trend appears to be heading in the right direction with some fluctuation  Patient is feeling better, but is still significantly edematous  Placed on standing dose of Bumex 2 mg IV b i d  due to his significant lower extremity swelling  Renal function is mildly improved compared to yesterday  Keep potassium greater than 4 and magnesium greater than 2  Continue bisoprolol  No ACE-inhibitor/ARB with acute kidney injury; may consider initiation as an outpatient  Agree with pursuing medical therapy given advanced age, renal dysfunction and lack of anginal symptoms  Eliquis 2 5 mg b i d  Dosed for renal function and age  Supportive care    Signed: Shirley Sotelo DO, [facility], FAC      History of Present Illness:  Patient seen and examined  Denies chest pain, pressure, tightness or squeezing  Denies shortness of breath, lightheadedness, dizziness or palpitations  Denies orthopnea or paroxysmal nocturnal dyspnea    Admits to continued lower extremity swelling significantly above his baseline  Review of Systems:  Review of Systems   Constitution: Negative for decreased appetite, fever, weight gain and weight loss  HENT: Negative for congestion and sore throat  Eyes: Negative for visual disturbance  Cardiovascular: Positive for leg swelling  Negative for chest pain, dyspnea on exertion, near-syncope and palpitations  Respiratory: Negative for cough and shortness of breath  Hematologic/Lymphatic: Negative for bleeding problem  Skin: Negative for rash  Musculoskeletal: Negative for myalgias and neck pain  Gastrointestinal: Negative for abdominal pain and nausea  Neurological: Negative for light-headedness and weakness  Psychiatric/Behavioral: Negative for depression  Allergies   Allergen Reactions    Lasix [Furosemide] Other (See Comments)     Weakness, decreased BP    Adhesive [Medical Tape] Rash    Neomycin-Bacitracin Zn-Polymyx Rash    Neosporin [Neomycin-Polymyxin-Gramicidin] Rash       No current facility-administered medications on file prior to encounter        Current Outpatient Medications on File Prior to Encounter   Medication Sig    apixaban (Eliquis) 2 5 mg Take 1 tablet (2 5 mg total) by mouth 2 (two) times a day    CHLORPHENIRAMINE MALEATE PO Take by mouth every 4 (four) hours as needed    finasteride (PROSCAR) 5 mg tablet Take 5 mg by mouth daily    metolazone (ZAROXOLYN) 2 5 mg tablet Take 2 5 mg by mouth once a week     Multiple Vitamins-Minerals (CENTRUM SILVER 50+MEN PO) Take 1 tablet by mouth daily    pantoprazole (PROTONIX) 40 mg tablet Take 40 mg by mouth daily    torsemide (DEMADEX) 10 mg tablet 10 mg 2 (two) times a day     [DISCONTINUED] bisoprolol (ZEBETA) 5 mg tablet Take 0 5 tablets (2 5 mg total) by mouth daily        Current Facility-Administered Medications   Medication Dose Route Frequency Provider Last Rate    apixaban  2 5 mg Oral BID Betrrand Valero MD      bisoprolol  5 mg Oral Daily Radha Medeiros DO      cefazolin 1,000 mg Intravenous Q12H Jennifer Saini MD 1,000 mg (03/12/21 0500)    finasteride  5 mg Oral Daily Jennifer Saini MD      hydrOXYzine HCL  25 mg Oral Q6H PRN Anola Crosser Prechtel, DO      loratadine  10 mg Oral Daily Viral S Prechtel, DO      pantoprazole  40 mg Oral Daily Jennifer Saini MD              Vitals:    03/11/21 2300 03/12/21 0300 03/12/21 0600 03/12/21 0733   BP: (!) 89/51 107/51  112/60   BP Location: Left arm Left arm  Left arm   Pulse: 70 71  72   Resp: 18 18  18   Temp: (!) 97 2 °F (36 2 °C) 97 5 °F (36 4 °C)  97 5 °F (36 4 °C)   TempSrc: Temporal Temporal  Temporal   SpO2: 95% 97%  96%   Weight:   80 2 kg (176 lb 12 9 oz)    Height:             Intake/Output Summary (Last 24 hours) at 3/12/2021 1045  Last data filed at 3/12/2021 0501  Gross per 24 hour   Intake --   Output 300 ml   Net -300 ml       Weight change: -1 kg (-2 lb 3 3 oz)    PHYSICAL EXAMINATION:  Gen: Awake, Alert, NAD  HEENT: AT/NC, Anicteric, mmm  Neck: Supple, No elevated JVP  Resp: CTA bilaterally no w/r/r  CV: RRR +S1, S2, No m/r/g  Abd: Soft, NT/ND + BS  Ext: warm, + 2 pitting LE edema bilaterally  Neuro: Follows commands, moves all extermities  Psych: Appropriate affect    Lab Results:  Results from last 7 days   Lab Units 03/12/21  0501   WBC Thousand/uL 7 52   HEMOGLOBIN g/dL 13 5   HEMATOCRIT % 42 1   PLATELETS Thousands/uL 198     Results from last 7 days   Lab Units 03/12/21  0501  03/09/21  1309   POTASSIUM mmol/L 4 1   < > 3 2*   CHLORIDE mmol/L 97*   < > 93*   CO2 mmol/L 29   < > 30   BUN mg/dL 52*   < > 50*   CREATININE mg/dL 2 07*   < > 2 48*   CALCIUM mg/dL 9 4   < > 9 1   ALK PHOS U/L  --   --  145*   ALT U/L  --   --  34   AST U/L  --   --  41    < > = values in this interval not displayed       No results found for: TROPONINT      Results from last 7 days   Lab Units 03/09/21  1309   TROPONIN I ng/mL 0 24*           Tele: AF w/ V paced rhythm, NSVT 5 beats    This note was completed in part utilizing M-Modal Fluency Direct Software  Grammatical errors, random word insertions, spelling mistakes, and incomplete sentences may be an occasional consequence of this system secondary to software limitations, ambient noise, and hardware issues  If you have any questions or concerns about the content, text, or information contained within the body of this dictation, please contact the provider for clarification

## 2021-03-12 NOTE — PROGRESS NOTES
[address]  Progress Note - Bryson Ends 1927, 80 y o  male MRN: [id number]  Unit/Bed#: E4 -01 Encounter: [id number]  Primary Care Provider: VARSHA Robins MD   Date and time admitted to hospital: 3/9/2021 12:52 PM    * Cellulitis of left leg  Assessment & Plan  Improving with ancef    Will transition over to keflex when ready for discharge    Acute on chronic systolic CHF (congestive heart failure) ([facility] [address] )  Assessment & Plan  Wt Readings from Last 3 Encounters:   21 80 2 kg (176 lb 12 9 oz)   21 81 7 kg (180 lb 3 2 oz)   21 83 5 kg (184 lb)       LVEF 25%    Cards keeping him on IV Bumex and renal added metalazone, one dose today    His leg edema is about 40% improved      Acute kidney [facility] superimposed [facility]  Assessment & Plan  · Appreciate renal help  · We have to watch his kidney function closely while we are diuresing          Subjective:   Feels bettter  Much less leg swelling  No fever nor chills  Much less itching  Objective:     Vitals:   Temp (24hrs), Av 5 °F (36 4 °C), Min:97 2 °F (36 2 °C), Max:97 7 °F (36 5 °C)    Temp:  [97 2 °F (36 2 °C)-97 7 °F (36 5 °C)] 97 7 °F (36 5 °C)  HR:  [70-72] 70  Resp:  [18] 18  BP: ()/(51-60) 103/58  SpO2:  [95 %-97 %] 97 %  Body mass index is 26 11 kg/m²  Input and Output Summary (last 24 hours): Intake/Output Summary (Last 24 hours) at 3/12/2021 1613  Last data filed at 3/12/2021 1300  Gross per 24 hour   Intake --   Output 1000 ml   Net -1000 ml       Physical Exam:     Physical Exam  Vitals signs and nursing note reviewed  HENT:      Head: Normocephalic and atraumatic  Eyes:      Pupils: Pupils are equal, round, and reactive to light  Cardiovascular:      Rate and Rhythm: Normal rate and regular rhythm  Heart sounds: No murmur  No friction rub  No gallop      Pulmonary:      Effort: Pulmonary effort is normal       Breath sounds: Normal breath sounds  No wheezing or rales  Abdominal:      General: Bowel sounds are normal       Palpations: Abdomen is soft  Tenderness: There is no abdominal tenderness  Musculoskeletal:      Right lower leg: Edema (1+1+) present  Left lower leg: Edema present  Comments: Minimal redness to LLE         Additional Data:     Labs:    Results from last 7 days   Lab Units 03/12/21  0501  03/10/21  0540   WBC Thousand/uL 7 52   < > 6 56   HEMOGLOBIN g/dL 13 5   < > 13 2   HEMATOCRIT % 42 1   < > 41 0   PLATELETS Thousands/uL 198   < > 189   NEUTROS PCT %  --   --  60   LYMPHS PCT %  --   --  20   MONOS PCT %  --   --  13*   EOS PCT %  --   --  6    < > = values in this interval not displayed  Results from last 7 days   Lab Units 03/12/21  0501  03/09/21  1309   POTASSIUM mmol/L 4 1   < > 3 2*   CHLORIDE mmol/L 97*   < > 93*   CO2 mmol/L 29   < > 30   BUN mg/dL 52*   < > 50*   CREATININE mg/dL 2 07*   < > 2 48*   CALCIUM mg/dL 9 4   < > 9 1   ALK PHOS U/L  --   --  145*   ALT U/L  --   --  34   AST U/L  --   --  41    < > = values in this interval not displayed  * I Have Reviewed All Lab Data     Recent Cultures (last 7 days):     Results from last 7 days   Lab Units 03/09/21  1409 03/09/21  1309   BLOOD CULTURE  No Growth at 48 hrs  No Growth at 48 hrs           Last 24 Hours Medication List:   Current Facility-Administered Medications   Medication Dose Route Frequency Provider Last Rate    apixaban  2 5 mg Oral BID Lety Lorenz MD      bisoprolol  5 mg Oral Daily Elois Backer, DO      bumetanide  2 mg Intravenous BID Bonnielee Medici, DO      cefazolin  1,000 mg Intravenous Q12H Lety Lorenz MD 1,000 mg (03/12/21 0500)    finasteride  5 mg Oral Daily Lety Lorenz MD      hydrOXYzine HCL  25 mg Oral Q6H PRN Geralene Allegra Prechtel, DO      loratadine  10 mg Oral Daily Viral S Prechtel, DO      metolazone  2 5 mg Oral Once Delmar Alvarado MD      pantoprazole  40 mg Oral Daily Lina Mejia MD           VTE Pharmacologic Prophylaxis:   Pharmacologic: Apixaban (Eliquis)      Current Length of Stay: 3 day(s)    Current Patient Status: Inpatient       Discharge Plan: home when ok with     Code Status: Level 3 - DNAR and DNI           Today, Patient Was Seen By: Veronica Armendariz DO    ** Please Note: Dictation voice to text software may have been used in the creation of this document   **

## 2021-03-13 NOTE — PLAN OF CARE
Problem: Potential for Falls  Goal: Patient will remain free of falls  Description: INTERVENTIONS:  - Assess patient frequently for physical needs  -  Identify cognitive and physical deficits and behaviors that affect risk of falls    -  [place] fall precautions as indicated by assessment   - Educate patient/family on patient safety including physical limitations  - Instruct patient to call for assistance with activity based on assessment  - Modify environment to reduce risk of injury  - Consider OT/PT consult to assist with strengthening/mobility  Outcome: Progressing     Problem: PAIN - ADULT  Goal: Verbalizes/displays adequate comfort level or baseline comfort level  Description: Interventions:  - Encourage patient to monitor pain and request assistance  - Assess pain using appropriate pain scale  - Administer analgesics based on type and severity of pain and evaluate response  - Implement non-pharmacological measures as appropriate and evaluate response  - Consider cultural and social influences on pain and pain management  - Notify physician/advanced practitioner if interventions unsuccessful or patient reports new pain  Outcome: Progressing     Problem: INFECTION - ADULT  Goal: Absence or prevention of progression during hospitalization  Description: INTERVENTIONS:  - Assess and monitor for signs and symptoms of infection  - Monitor lab/diagnostic results  - Monitor all insertion sites, i e  indwelling lines, tubes, and drains  - Monitor endotracheal if appropriate and nasal secretions for changes in amount and color  - [place] appropriate cooling/warming therapies per order  - Administer medications as ordered  - Instruct and encourage patient and family to use good hand hygiene technique  - Identify and instruct in appropriate isolation precautions for identified infection/condition  Outcome: Progressing     Problem: SAFETY ADULT  Goal: Maintain or return to baseline ADL function  Description: INTERVENTIONS:  -  Assess patient's ability to carry out ADLs; assess patient's baseline for ADL function and identify physical deficits which impact ability to perform ADLs (bathing, care of mouth/teeth, toileting, grooming, dressing, etc )  - Assess/evaluate cause of self-care deficits   - Assess range of motion  - Assess patient's mobility; develop plan if impaired  - Assess patient's need for assistive devices and provide as appropriate  - Encourage maximum independence but intervene and supervise when necessary  - Involve family in performance of ADLs  - Assess for home care needs following discharge   - Consider OT consult to assist with ADL evaluation and planning for discharge  - Provide patient education as appropriate  Outcome: Progressing  Goal: Maintain or return mobility status to optimal level  Description: INTERVENTIONS:  - Assess patient's baseline mobility status (ambulation, transfers, stairs, etc )    - Identify cognitive and physical deficits and behaviors that affect mobility  - Identify mobility aids required to assist with transfers and/or ambulation (gait belt, sit-to-stand, lift, walker, cane, etc )  - [place] fall precautions as indicated by assessment  - Record patient progress and toleration of activity level on Mobility SBAR; progress patient to next Phase/Stage  - Instruct patient to call for assistance with activity based on assessment  - Consider rehabilitation consult to assist with strengthening/weightbearing, etc   Outcome: Progressing     Problem: DISCHARGE PLANNING  Goal: Discharge to home or other facility with appropriate resources  Description: INTERVENTIONS:  - Identify barriers to discharge w/patient and caregiver  - Arrange for needed discharge resources and transportation as appropriate  - Identify discharge learning needs (meds, wound care, etc )  - Arrange for interpretive services to assist at discharge as needed  - Refer to Case Management Department for coordinating discharge planning if the patient needs post-hospital services based on physician/advanced practitioner order or complex needs related to functional status, cognitive ability, or social support system  Outcome: Progressing     Problem: Knowledge Deficit  Goal: Patient/family/caregiver demonstrates understanding of disease process, treatment plan, medications, and discharge instructions  Description: Complete learning assessment and assess knowledge base    Interventions:  - Provide teaching at level of understanding  - Provide teaching via preferred learning methods  Outcome: Progressing     Problem: SKIN/TISSUE INTEGRITY - ADULT  Goal: Skin integrity remains intact  Description: INTERVENTIONS  - Identify patients at risk for skin breakdown  - Assess and monitor skin integrity  - Assess and monitor nutrition and hydration status  - Monitor labs (i e  albumin)  - Assess for incontinence   - Turn and reposition patient  - Assist with mobility/ambulation  - Relieve pressure over bony prominences  - Avoid friction and shearing  - Provide appropriate hygiene as needed including keeping skin clean and dry  - Evaluate need for skin moisturizer/barrier cream  - Collaborate with interdisciplinary team (i e  Nutrition, Rehabilitation, etc )   - Patient/family teaching  Outcome: Progressing  Goal: Incision(s), wounds(s) or drain site(s) healing without S/S of infection  Description: INTERVENTIONS  - Assess and document risk factors for skin impairment   - Assess and document dressing, incision, wound bed, drain sites and surrounding tissue  - Consider nutrition services referral as needed  - Oral mucous membranes remain intact  - Provide patient/ family education  Outcome: Progressing  Goal: Oral mucous membranes remain intact  Description: INTERVENTIONS  - Assess oral mucosa and hygiene practices  - Implement preventative oral hygiene regimen  - Implement oral medicated treatments as ordered  - Initiate Nutrition services referral as needed  Outcome: Progressing     Problem: CARDIOVASCULAR - ADULT  Goal: Maintains optimal cardiac output and hemodynamic stability  Description: INTERVENTIONS:  - Monitor I/O, vital signs and rhythm  - Monitor for S/S and trends of decreased cardiac output  - Administer and titrate ordered vasoactive medications to optimize hemodynamic stability  - Assess quality of pulses, skin color and temperature  - Assess for signs of decreased coronary artery perfusion  - Instruct patient to report change in severity of symptoms  Outcome: Progressing  Goal: Absence of cardiac dysrhythmias or at baseline rhythm  Description: INTERVENTIONS:  - Continuous cardiac monitoring, vital signs, obtain 12 lead EKG if ordered  - Administer antiarrhythmic and heart rate control medications as ordered  - Monitor electrolytes and administer replacement therapy as ordered  Outcome: Progressing     Problem: METABOLIC, FLUID AND ELECTROLYTES - ADULT  Goal: Electrolytes maintained within normal limits  Description: INTERVENTIONS:  - Monitor labs and assess patient for signs and symptoms of electrolyte imbalances  - Administer electrolyte replacement as ordered  - Monitor response to electrolyte replacements, including repeat lab results as appropriate  - Instruct patient on fluid and nutrition as appropriate  Outcome: Progressing  Goal: Fluid balance maintained  Description: INTERVENTIONS:  - Monitor labs   - Monitor I/O and WT  - Instruct patient on fluid and nutrition as appropriate  - Assess for signs & symptoms of volume excess or deficit  Outcome: Progressing

## 2021-03-13 NOTE — ASSESSMENT & PLAN NOTE
Wt Readings from Last 3 Encounters:   03/13/21 80 2 kg (176 lb 12 9 oz)   03/09/21 81 7 kg (180 lb 3 2 oz)   02/26/21 83 5 kg (184 lb)       LVEF 25%    Cards keeping him on IV Bumex and renal added metalazone,  Diuretics held last night due to low blood pressure    His leg edema is about 40% improved

## 2021-03-13 NOTE — PROGRESS NOTES
NEPHROLOGY PROGRESS NOTE   Celio Olson 80 y o  male MRN: [id number]  Unit/Bed#: E4 -01 Encounter: [id number]      ASSESSMENT/PLAN:  Acute kidney injury (POA) on chronic kidney disease stage III/IV:  - acute kidney injury suspect secondary to cardiorenal syndrome with volume overload, possible underlying component of cellulitis  - upon review of medical records, baseline creatinine 1 8-1 9 since mid 2020, prior creatinine 1 6-1 8 mg/dL  - creatinine 2 48 mg/dL upon admission   - most recent creatinine 1 97 mg/dL today  - continued on Bumex 2 mg IV b i d  to note, patient did not receive his evening dose of Bumex or metolazone yesterday due to hold parameters  Provide metolazone prior to p m  Dose of Bumex today  - UA: No protein, 0-1 RBC, 1-2 WBC, occasional bacteria, 0-1 hyaline casts  - imaging: CT scan completed in 2019 revealed 1 or more simple renal cyst   Otherwise unremarkable kidneys, no hydronephrosis  - avoid NSAIDs, nephrotoxic agents, IV contrast   - adjust medications to appropriate GFR  - monitor volume status with strict intake/output, daily weight               - most recent weight: 80 2 kg, unchanged  - will check BMP, magnesium, phosphorus in a m  Electrolytes, acid/base:  - most recent potassium 3 7, given diuresis will provide K-Dur 40 mEq x 1      Blood pressure, hemodynamics:  - blood pressure with fluctuations  - currently on: Bisoprolol 5 mg daily  - optimize hemodynamics; avoid hypotension and fluctuations of blood pressure   - maintain MAP > 65       Systolic CHF:  - most recent echo revealed EF of 25% with moderate MR, moderate to severe TR, dilated IVC  - outpatient regimen: Torsemide 10 mg b i d , metolazone 2 5 mg weekly  - continue diuretics as above, strict intake/output, daily weight     - cardiology following       Concern for lower extremity cellulitis:  - on antibiotics per primary team     SUBJECTIVE:  Patient resting chair comfortably  Patient without acute shortness of breath or chest pain  Patient states his edema is slightly improved, weight unchanged from yesterday      OBJECTIVE:  Current Weight: Weight - Scale: 80 2 kg (176 lb 12 9 oz)  Vitals:    03/13/21 0745   BP: 101/58   Pulse: 76   Resp: 18   Temp: 98 5 °F (36 9 °C)   SpO2: 94%       Intake/Output Summary (Last 24 hours) at 3/13/2021 1340  Last data filed at 3/13/2021 1153  Gross per 24 hour   Intake 1060 ml   Output 1600 ml   Net -540 ml       General:  No acute distress  Skin:  Warm, no rash  Eyes:  Sclerae anicteric  ENT:  Moist lips and mucous membranes  Neck:  Supple trachea midline  Chest:  Clear breath sounds bilaterally   CVS:  Regular rate regular rhythm  Abdomen:  Soft, nontender, normoactive bowel sounds  Extremities:  +2 bilateral lower extremity edema   Neuro:  Awake and interactive  Psych:  Appropriate affect      Medications:  Scheduled Meds:  Current Facility-Administered Medications   Medication Dose Route Frequency Provider Last Rate    apixaban  2 5 mg Oral BID Sunitha Davis MD      bisoprolol  5 mg Oral Daily Chadd Delgado, DO      bumetanide  2 mg Intravenous BID Shelley Bañuelos, DO      cefazolin  1,000 mg Intravenous Q12H Sunitha Davis MD Stopped (03/13/21 0509)    finasteride  5 mg Oral Daily Sunitha Davis MD      hydrOXYzine HCL  25 mg Oral Q6H PRN Donelda Arm Prechtel, DO      loratadine  10 mg Oral Daily Viral S Prechtel, DO      metolazone  2 5 mg Oral Once Radha Hernandez MD      pantoprazole  40 mg Oral Daily Sunitha Davis MD         PRN Meds: hydrOXYzine HCL    Continuous Infusions:     Laboratory Results:  Results from last 7 days   Lab Units 03/13/21  0448 03/12/21  0501 03/11/21  0442 03/10/21  0540 03/09/21  1309   WBC Thousand/uL 7 72 7 52 6 95 6 56 7 27   HEMOGLOBIN g/dL 13 7 13 5 13 1 13 2 14 3   HEMATOCRIT % 43 3 42 1 40 4 41 0 43 5   PLATELETS Thousands/uL 224 198 205 189 214   SODIUM mmol/L 137 137 137 137 135* POTASSIUM mmol/L 3 7 4 1 3 4* 3 6 3 2*   CHLORIDE mmol/L 98* 97* 96* 96* 93*   CO2 mmol/L 29 29 29 30 30   BUN mg/dL 54* 52* 53* 51* 50*   CREATININE mg/dL 1 97* 2 07* 2 06* 2 21* 2 48*   CALCIUM mg/dL 9 4 9 4 9 5 9 5 9 1   MAGNESIUM mg/dL 2 1 2 0 2 0 2 1  --    PHOSPHORUS mg/dL 3 2  --   --  3 6  --

## 2021-03-13 NOTE — PROGRESS NOTES
[address] [place] [address]  Progress Note - [facility] 1927, 80 y o  male MRN: [id number]  Unit/Bed#: E4 -01 Encounter: [id number]  Primary Care Provider: VARSHA Nettles MD   Date and time admitted to hospital: 3/9/2021 12:52 PM    * Cellulitis of left leg  Assessment & Plan  Improving with ancef    Will transition over to keflex when ready for discharge    Acute on chronic systolic CHF (congestive heart failure) ([facility] )  Assessment & Plan  Wt Readings from Last 3 Encounters:   21 80 2 kg (176 lb 12 9 oz)   21 81 7 kg (180 lb 3 2 oz)   21 83 5 kg (184 lb)       LVEF 25%    Cards keeping him on IV Bumex and renal added metalazone,  Diuretics held last night due to low blood pressure    His leg edema is about 40% improved      Acute kidney injury superimposed on chronic kidney disease ([facility] )  Assessment & Plan  · Appreciate renal help  · We have to watch his kidney function closely while we are diuresing          Subjective:   Feels better  Much less leg swelling  No SOB      Objective:     Vitals:   Temp (24hrs), Av 9 °F (36 6 °C), Min:97 5 °F (36 4 °C), Max:98 5 °F (36 9 °C)    Temp:  [97 5 °F (36 4 °C)-98 5 °F (36 9 °C)] 97 7 °F (36 5 °C)  HR:  [72-76] 73  Resp:  [16-18] 18  BP: ()/(53-68) 110/59  SpO2:  [93 %-98 %] 98 %  Body mass index is 26 11 kg/m²  Input and Output Summary (last 24 hours): Intake/Output Summary (Last 24 hours) at 3/13/2021 1659  Last data filed at 3/13/2021 1153  Gross per 24 hour   Intake 1060 ml   Output 1100 ml   Net -40 ml       Physical Exam:     Physical Exam  Vitals signs and nursing note reviewed  HENT:      Head: Normocephalic and atraumatic  Eyes:      Pupils: Pupils are equal, round, and reactive to light  Cardiovascular:      Rate and Rhythm: Normal rate and regular rhythm  Heart sounds: No murmur  No friction rub  No gallop      Pulmonary:      Effort: Pulmonary effort is normal       Breath sounds: Normal breath sounds  No wheezing or rales  Abdominal:      General: Bowel sounds are normal       Palpations: Abdomen is soft  Tenderness: There is no abdominal tenderness  Musculoskeletal:      Right lower leg: Edema (1+) present  Left lower leg: Edema (1+) present          Additional Data:     Labs:    Results from last 7 days   Lab Units 03/13/21  0448  03/10/21  0540   WBC Thousand/uL 7 72   < > 6 56   HEMOGLOBIN g/dL 13 7   < > 13 2   HEMATOCRIT % 43 3   < > 41 0   PLATELETS Thousands/uL 224   < > 189   NEUTROS PCT %  --   --  60   LYMPHS PCT %  --   --  20   MONOS PCT %  --   --  13*   EOS PCT %  --   --  6    < > = values in this interval not displayed  Results from last 7 days   Lab Units 03/13/21  0448  03/09/21  1309   POTASSIUM mmol/L 3 7   < > 3 2*   CHLORIDE mmol/L 98*   < > 93*   CO2 mmol/L 29   < > 30   BUN mg/dL 54*   < > 50*   CREATININE mg/dL 1 97*   < > 2 48*   CALCIUM mg/dL 9 4   < > 9 1   ALK PHOS U/L  --   --  145*   ALT U/L  --   --  34   AST U/L  --   --  41    < > = values in this interval not displayed  * I Have Reviewed All Lab Data     Recent Cultures (last 7 days):     Results from last 7 days   Lab Units 03/09/21  1409 03/09/21  1309   BLOOD CULTURE  No Growth at 72 hrs  No Growth at 72 hrs           Last 24 Hours Medication List:   Current Facility-Administered Medications   Medication Dose Route Frequency Provider Last Rate    apixaban  2 5 mg Oral BID Lety Lorenz MD      bisoprolol  5 mg Oral Daily Elois Backer, DO      bumetanide  2 mg Intravenous BID Bonnielee Medici, DO      cefazolin  1,000 mg Intravenous Q12H Lety Lorenz MD 1,000 mg (03/13/21 1536)    finasteride  5 mg Oral Daily Lety Lorenz MD      hydrOXYzine HCL  25 mg Oral Q6H PRN Geralene Allegra Prechtel, DO      loratadine  10 mg Oral Daily Viral S Prechtel, DO      metolazone  2 5 mg Oral Once IAIN Mary      pantoprazole  40 mg Oral Daily Lety Lorenz MD VTE Pharmacologic Prophylaxis:   Pharmacologic: Apixaban (Eliquis)      Current Length of Stay: 4 day(s)    Current Patient Status: Inpatient       Discharge Plan:   Code Status: Level 3 - DNAR and DNI           Today, Patient Was Seen By: Dwight Partida DO    ** Please Note: Dictation voice to text software may have been used in the creation of this document   **

## 2021-03-13 NOTE — PROGRESS NOTES
Cardiology Progress Note   MD Vishnu Sanders MD Eda Mulberry, DO, MD Miracle Morris DO, Andrae Khoury DO, [facility]  ----------------------------------------------------------------  [address]  [address] [place], [address]    Zia Aure 80 y o  male MRN: [id number]  Unit/Bed#: E4 -01 Encounter: [id number]      ASSESSMENT:    Acute on chronic systolic heart failure   LVEF 25%, mild LVH, mild ANGEL, regional wall motion abnormalities consistent with CAD, severe RV dilatation severe RA dilatation, moderate MR, AV sclerosis with mild AR, moderate to severe TR w/ PASP 45-50 mmHg, March 2021   Left lower extremity cellulitis   NSVT 5 beats, March 2020   Acute kidney injury on CKD stage III   Non MI troponin elevation secondary to acute heart failure   Permanent atrial fibrillation s/p AVN ablation with [facility] CRT-P on Eliquis    GERD    PLAN:  Patient still significantly edematous  He did not receive Bumex last evening or Zaroxolyn due to holding parameters  Holding parameters have been adjusted for patient to receive medication  Renal function continues to improve  Continue bisoprolol  No ACE-inhibitor/ARB for now with renal dysfunction; can be considered for RAAS blockers when okay from Nephrology perspective  Keep potassium greater than 4 and magnesium greater than 2  Eliquis 2 5 mg b i d  For atrial fibrillation  Supportive care    Signed: Tammie Borrego DO, [place], [place]      History of Present Illness:  Patient seen and examined  Denies chest pain, pressure, tightness or squeezing  Denies shortness of breath, lightheadedness, dizziness or palpitations  Denies orthopnea or paroxysmal nocturnal dyspnea  Admits to continued lower extremity swelling significantly above his baseline  Review of Systems:  Review of Systems   Constitution: Negative for decreased appetite, fever, weight gain and weight loss     HENT: Negative for congestion and sore throat  Eyes: Negative for visual disturbance  Cardiovascular: Positive for leg swelling  Negative for chest pain, dyspnea on exertion, near-syncope and palpitations  Respiratory: Negative for cough and shortness of breath  Hematologic/Lymphatic: Negative for bleeding problem  Skin: Negative for rash  Musculoskeletal: Negative for myalgias and neck pain  Gastrointestinal: Negative for abdominal pain and nausea  Neurological: Negative for light-headedness and weakness  Psychiatric/Behavioral: Negative for depression  Allergies   Allergen Reactions    Lasix [Furosemide] Other (See Comments)     Weakness, decreased BP    Adhesive [Medical Tape] Rash    Neomycin-Bacitracin Zn-Polymyx Rash    Neosporin [Neomycin-Polymyxin-Gramicidin] Rash       No current facility-administered medications on file prior to encounter        Current Outpatient Medications on File Prior to Encounter   Medication Sig    apixaban (Eliquis) 2 5 mg Take 1 tablet (2 5 mg total) by mouth 2 (two) times a day    CHLORPHENIRAMINE MALEATE PO Take by mouth every 4 (four) hours as needed    finasteride (PROSCAR) 5 mg tablet Take 5 mg by mouth daily    metolazone (ZAROXOLYN) 2 5 mg tablet Take 2 5 mg by mouth once a week     Multiple Vitamins-Minerals (CENTRUM SILVER 50+MEN PO) Take 1 tablet by mouth daily    pantoprazole (PROTONIX) 40 mg tablet Take 40 mg by mouth daily    torsemide (DEMADEX) 10 mg tablet 10 mg 2 (two) times a day     [DISCONTINUED] bisoprolol (ZEBETA) 5 mg tablet Take 0 5 tablets (2 5 mg total) by mouth daily        Current Facility-Administered Medications   Medication Dose Route Frequency Provider Last Rate    apixaban  2 5 mg Oral BID Brenda Canas MD      bisoprolol  5 mg Oral Daily Bobby Lewis DO      bumetanide  2 mg Intravenous BID Barrington Fuentes DO      cefazolin  1,000 mg Intravenous Q12H Brenda Canas MD Stopped (03/13/21 7737)    finasteride  5 mg Oral Daily Bertrand Valero MD      hydrOXYzine HCL  25 mg Oral Q6H PRN Jeanine Shoulders Prechtel, DO      loratadine  10 mg Oral Daily Viral S Prechtel, DO      metolazone  2 5 mg Oral Once IAIN Mary      pantoprazole  40 mg Oral Daily Bertrand Valero MD      potassium chloride  40 mEq Oral Once IAIN Mary              Vitals:    03/12/21 1900 03/12/21 2344 03/13/21 0600 03/13/21 0745   BP: 99/68 95/53  101/58   BP Location: Left arm Right arm  Right arm   Pulse: 72 72  76   Resp: 18 16  18   Temp:  97 5 °F (36 4 °C)  98 5 °F (36 9 °C)   TempSrc:  Temporal  Temporal   SpO2: 98% 93%  94%   Weight:   80 2 kg (176 lb 12 9 oz)    Height:             Intake/Output Summary (Last 24 hours) at 3/13/2021 1429  Last data filed at 3/13/2021 1153  Gross per 24 hour   Intake 1060 ml   Output 1600 ml   Net -540 ml       Weight change: 0 kg (0 lb)    PHYSICAL EXAMINATION:  Gen: Awake, Alert, NAD  HEENT: AT/NC, Anicteric, mmm  Neck: Supple, +JVP  Resp: CTA bilaterally no w/r/r  CV: RRR +S1, S2, No m/r/g  Abd: Soft, NT/ND + BS  Ext: warm, + 2 pitting LE edema bilaterally  Neuro: Follows commands, moves all extermities  Psych: Appropriate affect    Lab Results:  Results from last 7 days   Lab Units 03/13/21  0448   WBC Thousand/uL 7 72   HEMOGLOBIN g/dL 13 7   HEMATOCRIT % 43 3   PLATELETS Thousands/uL 224     Results from last 7 days   Lab Units 03/13/21  0448  03/09/21  1309   POTASSIUM mmol/L 3 7   < > 3 2*   CHLORIDE mmol/L 98*   < > 93*   CO2 mmol/L 29   < > 30   BUN mg/dL 54*   < > 50*   CREATININE mg/dL 1 97*   < > 2 48*   CALCIUM mg/dL 9 4   < > 9 1   ALK PHOS U/L  --   --  145*   ALT U/L  --   --  34   AST U/L  --   --  41    < > = values in this interval not displayed       No results found for: Bindu Burkett      Results from last 7 days   Lab Units 03/09/21  1309   TROPONIN I ng/mL 0 24*           Tele: AF w/ V paced rhythm, rare ventricular couplets and triplets    This note was completed in part utilizing M-Modal Fluency Direct Software  Grammatical errors, random word insertions, spelling mistakes, and incomplete sentences may be an occasional consequence of this system secondary to software limitations, ambient noise, and hardware issues  If you have any questions or concerns about the content, text, or information contained within the body of this dictation, please contact the provider for clarification

## 2021-03-14 NOTE — PLAN OF CARE
Problem: Potential for Falls  Goal: Patient will remain free of falls  Description: INTERVENTIONS:  - Assess patient frequently for physical needs  -  Identify cognitive and physical deficits and behaviors that affect risk of falls    -  [place] fall precautions as indicated by assessment   - Educate patient/family on patient safety including physical limitations  - Instruct patient to call for assistance with activity based on assessment  - Modify environment to reduce risk of injury  - Consider OT/PT consult to assist with strengthening/mobility  Outcome: Progressing     Problem: PAIN - ADULT  Goal: Verbalizes/displays adequate comfort level or baseline comfort level  Description: Interventions:  - Encourage patient to monitor pain and request assistance  - Assess pain using appropriate pain scale  - Administer analgesics based on type and severity of pain and evaluate response  - Implement non-pharmacological measures as appropriate and evaluate response  - Consider cultural and social influences on pain and pain management  - Notify physician/advanced practitioner if interventions unsuccessful or patient reports new pain  Outcome: Progressing     Problem: INFECTION - ADULT  Goal: Absence or prevention of progression during hospitalization  Description: INTERVENTIONS:  - Assess and monitor for signs and symptoms of infection  - Monitor lab/diagnostic results  - Monitor all insertion sites, i e  indwelling lines, tubes, and drains  - Monitor endotracheal if appropriate and nasal secretions for changes in amount and color  - [place] appropriate cooling/warming therapies per order  - Administer medications as ordered  - Instruct and encourage patient and family to use good hand hygiene technique  - Identify and instruct in appropriate isolation precautions for identified infection/condition  Outcome: Progressing     Problem: SAFETY ADULT  Goal: Maintain or return to baseline ADL function  Description: INTERVENTIONS:  -  Assess patient's ability to carry out ADLs; assess patient's baseline for ADL function and identify physical deficits which impact ability to perform ADLs (bathing, care of mouth/teeth, toileting, grooming, dressing, etc )  - Assess/evaluate cause of self-care deficits   - Assess range of motion  - Assess patient's mobility; develop plan if impaired  - Assess patient's need for assistive devices and provide as appropriate  - Encourage maximum independence but intervene and supervise when necessary  - Involve family in performance of ADLs  - Assess for home care needs following discharge   - Consider OT consult to assist with ADL evaluation and planning for discharge  - Provide patient education as appropriate  Outcome: Progressing  Goal: Maintain or return mobility status to optimal level  Description: INTERVENTIONS:  - Assess patient's baseline mobility status (ambulation, transfers, stairs, etc )    - Identify cognitive and physical deficits and behaviors that affect mobility  - Identify mobility aids required to assist with transfers and/or ambulation (gait belt, sit-to-stand, lift, walker, cane, etc )  - [place] fall precautions as indicated by assessment  - Record patient progress and toleration of activity level on Mobility SBAR; progress patient to next Phase/Stage  - Instruct patient to call for assistance with activity based on assessment  - Consider rehabilitation consult to assist with strengthening/weightbearing, etc   Outcome: Progressing     Problem: DISCHARGE PLANNING  Goal: Discharge to home or other facility with appropriate resources  Description: INTERVENTIONS:  - Identify barriers to discharge w/patient and caregiver  - Arrange for needed discharge resources and transportation as appropriate  - Identify discharge learning needs (meds, wound care, etc )  - Arrange for interpretive services to assist at discharge as needed  - Refer to Case Management Department for coordinating discharge planning if the patient needs post-hospital services based on physician/advanced practitioner order or complex needs related to functional status, cognitive ability, or social support system  Outcome: Progressing     Problem: Knowledge Deficit  Goal: Patient/family/caregiver demonstrates understanding of disease process, treatment plan, medications, and discharge instructions  Description: Complete learning assessment and assess knowledge base    Interventions:  - Provide teaching at level of understanding  - Provide teaching via preferred learning methods  Outcome: Progressing     Problem: SKIN/TISSUE INTEGRITY - ADULT  Goal: Skin integrity remains intact  Description: INTERVENTIONS  - Identify patients at risk for skin breakdown  - Assess and monitor skin integrity  - Assess and monitor nutrition and hydration status  - Monitor labs (i e  albumin)  - Assess for incontinence   - Turn and reposition patient  - Assist with mobility/ambulation  - Relieve pressure over bony prominences  - Avoid friction and shearing  - Provide appropriate hygiene as needed including keeping skin clean and dry  - Evaluate need for skin moisturizer/barrier cream  - Collaborate with interdisciplinary team (i e  Nutrition, Rehabilitation, etc )   - Patient/family teaching  Outcome: Progressing  Goal: Incision(s), wounds(s) or drain site(s) healing without S/S of infection  Description: INTERVENTIONS  - Assess and document risk factors for skin impairment   - Assess and document dressing, incision, wound bed, drain sites and surrounding tissue  - Consider nutrition services referral as needed  - Oral mucous membranes remain intact  - Provide patient/ family education  Outcome: Progressing  Goal: Oral mucous membranes remain intact  Description: INTERVENTIONS  - Assess oral mucosa and hygiene practices  - Implement preventative oral hygiene regimen  - Implement oral medicated treatments as ordered  - Initiate Nutrition services referral as needed  Outcome: Progressing     Problem: CARDIOVASCULAR - ADULT  Goal: Maintains optimal cardiac output and hemodynamic stability  Description: INTERVENTIONS:  - Monitor I/O, vital signs and rhythm  - Monitor for S/S and trends of decreased cardiac output  - Administer and titrate ordered vasoactive medications to optimize hemodynamic stability  - Assess quality of pulses, skin color and temperature  - Assess for signs of decreased coronary artery perfusion  - Instruct patient to report change in severity of symptoms  Outcome: Progressing  Goal: Absence of cardiac dysrhythmias or at baseline rhythm  Description: INTERVENTIONS:  - Continuous cardiac monitoring, vital signs, obtain 12 lead EKG if ordered  - Administer antiarrhythmic and heart rate control medications as ordered  - Monitor electrolytes and administer replacement therapy as ordered  Outcome: Progressing     Problem: METABOLIC, FLUID AND ELECTROLYTES - ADULT  Goal: Electrolytes maintained within normal limits  Description: INTERVENTIONS:  - Monitor labs and assess patient for signs and symptoms of electrolyte imbalances  - Administer electrolyte replacement as ordered  - Monitor response to electrolyte replacements, including repeat lab results as appropriate  - Instruct patient on fluid and nutrition as appropriate  Outcome: Progressing  Goal: Fluid balance maintained  Description: INTERVENTIONS:  - Monitor labs   - Monitor I/O and WT  - Instruct patient on fluid and nutrition as appropriate  - Assess for signs & symptoms of volume excess or deficit  Outcome: Progressing

## 2021-03-14 NOTE — PROGRESS NOTES
NEPHROLOGY PROGRESS NOTE   Melissa Dec 80 y o  male MRN: [id number]  Unit/Bed#: E4 -01 Encounter: [id number]      ASSESSMENT/PLAN:  Acute kidney injury (POA) on chronic kidney disease stage III/IV:  - acute kidney injury suspect secondary to cardiorenal syndrome with volume overload, possible underlying component of cellulitis  - upon review of medical records, baseline creatinine 1 8-1 9 since mid 2020, prior creatinine 1 6-1 8 mg/dL  - creatinine 2 48 mg/dL upon admission   - most recent creatinine 2 16 mg/dL today               - agree with cardiology for initiation of Bumex drip                - UA: No protein, 0-1 RBC, 1-2 WBC, occasional bacteria, 0-1 hyaline casts              - imaging: CT scan completed in 2019 revealed 1 or more simple renal cyst, no hydronephrosis  - avoid NSAIDs, nephrotoxic agents, IV contrast   - adjust medications to appropriate GFR  - monitor volume status with strict intake/output, daily weight               - weight with minimal improvement over the last several days, initiating drip as above  - will check BMP, magnesium, phosphorus in a m      Electrolytes, acid/base:  - most recent potassium 3 6, given diuresis will provide K-Dur 40 mEq x 1      Blood pressure, hemodynamics:  - blood pressure with fluctuations  - currently on: Bisoprolol 5 mg daily   - optimize hemodynamics; avoid hypotension and fluctuations of blood pressure   - maintain MAP > 39       Systolic CHF:  - most recent echo revealed EF of 25% with moderate MR, moderate to severe TR, dilated IVC  - outpatient regimen: Torsemide 10 mg b i d , metolazone 2 5 mg weekly  - continue diuretics as above, strict intake/output, daily weight  - cardiology following       Concern for lower extremity cellulitis:  - on antibiotics per primary team      SUBJECTIVE:  Patient resting in chair  Patient feels well overall, however bilateral lower extremity edema has not significantly improved  OBJECTIVE:  Current Weight: Weight - Scale: 80 7 kg (177 lb 14 6 oz)  Vitals:    03/14/21 1043   BP: 110/77   Pulse: 80   Resp: 18   Temp: 97 6 °F (36 4 °C)   SpO2: 98%       Intake/Output Summary (Last 24 hours) at 3/14/2021 1223  Last data filed at 3/14/2021 1214  Gross per 24 hour   Intake 700 ml   Output 1800 ml   Net -1100 ml       General:  No acute distress  Skin:  Warm, no rash  Eyes:  Sclerae anicteric  ENT:  Moist lips mucous membranes  Neck:  Supple trachea midline  Chest:  Clear breath sounds bilaterally   CVS:  Regular rate regular rhythm  Abdomen:  Soft, nontender, normoactive bowel sounds  Extremities:  +2 bilateral lower extremity edema   Neuro:  Awake and interactive  Psych:  Appropriate affect      Medications:  Scheduled Meds:  Current Facility-Administered Medications   Medication Dose Route Frequency Provider Last Rate    apixaban  2 5 mg Oral BID Varun Valdez MD      bisoprolol  5 mg Oral Daily Leland Bold, DO      bumetanide  0 5 mg/hr Intravenous Continuous Rommie Garfield, DO      cefazolin  1,000 mg Intravenous Q12H Varun Valdez MD Stopped (03/14/21 9699)    finasteride  5 mg Oral Daily Varun Valdez MD      hydrOXYzine HCL  25 mg Oral Q6H PRN Ranjan Esquivel Prechtel, DO      loratadine  10 mg Oral Daily Viral MONSON Prechtel, DO      pantoprazole  40 mg Oral Daily Varun Valdez MD         PRN Meds: hydrOXYzine HCL    Continuous Infusions:bumetanide, 0 5 mg/hr        Laboratory Results:  Results from last 7 days   Lab Units 03/14/21  0541 03/13/21  0448 03/12/21  0501 03/11/21  0442 03/10/21  0540 03/09/21  1309   WBC Thousand/uL 7 00 7 72 7 52 6 95 6 56 7 27   HEMOGLOBIN g/dL 12 8 13 7 13 5 13 1 13 2 14 3   HEMATOCRIT % 40 8 43 3 42 1 40 4 41 0 43 5   PLATELETS Thousands/uL 193 224 198 205 189 214   SODIUM mmol/L 137 137 137 137 137 135*   POTASSIUM mmol/L 3 6 3 7 4 1 3 4* 3 6 3 2*   CHLORIDE mmol/L 97* 98* 97* 96* 96* 93*   CO2 mmol/L 28 29 29 29 30 30   BUN mg/dL 61* 54* 52* 53* 51* 50*   CREATININE mg/dL 2 16* 1 97* 2 07* 2 06* 2 21* 2 48*   CALCIUM mg/dL 9 3 9 4 9 4 9 5 9 5 9 1   MAGNESIUM mg/dL 2 0 2 1 2 0 2 0 2 1  --    PHOSPHORUS mg/dL  --  3 2  --   --  3 6  --

## 2021-03-14 NOTE — PROGRESS NOTES
Ankur Peck  Progress Note - Wilma Claros 1927, 80 y o  male MRN: [id number]  Unit/Bed#: E4 -01 Encounter: [id number]  Primary Care Provider: VARSHA Handley MD   Date and time admitted to hospital: 3/9/2021 12:52 PM    * Cellulitis of left leg  Assessment & Plan  Improving with ancef    Will transition over to keflex when ready for discharge    Acute on chronic systolic CHF (congestive heart failure) ([facility])  Assessment & Plan  Wt Readings from Last 3 Encounters:   21 80 7 kg (177 lb 14 6 oz)   21 81 7 kg (180 lb 3 2 oz)   21 83 5 kg (184 lb)       LVEF 25%    Cards and renal following  They are starting a Bumex drip      Acute kidney injury superimposed on chronic kidney disease ([facility] [address] )  Assessment & Plan  · Appreciate renal help  · We have to watch his kidney function closely while we are diuresing          Subjective:   Feels well, but his legs are more swollen  No SOB        Objective:     Vitals:   Temp (24hrs), Av 7 °F (36 5 °C), Min:97 4 °F (36 3 °C), Max:98 1 °F (36 7 °C)    Temp:  [97 4 °F (36 3 °C)-98 1 °F (36 7 °C)] 97 4 °F (36 3 °C)  HR:  [71-88] 88  Resp:  [18] 18  BP: (106-112)/(56-77) 106/74  SpO2:  [97 %-99 %] 99 %  Body mass index is 26 27 kg/m²  Input and Output Summary (last 24 hours): Intake/Output Summary (Last 24 hours) at 3/14/2021 1832  Last data filed at 3/14/2021 1809  Gross per 24 hour   Intake 290 ml   Output 2550 ml   Net -2260 ml       Physical Exam:     Physical Exam  Vitals signs and nursing note reviewed  HENT:      Head: Normocephalic and atraumatic  Eyes:      Pupils: Pupils are equal, round, and reactive to light  Cardiovascular:      Rate and Rhythm: Normal rate and regular rhythm  Heart sounds: No murmur  No friction rub  No gallop  Pulmonary:      Effort: Pulmonary effort is normal       Breath sounds: Normal breath sounds  No wheezing or rales     Abdominal:      General: Bowel sounds are normal       Palpations: Abdomen is soft  Tenderness: There is no abdominal tenderness  Musculoskeletal:      Right lower leg: Edema (2+    a little worse today) present  Left lower leg: Edema (2+   a little worse today) present  Comments: Left leg wound dressing is C/D/I         Additional Data:     Labs:    Results from last 7 days   Lab Units 03/14/21  0541  03/10/21  0540   WBC Thousand/uL 7 00   < > 6 56   HEMOGLOBIN g/dL 12 8   < > 13 2   HEMATOCRIT % 40 8   < > 41 0   PLATELETS Thousands/uL 193   < > 189   NEUTROS PCT %  --   --  60   LYMPHS PCT %  --   --  20   MONOS PCT %  --   --  13*   EOS PCT %  --   --  6    < > = values in this interval not displayed  Results from last 7 days   Lab Units 03/14/21  0541  03/09/21  1309   POTASSIUM mmol/L 3 6   < > 3 2*   CHLORIDE mmol/L 97*   < > 93*   CO2 mmol/L 28   < > 30   BUN mg/dL 61*   < > 50*   CREATININE mg/dL 2 16*   < > 2 48*   CALCIUM mg/dL 9 3   < > 9 1   ALK PHOS U/L  --   --  145*   ALT U/L  --   --  34   AST U/L  --   --  41    < > = values in this interval not displayed  * I Have Reviewed All Lab Data     Recent Cultures (last 7 days):     Results from last 7 days   Lab Units 03/09/21  1409 03/09/21  1309   BLOOD CULTURE  No Growth After 4 Days  No Growth After 4 Days           Last 24 Hours Medication List:   Current Facility-Administered Medications   Medication Dose Route Frequency Provider Last Rate    apixaban  2 5 mg Oral BID Jenna Mancini MD      bisoprolol  5 mg Oral Daily Eric Canela, DO      bumetanide  0 5 mg/hr Intravenous Continuous Lynann Ratel, DO 0 5 mg/hr (03/14/21 1335)    cefazolin  1,000 mg Intravenous Q12H Jenna Mancini MD 1,000 mg (03/14/21 1706)    finasteride  5 mg Oral Daily Jenna Mancini MD      hydrOXYzine HCL  25 mg Oral Q6H PRN [address] Precwili, DO      loratadine  10 mg Oral Daily Viral Kirby, DO      pantoprazole  40 mg Oral Daily Jenna Mancini MD VTE Pharmacologic Prophylaxis:   Pharmacologic: Apixaban (Eliquis)      Current Length of Stay: 5 day(s)    Current Patient Status: Inpatient       Discharge Plan: home when ok with cardiology    Code Status: Level 3 - DNAR and DNI           Today, Patient Was Seen By: Amirah Limon DO    ** Please Note: Dictation voice to text software may have been used in the creation of this document   **

## 2021-03-14 NOTE — PROGRESS NOTES
Cardiology Progress Note   MD Inés Blackmon MD Geanie Saas, DO, MD Bulmaro Fox DO, Baldo Mcdermott DO, [facility]  ----------------------------------------------------------------  [address] [facility]  [address]   [facility], [address] [facility] Ends 80 y o  male MRN: [id number]  Unit/Bed#: E4 -01 Encounter: [id number]      ASSESSMENT:    Acute on chronic systolic heart failure   LVEF 25%, mild LVH, mild ANGEL, regional wall motion abnormalities consistent with CAD, severe RV dilatation severe RA dilatation, moderate MR, AV sclerosis with mild AR, moderate to severe TR w/ PASP 45-50 mmHg, March 2021   Left lower extremity cellulitis   NSVT 5 beats, March 2021   Acute kidney injury on CKD stage III   Non MI troponin elevation secondary to acute heart failure   Permanent atrial fibrillation s/p AVN ablation with [facility] CRT-P on Eliquis    GERD    PLAN:  Patient's urine output has been good overall, but he has not had marked change in his swelling  His breathing continues to improve  Renal function has bumped slightly this morning  Recommend initiation of Bumex drip; discussed Nephrology in the agreement  Closely monitor renal function  Continue Zaroxolyn and bisoprolol  Avoiding ACE-inhibitor/ARB for now with renal dysfunction  Continue Eliquis for atrial fibrillation  Continue with ambulation as tolerated    Signed: Michael Arredondo DO, [facility] [place], [facility]      History of Present Illness:  Patient seen and examined  Admits to continued lower extremity swelling, but denies shortness of breath  Denies chest pain, pressure, tightness or squeezing  Denies lightheadedness, dizziness or palpitations  Review of Systems:  Review of Systems   Constitution: Negative for decreased appetite, fever, weight gain and weight loss  HENT: Negative for congestion and sore throat  Eyes: Negative for visual disturbance     Cardiovascular: Positive for leg swelling  Negative for chest pain, dyspnea on exertion, near-syncope and palpitations  Respiratory: Negative for cough and shortness of breath  Hematologic/Lymphatic: Negative for bleeding problem  Skin: Negative for rash  Musculoskeletal: Negative for myalgias and neck pain  Gastrointestinal: Negative for abdominal pain and nausea  Neurological: Negative for light-headedness and weakness  Psychiatric/Behavioral: Negative for depression  Allergies   Allergen Reactions    Lasix [Furosemide] Other (See Comments)     Weakness, decreased BP    Adhesive [Medical Tape] Rash    Neomycin-Bacitracin Zn-Polymyx Rash    Neosporin [Neomycin-Polymyxin-Gramicidin] Rash       No current facility-administered medications on file prior to encounter        Current Outpatient Medications on File Prior to Encounter   Medication Sig    apixaban (Eliquis) 2 5 mg Take 1 tablet (2 5 mg total) by mouth 2 (two) times a day    CHLORPHENIRAMINE MALEATE PO Take by mouth every 4 (four) hours as needed    finasteride (PROSCAR) 5 mg tablet Take 5 mg by mouth daily    metolazone (ZAROXOLYN) 2 5 mg tablet Take 2 5 mg by mouth once a week     Multiple Vitamins-Minerals (CENTRUM SILVER 50+MEN PO) Take 1 tablet by mouth daily    pantoprazole (PROTONIX) 40 mg tablet Take 40 mg by mouth daily    torsemide (DEMADEX) 10 mg tablet 10 mg 2 (two) times a day     [DISCONTINUED] bisoprolol (ZEBETA) 5 mg tablet Take 0 5 tablets (2 5 mg total) by mouth daily        Current Facility-Administered Medications   Medication Dose Route Frequency Provider Last Rate    apixaban  2 5 mg Oral BID Noemi Szymanski MD      bisoprolol  5 mg Oral Daily Anju Kern, DO      bumetanide  2 mg Intravenous BID Jeny eNal, DO      cefazolin  1,000 mg Intravenous Q12H Noemi Szymanski MD Stopped (03/14/21 0513)    finasteride  5 mg Oral Daily Noemi Szymanski MD      hydrOXYzine HCL  25 mg Oral Q6H PRN Shellie Kirby,       loratadine  10 mg Oral Daily Ave Kirby,       pantoprazole  40 mg Oral Daily Jenna Mancini MD              Vitals:    03/13/21 2255 03/14/21 0600 03/14/21 0715 03/14/21 1043   BP: 109/60  108/60 110/77   BP Location:   Right arm Right arm   Pulse: 74  77 80   Resp:   18 18   Temp: 98 1 °F (36 7 °C)  97 7 °F (36 5 °C) 97 6 °F (36 4 °C)   TempSrc: Temporal  Temporal Temporal   SpO2: 97%  98% 98%   Weight:  80 7 kg (177 lb 14 6 oz)     Height:             Intake/Output Summary (Last 24 hours) at 3/14/2021 1157  Last data filed at 3/14/2021 0947  Gross per 24 hour   Intake 700 ml   Output 1500 ml   Net -800 ml       Weight change: 0 5 kg (1 lb 1 6 oz)    PHYSICAL EXAMINATION:  Gen: Awake, Alert, NAD  HEENT: AT/NC, Anicteric, mmm  Neck: Supple, +JVP  Resp: CTA bilaterally no w/r/r  CV: RRR +S1, S2, No m/r/g  Abd: Soft, NT/ND + BS  Ext: warm, + 2 pitting LE edema bilaterally  Neuro: Follows commands, moves all extermities  Psych: Appropriate affect    Lab Results:  Results from last 7 days   Lab Units 03/14/21  0541   WBC Thousand/uL 7 00   HEMOGLOBIN g/dL 12 8   HEMATOCRIT % 40 8   PLATELETS Thousands/uL 193     Results from last 7 days   Lab Units 03/14/21  0541  03/09/21  1309   POTASSIUM mmol/L 3 6   < > 3 2*   CHLORIDE mmol/L 97*   < > 93*   CO2 mmol/L 28   < > 30   BUN mg/dL 61*   < > 50*   CREATININE mg/dL 2 16*   < > 2 48*   CALCIUM mg/dL 9 3   < > 9 1   ALK PHOS U/L  --   --  145*   ALT U/L  --   --  34   AST U/L  --   --  41    < > = values in this interval not displayed  No results found for: Mohit Worthington      Results from last 7 days   Lab Units 03/09/21  1309   TROPONIN I ng/mL 0 24*           Tele: AF w/ V paced rhythm    This note was completed in part utilizing [employer] Direct Software  Grammatical errors, random word insertions, spelling mistakes, and incomplete sentences may be an occasional consequence of this system secondary to software limitations, ambient noise, and hardware issues    If you have any questions or concerns about the content, text, or information contained within the body of this dictation, please contact the provider for clarification

## 2021-03-14 NOTE — PLAN OF CARE
Problem: Potential for Falls  Goal: Patient will remain free of falls  Description: INTERVENTIONS:  - Assess patient frequently for physical needs  -  Identify cognitive and physical deficits and behaviors that affect risk of falls    -  [place] fall precautions as indicated by assessment   - Educate patient/family on patient safety including physical limitations  - Instruct patient to call for assistance with activity based on assessment  - Modify environment to reduce risk of injury  - Consider OT/PT consult to assist with strengthening/mobility  Outcome: Progressing     Problem: PAIN - ADULT  Goal: Verbalizes/displays adequate comfort level or baseline comfort level  Description: Interventions:  - Encourage patient to monitor pain and request assistance  - Assess pain using appropriate pain scale  - Administer analgesics based on type and severity of pain and evaluate response  - Implement non-pharmacological measures as appropriate and evaluate response  - Consider cultural and social influences on pain and pain management  - Notify physician/advanced practitioner if interventions unsuccessful or patient reports new pain  Outcome: Progressing     Problem: INFECTION - ADULT  Goal: Absence or prevention of progression during hospitalization  Description: INTERVENTIONS:  - Assess and monitor for signs and symptoms of infection  - Monitor lab/diagnostic results  - Monitor all insertion sites, i e  indwelling lines, tubes, and drains  - Monitor endotracheal if appropriate and nasal secretions for changes in amount and color  - [place] appropriate cooling/warming therapies per order  - Administer medications as ordered  - Instruct and encourage patient and family to use good hand hygiene technique  - Identify and instruct in appropriate isolation precautions for identified infection/condition  Outcome: Progressing     Problem: SAFETY ADULT  Goal: Maintain or return to baseline ADL function  Description: INTERVENTIONS:  -  Assess patient's ability to carry out ADLs; assess patient's baseline for ADL function and identify physical deficits which impact ability to perform ADLs (bathing, care of mouth/teeth, toileting, grooming, dressing, etc )  - Assess/evaluate cause of self-care deficits   - Assess range of motion  - Assess patient's mobility; develop plan if impaired  - Assess patient's need for assistive devices and provide as appropriate  - Encourage maximum independence but intervene and supervise when necessary  - Involve family in performance of ADLs  - Assess for home care needs following discharge   - Consider OT consult to assist with ADL evaluation and planning for discharge  - Provide patient education as appropriate  Outcome: Progressing  Goal: Maintain or return mobility status to optimal level  Description: INTERVENTIONS:  - Assess patient's baseline mobility status (ambulation, transfers, stairs, etc )    - Identify cognitive and physical deficits and behaviors that affect mobility  - Identify mobility aids required to assist with transfers and/or ambulation (gait belt, sit-to-stand, lift, walker, cane, etc )  - [place] fall precautions as indicated by assessment  - Record patient progress and toleration of activity level on Mobility SBAR; progress patient to next Phase/Stage  - Instruct patient to call for assistance with activity based on assessment  - Consider rehabilitation consult to assist with strengthening/weightbearing, etc   Outcome: Progressing     Problem: DISCHARGE PLANNING  Goal: Discharge to home or other facility with appropriate resources  Description: INTERVENTIONS:  - Identify barriers to discharge w/patient and caregiver  - Arrange for needed discharge resources and transportation as appropriate  - Identify discharge learning needs (meds, wound care, etc )  - Arrange for interpretive services to assist at discharge as needed  - Refer to Case Management Department for coordinating discharge planning if the patient needs post-hospital services based on physician/advanced practitioner order or complex needs related to functional status, cognitive ability, or social support system  Outcome: Progressing     Problem: Knowledge Deficit  Goal: Patient/family/caregiver demonstrates understanding of disease process, treatment plan, medications, and discharge instructions  Description: Complete learning assessment and assess knowledge base    Interventions:  - Provide teaching at level of understanding  - Provide teaching via preferred learning methods  Outcome: Progressing     Problem: SKIN/TISSUE INTEGRITY - ADULT  Goal: Skin integrity remains intact  Description: INTERVENTIONS  - Identify patients at risk for skin breakdown  - Assess and monitor skin integrity  - Assess and monitor nutrition and hydration status  - Monitor labs (i e  albumin)  - Assess for incontinence   - Turn and reposition patient  - Assist with mobility/ambulation  - Relieve pressure over bony prominences  - Avoid friction and shearing  - Provide appropriate hygiene as needed including keeping skin clean and dry  - Evaluate need for skin moisturizer/barrier cream  - Collaborate with interdisciplinary team (i e  Nutrition, Rehabilitation, etc )   - Patient/family teaching  Outcome: Progressing  Goal: Incision(s), wounds(s) or drain site(s) healing without S/S of infection  Description: INTERVENTIONS  - Assess and document risk factors for skin impairment   - Assess and document dressing, incision, wound bed, drain sites and surrounding tissue  - Consider nutrition services referral as needed  - Oral mucous membranes remain intact  - Provide patient/ family education  Outcome: Progressing  Goal: Oral mucous membranes remain intact  Description: INTERVENTIONS  - Assess oral mucosa and hygiene practices  - Implement preventative oral hygiene regimen  - Implement oral medicated treatments as ordered  - Initiate Nutrition services referral as needed  Outcome: Progressing     Problem: CARDIOVASCULAR - ADULT  Goal: Maintains optimal cardiac output and hemodynamic stability  Description: INTERVENTIONS:  - Monitor I/O, vital signs and rhythm  - Monitor for S/S and trends of decreased cardiac output  - Administer and titrate ordered vasoactive medications to optimize hemodynamic stability  - Assess quality of pulses, skin color and temperature  - Assess for signs of decreased coronary artery perfusion  - Instruct patient to report change in severity of symptoms  Outcome: Progressing  Goal: Absence of cardiac dysrhythmias or at baseline rhythm  Description: INTERVENTIONS:  - Continuous cardiac monitoring, vital signs, obtain 12 lead EKG if ordered  - Administer antiarrhythmic and heart rate control medications as ordered  - Monitor electrolytes and administer replacement therapy as ordered  Outcome: Progressing     Problem: METABOLIC, FLUID AND ELECTROLYTES - ADULT  Goal: Electrolytes maintained within normal limits  Description: INTERVENTIONS:  - Monitor labs and assess patient for signs and symptoms of electrolyte imbalances  - Administer electrolyte replacement as ordered  - Monitor response to electrolyte replacements, including repeat lab results as appropriate  - Instruct patient on fluid and nutrition as appropriate  Outcome: Progressing  Goal: Fluid balance maintained  Description: INTERVENTIONS:  - Monitor labs   - Monitor I/O and WT  - Instruct patient on fluid and nutrition as appropriate  - Assess for signs & symptoms of volume excess or deficit  Outcome: Progressing

## 2021-03-14 NOTE — ASSESSMENT & PLAN NOTE
Wt Readings from Last 3 Encounters:   03/14/21 80 7 kg (177 lb 14 6 oz)   03/09/21 81 7 kg (180 lb 3 2 oz)   02/26/21 83 5 kg (184 lb)       LVEF 25%    Cards and renal following  They are starting a Bumex drip

## 2021-03-15 PROBLEM — E83.39 HYPERPHOSPHATEMIA: Status: ACTIVE | Noted: 2021-01-01

## 2021-03-15 PROBLEM — E87.6 HYPOKALEMIA: Status: ACTIVE | Noted: 2021-01-01

## 2021-03-15 NOTE — PLAN OF CARE
Problem: Potential for Falls  Goal: Patient will remain free of falls  Description: INTERVENTIONS:  - Assess patient frequently for physical needs  -  Identify cognitive and physical deficits and behaviors that affect risk of falls    -  [place] fall precautions as indicated by assessment   - Educate patient/family on patient safety including physical limitations  - Instruct patient to call for assistance with activity based on assessment  - Modify environment to reduce risk of injury  - Consider OT/PT consult to assist with strengthening/mobility  Outcome: Progressing     Problem: PAIN - ADULT  Goal: Verbalizes/displays adequate comfort level or baseline comfort level  Description: Interventions:  - Encourage patient to monitor pain and request assistance  - Assess pain using appropriate pain scale  - Administer analgesics based on type and severity of pain and evaluate response  - Implement non-pharmacological measures as appropriate and evaluate response  - Consider cultural and social influences on pain and pain management  - Notify physician/advanced practitioner if interventions unsuccessful or patient reports new pain  Outcome: Progressing     Problem: INFECTION - ADULT  Goal: Absence or prevention of progression during hospitalization  Description: INTERVENTIONS:  - Assess and monitor for signs and symptoms of infection  - Monitor lab/diagnostic results  - Monitor all insertion sites, i e  indwelling lines, tubes, and drains  - Monitor endotracheal if appropriate and nasal secretions for changes in amount and color  - [place] appropriate cooling/warming therapies per order  - Administer medications as ordered  - Instruct and encourage patient and family to use good hand hygiene technique  - Identify and instruct in appropriate isolation precautions for identified infection/condition  Outcome: Progressing     Problem: SAFETY ADULT  Goal: Maintain or return to baseline ADL function  Description: INTERVENTIONS:  -  Assess patient's ability to carry out ADLs; assess patient's baseline for ADL function and identify physical deficits which impact ability to perform ADLs (bathing, care of mouth/teeth, toileting, grooming, dressing, etc )  - Assess/evaluate cause of self-care deficits   - Assess range of motion  - Assess patient's mobility; develop plan if impaired  - Assess patient's need for assistive devices and provide as appropriate  - Encourage maximum independence but intervene and supervise when necessary  - Involve family in performance of ADLs  - Assess for home care needs following discharge   - Consider OT consult to assist with ADL evaluation and planning for discharge  - Provide patient education as appropriate  Outcome: Progressing  Goal: Maintain or return mobility status to optimal level  Description: INTERVENTIONS:  - Assess patient's baseline mobility status (ambulation, transfers, stairs, etc )    - Identify cognitive and physical deficits and behaviors that affect mobility  - Identify mobility aids required to assist with transfers and/or ambulation (gait belt, sit-to-stand, lift, walker, cane, etc )  - [place] fall precautions as indicated by assessment  - Record patient progress and toleration of activity level on Mobility SBAR; progress patient to next Phase/Stage  - Instruct patient to call for assistance with activity based on assessment  - Consider rehabilitation consult to assist with strengthening/weightbearing, etc   Outcome: Progressing     Problem: DISCHARGE PLANNING  Goal: Discharge to home or other facility with appropriate resources  Description: INTERVENTIONS:  - Identify barriers to discharge w/patient and caregiver  - Arrange for needed discharge resources and transportation as appropriate  - Identify discharge learning needs (meds, wound care, etc )  - Arrange for interpretive services to assist at discharge as needed  - Refer to Case Management Department for coordinating discharge planning if the patient needs post-hospital services based on physician/advanced practitioner order or complex needs related to functional status, cognitive ability, or social support system  Outcome: Progressing     Problem: Knowledge Deficit  Goal: Patient/family/caregiver demonstrates understanding of disease process, treatment plan, medications, and discharge instructions  Description: Complete learning assessment and assess knowledge base    Interventions:  - Provide teaching at level of understanding  - Provide teaching via preferred learning methods  Outcome: Progressing     Problem: SKIN/TISSUE INTEGRITY - ADULT  Goal: Skin integrity remains intact  Description: INTERVENTIONS  - Identify patients at risk for skin breakdown  - Assess and monitor skin integrity  - Assess and monitor nutrition and hydration status  - Monitor labs (i e  albumin)  - Assess for incontinence   - Turn and reposition patient  - Assist with mobility/ambulation  - Relieve pressure over bony prominences  - Avoid friction and shearing  - Provide appropriate hygiene as needed including keeping skin clean and dry  - Evaluate need for skin moisturizer/barrier cream  - Collaborate with interdisciplinary team (i e  Nutrition, Rehabilitation, etc )   - Patient/family teaching  Outcome: Progressing  Goal: Incision(s), wounds(s) or drain site(s) healing without S/S of infection  Description: INTERVENTIONS  - Assess and document risk factors for skin impairment   - Assess and document dressing, incision, wound bed, drain sites and surrounding tissue  - Consider nutrition services referral as needed  - Oral mucous membranes remain intact  - Provide patient/ family education  Outcome: Progressing  Goal: Oral mucous membranes remain intact  Description: INTERVENTIONS  - Assess oral mucosa and hygiene practices  - Implement preventative oral hygiene regimen  - Implement oral medicated treatments as ordered  - Initiate Nutrition services referral as needed  Outcome: Progressing     Problem: CARDIOVASCULAR - ADULT  Goal: Maintains optimal cardiac output and hemodynamic stability  Description: INTERVENTIONS:  - Monitor I/O, vital signs and rhythm  - Monitor for S/S and trends of decreased cardiac output  - Administer and titrate ordered vasoactive medications to optimize hemodynamic stability  - Assess quality of pulses, skin color and temperature  - Assess for signs of decreased coronary artery perfusion  - Instruct patient to report change in severity of symptoms  Outcome: Progressing  Goal: Absence of cardiac dysrhythmias or at baseline rhythm  Description: INTERVENTIONS:  - Continuous cardiac monitoring, vital signs, obtain 12 lead EKG if ordered  - Administer antiarrhythmic and heart rate control medications as ordered  - Monitor electrolytes and administer replacement therapy as ordered  Outcome: Progressing     Problem: METABOLIC, FLUID AND ELECTROLYTES - ADULT  Goal: Electrolytes maintained within normal limits  Description: INTERVENTIONS:  - Monitor labs and assess patient for signs and symptoms of electrolyte imbalances  - Administer electrolyte replacement as ordered  - Monitor response to electrolyte replacements, including repeat lab results as appropriate  - Instruct patient on fluid and nutrition as appropriate  Outcome: Progressing  Goal: Fluid balance maintained  Description: INTERVENTIONS:  - Monitor labs   - Monitor I/O and WT  - Instruct patient on fluid and nutrition as appropriate  - Assess for signs & symptoms of volume excess or deficit  Outcome: Progressing

## 2021-03-15 NOTE — PROGRESS NOTES
[facility]  Progress Note - Dario Apple 6/16/1927, 80 y o  male MRN: [id number]  Unit/Bed#: E4 -01 Encounter: [id number]  Primary Care Provider: VARSHA Garner MD   Date and time admitted to hospital: 3/9/2021 12:52 PM    Acute on chronic systolic CHF (congestive heart failure) ([facility] )  Assessment & Plan  Wt Readings from Last 3 Encounters:   03/15/21 77 7 kg (171 lb 4 8 oz)   03/09/21 81 7 kg (180 lb 3 2 oz)   02/26/21 83 5 kg (184 lb)       LVEF 25%  Card/Nephrology following  UOP 24 hours 3 liters, swelling improving  Bumex gtt, transition to PO in 1-2 days per nephro/cardiology      Acute kidney injury superimposed on chronic kidney disease ([facility] )  Assessment & Plan  · Renal fxns stable while diuresis  · Monitor renal fxns  · Hold ACEi at this time    BPH (benign prostatic hyperplasia)  Assessment & Plan  · Stable  · Continue finasteride daily    A-fib ([place] [id number] )  Assessment & Plan  · Status post biventricular pacemaker  · He is V paced and fully anticoagulated on Eliquis    * Cellulitis of left leg  Assessment & Plan  Plan for 7 days total, to finish by tomorrow  Wound care for dressing changes  Leg shows improvement        VTE Pharmacologic Prophylaxis:   Pharmacologic: Apixaban (Eliquis)  Mechanical VTE Prophylaxis in Place: Yes    Patient Centered Rounds: I have performed bedside rounds with nursing staff today  Discussions with Specialists or Other Care Team Provider: Cardiology, Nephrology    Education and Discussions with Family / Patient: Patient    Time Spent for Care: 30 minutes  More than 50% of total time spent on counseling and coordination of care as described above      Current Length of Stay: 6 day(s)    Current Patient Status: Inpatient   Certification Statement: The patient will continue to require additional inpatient hospital stay due to 1-2 days, pending transition to PO diuretics, PT/OT assessment    Discharge Plan: Pending, likely 2 days away    Code Status: Level 3 - DNAR and DNI      Subjective:   Patient today doing well  Reports improvement in swell  No events overnight  Denies any CP or SOB  Objective:     Vitals:   Temp (24hrs), Av 1 °F (36 2 °C), Min:96 7 °F (35 9 °C), Max:97 4 °F (36 3 °C)    Temp:  [96 7 °F (35 9 °C)-97 4 °F (36 3 °C)] 96 7 °F (35 9 °C)  HR:  [68-88] 68  Resp:  [18-20] 20  BP: (106-119)/(65-74) 119/65  SpO2:  [98 %-100 %] 100 %  Body mass index is 25 3 kg/m²  Input and Output Summary (last 24 hours): Intake/Output Summary (Last 24 hours) at 3/15/2021 1245  Last data filed at 3/15/2021 1008  Gross per 24 hour   Intake 992 83 ml   Output 4710 ml   Net -3717 17 ml       Physical Exam:     Physical Exam  Constitutional:       Appearance: He is not ill-appearing or diaphoretic  HENT:      Head: Normocephalic and atraumatic  Nose: No rhinorrhea  Neck:      Musculoskeletal: Neck supple  Cardiovascular:      Rate and Rhythm: Regular rhythm  Heart sounds: No murmur  No gallop  Pulmonary:      Effort: Pulmonary effort is normal  No respiratory distress  Breath sounds: No wheezing  Abdominal:      General: Abdomen is flat  There is no distension  Palpations: Abdomen is soft  Tenderness: There is no abdominal tenderness  Musculoskeletal:         General: Swelling present  Right lower leg: Edema present  Left lower leg: Edema present  Neurological:      Mental Status: He is alert and oriented to person, place, and time  Mental status is at baseline  Additional Data:     Labs:    Results from last 7 days   Lab Units 03/15/21  0501  03/10/21  0540   WBC Thousand/uL 6 65   < > 6 56   HEMOGLOBIN g/dL 13 9   < > 13 2   HEMATOCRIT % 42 8   < > 41 0   PLATELETS Thousands/uL 207   < > 189   NEUTROS PCT %  --   --  60   LYMPHS PCT %  --   --  20   MONOS PCT %  --   --  13*   EOS PCT %  --   --  6    < > = values in this interval not displayed       Results from last 7 days   Lab Units 03/15/21  0501  03/09/21  1309   SODIUM mmol/L 138   < > 135*   POTASSIUM mmol/L 3 0*   < > 3 2*   CHLORIDE mmol/L 94*   < > 93*   CO2 mmol/L 31   < > 30   BUN mg/dL 66*   < > 50*   CREATININE mg/dL 2 17*   < > 2 48*   ANION GAP mmol/L 13   < > 12   CALCIUM mg/dL 9 5   < > 9 1   ALBUMIN g/dL  --   --  3 2*   TOTAL BILIRUBIN mg/dL  --   --  1 29*   ALK PHOS U/L  --   --  145*   ALT U/L  --   --  34   AST U/L  --   --  41   GLUCOSE RANDOM mg/dL 102   < > 115    < > = values in this interval not displayed  * I Have Reviewed All Lab Data Listed Above  * Additional Pertinent Lab Tests Reviewed: All Labs For Current Hospital Admission Reviewed    Imaging:    Xr Chest 1 View Portable    Result Date: 3/9/2021  Impression: No acute cardiopulmonary disease  Workstation performed: [id number]     Recent Cultures (last 7 days):     Results from last 7 days   Lab Units 03/09/21  1409 03/09/21  1309   BLOOD CULTURE  No Growth After 5 Days  No Growth After 5 Days  Last 24 Hours Medication List:   Current Facility-Administered Medications   Medication Dose Route Frequency Provider Last Rate    apixaban  2 5 mg Oral BID Keri Wolf MD      bisoprolol  5 mg Oral Daily Shashank Medelos, DO      bumetanide  0 5 mg/hr Intravenous Continuous Gattis Churn, DO 0 5 mg/hr (03/15/21 0312)    cefazolin  1,000 mg Intravenous Q12H Keri Wolf MD Stopped (03/15/21 0342)    finasteride  5 mg Oral Daily Keri Wolf MD      hydrOXYzine HCL  25 mg Oral Q6H PRN Ethan Edgecombe Prechtel, DO      loratadine  10 mg Oral Daily Viral S Prechtel, DO      pantoprazole  40 mg Oral Daily Keri Wolf MD      potassium chloride  40 mEq Oral BID Preston Caceres MD          Today, Patient Was Seen By: Preston Caceres MD    ** Please Note: Dictation voice to text software may have been used in the creation of this document   **

## 2021-03-15 NOTE — CASE MANAGEMENT
A post acute care recommendation was made by your care team for [facility]  Discussed [place] of Choice with patient  List of agencies given to patient via in person  patient aware the list is custom filtered for them by preference  and that [facility] post acute providers are designated  Pt was agreeable to [facility] for RN  Awaiting for PT recommendations for PT/OT  Referral sent to [facility] via [address]  Cm will f/u for determination  IMM discussed with pt and signature page was placed in the scan bin

## 2021-03-15 NOTE — ASSESSMENT & PLAN NOTE
Wt Readings from Last 3 Encounters:   03/15/21 77 7 kg (171 lb 4 8 oz)   03/09/21 81 7 kg (180 lb 3 2 oz)   02/26/21 83 5 kg (184 lb)       LVEF 25%  Card/Nephrology following  UOP 24 hours 3 liters, swelling improving  Bumex gtt, transition to PO in 1-2 days per nephro/cardiology

## 2021-03-15 NOTE — PROGRESS NOTES
Progress Note - Cardiology   Melissa Dec 80 y o  male MRN: [id number]  Unit/Bed#: E4 -01 Encounter: [id number]      Assessment/Recommendations/Discussion:   · Acute on chronic systolic heart failure  · LVEF 25%, mild LVH, mild ANGEL, regional wall motion abnormalities consistent with CAD, severe RV dilatation severe RA dilatation, moderate MR, AV sclerosis with mild AR, moderate to severe TR w/ PASP 45-50 mmHg, March 2021  · Left lower extremity cellulitis  · NSVT 5 beats, March 2021  · Acute kidney injury on CKD stage III  · Non MI troponin elevation secondary to acute heart failure  · Permanent atrial fibrillation s/p AVN ablation with [place] CRT-P on Eliquis   · GERD    PLAN   Bumex drip was started yesterday   Weight has gone down from 177 lb to 171 lb   Net -3 5 L since yesterday   Creatinine stable compared to yesterday 2 17 today, 2 16 yesterday   Blood pressure is stable at 119/65   Continue bisoprolol, holding on ACE-inhibitor/ARB now with renal dysfunction   Continue Eliquis for anticoagulation for Afib   Ancef for cellulitis   Continue Bumex drip at least another day  Subjective:   HPI  Doing well  Weight is improving with Bumex drip, having significant urine output  Denies chest pain or shortness of breath      Review of Systems: As noted in HPI  Rest of ROS is negative      Vitals:   /65 (BP Location: Right arm)   Pulse 68   Temp (!) 96 7 °F (35 9 °C) (Temporal)   Resp 20   Ht 5' 9" (1 753 m)   Wt 77 7 kg (171 lb 4 8 oz)   SpO2 100%   BMI 25 30 kg/m²   Vitals:    03/14/21 0600 03/15/21 0600   Weight: 80 7 kg (177 lb 14 6 oz) 77 7 kg (171 lb 4 8 oz)       Intake/Output Summary (Last 24 hours) at 3/15/2021 1489  Last data filed at 3/15/2021 0831  Gross per 24 hour   Intake 1232 83 ml   Output 4810 ml   Net -3577 17 ml       Physical Exam:  General appearance: alert and oriented, in no acute distress  Head: Normocephalic, without obvious abnormality, atraumatic  Eyes: conjunctivae/corneas clear  Anicteric  Neck: no adenopathy, no carotid bruit, + JVD  Lungs: clear to auscultation bilaterally  Heart: regular rate and rhythm, S1, S2 normal, 2/6 systolic murmur at the left sternal border, no click, rub or gallop  Abdomen:  soft, non-tender; bowel sounds normal; no masses,  no organomegaly  Extremities:  Bilateral lower extremity pitting edema, 3+  Skin: Skin color, texture, turgor normal  No rashes or lesions     TELEMETRY:   Lab Results:  Results from last 7 days   Lab Units 03/15/21  0501   WBC Thousand/uL 6 65   HEMOGLOBIN g/dL 13 9   HEMATOCRIT % 42 8   PLATELETS Thousands/uL 207     Results from last 7 days   Lab Units 03/15/21  0501  03/09/21  1309   POTASSIUM mmol/L 3 0*   < > 3 2*   CHLORIDE mmol/L 94*   < > 93*   CO2 mmol/L 31   < > 30   BUN mg/dL 66*   < > 50*   CREATININE mg/dL 2 17*   < > 2 48*   CALCIUM mg/dL 9 5   < > 9 1   ALK PHOS U/L  --   --  145*   ALT U/L  --   --  34   AST U/L  --   --  41    < > = values in this interval not displayed       Results from last 7 days   Lab Units 03/15/21  0501   POTASSIUM mmol/L 3 0*   CHLORIDE mmol/L 94*   CO2 mmol/L 31   BUN mg/dL 66*   CREATININE mg/dL 2 17*   CALCIUM mg/dL 9 5           Medications:    Current Facility-Administered Medications:     apixaban (ELIQUIS) tablet 2 5 mg, 2 5 mg, Oral, BID, Jenna Mancini MD, 2 5 mg at 03/15/21 0830    bisoprolol (ZEBETA) tablet 5 mg, 5 mg, Oral, Daily, Eric Canela DO, 5 mg at 03/15/21 0830    bumetanide (BUMEX) 12 5 mg infusion 50 mL, 0 5 mg/hr, Intravenous, Continuous, Vaibhav Fontaine DO, Last Rate: 2 mL/hr at 03/15/21 0312, 0 5 mg/hr at 03/15/21 8362    ceFAZolin (ANCEF) IVPB (premix in dextrose) 1,000 mg 50 mL, 1,000 mg, Intravenous, Q12H, Jenna Mancini MD, Stopped at 03/15/21 0342    finasteride (PROSCAR) tablet 5 mg, 5 mg, Oral, Daily, Jenna Mancini MD, 5 mg at 03/15/21 0830    hydrOXYzine HCL (ATARAX) tablet 25 mg, 25 mg, Oral, Q6H PRN, Amirah Limon, DO   loratadine (CLARITIN) tablet 10 mg, 10 mg, Oral, Daily, Viral Kirby DO, 10 mg at 03/15/21 0830    pantoprazole (PROTONIX) EC tablet 40 mg, 40 mg, Oral, Daily, Ian Reina MD, 40 mg at 03/15/21 0610    potassium chloride (K-DUR,KLOR-CON) CR tablet 40 mEq, 40 mEq, Oral, BID, Fang Munoz MD, 40 mEq at 03/15/21 0830    potassium chloride 20 mEq IVPB (premix), 20 mEq, Intravenous, Once, Fnag Munoz MD, Last Rate: 50 mL/hr at 03/15/21 0831, 20 mEq at 03/15/21 0831    This note was completed in part utilizing M-Modal Fluency Direct Software  Grammatical errors, random word insertions, spelling mistakes, and incomplete sentences may be an occasional consequence of this system secondary to software limitations, ambient noise, and hardware issues  If you have any questions or concerns about the content, text, or information contained within the body of this dictation, please contact the provider for clarification      Celestino Crenshaw DO, [facility]  3/15/2021 9:24 AM

## 2021-03-15 NOTE — PROGRESS NOTES
Progress Note - Nephrology   Shabana Ceja 80 y o  male MRN: [id number]  Unit/Bed#: E4 -01 Encounter: [id number]      Assessment / Plan:  1  Acute kidney injury, present on admission, on top of CKD stage 3/4  -Amos I likely due to cardiorenal syndrome with volume overload, possible underlying component of cellulitis  -baseline serum creatinine 1 8-1 9 since mid , serum creatinine 2 48 upon admission, now improved down to 2 17 on Bumex drip per Cardiology  -monitor BMP    2  Hypokalemia, likely diuretic induced-potassium 3, monitor BMP status post oral repletion    3  Mild hyperphosphatemia-monitor phos, latest 4 2    4  Blood pressure a bit lower than goal in light of renal impairment, would want to maintain map greater than 65 to avoid relative hypotension/hypoperfusion  On bisoprolol 5 mg daily  Will adjust hold parameters  5  Acute on chronic systolic heart failure with EF 25%-on Bumex drip at 0 5 per hour per Cardiology, also on bisoprolol, ACE-inhibitor/Arb on hold in light of renal dysfunction, monitor daily weight, I/O  Weight improving    6  LLE cellulitis - on Abx per primary team    Subjective:   He denies chest pain or shortness breath, worsened edema and does think it is improving on a Bumex drip  He is urinating  No other complaints  Objective:     Vitals: Blood pressure 99/57, pulse 71, temperature (!) 95 7 °F (35 4 °C), temperature source Temporal, resp  rate 16, height 5' 9" (1 753 m), weight 77 7 kg (171 lb 4 8 oz), SpO2 98 %  ,Body mass index is 25 3 kg/m²  Temp (24hrs), Av 6 °F (35 9 °C), Min:95 7 °F (35 4 °C), Max:97 3 °F (36 3 °C)      Weight (last 2 days)     Date/Time   Weight    03/15/21 0600   77 7 (171 3)    21 0600   80 7 (177 91)    21 0600   80 2 (176 81)                Intake/Output Summary (Last 24 hours) at 3/15/2021 1635  Last data filed at 3/15/2021 1454  Gross per 24 hour   Intake 872 83 ml   Output 5235 ml   Net -4362 17 ml     I/O last 24 hours: In: 1352 8 [P O :1220; I V :32 8; IV Piggyback:100]  Out: 6660 [Urine:6660]        Physical Exam:   Physical Exam  Vitals signs reviewed  Constitutional:       General: He is not in acute distress  Appearance: Normal appearance  He is well-developed  He is not diaphoretic  Comments: Sitting up in chair   HENT:      Head: Normocephalic and atraumatic  Mouth/Throat:      Pharynx: No oropharyngeal exudate  Eyes:      General: No scleral icterus  Right eye: No discharge  Left eye: No discharge  Comments: eyeglasses   Neck:      Musculoskeletal: Neck supple  Thyroid: No thyromegaly  Cardiovascular:      Rate and Rhythm: Normal rate and regular rhythm  Heart sounds: Normal heart sounds  Pulmonary:      Effort: Pulmonary effort is normal       Breath sounds: Normal breath sounds  No wheezing or rales  Abdominal:      General: Bowel sounds are normal  There is no distension  Palpations: Abdomen is soft  Tenderness: There is no abdominal tenderness  Musculoskeletal: Normal range of motion  General: Swelling (b/l LE, pitting) present  Lymphadenopathy:      Cervical: No cervical adenopathy  Skin:     General: Skin is warm and dry  Findings: No rash  Neurological:      General: No focal deficit present  Mental Status: He is alert        Comments: awake   Psychiatric:         Mood and Affect: Mood normal          Behavior: Behavior normal          Invasive Devices     Peripheral Intravenous Line            Peripheral IV 03/13/21 Left;Ventral (anterior) Forearm 2 days                Medications:    Scheduled Meds:  Current Facility-Administered Medications   Medication Dose Route Frequency Provider Last Rate    apixaban  2 5 mg Oral BID Suly Fernandez MD      bisoprolol  5 mg Oral Daily Rao Pérez DO      bumetanide  0 5 mg/hr Intravenous Continuous Souleymane Pang DO 0 5 mg/hr (03/15/21 3781)    cefazolin  1,000 mg Intravenous Q12H Royal Martin MD 1,000 mg (03/15/21 1635)    finasteride  5 mg Oral Daily Sunitha Davis MD      hydrOXYzine HCL  25 mg Oral Q6H PRN Donelda Arm Prechtel, DO      loratadine  10 mg Oral Daily Viral MONSON Prechtel, DO      pantoprazole  40 mg Oral Daily Sunitha Davis MD      potassium chloride  40 mEq Oral BID Royal Martin MD         PRN Meds: hydrOXYzine HCL    Continuous Infusions:bumetanide, 0 5 mg/hr, Last Rate: 0 5 mg/hr (03/15/21 0312)            LAB RESULTS:      Results from last 7 days   Lab Units 03/15/21  0501 03/14/21  0541 03/13/21  0448 03/12/21  0501 03/11/21  0442 03/10/21  0540 03/09/21  1309   WBC Thousand/uL 6 65 7 00 7 72 7 52 6 95 6 56 7 27   HEMOGLOBIN g/dL 13 9 12 8 13 7 13 5 13 1 13 2 14 3   HEMATOCRIT % 42 8 40 8 43 3 42 1 40 4 41 0 43 5   PLATELETS Thousands/uL 207 193 224 198 205 189 214   NEUTROS PCT %  --   --   --   --   --  60 73   LYMPHS PCT %  --   --   --   --   --  20 12*   MONOS PCT %  --   --   --   --   --  13* 11   EOS PCT %  --   --   --   --   --  6 3   POTASSIUM mmol/L 3 0* 3 6 3 7 4 1 3 4* 3 6 3 2*   CHLORIDE mmol/L 94* 97* 98* 97* 96* 96* 93*   CO2 mmol/L 31 28 29 29 29 30 30   BUN mg/dL 66* 61* 54* 52* 53* 51* 50*   CREATININE mg/dL 2 17* 2 16* 1 97* 2 07* 2 06* 2 21* 2 48*   CALCIUM mg/dL 9 5 9 3 9 4 9 4 9 5 9 5 9 1   ALK PHOS U/L  --   --   --   --   --   --  145*   ALT U/L  --   --   --   --   --   --  34   AST U/L  --   --   --   --   --   --  41   MAGNESIUM mg/dL 1 9 2 0 2 1 2 0 2 0 2 1  --    PHOSPHORUS mg/dL 4 2*  --  3 2  --   --  3 6  --        CUTURES:  Lab Results   Component Value Date    BLOODCX No Growth After 5 Days  03/09/2021    BLOODCX No Growth After 5 Days  03/09/2021    URINECX <10,000 cfu/ml Mixed Contaminants X2 02/08/2017                 Portions of the record may have been created with voice recognition software   Occasional wrong word or "sound a like" substitutions may have occurred due to the inherent limitations of voice recognition software  Read the chart carefully and recognize, using context, where substitutions have occurred  If you have any questions, please contact the dictating provider

## 2021-03-16 PROBLEM — E83.52 HYPERCALCEMIA: Status: ACTIVE | Noted: 2021-01-01

## 2021-03-16 NOTE — ASSESSMENT & PLAN NOTE
Wt Readings from Last 3 Encounters:   03/16/21 73 5 kg (162 lb 0 6 oz)   03/09/21 81 7 kg (180 lb 3 2 oz)   02/26/21 83 5 kg (184 lb)       LVEF 25%  Card/Nephrology following  Has had 3L overnight UOP, weight down 162 from admission 182  Bumex gtt d/c, monitor off diuretics, consider PO or IV tmr per Cardiology

## 2021-03-16 NOTE — PHYSICAL THERAPY NOTE
PT EVALUATION    Pt  Name: Zia Looney  Pt  Age: 80 y o  MRN: [id number]  LENGTH OF STAY: 7    Patient Active Problem List   Diagnosis    Stage 3 chronic kidney disease    Dehydration    Hypertensive kidney disease with chronic kidney disease stage III    [facility]-fib ([facility] )    [facility]    BPH (benign prostatic hyperplasia)    Biventricular cardiac pacemaker in situ    Anticoagulation adequate    Mal[facility] neoplasm of colon [facility]    Cancer of splenic flexure (HCC)    Gastroesophageal reflux disease with esophagitis    Lower extremity edema    Cellulitis of left leg    Acute kidney injury superimposed on chronic kidney disease (HCC)    Acute on chronic systolic CHF (congestive heart failure) (HCC)    Hypokalemia    Hyperphosphatemia       Admitting Diagnoses:   Atrial fibrillation (HCC) [I48 91]  Congestive heart disease ([facility] ) [I50 9]  Peripheral edema [R60 9]  Elevated troponin [R77 8]  Cellulitis of left leg [L03 116]  Acute kidney injury ([facility] ) [N17 9]  Cardiomyopathy, unspecified type ([place] [id number] ) [I42 9]    Past Medical History:   Diagnosis Date    Anemia     Atrial fibrillation [facility]     BPH (benign prostatic hypertrophy)     Cancer (HCC)     COLON    Chronic kidney disease     Hypertension     Irregular heart beat     afib       Past Surgical History:   Procedure Laterality Date    BASAL CELL CARCINOMA EXCISION      CARDIAC PACEMAKER PLACEMENT      CARDIAC SURGERY      CARDIOVERSION      CARPAL TUNNEL RELEASE      CATARACT EXTRACTION      COLON SURGERY      COLONOSCOPY      [place] COLONOSCOPY FLX DX W/COLLJ SPEC WHEN PFRMD N/A 11/30/2018    Procedure: COLONOSCOPY w egd  by dr Einar Gosselin;  Surgeon: Ayaka Villarreal MD;  Location: [facility]; Service: Colorectal    [place] LAP,SURG,COLECTOMY, PARTIAL, W/ANAST N/A 1/8/2019    Procedure: Left hemicolectomy, takedown splenic flexure, end to end transverse to sigmoid colon anastamosis;   Surgeon: Ayaka Villarreal MD;  Location: [facility];  Service: Colorectal       Imaging Studies:  XR chest 1 view portable   Final Result by Vidal Arana DO (03/09 1842)      No acute cardiopulmonary disease  Workstation performed: [id number]              03/16/21 0955   PT Last Visit   PT Visit Date 03/16/21   Note Type   Note type Evaluation   Pain Assessment   Pain Assessment Tool Pain Assessment not indicated - pt denies pain   Pain Score No Pain   Home Living   Type of Home House   Home Layout Two level;Performs ADLs on one level; Able to live on main level with bedroom/bathroom;Stairs to enter with rails  (2 CELESTINO w/ hand rails; 1st floor setup)   Bathroom Shower/Tub Tub/shower unit  (and walk in shower; pt prefers to use tub/shower setup)   [employer] Raised   Bathroom Equipment Grab bars in shower; Shower chair;Grab bars around toilet   [address] Walker;Cane   Prior Function   Level of Lucas Independent with ADLs and functional mobility  (w/ RW )   Lives With Alone; Family  (Daughter is living w/ pt until end of April/May)   Receives Help From   [address] in the last 6 months 0   Vocational Retired   Comments Pt normally lives alone however his daughter and son in law are staying w/ him until the end of [de-identified]; (+)    Restrictions/Precautions   [place] Bearing Precautions Per Order No   Other Precautions Chair Alarm; Bed Alarm; Fall Risk;Telemetry   General   Family/Caregiver Present No   Cognition   Overall Cognitive Status WFL   Arousal/Participation Alert   Attention Attends with cues to redirect   Orientation Level Oriented X4   Following Commands Follows one step commands without difficulty   Comments Cooperative and pleasant   RUE Assessment   RUE Assessment   (refer to OT)   LUE Assessment   LUE Assessment   (refer to OT)   RLE Assessment   RLE Assessment WFL  (4+/5 grossly)   LLE Assessment   LLE Assessment WFL  (4+/5 grossly)   Coordination Sensation WFL   Bed Mobility   Supine to Sit Unable to assess   Sit to Supine Unable to assess   Additional Comments Pt OOB in bathroom pre session; Pt OOB in chair post session   Transfers   Sit to Stand 5  Supervision   Additional items Armrests; Increased time required;Verbal cues   Stand to Sit 5  Supervision   Additional items Armrests; Increased time required;Verbal cues   Toilet transfer 5  Supervision   Additional items Increased time required;Standard toilet;Verbal cues;Armrests  (grab bar use)   Additional Comments Cues for technique and safety   Ambulation/Elevation   Gait pattern Decreased foot clearance; Short stride; Excessively slow   Gait Assistance 5  Supervision   Additional items Verbal cues   Assistive Device Rolling walker   Distance 4'x1; 20'x1  (seated rest break between trials)   Balance   Static Sitting Good   Dynamic Sitting Fair +   Static Standing Fair  (w/ RW)   Dynamic Standing Fair -  (w/ RW)   Ambulatory Fair -  (w/ RW)   Activity Tolerance   Activity Tolerance Patient tolerated treatment well   Medical Staff Made Aware SANTIAGO Vieira   Nurse Made Aware KEY Aguayo   Assessment   Prognosis Good   Problem List Decreased strength;Decreased endurance; Impaired balance;Decreased mobility; Impaired judgement;Decreased safety awareness   Assessment Pt  89 y o male presents from his cardiology office due to concern for cellulitis of L leg and rising creatinine  Past medical hx includes cardiomyopathy, atrial fibrillation, CKD stage 3, and colon cancer  Pt admitted for Cellulitis of left leg w/ Atrial fibrillation, Congestive heart disease, Peripheral edema, Elevated troponin, Acute kidney injury, and Cardiomyopathy  Pt referred to PT for functional mobility evaluation & D/C planning w/ orders of activity as tolerated  PTA, pt reports being I w/ RW  During evaluation, deficits included dec mobility, balance, ambulation  Pt demonstrated dec endurance and tolerance to activity   Evaluation of functional mobility required S for sit<>stand, toilet transfers, and amb  Pt was able to ambulate 4' w/ RW  Pt was able to further amb 20' w/ RW in room  Gait deviations as above, slow but no gross LOB noted  Denies reports of dizziness or SOB t/o session  Nsg staff most recent vital signs as follows: /55 (BP Location: Right arm)   Pulse 72   Temp (!) 97 °F (36 1 °C) (Temporal)   Resp 18   Ht 5' 9" (1 753 m)   Wt 73 5 kg (162 lb 0 6 oz)   SpO2 97%   BMI 23 93 kg/m²   At end of session, pt OOB in chair in stable condition, call bell & phone in reach, chair alarm activated  Pt was educated on fall precautions and reinforced w/ good understanding  Pt would benefit from continued PT to address deficits as defined above and maximize level of independence with functional mobility and safety  The patient's AM-PAC Basic Mobility Inpatient Short Form Raw Score is 19, Standardized Score is 42 48  A standardized score less than 42 9 suggests the patient may benefit from discharge to post-acute rehabilitation services  Please also refer to the recommendation of the Physical Therapist for safe discharge planning  Despite AM-PAC score, from PT/mobility standpoint recommendation for D/C home w/ HHPT and inc social support, when medically cleared based on objective measures above, current function, and dec family/caregiver support  CM to follow  Ns staff to continue to mobilized pt (OOB in chair for all meals & ambulate in room/unit) as tolerated to prevent further decline in function  Nsg notified      Barriers to Discharge Inaccessible home environment   Barriers to Discharge Comments stairs to enter   Goals   Patient Goals to go home   STG Expiration Date 03/26/21   Short Term Goal #1 1) Inc overall LE strength by 1/2 MMT grade to improve functional mobility; 2) Pt will demonstrate improved bed mobility with mod I to dec caregiver burden; 3) Pt will demonstrate improved transfers w/ mod I for inc safety; 4) Pt will be able to amb w/ mod I >150' w/ RW for household distances to inc safety and dec caregiver burden; 5) Pt will be able to navigate stairs with mod I to dec caregiver burden and inc safety with functional mobility; 6) Improve general balance by 1 grade to inc safety; 7) PT for ongoing patient and caregiver education    PT Treatment Day 0   Plan   Treatment/Interventions Functional transfer training;Elevations;LE strengthening/ROM; Therapeutic exercise; Endurance training;Patient/family training;Bed mobility;Gait training;Spoke to nursing;OT   PT Frequency Other (Comment)  (3-5x/wk)   Recommendation   PT Discharge Recommendation Home with skilled therapy; Return to previous environment with social support  (HHPT w/ inc social support)   Equipment Recommended Walker  (Pt owns walker)   [employer] in Bed Without Bedrails 4   Lying on Back to Sitting on Edge of Flat Bed 3   Moving Bed to Chair 3   Standing Up From Chair 3   Walk in Room 3   Climb 3-5 Stairs 3   Basic Mobility Inpatient Raw Score 19   Basic Mobility Standardized Score 42 48   Hx/personal factors: co-morbidities, inaccessible home, dec caregiver support, home alone, advanced age, telemetry, use of AD and fall risk  Examination: dec mobility, dec balance, dec endurance, dec amb, risk for falls  Clinical: unpredictable (ongoing medical status, abnormal lab values and risk for falls)  Complexity: high     [place]ka [place]

## 2021-03-16 NOTE — PLAN OF CARE
Problem: OCCUPATIONAL THERAPY ADULT  Goal: Performs self-care activities at highest level of function for planned discharge setting  See evaluation for individualized goals  Description: Treatment Interventions: ADL retraining, Functional transfer training, UE strengthening/ROM, Endurance training, Patient/family training, Equipment evaluation/education, Compensatory technique education, Energy conservation, Activityengagement          See flowsheet documentation for full assessment, interventions and recommendations  Note: Limitation: Decreased ADL status, Decreased UE strength, Decreased Safe judgement during ADL, Decreased endurance, Decreased self-care trans, Decreased high-level ADLs  Prognosis: Fair  Assessment: Pt is a 80 y o  male seen for OT evaluation [place] [facility] [place] w/ Cellulitis of left leg, Acute on chronic systolic CHF, and SUDHIR superimposed on CKD  Comorbidities affecting pts functional performance include a significant PMH of Anemia, A-Fib, BPH, CA, CKD, HTN, and A-Fib  Pt with active OT orders and activity orders for Activity as tolerated  Pt lives alone in a two level [facility] home with 2 [facility] and 1st floor setup  Pt's dtr is living with him until end of April/beginning of May  Pt also has 2 local sons to assist as needed  At baseline, pt was I w/ ADLs, IADLs, and functional mobility/transfers w/ use of RW PRN, (+) , and denies falls PTA  Upon evaluation, pt currently requires Supervision for UB ADLs, Min A for LB ADLs, Supervision for toileting, and Supervision for functional mobility/transfers 2* the following deficits impacting occupational performance: weakness, decreased strength, decreased balance, decreased tolerance and decreased safety awareness   These impairments, as well at pts steps to enter environment, limited home support, difficulty performing ADLS, difficulty performing IADLS  and limited insight into deficits limit pts ability to safely engage in all baseline areas of occupation  The patient's raw score on the AM-PAC Daily Activity inpatient short form is 20, standardized score is 42 03, greater than 39 4  Patients at this level are likely to benefit from discharge to home  Please refer to the recommendation of the Occupational Therapist for safe discharge planning  Based on the aforementioned OT evaluation, functional performance deficits, and assessments, pt has been identified as a Moderate complexity evaluation  Pt to continue to benefit from continued acute OT services during hospital stay to address defined deficits and to maximize level of functional independence in the following Occupational Performance areas: grooming, bathing/shower, toilet hygiene, dressing, medication management, health maintenance, functional mobility, community mobility, clothing management, cleaning, meal prep and household maintenance  From OT standpoint, recommend STR upon D/C   OT will continue to follow pt 3-5x/wk to address the following goals to  w/in 10-14 days:     OT Discharge Recommendation: Home with skilled therapy  OT - OK to Discharge: Yes(when medically cleared to rehab)

## 2021-03-16 NOTE — OCCUPATIONAL THERAPY NOTE
Occupational Therapy Evaluation     Patient Name: Sophie Swan  CJGUD'G Date: 3/16/2021  Problem List  Principal Problem:    Cellulitis of left leg  Active Problems:    A-fib ([facility] )    Cardiomyopathy ([facility] )    BPH (benign prostatic hyperplasia)    Acute kidney injury superimposed on chronic kidney disease ([facility] )    Acute on chronic systolic CHF (congestive heart failure) ([facility] )    Hypokalemia    Hyperphosphatemia    Past Medical History  Past Medical History:   Diagnosis Date    Anemia     Atrial fibrillation ([facility]     BPH (benign prostatic hypertrophy)     Cancer (HCC)     COLON    Chronic kidney disease     Hypertension     Irregular heart beat     afib     Past Surgical History  Past Surgical History:   Procedure Laterality Date    BASAL CELL CARCINOMA EXCISION      CARDIAC PACEMAKER PLACEMENT      CARDIAC SURGERY      CARDIOVERSION      CARPAL TUNNEL RELEASE      CATARACT EXTRACTION      COLON SURGERY      COLONOSCOPY      MT COLONOSCOPY FLX DX W/COLLJ SPEC WHEN PFRMD N/A 11/30/2018    Procedure: COLONOSCOPY w egd  by dr Lul Pérez;  Surgeon: Moses Pereira MD;  Location: [facility]; Service: Colorectal    MT LAP,SURG,COLECTOMY, PARTIAL, W/ANAST N/A 1/8/2019    Procedure: Left hemicolectomy, takedown splenic flexure, end to end transverse to sigmoid colon anastamosis; Surgeon: Moses Pereira MD;  Location: [facility];  Service: Colorectal           03/16/21 0954   Note Type   Note type Evaluation   Restrictions/Precautions   Weight Bearing Precautions Per Order No   Other Precautions Chair Alarm; Bed Alarm;Telemetry; Fall Risk   Pain Assessment   Pain Assessment Tool Pain Assessment not indicated - pt denies pain   Pain Score No Pain   Home Living   Type of Home House  ([address] [facility] style home)   Home Layout Two level; Able to live on main level with bedroom/bathroom; Performs ADLs on one level;Stairs to enter with rails  (2 [address], 1st floor setup)   Bathroom Shower/Tub Tub/shower unit  (also has walk-in shower; Prefers tub/shower unit)   Bathroom Toilet Raised   Bathroom Equipment Grab bars in shower; Shower chair;Grab bars around toilet   [address] [place]   Additional Comments Pt lives alone in a two level [facility] style home with 2 CELEST[address] and 1st floor setup  Pt's dtr is living with him until end of April/beginning of May  Pt also has 2 local sons to assist as needed  Prior Function   Level of [place] Independent with ADLs and functional mobility   Lives With Alone;Daughter  (Dtr is living with him until end of April/beginning of May)   Receives Help From [facility] [place], [address] in the last 6 months 0   Vocational Retired   Comments At baseline, pt was I w/ ADLs, IADLs, and functional mobility/transfers w/ use of RW PRN, (+) , and denies falls PTA  Lifestyle   Autonomy At baseline, pt was I w/ ADLs, IADLs, and functional mobility/transfers w/ use of RW PRN, (+) , and denies falls PTA  Reciprocal Relationships Dtr, 4 sons   Service to Others Retired- [employer]   Intrinsic Gratification Watching TV, Traveling   Psychosocial   Psychosocial ([address]) WDL   ADL   Where Assessed Chair   Eating Assistance 5  Supervision/Setup   Grooming Assistance 5  Supervision/Setup   UB [address] 5  Supervision/Setup   LB [address] 4  Minimal Assistance   [address] 5  Supervision/Setup    [facility] 4  [address]  5  Supervision/Setup   Functional Assistance 5  Supervision/Setup   Additional Comments Pt reports increased difficulty completing LB ADLs at times   Bed Mobility   Supine to Sit Unable to assess   Sit to Supine Unable to assess   Additional Comments Pt OOB in bathroom at start of session and OOB in chair at end of eval  Chair alarm activated   All needs met and pt reports no further questions for OT at this time   Transfers   Sit to Stand 5  Supervision   Additional items Armrests; Increased time required;Verbal cues   Stand to Sit 5  Supervision   Additional items Armrests; Increased time required;Verbal cues   Toilet transfer 5  Supervision   Additional items Increased time required;Verbal cues;Standard toilet  (grab bar use)   Additional Comments Cues for safe technique and hand placement   Functional Mobility   Functional Mobility 5  Supervision   Additional Comments Assist x1   Additional items Rolling walker   Balance   Static Sitting Fair +   Dynamic Sitting Fair   Static Standing Fair   Dynamic Standing Fair -   Ambulatory Fair -   Activity Tolerance   Activity Tolerance Patient tolerated treatment well   Medical Staff Made Aware ALAN Simpson   Nurse Made Aware yes; KEY Hair   RUE Assessment   RUE Assessment WFL   RUE Strength   RUE Overall Strength Within Functional Limits - able to perform ADL tasks with strength  (4-/5 throughout)   LUE Assessment   LUE Assessment WFL   LUE Strength   LUE Overall Strength Within Functional Limits - able to perform ADL tasks with strength  (4-/5 throughout)   Hand Function   Gross Motor Coordination Functional   Fine Motor Coordination Functional   Sensation   Light Touch No apparent deficits   Proprioception   Proprioception No apparent deficits   Vision-Basic Assessment   Current Vision Wears glasses all the time   Vision - Complex Assessment   Ocular Range of Motion [facility]   Acuity Able to read clock/calendar on wall without difficulty; Able to read employee name badge without difficulty   Perception   Inattention/Neglect Appears intact   Cognition   Overall Cognitive Status [facility]   Arousal/Participation Alert; Cooperative   Attention Attends with cues to redirect   Orientation Level Oriented X4   Memory Decreased recall of precautions   Following Commands Follows one step commands without difficulty   Comments Pleasant and cooperative   Cues for redirection, limited insight into deficits    Assessment Limitation Decreased ADL status; Decreased UE strength;Decreased Safe judgement during ADL;Decreased endurance;Decreased self-care trans;Decreased high-level ADLs   Prognosis Fair   Assessment Pt is a 80 y o  male seen for OT evaluation s/p adm to [facility] on 3/9/2021 w/ Cellulitis of left leg, Acute on chronic systolic CHF, and SUDHIR superimposed on CKD  Comorbidities affecting pts functional performance include a significant PMH of Anemia, A-Fib, BPH, CA, CKD, HTN, and A-Fib  Pt with active OT orders and activity orders for Activity as tolerated  Pt lives alone in a two level [facility] home with 2 [address] and 1st floor setup  Pt's dtr is living with him until end of April/beginning of May  Pt also has 2 local sons to assist as needed  At baseline, pt was I w/ ADLs, IADLs, and functional mobility/transfers w/ use of RW PRN, (+) , and denies falls PTA  Upon evaluation, pt currently requires Supervision for UB ADLs, Min A for LB ADLs, Supervision for toileting, and Supervision for functional mobility/transfers 2* the following deficits impacting occupational performance: weakness, decreased strength, decreased balance, decreased tolerance and decreased safety awareness  These impairments, as well at pts steps to enter environment, limited home support, difficulty performing ADLS, difficulty performing IADLS  and limited insight into deficits limit pts ability to safely engage in all baseline areas of occupation  The patient's raw score on the AM-PAC Daily Activity inpatient short form is 20, standardized score is 42 03, greater than 39 4  Patients at this level are likely to benefit from discharge to home  Please refer to the recommendation of the Occupational Therapist for safe discharge planning  Based on the aforementioned OT evaluation, functional performance deficits, and assessments, pt has been identified as a Moderate complexity evaluation   Pt to continue to benefit from continued acute OT services during hospital stay to address defined deficits and to maximize level of functional independence in the following Occupational Performance areas: grooming, bathing/shower, toilet hygiene, dressing, medication management, health maintenance, functional mobility, community mobility, clothing management, cleaning, meal prep and household maintenance  From OT standpoint, recommend STR upon D/C  OT will continue to follow pt 3-5x/wk to address the following goals to  w/in 10-14 days:   Goals   Patient Goals To go home   LTG Time Frame 10-   Long Term Goal Please refer to LTGs listed below   Plan   Treatment Interventions ADL retraining;Functional transfer training;UE strengthening/ROM; Endurance training;Patient/family training;Equipment evaluation/education; Compensatory technique education; Energy conservation; Activityengagement   Goal Expiration Date 21   OT Treatment Day 0   OT Frequency 3-5x/wk   Recommendation   OT Discharge Recommendation Home with skilled therapy   OT - OK to Discharge Yes  (when medically cleared to rehab)   AM-PAC Daily Activity Inpatient   Lower Body Dressing 3   Bathing 3   Toileting 3   Upper Body Dressing 3   Grooming 4   Eating 4   Daily Activity Raw Score 20   Daily Activity Standardized Score (Calc for Raw Score >=11) 42 03   AM-PAC Applied Cognition Inpatient   Following a Speech/Presentation 4   Understanding Ordinary Conversation 4   Taking Medications 4   Remembering Where Things Are Placed or Put Away 4   Remembering List of 4-5 Errands 3   Taking Care of Complicated Tasks 3   Applied Cognition Raw Score 22   Applied Cognition Standardized Score 47 83        GOALS    1  Pt will improve activity tolerance to G for min 30 min txment sessions for increase engagement in functional tasks    2  Pt will complete bed mobility at a Mod I level w/ G balance/safety demonstrated to decrease caregiver assistance required     3   Pt will complete UB dressing/self care w/ mod I using adaptive device and DME as needed     4  Pt will complete LB dressing/self care w/ mod I using adaptive device and DME as needed    5  Pt will complete toileting w/ mod I w/ G hygiene/thoroughness using DME as needed    6  Pt will improve functional transfers to Mod I on/off all surfaces using DME as needed w/ G balance/safety     7  Pt will improve functional mobility during ADL/IADL/leisure tasks to Mod I using DME as needed w/ G balance/safety     8  Pt will be attentive 100% of the time during ongoing cognitive assessment w/ G participation to assist w/ safe d/c planning/recommendations    9  Pt will demonstrate G carryover of pt/caregiver education and training as appropriate w/o cues w/ good tolerance to increase safety during functional tasks    10  Pt will participate in simulated IADL management task to increase independence to Mod I w/ G safety and endurance    11  Pt will increase BUE strength by 1MM grade via AROM exercises to increase independence in ADLs and transfers    12   Pt will increase standing tolerance to 10-15 mins with Fair+ dynamic standing balance to increase safety during participation in ADLs       Trey Rose, OTR/L

## 2021-03-16 NOTE — PROGRESS NOTES
Progress Note - Cardiology   Nabor Mccloud 80 y o  male MRN: [id number]  Unit/Bed#: E4 -01 Encounter: [id number]      Assessment/Recommendations/Discussion:   · Acute on chronic systolic heart failure  · LVEF 25%, mild LVH, mild ANGEL, regional wall motion abnormalities consistent with CAD, severe RV dilatation severe RA dilatation, moderate MR, AV sclerosis with mild AR, moderate to severe TR w/ PASP 45-50 mmHg, March 2021  · Left lower extremity cellulitis  · NSVT 5 beats, March 2021  · Acute kidney injury on CKD stage III  · Non MI troponin elevation secondary to acute heart failure  · Permanent atrial fibrillation s/p AVN ablation with [facility] CRT-P on Eliquis   · GERD    PLAN   Having excellent urine output with Bumex drip   Creatinine slightly elevated today   Weight continues to decrease, has decreased significantly since Bumex drip was initiated   Blood pressure low normal   Continue bisoprolol   Continue Eliquis for anticoagulation for AFib   Ancef for cellulitis   DC Bumex drip today, hold further diuretics for the rest of the day today with plan to repeat BMP tomorrow and re-dose diuretics starting tomorrow with either intermittent IV dosing versus p o  Diuretics        Subjective:   HPI  Doing well, making very good urine, feels like he is getting dehydrated however  Denies chest pain or shortness of breath  Review of Systems: As noted in HPI  Rest of ROS is negative      Vitals:   /55 (BP Location: Right arm)   Pulse 72   Temp (!) 97 °F (36 1 °C) (Temporal)   Resp 18   Ht 5' 9" (1 753 m)   Wt 73 5 kg (162 lb 0 6 oz)   SpO2 97%   BMI 23 93 kg/m²   Vitals:    03/15/21 0600 03/16/21 0600   Weight: 77 7 kg (171 lb 4 8 oz) 73 5 kg (162 lb 0 6 oz)       Intake/Output Summary (Last 24 hours) at 3/16/2021 1114  Last data filed at 3/16/2021 0742  Gross per 24 hour   Intake --   Output 2975 ml   Net -2975 ml       Physical Exam:  General appearance: alert and oriented, in no acute distress  Head: Normocephalic, without obvious abnormality, atraumatic  Eyes: conjunctivae/corneas clear  Anicteric  Neck: no adenopathy, no carotid bruit, no JVD  Lungs: clear to auscultation bilaterally  Heart: regular rate and rhythm, S1, S2 normal, no murmur, no click, rub or gallop  Abdomen:  soft, non-tender; bowel sounds normal; no masses,  no organomegaly  Extremities:  Left lower extremity cellulitis  Bilateral lower extremity edema, 2 to 3+  Skin: Skin color, texture, turgor normal  No rashes or lesions     TELEMETRY:   Lab Results:  Results from last 7 days   Lab Units 03/15/21  0501   WBC Thousand/uL 6 65   HEMOGLOBIN g/dL 13 9   HEMATOCRIT % 42 8   PLATELETS Thousands/uL 207     Results from last 7 days   Lab Units 03/16/21  0514  03/09/21  1309   POTASSIUM mmol/L 3 5   < > 3 2*   CHLORIDE mmol/L 94*   < > 93*   CO2 mmol/L 34*   < > 30   BUN mg/dL 70*   < > 50*   CREATININE mg/dL 2 34*   < > 2 48*   CALCIUM mg/dL 10 2*   < > 9 1   ALK PHOS U/L  --   --  145*   ALT U/L  --   --  34   AST U/L  --   --  41    < > = values in this interval not displayed       Results from last 7 days   Lab Units 03/16/21  0514   POTASSIUM mmol/L 3 5   CHLORIDE mmol/L 94*   CO2 mmol/L 34*   BUN mg/dL 70*   CREATININE mg/dL 2 34*   CALCIUM mg/dL 10 2*           Medications:    Current Facility-Administered Medications:     apixaban (ELIQUIS) tablet 2 5 mg, 2 5 mg, Oral, BID, Cecelia Alvarez MD, 2 5 mg at 03/16/21 0921    bisoprolol (ZEBETA) tablet 5 mg, 5 mg, Oral, Daily, Taylor Zurita DO, Stopped at 03/16/21 0921    bumetanide (BUMEX) 12 5 mg infusion 50 mL, 0 5 mg/hr, Intravenous, Continuous, Adriana Frank DO, Last Rate: 2 mL/hr at 03/16/21 1017, 0 5 mg/hr at 03/16/21 1017    ceFAZolin (ANCEF) IVPB (premix in dextrose) 1,000 mg 50 mL, 1,000 mg, Intravenous, Q12H, Emily Castellon MD    finasteride (PROSCAR) tablet 5 mg, 5 mg, Oral, Daily, Cecelia Alvarez MD, 5 mg at 03/16/21 0921    hydrOXYzine HCL (ATARAX) tablet 25 mg, 25 mg, Oral, Q6H PRN, Katlin Kirby DO    loratadine (CLARITIN) tablet 10 mg, 10 mg, Oral, Daily, Viral Kirby DO, 10 mg at 03/16/21 0921    pantoprazole (PROTONIX) EC tablet 40 mg, 40 mg, Oral, Daily, Genoveva Hernandez MD, 40 mg at 03/16/21 0500    This note was completed in part utilizing [employer] Direct Software  Grammatical errors, random word insertions, spelling mistakes, and incomplete sentences may be an occasional consequence of this system secondary to software limitations, ambient noise, and hardware issues  If you have any questions or concerns about the content, text, or information contained within the body of this dictation, please contact the provider for clarification      Parviz Del Rio DO, [facility]  3/16/2021 11:14 AM

## 2021-03-16 NOTE — PROGRESS NOTES
[facility]  Progress Note - Coleen Maguire 6/16/1927, 80 y o  male MRN: [id number]  Unit/Bed#: E4 -01 Encounter: [id number]  Primary Care Provider: VARSHA Ramos MD   Date and time admitted to hospital: 3/9/2021 12:52 PM    Acute on chronic systolic CHF (congestive heart failure) ([facility])  Assessment & Plan  Wt Readings from Last 3 Encounters:   03/16/21 73 5 kg (162 lb 0 6 oz)   03/09/21 81 7 kg (180 lb 3 2 oz)   02/26/21 83 5 kg (184 lb)       LVEF 25%  Card/Nephrology following  Has had 3L overnight UOP, weight down 162 from admission 182  Bumex gtt d/c, monitor off diuretics, consider PO or IV tmr per Cardiology      Acute kidney injury superimposed on chronic kidney disease ([facility] )  Assessment & Plan  · Renal fxns stable while diuresis  · Monitor renal fxns  · Hold ACEi at this time    BPH (benign prostatic hyperplasia)  Assessment & Plan  · Stable  · Continue finasteride    [facility] )  Assessment & Plan  · Status post biventricular pacemaker  · V paced and anticoagulated on Eliquis    * Cellulitis of left leg  Assessment & Plan  Continue Ancef, to complete on 3/19  Consider switching to orals on discharge        VTE Pharmacologic Prophylaxis:   Pharmacologic: Apixaban (Eliquis)  Mechanical VTE Prophylaxis in Place: Yes    Patient Centered Rounds: I have performed bedside rounds with nursing staff today  Discussions with Specialists or Other Care Team Provider: Cardiology, Nephrology    Education and Discussions with Family / Patient: Patient    Time Spent for Care: 30 minutes  More than 50% of total time spent on counseling and coordination of care as described above  Current Length of Stay: 7 day(s)    Current Patient Status: Inpatient   Certification Statement: The patient will continue to require additional inpatient hospital stay due to IV diuresis    Discharge Plan: Pending, 1-2 days    Code Status: Level 3 - DNAR and DNI      Subjective:   Patient seen today  No complaints or events overnight  Swelling has improved, reviewed left foot wound and appears well healing, with decreased swelling  Objective:     Vitals:   Temp (24hrs), Av 7 °F (35 9 °C), Min:95 7 °F (35 4 °C), Max:97 2 °F (36 2 °C)    Temp:  [95 7 °F (35 4 °C)-97 2 °F (36 2 °C)] 97 °F (36 1 °C)  HR:  [70-74] 74  Resp:  [16-18] 18  BP: ()/(55-61) 93/60  SpO2:  [97 %-98 %] 97 %  Body mass index is 23 93 kg/m²  Input and Output Summary (last 24 hours): Intake/Output Summary (Last 24 hours) at 3/16/2021 1347  Last data filed at 3/16/2021 8334  Gross per 24 hour   Intake --   Output 2375 ml   Net -2375 ml       Physical Exam:     Physical Exam  Constitutional:       Appearance: He is not ill-appearing or diaphoretic  HENT:      Head: Normocephalic and atraumatic  Nose: No rhinorrhea  Neck:      Musculoskeletal: Neck supple  Cardiovascular:      Rate and Rhythm: Regular rhythm  Heart sounds: No murmur  No gallop  Pulmonary:      Effort: Pulmonary effort is normal  No respiratory distress  Breath sounds: No wheezing  Abdominal:      General: Abdomen is flat  There is no distension  Palpations: Abdomen is soft  Tenderness: There is no abdominal tenderness  Musculoskeletal:         General: Swelling present  Right lower leg: Edema present  Left lower leg: Edema present  Comments: Left anterior tibial wound, mild erythema, well healing tissue noted, wet   Neurological:      Mental Status: He is alert and oriented to person, place, and time  Mental status is at baseline             Additional Data:     Labs:    Results from last 7 days   Lab Units 03/15/21  0501  03/10/21  0540   WBC Thousand/uL 6 65   < > 6 56   HEMOGLOBIN g/dL 13 9   < > 13 2   HEMATOCRIT % 42 8   < > 41 0   PLATELETS Thousands/uL 207   < > 189   NEUTROS PCT %  --   --  60   LYMPHS PCT %  --   --  20   MONOS PCT %  --   --  13*   EOS PCT %  --   --  6    < > = values in this interval not displayed  Results from last 7 days   Lab Units 03/16/21  0514  03/09/21  1309   SODIUM mmol/L 139   < > 135*   POTASSIUM mmol/L 3 5   < > 3 2*   CHLORIDE mmol/L 94*   < > 93*   CO2 mmol/L 34*   < > 30   BUN mg/dL 70*   < > 50*   CREATININE mg/dL 2 34*   < > 2 48*   ANION GAP mmol/L 11   < > 12   CALCIUM mg/dL 10 2*   < > 9 1   ALBUMIN g/dL  --   --  3 2*   TOTAL BILIRUBIN mg/dL  --   --  1 29*   ALK PHOS U/L  --   --  145*   ALT U/L  --   --  34   AST U/L  --   --  41   GLUCOSE RANDOM mg/dL 112   < > 115    < > = values in this interval not displayed  Results from last 7 days   Lab Units 03/16/21  1124 03/16/21  0741   POC GLUCOSE mg/dl 140 116                   * I Have Reviewed All Lab Data Listed Above  * Additional Pertinent Lab Tests Reviewed: All Labs For Current Hospital Admission Reviewed    Imaging:    Xr Chest 1 View Portable    Result Date: 3/9/2021  Impression: No acute cardiopulmonary disease  Workstation performed: [id number]     Recent Cultures (last 7 days):     Results from last 7 days   Lab Units 03/09/21  1409 03/09/21  1309   BLOOD CULTURE  No Growth After 5 Days  No Growth After 5 Days  Last 24 Hours Medication List:   Current Facility-Administered Medications   Medication Dose Route Frequency Provider Last Rate    apixaban  2 5 mg Oral BID Jenna Mancini MD      bisoprolol  5 mg Oral Daily [facility]      cefazolin  1,000 mg Intravenous Q12H Fletcher Hermosillo MD      finasteride  5 mg Oral Daily Jenna Mancini MD      hydrOXYzine HCL  25 mg Oral Q6H PRN [address], DO      loratadine  10 mg Oral Daily Viral Phelpsel, DO      pantoprazole  40 mg Oral Daily Jenna Mancini MD          Today, Patient Was Seen By: Fletcher Hermosillo MD    ** Please Note: Dictation voice to text software may have been used in the creation of this document   **

## 2021-03-16 NOTE — PROGRESS NOTES
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  -Amos likely due to cardiorenal syndrome with volume overload, possible underlying component of cellulitis  -baseline serum creatinine 1 8-1 9 since mid , serum creatinine 2 48 upon admission, now improved down to 2 34 on Bumex drip per Cardiology  -may need to accept higher sCr for euvolemia  -monitor BMP    2  Hypokalemia, likely diuretic induced-potassium 3-->3 5, monitor BMP status post oral repletion, received 40meq today KCl    3  Mild hyperphosphatemia-monitor phos, latest 4 2    4  Blood pressure a bit lower than goal in light of renal impairment, would want to maintain map greater than 65 to avoid relative hypotension/hypoperfusion  On bisoprolol 5 mg with hold parameters  5  Acute on chronic systolic heart failure with EF 25%-on Bumex drip at 0 5 per hour per Cardiology, also on bisoprolol, ACE-inhibitor/Arb on hold in light of renal dysfunction, monitor daily weight, I/O  Weight improving    6  LLE cellulitis - on Abx per primary team    7  Hypercalcemia - uncorrected calcium 10 2  Will check ICal, phos, PTH levels  Not currently on calcium supplements  Suspect diuretic induced as on bumex gtt  Subjective:   He is urinating well  Denies chest pain or shortness breath  On Bumex drip  Thinks his leg edema is slowly improving  Objective:     Vitals: Blood pressure 93/60, pulse 74, temperature (!) 97 °F (36 1 °C), temperature source Temporal, resp  rate 18, height 5' 9" (1 753 m), weight 73 5 kg (162 lb 0 6 oz), SpO2 97 %  ,Body mass index is 23 93 kg/m²  Temp (24hrs), Av 7 °F (35 9 °C), Min:95 7 °F (35 4 °C), Max:97 2 °F (36 2 °C)      Weight (last 2 days)     Date/Time   Weight    21 0600   73 5 (162 04)    03/15/21 0600   77 7 (171 3)    21 0600   80 7 (177 91)                Intake/Output Summary (Last 24 hours) at 3/16/2021 1400  Last data filed at 3/16/2021 0742  Gross per 24 hour   Intake --   Output 2375 ml   Net -2375 ml     I/O last 24 hours: In: 240 [P O :240]  Out: 3775 [Urine:3775]        Physical Exam:   Physical Exam  Vitals signs reviewed  Constitutional:       General: He is not in acute distress  Appearance: Normal appearance  He is well-developed  He is not diaphoretic  HENT:      Head: Normocephalic and atraumatic  Mouth/Throat:      Pharynx: No oropharyngeal exudate  Eyes:      General: No scleral icterus  Right eye: No discharge  Left eye: No discharge  Comments: eyeglasses   Neck:      Musculoskeletal: Neck supple  Thyroid: No thyromegaly  Cardiovascular:      Rate and Rhythm: Normal rate and regular rhythm  Heart sounds: Normal heart sounds  Pulmonary:      Effort: Pulmonary effort is normal       Breath sounds: Normal breath sounds  No wheezing or rales  Abdominal:      General: Bowel sounds are normal  There is no distension  Palpations: Abdomen is soft  Tenderness: There is no abdominal tenderness  Musculoskeletal: Normal range of motion  General: Swelling (b/l LE) present  Lymphadenopathy:      Cervical: No cervical adenopathy  Skin:     General: Skin is warm and dry  Findings: No rash  Neurological:      General: No focal deficit present  Mental Status: He is alert        Comments: awake   Psychiatric:         Mood and Affect: Mood normal          Behavior: Behavior normal          Invasive Devices     Peripheral Intravenous Line            Peripheral IV 03/13/21 Left;Ventral (anterior) Forearm 3 days                Medications:    Scheduled Meds:  Current Facility-Administered Medications   Medication Dose Route Frequency Provider Last Rate    apixaban  2 5 mg Oral BID Tata Banegas MD      bisoprolol  5 mg Oral Daily Taylor Scott DO      cefazolin  1,000 mg Intravenous Q12H Mayelin Pinzon MD      finasteride  5 mg Oral Daily Ninfa Parish MD      hydrOXYzine HCL  25 mg Oral Q6H PRN Kushal Chele Kirby,       loratadine  10 mg Oral Daily Viral Kirby, DO      pantoprazole  40 mg Oral Daily Ninfa Parish MD         PRN Meds: hydrOXYzine HCL    Continuous Infusions:         LAB RESULTS:      Results from last 7 days   Lab Units 03/16/21  0514 03/15/21  0501 03/14/21  0541 03/13/21  0448 03/12/21  0501 03/11/21  0442 03/10/21  0540 03/09/21  1309   WBC Thousand/uL  --  6 65 7 00 7 72 7 52 6 95 6 56 7 27   HEMOGLOBIN g/dL  --  13 9 12 8 13 7 13 5 13 1 13 2 14 3   HEMATOCRIT %  --  42 8 40 8 43 3 42 1 40 4 41 0 43 5   PLATELETS Thousands/uL  --  207 193 224 198 205 189 214   NEUTROS PCT %  --   --   --   --   --   --  60 73   LYMPHS PCT %  --   --   --   --   --   --  20 12*   MONOS PCT %  --   --   --   --   --   --  13* 11   EOS PCT %  --   --   --   --   --   --  6 3   POTASSIUM mmol/L 3 5 3 0* 3 6 3 7 4 1 3 4* 3 6 3 2*   CHLORIDE mmol/L 94* 94* 97* 98* 97* 96* 96* 93*   CO2 mmol/L 34* 31 28 29 29 29 30 30   BUN mg/dL 70* 66* 61* 54* 52* 53* 51* 50*   CREATININE mg/dL 2 34* 2 17* 2 16* 1 97* 2 07* 2 06* 2 21* 2 48*   CALCIUM mg/dL 10 2* 9 5 9 3 9 4 9 4 9 5 9 5 9 1   ALK PHOS U/L  --   --   --   --   --   --   --  145*   ALT U/L  --   --   --   --   --   --   --  34   AST U/L  --   --   --   --   --   --   --  41   MAGNESIUM mg/dL 2 1 1 9 2 0 2 1 2 0 2 0 2 1  --    PHOSPHORUS mg/dL  --  4 2*  --  3 2  --   --  3 6  --        CUTURES:  Lab Results   Component Value Date    BLOODCX No Growth After 5 Days  03/09/2021    BLOODCX No Growth After 5 Days  03/09/2021    URINECX <10,000 cfu/ml Mixed Contaminants X2 02/08/2017                 Portions of the record may have been created with voice recognition software  Occasional wrong word or "sound a like" substitutions may have occurred due to the inherent limitations of voice recognition software   Read the chart carefully and recognize, using context, where substitutions have occurred  If you have any questions, please contact the dictating provider

## 2021-03-16 NOTE — PLAN OF CARE
Problem: PHYSICAL THERAPY ADULT  Goal: Performs mobility at highest level of function for planned discharge setting  See evaluation for individualized goals  Description: Treatment/Interventions: Functional transfer training, Elevations, LE strengthening/ROM, Therapeutic exercise, Endurance training, Patient/family training, Bed mobility, Gait training, Spoke to nursing, OT  Equipment Recommended: Walker(Pt owns walker)       See flowsheet documentation for full assessment, interventions and recommendations  Note: Prognosis: Good  Problem List: Decreased strength, Decreased endurance, Impaired balance, Decreased mobility, Impaired judgement, Decreased safety awareness  Assessment: Pt  89 y o male presents from his cardiology office due to concern for cellulitis of L leg and rising creatinine  Past medical hx includes cardiomyopathy, atrial fibrillation, CKD stage 3, and colon cancer  Pt admitted for Cellulitis of left leg w/ Atrial fibrillation, Congestive heart disease, Peripheral edema, Elevated troponin, Acute kidney injury, and Cardiomyopathy  Pt referred to PT for functional mobility evaluation & D/C planning w/ orders of activity as tolerated  PTA, pt reports being I w/ RW  During evaluation, deficits included dec mobility, balance, ambulation  Pt demonstrated dec endurance and tolerance to activity  Evaluation of functional mobility required S for sit<>stand, toilet transfers, and amb  Pt was able to ambulate 4' w/ RW  Pt was able to further amb 20' w/ RW in room  Gait deviations as above, slow but no gross LOB noted  Denies reports of dizziness or SOB t/o session  Nsg staff most recent vital signs as follows: /55 (BP Location: Right arm)   Pulse 72   Temp (!) 97 °F (36 1 °C) (Temporal)   Resp 18   Ht 5' 9" (1 753 m)   Wt 73 5 kg (162 lb 0 6 oz)   SpO2 97%   BMI 23 93 kg/m²   At end of session, pt OOB in chair in stable condition, call bell & phone in reach, chair alarm activated   Pt was educated on fall precautions and reinforced w/ good understanding  Pt would benefit from continued PT to address deficits as defined above and maximize level of independence with functional mobility and safety  The patient's AM-PAC Basic Mobility Inpatient Short Form Raw Score is 19, Standardized Score is 42 48  A standardized score less than 42 9 suggests the patient may benefit from discharge to post-acute rehabilitation services  Please also refer to the recommendation of the Physical Therapist for safe discharge planning  Despite AM-PAC score, from PT/mobility standpoint recommendation for D/C home w/ HHPT and inc social support, when medically cleared based on objective measures above, current function, and dec family/caregiver support  CM to follow  Nsg staff to continue to mobilized pt (OOB in chair for all meals & ambulate in room/unit) as tolerated to prevent further decline in function  Nsg notified  Barriers to Discharge: Inaccessible home environment  Barriers to Discharge Comments: stairs to enter  PT Discharge Recommendation: Home with skilled therapy, Return to previous environment with social support(HHPT w/ inc social support)          See flowsheet documentation for full assessment

## 2021-03-17 NOTE — PROGRESS NOTES
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  -Amos likely due to cardiorenal syndrome with volume overload, possible underlying component of cellulitis  -baseline serum creatinine 1 8-1 9 since mid , serum creatinine 2 48 upon admission, now 2 37 on intermittent bumex per cardiology  -likely need to accept higher sCr for euvolemia  -monitor BMP     2  Hypokalemia, likely diuretic induced-potassium 3-->3 8, resolved s/p repletion     3  Mild hyperphosphatemia-monitor phos, latest 4 3     4  Blood pressure a bit lower than goal in light of renal impairment, would want to maintain map greater than 65 to avoid relative hypotension/hypoperfusion  On bisoprolol 5 mg with hold parameters      5  Acute on chronic systolic heart failure with EF 25%-on bumex 2mg IV BID per Cardiology, also on bisoprolol, ACE-inhibitor/Arb on hold in light of renal dysfunction, monitor daily weight, I/O  Weight improving     6  LLE cellulitis - on Abx per primary team     7  pseudohypercalcemia - uncorrected calcium 10 5  ICal low at 1 08, phos, PTH levels  Not currently on calcium supplements  Suspect diuretic induced due to bumex  8  Elevated total CO2 in the setting of contraction alkalosis on diuretics  Monitor on intermittent Bumex as above  Subjective:   Denies chest pain or shortness breath  Urinating well  Feeling a lot better  He intends to walk today  He feels thirsty  Objective:     Vitals: Blood pressure 107/58, pulse 73, temperature 97 5 °F (36 4 °C), temperature source Temporal, resp  rate 18, height 5' 9" (1 753 m), weight 73 6 kg (162 lb 3 2 oz), SpO2 99 %  ,Body mass index is 23 95 kg/m²  Temp (24hrs), Av 2 °F (36 2 °C), Min:96 9 °F (36 1 °C), Max:97 5 °F (36 4 °C)      Weight (last 2 days)     Date/Time   Weight    21 0600   73 6 (162 2)    03/16/21 0600   73 5 (162 04)    03/15/21 0600   77 7 (171 3)                Intake/Output Summary (Last 24 hours) at 3/17/2021 1314  Last data filed at 3/17/2021 0601  Gross per 24 hour   Intake 200 ml   Output 1150 ml   Net -950 ml     I/O last 24 hours: In: 200 [P O :200]  Out: [place] [Urine:1550]        Physical Exam:   Physical Exam  Vitals signs reviewed  Constitutional:       General: He is not in acute distress  Appearance: Normal appearance  He is well-developed  He is not diaphoretic  Comments: thin   HENT:      Head: Normocephalic and atraumatic  Mouth/Throat:      Pharynx: No oropharyngeal exudate  Eyes:      General: No scleral icterus  Right eye: No discharge  Left eye: No discharge  Comments: eyeglasses   Neck:      Musculoskeletal: Neck supple  Thyroid: No thyromegaly  Cardiovascular:      Rate and Rhythm: Normal rate and regular rhythm  Heart sounds: Normal heart sounds  Pulmonary:      Effort: Pulmonary effort is normal       Breath sounds: Normal breath sounds  No wheezing or rales  Abdominal:      General: Bowel sounds are normal  There is no distension  Palpations: Abdomen is soft  Tenderness: There is no abdominal tenderness  Musculoskeletal: Normal range of motion  General: Swelling (b/l LE) present  Lymphadenopathy:      Cervical: No cervical adenopathy  Skin:     General: Skin is warm and dry  Findings: No rash  Neurological:      General: No focal deficit present  Mental Status: He is alert  Comments: awake   Psychiatric:         Mood and Affect: Mood normal          Behavior: Behavior normal          Invasive Devices     Peripheral Intravenous Line            Peripheral IV 03/13/21 Left;Ventral (anterior) Forearm 4 days    Peripheral IV 03/17/21 Dorsal (posterior); Right Forearm less than 1 day                Medications:    Scheduled Meds:  Current Facility-Administered Medications   Medication Dose Route Frequency Provider Last Rate    apixaban  2 5 mg Oral BID Kathy Mitchell MD      bisoprolol  5 mg Oral Daily Taylor Sextons, DO      bumetanide  2 mg Intravenous BID Shawn Riggins, DO      cefazolin  1,000 mg Intravenous Q12H Bisi Walter MD 1,000 mg (03/17/21 0450)    finasteride  5 mg Oral Daily Kathy Mitchell MD      hydrOXYzine HCL  25 mg Oral Q6H PRN [facility], [facility]      loratadine  10 mg Oral Daily Viral Heathbrittney, DO      pantoprazole  40 mg Oral Daily Kathy Mitchell MD         PRN Meds: hydrOXYzine HCL    Continuous Infusions:         LAB RESULTS:      Results from last 7 days   Lab Units 03/17/21  0625 03/16/21  0514 03/15/21  0501 03/14/21  0541 03/13/21  0448 03/12/21  0501 03/11/21  0442   WBC Thousand/uL 6 89  --  6 65 7 00 7 72 7 52 6 95   HEMOGLOBIN g/dL 13 5  --  13 9 12 8 13 7 13 5 13 1   HEMATOCRIT % 41 4  --  42 8 40 8 43 3 42 1 40 4   PLATELETS Thousands/uL 232  --  207 193 224 198 205   NEUTROS PCT % 58  --   --   --   --   --   --    LYMPHS PCT % 21  --   --   --   --   --   --    MONOS PCT % 13*  --   --   --   --   --   --    EOS PCT % 7*  --   --   --   --   --   --    POTASSIUM mmol/L 3 8 3 5 3 0* 3 6 3 7 4 1 3 4*   CHLORIDE mmol/L 98* 94* 94* 97* 98* 97* 96*   CO2 mmol/L 34* 34* 31 28 29 29 29   BUN mg/dL 78* 70* 66* 61* 54* 52* 53*   CREATININE mg/dL 2 37* 2 34* 2 17* 2 16* 1 97* 2 07* 2 06*   CALCIUM mg/dL 9 5 10 2* 9 5 9 3 9 4 9 4 9 5   ALK PHOS U/L 132*  --   --   --   --   --   --    ALT U/L 18  --   --   --   --   --   --    AST U/L 49*  --   --   --   --   --   --    MAGNESIUM mg/dL  --  2 1 1 9 2 0 2 1 2 0 2 0   PHOSPHORUS mg/dL 4 3*  --  4 2*  --  3 2  --   --        CUTURES:  Lab Results   Component Value Date    BLOODCX No Growth After 5 Days  03/09/2021    BLOODCX No Growth After 5 Days   03/09/2021    URINECX <10,000 cfu/ml Mixed Contaminants X2 02/08/2017                 Portions of the record may have been created with voice recognition software  Occasional wrong word or "sound a like" substitutions may have occurred due to the inherent limitations of voice recognition software  Read the chart carefully and recognize, using context, where substitutions have occurred  If you have any questions, please contact the dictating provider

## 2021-03-17 NOTE — PLAN OF CARE
Problem: PHYSICAL THERAPY ADULT  Goal: Performs mobility at highest level of function for planned discharge setting  See evaluation for individualized goals  Description: Treatment/Interventions: Functional transfer training, Elevations, LE strengthening/ROM, Therapeutic exercise, Endurance training, Patient/family training, Bed mobility, Gait training, Spoke to nursing, OT  Equipment Recommended: Walker(Pt owns walker)       See flowsheet documentation for full assessment, interventions and recommendations  Outcome: Progressing  Note: Prognosis: Good  Problem List: Decreased strength, Decreased endurance, Decreased range of motion, Impaired balance, Decreased mobility, Decreased skin integrity  Assessment: Pt  seated at EOB upon my arrival  Pt  eager to participate in therapy this AM, in hopes of going home  Performance of HEP with cues for hand placement/technique  Progressed with an amb  trial with use of RW and cues provided for LE sequencing  Pt  requested to use br, pt  progressed with increased amb  trials x 2 with use of RW and cues provided for LE sequencing  Pt  able to complete self pericare  Pt  continued with an increased amb  trial with eventual positioning seated in bedside chair  Pt  remained seated in bedside chair with alarm active at end of treatment session  Pt  will benefit from additional PT treatment sessions for progression of PT goals  PT will continue to recommend d/c home with family support and HHPT provided when medically stable  Barriers to Discharge: None  Barriers to Discharge Comments: stairs to enter  PT Discharge Recommendation: Home with skilled therapy, Return to previous environment with social support(HHPT)     PT - OK to Discharge: Yes(if d/c when medically stable )    See flowsheet documentation for full assessment

## 2021-03-17 NOTE — PLAN OF CARE
Problem: Potential for Falls  Goal: Patient will remain free of falls  Description: INTERVENTIONS:  - Assess patient frequently for physical needs  -  Identify cognitive and physical deficits and behaviors that affect risk of falls    -  [place] fall precautions as indicated by assessment   - Educate patient/family on patient safety including physical limitations  - Instruct patient to call for assistance with activity based on assessment  - Modify environment to reduce risk of injury  - Consider OT/PT consult to assist with strengthening/mobility  Outcome: Progressing     Problem: PAIN - ADULT  Goal: Verbalizes/displays adequate comfort level or baseline comfort level  Description: Interventions:  - Encourage patient to monitor pain and request assistance  - Assess pain using appropriate pain scale  - Administer analgesics based on type and severity of pain and evaluate response  - Implement non-pharmacological measures as appropriate and evaluate response  - Consider cultural and social influences on pain and pain management  - Notify physician/advanced practitioner if interventions unsuccessful or patient reports new pain  Outcome: Progressing     Problem: INFECTION - ADULT  Goal: Absence or prevention of progression during hospitalization  Description: INTERVENTIONS:  - Assess and monitor for signs and symptoms of infection  - Monitor lab/diagnostic results  - Monitor all insertion sites, i e  indwelling lines, tubes, and drains  - Monitor endotracheal if appropriate and nasal secretions for changes in amount and color  - [place] appropriate cooling/warming therapies per order  - Administer medications as ordered  - Instruct and encourage patient and family to use good hand hygiene technique  - Identify and instruct in appropriate isolation precautions for identified infection/condition  Outcome: Progressing     Problem: SAFETY ADULT  Goal: Maintain or return to baseline ADL function  Description: INTERVENTIONS:  -  Assess patient's ability to carry out ADLs; assess patient's baseline for ADL function and identify physical deficits which impact ability to perform ADLs (bathing, care of mouth/teeth, toileting, grooming, dressing, etc )  - Assess/evaluate cause of self-care deficits   - Assess range of motion  - Assess patient's mobility; develop plan if impaired  - Assess patient's need for assistive devices and provide as appropriate  - Encourage maximum independence but intervene and supervise when necessary  - Involve family in performance of ADLs  - Assess for home care needs following discharge   - Consider OT consult to assist with ADL evaluation and planning for discharge  - Provide patient education as appropriate  Outcome: Progressing  Goal: Maintain or return mobility status to optimal level  Description: INTERVENTIONS:  - Assess patient's baseline mobility status (ambulation, transfers, stairs, etc )    - Identify cognitive and physical deficits and behaviors that affect mobility  - Identify mobility aids required to assist with transfers and/or ambulation (gait belt, sit-to-stand, lift, walker, cane, etc )  - [place] fall precautions as indicated by assessment  - Record patient progress and toleration of activity level on Mobility SBAR; progress patient to next Phase/Stage  - Instruct patient to call for assistance with activity based on assessment  - Consider rehabilitation consult to assist with strengthening/weightbearing, etc   Outcome: Progressing     Problem: DISCHARGE PLANNING  Goal: Discharge to home or other facility with appropriate resources  Description: INTERVENTIONS:  - Identify barriers to discharge w/patient and caregiver  - Arrange for needed discharge resources and transportation as appropriate  - Identify discharge learning needs (meds, wound care, etc )  - Arrange for interpretive services to assist at discharge as needed  - Refer to Case Management Department for coordinating discharge planning if the patient needs post-hospital services based on physician/advanced practitioner order or complex needs related to functional status, cognitive ability, or social support system  Outcome: Progressing     Problem: Knowledge Deficit  Goal: Patient/family/caregiver demonstrates understanding of disease process, treatment plan, medications, and discharge instructions  Description: Complete learning assessment and assess knowledge base    Interventions:  - Provide teaching at level of understanding  - Provide teaching via preferred learning methods  Outcome: Progressing     Problem: SKIN/TISSUE INTEGRITY - ADULT  Goal: Skin integrity remains intact  Description: INTERVENTIONS  - Identify patients at risk for skin breakdown  - Assess and monitor skin integrity  - Assess and monitor nutrition and hydration status  - Monitor labs (i e  albumin)  - Assess for incontinence   - Turn and reposition patient  - Assist with mobility/ambulation  - Relieve pressure over bony prominences  - Avoid friction and shearing  - Provide appropriate hygiene as needed including keeping skin clean and dry  - Evaluate need for skin moisturizer/barrier cream  - Collaborate with interdisciplinary team (i e  Nutrition, Rehabilitation, etc )   - Patient/family teaching  Outcome: Progressing  Goal: Incision(s), wounds(s) or drain site(s) healing without S/S of infection  Description: INTERVENTIONS  - Assess and document risk factors for skin impairment   - Assess and document dressing, incision, wound bed, drain sites and surrounding tissue  - Consider nutrition services referral as needed  - Oral mucous membranes remain intact  - Provide patient/ family education  Outcome: Progressing  Goal: Oral mucous membranes remain intact  Description: INTERVENTIONS  - Assess oral mucosa and hygiene practices  - Implement preventative oral hygiene regimen  - Implement oral medicated treatments as ordered  - Initiate Nutrition services referral as needed  Outcome: Progressing     Problem: CARDIOVASCULAR - ADULT  Goal: Maintains optimal cardiac output and hemodynamic stability  Description: INTERVENTIONS:  - Monitor I/O, vital signs and rhythm  - Monitor for S/S and trends of decreased cardiac output  - Administer and titrate ordered vasoactive medications to optimize hemodynamic stability  - Assess quality of pulses, skin color and temperature  - Assess for signs of decreased coronary artery perfusion  - Instruct patient to report change in severity of symptoms  Outcome: Progressing  Goal: Absence of cardiac dysrhythmias or at baseline rhythm  Description: INTERVENTIONS:  - Continuous cardiac monitoring, vital signs, obtain 12 lead EKG if ordered  - Administer antiarrhythmic and heart rate control medications as ordered  - Monitor electrolytes and administer replacement therapy as ordered  Outcome: Progressing     Problem: METABOLIC, FLUID AND ELECTROLYTES - ADULT  Goal: Electrolytes maintained within normal limits  Description: INTERVENTIONS:  - Monitor labs and assess patient for signs and symptoms of electrolyte imbalances  - Administer electrolyte replacement as ordered  - Monitor response to electrolyte replacements, including repeat lab results as appropriate  - Instruct patient on fluid and nutrition as appropriate  Outcome: Progressing  Goal: Fluid balance maintained  Description: INTERVENTIONS:  - Monitor labs   - Monitor I/O and WT  - Instruct patient on fluid and nutrition as appropriate  - Assess for signs & symptoms of volume excess or deficit  Outcome: Progressing

## 2021-03-17 NOTE — ASSESSMENT & PLAN NOTE
Wt Readings from Last 3 Encounters:   03/17/21 73 6 kg (162 lb 3 2 oz)   03/09/21 81 7 kg (180 lb 3 2 oz)   02/26/21 83 5 kg (184 lb)       LVEF 25%  Card/Nephrology following  Has had 3L overnight UOP, weight down 162 from admission 182  Bumex gtt d/c, now on bumex IV  PT/OT consulted, recommend HHPT on discharge, DHIRAJ CLIFFORDA

## 2021-03-17 NOTE — PHYSICAL THERAPY NOTE
Physical Therapy Progress Note     03/17/21 0903   PT Last Visit   PT Visit Date 03/17/21   Note Type   Note Type Treatment   Pain Assessment   Pain Assessment Tool Pain Assessment not indicated - pt denies pain   Pain Score No Pain   Restrictions/Precautions   Weight Bearing Precautions Per Order No   Other Precautions Chair Alarm; Bed Alarm; Fall Risk;Telemetry;Multiple lines   General   Chart Reviewed Yes   Response to Previous Treatment Patient with no complaints from previous session  Family/Caregiver Present No   Subjective   Subjective Willing to participate in therapy this AM    Transfers   Sit to Stand 5  Supervision   Additional items Assist x 1;Bedrails; Increased time required;Verbal cues   Stand to Sit 5  Supervision   Additional items Assist x 1; Armrests; Increased time required;Verbal cues   Toilet transfer 5  Supervision   Additional items Assist x 1; Armrests; Increased time required;Verbal cues;Standard toilet; Other  (use of grab bar)   Ambulation/Elevation   Gait pattern Decreased foot clearance; Forward Flexion; Short stride; Excessively slow; Inconsistent chantelle; Shuffling   Gait Assistance 5  Supervision   Additional items Assist x 1;Verbal cues; Tactile cues   Assistive Device Rolling walker   Distance 50' x 2 with seated toileting break in between   Balance   Static Sitting Good   Dynamic Sitting Good   Static Standing Fair   Dynamic Standing Fair   Ambulatory Fair -   Endurance Deficit   Endurance Deficit No   Activity Tolerance   Activity Tolerance Patient tolerated treatment well   Nurse Made Aware Yes   Exercises   TKR Sitting;10 reps;AAROM; Bilateral   Assessment   Prognosis Good   Problem List Decreased strength;Decreased endurance;Decreased range of motion; Impaired balance;Decreased mobility; Decreased skin integrity   Assessment Pt  seated at EOB upon my arrival  Pt  eager to participate in therapy this AM, in hopes of going home  Performance of HEP with cues for hand placement/technique  Progressed with an amb  trial with use of RW and cues provided for LE sequencing  Pt  requested to use br, pt  progressed with increased amb  trials x 2 with use of RW and cues provided for LE sequencing  Pt  able to complete self pericare  Pt  continued with an increased amb  trial with eventual positioning seated in bedside chair  Pt  remained seated in bedside chair with alarm active at end of treatment session  Pt  will benefit from additional PT treatment sessions for progression of PT goals  PT will continue to recommend d/c home with family support and HHPT provided when medically stable  Barriers to Discharge None   Goals   Patient Goals To go home  STG Expiration Date 03/26/21   PT Treatment Day 1   Plan   Treatment/Interventions Functional transfer training;LE strengthening/ROM; Therapeutic exercise; Endurance training;Bed mobility;Gait training;Spoke to nursing;Spoke to case management   Progress Progressing toward goals   PT Frequency Other (Comment)  (3-5x/wk)   Recommendation   PT Discharge Recommendation Home with skilled therapy; Return to previous environment with social support  (HHPT)   Equipment Recommended Other (Comment)  (has DME at home  )   PT - OK to Discharge Yes  (if d/c when medically stable )   Additional Comments Would benefit from stair training prior to d/c     AM-PAC Basic Mobility Inpatient   Turning in Bed Without Bedrails 4   Lying on Back to Sitting on Edge of Flat Bed 4   Moving Bed to Chair 3   Standing Up From Chair 3   Walk in Room 3   Climb 3-5 Stairs 3   Basic Mobility Inpatient Raw Score 20   Basic Mobility Standardized Score 43 99     Oc Escobar, PTA

## 2021-03-17 NOTE — PROGRESS NOTES
Progress Note - Cardiology   Zia Looney 80 y o  male MRN: [id number]  Unit/Bed#: E4 -01 Encounter: [id number]      Assessment/Recommendations/Discussion:   · Acute on chronic systolic heart failure  · LVEF 25%, mild LVH, mild ANGEL, regional wall motion abnormalities consistent with CAD, severe RV dilatation severe RA dilatation, moderate MR, AV sclerosis with mild AR, moderate to severe TR w/ PASP 45-50 mmHg, March 2021  · Left lower extremity cellulitis  · NSVT 5 beats, March 2021  · Acute kidney injury on CKD stage III  · Non MI troponin elevation secondary to acute heart failure  · Permanent atrial fibrillation s/p AVN ablation with [facility] CRT-P on Eliquis   · GERD    PLAN   Bumex drip was held yesterday due to rising creatinine   Blood pressures been low normal   Creatinine is about the same today   Still with significant lower extremity edema  Weight has come down from 182 to 162 lbs   Would reinstitute intermittent IV dosing of Bumex 2 mg b i d  Today   We may need to accept a higher level creatinine in order to keep him euvolemic   Nephro is following   Continue bisoprolol   Continue Eliquis for anticoagulation for AFib   Ancef for cellulitis        Subjective:   HPI  Doing well  Feeling well  Still with lower extremity edema (significant)  Review of Systems: As noted in HPI  Rest of ROS is negative      Vitals:   BP 98/54 (BP Location: Right arm)   Pulse 71   Temp (!) 96 9 °F (36 1 °C) (Temporal)   Resp 18   Ht 5' 9" (1 753 m)   Wt 73 6 kg (162 lb 3 2 oz)   SpO2 97%   BMI 23 95 kg/m²   Vitals:    03/16/21 0600 03/17/21 0600   Weight: 73 5 kg (162 lb 0 6 oz) 73 6 kg (162 lb 3 2 oz)       Intake/Output Summary (Last 24 hours) at 3/17/2021 0959  Last data filed at 3/17/2021 0601  Gross per 24 hour   Intake 200 ml   Output 1150 ml   Net -950 ml       Physical Exam:  General appearance: alert and oriented, in no acute distress  Head: Normocephalic, without obvious abnormality, atraumatic  Eyes: conjunctivae/corneas clear  Anicteric  Neck: no adenopathy, no carotid bruit, no JVD  Lungs: clear to auscultation bilaterally  Heart: regular rate and rhythm, S1, S2 normal, no murmur, no click, rub or gallop  Abdomen:  soft, non-tender; bowel sounds normal; no masses,  no organomegaly  Extremities:  Bilateral lower extremity edema 2-3+  Skin: Skin color, texture, turgor normal  No rashes or lesions     Lab Results:  Results from last 7 days   Lab Units 03/17/21  0625   WBC Thousand/uL 6 89   HEMOGLOBIN g/dL 13 5   HEMATOCRIT % 41 4   PLATELETS Thousands/uL 232     Results from last 7 days   Lab Units 03/17/21  0625   POTASSIUM mmol/L 3 8   CHLORIDE mmol/L 98*   CO2 mmol/L 34*   BUN mg/dL 78*   CREATININE mg/dL 2 37*   CALCIUM mg/dL 9 5   ALK PHOS U/L 132*   ALT U/L 18   AST U/L 49*     Results from last 7 days   Lab Units 03/17/21  0625   POTASSIUM mmol/L 3 8   CHLORIDE mmol/L 98*   CO2 mmol/L 34*   BUN mg/dL 78*   CREATININE mg/dL 2 37*   CALCIUM mg/dL 9 5           Medications:    Current Facility-Administered Medications:     apixaban (ELIQUIS) tablet 2 5 mg, 2 5 mg, Oral, BID, Rere Cheung MD, 2 5 mg at 03/17/21 0944    bisoprolol (ZEBETA) tablet 5 mg, 5 mg, Oral, Daily, Taylor Zurita DO, Stopped at 03/16/21 0921    ceFAZolin (ANCEF) IVPB (premix in dextrose) 1,000 mg 50 mL, 1,000 mg, Intravenous, Q12H, Jackie Burrell MD, Last Rate: 100 mL/hr at 03/17/21 0450, 1,000 mg at 03/17/21 0450    finasteride (PROSCAR) tablet 5 mg, 5 mg, Oral, Daily, Rere Cheung MD, 5 mg at 03/17/21 0944    hydrOXYzine HCL (ATARAX) tablet 25 mg, 25 mg, Oral, Q6H PRN, Gus Kirby DO    loratadine (CLARITIN) tablet 10 mg, 10 mg, Oral, Daily, Viral Kirby DO, 10 mg at 03/17/21 0944    pantoprazole (PROTONIX) EC tablet 40 mg, 40 mg, Oral, Daily, Rere Cheung MD, 40 mg at 03/17/21 0554    This note was completed in part utilizing [employer] Flu[employer] Direct Software    Grammatical errors, random word insertions, spelling mistakes, and incomplete sentences may be an occasional consequence of this system secondary to software limitations, ambient noise, and hardware issues  If you have any questions or concerns about the content, text, or information contained within the body of this dictation, please contact the provider for clarification      Ledon Runner, DO, [facility]  3/17/2021 9:59 AM

## 2021-03-17 NOTE — PROGRESS NOTES
[address]  Progress Note - Cheo Clos 6/16/1927, 80 y o  male MRN: [id number]  Unit/Bed#: E4 -01 Encounter: [id number]  Primary Care Provider: VARSHA Jerry MD   Date and time admitted to hospital: 3/9/2021 12:52 PM    Acute on chronic systolic CHF (congestive heart failure) ([facility])  Assessment & Plan  Wt Readings from Last 3 Encounters:   03/17/21 73 6 kg (162 lb 3 2 oz)   03/09/21 81 7 kg (180 lb 3 2 oz)   02/26/21 83 5 kg (184 lb)       LVEF 25%  Card/Nephrology following  Has had 3L overnight UOP, weight down 162 from admission 182  Bumex gtt d/c, now on bumex IV  PT/OT consulted, recommend HHPT on discharge, [facility]      Acute kidney injury superimposed on chronic kidney disease ([facility] [address] )  Assessment & Plan    · Creatinine elevated, possibly new baseline as patient is diuresis  · Monitor renal fxns  · Hold ACEi at this time    BPH (benign prostatic hyperplasia)  Assessment & Plan  · Stable  · Continue finasteride    A-fib ([facility] [address] )  Assessment & Plan  · Status post biventricular pacemaker  · V paced and anticoagulated on Eliquis  · Continue bisoprolol    * Cellulitis of left leg  Assessment & Plan  Continue Ancef, to complete on 3/19  Wound care nurse consulted for dressing changes and mgmt        VTE Pharmacologic Prophylaxis:   Pharmacologic: Apixaban (Eliquis)  Mechanical VTE Prophylaxis in Place: Yes    Patient Centered Rounds: I have performed bedside rounds with nursing staff today  Discussions with Specialists or Other Care Team Provider: Cardiology, Nephrology    Education and Discussions with Family / Patient: Patient    Time Spent for Care: 30 minutes  More than 50% of total time spent on counseling and coordination of care as described above      Current Length of Stay: 8 day(s)    Current Patient Status: Inpatient   Certification Statement: The patient will continue to require additional inpatient hospital stay due to IV diuresis    Discharge Plan: Pending, 1-2 days    Code Status: Level 3 - DNAR and DNI      Subjective:   Patient today reports doing well  No events overnight  Swelling has improved  Able to ambulate with less difficulty  Objective:     Vitals:   Temp (24hrs), Av 2 °F (36 2 °C), Min:96 9 °F (36 1 °C), Max:97 5 °F (36 4 °C)    Temp:  [96 9 °F (36 1 °C)-97 5 °F (36 4 °C)] 97 5 °F (36 4 °C)  HR:  [67-73] 73  Resp:  [18] 18  BP: ()/(54-59) 107/58  SpO2:  [97 %-100 %] 99 %  Body mass index is 23 95 kg/m²  Input and Output Summary (last 24 hours): Intake/Output Summary (Last 24 hours) at 3/17/2021 1321  Last data filed at 3/17/2021 0601  Gross per 24 hour   Intake 200 ml   Output 1150 ml   Net -950 ml       Physical Exam:     Physical Exam  Constitutional:       Appearance: He is not ill-appearing or diaphoretic  HENT:      Head: Normocephalic and atraumatic  Nose: No rhinorrhea  Neck:      Musculoskeletal: Neck supple  Cardiovascular:      Rate and Rhythm: Regular rhythm  Heart sounds: No murmur  No gallop  Pulmonary:      Effort: Pulmonary effort is normal  No respiratory distress  Breath sounds: No wheezing  Abdominal:      General: Abdomen is flat  There is no distension  Palpations: Abdomen is soft  Tenderness: There is no abdominal tenderness  Musculoskeletal:         General: Swelling present  Right lower leg: Edema present  Left lower leg: Edema present  Comments: Left anterior tibial wound, mild erythema, well healing tissue noted, wet   Neurological:      Mental Status: He is alert and oriented to person, place, and time  Mental status is at baseline             Additional Data:     Labs:    Results from last 7 days   Lab Units 21  0625   WBC Thousand/uL 6 89   HEMOGLOBIN g/dL 13 5   HEMATOCRIT % 41 4   PLATELETS Thousands/uL 232   NEUTROS PCT % 58   LYMPHS PCT % 21   MONOS PCT % 13*   EOS PCT % 7*     Results from last 7 days   Lab Units 21  0625   SODIUM mmol/L 140 POTASSIUM mmol/L 3 8   CHLORIDE mmol/L 98*   CO2 mmol/L 34*   BUN mg/dL 78*   CREATININE mg/dL 2 37*   ANION GAP mmol/L 8   CALCIUM mg/dL 9 5   ALBUMIN g/dL 2 7*   TOTAL BILIRUBIN mg/dL 0 84   ALK PHOS U/L 132*   ALT U/L 18   AST U/L 49*   GLUCOSE RANDOM mg/dL 112         Results from last 7 days   Lab Units 03/17/21  0744 03/16/21  1621 03/16/21  1124 03/16/21  0741   POC GLUCOSE mg/dl 115 135 140 116                   * I Have Reviewed All Lab Data Listed Above  * Additional Pertinent Lab Tests Reviewed: Juan [address] 66 Admission Reviewed    Imaging:    None    Recent Cultures (last 7 days):           Last 24 Hours Medication List:   Current Facility-Administered Medications   Medication Dose Route Frequency Provider Last Rate    apixaban  2 5 mg Oral BID Varun Valdez MD      bisoprolol  5 mg Oral Daily Taylor Zurita, DO      bumetanide  2 mg Intravenous BID Ginny Winslow, DO      cefazolin  1,000 mg Intravenous Q12H Sarahy Mario MD 1,000 mg (03/17/21 0450)    finasteride  5 mg Oral Daily Varun Valdez MD      hydrOXYzine HCL  25 mg Oral Q6H PRN Ranjan Kirby, DO      loratadine  10 mg Oral Daily Viral Kirby, DO      pantoprazole  40 mg Oral Daily Varun Valdez MD          Today, Patient Was Seen By: Sarahy Mario MD    ** Please Note: Dictation voice to text software may have been used in the creation of this document   **

## 2021-03-17 NOTE — ASSESSMENT & PLAN NOTE
· Status post biventricular pacemaker  · V paced and anticoagulated on Eliquis  · Continue bisoprolol

## 2021-03-17 NOTE — ASSESSMENT & PLAN NOTE
· Creatinine elevated, possibly new baseline as patient is diuresis  · Monitor renal fxns  · Hold ACEi at this time

## 2021-03-17 NOTE — WOUND OSTOMY CARE
Progress Note - Wound   Pau Petersen 80 y o  male MRN: [id number]  Unit/Bed#: E4 -01 Encounter: [id number]        Assessment:   Patient seen for wound care weekly follow-up  Sitting up in recliner  Able to stand with assist x 1 and walker  Denies incontinence  Buttocks, sacrum, and heels are intact  Heels with blanchable erythema  Skin is generally dry and flaky with scattered scabs to arms and legs  1   Left pretibial venous ulceration--beefy red and yellow slough  Kaylin-wound with erythema and edema (improved)  See flowsheet and media for wound details  Wound Care Plan:   1-Hydraguard lotion to bilateral buttocks, sacrum, and heels twice daily and as needed  2-Elevate bilateral lower extremities as often as possible  Ensure heels are floating off of pillows to offload pressure  3-Offloading air cushion in chair when out of bed  4-Moisturize skin daily with skin nourishing cream   5-Encourage/remind patient to turn/reposition every 2 hours while in bed and weight shift frequently while in chair for pressure re-distribution on skin  6-Legs--Wash legs with soap and water, pat dry  Apply lotion to intact skin  Left lower extremity wound--apply Maxorb Ag, ABD, and wrap with mando every other day      Wound care team to follow  Plan of care reviewed with primary RN      Patient would benefit from VNA or follow-up at the wound center on discharge  Wound 03/10/21 Venous Ulcer Pretibial Left (Active)   Wound Image   03/17/21 1343   Wound Description Slough; Yellow; Beefy red 03/17/21 1343   Kaylin-wound Assessment Erythema;Edema 03/17/21 1343   Wound Length (cm) 5 5 cm 03/17/21 1343   Wound Width (cm) 6 5 cm 03/17/21 1343   Wound Depth (cm) 0 1 cm 03/17/21 1343   Wound Surface Area (cm^2) 35 75 cm^2 03/17/21 1343   Wound Volume (cm^3) 3 58 cm^3 03/17/21 1343   Calculated Wound Volume (cm^3) 3 58 cm^3 03/17/21 1343   Change in Wound Size % 21 32 03/17/21 1343   Drainage Amount Small 03/17/21 1343   Drainage Description Serosanguineous 03/17/21 1343   Treatments Elevated;Cleansed 03/17/21 1343   Dressing Calcium Alginate with Silver;ABD 03/17/21 1343   Wound packed? No 03/16/21 0900   Dressing Changed Changed 03/17/21 1343   Patient Tolerance Tolerated well 03/17/21 1343   Dressing Status Clean;Dry; Intact 03/17/21 [address] [place], RN, [employer]

## 2021-03-18 NOTE — PROGRESS NOTES
Progress Note - Cardiology   Jason Miners 80 y o  male MRN: [id number]  Unit/Bed#: E4 -01 Encounter: [id number]    Assessment:  1  Acute on chronic combined systolic and diastolic congestive heart failure, LVEF 25%  2  Nonsustained ventricular tachycardia with frequent ventricular ectopy  3  Acute kidney injury on chronic kidney disease, stage III   4  Non myocardial infarction troponin elevation secondary to CHF  5  Permanent atrial fibrillation, status post AV node ablation with [address] CRT-P     Plan:  1  Will continue Bumex 2 mg IV while waiting for labs and weight  Interval history:  Patient awake and alert and in good spirits  Blood pressure ranging from 94/61 to 121/60  O2 saturation 98% on room air  No weight today  The MP today pending      Vitals: /59   Pulse 73   Temp (!) 96 9 °F (36 1 °C) (Temporal)   Resp 20   Ht 5' 9" (1 753 m)   Wt 73 6 kg (162 lb 3 2 oz)   SpO2 98%   BMI 23 95 kg/m²   Vitals:    03/16/21 0600 03/17/21 0600   Weight: 73 5 kg (162 lb 0 6 oz) 73 6 kg (162 lb 3 2 oz)     Orthostatic Blood Pressures      Most Recent Value   Blood Pressure  103/59 filed at 03/18/2021 8810   Patient Position - Orthostatic VS  Sitting filed at 03/18/2021 0925            Intake/Output Summary (Last 24 hours) at 3/18/2021 1056  Last data filed at 3/17/2021 2107  Gross per 24 hour   Intake --   Output 800 ml   Net -800 ml       Invasive Devices     Peripheral Intravenous Line            Peripheral IV 03/17/21 Dorsal (posterior); Right Forearm 1 day                Review of Systems   Constitutional: Negative for activity change  Respiratory: Negative for cough, chest tightness, shortness of breath and wheezing  Cardiovascular: Positive for leg swelling  Negative for chest pain and palpitations  Musculoskeletal: Positive for gait problem  Skin: Negative for color change  Neurological: Negative for dizziness, tremors, syncope, weakness, light-headedness and headaches  Psychiatric/Behavioral: Negative for agitation and confusion  Physical Exam  Constitutional:       General: He is not in acute distress  Appearance: He is well-developed  HENT:      Head: Normocephalic and atraumatic  Neck:      Vascular: No carotid bruit or JVD  Cardiovascular:      Rate and Rhythm: Normal rate and regular rhythm  Heart sounds: Normal heart sounds  No murmur  No friction rub  No gallop  Pulmonary:      Effort: Pulmonary effort is normal  No respiratory distress  Breath sounds: Normal breath sounds  No wheezing, rhonchi or rales  Chest:      Chest wall: No tenderness  Abdominal:      General: Bowel sounds are normal  There is no distension  Palpations: Abdomen is soft  Musculoskeletal:      Right lower leg: Edema present  Left lower leg: Edema present  Skin:     General: Skin is warm and dry  Neurological:      General: No focal deficit present  Mental Status: He is alert and oriented to person, place, and time  Psychiatric:         Mood and Affect: Mood normal          Behavior: Behavior normal          Thought Content:  Thought content normal          Judgment: Judgment normal          --------------------------------------------------------------------------------  ECHO:   Results for orders placed during the hospital encounter of 18   Echo complete with contrast if indicated    Yamilka Dennis [address]  [address] [place] [address]  [phone]    Transthoracic Echocardiogram  2D, M-mode, Doppler, and Color Doppler    Study date:  21-Dec-2018    Patient: Jacob Lopez  MR number: [id number]  Account number: [de-identified]  : 1927  Age: 80 years  Gender: Male  Status: Outpatient  [facility]: [address] [place] [address] [facility]  Height: 69 in  Weight: 202 lb  BP: 102/ 64 mmHg    Indications: Atrial fibrillation    Diagnoses: I48 0 - Atrial fibrillation    Sonographer:  Mony Alvarez [facility]  Primary Physician:  Maryse Mcgovern MD  Referring Physician:  Soraya Purvis DO  Group:  [id number]  Cardiology Fellow:  Yue Rasmussen MD  Interpreting Physician:  Josse Marr DO    SUMMARY    LEFT VENTRICLE:  Systolic function was mildly reduced  Ejection fraction was estimated to be 45 %  There was mild diffuse hypokinesis with regional variations including basal to mid inferolateral and basal inferior hypokinesis  There was mild concentric hypertrophy  RIGHT VENTRICLE:  The ventricle was mildly dilated  Systolic function was reduced  LEFT ATRIUM:  The atrium was mildly to moderately dilated  RIGHT ATRIUM:  The atrium was moderately dilated  TRICUSPID VALVE:  There was moderate regurgitation  Estimated peak PA pressure was 42 mmHg  HISTORY: PRIOR HISTORY: Hypertension, atrial fibrillation, pacemaker, cardiomyopathy, chronic kidney disease    PROCEDURE: The study was performed in the [address] [facility]  This was a routine study  The transthoracic approach was used  The study included complete 2D imaging, M-mode, complete spectral Doppler, and color Doppler  Image  quality was adequate  LEFT VENTRICLE: Size was normal  Systolic function was mildly reduced  Ejection fraction was estimated to be 45 %  There was mild diffuse hypokinesis with regional variations including basal to mid inferolateral and basal inferior  hypokinesis Wall thickness was mildly increased  There was mild concentric hypertrophy  DOPPLER: Transmitral flow pattern: atrial fibrillation  RIGHT VENTRICLE: The ventricle was mildly dilated  Systolic function was reduced  Wall thickness was normal  A pacing wire was present  LEFT ATRIUM: The atrium was mildly to moderately dilated  RIGHT ATRIUM: The atrium was moderately dilated  A pacing wire was present  MITRAL VALVE: Valve structure was normal  There was normal leaflet separation   DOPPLER: The transmitral velocity was within the normal range  There was no evidence for stenosis  There was no significant regurgitation  AORTIC VALVE: The valve was trileaflet  Leaflets exhibited mildly increased thickness, mild calcification, and normal cuspal separation  DOPPLER: Transaortic velocity was within the normal range  There was no evidence for stenosis  There  was no significant regurgitation  TRICUSPID VALVE: The valve structure was normal  There was normal leaflet separation  DOPPLER: The transtricuspid velocity was within the normal range  There was no evidence for stenosis  There was moderate regurgitation  Estimated peak PA  pressure was 42 mmHg  PULMONIC VALVE: Leaflets exhibited normal thickness, no calcification, and normal cuspal separation  DOPPLER: The transpulmonic velocity was within the normal range  There was trace regurgitation  PERICARDIUM: There was no pericardial effusion  The pericardium was normal in appearance  AORTA: The root exhibited normal size  The ascending aorta was upper normal in size  SYSTEMIC VEINS: IVC: The inferior vena cava was normal in size   Respirophasic changes were normal     SYSTEM MEASUREMENT TABLES    2D  %FS: 17 03 %  Ao Diam: 3 79 cm  EDV(Teich): 129 52 ml  EF Biplane: 42 45 %  EF(Cube): 42 88 %  EF(Teich): 35 35 %  ESV(Cube): 80 33 ml  ESV(Teich): 83 74 ml  IVSd: 1 31 cm  LA Area: 24 22 cm2  LA Diam: 4 28 cm  LVEDV MOD A2C: 220 9 ml  LVEDV MOD A4C: 205 11 ml  LVEDV MOD BP: 215 2 ml  LVEF MOD A2C: 40 58 %  LVEF MOD A4C: 44 97 %  LVESV MOD A2C: 131 27 ml  LVESV MOD A4C: 112 87 ml  LVESV MOD BP: 123 84 ml  LVIDd: 5 2 cm  LVIDs: 4 31 cm  LVLd A2C: 9 46 cm  LVLd A4C: 9 25 cm  LVLs A2C: 8 73 cm  LVLs A4C: 8 41 cm  LVPWd: 1 31 cm  RA Area: 23 9 cm2  RV Diam : 5 16 cm  SV MOD A2C: 89 63 ml  SV MOD A4C: 92 23 ml  SV(Cube): 60 3 ml  SV(Teich): 45 78 ml    CW  TR Vmax: 3 08 m/s  TR maxP 97 mmHg    MM  TAPSE: 1 41 cm    [facility] Accredited Echocardiography Laboratory    Prepared and electronically signed by    Alyce Tong DO  Signed 21-Dec-2018 13:06:44       --------------------------------------------------------------------------------  CAROTIDS  Results for orders placed during the hospital encounter of 08/23/16   VAS carotid complete study    Narrative    [facility] REPORT  CLINICAL:  Indications:  Bilateral Transient Visual Loss [H53 129]  Patient has had several brief episodes over a long period of time, of partial  vision loss in both eyes  Most recently, the episode lasted over an hour and a  half  He reports this happened while at the eye doctor but his eye doctor told  him his eyes looked fine  Risk Factors  The patient has history of HTN  Clinical  Right Pressure:  138/60 mm Hg,     FINDINGS:     Right        Impression  PSV  EDV (cm/s)  Direction of Flow  Ratio    Dist  ICA                 64          18                      0 71    Mid  ICA                  62          13                      0 69    Prox  ICA    1 - 49%      51          12                      0 57    Dist CCA                  99          11                              Mid CCA                   90           5                      0 98    Prox CCA                  91           7                              Ext Carotid               81           3                      0 90    Prox Vert                 28           7  Antegrade                   Subclavian               124           0                                 Left         Impression  PSV  EDV (cm/s)  Direction of Flow  Ratio    Dist  ICA                 64          16                      0 67    Mid  ICA                  82          24                      0 86    Prox   ICA    1 - 49%      51          13                      0 54    Dist CCA                  77          10                              Mid CCA                   95          12                      1 10    Prox CCA                  86          10 Ext Carotid              109           0                      1 16    Prox Vert                 40          12  Antegrade                   Subclavian               100           7                                       CONCLUSION:     Impression  RIGHT:  There is <50% stenosis noted in the internal carotid artery  Plaque is  heterogenous  Vertebral artery flow is antegrade  There is no significant subclavian artery  disease  LEFT:  There is <50% stenosis noted in the internal carotid artery  Plaque is  heterogenous  Vertebral artery flow is antegrade  There is no significant subclavian artery  disease  Internal carotid artery stenosis determination by consensus criteria from:  Dominic Fraser et al  Carotid Artery Stenosis: Gray-Scale and Doppler US Diagnosis  - Society of Radiologists in [address] [place] [address], Radiology 2003;  [phone]       SIGNATURE:  Electronically Signed by: Holli Pratt on 2016-08-23 03:51:53 PM        ======================================================    Lab Results   Component Value Date    WBC 6 89 03/17/2021    HGB 13 5 03/17/2021    HCT 41 4 03/17/2021    MCV 97 03/17/2021     03/17/2021      Lab Results   Component Value Date    SODIUM 140 03/17/2021    K 3 8 03/17/2021    CL 98 (L) 03/17/2021    CO2 34 (H) 03/17/2021    BUN 78 (H) 03/17/2021    CREATININE 2 37 (H) 03/17/2021    GLUC 112 03/17/2021    CALCIUM 9 5 03/17/2021      Lab Results   Component Value Date    HGBA1C 5 4 12/26/2018      Lab Results   Component Value Date    CHOL 157 01/09/2015     Lab Results   Component Value Date    HDL 30 (L) 10/06/2020    HDL 28 (L) 09/18/2019    HDL 28 (L) 10/02/2018     Lab Results   Component Value Date    LDLCALC 92 10/06/2020    LDLCALC 73 09/18/2019    LDLCALC 85 10/02/2018     Lab Results   Component Value Date    TRIG 124 10/06/2020    TRIG 135 09/18/2019    TRIG 126 10/02/2018     No results found for: CHOLHDL   Lab Results   Component Value Date    INR 1 28 (H) 02/09/2017    INR 1 26 (H) 03/04/2015    INR 1 23 (H) 03/02/2015    PROTIME 15 7 (H) 02/09/2017    PROTIME 15 5 (H) 03/04/2015    PROTIME 15 2 (H) 03/02/2015        Imaging:   I have personally reviewed pertinent reports          EKG:  Mostly ventricular paced rhythm, significant ventricular ectopy with short runs of ventricular tachycardia the longest being 7 beats in the last 24 hours

## 2021-03-18 NOTE — PLAN OF CARE
Problem: OCCUPATIONAL THERAPY ADULT  Goal: Performs self-care activities at highest level of function for planned discharge setting  See evaluation for individualized goals  Description: Treatment Interventions: ADL retraining, Functional transfer training, UE strengthening/ROM, Endurance training, Patient/family training, Equipment evaluation/education, Compensatory technique education, Energy conservation, Activityengagement          See flowsheet documentation for full assessment, interventions and recommendations  Outcome: Progressing  Note: Limitation: Decreased ADL status, Decreased UE strength, Decreased Safe judgement during ADL, Decreased endurance, Decreased self-care trans, Decreased high-level ADLs  Prognosis: Fair  Assessment: Pt was seen for skilled OT with focus on completion of self care tasks, functional transfers, review of CHF packet/management, the fall prevention checklist and review of current plan of care  Pt pleasant and cooperative through out tx session  Pt with F understanding of reviewed CHF precautions and need for daily weights, diet and self monitoring by logging on calendar  See above levels of A required for all functional tasks  Recommended looking into a medical alert system for use at home due to Pt will be living alone after his daughter moves to [place] in May  Pt may benefit from use of LHAE as needed with functional reach limitations and need for shoehorn periodically in the home  The patient's raw score on the AM-PAC Daily Activity inpatient short form is 20, standardized score is 42 03, greater than 39 4  Patients at this level are likely to benefit from discharge to home  Fall prevention checklist reviewed with Pt with Pt reporting having many adaptations in the home from caring for his wife in the past  Reported all findings and vital signs to nursing staff  OT Discharge Recommendation: Home with skilled therapy  OT - OK to Discharge: Yes(when medically cleared  )

## 2021-03-18 NOTE — PROGRESS NOTES
Progress Note - Nephrology   Shaunna Resendez 80 y o  male MRN: [id number]  Unit/Bed#: E4 -01 Encounter: [id number]      Assessment / Plan:  1  Acute kidney injury, present on admission, on top of CKD stage 3/4  -Amos likely due to cardiorenal syndrome with volume overload, possible underlying component of cellulitis  -baseline serum creatinine 1 8-1 9 since mid , serum creatinine 2 48 upon admission, now 2 36 and stable on intermittent bumex 2mg IV BID per cardiology  -likely need to accept higher sCr for euvolemia  -monitor BMP     2  Hypokalemia, likely diuretic induced-potassium 3, monitor s/p repletion (40meq KCl x 2 ordered po), mag ok      3  Mild hyperphosphatemia-monitor phos, latest 4 3     4  Blood pressure a bit lower than goal in light of renal impairment, would want to maintain map greater than 65 to avoid relative hypotension/hypoperfusion   On bisoprolol 5 mg with hold parameters      5  Acute on chronic systolic heart failure with EF 25%-on bumex 2mg IV BID per Cardiology, also on bisoprolol, ACE-inhibitor/Arb on hold in light of renal dysfunction, monitor daily weight, I/O  Weight improving     6  LLE cellulitis - on Abx per primary team     7  pseudohypercalcemia - uncorrected calcium about 11  ICal low at 1 08, phos, PTH levels  Not currently on calcium supplements  Suspect diuretic induced due to bumex       8  Elevated total CO2 in the setting of contraction alkalosis on diuretics  improved on intermittent bumex        Subjective:   He denies chest pain or shortness of breath  Thinks his leg edema is improved  No other complaints  Objective:     Vitals: Blood pressure 99/56, pulse 71, temperature (!) 97 1 °F (36 2 °C), temperature source Temporal, resp  rate 16, height 5' 9" (1 753 m), weight 73 6 kg (162 lb 3 2 oz), SpO2 97 %  ,Body mass index is 23 95 kg/m²  Temp (24hrs), Av 1 °F (36 2 °C), Min:96 9 °F (36 1 °C), Max:97 2 °F (36 2 °C)      Weight (last 2 days) Date/Time   Weight    03/17/21 0600   73 6 (162 2)    03/16/21 0600   73 5 (162 04)                Intake/Output Summary (Last 24 hours) at 3/18/2021 1604  Last data filed at 3/17/2021 2107  Gross per 24 hour   Intake --   Output 800 ml   Net -800 ml     I/O last 24 hours: In: -   Out: 800 [Urine:800]        Physical Exam:   Physical Exam  Vitals signs reviewed  Constitutional:       General: He is not in acute distress  Appearance: Normal appearance  He is well-developed  He is not diaphoretic  Comments: In chair   HENT:      Head: Normocephalic and atraumatic  Mouth/Throat:      Pharynx: No oropharyngeal exudate  Eyes:      General: No scleral icterus  Right eye: No discharge  Left eye: No discharge  Comments: eyeglasses   Neck:      Musculoskeletal: Neck supple  Thyroid: No thyromegaly  Cardiovascular:      Rate and Rhythm: Normal rate and regular rhythm  Heart sounds: Normal heart sounds  Pulmonary:      Effort: Pulmonary effort is normal       Breath sounds: Normal breath sounds  No wheezing or rales  Abdominal:      General: Bowel sounds are normal  There is no distension  Palpations: Abdomen is soft  Tenderness: There is no abdominal tenderness  Musculoskeletal: Normal range of motion  General: Swelling (b/l LE) present  Lymphadenopathy:      Cervical: No cervical adenopathy  Skin:     General: Skin is warm and dry  Findings: No rash  Neurological:      Mental Status: He is alert  Comments: awake   Psychiatric:         Behavior: Behavior normal          Invasive Devices     Peripheral Intravenous Line            Peripheral IV 03/17/21 Dorsal (posterior); Right Forearm 1 day                Medications:    Scheduled Meds:  Current Facility-Administered Medications   Medication Dose Route Frequency Provider Last Rate    apixaban  2 5 mg Oral BID Carla Paez MD      bisoprolol  5 mg Oral Daily Taylor Hewitt DO      bumetanide  2 mg Intravenous BID Zach Velazquez, DO      cefazolin  1,000 mg Intravenous Q12H Kat Camarillo MD 1,000 mg (03/18/21 0520)    finasteride  5 mg Oral Daily Kalpesh Russo MD      hydrOXYzine HCL  25 mg Oral Q6H PRN Manpreet Kirby, DO      loratadine  10 mg Oral Daily Viral Kirby, DO      pantoprazole  40 mg Oral Daily Kalpesh Russo MD         PRN Meds: hydrOXYzine HCL    Continuous Infusions:         LAB RESULTS:      Results from last 7 days   Lab Units 03/18/21  1203 03/17/21  0625 03/16/21  0514 03/15/21  0501 03/14/21  0541 03/13/21  0448 03/12/21  0501   WBC Thousand/uL  --  6 89  --  6 65 7 00 7 72 7 52   HEMOGLOBIN g/dL  --  13 5  --  13 9 12 8 13 7 13 5   HEMATOCRIT %  --  41 4  --  42 8 40 8 43 3 42 1   PLATELETS Thousands/uL  --  232  --  207 193 224 198   NEUTROS PCT %  --  58  --   --   --   --   --    LYMPHS PCT %  --  21  --   --   --   --   --    MONOS PCT %  --  13*  --   --   --   --   --    EOS PCT %  --  7*  --   --   --   --   --    POTASSIUM mmol/L 3 0* 3 8 3 5 3 0* 3 6 3 7 4 1   CHLORIDE mmol/L 95* 98* 94* 94* 97* 98* 97*   CO2 mmol/L 31 34* 34* 31 28 29 29   BUN mg/dL 83* 78* 70* 66* 61* 54* 52*   CREATININE mg/dL 2 36* 2 37* 2 34* 2 17* 2 16* 1 97* 2 07*   CALCIUM mg/dL 10 3* 9 5 10 2* 9 5 9 3 9 4 9 4   ALK PHOS U/L  --  132*  --   --   --   --   --    ALT U/L  --  18  --   --   --   --   --    AST U/L  --  49*  --   --   --   --   --    MAGNESIUM mg/dL 2 1  --  2 1 1 9 2 0 2 1 2 0   PHOSPHORUS mg/dL  --  4 3*  --  4 2*  --  3 2  --        CUTURES:  Lab Results   Component Value Date    BLOODCX No Growth After 5 Days  03/09/2021    BLOODCX No Growth After 5 Days  03/09/2021    URINECX <10,000 cfu/ml Mixed Contaminants X2 02/08/2017                 Portions of the record may have been created with voice recognition software  Occasional wrong word or "sound a like" substitutions may have occurred due to the inherent limitations of voice recognition software   Read the chart carefully and recognize, using context, where substitutions have occurred  If you have any questions, please contact the dictating provider

## 2021-03-18 NOTE — OCCUPATIONAL THERAPY NOTE
Occupational Therapy Treatment Note:         03/18/21 1108   OT Last Visit   OT Visit Date 03/18/21   Note Type   Note Type Treatment   Restrictions/Precautions   Weight Bearing Precautions Per Order No   Other Precautions Fall Risk; Chair Alarm; Bed Alarm   Pain Assessment   Pain Assessment Tool 0-10   Pain Score No Pain   ADL   Where Assessed Chair   Grooming Assistance 5  Supervision/Setup   Grooming Deficit Setup   LB Dressing Assistance 5  Supervision/Setup   LB Dressing Deficit Setup;Steadying; Requires assistive device for steadying;Verbal cueing;Supervision/safety; Increased time to complete; Don/doff R sock; Don/doff L sock; Thread RLE into pants; Thread LLE into pants;Pull up over hips   LB Dressing Comments no LOB noted with activity  Bed Mobility   Supine to Sit Unable to assess   Transfers   Sit to Stand 5  Supervision   Additional items Assist x 1; Armrests; Increased time required;Verbal cues   Stand to Sit 5  Supervision   Additional items Assist x 1; Increased time required;Verbal cues;Armrests   Stand pivot 5  Supervision   Additional items Assist x 1; Armrests; Increased time required;Verbal cues   Additional Comments cues for consistency with use of RW  Functional Mobility   Functional Mobility 5  Supervision   Additional Comments x1   Additional items Rolling walker   Cognition   Overall Cognitive Status WFL   Arousal/Participation Alert; Responsive; Cooperative   Attention Within functional limits   Orientation Level Oriented X4   Memory Decreased short term memory   Following Commands Follows multistep commands with increased time or repetition   Comments Pt able to make his need known  Cognition Assessment Tools Other (Comment)  (functional observation  )   Additional Activities   Additional Activities Other (Comment)  (reviewed fall prevention, CHF packety, precautions  )   Additional Activities Comments Pt reports having interests with use of CHF packet daily  Packety and issued for reference  Activity Tolerance   Activity Tolerance Patient limited by fatigue   Medical Staff Made Aware reported all findings to nursing staff  Pt with B/P at 106/54 while seated in bedside chair  Instance 101/58  Pt with slight dizziness with initial sit to stand  Assessment   Assessment Pt was seen for skilled OT with focus on completion of self care tasks, functional transfers, review of CHF packet/management, the fall prevention checklist and review of current plan of care  Pt pleasant and cooperative through out tx session  Pt with F understanding of reviewed CHF precautions and need for daily weights, diet and self monitoring by logging on calendar  See above levels of A required for all functional tasks  Recommended looking into a medical alert system for use at home due to Pt will be living alone after his daughter moves to [place] in May  Pt may benefit from use of LHAE as needed with functional reach limitations and need for shoehorn periodically in the home  The patient's raw score on the AM-PAC Daily Activity inpatient short form is 20, standardized score is 42 03, greater than 39 4  Patients at this level are likely to benefit from discharge to home  Fall prevention checklist reviewed with Pt with Pt reporting having many adaptations in the home from caring for his wife in the past  Reported all findings and vital signs to nursing staff  Plan   Treatment Interventions ADL retraining;Functional transfer training;UE strengthening/ROM; Endurance training;Cognitive reorientation;Patient/family training   Goal Expiration Date 03/30/21   OT Treatment Day 1   OT Frequency 3-5x/wk   Recommendation   OT Discharge Recommendation Home with skilled therapy   OT - OK to Discharge Yes  (when medically cleared   )   AM-PAC Daily Activity Inpatient   Lower Body Dressing 3   Bathing 3   Toileting 3   Upper Body Dressing 3   Grooming 4   Eating 4   Daily Activity Raw Score 20   Daily Activity Standardized Score (Calc for Raw Score >=11) 42 03   AM-PAC Applied Cognition Inpatient   Following a Speech/Presentation 4   Understanding Ordinary Conversation 4   Taking Medications 4   Remembering Where Things Are Placed or Put Away 4   Remembering List of 4-5 Errands 3   Taking Care of Complicated Tasks 3   Applied Cognition Raw Score 22   Applied Cognition Standardized Score 47 83   Yulan Aus, [address]

## 2021-03-18 NOTE — PLAN OF CARE
Problem: PHYSICAL THERAPY ADULT  Goal: Performs mobility at highest level of function for planned discharge setting  See evaluation for individualized goals  Description: Treatment/Interventions: Functional transfer training, Elevations, LE strengthening/ROM, Therapeutic exercise, Endurance training, Patient/family training, Bed mobility, Gait training, Spoke to nursing, OT  Equipment Recommended: Walker(Pt owns walker)       See flowsheet documentation for full assessment, interventions and recommendations  Outcome: Progressing  Note: Prognosis: Good  Problem List: Decreased strength, Decreased range of motion, Decreased endurance, Impaired balance, Decreased mobility, Decreased skin integrity  Assessment: Pt  seated in bedside chair upon my arrival  Pt  eager to participate in therapy this AM  Performance of HEP wtih cues provided for proper completion  Progressed with transfers being able to complete practicing proper technique with no noted LOB  Progressed with increased amb  trials x 2 with use of RW and cues provided for LE sequencing  Pt  able to complete higher level balance training, with simulation of stair trial with no noted LOB  Pt  reports good understanding of stair training at this time  Pt  returned to seated in bedside chair at end of treatment session with alarm active  PT will continue to recommend d/c home with HHPT and family support as needed when medically stable  Barriers to Discharge: None  Barriers to Discharge Comments: stairs to enter  PT Discharge Recommendation: Home with skilled therapy, Return to previous environment with social support(HHPT)     PT - OK to Discharge: Yes(if d/c when medically stable )    See flowsheet documentation for full assessment

## 2021-03-18 NOTE — PHYSICAL THERAPY NOTE
Physical Therapy Progress Note     03/18/21 0943   PT Last Visit   PT Visit Date 03/18/21   Note Type   Note Type Treatment   Pain Assessment   Pain Assessment Tool Pain Assessment not indicated - pt denies pain   Pain Score No Pain   Restrictions/Precautions   Weight Bearing Precautions Per Order No   Other Precautions Chair Alarm; Bed Alarm; Fall Risk   General   Chart Reviewed Yes   Response to Previous Treatment Patient with no complaints from previous session  Family/Caregiver Present No   Subjective   Subjective Willing to participate in therapy this AM    Transfers   Sit to Stand 5  Supervision   Additional items Assist x 1; Armrests; Increased time required;Verbal cues   Stand to Sit 5  Supervision   Additional items Assist x 1; Armrests; Increased time required;Verbal cues   Ambulation/Elevation   Gait pattern Decreased foot clearance; Forward Flexion; Short stride; Excessively slow; Inconsistent chantelle; Shuffling   Gait Assistance 5  Supervision   Additional items Assist x 1;Verbal cues; Tactile cues   Assistive Device Rolling walker   Distance 100' x 2 with standing resting period in between   Balance   Static Sitting Good   Dynamic Sitting Good   Static Standing Fair   Dynamic Standing Fair   Ambulatory Fair -   Endurance Deficit   Endurance Deficit No   Activity Tolerance   Activity Tolerance Patient tolerated treatment well   Nurse Made Aware Yes   Exercises   TKR Sitting;10 reps;AAROM; Bilateral   Balance training  BLE marching   Assessment   Prognosis Good   Problem List Decreased strength;Decreased range of motion;Decreased endurance; Impaired balance;Decreased mobility; Decreased skin integrity   Assessment Pt  seated in bedside chair upon my arrival  Pt  eager to participate in therapy this AM  Performance of HEP wtih cues provided for proper completion  Progressed with transfers being able to complete practicing proper technique with no noted LOB   Progressed with increased amb  trials x 2 with use of RW and cues provided for LE sequencing  Pt  able to complete higher level balance training, with simulation of stair trial with no noted LOB  Pt  reports good understanding of stair training at this time  Pt  returned to seated in bedside chair at end of treatment session with alarm active  PT will continue to recommend d/c home with HHPT and family support as needed when medically stable  Barriers to Discharge None   Goals   Patient Goals To go home  STG Expiration Date 03/26/21   PT Treatment Day 2   Plan   Treatment/Interventions Functional transfer training;LE strengthening/ROM; Elevations; Therapeutic exercise; Endurance training;Gait training;Spoke to nursing;Spoke to case management   Progress Progressing toward goals   PT Frequency Other (Comment)  (3-5x/wk)   Recommendation   PT Discharge Recommendation Home with skilled therapy; Return to previous environment with social support  (HHPT)   PT - OK to Discharge Yes  (if d/c when medically stable  )   AM-PAC Basic Mobility Inpatient   Turning in Bed Without Bedrails 4   Lying on Back to Sitting on Edge of Flat Bed 4   Moving Bed to Chair 3   Standing Up From Chair 3   Walk in Room 3   Climb 3-5 Stairs 3   Basic Mobility Inpatient Raw Score 20   Basic Mobility Standardized Score 43 99     [place], PTA

## 2021-03-18 NOTE — ASSESSMENT & PLAN NOTE
Wt Readings from Last 3 Encounters:   03/17/21 73 6 kg (162 lb 3 2 oz)   03/09/21 81 7 kg (180 lb 3 2 oz)   02/26/21 83 5 kg (184 lb)       LVEF 25%  Card/Nephrology following  Still on bumex drip      Will defer to cards and nephro when to stop and then the patient can go home

## 2021-03-18 NOTE — PROGRESS NOTES
[address] [place] [address]  Progress Note - Celio Olson 1927, 80 y o  male MRN: [id number]  Unit/Bed#: E4 -01 Encounter: [id number]  Primary Care Provider: VARSHA Tamayo MD   Date and time admitted to hospital: 3/9/2021 12:52 PM    * Cellulitis of left leg  Assessment & Plan  Continue Ancef, to complete on 3/19  Wound care nurse consulted for dressing changes and mgmt    Acute on chronic systolic CHF (congestive heart failure) (HCC)  Assessment & Plan  Wt Readings from Last 3 Encounters:   21 73 6 kg (162 lb 3 2 oz)   21 81 7 kg (180 lb 3 2 oz)   21 83 5 kg (184 lb)       LVEF 25%  Card/Nephrology following  Still on bumex drip  Will defer to cards and nephro when to stop and then the patient can go home      Acute kidney injury superimposed on chronic kidney disease [facility])  Assessment & Plan  Holding ace inhibitor  This Cr may be his new baseline  Subjective:   Feels better  Less leg swelling  No shortness of breath      Objective:     Vitals:   Temp (24hrs), Av 1 °F (36 2 °C), Min:96 9 °F (36 1 °C), Max:97 2 °F (36 2 °C)    Temp:  [96 9 °F (36 1 °C)-97 2 °F (36 2 °C)] 97 1 °F (36 2 °C)  HR:  [69-79] 71  Resp:  [16-20] 16  BP: ()/(56-66) 99/56  SpO2:  [97 %-98 %] 97 %  Body mass index is 23 95 kg/m²  Input and Output Summary (last 24 hours): Intake/Output Summary (Last 24 hours) at 3/18/2021 1610  Last data filed at 3/17/2021 2107  Gross per 24 hour   Intake --   Output 800 ml   Net -800 ml       Physical Exam:     Physical Exam  Vitals signs and nursing note reviewed  HENT:      Head: Normocephalic and atraumatic  Eyes:      Pupils: Pupils are equal, round, and reactive to light  Cardiovascular:      Rate and Rhythm: Normal rate and regular rhythm  Heart sounds: No murmur  No friction rub  No gallop  Pulmonary:      Effort: Pulmonary effort is normal       Breath sounds: Normal breath sounds  No wheezing or rales  Health Maintenance Due   Topic Date Due   • Shingles Vaccine (1 of 2) 10/16/2015       Patient is due for topics as listed above but is not proceeding with Immunization(s) Shingles at this time.            Abdominal:      General: Bowel sounds are normal       Palpations: Abdomen is soft  Tenderness: There is no abdominal tenderness  Musculoskeletal:      Right lower leg: Edema (1+ much improved) present  Left lower leg: Edema (1+) present          Additional Data:     Labs:    Results from last 7 days   Lab Units 03/17/21  0625   WBC Thousand/uL 6 89   HEMOGLOBIN g/dL 13 5   HEMATOCRIT % 41 4   PLATELETS Thousands/uL 232   NEUTROS PCT % 58   LYMPHS PCT % 21   MONOS PCT % 13*   EOS PCT % 7*     Results from last 7 days   Lab Units 03/18/21  1203 03/17/21  0625   POTASSIUM mmol/L 3 0* 3 8   CHLORIDE mmol/L 95* 98*   CO2 mmol/L 31 34*   BUN mg/dL 83* 78*   CREATININE mg/dL 2 36* 2 37*   CALCIUM mg/dL 10 3* 9 5   ALK PHOS U/L  --  132*   ALT U/L  --  18   AST U/L  --  49*         Results from last 7 days   Lab Units 03/17/21  2144 03/17/21  1602 03/17/21  0744 03/16/21  1621 03/16/21  1124 03/16/21  0741   POC GLUCOSE mg/dl 130 166* 115 135 140 116               * I Have Reviewed All Lab Data     Recent Cultures (last 7 days):             Last 24 Hours Medication List:   Current Facility-Administered Medications   Medication Dose Route Frequency Provider Last Rate    apixaban  2 5 mg Oral BID Lex Joseph MD      bisoprolol  5 mg Oral Daily Taylor Zurita,       bumetanide  2 mg Intravenous BID Reese Spruce, DO      cefazolin  1,000 mg Intravenous Q12H Meghan Bravo MD 1,000 mg (03/18/21 0520)    finasteride  5 mg Oral Daily Lex Joseph MD      hydrOXYzine HCL  25 mg Oral Q6H PRN Bismark Kirby, DO      loratadine  10 mg Oral Daily Viral Kirby, DO      pantoprazole  40 mg Oral Daily Lex Joseph MD      potassium chloride  40 mEq Oral BID Taylor Bruce,            VTE Pharmacologic Prophylaxis:   Pharmacologic: apixaban        Current Length of Stay: 9 day(s)    Current Patient Status: Inpatient       Discharge Plan: hopefully home soon     When cards stops bumex drip    Code Status: Level 3 - DNAR and DNI           Today, Patient Was Seen By: Jean Paul Powell DO    ** Please Note: Dictation voice to text software may have been used in the creation of this document   **

## 2021-03-19 PROBLEM — E87.6 HYPOKALEMIA: Status: RESOLVED | Noted: 2021-01-01 | Resolved: 2021-01-01

## 2021-03-19 PROBLEM — E87.3 ALKALOSIS: Status: ACTIVE | Noted: 2021-01-01

## 2021-03-19 NOTE — PLAN OF CARE
Problem: Potential for Falls  Goal: Patient will remain free of falls  Description: INTERVENTIONS:  - Assess patient frequently for physical needs  -  Identify cognitive and physical deficits and behaviors that affect risk of falls    -  [place] fall precautions as indicated by assessment   - Educate patient/family on patient safety including physical limitations  - Instruct patient to call for assistance with activity based on assessment  - Modify environment to reduce risk of injury  - Consider OT/PT consult to assist with strengthening/mobility  Outcome: Progressing     Problem: PAIN - ADULT  Goal: Verbalizes/displays adequate comfort level or baseline comfort level  Description: Interventions:  - Encourage patient to monitor pain and request assistance  - Assess pain using appropriate pain scale  - Administer analgesics based on type and severity of pain and evaluate response  - Implement non-pharmacological measures as appropriate and evaluate response  - Consider cultural and social influences on pain and pain management  - Notify physician/advanced practitioner if interventions unsuccessful or patient reports new pain  Outcome: Progressing     Problem: INFECTION - ADULT  Goal: Absence or prevention of progression during hospitalization  Description: INTERVENTIONS:  - Assess and monitor for signs and symptoms of infection  - Monitor lab/diagnostic results  - Monitor all insertion sites, i e  indwelling lines, tubes, and drains  - Monitor endotracheal if appropriate and nasal secretions for changes in amount and color  - [place] appropriate cooling/warming therapies per order  - Administer medications as ordered  - Instruct and encourage patient and family to use good hand hygiene technique  - Identify and instruct in appropriate isolation precautions for identified infection/condition  Outcome: Progressing     Problem: SAFETY ADULT  Goal: Maintain or return to baseline ADL function  Description: INTERVENTIONS:  -  Assess patient's ability to carry out ADLs; assess patient's baseline for ADL function and identify physical deficits which impact ability to perform ADLs (bathing, care of mouth/teeth, toileting, grooming, dressing, etc )  - Assess/evaluate cause of self-care deficits   - Assess range of motion  - Assess patient's mobility; develop plan if impaired  - Assess patient's need for assistive devices and provide as appropriate  - Encourage maximum independence but intervene and supervise when necessary  - Involve family in performance of ADLs  - Assess for home care needs following discharge   - Consider OT consult to assist with ADL evaluation and planning for discharge  - Provide patient education as appropriate  Outcome: Progressing  Goal: Maintain or return mobility status to optimal level  Description: INTERVENTIONS:  - Assess patient's baseline mobility status (ambulation, transfers, stairs, etc )    - Identify cognitive and physical deficits and behaviors that affect mobility  - Identify mobility aids required to assist with transfers and/or ambulation (gait belt, sit-to-stand, lift, walker, cane, etc )  - [address] fall precautions as indicated by assessment  - Record patient progress and toleration of activity level on Mobility SBAR; progress patient to next Phase/Stage  - Instruct patient to call for assistance with activity based on assessment  - Consider rehabilitation consult to assist with strengthening/weightbearing, etc   Outcome: Progressing     Problem: DISCHARGE PLANNING  Goal: Discharge to home or other facility with appropriate resources  Description: INTERVENTIONS:  - Identify barriers to discharge w/patient and caregiver  - Arrange for needed discharge resources and transportation as appropriate  - Identify discharge learning needs (meds, wound care, etc )  - Arrange for interpretive services to assist at discharge as needed  - Refer to Case Management Department for coordinating discharge planning if the patient needs post-hospital services based on physician/advanced practitioner order or complex needs related to functional status, cognitive ability, or social support system  Outcome: Progressing     Problem: Knowledge Deficit  Goal: Patient/family/caregiver demonstrates understanding of disease process, treatment plan, medications, and discharge instructions  Description: Complete learning assessment and assess knowledge base    Interventions:  - Provide teaching at level of understanding  - Provide teaching via preferred learning methods  Outcome: Progressing     Problem: SKIN/TISSUE INTEGRITY - ADULT  Goal: Skin integrity remains intact  Description: INTERVENTIONS  - Identify patients at risk for skin breakdown  - Assess and monitor skin integrity  - Assess and monitor nutrition and hydration status  - Monitor labs (i e  albumin)  - Assess for incontinence   - Turn and reposition patient  - Assist with mobility/ambulation  - Relieve pressure over bony prominences  - Avoid friction and shearing  - Provide appropriate hygiene as needed including keeping skin clean and dry  - Evaluate need for skin moisturizer/barrier cream  - Collaborate with interdisciplinary team (i e  Nutrition, Rehabilitation, etc )   - Patient/family teaching  Outcome: Progressing  Goal: Incision(s), wounds(s) or drain site(s) healing without S/S of infection  Description: INTERVENTIONS  - Assess and document risk factors for skin impairment   - Assess and document dressing, incision, wound bed, drain sites and surrounding tissue  - Consider nutrition services referral as needed  - Oral mucous membranes remain intact  - Provide patient/ family education  Outcome: Progressing  Goal: Oral mucous membranes remain intact  Description: INTERVENTIONS  - Assess oral mucosa and hygiene practices  - Implement preventative oral hygiene regimen  - Implement oral medicated treatments as ordered  - Initiate Nutrition services referral as needed  Outcome: Progressing     Problem: CARDIOVASCULAR - ADULT  Goal: Maintains optimal cardiac output and hemodynamic stability  Description: INTERVENTIONS:  - Monitor I/O, vital signs and rhythm  - Monitor for S/S and trends of decreased cardiac output  - Administer and titrate ordered vasoactive medications to optimize hemodynamic stability  - Assess quality of pulses, skin color and temperature  - Assess for signs of decreased coronary artery perfusion  - Instruct patient to report change in severity of symptoms  Outcome: Progressing  Goal: Absence of cardiac dysrhythmias or at baseline rhythm  Description: INTERVENTIONS:  - Continuous cardiac monitoring, vital signs, obtain 12 lead EKG if ordered  - Administer antiarrhythmic and heart rate control medications as ordered  - Monitor electrolytes and administer replacement therapy as ordered  Outcome: Progressing     Problem: METABOLIC, FLUID AND ELECTROLYTES - ADULT  Goal: Electrolytes maintained within normal limits  Description: INTERVENTIONS:  - Monitor labs and assess patient for signs and symptoms of electrolyte imbalances  - Administer electrolyte replacement as ordered  - Monitor response to electrolyte replacements, including repeat lab results as appropriate  - Instruct patient on fluid and nutrition as appropriate  Outcome: Progressing  Goal: Fluid balance maintained  Description: INTERVENTIONS:  - Monitor labs   - Monitor I/O and WT  - Instruct patient on fluid and nutrition as appropriate  - Assess for signs & symptoms of volume excess or deficit  Outcome: Progressing

## 2021-03-19 NOTE — ASSESSMENT & PLAN NOTE
Resolved    Recent Labs     03/17/21  0625 03/18/21  1203 03/19/21  0606   K 3 8 3 0* 4 0   MG  --  2 1  --

## 2021-03-19 NOTE — PROGRESS NOTES
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  -Amos likely due to cardiorenal syndrome with volume overload, possible underlying component of cellulitis  -baseline serum creatinine 1 8-1 9 since mid 2020, serum creatinine 2 48 upon admission, now 2 27 and stable on intermittent bumex 2mg IV BID per cardiology  -likely need to accept higher sCr for euvolemia  -monitor BMP     2  Hypokalemia, likely diuretic induced-resolved s/p repletion     3  Mild hyperphosphatemia-monitor phos, latest 4 3     4  Blood pressure a bit lower than goal in light of renal impairment, would want to maintain map greater than 65 to avoid relative hypotension/hypoperfusion   On bisoprolol 5 mg with hold parameters      5  Acute on chronic systolic heart failure with EF 25%-on bumex 2mg IV BID per Cardiology, also on bisoprolol, ACE-inhibitor/Arb on hold in light of renal dysfunction, monitor daily weight, I/O  Weight remains stable  As patient with hypoalbuminemia, suspect LE edema in part due to third spacing  Consider transition to oral bumex soon      6  LLE cellulitis - s/p 3 days Abx per primary team     7  pseudohypercalcemia - uncorrected calcium about 11  ICal low at 1 08, phos, PTH levels  Not currently on calcium supplements  Suspect diuretic induced due to bumex       8  Elevated total CO2 in the setting of contraction alkalosis on diuretics  Stable on intermittent bumex        Subjective:   He denies any chest pain or shortness of breath  He thinks his leg edema is improving  No other complaints  Urinating well  Objective:     Vitals: Blood pressure 104/59, pulse 73, temperature (!) 97 1 °F (36 2 °C), temperature source Temporal, resp  rate 18, height 5' 9" (1 753 m), weight 73 6 kg (162 lb 3 2 oz), SpO2 98 %  ,Body mass index is 23 95 kg/m²  Temp (24hrs), Av 3 °F (36 3 °C), Min:97 1 °F (36 2 °C), Max:97 8 °F (36 6 °C)      Weight (last 2 days)     Date/Time   Weight    21 0600   73 6 (162 2)                Intake/Output Summary (Last 24 hours) at 3/19/2021 1431  Last data filed at 3/18/2021 2000  Gross per 24 hour   Intake --   Output 425 ml   Net -425 ml     I/O last 24 hours: In: -   Out: [address] [Urine:625]        Physical Exam:   Physical Exam  Vitals signs reviewed  Constitutional:       General: He is not in acute distress  Appearance: Normal appearance  He is well-developed  He is not diaphoretic  Comments: Thin, sitting up in chair   HENT:      Head: Normocephalic and atraumatic  Nose: Nose normal       Mouth/Throat:      Mouth: Mucous membranes are moist       Pharynx: No oropharyngeal exudate  Eyes:      General: No scleral icterus  Right eye: No discharge  Left eye: No discharge  Neck:      Musculoskeletal: Neck supple  Thyroid: No thyromegaly  Cardiovascular:      Rate and Rhythm: Normal rate and regular rhythm  Heart sounds: Normal heart sounds  Pulmonary:      Effort: Pulmonary effort is normal       Breath sounds: Normal breath sounds  No wheezing or rales  Abdominal:      General: Bowel sounds are normal  There is no distension  Palpations: Abdomen is soft  Tenderness: There is no abdominal tenderness  Musculoskeletal: Normal range of motion  General: Swelling (LLE wrapped, b/l LE) present  Lymphadenopathy:      Cervical: No cervical adenopathy  Skin:     General: Skin is warm and dry  Findings: No rash  Neurological:      General: No focal deficit present  Mental Status: He is alert  Comments: awake   Psychiatric:         Mood and Affect: Mood normal          Behavior: Behavior normal          Invasive Devices     Peripheral Intravenous Line            Peripheral IV 21 Dorsal (posterior); Right Forearm 2 days Medications:    Scheduled Meds:  Current Facility-Administered Medications   Medication Dose Route Frequency Provider Last Rate    apixaban  2 5 mg Oral BID Suly Fernandez MD      bisoprolol  5 mg Oral Daily Taylor bAreu,       bumetanide  2 mg Intravenous BID Yoko Monaco MD      finasteride  5 mg Oral Daily Suly Fernandez MD      hydrOXYzine HCL  25 mg Oral Q6H PRN Bartonrianna Bonilla Prechtel, DO      loratadine  10 mg Oral Daily Viral MONSON Prechtel, DO      pantoprazole  40 mg Oral Daily Suly Fernandez MD         PRN Meds: hydrOXYzine HCL    Continuous Infusions:         LAB RESULTS:      Results from last 7 days   Lab Units 03/19/21  0606 03/18/21  1203 03/17/21  0625 03/16/21  0514 03/15/21  0501 03/14/21  0541 03/13/21  0448   WBC Thousand/uL 6 67  --  6 89  --  6 65 7 00 7 72   HEMOGLOBIN g/dL 13 9  --  13 5  --  13 9 12 8 13 7   HEMATOCRIT % 43 4  --  41 4  --  42 8 40 8 43 3   PLATELETS Thousands/uL 214  --  232  --  207 193 224   NEUTROS PCT %  --   --  58  --   --   --   --    LYMPHS PCT %  --   --  21  --   --   --   --    MONOS PCT %  --   --  13*  --   --   --   --    EOS PCT %  --   --  7*  --   --   --   --    POTASSIUM mmol/L 4 0 3 0* 3 8 3 5 3 0* 3 6 3 7   CHLORIDE mmol/L 96* 95* 98* 94* 94* 97* 98*   CO2 mmol/L 33* 31 34* 34* 31 28 29   BUN mg/dL 76* 83* 78* 70* 66* 61* 54*   CREATININE mg/dL 2 27* 2 36* 2 37* 2 34* 2 17* 2 16* 1 97*   CALCIUM mg/dL 9 7 10 3* 9 5 10 2* 9 5 9 3 9 4   ALK PHOS U/L  --   --  132*  --   --   --   --    ALT U/L  --   --  18  --   --   --   --    AST U/L  --   --  49*  --   --   --   --    MAGNESIUM mg/dL  --  2 1  --  2 1 1 9 2 0 2 1   PHOSPHORUS mg/dL  --   --  4 3*  --  4 2*  --  3 2       CUTURES:  Lab Results   Component Value Date    BLOODCX No Growth After 5 Days  03/09/2021    BLOODCX No Growth After 5 Days   03/09/2021    URINECX <10,000 cfu/ml Mixed Contaminants X2 02/08/2017                 Portions of the record may have been created with voice recognition software  Occasional wrong word or "sound a like" substitutions may have occurred due to the inherent limitations of voice recognition software  Read the chart carefully and recognize, using context, where substitutions have occurred  If you have any questions, please contact the dictating provider

## 2021-03-19 NOTE — ASSESSMENT & PLAN NOTE
Wt Readings from Last 3 Encounters:   03/17/21 73 6 kg (162 lb 3 2 oz)   03/09/21 81 7 kg (180 lb 3 2 oz)   02/26/21 83 5 kg (184 lb)     EF 25%  - Continue IV bumex  Cardiology decreased hold parameters to facilitate diuresis

## 2021-03-19 NOTE — PLAN OF CARE
Problem: OCCUPATIONAL THERAPY ADULT  Goal: Performs self-care activities at highest level of function for planned discharge setting  See evaluation for individualized goals  Description: Treatment Interventions: ADL retraining, Functional transfer training, UE strengthening/ROM, Endurance training, Patient/family training, Equipment evaluation/education, Compensatory technique education, Energy conservation, Activityengagement          See flowsheet documentation for full assessment, interventions and recommendations  Outcome: Progressing  Note: Limitation: Decreased ADL status, Decreased UE strength, Decreased Safe judgement during ADL, Decreased endurance, Decreased self-care trans, Decreased high-level ADLs  Prognosis: Fair  Assessment: Pt seen for OT treatment session focusing on functional activity tolerance, ADLs, functional mobility/transfersand UE ROM/strengthening  Pt alert and cooperative throughout session  Pt seated OOB in chair at start of session  Grooming tasks and UB dressing completed with Supervision w/ setup required and increased time to complete  LB dressing completed with Supervision w/ increased time required to don/doff B/L socks and thread B/L LEs into underwear and pants  Pt demonstrated Fair dynamic standing balance when standing to pull underwear/pants over hips  Transfers (sit<>stand) completed with Supervision with cues for safe technique and hand placement  Supervision required for functional mobility with use of RW  No LOBs noted  Pt tolerated increased standing tolerance, tolerating approx  10 mins of standing activity prior to requesting seated rest break  UE exercises completed while seated OOB in chair to increase UE ROM/strength needed for ADLs/transfers  Green resistance theraband utilized  Distributed HEP with pt and pt's dtr reporting understanding of education  Pt tolerated well  Pt seated OOB in chair at end of session with call bell and phone within reach   Dtr present  All needs met and pt reports no further questions for OT at this time  Continue to recommend Home OT when medically cleared  OT to follow pt on caseload        OT Discharge Recommendation: Home with skilled therapy  OT - OK to Discharge: Yes(when medically cleared to rehab)

## 2021-03-19 NOTE — PLAN OF CARE
Problem: PHYSICAL THERAPY ADULT  Goal: Performs mobility at highest level of function for planned discharge setting  See evaluation for individualized goals  Description: Treatment/Interventions: Functional transfer training, Elevations, LE strengthening/ROM, Therapeutic exercise, Endurance training, Patient/family training, Bed mobility, Gait training, Spoke to nursing, OT  Equipment Recommended: Walker(Pt owns walker)       See flowsheet documentation for full assessment, interventions and recommendations  Outcome: Progressing  Note: Prognosis: Good  Problem List: Decreased strength, Decreased range of motion, Decreased endurance, Impaired balance, Decreased mobility, Decreased skin integrity  Assessment: Pt  seated in bedside chair upon my arrival  Pt  reporting fatigue, however agreeable to therapeutic intervention  Performance of HEP seated in bedside chair with cues provided for proper completion  Progressed with transfers being able to complete practicing proper technique with no noted LOB  Pt  able to complete amb  trial with use of RW and cues provided for LE sequencing  After additional amb  trial pt  returned to seated in bedside chair with alarm active at end of treatment session  PT will continue to recommend d/c home with HHPT and family support as needed when medically stable  Barriers to Discharge: None  Barriers to Discharge Comments: stairs to enter  PT Discharge Recommendation: Home with skilled therapy, Return to previous environment with social support(HHPT)     PT - OK to Discharge: Yes(if d/c when medically stable )    See flowsheet documentation for full assessment

## 2021-03-19 NOTE — PHYSICAL THERAPY NOTE
Physical Therapy Progress Note     03/19/21 0858   PT Last Visit   PT Visit Date 03/19/21   Note Type   Note Type Treatment   Pain Assessment   Pain Assessment Tool Pain Assessment not indicated - pt denies pain   Pain Score No Pain   Restrictions/Precautions   Weight Bearing Precautions Per Order No   Other Precautions Fall Risk; Chair Alarm; Bed Alarm;Telemetry;Multiple lines   General   Chart Reviewed Yes   Response to Previous Treatment Patient with no complaints from previous session  Family/Caregiver Present No   Subjective   Subjective Willing to participate in therapy this AM    Transfers   Sit to Stand 5  Supervision   Additional items Assist x 1; Armrests; Increased time required;Verbal cues   Stand to Sit 5  Supervision   Additional items Assist x 1; Armrests; Increased time required;Verbal cues   Toilet transfer 5  Supervision   Additional items Assist x 1; Armrests; Increased time required;Verbal cues;Standard toilet; Other  (use of grab bar)   Ambulation/Elevation   Gait pattern Decreased foot clearance; Forward Flexion; Short stride; Excessively slow; Inconsistent chantelle; Shuffling   Gait Assistance 5  Supervision   Additional items Assist x 1;Verbal cues; Tactile cues   Assistive Device Rolling walker   Distance 100'   Balance   Static Sitting Good   Dynamic Sitting Good   Static Standing Fair   Dynamic Standing Fair   Ambulatory Fair -   Endurance Deficit   Endurance Deficit No   Activity Tolerance   Activity Tolerance Patient tolerated treatment well   Nurse Made Aware Yes   Exercises   TKR Sitting;10 reps;AAROM; Bilateral   Assessment   Prognosis Good   Problem List Decreased strength;Decreased range of motion;Decreased endurance; Impaired balance;Decreased mobility; Decreased skin integrity   Assessment Pt  seated in bedside chair upon my arrival  Pt  reporting fatigue, however agreeable to therapeutic intervention  Performance of HEP seated in bedside chair with cues provided for proper completion  Progressed with transfers being able to complete practicing proper technique with no noted LOB  Pt  able to complete amb  trial with use of RW and cues provided for LE sequencing  After additional amb  trial pt  returned to seated in bedside chair with alarm active at end of treatment session  PT will continue to recommend d/c home with HHPT and family support as needed when medically stable  Barriers to Discharge None   Goals   Patient Goals To go home  STG Expiration Date 03/26/21   PT Treatment Day 3   Plan   Treatment/Interventions Functional transfer training;LE strengthening/ROM; Endurance training; Therapeutic exercise;Gait training;Spoke to nursing;Spoke to case management   Progress Progressing toward goals   PT Frequency Other (Comment)  (3-5x/wk)   Recommendation   PT Discharge Recommendation Home with skilled therapy; Return to previous environment with social support  (HHPT)   PT - OK to Discharge Yes  (if d/c when medically stable  )   AM-PAC Basic Mobility Inpatient   Turning in Bed Without Bedrails 4   Lying on Back to Sitting on Edge of Flat Bed 4   Moving Bed to Chair 3   Standing Up From Chair 3   Walk in Room 3   Climb 3-5 Stairs 3   Basic Mobility Inpatient Raw Score 20   Basic Mobility Standardized Score 43 99     Conchita Wilkinson, PTA

## 2021-03-19 NOTE — OCCUPATIONAL THERAPY NOTE
[address] Progress Note     Patient Name: Pushpa MILLS'S Date: 3/19/2021  Problem List  Principal Problem:    Cellulitis of left leg  Active Problems:    A-fib (HCC)    BPH (benign prostatic hyperplasia)    Acute kidney injury superimposed on chronic kidney disease ([facility] [address] )    Acute on chronic systolic CHF (congestive heart failure) ([facility] [address] )    Hyperphosphatemia    Hypercalcemia    Alkalosis          03/19/21 1522   OT Last Visit   OT Visit Date 03/19/21   Note Type   Note Type Treatment   Restrictions/Precautions   Weight Bearing Precautions Per Order No   Other Precautions Chair Alarm; Bed Alarm; Fall Risk;Multiple lines;Telemetry   General   Response to Previous Treatment Patient with no complaints from previous session   Lifestyle   Autonomy At baseline, pt was I w/ ADLs, IADLs, and functional mobility/transfers w/ use of RW PRN, (+) , and denies falls PTA  Reciprocal Relationships Dtr, 4 sons   Service to Others Retired- [employer]   Intrinsic Gratification Watching TV, Traveling   Pain Assessment   Pain Assessment Tool Pain Assessment not indicated - pt denies pain   Pain Score No Pain   ADL   Where Assessed Chair   Grooming Assistance 5  Supervision/Setup   Grooming Deficit Setup;Supervision/safety; Increased time to complete   UB Dressing Assistance 5  Supervision/Setup   UB Dressing Deficit Setup;Supervision/safety; Increased time to complete   LB Dressing Assistance 5  Supervision/Setup   LB Dressing Deficit Setup;Supervision/safety; Increased time to complete; Don/doff R sock; Don/doff L sock; Thread RLE into underwear; Thread LLE into underwear; Thread RLE into pants; Thread LLE into pants;Pull up over hips   Functional Standing Tolerance   Time 10 mins   Activity Dynamic standing balance activity   Comments No LOBs noted   Bed Mobility   Supine to Sit Unable to assess   Sit to Supine Unable to assess   Additional Comments Pt seated OOB in chair at end of session with call bell and phone within reach  Dtr present  All needs met and pt reports no further questions for OT at this time  Transfers   Sit to Stand 5  Supervision   Additional items Armrests; Increased time required;Verbal cues   Stand to Sit 5  Supervision   Additional items Armrests; Increased time required;Verbal cues   Functional Mobility   Functional Mobility 5  Supervision   Additional Comments Assist x1   Additional items Rolling walker   Therapeutic Excerise-Strength   UE Strength Yes   Right Upper Extremity- Strength   R Shoulder Flexion; Extension;Horizontal ABduction   R Elbow Elbow flexion;Elbow extension   R Position Seated   Equipment Theraband  (Green theraband)   RUE Strength Comment 10 reps x1   Left Upper Extremity-Strength   L Shoulder Flexion; Extension;Horizontal ABduction   L Elbow Elbow flexion;Elbow extension   L Position Seated   Equipment Theraband  (Green resistance)   LUE Strength Comment 10 reps x1   Cognition   Overall Cognitive Status WFL   Arousal/Participation Alert; Responsive; Cooperative   Attention Within functional limits   Orientation Level Oriented X4   Memory Decreased recall of precautions   Following Commands Follows multistep commands with increased time or repetition   Activity Tolerance   Activity Tolerance Patient tolerated treatment well   Medical Staff Made Aware KEY Sloan   Assessment   Assessment Pt seen for OT treatment session focusing on functional activity tolerance, ADLs, functional mobility/transfersand UE ROM/strengthening  Pt alert and cooperative throughout session  Pt seated OOB in chair at start of session  Grooming tasks and UB dressing completed with Supervision w/ setup required and increased time to complete  LB dressing completed with Supervision w/ increased time required to don/doff B/L socks and thread B/L LEs into underwear and pants  Pt demonstrated Fair dynamic standing balance when standing to pull underwear/pants over hips   Transfers (sit<>stand) completed with Supervision with cues for safe technique and hand placement  Supervision required for functional mobility with use of RW  No LOBs noted  Pt tolerated increased standing tolerance, tolerating approx  10 mins of standing activity prior to requesting seated rest break  UE exercises completed while seated OOB in chair to increase UE ROM/strength needed for ADLs/transfers  Green resistance theraband utilized  Distributed HEP with pt and pt's dtr reporting understanding of education  Pt tolerated well  Pt seated OOB in chair at end of session with call bell and phone within reach  Dtr present  All needs met and pt reports no further questions for OT at this time  Continue to recommend Home OT when medically cleared  OT to follow pt on caseload  Plan   Treatment Interventions ADL retraining;Functional transfer training;UE strengthening/ROM; Endurance training;Patient/family training;Equipment evaluation/education; Compensatory technique education; Energy conservation; Activityengagement   Goal Expiration Date 03/30/21   OT Treatment Day 2   OT Frequency 3-5x/wk   Recommendation   OT Discharge Recommendation Home with skilled therapy   OT - OK to Discharge Yes  (when medically cleared to rehab)   AM-PAC Daily Activity Inpatient   Lower Body Dressing 3   Bathing 3   Toileting 3   Upper Body Dressing 3   Grooming 4   Eating 4   Daily Activity Raw Score 20   Daily Activity Standardized Score (Calc for Raw Score >=11) 42 03   AM-PAC Applied Cognition Inpatient   Following a Speech/Presentation 4   Understanding Ordinary Conversation 4   Taking Medications 4   Remembering Where Things Are Placed or Put Away 4   Remembering List of 4-5 Errands 3   Taking Care of Complicated Tasks 3   Applied Cognition Raw Score 22   Applied Cognition Standardized Score 47 83          Inder Vaughn, OTR/L

## 2021-03-19 NOTE — PROGRESS NOTES
[address] [place] [address]  Progress Note - Shabana Ceja 6/16/1927, 80 y o  male MRN: [id number]  Unit/Bed#: E4 -01 Encounter: [id number]  Primary Care Provider: VARSHA Shepard MD   Date and time admitted to hospital: 3/9/2021 12:52 PM    * Cellulitis of left leg  Assessment & Plan  80year old male admitted due to cellulitis of his left leg   - Complete Ancef today  - Wound care    Acute on chronic systolic CHF (congestive heart failure) ([facility])  Assessment & Plan  Wt Readings from Last 3 Encounters:   03/17/21 73 6 kg (162 lb 3 2 oz)   03/09/21 81 7 kg (180 lb 3 2 oz)   02/26/21 83 5 kg (184 lb)     EF 25%  - Continue IV bumex  Cardiology decreased hold parameters to facilitate diuresis  A-fib [facility])  Assessment & Plan  S/p PPM   - Continue bisoprolol and Eliquis    Acute kidney injury superimposed on chronic kidney disease [facility])  Assessment & Plan  Stable    Recent Labs     03/17/21  0625 03/18/21  1203 03/19/21  0606   BUN 78* 83* 76*   CREATININE 2 37* 2 36* 2 27*   EGFR 23 23 24               BPH (benign prostatic hyperplasia)  Assessment & Plan  Continue finasteride    Hypokalemia-resolved as of 3/19/2021  Assessment & Plan  Resolved    Recent Labs     03/17/21  0625 03/18/21  1203 03/19/21  0606   K 3 8 3 0* 4 0   MG  --  2 1  --            VTE Pharmacologic Prophylaxis:   Pharmacologic: Apixaban (Eliquis)  Mechanical VTE Prophylaxis in Place: Yes    Patient Centered Rounds: I have performed bedside rounds with nursing staff today  Discussions with Specialists or Other Care Team Provider: Nursing, cardiology    Education and Discussions with Family / Patient: patient    Time Spent for Care: 30 minutes  More than 50% of total time spent on counseling and coordination of care as described above      Current Length of Stay: 10 day(s)    Current Patient Status: Inpatient   Certification Statement: The patient will continue to require additional inpatient hospital stay due to IV antibiotics, iv diuresis    Discharge Plan: 24-48 hours    Code Status: Level 3 - DNAR and DNI      Subjective:   Patient seen and examined at bedside  He is currently in good spirits  He is aware that we were unable to offer him diuresis due to the hold parameters  He understands that we need to bring the parameters down today to facilitate IV diuresis  Objective:     Vitals:   Temp (24hrs), Av 3 °F (36 3 °C), Min:97 1 °F (36 2 °C), Max:97 8 °F (36 6 °C)    Temp:  [97 1 °F (36 2 °C)-97 8 °F (36 6 °C)] 97 1 °F (36 2 °C)  HR:  [71-74] 73  Resp:  [16-18] 18  BP: ()/(55-59) 104/59  SpO2:  [97 %-99 %] 98 %  Body mass index is 23 95 kg/m²  Input and Output Summary (last 24 hours): Intake/Output Summary (Last 24 hours) at 3/19/2021 1143  Last data filed at 3/18/2021 2000  Gross per 24 hour   Intake --   Output 425 ml   Net -425 ml       Physical Exam:     Physical Exam  Vitals signs reviewed  HENT:      Head: Normocephalic  Nose: Nose normal       Mouth/Throat:      Mouth: Mucous membranes are dry  Eyes:      General: No scleral icterus  Extraocular Movements: Extraocular movements intact  Cardiovascular:      Rate and Rhythm: Normal rate  Rhythm irregular  Pulmonary:      Effort: Pulmonary effort is normal  No respiratory distress  Breath sounds: No wheezing or rales  Abdominal:      General: There is no distension  Palpations: Abdomen is soft  Tenderness: There is no abdominal tenderness  Musculoskeletal:      Right lower leg: Edema present  Left lower leg: Edema present  Skin:     General: Skin is warm  Neurological:      Mental Status: He is alert  Mental status is at baseline     Psychiatric:         Mood and Affect: Mood normal          Behavior: Behavior normal        Additional Data:     Labs:    Results from last 7 days   Lab Units 21  0606 21  0625   WBC Thousand/uL 6 67 6 89   HEMOGLOBIN g/dL 13 9 13 5   HEMATOCRIT % 43 4 41 4 PLATELETS Thousands/uL 214 232   NEUTROS PCT %  --  58   LYMPHS PCT %  --  21   MONOS PCT %  --  13*   EOS PCT %  --  7*     Results from last 7 days   Lab Units 03/19/21  0606  03/17/21  0625   SODIUM mmol/L 138   < > 140   POTASSIUM mmol/L 4 0   < > 3 8   CHLORIDE mmol/L 96*   < > 98*   CO2 mmol/L 33*   < > 34*   BUN mg/dL 76*   < > 78*   CREATININE mg/dL 2 27*   < > 2 37*   ANION GAP mmol/L 9   < > 8   CALCIUM mg/dL 9 7   < > 9 5   ALBUMIN g/dL  --   --  2 7*   TOTAL BILIRUBIN mg/dL  --   --  0 84   ALK PHOS U/L  --   --  132*   ALT U/L  --   --  18   AST U/L  --   --  49*   GLUCOSE RANDOM mg/dL 114   < > 112    < > = values in this interval not displayed  Results from last 7 days   Lab Units 03/17/21  2144 03/17/21  1602 03/17/21  0744 03/16/21  1621 03/16/21  1124 03/16/21  0741   POC GLUCOSE mg/dl 130 166* 115 135 140 116                   * I Have Reviewed All Lab Data Listed Above  * Additional Pertinent Lab Tests Reviewed: [place] [facility] Admission Reviewed    Imaging:    Imaging Reports Reviewed Today Include: xr chest    Recent Cultures (last 7 days):           Last 24 Hours Medication List:   Current Facility-Administered Medications   Medication Dose Route Frequency Provider Last Rate    apixaban  2 5 mg Oral BID Suzzanne Burkitt, MD      bisoprolol  5 mg Oral Daily Taylor Zurita, DO      bumetanide  2 mg Intravenous BID Donovan Monaco MD      finasteride  5 mg Oral Daily Suzzanne Burkitt, MD      hydrOXYzine HCL  25 mg Oral Q6H PRN Altamese Petties Prechtel, DO      loratadine  10 mg Oral Daily Viral S Prechtel, DO      pantoprazole  40 mg Oral Daily Suzzanne Burkitt, MD          Today, Patient Was Seen By: Brendolyn Apley, MD    ** Please Note: Dictation voice to text software may have been used in the creation of this document   **

## 2021-03-19 NOTE — ASSESSMENT & PLAN NOTE
Stable    Recent Labs     03/17/21  0625 03/18/21  1203 03/19/21  0606   BUN 78* 83* 76*   CREATININE 2 37* 2 36* 2 27*   EGFR 23 23 24

## 2021-03-19 NOTE — PROGRESS NOTES
Progress Note - Cardiology   Andrew Mortensen 80 y o  male MRN: [id number]  Unit/Bed#: E4 -01 Encounter: [id number]    Assessment:  1  Acute on chronic combined systolic and diastolic congestive heart failure, LVEF 25%  2  Nonsustained ventricular tachycardia and frequent ventricular ectopy  3  Chronic kidney disease, stage IIIB  4  Non myocardial infarction troponin elevation secondary to CHF  5  Permanent atrial fibrillation, status post AV node ablation with [address] CRT-P    Plan:  1  Will reduce hold on Bumex to a blood pressure less than 90 systolic  2  Continue to monitor electrolytes       Interval history:  Patient in good spirits  Blood pressure 99/50 6-112/57  Diuretics being held for low blood pressure  Weight not significantly changed  Creatinine 2 27, GFR 24, BUN 76    Vitals: /59 (BP Location: Left arm)   Pulse 73   Temp (!) 97 1 °F (36 2 °C) (Temporal)   Resp 18   Ht 5' 9" (1 753 m)   Wt 73 6 kg (162 lb 3 2 oz)   SpO2 98%   BMI 23 95 kg/m²   Vitals:    03/16/21 0600 03/17/21 0600   Weight: 73 5 kg (162 lb 0 6 oz) 73 6 kg (162 lb 3 2 oz)     Orthostatic Blood Pressures      Most Recent Value   Blood Pressure  104/59 filed at 03/19/2021 0900   Patient Position - Orthostatic VS  Sitting filed at 03/19/2021 0900            Intake/Output Summary (Last 24 hours) at 3/19/2021 1023  Last data filed at 3/18/2021 2000  Gross per 24 hour   Intake --   Output 425 ml   Net -425 ml       Invasive Devices     Peripheral Intravenous Line            Peripheral IV 03/17/21 Dorsal (posterior); Right Forearm 2 days                Review of Systems   Constitutional: Negative for activity change  Respiratory: Negative for cough, chest tightness, shortness of breath and wheezing  Cardiovascular: Positive for leg swelling  Negative for chest pain and palpitations  Musculoskeletal: Negative for gait problem  Skin: Negative for color change     Neurological: Negative for dizziness, tremors, syncope, weakness, light-headedness and headaches  Psychiatric/Behavioral: Negative for agitation and confusion  Physical Exam  Constitutional:       General: He is not in acute distress  Appearance: He is well-developed  HENT:      Head: Normocephalic and atraumatic  Neck:      Vascular: No carotid bruit or JVD  Cardiovascular:      Rate and Rhythm: Normal rate and regular rhythm  Heart sounds: Normal heart sounds  No murmur  No friction rub  No gallop  Pulmonary:      Effort: Pulmonary effort is normal  No respiratory distress  Breath sounds: Normal breath sounds  No wheezing or rales  Chest:      Chest wall: No tenderness  Abdominal:      General: Bowel sounds are normal  There is no distension  Palpations: Abdomen is soft  Musculoskeletal:      Right lower leg: Edema present  Left lower leg: Edema present  Skin:     General: Skin is warm and dry  Neurological:      General: No focal deficit present  Mental Status: He is alert and oriented to person, place, and time  Psychiatric:         Mood and Affect: Mood normal          Behavior: Behavior normal          Thought Content:  Thought content normal          Judgment: Judgment normal          --------------------------------------------------------------------------------  ECHO:   Results for orders placed during the hospital encounter of 18   Echo complete with contrast if indicated    [facility] [address]  [phone]    Transthoracic Echocardiogram  2D, M-mode, Doppler, and Color Doppler    Study date:  21-Dec-2018    Patient: Jacob Lopez  MR number: [id number]  Account number: [de-identified]  : 1927  Age: 80 years  Gender: Male  Status: Outpatient  [facility]: [address] [place] [address] [facility]  Height: 69 in  Weight: 202 lb  BP: 102/ 64 mmHg    Indications: Atrial fibrillation    Diagnoses: I48 0 - Atrial fibrillation    Sonographer:  JOSEFINA Bolden  Primary Physician:  Maryse Mcgovern MD  Referring Physician:  Soraya Purvis DO  Group:  [id number]  Cardiology Fellow:  Yue Rasmussen MD  Interpreting Physician:  Josse Marr DO    SUMMARY    LEFT VENTRICLE:  Systolic function was mildly reduced  Ejection fraction was estimated to be 45 %  There was mild diffuse hypokinesis with regional variations including basal to mid inferolateral and basal inferior hypokinesis  There was mild concentric hypertrophy  RIGHT VENTRICLE:  The ventricle was mildly dilated  Systolic function was reduced  LEFT ATRIUM:  The atrium was mildly to moderately dilated  RIGHT ATRIUM:  The atrium was moderately dilated  TRICUSPID VALVE:  There was moderate regurgitation  Estimated peak PA pressure was 42 mmHg  HISTORY: PRIOR HISTORY: Hypertension, atrial fibrillation, pacemaker, cardiomyopathy, chronic kidney disease    PROCEDURE: The study was performed in the [address] [facility] [place]  This was a routine study  The transthoracic approach was used  The study included complete 2D imaging, M-mode, complete spectral Doppler, and color Doppler  Image  quality was adequate  LEFT VENTRICLE: Size was normal  Systolic function was mildly reduced  Ejection fraction was estimated to be 45 %  There was mild diffuse hypokinesis with regional variations including basal to mid inferolateral and basal inferior  hypokinesis Wall thickness was mildly increased  There was mild concentric hypertrophy  DOPPLER: Transmitral flow pattern: atrial fibrillation  RIGHT VENTRICLE: The ventricle was mildly dilated  Systolic function was reduced  Wall thickness was normal  A pacing wire was present  LEFT ATRIUM: The atrium was mildly to moderately dilated  RIGHT ATRIUM: The atrium was moderately dilated  A pacing wire was present      MITRAL VALVE: Valve structure was normal  There was normal leaflet separation  DOPPLER: The transmitral velocity was within the normal range  There was no evidence for stenosis  There was no significant regurgitation  AORTIC VALVE: The valve was trileaflet  Leaflets exhibited mildly increased thickness, mild calcification, and normal cuspal separation  DOPPLER: Transaortic velocity was within the normal range  There was no evidence for stenosis  There  was no significant regurgitation  TRICUSPID VALVE: The valve structure was normal  There was normal leaflet separation  DOPPLER: The transtricuspid velocity was within the normal range  There was no evidence for stenosis  There was moderate regurgitation  Estimated peak PA  pressure was 42 mmHg  PULMONIC VALVE: Leaflets exhibited normal thickness, no calcification, and normal cuspal separation  DOPPLER: The transpulmonic velocity was within the normal range  There was trace regurgitation  PERICARDIUM: There was no pericardial effusion  The pericardium was normal in appearance  AORTA: The root exhibited normal size  The ascending aorta was upper normal in size  SYSTEMIC VEINS: IVC: The inferior vena cava was normal in size   Respirophasic changes were normal     SYSTEM MEASUREMENT TABLES    2D  %FS: 17 03 %  Ao Diam: 3 79 cm  EDV(Teich): 129 52 ml  EF Biplane: 42 45 %  EF(Cube): 42 88 %  EF(Teich): 35 35 %  ESV(Cube): 80 33 ml  ESV(Teich): 83 74 ml  IVSd: 1 31 cm  LA Area: 24 22 cm2  LA Diam: 4 28 cm  LVEDV MOD A2C: 220 9 ml  LVEDV MOD A4C: 205 11 ml  LVEDV MOD BP: 215 2 ml  LVEF MOD A2C: 40 58 %  LVEF MOD A4C: 44 97 %  LVESV MOD A2C: 131 27 ml  LVESV MOD A4C: 112 87 ml  LVESV MOD BP: 123 84 ml  LVIDd: 5 2 cm  LVIDs: 4 31 cm  LVLd A2C: 9 46 cm  LVLd A4C: 9 25 cm  LVLs A2C: 8 73 cm  LVLs A4C: 8 41 cm  LVPWd: 1 31 cm  RA Area: 23 9 cm2  RV Diam : 5 16 cm  SV MOD A2C: 89 63 ml  SV MOD A4C: 92 23 ml  SV(Cube): 60 3 ml  SV(Teich): 45 78 ml    CW  TR Vmax: 3 08 m/s  TR maxP 97 mmHg    MM  TAPSE: 1 41     [facility]    Prepared and electronically signed by    Yanci Rivera DO  Signed 21-Dec-2018 13:06:44         --------------------------------------------------------------------------------  CAROTIDS  Results for orders placed during the hospital encounter of 08/23/16   VAS carotid complete study    Narrative    [facility] REPORT  CLINICAL:  Indications:  Bilateral Transient Visual Loss [Y04 870]  Patient has had several brief episodes over a long period of time, of partial  vision loss in both eyes  Most recently, the episode lasted over an hour and a  half  He reports this happened while at the eye doctor but his eye doctor told  him his eyes looked fine  Risk Factors  The patient has history of HTN  Clinical  Right Pressure:  138/60 mm Hg,     FINDINGS:     Right        Impression  PSV  EDV (cm/s)  Direction of Flow  Ratio    Dist  ICA                 64          18                      0 71    Mid  ICA                  62          13                      0 69    Prox  ICA    1 - 49%      51          12                      0 57    Dist CCA                  99          11                              Mid CCA                   90           5                      0 98    Prox CCA                  91           7                              Ext Carotid               81           3                      0 90    Prox Vert                 28           7  Antegrade                   Subclavian               124           0                                 Left         Impression  PSV  EDV (cm/s)  Direction of Flow  Ratio    Dist  ICA                 64          16                      0 67    Mid  ICA                  82          24                      0 86    Prox   ICA    1 - 49%      51          13                      0 54    Dist CCA                  77          10                              Mid CCA                   95          12 1 10    Prox CCA                  86          10                              Ext Carotid              109           0                      1 16    Prox Vert                 40          12  Antegrade                   Subclavian               100           7                                       CONCLUSION:     Impression  RIGHT:  There is <50% stenosis noted in the internal carotid artery  Plaque is  heterogenous  Vertebral artery flow is antegrade  There is no significant subclavian artery  disease  LEFT:  There is <50% stenosis noted in the internal carotid artery  Plaque is  heterogenous  Vertebral artery flow is antegrade  There is no significant subclavian artery  disease  Internal carotid artery stenosis determination by consensus criteria from:  Deacon Roberts et al  Carotid Artery Stenosis: Gray-Scale and Doppler US Diagnosis  - Society of Radiologists in [address] [place] [address], Radiology 2003;  [phone]       SIGNATURE:  Electronically Signed by: Jefferson Jones on 2016-08-23 03:51:53 PM      --------------------------------------------------------------------------------  [unfilled]   ======================================================    Lab Results   Component Value Date    WBC 6 67 03/19/2021    HGB 13 9 03/19/2021    HCT 43 4 03/19/2021    MCV 98 03/19/2021     03/19/2021      Lab Results   Component Value Date    SODIUM 138 03/19/2021    K 4 0 03/19/2021    CL 96 (L) 03/19/2021    CO2 33 (H) 03/19/2021    BUN 76 (H) 03/19/2021    CREATININE 2 27 (H) 03/19/2021    GLUC 114 03/19/2021    CALCIUM 9 7 03/19/2021      Lab Results   Component Value Date    HGBA1C 5 4 12/26/2018      Lab Results   Component Value Date    CHOL 157 01/09/2015     Lab Results   Component Value Date    HDL 30 (L) 10/06/2020    HDL 28 (L) 09/18/2019    HDL 28 (L) 10/02/2018     Lab Results   Component Value Date    LDLCALC 92 10/06/2020    LDLCALC 73 09/18/2019    LDLCALC 85 10/02/2018     Lab Results   Component Value Date    TRIG 124 10/06/2020    TRIG 135 09/18/2019    TRIG 126 10/02/2018     No results found for: [place], [place]   Lab Results   Component Value Date    INR 1 28 (H) 02/09/2017    INR 1 26 (H) 03/04/2015    INR 1 23 (H) 03/02/2015    PROTIME 15 7 (H) 02/09/2017    PROTIME 15 5 (H) 03/04/2015    PROTIME 15 2 (H) 03/02/2015        Imaging:   I have personally reviewed pertinent reports          EKG:  Four 4-5 beat runs of ventricular tachycardia

## 2021-03-19 NOTE — PLAN OF CARE
Problem: Potential for Falls  Goal: Patient will remain free of falls  Description: INTERVENTIONS:  - Assess patient frequently for physical needs  -  Identify cognitive and physical deficits and behaviors that affect risk of falls    -  [place] fall precautions as indicated by assessment   - Educate patient/family on patient safety including physical limitations  - Instruct patient to call for assistance with activity based on assessment  - Modify environment to reduce risk of injury  - Consider OT/PT consult to assist with strengthening/mobility  Outcome: Progressing     Problem: PAIN - ADULT  Goal: Verbalizes/displays adequate comfort level or baseline comfort level  Description: Interventions:  - Encourage patient to monitor pain and request assistance  - Assess pain using appropriate pain scale  - Administer analgesics based on type and severity of pain and evaluate response  - Implement non-pharmacological measures as appropriate and evaluate response  - Consider cultural and social influences on pain and pain management  - Notify physician/advanced practitioner if interventions unsuccessful or patient reports new pain  Outcome: Progressing     Problem: INFECTION - ADULT  Goal: Absence or prevention of progression during hospitalization  Description: INTERVENTIONS:  - Assess and monitor for signs and symptoms of infection  - Monitor lab/diagnostic results  - Monitor all insertion sites, i e  indwelling lines, tubes, and drains  - Monitor endotracheal if appropriate and nasal secretions for changes in amount and color  - [place] appropriate cooling/warming therapies per order  - Administer medications as ordered  - Instruct and encourage patient and family to use good hand hygiene technique  - Identify and instruct in appropriate isolation precautions for identified infection/condition  Outcome: Progressing     Problem: SAFETY ADULT  Goal: Maintain or return to baseline ADL function  Description: INTERVENTIONS:  -  Assess patient's ability to carry out ADLs; assess patient's baseline for ADL function and identify physical deficits which impact ability to perform ADLs (bathing, care of mouth/teeth, toileting, grooming, dressing, etc )  - Assess/evaluate cause of self-care deficits   - Assess range of motion  - Assess patient's mobility; develop plan if impaired  - Assess patient's need for assistive devices and provide as appropriate  - Encourage maximum independence but intervene and supervise when necessary  - Involve family in performance of ADLs  - Assess for home care needs following discharge   - Consider OT consult to assist with ADL evaluation and planning for discharge  - Provide patient education as appropriate  Outcome: Progressing  Goal: Maintain or return mobility status to optimal level  Description: INTERVENTIONS:  - Assess patient's baseline mobility status (ambulation, transfers, stairs, etc )    - Identify cognitive and physical deficits and behaviors that affect mobility  - Identify mobility aids required to assist with transfers and/or ambulation (gait belt, sit-to-stand, lift, walker, cane, etc )  - [place] fall precautions as indicated by assessment  - Record patient progress and toleration of activity level on Mobility SBAR; progress patient to next Phase/Stage  - Instruct patient to call for assistance with activity based on assessment  - Consider rehabilitation consult to assist with strengthening/weightbearing, etc   Outcome: Progressing     Problem: DISCHARGE PLANNING  Goal: Discharge to home or other facility with appropriate resources  Description: INTERVENTIONS:  - Identify barriers to discharge w/patient and caregiver  - Arrange for needed discharge resources and transportation as appropriate  - Identify discharge learning needs (meds, wound care, etc )  - Arrange for interpretive services to assist at discharge as needed  - Refer to Case Management Department for coordinating discharge planning if the patient needs post-hospital services based on physician/advanced practitioner order or complex needs related to functional status, cognitive ability, or social support system  Outcome: Progressing     Problem: Knowledge Deficit  Goal: Patient/family/caregiver demonstrates understanding of disease process, treatment plan, medications, and discharge instructions  Description: Complete learning assessment and assess knowledge base    Interventions:  - Provide teaching at level of understanding  - Provide teaching via preferred learning methods  Outcome: Progressing     Problem: SKIN/TISSUE INTEGRITY - ADULT  Goal: Skin integrity remains intact  Description: INTERVENTIONS  - Identify patients at risk for skin breakdown  - Assess and monitor skin integrity  - Assess and monitor nutrition and hydration status  - Monitor labs (i e  albumin)  - Assess for incontinence   - Turn and reposition patient  - Assist with mobility/ambulation  - Relieve pressure over bony prominences  - Avoid friction and shearing  - Provide appropriate hygiene as needed including keeping skin clean and dry  - Evaluate need for skin moisturizer/barrier cream  - Collaborate with interdisciplinary team (i e  Nutrition, Rehabilitation, etc )   - Patient/family teaching  Outcome: Progressing  Goal: Incision(s), wounds(s) or drain site(s) healing without S/S of infection  Description: INTERVENTIONS  - Assess and document risk factors for skin impairment   - Assess and document dressing, incision, wound bed, drain sites and surrounding tissue  - Consider nutrition services referral as needed  - Oral mucous membranes remain intact  - Provide patient/ family education  Outcome: Progressing  Goal: Oral mucous membranes remain intact  Description: INTERVENTIONS  - Assess oral mucosa and hygiene practices  - Implement preventative oral hygiene regimen  - Implement oral medicated treatments as ordered  - Initiate Nutrition services referral as needed  Outcome: Progressing     Problem: CARDIOVASCULAR - ADULT  Goal: Maintains optimal cardiac output and hemodynamic stability  Description: INTERVENTIONS:  - Monitor I/O, vital signs and rhythm  - Monitor for S/S and trends of decreased cardiac output  - Administer and titrate ordered vasoactive medications to optimize hemodynamic stability  - Assess quality of pulses, skin color and temperature  - Assess for signs of decreased coronary artery perfusion  - Instruct patient to report change in severity of symptoms  Outcome: Progressing  Goal: Absence of cardiac dysrhythmias or at baseline rhythm  Description: INTERVENTIONS:  - Continuous cardiac monitoring, vital signs, obtain 12 lead EKG if ordered  - Administer antiarrhythmic and heart rate control medications as ordered  - Monitor electrolytes and administer replacement therapy as ordered  Outcome: Progressing     Problem: METABOLIC, FLUID AND ELECTROLYTES - ADULT  Goal: Electrolytes maintained within normal limits  Description: INTERVENTIONS:  - Monitor labs and assess patient for signs and symptoms of electrolyte imbalances  - Administer electrolyte replacement as ordered  - Monitor response to electrolyte replacements, including repeat lab results as appropriate  - Instruct patient on fluid and nutrition as appropriate  Outcome: Progressing  Goal: Fluid balance maintained  Description: INTERVENTIONS:  - Monitor labs   - Monitor I/O and WT  - Instruct patient on fluid and nutrition as appropriate  - Assess for signs & symptoms of volume excess or deficit  Outcome: Progressing

## 2021-03-20 NOTE — ASSESSMENT & PLAN NOTE
80year old male admitted due to cellulitis of his left leg   - Completed antibiotic therapy  - Wound care

## 2021-03-20 NOTE — PROGRESS NOTES
NEPHROLOGY PROGRESS NOTE   Bryson Peters 80 y o  male MRN: [id number]  Unit/Bed#: E4 -01 Encounter: [id number]  Reason for Consult: SUDHIR      ASSESSMENT and PLAN:    Acute kidney injury--resolving  --likely secondary to cardiorenal syndrome and possible ATN from cellulitis  --renal function continues to improve with diuresis  --nonoliguric  --creatinine has essentially returned back to baseline at 2 04 mg/dL today  --IV diuretics as per Cardiology  --no objections to transition to oral diuretics next 24-48 hours    Hypokalemia--resolved    Acute on chronic systolic congestive heart failure  --Bumex increased to 2 mg IV t i d  By Cardiology  --diuretics per Cardiology    Chronic kidney disease stage IIIB  --baseline creatinine 1 8-1 9 mg/dL  --tolerate a higher creatinine to maintain euvolemia and stable renal function and cardiac function    Elevated bicarbonate level--stable      SUBJECTIVE / INTERVAL HISTORY:    Overall feeling better not as short of breath  Swelling is improving    OBJECTIVE:  Current Weight: Weight - Scale: 74 kg (163 lb 2 3 oz)  Vitals:    03/20/21 0600 03/20/21 0737 03/20/21 1255 03/20/21 1525   BP:  102/55 107/60 111/58   BP Location:  Left arm  Left arm   Pulse:  66 72 71   Resp:  20  18   Temp:  (!) 96 7 °F (35 9 °C)  (!) 97 °F (36 1 °C)   TempSrc:  Temporal  Temporal   SpO2:  95%  100%   Weight: 74 kg (163 lb 2 3 oz)      Height:           Intake/Output Summary (Last 24 hours) at 3/20/2021 1645  Last data filed at 3/20/2021 1329  Gross per 24 hour   Intake 240 ml   Output 1750 ml   Net -1510 ml       Review of Systems:    12 point ROS has been reviewed  Physical Exam  Constitutional:       General: He is not in acute distress  Appearance: He is well-developed  He is not diaphoretic  HENT:      Head: Normocephalic and atraumatic  Eyes:      General: No scleral icterus  Pupils: Pupils are equal, round, and reactive to light     Neck:      Musculoskeletal: Normal range of motion and neck supple  Cardiovascular:      Rate and Rhythm: Normal rate and regular rhythm  Heart sounds: Normal heart sounds  No murmur  No friction rub  No gallop  Pulmonary:      Effort: Pulmonary effort is normal  No respiratory distress  Breath sounds: Normal breath sounds  No wheezing or rales  Chest:      Chest wall: No tenderness  Abdominal:      General: Bowel sounds are normal  There is no distension  Palpations: Abdomen is soft  Tenderness: There is no abdominal tenderness  There is no rebound  Musculoskeletal: Normal range of motion  Right lower leg: Edema present  Left lower leg: Edema present  Skin:     Findings: No rash  Neurological:      Mental Status: He is alert and oriented to person, place, and time           Medications:    Current Facility-Administered Medications:     apixaban (ELIQUIS) tablet 2 5 mg, 2 5 mg, Oral, BID, Noemi Szymanski MD, 2 5 mg at 03/20/21 4109    bisoprolol (ZEBETA) tablet 2 5 mg, 2 5 mg, Oral, Daily, Claudell Garland, MD, 2 5 mg at 03/20/21 1259    bumetanide (BUMEX) injection 2 mg, 2 mg, Intravenous, TID (diuretic), Claudell Garland, MD, 2 mg at 03/20/21 1259    finasteride (PROSCAR) tablet 5 mg, 5 mg, Oral, Daily, Noemi Szymanski MD, 5 mg at 03/20/21 8781    hydrOXYzine HCL (ATARAX) tablet 25 mg, 25 mg, Oral, Q6H PRN, Viral Heathhtel, DO, 25 mg at 03/19/21 0146    loratadine (CLARITIN) tablet 10 mg, 10 mg, Oral, Daily, Viral Heathhtel, DO, 10 mg at 03/20/21 2066    pantoprazole (PROTONIX) EC tablet 40 mg, 40 mg, Oral, Daily, Noemi Szymanski MD, 40 mg at 03/20/21 0305    Laboratory Results:  Results from last 7 days   Lab Units 03/20/21  0602 03/19/21  0606 03/18/21  1203 03/17/21  0625 03/16/21  0514 03/15/21  0501 03/14/21  0541   WBC Thousand/uL  --  6 67  --  6 89  --  6 65 7 00   HEMOGLOBIN g/dL  --  13 9  --  13 5  --  13 9 12 8   HEMATOCRIT %  --  43 4  --  41 4  --  42 8 40 8   PLATELETS Thousands/uL  --  214  -- 232  --  207 193   POTASSIUM mmol/L 3 9 4 0 3 0* 3 8 3 5 3 0* 3 6   CHLORIDE mmol/L 98* 96* 95* 98* 94* 94* 97*   CO2 mmol/L 32 33* 31 34* 34* 31 28   BUN mg/dL 74* 76* 83* 78* 70* 66* 61*   CREATININE mg/dL 2 04* 2 27* 2 36* 2 37* 2 34* 2 17* 2 16*   CALCIUM mg/dL 9 5 9 7 10 3* 9 5 10 2* 9 5 9 3   MAGNESIUM mg/dL 2 2  --  2 1  --  2 1 1 9 2 0   PHOSPHORUS mg/dL  --   --   --  4 3*  --  4 2*  --

## 2021-03-20 NOTE — PROGRESS NOTES
[address] [place] [address]  Progress Note - Lex Del Rio 6/16/1927, 80 y o  male MRN: [id number]  Unit/Bed#: E4 -01 Encounter: [id number]  Primary Care Provider: VARSHA Araujo MD   Date and time admitted to hospital: 3/9/2021 12:52 PM    * Cellulitis of left leg  Assessment & Plan  80year old male admitted due to cellulitis of his left leg   - Completed antibiotic therapy  - Wound care    Acute on chronic systolic CHF (congestive heart failure) ([facility] [address] )  Assessment & Plan  Wt Readings from Last 3 Encounters:   03/20/21 74 kg (163 lb 2 3 oz)   03/09/21 81 7 kg (180 lb 3 2 oz)   02/26/21 83 5 kg (184 lb)     EF 25%  - Continue IV bumex  Possibly transition to po in 24-48 hours as per renal    A-fib [facility])  Assessment & Plan  S/p PPM   - Continue bisoprolol and Eliquis    [facility] kidney [facility] superimposed on chronic kidney disease ([facility] )  Assessment & Plan  Resolved  Back to baseline    Recent Labs     03/18/21  1203 03/19/21  0606 03/20/21  0602   BUN 83* 76* 74*   CREATININE 2 36* 2 27* 2 04*   EGFR 23 24 27               BPH (benign prostatic hyperplasia)  Assessment & Plan  Continue finasteride      VTE Pharmacologic Prophylaxis:   Pharmacologic: Apixaban (Eliquis)  Mechanical VTE Prophylaxis in Place: Yes    Patient Centered Rounds: I have performed bedside rounds with nursing staff today  Discussions with Specialists or Other Care Team Provider: Nursing    Education and Discussions with Family / Patient: patient, cardiology    Time Spent for Care: 30 minutes  More than 50% of total time spent on counseling and coordination of care as described above  Current Length of Stay: 11 day(s)    Current Patient Status: Inpatient   Certification Statement: The patient will continue to require additional inpatient hospital stay due to iv diuretics    Discharge Plan: active    Code Status: Level 3 - DNAR and DNI      Subjective:   Patient seen and examined at bedside  Comfortable   Denies any acute issues  Objective:     Vitals:   Temp (24hrs), Av °F (36 1 °C), Min:96 7 °F (35 9 °C), Max:97 4 °F (36 3 °C)    Temp:  [96 7 °F (35 9 °C)-97 4 °F (36 3 °C)] 97 °F (36 1 °C)  HR:  [66-72] 71  Resp:  [18-20] 18  BP: (101-111)/(55-60) 111/58  SpO2:  [95 %-100 %] 100 %  Body mass index is 24 09 kg/m²  Input and Output Summary (last 24 hours): Intake/Output Summary (Last 24 hours) at 3/20/2021 1834  Last data filed at 3/20/2021 1329  Gross per 24 hour   Intake 240 ml   Output 1750 ml   Net -1510 ml       Physical Exam:     Physical Exam  Vitals signs reviewed  HENT:      Head: Normocephalic  Nose: Nose normal       Mouth/Throat:      Mouth: Mucous membranes are moist    Eyes:      General: No scleral icterus  Extraocular Movements: Extraocular movements intact  Cardiovascular:      Rate and Rhythm: Normal rate  Pulmonary:      Effort: Pulmonary effort is normal  No respiratory distress  Breath sounds: No wheezing  Abdominal:      General: There is no distension  Palpations: Abdomen is soft  Tenderness: There is no abdominal tenderness  Musculoskeletal:      Right lower leg: Edema present  Left lower leg: Edema present  Skin:     General: Skin is warm  Neurological:      Mental Status: He is alert  Mental status is at baseline     Psychiatric:         Mood and Affect: Mood normal          Behavior: Behavior normal        Additional Data:     Labs:    Results from last 7 days   Lab Units 21  0606 21  0625   WBC Thousand/uL 6 67 6 89   HEMOGLOBIN g/dL 13 9 13 5   HEMATOCRIT % 43 4 41 4   PLATELETS Thousands/uL 214 232   NEUTROS PCT %  --  58   LYMPHS PCT %  --  21   MONOS PCT %  --  13*   EOS PCT %  --  7*     Results from last 7 days   Lab Units 21  0602  21  0625   SODIUM mmol/L 139   < > 140   POTASSIUM mmol/L 3 9   < > 3 8   CHLORIDE mmol/L 98*   < > 98*   CO2 mmol/L 32   < > 34*   BUN mg/dL 74*   < > 78*   CREATININE mg/dL 2 04* < > 2 37*   ANION GAP mmol/L 9   < > 8   CALCIUM mg/dL 9 5   < > 9 5   ALBUMIN g/dL  --   --  2 7*   TOTAL BILIRUBIN mg/dL  --   --  0 84   ALK PHOS U/L  --   --  132*   ALT U/L  --   --  18   AST U/L  --   --  49*   GLUCOSE RANDOM mg/dL 99   < > 112    < > = values in this interval not displayed  Results from last 7 days   Lab Units 03/17/21  2144 03/17/21  1602 03/17/21  0744 03/16/21  1621 03/16/21  1124 03/16/21  0741   POC GLUCOSE mg/dl 130 166* 115 135 140 116                   * I Have Reviewed All Lab Data Listed Above  * Additional Pertinent Lab Tests Reviewed: Juan [facility] 66 Admission Reviewed    Imaging:    Imaging Reports Reviewed Today Include: xr chest    Recent Cultures (last 7 days):           Last 24 Hours Medication List:   Current Facility-Administered Medications   Medication Dose Route Frequency Provider Last Rate    apixaban  2 5 mg Oral BID Jennifer Saini MD      bisoprolol  2 5 mg Oral Daily Bartolome Yee MD      bumetanide  2 mg Intravenous TID (diuretic) Bartolome Yee MD      finasteride  5 mg Oral Daily Jennifer Saini MD      hydrOXYzine HCL  25 mg Oral Q6H PRN Anola Rob Kirby, DO      loratadine  10 mg Oral Daily Viral Kirby, DO      pantoprazole  40 mg Oral Daily Jennifer Saini MD          Today, Patient Was Seen By: Arizona Dakins, MD    ** Please Note: Dictation voice to text software may have been used in the creation of this document   **

## 2021-03-20 NOTE — PLAN OF CARE
Problem: Potential for Falls  Goal: Patient will remain free of falls  Description: INTERVENTIONS:  - Assess patient frequently for physical needs  -  Identify cognitive and physical deficits and behaviors that affect risk of falls    -  [place] fall precautions as indicated by assessment   - Educate patient/family on patient safety including physical limitations  - Instruct patient to call for assistance with activity based on assessment  - Modify environment to reduce risk of injury  - Consider OT/PT consult to assist with strengthening/mobility  Outcome: Progressing     Problem: PAIN - ADULT  Goal: Verbalizes/displays adequate comfort level or baseline comfort level  Description: Interventions:  - Encourage patient to monitor pain and request assistance  - Assess pain using appropriate pain scale  - Administer analgesics based on type and severity of pain and evaluate response  - Implement non-pharmacological measures as appropriate and evaluate response  - Consider cultural and social influences on pain and pain management  - Notify physician/advanced practitioner if interventions unsuccessful or patient reports new pain  Outcome: Progressing     Problem: INFECTION - ADULT  Goal: Absence or prevention of progression during hospitalization  Description: INTERVENTIONS:  - Assess and monitor for signs and symptoms of infection  - Monitor lab/diagnostic results  - Monitor all insertion sites, i e  indwelling lines, tubes, and drains  - Monitor endotracheal if appropriate and nasal secretions for changes in amount and color  - [place] appropriate cooling/warming therapies per order  - Administer medications as ordered  - Instruct and encourage patient and family to use good hand hygiene technique  - Identify and instruct in appropriate isolation precautions for identified infection/condition  Outcome: Progressing     Problem: SAFETY ADULT  Goal: Maintain or return to baseline ADL function  Description: INTERVENTIONS:  -  Assess patient's ability to carry out ADLs; assess patient's baseline for ADL function and identify physical deficits which impact ability to perform ADLs (bathing, care of mouth/teeth, toileting, grooming, dressing, etc )  - Assess/evaluate cause of self-care deficits   - Assess range of motion  - Assess patient's mobility; develop plan if impaired  - Assess patient's need for assistive devices and provide as appropriate  - Encourage maximum independence but intervene and supervise when necessary  - Involve family in performance of ADLs  - Assess for home care needs following discharge   - Consider OT consult to assist with ADL evaluation and planning for discharge  - Provide patient education as appropriate  Outcome: Progressing  Goal: Maintain or return mobility status to optimal level  Description: INTERVENTIONS:  - Assess patient's baseline mobility status (ambulation, transfers, stairs, etc )    - Identify cognitive and physical deficits and behaviors that affect mobility  - Identify mobility aids required to assist with transfers and/or ambulation (gait belt, sit-to-stand, lift, walker, cane, etc )  - [place] fall precautions as indicated by assessment  - Record patient progress and toleration of activity level on Mobility SBAR; progress patient to next Phase/Stage  - Instruct patient to call for assistance with activity based on assessment  - Consider rehabilitation consult to assist with strengthening/weightbearing, etc   Outcome: Progressing     Problem: DISCHARGE PLANNING  Goal: Discharge to home or other facility with appropriate resources  Description: INTERVENTIONS:  - Identify barriers to discharge w/patient and caregiver  - Arrange for needed discharge resources and transportation as appropriate  - Identify discharge learning needs (meds, wound care, etc )  - Arrange for interpretive services to assist at discharge as needed  - Refer to Case Management Department for coordinating discharge planning if the patient needs post-hospital services based on physician/advanced practitioner order or complex needs related to functional status, cognitive ability, or social support system  Outcome: Progressing     Problem: Knowledge Deficit  Goal: Patient/family/caregiver demonstrates understanding of disease process, treatment plan, medications, and discharge instructions  Description: Complete learning assessment and assess knowledge base    Interventions:  - Provide teaching at level of understanding  - Provide teaching via preferred learning methods  Outcome: Progressing     Problem: SKIN/TISSUE INTEGRITY - ADULT  Goal: Skin integrity remains intact  Description: INTERVENTIONS  - Identify patients at risk for skin breakdown  - Assess and monitor skin integrity  - Assess and monitor nutrition and hydration status  - Monitor labs (i e  albumin)  - Assess for incontinence   - Turn and reposition patient  - Assist with mobility/ambulation  - Relieve pressure over bony prominences  - Avoid friction and shearing  - Provide appropriate hygiene as needed including keeping skin clean and dry  - Evaluate need for skin moisturizer/barrier cream  - Collaborate with interdisciplinary team (i e  Nutrition, Rehabilitation, etc )   - Patient/family teaching  Outcome: Progressing  Goal: Incision(s), wounds(s) or drain site(s) healing without S/S of infection  Description: INTERVENTIONS  - Assess and document risk factors for skin impairment   - Assess and document dressing, incision, wound bed, drain sites and surrounding tissue  - Consider nutrition services referral as needed  - Oral mucous membranes remain intact  - Provide patient/ family education  Outcome: Progressing  Goal: Oral mucous membranes remain intact  Description: INTERVENTIONS  - Assess oral mucosa and hygiene practices  - Implement preventative oral hygiene regimen  - Implement oral medicated treatments as ordered  - Initiate Nutrition services referral as needed  Outcome: Progressing     Problem: CARDIOVASCULAR - ADULT  Goal: Maintains optimal cardiac output and hemodynamic stability  Description: INTERVENTIONS:  - Monitor I/O, vital signs and rhythm  - Monitor for S/S and trends of decreased cardiac output  - Administer and titrate ordered vasoactive medications to optimize hemodynamic stability  - Assess quality of pulses, skin color and temperature  - Assess for signs of decreased coronary artery perfusion  - Instruct patient to report change in severity of symptoms  Outcome: Progressing  Goal: Absence of cardiac dysrhythmias or at baseline rhythm  Description: INTERVENTIONS:  - Continuous cardiac monitoring, vital signs, obtain 12 lead EKG if ordered  - Administer antiarrhythmic and heart rate control medications as ordered  - Monitor electrolytes and administer replacement therapy as ordered  Outcome: Progressing     Problem: METABOLIC, FLUID AND ELECTROLYTES - ADULT  Goal: Electrolytes maintained within normal limits  Description: INTERVENTIONS:  - Monitor labs and assess patient for signs and symptoms of electrolyte imbalances  - Administer electrolyte replacement as ordered  - Monitor response to electrolyte replacements, including repeat lab results as appropriate  - Instruct patient on fluid and nutrition as appropriate  Outcome: Progressing  Goal: Fluid balance maintained  Description: INTERVENTIONS:  - Monitor labs   - Monitor I/O and WT  - Instruct patient on fluid and nutrition as appropriate  - Assess for signs & symptoms of volume excess or deficit  Outcome: Progressing

## 2021-03-20 NOTE — PLAN OF CARE
Problem: Potential for Falls  Goal: Patient will remain free of falls  Description: INTERVENTIONS:  - Assess patient frequently for physical needs  -  Identify cognitive and physical deficits and behaviors that affect risk of falls    -  [place] fall precautions as indicated by assessment   - Educate patient/family on patient safety including physical limitations  - Instruct patient to call for assistance with activity based on assessment  - Modify environment to reduce risk of injury  - Consider OT/PT consult to assist with strengthening/mobility  Outcome: Progressing     Problem: PAIN - ADULT  Goal: Verbalizes/displays adequate comfort level or baseline comfort level  Description: Interventions:  - Encourage patient to monitor pain and request assistance  - Assess pain using appropriate pain scale  - Administer analgesics based on type and severity of pain and evaluate response  - Implement non-pharmacological measures as appropriate and evaluate response  - Consider cultural and social influences on pain and pain management  - Notify physician/advanced practitioner if interventions unsuccessful or patient reports new pain  Outcome: Progressing     Problem: INFECTION - ADULT  Goal: Absence or prevention of progression during hospitalization  Description: INTERVENTIONS:  - Assess and monitor for signs and symptoms of infection  - Monitor lab/diagnostic results  - Monitor all insertion sites, i e  indwelling lines, tubes, and drains  - Monitor endotracheal if appropriate and nasal secretions for changes in amount and color  - [place] appropriate cooling/warming therapies per order  - Administer medications as ordered  - Instruct and encourage patient and family to use good hand hygiene technique  - Identify and instruct in appropriate isolation precautions for identified infection/condition  Outcome: Progressing     Problem: SAFETY ADULT  Goal: Maintain or return to baseline ADL function  Description: INTERVENTIONS:  -  Assess patient's ability to carry out ADLs; assess patient's baseline for ADL function and identify physical deficits which impact ability to perform ADLs (bathing, care of mouth/teeth, toileting, grooming, dressing, etc )  - Assess/evaluate cause of self-care deficits   - Assess range of motion  - Assess patient's mobility; develop plan if impaired  - Assess patient's need for assistive devices and provide as appropriate  - Encourage maximum independence but intervene and supervise when necessary  - Involve family in performance of ADLs  - Assess for home care needs following discharge   - Consider OT consult to assist with ADL evaluation and planning for discharge  - Provide patient education as appropriate  Outcome: Progressing  Goal: Maintain or return mobility status to optimal level  Description: INTERVENTIONS:  - Assess patient's baseline mobility status (ambulation, transfers, stairs, etc )    - Identify cognitive and physical deficits and behaviors that affect mobility  - Identify mobility aids required to assist with transfers and/or ambulation (gait belt, sit-to-stand, lift, walker, cane, etc )  - [place] fall precautions as indicated by assessment  - Record patient progress and toleration of activity level on Mobility SBAR; progress patient to next Phase/Stage  - Instruct patient to call for assistance with activity based on assessment  - Consider rehabilitation consult to assist with strengthening/weightbearing, etc   Outcome: Progressing     Problem: DISCHARGE PLANNING  Goal: Discharge to home or other facility with appropriate resources  Description: INTERVENTIONS:  - Identify barriers to discharge w/patient and caregiver  - Arrange for needed discharge resources and transportation as appropriate  - Identify discharge learning needs (meds, wound care, etc )  - Arrange for interpretive services to assist at discharge as needed  - Refer to Case Management Department for coordinating discharge planning if the patient needs post-hospital services based on physician/advanced practitioner order or complex needs related to functional status, cognitive ability, or social support system  Outcome: Progressing     Problem: Knowledge Deficit  Goal: Patient/family/caregiver demonstrates understanding of disease process, treatment plan, medications, and discharge instructions  Description: Complete learning assessment and assess knowledge base    Interventions:  - Provide teaching at level of understanding  - Provide teaching via preferred learning methods  Outcome: Progressing     Problem: SKIN/TISSUE INTEGRITY - ADULT  Goal: Skin integrity remains intact  Description: INTERVENTIONS  - Identify patients at risk for skin breakdown  - Assess and monitor skin integrity  - Assess and monitor nutrition and hydration status  - Monitor labs (i e  albumin)  - Assess for incontinence   - Turn and reposition patient  - Assist with mobility/ambulation  - Relieve pressure over bony prominences  - Avoid friction and shearing  - Provide appropriate hygiene as needed including keeping skin clean and dry  - Evaluate need for skin moisturizer/barrier cream  - Collaborate with interdisciplinary team (i e  Nutrition, Rehabilitation, etc )   - Patient/family teaching  Outcome: Progressing  Goal: Incision(s), wounds(s) or drain site(s) healing without S/S of infection  Description: INTERVENTIONS  - Assess and document risk factors for skin impairment   - Assess and document dressing, incision, wound bed, drain sites and surrounding tissue  - Consider nutrition services referral as needed  - Oral mucous membranes remain intact  - Provide patient/ family education  Outcome: Progressing  Goal: Oral mucous membranes remain intact  Description: INTERVENTIONS  - Assess oral mucosa and hygiene practices  - Implement preventative oral hygiene regimen  - Implement oral medicated treatments as ordered  - Initiate Nutrition services referral as needed  Outcome: Progressing     Problem: CARDIOVASCULAR - ADULT  Goal: Maintains optimal cardiac output and hemodynamic stability  Description: INTERVENTIONS:  - Monitor I/O, vital signs and rhythm  - Monitor for S/S and trends of decreased cardiac output  - Administer and titrate ordered vasoactive medications to optimize hemodynamic stability  - Assess quality of pulses, skin color and temperature  - Assess for signs of decreased coronary artery perfusion  - Instruct patient to report change in severity of symptoms  Outcome: Progressing  Goal: Absence of cardiac dysrhythmias or at baseline rhythm  Description: INTERVENTIONS:  - Continuous cardiac monitoring, vital signs, obtain 12 lead EKG if ordered  - Administer antiarrhythmic and heart rate control medications as ordered  - Monitor electrolytes and administer replacement therapy as ordered  Outcome: Progressing     Problem: METABOLIC, FLUID AND ELECTROLYTES - ADULT  Goal: Electrolytes maintained within normal limits  Description: INTERVENTIONS:  - Monitor labs and assess patient for signs and symptoms of electrolyte imbalances  - Administer electrolyte replacement as ordered  - Monitor response to electrolyte replacements, including repeat lab results as appropriate  - Instruct patient on fluid and nutrition as appropriate  Outcome: Progressing  Goal: Fluid balance maintained  Description: INTERVENTIONS:  - Monitor labs   - Monitor I/O and WT  - Instruct patient on fluid and nutrition as appropriate  - Assess for signs & symptoms of volume excess or deficit  Outcome: Progressing

## 2021-03-20 NOTE — ASSESSMENT & PLAN NOTE
Resolved   Back to baseline    Recent Labs     03/18/21  1203 03/19/21  0606 03/20/21  0602   BUN 83* 76* 74*   CREATININE 2 36* 2 27* 2 04*   EGFR 23 24 27

## 2021-03-20 NOTE — PROGRESS NOTES
Progress Note - Cardiology   Mary Parsons 80 y o  male MRN: [id number]  Unit/Bed#: E4 -01 Encounter: [id number]    Assessment:  1  Acute on chronic combined systolic and diastolic congestive heart failure, LVEF 25%, mostly right heart failure  2  Recurrence ventricular tachycardia, nonsustained, 22 beats today with frequent ventricular ectopy  Bisoprolol being held for low blood pressure  3  Chronic kidney disease, stage IIIB  4  Non myocardial infarction troponin elevation secondary to congestive heart failure  5  Permanent atrial fibrillation, status post AV node ablation with [address] CRT-P     Plan:  1  Will reduce hold on bisoprolol to 90 systolic and decrease dose to 2 5 mg daily  If ventricular tachycardia cannot be controlled with bisoprolol, will consider amiodarone  2  Increase Bumex 2 mg IV to t i d   3  Monitor electrolytes       Interval history:  Patient in good spirits and would like to be discharged  Twenty-two beat run of ventricular tachycardia at 9:58 a m  today  Patient on bisoprolol but is being held for blood pressure  Still has 2 to 3+ pretibial edema  Weight up 1 lb  Vitals: /55 (BP Location: Left arm)   Pulse 66   Temp (!) 96 7 °F (35 9 °C) (Temporal)   Resp 20   Ht 5' 9" (1 753 m)   Wt 74 kg (163 lb 2 3 oz)   SpO2 95%   BMI 24 09 kg/m²   Vitals:    03/17/21 0600 03/20/21 0600   Weight: 73 6 kg (162 lb 3 2 oz) 74 kg (163 lb 2 3 oz)     Orthostatic Blood Pressures      Most Recent Value   Blood Pressure  102/55 filed at 03/20/2021 0737   Patient Position - Orthostatic VS  Lying filed at 03/20/2021 0737            Intake/Output Summary (Last 24 hours) at 3/20/2021 1115  Last data filed at 3/20/2021 0650  Gross per 24 hour   Intake --   Output 650 ml   Net -650 ml       Invasive Devices     Peripheral Intravenous Line            Peripheral IV 03/17/21 Dorsal (posterior); Right Forearm 3 days                Review of Systems   Constitutional: Negative for activity change  Respiratory: Negative for cough, chest tightness, shortness of breath and wheezing  Cardiovascular: Positive for leg swelling  Negative for chest pain and palpitations  Musculoskeletal: Negative for gait problem  Skin: Negative for color change  Neurological: Negative for dizziness, tremors, syncope, weakness, light-headedness and headaches  Psychiatric/Behavioral: Negative for agitation and confusion  Physical Exam  Constitutional:       General: He is not in acute distress  Appearance: He is well-developed  HENT:      Head: Normocephalic and atraumatic  Neck:      Vascular: No carotid bruit or JVD  Cardiovascular:      Rate and Rhythm: Normal rate and regular rhythm  Heart sounds: Normal heart sounds  No murmur  No friction rub  No gallop  Pulmonary:      Effort: Pulmonary effort is normal  No respiratory distress  Breath sounds: Normal breath sounds  No wheezing, rhonchi or rales  Chest:      Chest wall: No tenderness  Abdominal:      General: Bowel sounds are normal  There is no distension  Palpations: Abdomen is soft  Musculoskeletal:      Right lower leg: Edema present  Left lower leg: Edema present  Skin:     General: Skin is warm and dry  Neurological:      General: No focal deficit present  Mental Status: He is alert and oriented to person, place, and time  Mental status is at baseline  Psychiatric:         Mood and Affect: Mood normal          Behavior: Behavior normal          Thought Content:  Thought content normal          Judgment: Judgment normal          --------------------------------------------------------------------------------  ECHO:   Results for orders placed during the hospital encounter of 12/21/18   Echo complete with contrast if indicated    Yamilka Dennis [address] [place] [address], [address] [place] [address]  [phone]    Transthoracic Echocardiogram  2D, M-mode, Doppler, and Color Doppler    Study date:  21-Dec-2018    Patient: Jacob Lopez  MR number: [id number]  Account number: [de-identified]  : 1927  Age: 80 years  Gender: Male  Status: Outpatient  Location: [address] [place] [address] [facility]  Height: 69 in  Weight: 202 lb  BP: 102/ 64 mmHg    Indications: Atrial fibrillation    Diagnoses: I48 0 - Atrial fibrillation    Sonographer:  JOSEFINA Lau  Primary Physician:  Rosalinda Rose MD  Referring Physician:  Placido Kimbrough DO  Group:  [facility]  Cardiology Fellow:  Weston Yang MD  Interpreting Physician:  Dolores Doty DO    SUMMARY    LEFT VENTRICLE:  Systolic function was mildly reduced  Ejection fraction was estimated to be 45 %  There was mild diffuse hypokinesis with regional variations including basal to mid inferolateral and basal inferior hypokinesis  There was mild concentric hypertrophy  RIGHT VENTRICLE:  The ventricle was mildly dilated  Systolic function was reduced  LEFT ATRIUM:  The atrium was mildly to moderately dilated  RIGHT ATRIUM:  The atrium was moderately dilated  TRICUSPID VALVE:  There was moderate regurgitation  Estimated peak PA pressure was 42 mmHg  HISTORY: PRIOR HISTORY: Hypertension, atrial fibrillation, pacemaker, cardiomyopathy, chronic kidney disease    PROCEDURE: The study was performed in the [address] [facility]  This was a routine study  The transthoracic approach was used  The study included complete 2D imaging, M-mode, complete spectral Doppler, and color Doppler  Image  quality was adequate  LEFT VENTRICLE: Size was normal  Systolic function was mildly reduced  Ejection fraction was estimated to be 45 %  There was mild diffuse hypokinesis with regional variations including basal to mid inferolateral and basal inferior  hypokinesis Wall thickness was mildly increased  There was mild concentric hypertrophy   DOPPLER: Transmitral flow pattern: atrial fibrillation  RIGHT VENTRICLE: The ventricle was mildly dilated  Systolic function was reduced  Wall thickness was normal  A pacing wire was present  LEFT ATRIUM: The atrium was mildly to moderately dilated  RIGHT ATRIUM: The atrium was moderately dilated  A pacing wire was present  MITRAL VALVE: Valve structure was normal  There was normal leaflet separation  DOPPLER: The transmitral velocity was within the normal range  There was no evidence for stenosis  There was no significant regurgitation  AORTIC VALVE: The valve was trileaflet  Leaflets exhibited mildly increased thickness, mild calcification, and normal cuspal separation  DOPPLER: Transaortic velocity was within the normal range  There was no evidence for stenosis  There  was no significant regurgitation  TRICUSPID VALVE: The valve structure was normal  There was normal leaflet separation  DOPPLER: The transtricuspid velocity was within the normal range  There was no evidence for stenosis  There was moderate regurgitation  Estimated peak PA  pressure was 42 mmHg  PULMONIC VALVE: Leaflets exhibited normal thickness, no calcification, and normal cuspal separation  DOPPLER: The transpulmonic velocity was within the normal range  There was trace regurgitation  PERICARDIUM: There was no pericardial effusion  The pericardium was normal in appearance  AORTA: The root exhibited normal size  The ascending aorta was upper normal in size  SYSTEMIC VEINS: IVC: The inferior vena cava was normal in size   Respirophasic changes were normal     SYSTEM MEASUREMENT TABLES    2D  %FS: 17 03 %  Ao Diam: 3 79 cm  EDV(Teich): 129 52 ml  EF Biplane: 42 45 %  EF(Cube): 42 88 %  EF(Teich): 35 35 %  ESV(Cube): 80 33 ml  ESV(Teich): 83 74 ml  IVSd: 1 31 cm  LA Area: 24 22 cm2  LA Diam: 4 28 cm  LVEDV MOD A2C: 220 9 ml  LVEDV MOD A4C: 205 11 ml  LVEDV MOD BP: 215 2 ml  LVEF MOD A2C: 40 58 %  LVEF MOD A4C: 44 97 %  LVESV MOD A2C: 131 27 ml  LVESV MOD A4C: 112 87 ml  LVESV MOD BP: 123 84 ml  LVIDd: 5 2 cm  LVIDs: 4 31 cm  LVLd A2C: 9 46 cm  LVLd A4C: 9 25 cm  LVLs A2C: 8 73 cm  LVLs A4C: 8 41 cm  LVPWd: 1 31 cm  RA Area: 23 9 cm2  RV Diam : 5 16 cm  SV MOD A2C: 89 63 ml  SV MOD A4C: 92 23 ml  SV(Cube): 60 3 ml  SV(Teich): 45 78 ml    CW  TR Vmax: 3 08 m/s  TR maxP 97 mmHg    MM  TAPSE: 1 41 cm    [facility]    Prepared and electronically signed by    Ronda Barbosa DO  Signed 21-Dec-2018 13:06:44         --------------------------------------------------------------------------------  CAROTIDS  Results for orders placed during the hospital encounter of 16   VAS carotid complete study    Narrative    [facility] REPORT  CLINICAL:  Indications:  Bilateral Transient Visual Loss [H53 129]  Patient has had several brief episodes over a long period of time, of partial  vision loss in both eyes  Most recently, the episode lasted over an hour and a  half  He reports this happened while at the eye doctor but his eye doctor told  him his eyes looked fine  Risk Factors  The patient has history of HTN  Clinical  Right Pressure:  138/60 mm Hg,     FINDINGS:     Right        Impression  PSV  EDV (cm/s)  Direction of Flow  Ratio    Dist  ICA                 64          18                      0 71    Mid  ICA                  62          13                      0 69    Prox   ICA    1 - 49%      51          12                      0 57    Dist CCA                  99          11                              Mid CCA                   90           5                      0 98    Prox CCA                  91           7                              Ext Carotid               81           3                      0 90    Prox Vert                 28           7  Antegrade                   Subclavian               124           0                                 Left         Impression  PSV  EDV (cm/s)  Direction of Flow Ratio    Dist  ICA                 64          16                      0 67    Mid  ICA                  82          24                      0 86    Prox  ICA    1 - 49%      51          13                      0 54    Dist CCA                  77          10                              Mid CCA                   95          12                      1 10    Prox CCA                  86          10                              Ext Carotid              109           0                      1 16    Prox Vert                 40          12  Antegrade                   Subclavian               100           7                                       CONCLUSION:     Impression  RIGHT:  There is <50% stenosis noted in the internal carotid artery  Plaque is  heterogenous  Vertebral artery flow is antegrade  There is no significant subclavian artery  disease  LEFT:  There is <50% stenosis noted in the internal carotid artery  Plaque is  heterogenous  Vertebral artery flow is antegrade  There is no significant subclavian artery  disease  Internal carotid artery stenosis determination by consensus criteria from:  Jasiel Heath et al  Carotid Artery Stenosis: Gray-Scale and Doppler US Diagnosis  - Society of Radiologists in [address] [place] [address], Radiology 2003;  [phone]       SIGNATURE:  Electronically Signed by: Darryle Misty on 2016-08-23 03:51:53 PM        ======================================================    Lab Results   Component Value Date    WBC 6 67 03/19/2021    HGB 13 9 03/19/2021    HCT 43 4 03/19/2021    MCV 98 03/19/2021     03/19/2021      Lab Results   Component Value Date    SODIUM 139 03/20/2021    K 3 9 03/20/2021    CL 98 (L) 03/20/2021    CO2 32 03/20/2021    BUN 74 (H) 03/20/2021    CREATININE 2 04 (H) 03/20/2021    GLUC 99 03/20/2021    CALCIUM 9 5 03/20/2021      Lab Results   Component Value Date    HGBA1C 5 4 12/26/2018      Lab Results   Component Value Date CHOL 157 01/09/2015     Lab Results   Component Value Date    HDL 30 (L) 10/06/2020    HDL 28 (L) 09/18/2019    HDL 28 (L) 10/02/2018     Lab Results   Component Value Date    LDLCALC 92 10/06/2020    LDLCALC 73 09/18/2019    LDLCALC 85 10/02/2018     Lab Results   Component Value Date    TRIG 124 10/06/2020    TRIG 135 09/18/2019    TRIG 126 10/02/2018     No results found for: [place], [place]   Lab Results   Component Value Date    INR 1 28 (H) 02/09/2017    INR 1 26 (H) 03/04/2015    INR 1 23 (H) 03/02/2015    PROTIME 15 7 (H) 02/09/2017    PROTIME 15 5 (H) 03/04/2015    PROTIME 15 2 (H) 03/02/2015        Imaging:   I have personally reviewed pertinent reports          EKG:  Paced ventricular rhythm with 22 beats of ventricular tachycardia at 9:58 a m  this morning

## 2021-03-20 NOTE — ASSESSMENT & PLAN NOTE
Wt Readings from Last 3 Encounters:   03/20/21 74 kg (163 lb 2 3 oz)   03/09/21 81 7 kg (180 lb 3 2 oz)   02/26/21 83 5 kg (184 lb)     EF 25%  - Continue IV bumex   Possibly transition to po in 24-48 hours as per renal

## 2021-03-21 PROBLEM — I47.2 VENTRICULAR TACHYCARDIA (HCC): Status: ACTIVE | Noted: 2021-01-01

## 2021-03-21 NOTE — PROGRESS NOTES
[facility]  Progress Note - Dario Apple 6/16/1927, 80 y o  male MRN: [id number]  Unit/Bed#: E4 -01 Encounter: [id number]  Primary Care Provider: VARSHA Garner MD   Date and time admitted to hospital: 3/9/2021 12:52 PM    * Cellulitis of left leg  Assessment & Plan  80year old male admitted due to cellulitis of his left leg   - Completed antibiotic therapy  - Wound care    Acute on chronic systolic CHF (congestive heart failure) ([facility] )  Assessment & Plan  Wt Readings from Last 3 Encounters:   03/21/21 72 1 kg (158 lb 15 2 oz)   03/09/21 81 7 kg (180 lb 3 2 oz)   02/26/21 83 5 kg (184 lb)     EF 25%  - Continue IV bumex, decreased to 1mg TID by cardiology  Amiloride was started by cards  Ventricular tachycardia [facility])  Assessment & Plan  Telemetry monitoring restarted to monitor for ventricular tachycardia  - Continue Bisoprolol  A-fib [facility])  Assessment & Plan  S/p PPM   - Continue bisoprolol  - Continue Eliquis    Acute kidney injury superimposed on chronic kidney disease ([facility] )  Assessment & Plan  Resolved  Back to baseline    Recent Labs     03/20/21  0602 03/21/21  0406 03/21/21  0940   BUN 74* 71* 68*   CREATININE 2 04* 2 10* 2 12*   EGFR 27 26 26               BPH (benign prostatic hyperplasia)  Assessment & Plan  Continue finasteride      VTE Pharmacologic Prophylaxis:   Pharmacologic: Apixaban (Eliquis)  Mechanical VTE Prophylaxis in Place: Yes    Patient Centered Rounds: I have performed bedside rounds with nursing staff today  Discussions with Specialists or Other Care Team Provider: Nursing    Education and Discussions with Family / Patient: patient    Time Spent for Care: 30 minutes  More than 50% of total time spent on counseling and coordination of care as described above      Current Length of Stay: 12 day(s)    Current Patient Status: Inpatient   Certification Statement: The patient will continue to require additional inpatient hospital stay due to iv diuretics, repeat bmp    Discharge Plan: active    Code Status: Level 3 - DNAR and DNI      Subjective:   Patient seen and examined at bedside  No acute complaints overnight  States that he is feeling better already  Objective:     Vitals:   Temp (24hrs), Av 6 °F (36 4 °C), Min:97 °F (36 1 °C), Max:97 9 °F (36 6 °C)    Temp:  [97 °F (36 1 °C)-97 9 °F (36 6 °C)] 97 8 °F (36 6 °C)  HR:  [70-74] 70  Resp:  [18] 18  BP: ()/(57-63) 103/61  SpO2:  [92 %-100 %] 98 %  Body mass index is 23 47 kg/m²  Input and Output Summary (last 24 hours): Intake/Output Summary (Last 24 hours) at 3/21/2021 1337  Last data filed at 3/21/2021 1334  Gross per 24 hour   Intake 720 ml   Output 2800 ml   Net -2080 ml       Physical Exam:     Physical Exam  Vitals signs reviewed  HENT:      Head: Normocephalic  Nose: Nose normal       Mouth/Throat:      Mouth: Mucous membranes are moist    Eyes:      General: No scleral icterus  Extraocular Movements: Extraocular movements intact  Cardiovascular:      Rate and Rhythm: Normal rate  Pulmonary:      Effort: Pulmonary effort is normal  No respiratory distress  Abdominal:      General: There is no distension  Palpations: Abdomen is soft  Tenderness: There is no abdominal tenderness  Musculoskeletal:      Right lower leg: Edema present  Left lower leg: Edema present  Skin:     General: Skin is warm  Neurological:      Mental Status: He is alert  Mental status is at baseline     Psychiatric:         Mood and Affect: Mood normal          Behavior: Behavior normal        Additional Data:     Labs:    Results from last 7 days   Lab Units 21  0606 21  0625   WBC Thousand/uL 6 67 6 89   HEMOGLOBIN g/dL 13 9 13 5   HEMATOCRIT % 43 4 41 4   PLATELETS Thousands/uL 214 232   NEUTROS PCT %  --  58   LYMPHS PCT %  --  21   MONOS PCT %  --  13*   EOS PCT %  --  7*     Results from last 7 days   Lab Units 21  0940  21  9005 SODIUM mmol/L 141   < > 140   POTASSIUM mmol/L 3 4*   < > 3 8   CHLORIDE mmol/L 98*   < > 98*   CO2 mmol/L 31   < > 34*   BUN mg/dL 68*   < > 78*   CREATININE mg/dL 2 12*   < > 2 37*   ANION GAP mmol/L 12   < > 8   CALCIUM mg/dL 9 5   < > 9 5   ALBUMIN g/dL  --   --  2 7*   TOTAL BILIRUBIN mg/dL  --   --  0 84   ALK PHOS U/L  --   --  132*   ALT U/L  --   --  18   AST U/L  --   --  49*   GLUCOSE RANDOM mg/dL 163*   < > 112    < > = values in this interval not displayed  Results from last 7 days   Lab Units 03/17/21  2144 03/17/21  1602 03/17/21  0744 03/16/21  1621 03/16/21  1124 03/16/21  0741   POC GLUCOSE mg/dl 130 166* 115 135 140 116                   * I Have Reviewed All Lab Data Listed Above  * Additional Pertinent Lab Tests Reviewed: [facility]    Imaging:    Imaging Reports Reviewed Today Include: no new imaging    Recent Cultures (last 7 days):           Last 24 Hours Medication List:   Current Facility-Administered Medications   Medication Dose Route Frequency Provider Last Rate    AMILoride  2 5 mg Oral Daily Candy Aparicio MD      apixaban  2 5 mg Oral BID Sunitha Davis MD      bisoprolol  2 5 mg Oral Daily Candy Aparicio MD      bumetanide  1 mg Intravenous TID (diuretic) Candy Aparicio MD      finasteride  5 mg Oral Daily Sunitha Davis MD      hydrOXYzine HCL  25 mg Oral Q6H PRN Donelda Arm Prechtel, DO      loratadine  10 mg Oral Daily Viral S Prechtel, DO      pantoprazole  40 mg Oral Daily Sunitha Davis MD      potassium chloride  20 mEq Oral Once Deepika Akhtar MD          Today, Patient Was Seen By: Micheline Tsai MD    ** Please Note: Dictation voice to text software may have been used in the creation of this document   **

## 2021-03-21 NOTE — PLAN OF CARE
Problem: Potential for Falls  Goal: Patient will remain free of falls  Description: INTERVENTIONS:  - Assess patient frequently for physical needs  -  Identify cognitive and physical deficits and behaviors that affect risk of falls    -  [place] fall precautions as indicated by assessment   - Educate patient/family on patient safety including physical limitations  - Instruct patient to call for assistance with activity based on assessment  - Modify environment to reduce risk of injury  - Consider OT/PT consult to assist with strengthening/mobility  Outcome: Progressing     Problem: PAIN - ADULT  Goal: Verbalizes/displays adequate comfort level or baseline comfort level  Description: Interventions:  - Encourage patient to monitor pain and request assistance  - Assess pain using appropriate pain scale  - Administer analgesics based on type and severity of pain and evaluate response  - Implement non-pharmacological measures as appropriate and evaluate response  - Consider cultural and social influences on pain and pain management  - Notify physician/advanced practitioner if interventions unsuccessful or patient reports new pain  Outcome: Progressing     Problem: INFECTION - ADULT  Goal: Absence or prevention of progression during hospitalization  Description: INTERVENTIONS:  - Assess and monitor for signs and symptoms of infection  - Monitor lab/diagnostic results  - Monitor all insertion sites, i e  indwelling lines, tubes, and drains  - Monitor endotracheal if appropriate and nasal secretions for changes in amount and color  - [place] appropriate cooling/warming therapies per order  - Administer medications as ordered  - Instruct and encourage patient and family to use good hand hygiene technique  - Identify and instruct in appropriate isolation precautions for identified infection/condition  Outcome: Progressing     Problem: SAFETY ADULT  Goal: Maintain or return to baseline ADL function  Description: INTERVENTIONS:  -  Assess patient's ability to carry out ADLs; assess patient's baseline for ADL function and identify physical deficits which impact ability to perform ADLs (bathing, care of mouth/teeth, toileting, grooming, dressing, etc )  - Assess/evaluate cause of self-care deficits   - Assess range of motion  - Assess patient's mobility; develop plan if impaired  - Assess patient's need for assistive devices and provide as appropriate  - Encourage maximum independence but intervene and supervise when necessary  - Involve family in performance of ADLs  - Assess for home care needs following discharge   - Consider OT consult to assist with ADL evaluation and planning for discharge  - Provide patient education as appropriate  Outcome: Progressing  Goal: Maintain or return mobility status to optimal level  Description: INTERVENTIONS:  - Assess patient's baseline mobility status (ambulation, transfers, stairs, etc )    - Identify cognitive and physical deficits and behaviors that affect mobility  - Identify mobility aids required to assist with transfers and/or ambulation (gait belt, sit-to-stand, lift, walker, cane, etc )  - [place] fall precautions as indicated by assessment  - Record patient progress and toleration of activity level on Mobility SBAR; progress patient to next Phase/Stage  - Instruct patient to call for assistance with activity based on assessment  - Consider rehabilitation consult to assist with strengthening/weightbearing, etc   Outcome: Progressing     Problem: DISCHARGE PLANNING  Goal: Discharge to home or other facility with appropriate resources  Description: INTERVENTIONS:  - Identify barriers to discharge w/patient and caregiver  - Arrange for needed discharge resources and transportation as appropriate  - Identify discharge learning needs (meds, wound care, etc )  - Arrange for interpretive services to assist at discharge as needed  - Refer to Case Management Department for coordinating discharge planning if the patient needs post-hospital services based on physician/advanced practitioner order or complex needs related to functional status, cognitive ability, or social support system  Outcome: Progressing     Problem: Knowledge Deficit  Goal: Patient/family/caregiver demonstrates understanding of disease process, treatment plan, medications, and discharge instructions  Description: Complete learning assessment and assess knowledge base    Interventions:  - Provide teaching at level of understanding  - Provide teaching via preferred learning methods  Outcome: Progressing     Problem: SKIN/TISSUE INTEGRITY - ADULT  Goal: Skin integrity remains intact  Description: INTERVENTIONS  - Identify patients at risk for skin breakdown  - Assess and monitor skin integrity  - Assess and monitor nutrition and hydration status  - Monitor labs (i e  albumin)  - Assess for incontinence   - Turn and reposition patient  - Assist with mobility/ambulation  - Relieve pressure over bony prominences  - Avoid friction and shearing  - Provide appropriate hygiene as needed including keeping skin clean and dry  - Evaluate need for skin moisturizer/barrier cream  - Collaborate with interdisciplinary team (i e  Nutrition, Rehabilitation, etc )   - Patient/family teaching  Outcome: Progressing  Goal: Incision(s), wounds(s) or drain site(s) healing without S/S of infection  Description: INTERVENTIONS  - Assess and document risk factors for skin impairment   - Assess and document dressing, incision, wound bed, drain sites and surrounding tissue  - Consider nutrition services referral as needed  - Oral mucous membranes remain intact  - Provide patient/ family education  Outcome: Progressing  Goal: Oral mucous membranes remain intact  Description: INTERVENTIONS  - Assess oral mucosa and hygiene practices  - Implement preventative oral hygiene regimen  - Implement oral medicated treatments as ordered  - Initiate Nutrition services referral as needed  Outcome: Progressing     Problem: CARDIOVASCULAR - ADULT  Goal: Maintains optimal cardiac output and hemodynamic stability  Description: INTERVENTIONS:  - Monitor I/O, vital signs and rhythm  - Monitor for S/S and trends of decreased cardiac output  - Administer and titrate ordered vasoactive medications to optimize hemodynamic stability  - Assess quality of pulses, skin color and temperature  - Assess for signs of decreased coronary artery perfusion  - Instruct patient to report change in severity of symptoms  Outcome: Progressing  Goal: Absence of cardiac dysrhythmias or at baseline rhythm  Description: INTERVENTIONS:  - Continuous cardiac monitoring, vital signs, obtain 12 lead EKG if ordered  - Administer antiarrhythmic and heart rate control medications as ordered  - Monitor electrolytes and administer replacement therapy as ordered  Outcome: Progressing     Problem: METABOLIC, FLUID AND ELECTROLYTES - ADULT  Goal: Electrolytes maintained within normal limits  Description: INTERVENTIONS:  - Monitor labs and assess patient for signs and symptoms of electrolyte imbalances  - Administer electrolyte replacement as ordered  - Monitor response to electrolyte replacements, including repeat lab results as appropriate  - Instruct patient on fluid and nutrition as appropriate  Outcome: Progressing  Goal: Fluid balance maintained  Description: INTERVENTIONS:  - Monitor labs   - Monitor I/O and WT  - Instruct patient on fluid and nutrition as appropriate  - Assess for signs & symptoms of volume excess or deficit  Outcome: Progressing

## 2021-03-21 NOTE — ASSESSMENT & PLAN NOTE
Wt Readings from Last 3 Encounters:   03/21/21 72 1 kg (158 lb 15 2 oz)   03/09/21 81 7 kg (180 lb 3 2 oz)   02/26/21 83 5 kg (184 lb)     EF 25%  - Continue IV bumex, decreased to 1mg TID by cardiology  Amiloride was started by cards

## 2021-03-21 NOTE — PROGRESS NOTES
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%, mostly right heart failure  2  Recurrence ventricular tachycardia, presently off telemetry monitor, patient is receiving bisoprolol  3  Chronic kidney disease, stage IIIB   4  Non myocardial infarction troponin elevation secondary to congestive heart failure  5  Permanent atrial fibrillation, status post AV node ablation with [address]:   1  Decrease Bumex IV to 1 mg t i d   2  Begin  Amiloride 2 5 mg daily 1st dose today   3  Renew  telemetry for next 48 hours with history of ventricular tachycardia yesterday  4  Patient has been receiving bisoprolol as ordered; need to be aggressive in suppressing ventricular tachycardia since patient does not have an ICD  Interval history:   patient in good spirits  Was off telemetry monitor  Monitor renewed since he had 22 beats of ventricular tachycardia yesterday  Weight down 5 lb  Blood pressure 95/60 to 111/58 over last 24 hours  O2 saturation % on room air    Potassium 3 4, CO2 31, BUN 68, creatinine 2 12      Vitals: /61 (BP Location: Left arm)   Pulse 70   Temp 97 8 °F (36 6 °C)   Resp 18   Ht 5' 9" (1 753 m)   Wt 72 1 kg (158 lb 15 2 oz)   SpO2 98%   BMI 23 47 kg/m²   Vitals:    03/20/21 0600 03/21/21 0400   Weight: 74 kg (163 lb 2 3 oz) 72 1 kg (158 lb 15 2 oz)     Orthostatic Blood Pressures      Most Recent Value   Blood Pressure  103/61 filed at 03/21/2021 2635   Patient Position - Orthostatic VS  Sitting filed at 03/21/2021 0916            Intake/Output Summary (Last 24 hours) at 3/21/2021 1250  Last data filed at 3/21/2021 1158  Gross per 24 hour   Intake 720 ml   Output 3300 ml   Net -2580 ml       Invasive Devices     Peripheral Intravenous Line            Peripheral IV 03/21/21 Right Antecubital less than 1 day                Review of Systems   Constitutional: Negative for activity change  Respiratory: Negative for cough, chest tightness, shortness of breath and wheezing  Cardiovascular: Negative for chest pain, palpitations and leg swelling  Musculoskeletal: Negative for gait problem  Skin: Negative for color change  Neurological: Negative for dizziness, tremors, syncope, weakness, light-headedness and headaches  Psychiatric/Behavioral: Negative for agitation and confusion  Physical Exam  Constitutional:       General: He is not in acute distress  Appearance: He is well-developed  HENT:      Head: Normocephalic and atraumatic  Neck:      Vascular: No carotid bruit or JVD  Cardiovascular:      Rate and Rhythm: Normal rate and regular rhythm  Heart sounds: Normal heart sounds  No murmur  No friction rub  No gallop  Pulmonary:      Effort: Pulmonary effort is normal  No respiratory distress  Breath sounds: Normal breath sounds  No wheezing, rhonchi or rales  Chest:      Chest wall: No tenderness  Abdominal:      General: Bowel sounds are normal  There is no distension  Palpations: Abdomen is soft  Musculoskeletal:      Right lower leg: Edema present  Left lower leg: Edema present  Comments: Pretibial edema slowly improving  Skin:     General: Skin is warm and dry  Neurological:      General: No focal deficit present  Mental Status: He is alert and oriented to person, place, and time  Psychiatric:         Mood and Affect: Mood normal          Behavior: Behavior normal          Thought Content:  Thought content normal          Judgment: Judgment normal            --------------------------------------------------------------------------------  ECHO:   Results for orders placed during the hospital encounter of 12/21/18   Echo complete with contrast if indicated    Narrative [address]  [address] [facility]  [phone]    Transthoracic Echocardiogram  2D, M-mode, Doppler, and Color Doppler    Study date:  21-Dec-2018    Patient: Gigi Aparicio  MR number: [id number]  Account number: [de-identified]  : [id number]  Age: 80 years  Gender: Male  Status: Outpatient  Location: [address] [place] [address] and [facility] [place]  Height: 69 in  Weight: 202 lb  BP: 102/ 64 mmHg    Indications: Atrial fibrillation    Diagnoses: I48 0 - Atrial fibrillation    Sonographer:  JOSEFINA Sullivan  Primary Physician:  Jillian Pyle MD  Referring Physician:  Neal Madison DO  Group:  [id number]  Cardiology Fellow:  Jez Pickard MD  Interpreting Physician:  Michael Silva DO    SUMMARY    LEFT VENTRICLE:  Systolic function was mildly reduced  Ejection fraction was estimated to be 45 %  There was mild diffuse hypokinesis with regional variations including basal to mid inferolateral and basal inferior hypokinesis  There was mild concentric hypertrophy  RIGHT VENTRICLE:  The ventricle was mildly dilated  Systolic function was reduced  LEFT ATRIUM:  The atrium was mildly to moderately dilated  RIGHT ATRIUM:  The atrium was moderately dilated  TRICUSPID VALVE:  There was moderate regurgitation  Estimated peak PA pressure was 42 mmHg  HISTORY: PRIOR HISTORY: Hypertension, atrial fibrillation, pacemaker, cardiomyopathy, chronic kidney disease    PROCEDURE: The study was performed in the [address] [facility] [place]  This was a routine study  The transthoracic approach was used  The study included complete 2D imaging, M-mode, complete spectral Doppler, and color Doppler  Image  quality was adequate  LEFT VENTRICLE: Size was normal  Systolic function was mildly reduced  Ejection fraction was estimated to be 45 %   There was mild diffuse hypokinesis with regional variations including basal to mid inferolateral and basal inferior  hypokinesis Wall thickness was mildly increased  There was mild concentric hypertrophy  DOPPLER: Transmitral flow pattern: atrial fibrillation  RIGHT VENTRICLE: The ventricle was mildly dilated  Systolic function was reduced  Wall thickness was normal  A pacing wire was present  LEFT ATRIUM: The atrium was mildly to moderately dilated  RIGHT ATRIUM: The atrium was moderately dilated  A pacing wire was present  MITRAL VALVE: Valve structure was normal  There was normal leaflet separation  DOPPLER: The transmitral velocity was within the normal range  There was no evidence for stenosis  There was no significant regurgitation  AORTIC VALVE: The valve was trileaflet  Leaflets exhibited mildly increased thickness, mild calcification, and normal cuspal separation  DOPPLER: Transaortic velocity was within the normal range  There was no evidence for stenosis  There  was no significant regurgitation  TRICUSPID VALVE: The valve structure was normal  There was normal leaflet separation  DOPPLER: The transtricuspid velocity was within the normal range  There was no evidence for stenosis  There was moderate regurgitation  Estimated peak PA  pressure was 42 mmHg  PULMONIC VALVE: Leaflets exhibited normal thickness, no calcification, and normal cuspal separation  DOPPLER: The transpulmonic velocity was within the normal range  There was trace regurgitation  PERICARDIUM: There was no pericardial effusion  The pericardium was normal in appearance  AORTA: The root exhibited normal size  The ascending aorta was upper normal in size  SYSTEMIC VEINS: IVC: The inferior vena cava was normal in size   Respirophasic changes were normal     SYSTEM MEASUREMENT TABLES    2D  %FS: 17 03 %  Ao Diam: 3 79 cm  EDV(Teich): 129 52 ml  EF Biplane: 42 45 %  EF(Cube): 42 88 %  EF(Teich): 35 35 %  ESV(Cube): 80 33 ml  ESV(Teich): 83 74 ml  IVSd: 1 31 cm  LA Area: 24 22 cm2  LA Diam: 4 28 cm  LVEDV MOD A2C: 220 9 ml  LVEDV MOD A4C: 205 11 ml  LVEDV MOD BP: 215 2 ml  LVEF MOD A2C: 40 58 %  LVEF MOD A4C: 44 97 %  LVESV MOD A2C: 131 27 ml  LVESV MOD A4C: 112 87 ml  LVESV MOD BP: 123 84 ml  LVIDd: 5 2 cm  LVIDs: 4 31 cm  LVLd A2C: 9 46 cm  LVLd A4C: 9 25 cm  LVLs A2C: 8 73 cm  LVLs A4C: 8 41 cm  LVPWd: 1 31 cm  RA Area: 23 9 cm2  RV Diam : 5 16 cm  SV MOD A2C: 89 63 ml  SV MOD A4C: 92 23 ml  SV(Cube): 60 3 ml  SV(Teich): 45 78 ml    CW  TR Vmax: 3 08 m/s  TR maxP 97 mmHg    MM  TAPSE: 1 41 cm    [facility]redited [facility]    Prepared and electronically signed by    Howard Ramos DO  Signed 21-Dec-2018 13:06:44         --------------------------------------------------------------------------------  CAROTIDS  Results for orders placed during the hospital encounter of 16   VAS carotid complete study    Narrative    [facility] REPORT  CLINICAL:  Indications:  Bilateral Transient Visual Loss [H53 129]  Patient has had several brief episodes over a long period of time, of partial  vision loss in both eyes  Most recently, the episode lasted over an hour and a  half  He reports this happened while at the eye doctor but his eye doctor told  him his eyes looked fine  Risk Factors  The patient has history of HTN  Clinical  Right Pressure:  138/60 mm Hg,     FINDINGS:     Right        Impression  PSV  EDV (cm/s)  Direction of Flow  Ratio    Dist  ICA                 64          18                      0 71    Mid  ICA                  62          13                      0 69    Prox   ICA    1 - 49%      51          12                      0 57    Dist CCA                  99          11                              Mid CCA                   90           5                      0 98    Prox CCA                  91           7                              Ext Carotid               81           3                      0 90    Prox Vert                 28           7  Antegrade                   Subclavian               124           0 Left         Impression  PSV  EDV (cm/s)  Direction of Flow  Ratio    Dist  ICA                 64          16                      0 67    Mid  ICA                  82          24                      0 86    Prox  ICA    1 - 49%      51          13                      0 54    Dist CCA                  77          10                              Mid CCA                   95          12                      1 10    Prox CCA                  86          10                              Ext Carotid              109           0                      1 16    Prox Vert                 40          12  Antegrade                   Subclavian               100           7                                       CONCLUSION:     Impression  RIGHT:  There is <50% stenosis noted in the internal carotid artery  Plaque is  heterogenous  Vertebral artery flow is antegrade  There is no significant subclavian artery  disease  LEFT:  There is <50% stenosis noted in the internal carotid artery  Plaque is  heterogenous  Vertebral artery flow is antegrade  There is no significant subclavian artery  disease  Internal carotid artery stenosis determination by consensus criteria from:  Asha Martinez et al  Carotid Artery Stenosis: Gray-Scale and Doppler US Diagnosis  - Society of Radiologists in [address] [place] [address], Radiology 2003;  [phone]       SIGNATURE:  Electronically Signed by: Bertha Rubio on 2016-08-23 03:51:53 PM        ======================================================    Lab Results   Component Value Date    WBC 6 67 03/19/2021    HGB 13 9 03/19/2021    HCT 43 4 03/19/2021    MCV 98 03/19/2021     03/19/2021      Lab Results   Component Value Date    SODIUM 141 03/21/2021    K 3 4 (L) 03/21/2021    CL 98 (L) 03/21/2021    CO2 31 03/21/2021    BUN 68 (H) 03/21/2021    CREATININE 2 12 (H) 03/21/2021    GLUC 163 (H) 03/21/2021    CALCIUM 9 5 03/21/2021 Lab Results   Component Value Date    HGBA1C 5 4 12/26/2018      Lab Results   Component Value Date    CHOL 157 01/09/2015     Lab Results   Component Value Date    HDL 30 (L) 10/06/2020    HDL 28 (L) 09/18/2019    HDL 28 (L) 10/02/2018     Lab Results   Component Value Date    LDLCALC 92 10/06/2020    LDLCALC 73 09/18/2019    LDLCALC 85 10/02/2018     Lab Results   Component Value Date    TRIG 124 10/06/2020    TRIG 135 09/18/2019    TRIG 126 10/02/2018     No results found for: [place], [place]   Lab Results   Component Value Date    INR 1 28 (H) 02/09/2017    INR 1 26 (H) 03/04/2015    INR 1 23 (H) 03/02/2015    PROTIME 15 7 (H) 02/09/2017    PROTIME 15 5 (H) 03/04/2015    PROTIME 15 2 (H) 03/02/2015        Imaging:   I have personally reviewed pertinent reports  EKG:  Patient off monitor presently  Had 22 beats of ventricular tachycardia yesterday  Will renew telemetry

## 2021-03-21 NOTE — PROGRESS NOTES
NEPHROLOGY PROGRESS NOTE   Shaunna Resendez 80 y o  male MRN: [id number]  Unit/Bed#: E4 -01 Encounter: [id number]  Reason for Consult: SUDHIR        ASSESSMENT and PLAN:    Acute kidney injury--resolving  --likely secondary to cardiorenal syndrome and possible ATN from cellulitis  --renal function continues to improve with diuresis  --nonoliguric  --renal function overall stable at around 2 1 mg/dL  --may need to tolerate a slightly higher baseline creatinine to keep out of congestive heart failure and volume overload  --IV diuretics as per Cardiology     Hypokalemia--resolved     Acute on chronic systolic congestive heart failure  --diuretics per Cardiology  --Bumex reduced to 1 mg t i d   --given a dose of amiloride 2 5 mg  --consider the addition of spironolactone     Chronic kidney disease stage IIIB  --baseline creatinine 1 8-1 9 mg/dL  --tolerate a higher creatinine to maintain euvolemia and stable renal function and cardiac function     Elevated bicarbonate level--stable    Hypokalemia  --potassium replaced in given a dose of amiloride      SUBJECTIVE / INTERVAL HISTORY:    No chest pain or shortness of breath    OBJECTIVE:  Current Weight: Weight - Scale: 72 1 kg (158 lb 15 2 oz)  Vitals:    03/20/21 1915 03/20/21 2300 03/21/21 0400 03/21/21 0916   BP: 101/63 103/57 95/60 103/61   BP Location: Right arm Right arm Left arm Left arm   Pulse: 72 74 70 70   Resp: 18 18  18   Temp: 97 8 °F (36 6 °C) 97 9 °F (36 6 °C)  97 8 °F (36 6 °C)   TempSrc: Temporal Temporal     SpO2:  92% 96% 98%   Weight:   72 1 kg (158 lb 15 2 oz)    Height:           Intake/Output Summary (Last 24 hours) at 3/21/2021 1436  Last data filed at 3/21/2021 1427  Gross per 24 hour   Intake 720 ml   Output 3100 ml   Net -2380 ml       Review of Systems:    12 point ROS has been reviewed  Physical Exam  Constitutional:       General: He is not in acute distress  Appearance: He is well-developed     HENT:      Head: Normocephalic and atraumatic  Eyes:      General: No scleral icterus  Conjunctiva/sclera: Conjunctivae normal       Pupils: Pupils are equal, round, and reactive to light  Neck:      Musculoskeletal: Normal range of motion and neck supple  Cardiovascular:      Rate and Rhythm: Normal rate and regular rhythm  Heart sounds: S1 normal and S2 normal  No murmur  No friction rub  No gallop  Pulmonary:      Effort: Pulmonary effort is normal  No respiratory distress  Breath sounds: Normal breath sounds  No wheezing or rales  Abdominal:      General: Bowel sounds are normal       Palpations: Abdomen is soft  Tenderness: There is no abdominal tenderness  There is no rebound  Musculoskeletal: Normal range of motion  Right lower leg: Edema present  Left lower leg: Edema present  Skin:     Findings: No rash  Neurological:      Mental Status: He is alert and oriented to person, place, and time     Psychiatric:         Behavior: Behavior normal          Medications:    Current Facility-Administered Medications:     AMILoride tablet 2 5 mg, 2 5 mg, Oral, Daily, Judy Solorzano MD, 2 5 mg at 03/21/21 1422    apixaban (ELIQUIS) tablet 2 5 mg, 2 5 mg, Oral, BID, Lety Lorenz MD, 2 5 mg at 03/21/21 0916    bisoprolol (ZEBETA) tablet 2 5 mg, 2 5 mg, Oral, Daily, Judy Solorzano MD, 2 5 mg at 03/21/21 0916    bumetanide (BUMEX) injection 1 mg, 1 mg, Intravenous, TID (diuretic), Judy Solorzano MD    finasteride (PROSCAR) tablet 5 mg, 5 mg, Oral, Daily, Lety Lorenz MD, 5 mg at 03/21/21 0916    hydrOXYzine HCL (ATARAX) tablet 25 mg, 25 mg, Oral, Q6H PRN, Geralene Allegra Prechtel, DO, 25 mg at 03/20/21 2103    loratadine (CLARITIN) tablet 10 mg, 10 mg, Oral, Daily, Viral MONSON Prechtel, DO, 10 mg at 03/21/21 0916    pantoprazole (PROTONIX) EC tablet 40 mg, 40 mg, Oral, Daily, Lety Lorenz MD, 40 mg at 03/21/21 1269    Laboratory Results:  Results from last 7 days   Lab Units 03/21/21  0940 03/21/21  8873 03/20/21  0602 03/19/21  0606 03/18/21  1203 03/17/21  0625 03/16/21  0514 03/15/21  0501   WBC Thousand/uL  --   --   --  6 67  --  6 89  --  6 65   HEMOGLOBIN g/dL  --   --   --  13 9  --  13 5  --  13 9   HEMATOCRIT %  --   --   --  43 4  --  41 4  --  42 8   PLATELETS Thousands/uL  --   --   --  214  --  232  --  207   POTASSIUM mmol/L 3 4* 3 4* 3 9 4 0 3 0* 3 8 3 5 3 0*   CHLORIDE mmol/L 98* 100 98* 96* 95* 98* 94* 94*   CO2 mmol/L 31 33* 32 33* 31 34* 34* 31   BUN mg/dL 68* 71* 74* 76* 83* 78* 70* 66*   CREATININE mg/dL 2 12* 2 10* 2 04* 2 27* 2 36* 2 37* 2 34* 2 17*   CALCIUM mg/dL 9 5 9 5 9 5 9 7 10 3* 9 5 10 2* 9 5   MAGNESIUM mg/dL  --  2 1 2 2  --  2 1  --  2 1 1 9   PHOSPHORUS mg/dL  --   --   --   --   --  4 3*  --  4 2*

## 2021-03-21 NOTE — ASSESSMENT & PLAN NOTE
Resolved   Back to baseline    Recent Labs     03/20/21  0602 03/21/21  0406 03/21/21  0940   BUN 74* 71* 68*   CREATININE 2 04* 2 10* 2 12*   EGFR 27 26 26

## 2021-03-21 NOTE — PLAN OF CARE
Problem: Potential for Falls  Goal: Patient will remain free of falls  Description: INTERVENTIONS:  - Assess patient frequently for physical needs  -  Identify cognitive and physical deficits and behaviors that affect risk of falls    -  [place] fall precautions as indicated by assessment   - Educate patient/family on patient safety including physical limitations  - Instruct patient to call for assistance with activity based on assessment  - Modify environment to reduce risk of injury  - Consider OT/PT consult to assist with strengthening/mobility  Outcome: Progressing     Problem: PAIN - ADULT  Goal: Verbalizes/displays adequate comfort level or baseline comfort level  Description: Interventions:  - Encourage patient to monitor pain and request assistance  - Assess pain using appropriate pain scale  - Administer analgesics based on type and severity of pain and evaluate response  - Implement non-pharmacological measures as appropriate and evaluate response  - Consider cultural and social influences on pain and pain management  - Notify physician/advanced practitioner if interventions unsuccessful or patient reports new pain  Outcome: Progressing     Problem: INFECTION - ADULT  Goal: Absence or prevention of progression during hospitalization  Description: INTERVENTIONS:  - Assess and monitor for signs and symptoms of infection  - Monitor lab/diagnostic results  - Monitor all insertion sites, i e  indwelling lines, tubes, and drains  - Monitor endotracheal if appropriate and nasal secretions for changes in amount and color  - [place] appropriate cooling/warming therapies per order  - Administer medications as ordered  - Instruct and encourage patient and family to use good hand hygiene technique  - Identify and instruct in appropriate isolation precautions for identified infection/condition  Outcome: Progressing     Problem: SAFETY ADULT  Goal: Maintain or return to baseline ADL function  Description: INTERVENTIONS:  -  Assess patient's ability to carry out ADLs; assess patient's baseline for ADL function and identify physical deficits which impact ability to perform ADLs (bathing, care of mouth/teeth, toileting, grooming, dressing, etc )  - Assess/evaluate cause of self-care deficits   - Assess range of motion  - Assess patient's mobility; develop plan if impaired  - Assess patient's need for assistive devices and provide as appropriate  - Encourage maximum independence but intervene and supervise when necessary  - Involve family in performance of ADLs  - Assess for home care needs following discharge   - Consider OT consult to assist with ADL evaluation and planning for discharge  - Provide patient education as appropriate  Outcome: Progressing  Goal: Maintain or return mobility status to optimal level  Description: INTERVENTIONS:  - Assess patient's baseline mobility status (ambulation, transfers, stairs, etc )    - Identify cognitive and physical deficits and behaviors that affect mobility  - Identify mobility aids required to assist with transfers and/or ambulation (gait belt, sit-to-stand, lift, walker, cane, etc )  - [place] fall precautions as indicated by assessment  - Record patient progress and toleration of activity level on Mobility SBAR; progress patient to next Phase/Stage  - Instruct patient to call for assistance with activity based on assessment  - Consider rehabilitation consult to assist with strengthening/weightbearing, etc   Outcome: Progressing     Problem: DISCHARGE PLANNING  Goal: Discharge to home or other facility with appropriate resources  Description: INTERVENTIONS:  - Identify barriers to discharge w/patient and caregiver  - Arrange for needed discharge resources and transportation as appropriate  - Identify discharge learning needs (meds, wound care, etc )  - Arrange for interpretive services to assist at discharge as needed  - Refer to Case Management Department for coordinating discharge planning if the patient needs post-hospital services based on physician/advanced practitioner order or complex needs related to functional status, cognitive ability, or social support system  Outcome: Progressing     Problem: Knowledge Deficit  Goal: Patient/family/caregiver demonstrates understanding of disease process, treatment plan, medications, and discharge instructions  Description: Complete learning assessment and assess knowledge base    Interventions:  - Provide teaching at level of understanding  - Provide teaching via preferred learning methods  Outcome: Progressing     Problem: SKIN/TISSUE INTEGRITY - ADULT  Goal: Skin integrity remains intact  Description: INTERVENTIONS  - Identify patients at risk for skin breakdown  - Assess and monitor skin integrity  - Assess and monitor nutrition and hydration status  - Monitor labs (i e  albumin)  - Assess for incontinence   - Turn and reposition patient  - Assist with mobility/ambulation  - Relieve pressure over bony prominences  - Avoid friction and shearing  - Provide appropriate hygiene as needed including keeping skin clean and dry  - Evaluate need for skin moisturizer/barrier cream  - Collaborate with interdisciplinary team (i e  Nutrition, Rehabilitation, etc )   - Patient/family teaching  Outcome: Progressing  Goal: Incision(s), wounds(s) or drain site(s) healing without S/S of infection  Description: INTERVENTIONS  - Assess and document risk factors for skin impairment   - Assess and document dressing, incision, wound bed, drain sites and surrounding tissue  - Consider nutrition services referral as needed  - Oral mucous membranes remain intact  - Provide patient/ family education  Outcome: Progressing  Goal: Oral mucous membranes remain intact  Description: INTERVENTIONS  - Assess oral mucosa and hygiene practices  - Implement preventative oral hygiene regimen  - Implement oral medicated treatments as ordered  - Initiate Nutrition services referral as needed  Outcome: Progressing     Problem: CARDIOVASCULAR - ADULT  Goal: Maintains optimal cardiac output and hemodynamic stability  Description: INTERVENTIONS:  - Monitor I/O, vital signs and rhythm  - Monitor for S/S and trends of decreased cardiac output  - Administer and titrate ordered vasoactive medications to optimize hemodynamic stability  - Assess quality of pulses, skin color and temperature  - Assess for signs of decreased coronary artery perfusion  - Instruct patient to report change in severity of symptoms  Outcome: Progressing  Goal: Absence of cardiac dysrhythmias or at baseline rhythm  Description: INTERVENTIONS:  - Continuous cardiac monitoring, vital signs, obtain 12 lead EKG if ordered  - Administer antiarrhythmic and heart rate control medications as ordered  - Monitor electrolytes and administer replacement therapy as ordered  Outcome: Progressing     Problem: METABOLIC, FLUID AND ELECTROLYTES - ADULT  Goal: Electrolytes maintained within normal limits  Description: INTERVENTIONS:  - Monitor labs and assess patient for signs and symptoms of electrolyte imbalances  - Administer electrolyte replacement as ordered  - Monitor response to electrolyte replacements, including repeat lab results as appropriate  - Instruct patient on fluid and nutrition as appropriate  Outcome: Progressing  Goal: Fluid balance maintained  Description: INTERVENTIONS:  - Monitor labs   - Monitor I/O and WT  - Instruct patient on fluid and nutrition as appropriate  - Assess for signs & symptoms of volume excess or deficit  Outcome: Progressing

## 2021-03-22 NOTE — PROGRESS NOTES
Progress Note - Cardiology   Shabana Ceja 80 y o  male MRN: [id number]  Unit/Bed#: E4 -01 Encounter: [id number]    Assessment:  1  Acute on chronic combined systolic and diastolic congestive heart failure, LV EF 25%, mostly right heart failure  2  Recurrent ventricular tachycardia, improved now that bisoprolol is not being held  3  Chronic kidney disease, stage IIIB  4  Non myocardial infarction troponin elevation secondary to congestive heart failure  5  Permanent atrial fibrillation, status post AV node ablation with [address]:  1  Increase Bumex IV to 2 mg b i d   2  Discontinue amiloride  Interval history:  Weight up 1 5 lb since yesterday  Blood pressure 92/63 to 125/55  Creatinine 2 12 -> 2 29, peripheral edema appears to be improving      Vitals: /57 (BP Location: Right arm)   Pulse 69   Temp (!) 97 °F (36 1 °C) (Temporal)   Resp 18   Ht 5' 9" (1 753 m)   Wt 72 7 kg (160 lb 4 4 oz)   SpO2 98%   BMI 23 67 kg/m²   Vitals:    03/21/21 0400 03/22/21 0542   Weight: 72 1 kg (158 lb 15 2 oz) 72 7 kg (160 lb 4 4 oz)     Orthostatic Blood Pressures      Most Recent Value   Blood Pressure  100/57 filed at 03/22/2021 0815   Patient Position - Orthostatic VS  Sitting filed at 03/22/2021 0815            Intake/Output Summary (Last 24 hours) at 3/22/2021 0908  Last data filed at 3/22/2021 0810  Gross per 24 hour   Intake 960 ml   Output 1800 ml   Net -840 ml       Invasive Devices     Peripheral Intravenous Line            Peripheral IV 03/22/21 Left;Ventral (anterior) Forearm less than 1 day                Review of Systems   Constitutional: Negative for activity change  Respiratory: Negative for cough, chest tightness, shortness of breath and wheezing  Cardiovascular: Positive for leg swelling  Negative for chest pain and palpitations  Musculoskeletal: Negative for gait problem  Skin: Negative for color change     Neurological: Negative for dizziness, tremors, syncope, weakness, light-headedness and headaches  Psychiatric/Behavioral: Negative for agitation and confusion  Physical Exam  Constitutional:       General: He is not in acute distress  Appearance: He is well-developed  HENT:      Head: Normocephalic and atraumatic  Neck:      Vascular: No carotid bruit or JVD  Cardiovascular:      Rate and Rhythm: Normal rate and regular rhythm  Heart sounds: Normal heart sounds  No murmur  No friction rub  No gallop  Pulmonary:      Effort: Pulmonary effort is normal  No respiratory distress  Breath sounds: Normal breath sounds  No wheezing, rhonchi or rales  Chest:      Chest wall: No tenderness  Abdominal:      General: Bowel sounds are normal  There is no distension  Palpations: Abdomen is soft  Musculoskeletal:      Right lower leg: Edema present  Left lower leg: Edema present  Skin:     General: Skin is warm and dry  Neurological:      General: No focal deficit present  Mental Status: He is alert and oriented to person, place, and time  Psychiatric:         Mood and Affect: Mood normal          Behavior: Behavior normal          Thought Content:  Thought content normal          Judgment: Judgment normal          --------------------------------------------------------------------------------  ECHO:   Results for orders placed during the hospital encounter of 18   Echo complete with contrast if indicated    [address]  [phone]    Transthoracic Echocardiogram  2D, M-mode, Doppler, and Color Doppler    Study date:  21-Dec-2018    Patient: Neil Colby  MR number: [id number]  Account number: [de-identified]  : [id number]  Age: 80 years  Gender: Male  Status: Outpatient  [facility]: [address] [place] [address] [facility]  Height: 69 in  Weight: 202 lb  BP: 102/ 64 mmHg    Indications: Atrial fibrillation    Diagnoses: I48 0 - Atrial fibrillation    Sonographer:  JOSEFINA Galloway  Primary Physician:  Mandy Hagan MD  Referring Physician:  Kavitha Roland DO  Group:  [id number]  Cardiology Fellow:  Iris Diaz MD  Interpreting Physician:  Howard Ramos DO    SUMMARY    LEFT VENTRICLE:  Systolic function was mildly reduced  Ejection fraction was estimated to be 45 %  There was mild diffuse hypokinesis with regional variations including basal to mid inferolateral and basal inferior hypokinesis  There was mild concentric hypertrophy  RIGHT VENTRICLE:  The ventricle was mildly dilated  Systolic function was reduced  LEFT ATRIUM:  The atrium was mildly to moderately dilated  RIGHT ATRIUM:  The atrium was moderately dilated  TRICUSPID VALVE:  There was moderate regurgitation  Estimated peak PA pressure was 42 mmHg  HISTORY: PRIOR HISTORY: Hypertension, atrial fibrillation, pacemaker, cardiomyopathy, chronic kidney disease    PROCEDURE: The study was performed in the [address] [facility]  This was a routine study  The transthoracic approach was used  The study included complete 2D imaging, M-mode, complete spectral Doppler, and color Doppler  Image  quality was adequate  LEFT VENTRICLE: Size was normal  Systolic function was mildly reduced  Ejection fraction was estimated to be 45 %  There was mild diffuse hypokinesis with regional variations including basal to mid inferolateral and basal inferior  hypokinesis Wall thickness was mildly increased  There was mild concentric hypertrophy  DOPPLER: Transmitral flow pattern: atrial fibrillation  RIGHT VENTRICLE: The ventricle was mildly dilated  Systolic function was reduced  Wall thickness was normal  A pacing wire was present  LEFT ATRIUM: The atrium was mildly to moderately dilated  RIGHT ATRIUM: The atrium was moderately dilated  A pacing wire was present      MITRAL VALVE: Valve structure was normal  There was normal leaflet separation  DOPPLER: The transmitral velocity was within the normal range  There was no evidence for stenosis  There was no significant regurgitation  AORTIC VALVE: The valve was trileaflet  Leaflets exhibited mildly increased thickness, mild calcification, and normal cuspal separation  DOPPLER: Transaortic velocity was within the normal range  There was no evidence for stenosis  There  was no significant regurgitation  TRICUSPID VALVE: The valve structure was normal  There was normal leaflet separation  DOPPLER: The transtricuspid velocity was within the normal range  There was no evidence for stenosis  There was moderate regurgitation  Estimated peak PA  pressure was 42 mmHg  PULMONIC VALVE: Leaflets exhibited normal thickness, no calcification, and normal cuspal separation  DOPPLER: The transpulmonic velocity was within the normal range  There was trace regurgitation  PERICARDIUM: There was no pericardial effusion  The pericardium was normal in appearance  AORTA: The root exhibited normal size  The ascending aorta was upper normal in size  SYSTEMIC VEINS: IVC: The inferior vena cava was normal in size   Respirophasic changes were normal     SYSTEM MEASUREMENT TABLES    2D  %FS: 17 03 %  Ao Diam: 3 79 cm  EDV(Teich): 129 52 ml  EF Biplane: 42 45 %  EF(Cube): 42 88 %  EF(Teich): 35 35 %  ESV(Cube): 80 33 ml  ESV(Teich): 83 74 ml  IVSd: 1 31 cm  LA Area: 24 22 cm2  LA Diam: 4 28 cm  LVEDV MOD A2C: 220 9 ml  LVEDV MOD A4C: 205 11 ml  LVEDV MOD BP: 215 2 ml  LVEF MOD A2C: 40 58 %  LVEF MOD A4C: 44 97 %  LVESV MOD A2C: 131 27 ml  LVESV MOD A4C: 112 87 ml  LVESV MOD BP: 123 84 ml  LVIDd: 5 2 cm  LVIDs: 4 31 cm  LVLd A2C: 9 46 cm  LVLd A4C: 9 25 cm  LVLs A2C: 8 73 cm  LVLs A4C: 8 41 cm  LVPWd: 1 31 cm  RA Area: 23 9 cm2  RV Diam : 5 16 cm  SV MOD A2C: 89 63 ml  SV MOD A4C: 92 23 ml  SV(Cube): 60 3 ml  SV(Teich): 45 78 ml    CW  TR Vmax: 3 08 m/s  TR maxP 97 mmHg    MM  TAPSE: 1 41     [facility]    Prepared and electronically signed by    Soila Pino DO  Signed 21-Dec-2018 13:06:44         --------------------------------------------------------------------------------  CAROTIDS  Results for orders placed during the hospital encounter of 08/23/16   VAS carotid complete study    Narrative    [facility] REPORT  CLINICAL:  Indications:  Bilateral Transient Visual Loss [D05 200]  Patient has had several brief episodes over a long period of time, of partial  vision loss in both eyes  Most recently, the episode lasted over an hour and a  half  He reports this happened while at the eye doctor but his eye doctor told  him his eyes looked fine  Risk Factors  The patient has history of HTN  Clinical  Right Pressure:  138/60 mm Hg,     FINDINGS:     Right        Impression  PSV  EDV (cm/s)  Direction of Flow  Ratio    Dist  ICA                 64          18                      0 71    Mid  ICA                  62          13                      0 69    Prox  ICA    1 - 49%      51          12                      0 57    Dist CCA                  99          11                              Mid CCA                   90           5                      0 98    Prox CCA                  91           7                              Ext Carotid               81           3                      0 90    Prox Vert                 28           7  Antegrade                   Subclavian               124           0                                 Left         Impression  PSV  EDV (cm/s)  Direction of Flow  Ratio    Dist  ICA                 64          16                      0 67    Mid  ICA                  82          24                      0 86    Prox   ICA    1 - 49%      51          13                      0 54    Dist CCA                  77          10                              Mid CCA                   95          12 1 10    Prox CCA                  86          10                              Ext Carotid              109           0                      1 16    Prox Vert                 40          12  Antegrade                   Subclavian               100           7                                       CONCLUSION:     Impression  RIGHT:  There is <50% stenosis noted in the internal carotid artery  Plaque is  heterogenous  Vertebral artery flow is antegrade  There is no significant subclavian artery  disease  LEFT:  There is <50% stenosis noted in the internal carotid artery  Plaque is  heterogenous  Vertebral artery flow is antegrade  There is no significant subclavian artery  disease  Internal carotid artery stenosis determination by consensus criteria from:  Santos Harding et al  Carotid Artery Stenosis: Gray-Scale and Doppler US Diagnosis  - Society of Radiologists in [address] [place] [address], Radiology 2003;  [phone]       SIGNATURE:  Electronically Signed by: Ramon Pope on 2016-08-23 03:51:53 PM        ======================================================    Lab Results   Component Value Date    WBC 6 67 03/19/2021    HGB 13 9 03/19/2021    HCT 43 4 03/19/2021    MCV 98 03/19/2021     03/19/2021      Lab Results   Component Value Date    SODIUM 140 03/22/2021    K 4 0 03/22/2021     03/22/2021    CO2 32 03/22/2021    BUN 79 (H) 03/22/2021    CREATININE 2 29 (H) 03/22/2021    GLUC 104 03/22/2021    CALCIUM 9 1 03/22/2021      Lab Results   Component Value Date    HGBA1C 5 4 12/26/2018      Lab Results   Component Value Date    CHOL 157 01/09/2015     Lab Results   Component Value Date    HDL 30 (L) 10/06/2020    HDL 28 (L) 09/18/2019    HDL 28 (L) 10/02/2018     Lab Results   Component Value Date    LDLCALC 92 10/06/2020    LDLCALC 73 09/18/2019    LDLCALC 85 10/02/2018     Lab Results   Component Value Date    TRIG 124 10/06/2020    TRIG 135 09/18/2019    TRIG 126 10/02/2018     No results found for: [place], [place]   Lab Results   Component Value Date    INR 1 28 (H) 02/09/2017    INR 1 26 (H) 03/04/2015    INR 1 23 (H) 03/02/2015    PROTIME 15 7 (H) 02/09/2017    PROTIME 15 5 (H) 03/04/2015    PROTIME 15 2 (H) 03/02/2015        Imaging:   I have personally reviewed pertinent reports  EKG:  Ventricular paced rhythm  Three 4 beat runs of ventricular tachycardia and 1 triplet since yesterday  No long ventricular tachycardia runs

## 2021-03-22 NOTE — PLAN OF CARE
Problem: OCCUPATIONAL THERAPY ADULT  Goal: Performs self-care activities at highest level of function for planned discharge setting  See evaluation for individualized goals  Description: Treatment Interventions: ADL retraining, Functional transfer training, UE strengthening/ROM, Endurance training, Patient/family training, Equipment evaluation/education, Compensatory technique education, Energy conservation, Activityengagement          See flowsheet documentation for full assessment, interventions and recommendations  Outcome: Progressing  Note: Limitation: Decreased ADL status, Decreased UE strength, Decreased Safe judgement during ADL, Decreased endurance, Decreased self-care trans, Decreased high-level ADLs  Prognosis: Fair  Assessment: Pt seen for OT treatment session focusing on functional activity tolerance, ADLs, functional mobility/transfers, UE ROM/strengthening, and dynamic standing balance activity  Pt alert and cooperative throughout session  Pt seated OOB in chair at start of session  UE exercises completed to increase UE ROM/strength needed for ADLs and transfers  Green theraband utilized for [place] abduction and elbow flexion/extension  Pt tolerated well, no c/o pain or fatigue  LB dressing completed with Supervision with increased time required to thread B/L LEs into pants while seated and Fair dynamic standing balance demonstrated when standing to pull pants over hips  Transfers (sit<>stand) completed with Supervision with cues for hand placement  Supervision required for functional mobility with increased dynamic standing tolerance demonstrated  Dynamic standing balance activity completed to simulate IADL task (ie: retrieve common household objects from closet at various heights) to assess pt's balance when reaching across midline and out of  base of support at various heights  No LOBs noted during activity  Pt seated OOB in chair at end of session with PT present   All needs met and pt reports no further questions for OT at this time  The patient's raw score on the AM-PAC Daily Activity inpatient short form is 20, standardized score is 42 03, greater than 39 4  Patients at this level are likely to benefit from discharge to home  Please refer to the recommendation of the Occupational Therapist for safe discharge planning  Continue to recommend Home OT when medically cleared  OT to follow pt on caseload        OT Discharge Recommendation: Home with skilled therapy  OT - OK to Discharge: Yes(when medically cleared)

## 2021-03-22 NOTE — PROGRESS NOTES
Follow up Consultation    Nephrology   Trevon Rafia 80 y o  male MRN: [id number]  Unit/Bed#: E4 -01 Encounter: [id number]      Physician Requesting Consult: Karma Ramesh MD        ASSESSMENT/PLAN:      Maile Splinter Acute kidney injury (POA) on CKD stage 4:  - SUDHIR most likely secondary to cardiorenal syndrome and subsequent ATN  - After review of records In [facility] as well as Care everywhere it appears that the patient has a baseline Creatinine of 1 6-1 9 mg/dL  - patient was admitted with a creatinine of 2 48 mg/dL on 03/09/2021   - patient's creatinine today is at 2 29 mg/dL, appears leveled off  May need to accept higher baseline creatinine to maintain euvolemia   - diuretics per Cardiology  - check BMP in a m   - Check renal ultrasound to r/o hydronephrosis, obstruction if renal parameters continue to deteriorate  - Await renal recovery  - Optimize hemodynamic status to avoid delay in renal recovery  - Place on a renal diet when allowed diet order    - Avoid nephrotoxins, adjust meds to appropriate GFR   - Strict I/O   - Daily weights  - Urinary retention protocol if patient does not have a Alas  - Most likely has underlying CKD secondary to age-related nephron loss plus cardiorenal syndrome  - will need to set up patient for follow up with Nephrology as an outpatient post hospitalization   - for nephrology as an outpatient patient follows up with no nephrologist     Blood pressure:  - current medications:  Bisoprolol 2 5 mg p o  Q day, Bumex 1 mg IV t i d  amiloride  - recommendations:  Recommend changing over to p o  Diuretics if okay with Cardiology  Agree with discontinuation of amiloride as per Cardiology  - Optimize hemodynamics   - Maintain MAP > 65mmHg  - Avoid BP fluctuations       H/H/anemia:  - most recent hemoglobin at 13 9 grams/deciliter  - maintain hemoglobin greater than 8 grams/deciliter     Acid-base electrolytes:    o Hypokalemia:  Due to diuretics  Most recent potassium 4 0  o Acid-base:    - Most recent bicarb at 32     Volume status:  o  Clinically appears euvolemic to minimally hypervolemic   Proteinuria:   o Most recent UA with no protein as of 03/10/2021     Other medical problems:  o Diabetes:  Management per primary team   On insulin  o Acute on chronic CHF:  Management per primary team   Follow-up with cardiology diuretics per Cardiology recommend changing over to p o  Diuretics  Most recent echo with EF of 25%  o BPH:  On Proscar      Thanks for the consult  Will continue to follow  Please call with questions/ concerns  Above-mentioned orders and Plan in terms of acute kidney injury was discussed with the team in [address] MD, FASN, 3/22/2021, 1:36 PM              Objective :     Patient seen and examined in his room no overnight events blood pressures remain borderline low systolics in the 91I to 014Y remains afebrile urine output 1 7 L in last 24 hours on 1 L thus far today  Overall feels much better  Hoping for discharge soon  PHYSICAL EXAM  /57 (BP Location: Right arm)   Pulse 69   Temp (!) 97 °F (36 1 °C) (Temporal)   Resp 18   Ht 5' 9" (1 753 m)   Wt 72 7 kg (160 lb 4 4 oz)   SpO2 98%   BMI 23 67 kg/m²   Temp (24hrs), Av 1 °F (36 2 °C), Min:97 °F (36 1 °C), Max:97 4 °F (36 3 °C)        Intake/Output Summary (Last 24 hours) at 3/22/2021 1336  Last data filed at 3/22/2021 1310  Gross per 24 hour   Intake 960 ml   Output 1850 ml   Net -890 ml       I/O last 24 hours: In: 1680 [P O :1680]  Out: 2700 [Urine:2700]      Current Weight: Weight - Scale: 72 7 kg (160 lb 4 4 oz)  First Weight: Weight - Scale: 82 6 kg (182 lb 1 6 oz)  Physical Exam  Vitals signs and nursing note reviewed  Constitutional:       General: He is not in acute distress  Appearance: Normal appearance  He is normal weight  He is ill-appearing  He is not diaphoretic  HENT:      Head: Normocephalic and atraumatic        Mouth/Throat:      Mouth: Mucous membranes are moist       Pharynx: Oropharynx is clear  No oropharyngeal exudate  Eyes:      General: No scleral icterus  Neck:      Musculoskeletal: Normal range of motion and neck supple  Cardiovascular:      Rate and Rhythm: Normal rate  Heart sounds: Normal heart sounds  No friction rub  Pulmonary:      Effort: Pulmonary effort is normal  No respiratory distress  Breath sounds: Normal breath sounds  No wheezing  Abdominal:      General: There is no distension  Palpations: Abdomen is soft  There is no mass  Tenderness: There is no abdominal tenderness  Musculoskeletal:         General: Swelling present  Comments: 1+ edema bilateral extremities   Skin:     General: Skin is warm  Coloration: Skin is not jaundiced  Neurological:      General: No focal deficit present  Mental Status: He is alert and oriented to person, place, and time  Psychiatric:         Mood and Affect: Mood normal          Behavior: Behavior normal              Review of Systems   Constitutional: Negative for chills, fatigue and fever  HENT: Negative for congestion  Respiratory: Negative for cough, shortness of breath and wheezing  Cardiovascular: Positive for leg swelling  Gastrointestinal: Negative for abdominal pain, constipation, diarrhea, nausea and vomiting  Genitourinary: Negative for dysuria  Musculoskeletal: Negative for back pain  Skin: Negative for rash  Neurological: Negative for dizziness and headaches  Psychiatric/Behavioral: Negative for agitation and confusion  All other systems reviewed and are negative        Scheduled Meds:  Current Facility-Administered Medications   Medication Dose Route Frequency Provider Last Rate    apixaban  2 5 mg Oral BID Kathy Mitchell MD      bisoprolol  2 5 mg Oral Daily Carly Avalos MD      bumetanide  2 mg Intravenous BID Carly Avalos MD      finasteride  5 mg Oral Daily Kathy Mitchell MD      hydrOXYzine HCL  25 mg Oral Q6H PRN Ranjan Kirby,       loratadine  10 mg Oral Daily Viral IONA Kirby, DO      pantoprazole  40 mg Oral Daily Varun Valdez MD         PRN Meds: hydrOXYzine HCL    Continuous Infusions:       Invasive Devices: Invasive Devices     Peripheral Intravenous Line            Peripheral IV 03/22/21 Left;Ventral (anterior) Forearm less than 1 day                  LABORATORY:    Results from last 7 days   Lab Units 03/22/21  0614 03/21/21  0940 03/21/21  0406 03/20/21  0602 03/19/21  0606 03/18/21  1203 03/17/21  0625 03/16/21  0514   WBC Thousand/uL  --   --   --   --  6 67  --  6 89  --    HEMOGLOBIN g/dL  --   --   --   --  13 9  --  13 5  --    HEMATOCRIT %  --   --   --   --  43 4  --  41 4  --    PLATELETS Thousands/uL  --   --   --   --  214  --  232  --    POTASSIUM mmol/L 4 0 3 4* 3 4* 3 9 4 0 3 0* 3 8 3 5   CHLORIDE mmol/L 100 98* 100 98* 96* 95* 98* 94*   CO2 mmol/L 32 31 33* 32 33* 31 34* 34*   BUN mg/dL 79* 68* 71* 74* 76* 83* 78* 70*   CREATININE mg/dL 2 29* 2 12* 2 10* 2 04* 2 27* 2 36* 2 37* 2 34*   CALCIUM mg/dL 9 1 9 5 9 5 9 5 9 7 10 3* 9 5 10 2*   MAGNESIUM mg/dL 2 0  --  2 1 2 2  --  2 1  --  2 1   PHOSPHORUS mg/dL  --   --   --   --   --   --  4 3*  --       rest all reviewed    RADIOLOGY:  XR chest 1 view portable   Final Result by Suzie Umaña DO (03/09 1842)      No acute cardiopulmonary disease  Workstation performed: [id number]           Rest all reviewed    Portions of the record may have been created with voice recognition software  Occasional wrong word or "sound a like" substitutions may have occurred due to the inherent limitations of voice recognition software  Read the chart carefully and recognize, using context, where substitutions have occurred  If you have any questions, please contact the dictating provider

## 2021-03-22 NOTE — OCCUPATIONAL THERAPY NOTE
[address] Progress Note     Patient Name: Leonard Maddox  DQBAW'R Date: 3/22/2021  Problem List  Principal Problem:    Cellulitis of left leg  Active Problems:    A-fib (HCC)    BPH (benign prostatic hyperplasia)    Acute kidney injury superimposed on chronic kidney disease ([facility] [place] [address] )    Acute on chronic systolic CHF (congestive heart failure) (HCC)    Hyperphosphatemia    Hypercalcemia    Alkalosis    Ventricular tachycardia ([facility] [place] [address] )          03/22/21 1353   OT Last Visit   OT Visit Date 03/22/21   Note Type   Note Type Treatment   Restrictions/Precautions   Weight Bearing Precautions Per Order No   Other Precautions Chair Alarm; Bed Alarm;Telemetry; Fall Risk   General   Response to Previous Treatment Patient with no complaints from previous session   Lifestyle   Autonomy At baseline, pt was I w/ ADLs, IADLs, and functional mobility/transfers w/ use of RW PRN, (+) , and denies falls PTA  Reciprocal Relationships Dtr, 4 sons   Service to Others Retired- [employer]   Intrinsic Gratification Watching TV, Traveling   Pain Assessment   Pain Assessment Tool Pain Assessment not indicated - pt denies pain   Pain Score No Pain   ADL   Where Assessed Chair   Grooming Assistance 5  Supervision/Setup   Grooming Deficit Setup;Supervision/safety; Increased time to complete   LB Dressing Assistance 5  Supervision/Setup   LB Dressing Deficit Setup;Supervision/safety; Increased time to complete; Thread RLE into pants; Thread LLE into pants;Pull up over hips   LB Dressing Comments Fair dynamic standing balance demonstrated when standing to pull pants over hips  Functional Standing Tolerance   Time 10 mins   Activity Dynamic standing balance activity   Comments Fair dynamic standing balance demonstrated  No c/o fatigue   Bed Mobility   Supine to Sit Unable to assess   Sit to Supine Unable to assess   Additional Comments Pt seated OOB in chair at end of session with PT present   All needs met and pt reports no further questions for OT at this time  Transfers   Sit to Stand 5  Supervision   Additional items Armrests; Increased time required;Verbal cues   Stand to Sit 5  Supervision   Additional items Armrests; Increased time required;Verbal cues   Functional Mobility   Functional Mobility 5  Supervision   Additional Comments Assist x1   Additional items Rolling walker   Therapeutic Excerise-Strength   UE Strength Yes   Right Upper Extremity- Strength   R Shoulder Flexion; Extension;Horizontal ABduction   R Elbow Elbow flexion;Elbow extension   R Position Seated   Equipment Theraband  (Green theraband)   RUE Strength Comment Chair push ups  12 reps x1  Tolerated well, no c/o pain or fatigue   Left Upper Extremity-Strength   L Shoulder Flexion; Extension;Horizontal ABduction   L Elbow Elbow flexion;Elbow extension   L Position Seated   Equipment Theraband  (Green resistance)   LUE Strength Comment Chair push ups  12 reps x1  Tolerated well, no c/o pain or fatigue   Cognition   Overall Cognitive Status WFL   Arousal/Participation Alert; Cooperative   Attention Attends with cues to redirect   Orientation Level Oriented X4   Memory Decreased recall of precautions   Following Commands Follows one step commands without difficulty   Activity Tolerance   Activity Tolerance Patient tolerated treatment well   Medical Staff Made Aware aRe RN; Crow Barnes PTA   Assessment   Assessment Pt seen for OT treatment session focusing on functional activity tolerance, ADLs, functional mobility/transfers, UE ROM/strengthening, and dynamic standing balance activity  Pt alert and cooperative throughout session  Pt seated OOB in chair at start of session  UE exercises completed to increase UE ROM/strength needed for ADLs and transfers  Green theraband utilized for [place] abduction and elbow flexion/extension  Pt tolerated well, no c/o pain or fatigue   LB dressing completed with Supervision with increased time required to thread B/L LEs into pants while seated and Fair dynamic standing balance demonstrated when standing to pull pants over hips  Transfers (sit<>stand) completed with Supervision with cues for hand placement  Supervision required for functional mobility with increased dynamic standing tolerance demonstrated  Dynamic standing balance activity completed to simulate IADL task (ie: retrieve common household objects from closet at various heights) to assess pt's balance when reaching across midline and out of  base of support at various heights  No LOBs noted during activity  Pt seated OOB in chair at end of session with PT present  All needs met and pt reports no further questions for OT at this time  The patient's raw score on the AM-PAC Daily Activity inpatient short form is 20, standardized score is 42 03, greater than 39 4  Patients at this level are likely to benefit from discharge to home  Please refer to the recommendation of the Occupational Therapist for safe discharge planning  Continue to recommend Home OT when medically cleared  OT to follow pt on caseload  Plan   Treatment Interventions ADL retraining;Functional transfer training;UE strengthening/ROM; Endurance training;Patient/family training;Equipment evaluation/education; Compensatory technique education; Energy conservation; Activityengagement   Goal Expiration Date 03/30/21   OT Treatment Day 3   OT Frequency 3-5x/wk   Recommendation   OT Discharge Recommendation Home with skilled therapy   OT - OK to Discharge Yes  (when medically cleared)   [facility] Daily Activity Inpatient   Lower Body Dressing 3   Bathing 3   Toileting 3   Upper Body Dressing 3   Grooming 4   Eating 4   Daily Activity Raw Score 20   Daily Activity Standardized Score (Calc for Raw Score >=11) 42 03   AM-[facility] Applied Cognition Inpatient   Following a Speech/Presentation 4   Understanding Ordinary Conversation 4   Taking Medications 4   Remembering Where Things Are Placed or Put Away 4   Remembering List of 4-5 Errands 3 Taking Care of Complicated Tasks 3   Applied Cognition Raw Score 22   Applied Cognition Standardized Score 47 83          Stephanie April, OTR/L

## 2021-03-22 NOTE — ASSESSMENT & PLAN NOTE
Wt Readings from Last 3 Encounters:   03/22/21 72 7 kg (160 lb 4 4 oz)   03/09/21 81 7 kg (180 lb 3 2 oz)   02/26/21 83 5 kg (184 lb)     EF 25%  - Continue IV bumex  Dose increased again today due to weight gain  Amiloride discontinued due to creatinine increase

## 2021-03-22 NOTE — PROGRESS NOTES
[facility]  Progress Note - Celio Olson 6/16/1927, 80 y o  male MRN: [id number]  Unit/Bed#: E4 -01 Encounter: [id number]  Primary Care Provider: VARSHA Tamayo MD   Date and time admitted to hospital: 3/9/2021 12:52 PM    * Cellulitis of left leg  Assessment & Plan  80year old male admitted due to cellulitis of his left leg   - Completed antibiotic therapy    Acute on chronic systolic CHF (congestive heart failure) ([facility])  Assessment & Plan  Wt Readings from Last 3 Encounters:   03/22/21 72 7 kg (160 lb 4 4 oz)   03/09/21 81 7 kg (180 lb 3 2 oz)   02/26/21 83 5 kg (184 lb)     EF 25%  - Continue IV bumex  Dose increased again today due to weight gain  Amiloride discontinued due to creatinine increase  A-fib [facility])  Assessment & Plan  S/p PPM   - Continue bisoprolol  - Continue Eliquis    Acute kidney injury superimposed on chronic kidney disease ([facility] [address] )  Assessment & Plan  Continue monitoring, slightly increased again possibly due to amiloride  Recent Labs     03/21/21  0406 03/21/21  0940 03/22/21  0614   BUN 71* 68* 79*   CREATININE 2 10* 2 12* 2 29*   EGFR 26 26 24               BPH (benign prostatic hyperplasia)  Assessment & Plan  Continue finasteride      VTE Pharmacologic Prophylaxis:   Pharmacologic: Apixaban (Eliquis)  Mechanical VTE Prophylaxis in Place: Yes    Patient Centered Rounds: I have performed bedside rounds with nursing staff today  Discussions with Specialists or Other Care Team Provider: Nursing    Education and Discussions with Family / Patient: patient    Time Spent for Care: 30 minutes  More than 50% of total time spent on counseling and coordination of care as described above      Current Length of Stay: 13 day(s)    Current Patient Status: Inpatient   Certification Statement: The patient will continue to require additional inpatient hospital stay due to iv diuretics    Discharge Plan: active    Code Status: Level 3 - DNAR and DNI      Subjective:   Patient seen and examined at bedside  He is comfortable  He is disappointed that he continues to remain in the hospital, but understood that continued diuresis is needed for him to get better  Objective:     Vitals:   Temp (24hrs), Av 1 °F (36 2 °C), Min:97 °F (36 1 °C), Max:97 4 °F (36 3 °C)    Temp:  [97 °F (36 1 °C)-97 4 °F (36 3 °C)] 97 °F (36 1 °C)  HR:  [69-71] 69  Resp:  [18] 18  BP: ()/(53-63) 100/57  SpO2:  [97 %-98 %] 98 %  Body mass index is 23 67 kg/m²  Input and Output Summary (last 24 hours): Intake/Output Summary (Last 24 hours) at 3/22/2021 1324  Last data filed at 3/22/2021 1310  Gross per 24 hour   Intake 1200 ml   Output 1850 ml   Net -650 ml       Physical Exam:     Physical Exam  Vitals signs reviewed  HENT:      Head: Normocephalic  Nose: Nose normal       Mouth/Throat:      Mouth: Mucous membranes are moist    Eyes:      General: No scleral icterus  Extraocular Movements: Extraocular movements intact  Cardiovascular:      Rate and Rhythm: Normal rate  Pulmonary:      Effort: Pulmonary effort is normal  No respiratory distress  Breath sounds: No wheezing or rales  Abdominal:      General: There is no distension  Palpations: Abdomen is soft  Tenderness: There is no abdominal tenderness  Musculoskeletal:      Right lower leg: Edema present  Left lower leg: Edema present  Skin:     General: Skin is warm  Neurological:      Mental Status: He is alert  Mental status is at baseline     Psychiatric:         Mood and Affect: Mood normal          Behavior: Behavior normal        Additional Data:     Labs:    Results from last 7 days   Lab Units 21  0606 21  0625   WBC Thousand/uL 6 67 6 89   HEMOGLOBIN g/dL 13 9 13 5   HEMATOCRIT % 43 4 41 4   PLATELETS Thousands/uL 214 232   NEUTROS PCT %  --  58   LYMPHS PCT %  --  21   MONOS PCT %  --  13*   EOS PCT %  --  7*     Results from last 7 days   Lab Units 03/22/21  0614  03/17/21  0625   SODIUM mmol/L 140   < > 140   POTASSIUM mmol/L 4 0   < > 3 8   CHLORIDE mmol/L 100   < > 98*   CO2 mmol/L 32   < > 34*   BUN mg/dL 79*   < > 78*   CREATININE mg/dL 2 29*   < > 2 37*   ANION GAP mmol/L 8   < > 8   CALCIUM mg/dL 9 1   < > 9 5   ALBUMIN g/dL  --   --  2 7*   TOTAL BILIRUBIN mg/dL  --   --  0 84   ALK PHOS U/L  --   --  132*   ALT U/L  --   --  18   AST U/L  --   --  49*   GLUCOSE RANDOM mg/dL 104   < > 112    < > = values in this interval not displayed  Results from last 7 days   Lab Units 03/17/21  2144 03/17/21  1602 03/17/21  0744 03/16/21  1621 03/16/21  1124 03/16/21  0741   POC GLUCOSE mg/dl 130 166* 115 135 140 116                   * I Have Reviewed All Lab Data Listed Above  * Additional Pertinent Lab Tests Reviewed: [facility] [address] [facility] Admission Reviewed    Imaging:    Imaging Reports Reviewed Today Include: no new imaging    Recent Cultures (last 7 days):           Last 24 Hours Medication List:   Current Facility-Administered Medications   Medication Dose Route Frequency Provider Last Rate    apixaban  2 5 mg Oral BID Amarjit Hawthorne MD      bisoprolol  2 5 mg Oral Daily Zach Cardenas MD      bumetanide  2 mg Intravenous BID Zach Cardenas MD      finasteride  5 mg Oral Daily Amarjit Hawthorne MD      hydrOXYzine HCL  25 mg Oral Q6H PRN Sergio Kirby, DO      loratadine  10 mg Oral Daily Viral Kirby, DO      pantoprazole  40 mg Oral Daily Amarjit Hawthorne MD          Today, Patient Was Seen By: Vinicio Valdivia MD    ** Please Note: Dictation voice to text software may have been used in the creation of this document   **

## 2021-03-22 NOTE — PLAN OF CARE
Problem: PHYSICAL THERAPY ADULT  Goal: Performs mobility at highest level of function for planned discharge setting  See evaluation for individualized goals  Description: Treatment/Interventions: Functional transfer training, Elevations, LE strengthening/ROM, Therapeutic exercise, Endurance training, Patient/family training, Bed mobility, Gait training, Spoke to nursing, OT  Equipment Recommended: Walker(Pt owns walker)       See flowsheet documentation for full assessment, interventions and recommendations  Outcome: Progressing  Note: Prognosis: Good  Problem List: Decreased strength, Decreased range of motion, Decreased endurance, Impaired balance, Decreased mobility, Decreased skin integrity  Assessment: Pt  seated in bedside chair upon my arrival  Pt  eager to participate in therapy this PM, in hopes of going home soon  Performance of HEP seated in bedside chair with cues provided for proper completion  Progressed with transfers being able to complete with A of therapist with cues for hand placement/technique  Progressed with an amb  trial with use of RW and cues provided for LE sequencing  Pt  able to complete additional amb  trial this treatment session  Pt  returned to room and repositioned seated in bedside chair with alarm active at end of treatment session  PT will continue to recommend d/c home with family support and HHPT provided when medically stable  Barriers to Discharge: None  Barriers to Discharge Comments: stairs to enter  PT Discharge Recommendation: Home with skilled therapy, Return to previous environment with social support(HHPT)     PT - OK to Discharge: Yes(if d/c when medically stable )    See flowsheet documentation for full assessment

## 2021-03-22 NOTE — PLAN OF CARE
Problem: Potential for Falls  Goal: Patient will remain free of falls  Description: INTERVENTIONS:  - Assess patient frequently for physical needs  -  Identify cognitive and physical deficits and behaviors that affect risk of falls    -  [place] fall precautions as indicated by assessment   - Educate patient/family on patient safety including physical limitations  - Instruct patient to call for assistance with activity based on assessment  - Modify environment to reduce risk of injury  - Consider OT/PT consult to assist with strengthening/mobility  Outcome: Progressing     Problem: PAIN - ADULT  Goal: Verbalizes/displays adequate comfort level or baseline comfort level  Description: Interventions:  - Encourage patient to monitor pain and request assistance  - Assess pain using appropriate pain scale  - Administer analgesics based on type and severity of pain and evaluate response  - Implement non-pharmacological measures as appropriate and evaluate response  - Consider cultural and social influences on pain and pain management  - Notify physician/advanced practitioner if interventions unsuccessful or patient reports new pain  Outcome: Progressing     Problem: INFECTION - ADULT  Goal: Absence or prevention of progression during hospitalization  Description: INTERVENTIONS:  - Assess and monitor for signs and symptoms of infection  - Monitor lab/diagnostic results  - Monitor all insertion sites, i e  indwelling lines, tubes, and drains  - Monitor endotracheal if appropriate and nasal secretions for changes in amount and color  - [place] appropriate cooling/warming therapies per order  - Administer medications as ordered  - Instruct and encourage patient and family to use good hand hygiene technique  - Identify and instruct in appropriate isolation precautions for identified infection/condition  Outcome: Progressing     Problem: SAFETY ADULT  Goal: Maintain or return to baseline ADL function  Description: INTERVENTIONS:  -  Assess patient's ability to carry out ADLs; assess patient's baseline for ADL function and identify physical deficits which impact ability to perform ADLs (bathing, care of mouth/teeth, toileting, grooming, dressing, etc )  - Assess/evaluate cause of self-care deficits   - Assess range of motion  - Assess patient's mobility; develop plan if impaired  - Assess patient's need for assistive devices and provide as appropriate  - Encourage maximum independence but intervene and supervise when necessary  - Involve family in performance of ADLs  - Assess for home care needs following discharge   - Consider OT consult to assist with ADL evaluation and planning for discharge  - Provide patient education as appropriate  Outcome: Progressing  Goal: Maintain or return mobility status to optimal level  Description: INTERVENTIONS:  - Assess patient's baseline mobility status (ambulation, transfers, stairs, etc )    - Identify cognitive and physical deficits and behaviors that affect mobility  - Identify mobility aids required to assist with transfers and/or ambulation (gait belt, sit-to-stand, lift, walker, cane, etc )  - [place] fall precautions as indicated by assessment  - Record patient progress and toleration of activity level on Mobility SBAR; progress patient to next Phase/Stage  - Instruct patient to call for assistance with activity based on assessment  - Consider rehabilitation consult to assist with strengthening/weightbearing, etc   Outcome: Progressing     Problem: DISCHARGE PLANNING  Goal: Discharge to home or other facility with appropriate resources  Description: INTERVENTIONS:  - Identify barriers to discharge w/patient and caregiver  - Arrange for needed discharge resources and transportation as appropriate  - Identify discharge learning needs (meds, wound care, etc )  - Arrange for interpretive services to assist at discharge as needed  - Refer to Case Management Department for coordinating discharge planning if the patient needs post-hospital services based on physician/advanced practitioner order or complex needs related to functional status, cognitive ability, or social support system  Outcome: Progressing     Problem: Knowledge Deficit  Goal: Patient/family/caregiver demonstrates understanding of disease process, treatment plan, medications, and discharge instructions  Description: Complete learning assessment and assess knowledge base    Interventions:  - Provide teaching at level of understanding  - Provide teaching via preferred learning methods  Outcome: Progressing     Problem: SKIN/TISSUE INTEGRITY - ADULT  Goal: Skin integrity remains intact  Description: INTERVENTIONS  - Identify patients at risk for skin breakdown  - Assess and monitor skin integrity  - Assess and monitor nutrition and hydration status  - Monitor labs (i e  albumin)  - Assess for incontinence   - Turn and reposition patient  - Assist with mobility/ambulation  - Relieve pressure over bony prominences  - Avoid friction and shearing  - Provide appropriate hygiene as needed including keeping skin clean and dry  - Evaluate need for skin moisturizer/barrier cream  - Collaborate with interdisciplinary team (i e  Nutrition, Rehabilitation, etc )   - Patient/family teaching  Outcome: Progressing  Goal: Incision(s), wounds(s) or drain site(s) healing without S/S of infection  Description: INTERVENTIONS  - Assess and document risk factors for skin impairment   - Assess and document dressing, incision, wound bed, drain sites and surrounding tissue  - Consider nutrition services referral as needed  - Oral mucous membranes remain intact  - Provide patient/ family education  Outcome: Progressing  Goal: Oral mucous membranes remain intact  Description: INTERVENTIONS  - Assess oral mucosa and hygiene practices  - Implement preventative oral hygiene regimen  - Implement oral medicated treatments as ordered  - Initiate Nutrition services referral as needed  Outcome: Progressing     Problem: CARDIOVASCULAR - ADULT  Goal: Maintains optimal cardiac output and hemodynamic stability  Description: INTERVENTIONS:  - Monitor I/O, vital signs and rhythm  - Monitor for S/S and trends of decreased cardiac output  - Administer and titrate ordered vasoactive medications to optimize hemodynamic stability  - Assess quality of pulses, skin color and temperature  - Assess for signs of decreased coronary artery perfusion  - Instruct patient to report change in severity of symptoms  Outcome: Progressing  Goal: Absence of cardiac dysrhythmias or at baseline rhythm  Description: INTERVENTIONS:  - Continuous cardiac monitoring, vital signs, obtain 12 lead EKG if ordered  - Administer antiarrhythmic and heart rate control medications as ordered  - Monitor electrolytes and administer replacement therapy as ordered  Outcome: Progressing     Problem: METABOLIC, FLUID AND ELECTROLYTES - ADULT  Goal: Electrolytes maintained within normal limits  Description: INTERVENTIONS:  - Monitor labs and assess patient for signs and symptoms of electrolyte imbalances  - Administer electrolyte replacement as ordered  - Monitor response to electrolyte replacements, including repeat lab results as appropriate  - Instruct patient on fluid and nutrition as appropriate  Outcome: Progressing  Goal: Fluid balance maintained  Description: INTERVENTIONS:  - Monitor labs   - Monitor I/O and WT  - Instruct patient on fluid and nutrition as appropriate  - Assess for signs & symptoms of volume excess or deficit  Outcome: Progressing

## 2021-03-22 NOTE — UTILIZATION REVIEW
Continued Stay Review    Date: 3/22/21                          Current Patient Class: inpatient  Current Level of Care: med surg    HPI:89 y o  male w/hx pacemaker, cmp, a fib, ckd, colon ca  initially admitted on 3/9/21 as inpatient due to LLE cellulitis, SUDHIR, cardiomyopathy  IV diuretics and IV antbx were in progress, Renal and cardio were following  Assessment/Plan: 3/22: borderline bp's, low 90's-100's; afebrile;  weight up 1 5lb since yesterday, peripheral edema improving, increased diuretic dose today, dc amiloride due to creat increase  Had recurrent v tach when bisoprolol was held  This is improved now that bisoprolol was restarted  Renal and cardio continue to follow  Creat has leveled off at 2 29, might need higher baseline to maintain euvolemia  If renal function continues to deteriorate, will order renal US to check for hydro or obstruction  Finished IV antbx for LE cellulitis on 3/16  No documented recurrence or new sx aside from persistent BLE edema       Intake/Output Summary (Last 24 hours) at 3/22/2021 1324  Last data filed at 3/22/2021 1310      Gross per 24 hour   Intake 1200 ml   Output 1850 ml   Net -650 ml       Pertinent Labs/Diagnostic Results:     no new rads or EKG  Results from last 7 days   Lab Units 03/19/21  0606 03/17/21  0625   WBC Thousand/uL 6 67 6 89   HEMOGLOBIN g/dL 13 9 13 5   HEMATOCRIT % 43 4 41 4   PLATELETS Thousands/uL 214 232   NEUTROS ABS Thousands/µL  --  4 02         Results from last 7 days   Lab Units 03/22/21  0614 03/21/21  0940 03/21/21  0406 03/20/21  0602 03/19/21  0606 03/18/21  1203 03/17/21  0625 03/16/21  0514   SODIUM mmol/L 140 141 142 139 138 139 140 139   POTASSIUM mmol/L 4 0 3 4* 3 4* 3 9 4 0 3 0* 3 8 3 5   CHLORIDE mmol/L 100 98* 100 98* 96* 95* 98* 94*   CO2 mmol/L 32 31 33* 32 33* 31 34* 34*   ANION GAP mmol/L 8 12 9 9 9 13 8 11   BUN mg/dL 79* 68* 71* 74* 76* 83* 78* 70*   CREATININE mg/dL 2 29* 2 12* 2 10* 2 04* 2 27* 2 36* 2 37* 2 34*   EGFR ml/min/1 73sq m 24 26 26 27 24 23 23 23   CALCIUM mg/dL 9 1 9 5 9 5 9 5 9 7 10 3* 9 5 10 2*   CALCIUM, IONIZED mmol/L  --   --   --   --   --   --  1 08*  --    MAGNESIUM mg/dL 2 0  --  2 1 2 2  --  2 1  --  2 1   PHOSPHORUS mg/dL  --   --   --   --   --   --  4 3*  --      Results from last 7 days   Lab Units 03/17/21  0625   AST U/L 49*   ALT U/L 18   ALK PHOS U/L 132*   TOTAL PROTEIN g/dL 7 2   ALBUMIN g/dL 2 7*   TOTAL BILIRUBIN mg/dL 0 84     Results from last 7 days   Lab Units 03/17/21  2144 03/17/21  1602 03/17/21  0744 03/16/21  1621 03/16/21  1124 03/16/21  0741   POC GLUCOSE mg/dl 130 166* 115 135 140 116     Results from last 7 days   Lab Units 03/22/21  0614 03/21/21  0940 03/21/21  0406 03/20/21  0602 03/19/21  0606 03/18/21  1203 03/17/21  0625 03/16/21  0514   GLUCOSE RANDOM mg/dL 104 163* 96 99 114 62* 112 112         Vital Signs:   Date/Time  Temp  Pulse  Resp  BP  MAP (mmHg)  SpO2  O2 Device    03/22/21 0815  97 °F (36 1 °C)Abnormal   69  18  100/57  --  98 %  None (Room air)    03/22/21 0542  --  --  --  92/63  68  --  --    03/21/21 2300  97 4 °F (36 3 °C)Abnormal   70  18  93/53  68  98 %  None (Room air)    03/21/21 1507  97 °F (36 1 °C)Abnormal   71  18  125/55  --  97 %  --    03/21/21 0916  97 8 °F (36 6 °C)  70  18  103/61  --  98 %  None (Room air)    03/21/21 0400  --  70  --  95/60  65  96 %  None (Room air)    03/20/21 2300  97 9 °F (36 6 °C)  74  18  103/57  --  92 %  None (Room air)    03/20/21 2100  --  --  --  --  --  --  None (Room air)    03/20/21 1915  97 8 °F (36 6 °C)  72  18  101/63  --  --  --    03/20/21 1525  97 °F (36 1 °C)Abnormal   71  18  111/58  --  100 %  None (Room air)    03/20/21 1255  --  72  --  107/60  --  --  --    03/20/21 0737  96 7 °F (35 9 °C)Abnormal   66  20  102/55  --  95 %  --          Medications:   Scheduled Medications:  apixaban, 2 5 mg, Oral, BID  bisoprolol, 2 5 mg, Oral, Daily  bumetanide, 2 mg, Intravenous, BID   finasteride, 5 mg, Oral, Daily  loratadine, 10 mg, Oral, Daily  pantoprazole, 40 mg, Oral, Daily  PO kdur received multiple doses from 3/13 through 3/21    Finished IV ancef on 3/19    AMILoride tablet 2 5 mg   Dose: 2 5 mg  Freq: Daily Route: PO  Start: 03/21/21 1315 End: 03/22/21 0923    Continuous IV Infusions: bumex gtt d/c on 3/16     PRN Meds:  hydrOXYzine HCL, 25 mg, Oral, Q6H PRN x 1 3/19, 3/20, 3/21        Discharge Plan: D    Network Utilization Review Department  ATTENTION: Please call with any questions or concerns to [phone] and carefully listen to the prompts so that you are directed to the right person  All voicemails are confidential   Linda Mejia all requests for admission clinical reviews, approved or denied determinations and any other requests to dedicated fax number below belonging to the campus where the patient is receiving treatment   List of dedicated fax numbers for the Facilities:  [address] DENIALS (Administrative/Medical Necessity) [phone]   [address] (Maternity/NICU/Pediatrics) [phone] [address] Ramila Munoz 4003 (Jethro Cerrato "Nan" 103) [address] [place] [address] Keri Ayala 14[address] [place] [address] [phone]

## 2021-03-22 NOTE — PHYSICAL THERAPY NOTE
Physical Therapy Progress Note     03/22/21 1400   PT Last Visit   PT Visit Date 03/22/21   Note Type   Note Type Treatment   Pain Assessment   Pain Assessment Tool Pain Assessment not indicated - pt denies pain   Pain Score No Pain   Restrictions/Precautions   Weight Bearing Precautions Per Order No   Other Precautions Chair Alarm; Bed Alarm; Fall Risk;Telemetry;Multiple lines   General   Chart Reviewed Yes   Response to Previous Treatment Patient with no complaints from previous session  Family/Caregiver Present No   Subjective   Subjective Willing to participate in therapy this PM    Transfers   Sit to Stand 5  Supervision   Additional items Assist x 1; Armrests; Increased time required;Verbal cues   Stand to Sit 5  Supervision   Additional items Assist x 1; Armrests; Increased time required;Verbal cues   Ambulation/Elevation   Gait pattern Decreased foot clearance; Forward Flexion; Short stride; Excessively slow; Inconsistent chantelle; Shuffling   Gait Assistance 5  Supervision   Additional items Assist x 1;Verbal cues; Tactile cues   Assistive Device Rolling walker   Distance 150' x  2 with a standing resting period in between   Balance   Static Sitting Good   Dynamic Sitting Good   Static Standing Fair   Dynamic Standing Fair   Ambulatory Fair -   Endurance Deficit   Endurance Deficit No   Activity Tolerance   Activity Tolerance Patient tolerated treatment well   Medical Staff [address]    Nurse Made Aware Yes   Exercises   TKR Sitting;10 reps;AAROM; Bilateral   Assessment   Prognosis Good   Problem List Decreased strength;Decreased range of motion;Decreased endurance; Impaired balance;Decreased mobility; Decreased skin integrity   Assessment Pt  seated in bedside chair upon my arrival  Pt  eager to participate in therapy this PM, in hopes of going home soon  Performance of HEP seated in bedside chair with cues provided for proper completion   Progressed with transfers being able to complete with A of therapist with cues for hand placement/technique  Progressed with an amb  trial with use of RW and cues provided for LE sequencing  Pt  able to complete additional amb  trial this treatment session  Pt  returned to room and repositioned seated in bedside chair with alarm active at end of treatment session  PT will continue to recommend d/c home with family support and HHPT provided when medically stable  Barriers to Discharge None   Goals   Patient Goals To go home  STG Expiration Date 03/26/21   PT Treatment Day 4   Plan   Treatment/Interventions Functional transfer training;LE strengthening/ROM; Therapeutic exercise; Endurance training;Gait training;Spoke to nursing;Spoke to case management;OT   Progress Progressing toward goals   PT Frequency Other (Comment)  (3-5x/wk)   Recommendation   PT Discharge Recommendation Home with skilled therapy; Return to previous environment with social support  (HHPT)   Equipment Recommended Other (Comment)  (has DME at home  )   PT - OK to Discharge Yes  (if d/c when medically stable  )   AM-PAC Basic Mobility Inpatient   Turning in Bed Without Bedrails 4   Lying on Back to Sitting on Edge of Flat Bed 4   Moving Bed to Chair 3   Standing Up From Chair 3   Walk in Room 3   Climb 3-5 Stairs 3   Basic Mobility Inpatient Raw Score 20   Basic Mobility Standardized Score 43 99     Kunal Lynch, PTA

## 2021-03-22 NOTE — ASSESSMENT & PLAN NOTE
Continue monitoring, slightly increased again possibly due to amiloride      Recent Labs     03/21/21  0406 03/21/21  0940 03/22/21  0614   BUN 71* 68* 79*   CREATININE 2 10* 2 12* 2 29*   EGFR 26 26 24

## 2021-03-23 NOTE — PROGRESS NOTES
Follow up Consultation    Nephrology   Lex Del Rio 80 y o  male MRN: [id number]  Unit/Bed#: E4 -01 Encounter: [id number]      Physician Requesting Consult: Soha Robles MD        ASSESSMENT/PLAN:      Jacklyn Acute kidney injury (POA) on CKD stage 4:  - SUDHIR most likely secondary to cardiorenal syndrome and subsequent ATN  - After review of records In [facility] as well as Care everywhere it appears that the patient has a baseline Creatinine of 1 6-1 9 mg/dL  - patient was admitted with a creatinine of 2 48 mg/dL on 03/09/2021   - patient's creatinine today is at 2 33 mg/dL, appears leveled off  May need to accept higher baseline creatinine to maintain euvolemia   - diuretics per Cardiology  - check BMP in a m   - Check renal ultrasound to r/o hydronephrosis, obstruction if renal parameters continue to deteriorate  - Await renal recovery  - Optimize hemodynamic status to avoid delay in renal recovery  - Place on a renal diet when allowed diet order    - Avoid nephrotoxins, adjust meds to appropriate GFR   - Strict I/O   - Daily weights  - Urinary retention protocol if patient does not have a Alas  - Most likely has underlying CKD secondary to age-related nephron loss plus cardiorenal syndrome  - will need to set up patient for follow up with Nephrology as an outpatient post hospitalization   - for nephrology as an outpatient patient follows up with no nephrologist     Blood pressure:  - current medications:  Bisoprolol 2 5 mg p o  Q day, Bumex 2 mg IVbid  - recommendations:  Recommend changing over to p o  Diuretics if okay with Cardiology  - Optimize hemodynamics   - Maintain MAP > 65mmHg  - Avoid BP fluctuations       H/H/anemia:  - most recent hemoglobin at 13 9 grams/deciliter  - maintain hemoglobin greater than 8 grams/deciliter     Acid-base electrolytes:    o Hypokalemia:  Due to diuretics  Most recent potassium 4 1  o  Acid-base:    - Most recent bicarb at 30     Volume status:  o  Clinically appears euvolemic to minimally hypervolemic  Recommend changing over to p o  Diuretics if okay with Cardiology     Proteinuria:   o Most recent UA with no protein as of 03/10/2021     Other medical problems:  o Diabetes:  Management per primary team   On insulin  o Acute on chronic CHF:  Management per primary team   Follow-up with cardiology diuretics per Cardiology recommend changing over to p o  Diuretics  Most recent echo with EF of 25%  o BPH:  On Proscar      Thanks for the consult  Will continue to follow  Please call with questions/ concerns  Above-mentioned orders and Plan in terms of acute kidney injury was discussed with the team in [address] Dinh [facility]23/[facility]21, 10:56 AM              Objective :     Patient seen and examined in his room, no overnight events systolic blood pressures in the  urine output close to 2 L last 24 hours remains afebrile hopeful to go home in the next 24 hours  PHYSICAL EXAM  /55 (BP Location: Right arm)   Pulse 71   Temp (!) 97 4 °F (36 3 °C) (Temporal)   Resp 18   Ht 5' 9" (1 753 m)   Wt 72 7 kg (160 lb 4 4 oz)   SpO2 96%   BMI 23 67 kg/m²   Temp (24hrs), Av 2 °F (36 2 °C), Min:97 °F (36 1 °C), Max:97 4 °F (36 3 °C)        Intake/Output Summary (Last 24 hours) at 3/23/2021 1056  Last data filed at 3/22/2021 2200  Gross per 24 hour   Intake 600 ml   Output 1550 ml   Net -950 ml       I/O last 24 hours: In: 960 [P O :960]  Out: 1950 [Urine:1950]      Current Weight: Weight - Scale: 72 7 kg (160 lb 4 4 oz)  First Weight: Weight - Scale: 82 6 kg (182 lb 1 6 oz)  Physical Exam  Vitals signs and nursing note reviewed  Constitutional:       General: He is not in acute distress  Appearance: Normal appearance  He is normal weight  He is not ill-appearing, toxic-appearing or diaphoretic  HENT:      Head: Normocephalic and atraumatic  Mouth/Throat:      Mouth: Mucous membranes are moist       Pharynx: Oropharynx is clear   No oropharyngeal exudate  Eyes:      General: No scleral icterus  Conjunctiva/sclera: Conjunctivae normal    Neck:      Musculoskeletal: Normal range of motion and neck supple  Cardiovascular:      Rate and Rhythm: Normal rate  Heart sounds: Normal heart sounds  No friction rub  Pulmonary:      Effort: Pulmonary effort is normal  No respiratory distress  Breath sounds: Normal breath sounds  No stridor  No wheezing  Abdominal:      General: There is no distension  Palpations: Abdomen is soft  There is no mass  Tenderness: There is no abdominal tenderness  Musculoskeletal:         General: Swelling present  Comments: Trace edema bilateral lower extremities is left lower extremity dressing   Skin:     General: Skin is warm  Coloration: Skin is not jaundiced  Neurological:      General: No focal deficit present  Mental Status: He is alert and oriented to person, place, and time  Psychiatric:         Mood and Affect: Mood normal          Behavior: Behavior normal              Review of Systems   Constitutional: Negative for appetite change, chills and fatigue  HENT: Negative for congestion  Respiratory: Negative for cough, shortness of breath and wheezing  Cardiovascular: Positive for leg swelling  Gastrointestinal: Negative for abdominal pain, constipation, diarrhea, nausea and vomiting  Genitourinary: Negative for hematuria  Musculoskeletal: Negative for back pain  Skin: Positive for wound  Neurological: Negative for headaches  Psychiatric/Behavioral: Negative for agitation and confusion  All other systems reviewed and are negative        Scheduled Meds:  Current Facility-Administered Medications   Medication Dose Route Frequency Provider Last Rate    apixaban  2 5 mg Oral BID Genoveva Hernandez MD      bisoprolol  2 5 mg Oral Daily Fred Alatorre MD      bumetanide  2 mg Intravenous BID Fred Alatorre MD      finasteride  5 mg Oral Daily Genoveva Hernandez, MD      hydrOXYzine HCL  25 mg Oral Q6H PRN Shellie Juan Kirby,       loratadine  10 mg Oral Daily Viral Kirby,       pantoprazole  40 mg Oral Daily Noemi Szymanski MD         PRN Meds: hydrOXYzine HCL    Continuous Infusions:       Invasive Devices: Invasive Devices     Peripheral Intravenous Line            Peripheral IV 03/22/21 Left;Ventral (anterior) Forearm 1 day                  LABORATORY:    Results from last 7 days   Lab Units 03/23/21  0519 03/22/21  0614 03/21/21  0940 03/21/21  0406 03/20/21  0602 03/19/21  0606 03/18/21  1203 03/17/21  0625   WBC Thousand/uL  --   --   --   --   --  6 67  --  6 89   HEMOGLOBIN g/dL  --   --   --   --   --  13 9  --  13 5   HEMATOCRIT %  --   --   --   --   --  43 4  --  41 4   PLATELETS Thousands/uL  --   --   --   --   --  214  --  232   POTASSIUM mmol/L 4 1 4 0 3 4* 3 4* 3 9 4 0 3 0* 3 8   CHLORIDE mmol/L 99* 100 98* 100 98* 96* 95* 98*   CO2 mmol/L 30 32 31 33* 32 33* 31 34*   BUN mg/dL 94* 79* 68* 71* 74* 76* 83* 78*   CREATININE mg/dL 2 33* 2 29* 2 12* 2 10* 2 04* 2 27* 2 36* 2 37*   CALCIUM mg/dL 9 3 9 1 9 5 9 5 9 5 9 7 10 3* 9 5   MAGNESIUM mg/dL 2 1 2 0  --  2 1 2 2  --  2 1  --    PHOSPHORUS mg/dL  --   --   --   --   --   --   --  4 3*      rest all reviewed    RADIOLOGY:  XR chest 1 view portable   Final Result by Justin Pillai DO (03/09 1842)      No acute cardiopulmonary disease  Workstation performed: [id number]           Rest all reviewed    Portions of the record may have been created with voice recognition software  Occasional wrong word or "sound a like" substitutions may have occurred due to the inherent limitations of voice recognition software  Read the chart carefully and recognize, using context, where substitutions have occurred  If you have any questions, please contact the dictating provider

## 2021-03-23 NOTE — PROGRESS NOTES
[facility] [facility]  Progress Note - Juanclovisxin Apple 6/16/1927, 80 y o  male MRN: [id number]  Unit/Bed#: E4 -01 Encounter: [id number]  Primary Care Provider: VARSHA Fisher MD   Date and time admitted to hospital: 3/9/2021 12:52 PM    * Cellulitis of left leg  Assessment & Plan  80year old male admitted due to cellulitis of his left leg   - Completed antibiotic therapy    Acute on chronic systolic CHF (congestive heart failure) ([facility])  Assessment & Plan  Wt Readings from Last 3 Encounters:   03/23/21 72 7 kg (160 lb 4 4 oz)   03/09/21 81 7 kg (180 lb 3 2 oz)   02/26/21 83 5 kg (184 lb)     EF 25%  - Continue IV bumex  Transition to po in 24-4 hours as per cardiology and renal discussion  A-fib [facility])  Assessment & Plan  S/p PPM   - Continue bisoprolol  - Continue Eliquis    Acute kidney injury superimposed on chronic kidney disease ([facility] [address] )  Assessment & Plan  Renal suspects that he may have started to level off and that he might have to maintain a higher level of creatinine to maintain euvolemia  Recent Labs     03/21/21  0940 03/22/21  0614 03/23/21  0519   BUN 68* 79* 94*   CREATININE 2 12* 2 29* 2 33*   EGFR 26 24 23               BPH (benign prostatic hyperplasia)  Assessment & Plan  Continue finasteride      VTE Pharmacologic Prophylaxis:   Pharmacologic: Apixaban (Eliquis)  Mechanical VTE Prophylaxis in Place: Yes    Patient Centered Rounds: I have performed bedside rounds with nursing staff today  Discussions with Specialists or Other Care Team Provider: Nursing    Education and Discussions with Family / Patient: patient    Time Spent for Care: 30 minutes  More than 50% of total time spent on counseling and coordination of care as described above      Current Length of Stay: 14 day(s)    Current Patient Status: Inpatient   Certification Statement: The patient will continue to require additional inpatient hospital stay due to iv bumetanide    Discharge Plan: 24-48 hours    Code Status: Level 3 - DNAR and DNI      Subjective:   Patient seen and examined at bedside  I explained to him that we are nearing discharge  He is aware that we are transitioning him to po in 24-48 hours  Objective:     Vitals:   Temp (24hrs), Av 3 °F (36 3 °C), Min:97 °F (36 1 °C), Max:97 4 °F (36 3 °C)    Temp:  [97 °F (36 1 °C)-97 4 °F (36 3 °C)] 97 3 °F (36 3 °C)  HR:  [66-75] 73  Resp:  [18] 18  BP: ()/(53-74) 100/63  SpO2:  [95 %-99 %] 95 %  Body mass index is 23 67 kg/m²  Input and Output Summary (last 24 hours): Intake/Output Summary (Last 24 hours) at 3/23/2021 1843  Last data filed at 3/23/2021 1536  Gross per 24 hour   Intake --   Output 1175 ml   Net -1175 ml       Physical Exam:     Physical Exam  Vitals signs reviewed  HENT:      Head: Normocephalic  Nose: Nose normal       Mouth/Throat:      Mouth: Mucous membranes are moist    Eyes:      General: No scleral icterus  Cardiovascular:      Rate and Rhythm: Normal rate  Pulmonary:      Effort: Pulmonary effort is normal  No respiratory distress  Breath sounds: No wheezing or rales  Abdominal:      General: There is no distension  Palpations: Abdomen is soft  Tenderness: There is no abdominal tenderness  Musculoskeletal:      Right lower leg: Edema present  Left lower leg: Edema present  Skin:     General: Skin is warm  Neurological:      Mental Status: He is alert  Mental status is at baseline     Psychiatric:         Mood and Affect: Mood normal          Behavior: Behavior normal        Additional Data:     Labs:    Results from last 7 days   Lab Units 21  0606 21  0625   WBC Thousand/uL 6 67 6 89   HEMOGLOBIN g/dL 13 9 13 5   HEMATOCRIT % 43 4 41 4   PLATELETS Thousands/uL 214 232   NEUTROS PCT %  --  58   LYMPHS PCT %  --  21   MONOS PCT %  --  13*   EOS PCT %  --  7*     Results from last 7 days   Lab Units 21  0519  21  0625   SODIUM mmol/L 139   < > 140 POTASSIUM mmol/L 4 1   < > 3 8   CHLORIDE mmol/L 99*   < > 98*   CO2 mmol/L 30   < > 34*   BUN mg/dL 94*   < > 78*   CREATININE mg/dL 2 33*   < > 2 37*   ANION GAP mmol/L 10   < > 8   CALCIUM mg/dL 9 3   < > 9 5   ALBUMIN g/dL  --   --  2 7*   TOTAL BILIRUBIN mg/dL  --   --  0 84   ALK PHOS U/L  --   --  132*   ALT U/L  --   --  18   AST U/L  --   --  49*   GLUCOSE RANDOM mg/dL 104   < > 112    < > = values in this interval not displayed  Results from last 7 days   Lab Units 03/17/21  2144 03/17/21  1602 03/17/21  0744   POC GLUCOSE mg/dl 130 166* 115                   * I Have Reviewed All Lab Data Listed Above  * Additional Pertinent Lab Tests Reviewed: Juan C 66 Admission Reviewed    Imaging:    Imaging Reports Reviewed Today Include: No new imaging    Recent Cultures (last 7 days):           Last 24 Hours Medication List:   Current Facility-Administered Medications   Medication Dose Route Frequency Provider Last Rate    apixaban  2 5 mg Oral BID Kalpesh Russo MD      bisoprolol  2 5 mg Oral Daily Yobani Robins MD      bumetanide  2 mg Intravenous BID Yobani Robins MD      finasteride  5 mg Oral Daily Kalpesh Russo MD      hydrOXYzine HCL  25 mg Oral Q6H PRN Manpreet Kirby, DO      loratadine  10 mg Oral Daily Viral Kirby, DO      pantoprazole  40 mg Oral Daily Kalpesh Russo MD          Today, Patient Was Seen By: Twila Long MD    ** Please Note: Dictation voice to text software may have been used in the creation of this document   **

## 2021-03-23 NOTE — PROGRESS NOTES
Cardiology   MD Rita Alvarado MD Ather Mansoor, MD Georgie Bair, DO, Richie Edwards DO, [place]ico Moncada DO, [facility] - [place]  -------------------------------------------------------------------  [facility]  [address] [place] [id number] [phone]  [phone]        Progress Note - Cardiology   Wilma Claros 80 y o  male MRN: [id number]  Unit/Bed#: E4 -01 Encounter: [id number]        Assessment/Recommendations/Discussion:   1  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 congestive heart failure  2  Nonischemic cardiomyopathy, ejection fraction 25%  3  Non MI related troponin elevation secondary to CHF  4  Permanent atrial fibrillation, prior AV node ablation and [place] CRT pacemaker in place  5  Acute kidney injury on chronic kidney disease    · Will transition to torsemide over the next 24-48 hours  Weight significantly below baseline, now at 160 lb  Appreciate Nephrology support  · Continue bisoprolol, Eliquis    V-paced on telemetry        Subjective:  Patient seen and examined, feels well, denies shortness of breath          Physical Exam:  GEN:  NAD  HEENT:  MMM, NCAT, pink conjunctiva, EOMI, nonicteric sclera  CV:  NO JVD/HJR, RR, NO M/R/G, +S1/S2, NO PARASTERNAL HEAVE/THRILL, 2+LE EDEMA, NO HEPATIC SYSTOLIC PULSATION, WARM EXTREMITIES  RESP:  CTAB/L  ABD:  SOFT, NT, NO GROSS ORGANOMEGALY        Vitals:   /55 (BP Location: Right arm)   Pulse 71   Temp (!) 97 4 °F (36 3 °C) (Temporal)   Resp 18   Ht 5' 9" (1 753 m)   Wt 72 7 kg (160 lb 4 4 oz)   SpO2 96%   BMI 23 67 kg/m²   Vitals:    03/22/21 0542 03/23/21 0555   Weight: 72 7 kg (160 lb 4 4 oz) 72 7 kg (160 lb 4 4 oz)       Intake/Output Summary (Last 24 hours) at 3/23/2021 1039  Last data filed at 3/22/2021 2200  Gross per 24 hour   Intake 600 ml   Output 1550 ml   Net -950 ml       TELEMETRY:  Ventricular paced rhythm  Lab Results:  Results from last 7 days   Lab Units 03/19/21  0606   WBC Thousand/uL 6 67   HEMOGLOBIN g/dL 13 9   HEMATOCRIT % 43 4   PLATELETS Thousands/uL 214     Results from last 7 days   Lab Units 03/23/21  0519  03/17/21  0625   POTASSIUM mmol/L 4 1   < > 3 8   CHLORIDE mmol/L 99*   < > 98*   CO2 mmol/L 30   < > 34*   BUN mg/dL 94*   < > 78*   CREATININE mg/dL 2 33*   < > 2 37*   CALCIUM mg/dL 9 3   < > 9 5   ALK PHOS U/L  --   --  132*   ALT U/L  --   --  18   AST U/L  --   --  49*    < > = values in this interval not displayed  Results from last 7 days   Lab Units 03/23/21  0519   POTASSIUM mmol/L 4 1   CHLORIDE mmol/L 99*   CO2 mmol/L 30   BUN mg/dL 94*   CREATININE mg/dL 2 33*   CALCIUM mg/dL 9 3           Medications:    Current Facility-Administered Medications:     apixaban (ELIQUIS) tablet 2 5 mg, 2 5 mg, Oral, BID, Fide Perea MD, 2 5 mg at 03/23/21 1028    bisoprolol (ZEBETA) tablet 2 5 mg, 2 5 mg, Oral, Daily, Yanira Hooper MD, 2 5 mg at 03/23/21 1028    bumetanide (BUMEX) injection 2 mg, 2 mg, Intravenous, BID, Yanira Hooper MD, 2 mg at 03/23/21 1029    finasteride (PROSCAR) tablet 5 mg, 5 mg, Oral, Daily, Fide Perea MD, 5 mg at 03/23/21 1027    hydrOXYzine HCL (ATARAX) tablet 25 mg, 25 mg, Oral, Q6H PRN, Zia Kirby DO, 25 mg at 03/21/21 2126    loratadine (CLARITIN) tablet 10 mg, 10 mg, Oral, Daily, Viral Kirby DO, 10 mg at 03/23/21 1028    pantoprazole (PROTONIX) EC tablet 40 mg, 40 mg, Oral, Daily, Fide Perea MD, 40 mg at 03/23/21 0555    This note was completed in part utilizing [employer] Direct Software  Grammatical errors, random word insertions, spelling mistakes, and incomplete sentences may be an occasional consequence of this system secondary to software limitations, ambient noise, and hardware issues  If you have any questions or concerns about the content, text, or information contained within the body of this dictation, please contact the provider for clarification

## 2021-03-23 NOTE — ASSESSMENT & PLAN NOTE
Wt Readings from Last 3 Encounters:   03/23/21 72 7 kg (160 lb 4 4 oz)   03/09/21 81 7 kg (180 lb 3 2 oz)   02/26/21 83 5 kg (184 lb)     EF 25%  - Continue IV bumex  Transition to po in 24-4 hours as per cardiology and renal discussion

## 2021-03-23 NOTE — ASSESSMENT & PLAN NOTE
Renal suspects that he may have started to level off and that he might have to maintain a higher level of creatinine to maintain euvolemia      Recent Labs     03/21/21  0940 03/22/21  0614 03/23/21  0519   BUN 68* 79* 94*   CREATININE 2 12* 2 29* 2 33*   EGFR 26 24 23

## 2021-03-23 NOTE — PLAN OF CARE
Problem: Potential for Falls  Goal: Patient will remain free of falls  Description: INTERVENTIONS:  - Assess patient frequently for physical needs  -  Identify cognitive and physical deficits and behaviors that affect risk of falls    -  [place] fall precautions as indicated by assessment   - Educate patient/family on patient safety including physical limitations  - Instruct patient to call for assistance with activity based on assessment  - Modify environment to reduce risk of injury  - Consider OT/PT consult to assist with strengthening/mobility  Outcome: Progressing     Problem: PAIN - ADULT  Goal: Verbalizes/displays adequate comfort level or baseline comfort level  Description: Interventions:  - Encourage patient to monitor pain and request assistance  - Assess pain using appropriate pain scale  - Administer analgesics based on type and severity of pain and evaluate response  - Implement non-pharmacological measures as appropriate and evaluate response  - Consider cultural and social influences on pain and pain management  - Notify physician/advanced practitioner if interventions unsuccessful or patient reports new pain  Outcome: Progressing     Problem: INFECTION - ADULT  Goal: Absence or prevention of progression during hospitalization  Description: INTERVENTIONS:  - Assess and monitor for signs and symptoms of infection  - Monitor lab/diagnostic results  - Monitor all insertion sites, i e  indwelling lines, tubes, and drains  - Monitor endotracheal if appropriate and nasal secretions for changes in amount and color  - [place] appropriate cooling/warming therapies per order  - Administer medications as ordered  - Instruct and encourage patient and family to use good hand hygiene technique  - Identify and instruct in appropriate isolation precautions for identified infection/condition  Outcome: Progressing     Problem: SAFETY ADULT  Goal: Maintain or return to baseline ADL function  Description: INTERVENTIONS:  -  Assess patient's ability to carry out ADLs; assess patient's baseline for ADL function and identify physical deficits which impact ability to perform ADLs (bathing, care of mouth/teeth, toileting, grooming, dressing, etc )  - Assess/evaluate cause of self-care deficits   - Assess range of motion  - Assess patient's mobility; develop plan if impaired  - Assess patient's need for assistive devices and provide as appropriate  - Encourage maximum independence but intervene and supervise when necessary  - Involve family in performance of ADLs  - Assess for home care needs following discharge   - Consider OT consult to assist with ADL evaluation and planning for discharge  - Provide patient education as appropriate  Outcome: Progressing  Goal: Maintain or return mobility status to optimal level  Description: INTERVENTIONS:  - Assess patient's baseline mobility status (ambulation, transfers, stairs, etc )    - Identify cognitive and physical deficits and behaviors that affect mobility  - Identify mobility aids required to assist with transfers and/or ambulation (gait belt, sit-to-stand, lift, walker, cane, etc )  - [place] fall precautions as indicated by assessment  - Record patient progress and toleration of activity level on Mobility SBAR; progress patient to next Phase/Stage  - Instruct patient to call for assistance with activity based on assessment  - Consider rehabilitation consult to assist with strengthening/weightbearing, etc   Outcome: Progressing     Problem: DISCHARGE PLANNING  Goal: Discharge to home or other facility with appropriate resources  Description: INTERVENTIONS:  - Identify barriers to discharge w/patient and caregiver  - Arrange for needed discharge resources and transportation as appropriate  - Identify discharge learning needs (meds, wound care, etc )  - Arrange for interpretive services to assist at discharge as needed  - Refer to Case Management Department for coordinating discharge planning if the patient needs post-hospital services based on physician/advanced practitioner order or complex needs related to functional status, cognitive ability, or social support system  Outcome: Progressing     Problem: Knowledge Deficit  Goal: Patient/family/caregiver demonstrates understanding of disease process, treatment plan, medications, and discharge instructions  Description: Complete learning assessment and assess knowledge base    Interventions:  - Provide teaching at level of understanding  - Provide teaching via preferred learning methods  Outcome: Progressing     Problem: SKIN/TISSUE INTEGRITY - ADULT  Goal: Skin integrity remains intact  Description: INTERVENTIONS  - Identify patients at risk for skin breakdown  - Assess and monitor skin integrity  - Assess and monitor nutrition and hydration status  - Monitor labs (i e  albumin)  - Assess for incontinence   - Turn and reposition patient  - Assist with mobility/ambulation  - Relieve pressure over bony prominences  - Avoid friction and shearing  - Provide appropriate hygiene as needed including keeping skin clean and dry  - Evaluate need for skin moisturizer/barrier cream  - Collaborate with interdisciplinary team (i e  Nutrition, Rehabilitation, etc )   - Patient/family teaching  Outcome: Progressing  Goal: Incision(s), wounds(s) or drain site(s) healing without S/S of infection  Description: INTERVENTIONS  - Assess and document risk factors for skin impairment   - Assess and document dressing, incision, wound bed, drain sites and surrounding tissue  - Consider nutrition services referral as needed  - Oral mucous membranes remain intact  - Provide patient/ family education  Outcome: Progressing  Goal: Oral mucous membranes remain intact  Description: INTERVENTIONS  - Assess oral mucosa and hygiene practices  - Implement preventative oral hygiene regimen  - Implement oral medicated treatments as ordered  - Initiate Nutrition services referral as needed  Outcome: Progressing     Problem: CARDIOVASCULAR - ADULT  Goal: Maintains optimal cardiac output and hemodynamic stability  Description: INTERVENTIONS:  - Monitor I/O, vital signs and rhythm  - Monitor for S/S and trends of decreased cardiac output  - Administer and titrate ordered vasoactive medications to optimize hemodynamic stability  - Assess quality of pulses, skin color and temperature  - Assess for signs of decreased coronary artery perfusion  - Instruct patient to report change in severity of symptoms  Outcome: Progressing  Goal: Absence of cardiac dysrhythmias or at baseline rhythm  Description: INTERVENTIONS:  - Continuous cardiac monitoring, vital signs, obtain 12 lead EKG if ordered  - Administer antiarrhythmic and heart rate control medications as ordered  - Monitor electrolytes and administer replacement therapy as ordered  Outcome: Progressing     Problem: METABOLIC, FLUID AND ELECTROLYTES - ADULT  Goal: Electrolytes maintained within normal limits  Description: INTERVENTIONS:  - Monitor labs and assess patient for signs and symptoms of electrolyte imbalances  - Administer electrolyte replacement as ordered  - Monitor response to electrolyte replacements, including repeat lab results as appropriate  - Instruct patient on fluid and nutrition as appropriate  Outcome: Progressing  Goal: Fluid balance maintained  Description: INTERVENTIONS:  - Monitor labs   - Monitor I/O and WT  - Instruct patient on fluid and nutrition as appropriate  - Assess for signs & symptoms of volume excess or deficit  Outcome: Progressing

## 2021-03-24 NOTE — PLAN OF CARE
Problem: Potential for Falls  Goal: Patient will remain free of falls  Description: INTERVENTIONS:  - Assess patient frequently for physical needs  -  Identify cognitive and physical deficits and behaviors that affect risk of falls    -  [place] fall precautions as indicated by assessment   - Educate patient/family on patient safety including physical limitations  - Instruct patient to call for assistance with activity based on assessment  - Modify environment to reduce risk of injury  - Consider OT/PT consult to assist with strengthening/mobility  3/23/2021 2132 by Dede Aggarwal RN  Outcome: Progressing  3/23/2021 2131 by Dede Aggarwal RN  Outcome: Progressing     Problem: PAIN - ADULT  Goal: Verbalizes/displays adequate comfort level or baseline comfort level  Description: Interventions:  - Encourage patient to monitor pain and request assistance  - Assess pain using appropriate pain scale  - Administer analgesics based on type and severity of pain and evaluate response  - Implement non-pharmacological measures as appropriate and evaluate response  - Consider cultural and social influences on pain and pain management  - Notify physician/advanced practitioner if interventions unsuccessful or patient reports new pain  3/23/2021 2132 by Dede Aggarwal RN  Outcome: Progressing  3/23/2021 2131 by Dede Aggarwal RN  Outcome: Progressing     Problem: INFECTION - ADULT  Goal: Absence or prevention of progression during hospitalization  Description: INTERVENTIONS:  - Assess and monitor for signs and symptoms of infection  - Monitor lab/diagnostic results  - Monitor all insertion sites, i e  indwelling lines, tubes, and drains  - Monitor endotracheal if appropriate and nasal secretions for changes in amount and color  - [place] appropriate cooling/warming therapies per order  - Administer medications as ordered  - Instruct and encourage patient and family to use good hand hygiene technique  - Identify and instruct in appropriate isolation precautions for identified infection/condition  3/23/2021 2132 by Gisella Simmons RN  Outcome: Progressing  3/23/2021 2131 by Gisella Simmons RN  Outcome: Progressing     Problem: SAFETY ADULT  Goal: Maintain or return to baseline ADL function  Description: INTERVENTIONS:  -  Assess patient's ability to carry out ADLs; assess patient's baseline for ADL function and identify physical deficits which impact ability to perform ADLs (bathing, care of mouth/teeth, toileting, grooming, dressing, etc )  - Assess/evaluate cause of self-care deficits   - Assess range of motion  - Assess patient's mobility; develop plan if impaired  - Assess patient's need for assistive devices and provide as appropriate  - Encourage maximum independence but intervene and supervise when necessary  - Involve family in performance of ADLs  - Assess for home care needs following discharge   - Consider OT consult to assist with ADL evaluation and planning for discharge  - Provide patient education as appropriate  3/23/2021 2132 by Gisella Simmons RN  Outcome: Progressing  3/23/2021 2131 by Gisella Simmons RN  Outcome: Progressing  Goal: Maintain or return mobility status to optimal level  Description: INTERVENTIONS:  - Assess patient's baseline mobility status (ambulation, transfers, stairs, etc )    - Identify cognitive and physical deficits and behaviors that affect mobility  - Identify mobility aids required to assist with transfers and/or ambulation (gait belt, sit-to-stand, lift, walker, cane, etc )  - [place] fall precautions as indicated by assessment  - Record patient progress and toleration of activity level on Mobility SBAR; progress patient to next Phase/Stage  - Instruct patient to call for assistance with activity based on assessment  - Consider rehabilitation consult to assist with strengthening/weightbearing, etc   3/23/2021 2132 by Gisella Simmons RN  Outcome: Progressing  3/23/2021 2131 by Gisella Simmons RN  Outcome: Progressing     Problem: DISCHARGE PLANNING  Goal: Discharge to home or other facility with appropriate resources  Description: INTERVENTIONS:  - Identify barriers to discharge w/patient and caregiver  - Arrange for needed discharge resources and transportation as appropriate  - Identify discharge learning needs (meds, wound care, etc )  - Arrange for interpretive services to assist at discharge as needed  - Refer to Case Management Department for coordinating discharge planning if the patient needs post-hospital services based on physician/advanced practitioner order or complex needs related to functional status, cognitive ability, or social support system  3/23/2021 2132 by Dede Aggarwal RN  Outcome: Progressing  3/23/2021 2131 by Dede Aggarwal RN  Outcome: Progressing     Problem: Knowledge Deficit  Goal: Patient/family/caregiver demonstrates understanding of disease process, treatment plan, medications, and discharge instructions  Description: Complete learning assessment and assess knowledge base    Interventions:  - Provide teaching at level of understanding  - Provide teaching via preferred learning methods  3/23/2021 2132 by Dede Aggarwal RN  Outcome: Progressing  3/23/2021 2131 by Dede Aggarwal RN  Outcome: Progressing     Problem: SKIN/TISSUE INTEGRITY - ADULT  Goal: Skin integrity remains intact  Description: INTERVENTIONS  - Identify patients at risk for skin breakdown  - Assess and monitor skin integrity  - Assess and monitor nutrition and hydration status  - Monitor labs (i e  albumin)  - Assess for incontinence   - Turn and reposition patient  - Assist with mobility/ambulation  - Relieve pressure over bony prominences  - Avoid friction and shearing  - Provide appropriate hygiene as needed including keeping skin clean and dry  - Evaluate need for skin moisturizer/barrier cream  - Collaborate with interdisciplinary team (i e  Nutrition, Rehabilitation, etc )   - Patient/family teaching  3/23/2021 2132 by Tyler James RN  Outcome: Progressing  3/23/2021 2131 by Tyler James RN  Outcome: Progressing  Goal: Incision(s), wounds(s) or drain site(s) healing without S/S of infection  Description: INTERVENTIONS  - Assess and document risk factors for skin impairment   - Assess and document dressing, incision, wound bed, drain sites and surrounding tissue  - Consider nutrition services referral as needed  - Oral mucous membranes remain intact  - Provide patient/ family education  3/23/2021 2132 by Tyler James RN  Outcome: Progressing  3/23/2021 2131 by Tyler James RN  Outcome: Progressing  Goal: Oral mucous membranes remain intact  Description: INTERVENTIONS  - Assess oral mucosa and hygiene practices  - Implement preventative oral hygiene regimen  - Implement oral medicated treatments as ordered  - Initiate Nutrition services referral as needed  3/23/2021 2132 by Tyler James RN  Outcome: Progressing  3/23/2021 2131 by Tyler James RN  Outcome: Progressing     Problem: CARDIOVASCULAR - ADULT  Goal: Maintains optimal cardiac output and hemodynamic stability  Description: INTERVENTIONS:  - Monitor I/O, vital signs and rhythm  - Monitor for S/S and trends of decreased cardiac output  - Administer and titrate ordered vasoactive medications to optimize hemodynamic stability  - Assess quality of pulses, skin color and temperature  - Assess for signs of decreased coronary artery perfusion  - Instruct patient to report change in severity of symptoms  3/23/2021 2132 by Tyler James RN  Outcome: Progressing  3/23/2021 2131 by Tyler James RN  Outcome: Progressing  Goal: Absence of cardiac dysrhythmias or at baseline rhythm  Description: INTERVENTIONS:  - Continuous cardiac monitoring, vital signs, obtain 12 lead EKG if ordered  - Administer antiarrhythmic and heart rate control medications as ordered  - Monitor electrolytes and administer replacement therapy as ordered  3/23/2021 2132 by Tyler James RN  Outcome: Progressing  3/23/2021 2131 by Mahesh Laguerre RN  Outcome: Progressing     Problem: METABOLIC, FLUID AND ELECTROLYTES - ADULT  Goal: Electrolytes maintained within normal limits  Description: INTERVENTIONS:  - Monitor labs and assess patient for signs and symptoms of electrolyte imbalances  - Administer electrolyte replacement as ordered  - Monitor response to electrolyte replacements, including repeat lab results as appropriate  - Instruct patient on fluid and nutrition as appropriate  3/23/2021 2132 by Mahesh Laguerre RN  Outcome: Progressing  3/23/2021 2131 by Mahesh Laguerre RN  Outcome: Progressing  Goal: Fluid balance maintained  Description: INTERVENTIONS:  - Monitor labs   - Monitor I/O and WT  - Instruct patient on fluid and nutrition as appropriate  - Assess for signs & symptoms of volume excess or deficit  3/23/2021 2132 by Mahesh Laguerre RN  Outcome: Progressing  3/23/2021 2131 by Mahesh Laguerre RN  Outcome: Progressing

## 2021-03-24 NOTE — PLAN OF CARE
Problem: Potential for Falls  Goal: Patient will remain free of falls  Description: INTERVENTIONS:  - Assess patient frequently for physical needs  -  Identify cognitive and physical deficits and behaviors that affect risk of falls    -  [place] fall precautions as indicated by assessment   - Educate patient/family on patient safety including physical limitations  - Instruct patient to call for assistance with activity based on assessment  - Modify environment to reduce risk of injury  - Consider OT/PT consult to assist with strengthening/mobility  Outcome: Progressing     Problem: PAIN - ADULT  Goal: Verbalizes/displays adequate comfort level or baseline comfort level  Description: Interventions:  - Encourage patient to monitor pain and request assistance  - Assess pain using appropriate pain scale  - Administer analgesics based on type and severity of pain and evaluate response  - Implement non-pharmacological measures as appropriate and evaluate response  - Consider cultural and social influences on pain and pain management  - Notify physician/advanced practitioner if interventions unsuccessful or patient reports new pain  Outcome: Progressing     Problem: INFECTION - ADULT  Goal: Absence or prevention of progression during hospitalization  Description: INTERVENTIONS:  - Assess and monitor for signs and symptoms of infection  - Monitor lab/diagnostic results  - Monitor all insertion sites, i e  indwelling lines, tubes, and drains  - Monitor endotracheal if appropriate and nasal secretions for changes in amount and color  - [place] appropriate cooling/warming therapies per order  - Administer medications as ordered  - Instruct and encourage patient and family to use good hand hygiene technique  - Identify and instruct in appropriate isolation precautions for identified infection/condition  Outcome: Progressing     Problem: SAFETY ADULT  Goal: Maintain or return to baseline ADL function  Description: INTERVENTIONS:  -  Assess patient's ability to carry out ADLs; assess patient's baseline for ADL function and identify physical deficits which impact ability to perform ADLs (bathing, care of mouth/teeth, toileting, grooming, dressing, etc )  - Assess/evaluate cause of self-care deficits   - Assess range of motion  - Assess patient's mobility; develop plan if impaired  - Assess patient's need for assistive devices and provide as appropriate  - Encourage maximum independence but intervene and supervise when necessary  - Involve family in performance of ADLs  - Assess for home care needs following discharge   - Consider OT consult to assist with ADL evaluation and planning for discharge  - Provide patient education as appropriate  Outcome: Progressing  Goal: Maintain or return mobility status to optimal level  Description: INTERVENTIONS:  - Assess patient's baseline mobility status (ambulation, transfers, stairs, etc )    - Identify cognitive and physical deficits and behaviors that affect mobility  - Identify mobility aids required to assist with transfers and/or ambulation (gait belt, sit-to-stand, lift, walker, cane, etc )  - [place] fall precautions as indicated by assessment  - Record patient progress and toleration of activity level on Mobility SBAR; progress patient to next Phase/Stage  - Instruct patient to call for assistance with activity based on assessment  - Consider rehabilitation consult to assist with strengthening/weightbearing, etc   Outcome: Progressing     Problem: DISCHARGE PLANNING  Goal: Discharge to home or other facility with appropriate resources  Description: INTERVENTIONS:  - Identify barriers to discharge w/patient and caregiver  - Arrange for needed discharge resources and transportation as appropriate  - Identify discharge learning needs (meds, wound care, etc )  - Arrange for interpretive services to assist at discharge as needed  - Refer to Case Management Department for coordinating discharge planning if the patient needs post-hospital services based on physician/advanced practitioner order or complex needs related to functional status, cognitive ability, or social support system  Outcome: Progressing     Problem: Knowledge Deficit  Goal: Patient/family/caregiver demonstrates understanding of disease process, treatment plan, medications, and discharge instructions  Description: Complete learning assessment and assess knowledge base    Interventions:  - Provide teaching at level of understanding  - Provide teaching via preferred learning methods  Outcome: Progressing     Problem: SKIN/TISSUE INTEGRITY - ADULT  Goal: Skin integrity remains intact  Description: INTERVENTIONS  - Identify patients at risk for skin breakdown  - Assess and monitor skin integrity  - Assess and monitor nutrition and hydration status  - Monitor labs (i e  albumin)  - Assess for incontinence   - Turn and reposition patient  - Assist with mobility/ambulation  - Relieve pressure over bony prominences  - Avoid friction and shearing  - Provide appropriate hygiene as needed including keeping skin clean and dry  - Evaluate need for skin moisturizer/barrier cream  - Collaborate with interdisciplinary team (i e  Nutrition, Rehabilitation, etc )   - Patient/family teaching  Outcome: Progressing  Goal: Incision(s), wounds(s) or drain site(s) healing without S/S of infection  Description: INTERVENTIONS  - Assess and document risk factors for skin impairment   - Assess and document dressing, incision, wound bed, drain sites and surrounding tissue  - Consider nutrition services referral as needed  - Oral mucous membranes remain intact  - Provide patient/ family education  Outcome: Progressing  Goal: Oral mucous membranes remain intact  Description: INTERVENTIONS  - Assess oral mucosa and hygiene practices  - Implement preventative oral hygiene regimen  - Implement oral medicated treatments as ordered  - Initiate Nutrition services referral as needed  Outcome: Progressing     Problem: CARDIOVASCULAR - ADULT  Goal: Maintains optimal cardiac output and hemodynamic stability  Description: INTERVENTIONS:  - Monitor I/O, vital signs and rhythm  - Monitor for S/S and trends of decreased cardiac output  - Administer and titrate ordered vasoactive medications to optimize hemodynamic stability  - Assess quality of pulses, skin color and temperature  - Assess for signs of decreased coronary artery perfusion  - Instruct patient to report change in severity of symptoms  Outcome: Progressing  Goal: Absence of cardiac dysrhythmias or at baseline rhythm  Description: INTERVENTIONS:  - Continuous cardiac monitoring, vital signs, obtain 12 lead EKG if ordered  - Administer antiarrhythmic and heart rate control medications as ordered  - Monitor electrolytes and administer replacement therapy as ordered  Outcome: Progressing     Problem: METABOLIC, FLUID AND ELECTROLYTES - ADULT  Goal: Electrolytes maintained within normal limits  Description: INTERVENTIONS:  - Monitor labs and assess patient for signs and symptoms of electrolyte imbalances  - Administer electrolyte replacement as ordered  - Monitor response to electrolyte replacements, including repeat lab results as appropriate  - Instruct patient on fluid and nutrition as appropriate  Outcome: Progressing  Goal: Fluid balance maintained  Description: INTERVENTIONS:  - Monitor labs   - Monitor I/O and WT  - Instruct patient on fluid and nutrition as appropriate  - Assess for signs & symptoms of volume excess or deficit  Outcome: Progressing

## 2021-03-24 NOTE — PLAN OF CARE
Problem: Potential for Falls  Goal: Patient will remain free of falls  Description: INTERVENTIONS:  - Assess patient frequently for physical needs  -  Identify cognitive and physical deficits and behaviors that affect risk of falls    -  [place] fall precautions as indicated by assessment   - Educate patient/family on patient safety including physical limitations  - Instruct patient to call for assistance with activity based on assessment  - Modify environment to reduce risk of injury  - Consider OT/PT consult to assist with strengthening/mobility  Outcome: Progressing     Problem: PAIN - ADULT  Goal: Verbalizes/displays adequate comfort level or baseline comfort level  Description: Interventions:  - Encourage patient to monitor pain and request assistance  - Assess pain using appropriate pain scale  - Administer analgesics based on type and severity of pain and evaluate response  - Implement non-pharmacological measures as appropriate and evaluate response  - Consider cultural and social influences on pain and pain management  - Notify physician/advanced practitioner if interventions unsuccessful or patient reports new pain  Outcome: Progressing     Problem: INFECTION - ADULT  Goal: Absence or prevention of progression during hospitalization  Description: INTERVENTIONS:  - Assess and monitor for signs and symptoms of infection  - Monitor lab/diagnostic results  - Monitor all insertion sites, i e  indwelling lines, tubes, and drains  - Monitor endotracheal if appropriate and nasal secretions for changes in amount and color  - [place] appropriate cooling/warming therapies per order  - Administer medications as ordered  - Instruct and encourage patient and family to use good hand hygiene technique  - Identify and instruct in appropriate isolation precautions for identified infection/condition  Outcome: Progressing     Problem: SAFETY ADULT  Goal: Maintain or return to baseline ADL function  Description: INTERVENTIONS:  -  Assess patient's ability to carry out ADLs; assess patient's baseline for ADL function and identify physical deficits which impact ability to perform ADLs (bathing, care of mouth/teeth, toileting, grooming, dressing, etc )  - Assess/evaluate cause of self-care deficits   - Assess range of motion  - Assess patient's mobility; develop plan if impaired  - Assess patient's need for assistive devices and provide as appropriate  - Encourage maximum independence but intervene and supervise when necessary  - Involve family in performance of ADLs  - Assess for home care needs following discharge   - Consider OT consult to assist with ADL evaluation and planning for discharge  - Provide patient education as appropriate  Outcome: Progressing  Goal: Maintain or return mobility status to optimal level  Description: INTERVENTIONS:  - Assess patient's baseline mobility status (ambulation, transfers, stairs, etc )    - Identify cognitive and physical deficits and behaviors that affect mobility  - Identify mobility aids required to assist with transfers and/or ambulation (gait belt, sit-to-stand, lift, walker, cane, etc )  - [place] fall precautions as indicated by assessment  - Record patient progress and toleration of activity level on Mobility SBAR; progress patient to next Phase/Stage  - Instruct patient to call for assistance with activity based on assessment  - Consider rehabilitation consult to assist with strengthening/weightbearing, etc   Outcome: Progressing     Problem: DISCHARGE PLANNING  Goal: Discharge to home or other facility with appropriate resources  Description: INTERVENTIONS:  - Identify barriers to discharge w/patient and caregiver  - Arrange for needed discharge resources and transportation as appropriate  - Identify discharge learning needs (meds, wound care, etc )  - Arrange for interpretive services to assist at discharge as needed  - Refer to Case Management Department for coordinating discharge planning if the patient needs post-hospital services based on physician/advanced practitioner order or complex needs related to functional status, cognitive ability, or social support system  Outcome: Progressing     Problem: Knowledge Deficit  Goal: Patient/family/caregiver demonstrates understanding of disease process, treatment plan, medications, and discharge instructions  Description: Complete learning assessment and assess knowledge base    Interventions:  - Provide teaching at level of understanding  - Provide teaching via preferred learning methods  Outcome: Progressing     Problem: SKIN/TISSUE INTEGRITY - ADULT  Goal: Skin integrity remains intact  Description: INTERVENTIONS  - Identify patients at risk for skin breakdown  - Assess and monitor skin integrity  - Assess and monitor nutrition and hydration status  - Monitor labs (i e  albumin)  - Assess for incontinence   - Turn and reposition patient  - Assist with mobility/ambulation  - Relieve pressure over bony prominences  - Avoid friction and shearing  - Provide appropriate hygiene as needed including keeping skin clean and dry  - Evaluate need for skin moisturizer/barrier cream  - Collaborate with interdisciplinary team (i e  Nutrition, Rehabilitation, etc )   - Patient/family teaching  Outcome: Progressing  Goal: Incision(s), wounds(s) or drain site(s) healing without S/S of infection  Description: INTERVENTIONS  - Assess and document risk factors for skin impairment   - Assess and document dressing, incision, wound bed, drain sites and surrounding tissue  - Consider nutrition services referral as needed  - Oral mucous membranes remain intact  - Provide patient/ family education  Outcome: Progressing  Goal: Oral mucous membranes remain intact  Description: INTERVENTIONS  - Assess oral mucosa and hygiene practices  - Implement preventative oral hygiene regimen  - Implement oral medicated treatments as ordered  - Initiate Nutrition services referral as needed  Outcome: Progressing     Problem: CARDIOVASCULAR - ADULT  Goal: Maintains optimal cardiac output and hemodynamic stability  Description: INTERVENTIONS:  - Monitor I/O, vital signs and rhythm  - Monitor for S/S and trends of decreased cardiac output  - Administer and titrate ordered vasoactive medications to optimize hemodynamic stability  - Assess quality of pulses, skin color and temperature  - Assess for signs of decreased coronary artery perfusion  - Instruct patient to report change in severity of symptoms  Outcome: Progressing  Goal: Absence of cardiac dysrhythmias or at baseline rhythm  Description: INTERVENTIONS:  - Continuous cardiac monitoring, vital signs, obtain 12 lead EKG if ordered  - Administer antiarrhythmic and heart rate control medications as ordered  - Monitor electrolytes and administer replacement therapy as ordered  Outcome: Progressing     Problem: METABOLIC, FLUID AND ELECTROLYTES - ADULT  Goal: Electrolytes maintained within normal limits  Description: INTERVENTIONS:  - Monitor labs and assess patient for signs and symptoms of electrolyte imbalances  - Administer electrolyte replacement as ordered  - Monitor response to electrolyte replacements, including repeat lab results as appropriate  - Instruct patient on fluid and nutrition as appropriate  Outcome: Progressing  Goal: Fluid balance maintained  Description: INTERVENTIONS:  - Monitor labs   - Monitor I/O and WT  - Instruct patient on fluid and nutrition as appropriate  - Assess for signs & symptoms of volume excess or deficit  Outcome: Progressing

## 2021-03-24 NOTE — ASSESSMENT & PLAN NOTE
Wt Readings from Last 3 Encounters:   03/24/21 72 4 kg (159 lb 9 8 oz)   03/09/21 81 7 kg (180 lb 3 2 oz)   02/26/21 83 5 kg (184 lb)     EF 25%  - Transitioned to PO   Anticipate discharge in 24 hours

## 2021-03-24 NOTE — ASSESSMENT & PLAN NOTE
Might have to maintain a higher level of creatinine to maintain euvolemia      Recent Labs     03/22/21  0614 03/23/21  0519 03/24/21  0533   BUN 79* 94* 94*   CREATININE 2 29* 2 33* 2 43*   EGFR 24 23 22

## 2021-03-24 NOTE — PLAN OF CARE
Problem: PHYSICAL THERAPY ADULT  Goal: Performs mobility at highest level of function for planned discharge setting  See evaluation for individualized goals  Description: Treatment/Interventions: Functional transfer training, Elevations, LE strengthening/ROM, Therapeutic exercise, Endurance training, Patient/family training, Bed mobility, Gait training, Spoke to nursing, OT  Equipment Recommended: Walker(Pt owns walker)       See flowsheet documentation for full assessment, interventions and recommendations  Outcome: Progressing  Note: Prognosis: Good  Problem List: Decreased strength, Decreased range of motion, Decreased endurance, Impaired balance, Decreased mobility, Decreased skin integrity  Assessment: Pt seen for PT treatment session this date with interventions consisting of gait training w/ emphasis on improving pt's ability to ambulate level surfaces x 500 feet with close S provided by therapist with RW and Therapeutic exercise consisting of: AROM 2 sets of 15 reps B LE in sitting position  Pt agreeable to PT treatment session upon arrival, pt found seated OOB in chair, in no apparent distress  In comparison to previous session, pt with significant improvements in amb distance & ex tolerance  Post session: pt returned back to recliner, chair alarm engaged and all needs in reach Continue to recommend Home PT with family support at time of d/c in order to maximize pt's functional independence and safety w/ mobility  Pt continues to be functioning below baseline level, and remains limited 2* factors listed above and including decreased strength, ROM & safe functional mobility  PT will continue to see pt while here in order to address the deficits listed above and provide interventions consistent w/ POC in effort to achieve STGs    Barriers to Discharge: None  Barriers to Discharge Comments: stairs to enter  PT Discharge Recommendation: Home with skilled therapy, Return to previous environment with social support     PT - OK to Discharge: Yes(when med cleared)    See flowsheet documentation for full assessment

## 2021-03-24 NOTE — PLAN OF CARE
Problem: Potential for Falls  Goal: Patient will remain free of falls  Description: INTERVENTIONS:  - Assess patient frequently for physical needs  -  Identify cognitive and physical deficits and behaviors that affect risk of falls    -  Chase fall precautions as indicated by assessment   - Educate patient/family on patient safety including physical limitations  - Instruct patient to call for assistance with activity based on assessment  - Modify environment to reduce risk of injury  - Consider OT/PT consult to assist with strengthening/mobility  Outcome: Progressing     Problem: PAIN - ADULT  Goal: Verbalizes/displays adequate comfort level or baseline comfort level  Description: Interventions:  - Encourage patient to monitor pain and request assistance  - Assess pain using appropriate pain scale  - Administer analgesics based on type and severity of pain and evaluate response  - Implement non-pharmacological measures as appropriate and evaluate response  - Consider cultural and social influences on pain and pain management  - Notify physician/advanced practitioner if interventions unsuccessful or patient reports new pain  Outcome: Progressing     Problem: INFECTION - ADULT  Goal: Absence or prevention of progression during hospitalization  Description: INTERVENTIONS:  - Assess and monitor for signs and symptoms of infection  - Monitor lab/diagnostic results  - Monitor all insertion sites, i e  indwelling lines, tubes, and drains  - Monitor endotracheal if appropriate and nasal secretions for changes in amount and color  - Chase appropriate cooling/warming therapies per order  - Administer medications as ordered  - Instruct and encourage patient and family to use good hand hygiene technique  - Identify and instruct in appropriate isolation precautions for identified infection/condition  Outcome: Progressing     Problem: SAFETY ADULT  Goal: Maintain or return to baseline ADL function  Description: INTERVENTIONS:  -  Assess patient's ability to carry out ADLs; assess patient's baseline for ADL function and identify physical deficits which impact ability to perform ADLs (bathing, care of mouth/teeth, toileting, grooming, dressing, etc )  - Assess/evaluate cause of self-care deficits   - Assess range of motion  - Assess patient's mobility; develop plan if impaired  - Assess patient's need for assistive devices and provide as appropriate  - Encourage maximum independence but intervene and supervise when necessary  - Involve family in performance of ADLs  - Assess for home care needs following discharge   - Consider OT consult to assist with ADL evaluation and planning for discharge  - Provide patient education as appropriate  Outcome: Progressing  Goal: Maintain or return mobility status to optimal level  Description: INTERVENTIONS:  - Assess patient's baseline mobility status (ambulation, transfers, stairs, etc )    - Identify cognitive and physical deficits and behaviors that affect mobility  - Identify mobility aids required to assist with transfers and/or ambulation (gait belt, sit-to-stand, lift, walker, cane, etc )  - [place] fall precautions as indicated by assessment  - Record patient progress and toleration of activity level on Mobility SBAR; progress patient to next Phase/Stage  - Instruct patient to call for assistance with activity based on assessment  - Consider rehabilitation consult to assist with strengthening/weightbearing, etc   Outcome: Progressing     Problem: DISCHARGE PLANNING  Goal: Discharge to home or other facility with appropriate resources  Description: INTERVENTIONS:  - Identify barriers to discharge w/patient and caregiver  - Arrange for needed discharge resources and transportation as appropriate  - Identify discharge learning needs (meds, wound care, etc )  - Arrange for interpretive services to assist at discharge as needed  - Refer to Case Management Department for coordinating discharge planning if the patient needs post-hospital services based on physician/advanced practitioner order or complex needs related to functional status, cognitive ability, or social support system  Outcome: Progressing     Problem: Knowledge Deficit  Goal: Patient/family/caregiver demonstrates understanding of disease process, treatment plan, medications, and discharge instructions  Description: Complete learning assessment and assess knowledge base    Interventions:  - Provide teaching at level of understanding  - Provide teaching via preferred learning methods  Outcome: Progressing     Problem: SKIN/TISSUE INTEGRITY - ADULT  Goal: Skin integrity remains intact  Description: INTERVENTIONS  - Identify patients at risk for skin breakdown  - Assess and monitor skin integrity  - Assess and monitor nutrition and hydration status  - Monitor labs (i e  albumin)  - Assess for incontinence   - Turn and reposition patient  - Assist with mobility/ambulation  - Relieve pressure over bony prominences  - Avoid friction and shearing  - Provide appropriate hygiene as needed including keeping skin clean and dry  - Evaluate need for skin moisturizer/barrier cream  - Collaborate with interdisciplinary team (i e  Nutrition, Rehabilitation, etc )   - Patient/family teaching  Outcome: Progressing  Goal: Incision(s), wounds(s) or drain site(s) healing without S/S of infection  Description: INTERVENTIONS  - Assess and document risk factors for skin impairment   - Assess and document dressing, incision, wound bed, drain sites and surrounding tissue  - Consider nutrition services referral as needed  - Oral mucous membranes remain intact  - Provide patient/ family education  Outcome: Progressing  Goal: Oral mucous membranes remain intact  Description: INTERVENTIONS  - Assess oral mucosa and hygiene practices  - Implement preventative oral hygiene regimen  - Implement oral medicated treatments as ordered  - Initiate Nutrition services referral as needed  Outcome: Progressing     Problem: CARDIOVASCULAR - ADULT  Goal: Maintains optimal cardiac output and hemodynamic stability  Description: INTERVENTIONS:  - Monitor I/O, vital signs and rhythm  - Monitor for S/S and trends of decreased cardiac output  - Administer and titrate ordered vasoactive medications to optimize hemodynamic stability  - Assess quality of pulses, skin color and temperature  - Assess for signs of decreased coronary artery perfusion  - Instruct patient to report change in severity of symptoms  Outcome: Progressing  Goal: Absence of cardiac dysrhythmias or at baseline rhythm  Description: INTERVENTIONS:  - Continuous cardiac monitoring, vital signs, obtain 12 lead EKG if ordered  - Administer antiarrhythmic and heart rate control medications as ordered  - Monitor electrolytes and administer replacement therapy as ordered  Outcome: Progressing     Problem: METABOLIC, FLUID AND ELECTROLYTES - ADULT  Goal: Electrolytes maintained within normal limits  Description: INTERVENTIONS:  - Monitor labs and assess patient for signs and symptoms of electrolyte imbalances  - Administer electrolyte replacement as ordered  - Monitor response to electrolyte replacements, including repeat lab results as appropriate  - Instruct patient on fluid and nutrition as appropriate  Outcome: Progressing  Goal: Fluid balance maintained  Description: INTERVENTIONS:  - Monitor labs   - Monitor I/O and WT  - Instruct patient on fluid and nutrition as appropriate  - Assess for signs & symptoms of volume excess or deficit  Outcome: Progressing

## 2021-03-24 NOTE — PROGRESS NOTES
Progress Note - Cardiology   Teofilo Palomares 80 y o  male MRN: [id number]  Unit/Bed#: E4 -01 Encounter: [id number]      Assessment/Recommendations/Discussion:   1   acute on chronic systolic and diastolic heart failure   2  Nonischemic cardiomyopathy with ejection fraction 25%   3  Non MI troponin elevation secondary to CHF   4  Permanent atrial fibrillation with prior AV node ablation and [employer]/ [employer] CRT pacemaker   5  Acute kidney injury on chronic kidney disease    PLAN    weight continues to improve with diuretics    Now down to 159 lb   Likely will need to accept a higher baseline level of creatinine in order to maintain euvolemia  Creatinine 2 43 today   Plan to DC Bumex IV today and start p o  torsemide 40 mg daily (was on 10mg BID prior)    continue Zaroxolyn 2 5 mg once a week    continue Eliquis   Ultimately plan for DC home in the next 24 hours possibly        Subjective:   HPI  Doing well, wants to go home, denies shortness of breath, lower extremity edema has markedly improved      Review of Systems: As noted in HPI  Rest of ROS is negative  Vitals:   BP 94/55 (BP Location: Right arm)   Pulse 69   Temp (!) 96 7 °F (35 9 °C) (Temporal)   Resp 18   Ht 5' 9" (1 753 m)   Wt 72 4 kg (159 lb 9 8 oz)   SpO2 100%   BMI 23 57 kg/m²   Vitals:    03/23/21 0555 03/24/21 0600   Weight: 72 7 kg (160 lb 4 4 oz) 72 4 kg (159 lb 9 8 oz)       Intake/Output Summary (Last 24 hours) at 3/24/2021 1001  Last data filed at 3/24/2021 0602  Gross per 24 hour   Intake 120 ml   Output 825 ml   Net -705 ml       Physical Exam:  General appearance: alert and oriented, in no acute distress  Head: Normocephalic, without obvious abnormality, atraumatic  Eyes: conjunctivae/corneas clear  Anicteric    Neck: no adenopathy, no carotid bruit, no JVD  Lungs: clear to auscultation bilaterally  Heart: regular rate and rhythm, S1, S2 normal, 2/6 systolic murmur left sternal border, no click, rub or gallop  Abdomen:  soft, non-tender; bowel sounds normal; no masses,  no organomegaly  Extremities:  Bilateral 2+ edema, however markedly improved since admission when he had anasarca  Skin: Skin color, texture, turgor normal  No rashes or lesions     Lab Results:  Results from last 7 days   Lab Units 03/19/21  0606   WBC Thousand/uL 6 67   HEMOGLOBIN g/dL 13 9   HEMATOCRIT % 43 4   PLATELETS Thousands/uL 214     Results from last 7 days   Lab Units 03/24/21  0533   POTASSIUM mmol/L 3 9   CHLORIDE mmol/L 101   CO2 mmol/L 29   BUN mg/dL 94*   CREATININE mg/dL 2 43*   CALCIUM mg/dL 9 3     Results from last 7 days   Lab Units 03/24/21  0533   POTASSIUM mmol/L 3 9   CHLORIDE mmol/L 101   CO2 mmol/L 29   BUN mg/dL 94*   CREATININE mg/dL 2 43*   CALCIUM mg/dL 9 3           Medications:    Current Facility-Administered Medications:     apixaban (ELIQUIS) tablet 2 5 mg, 2 5 mg, Oral, BID, Amy Pool MD, 2 5 mg at 03/24/21 0941    bisoprolol (ZEBETA) tablet 2 5 mg, 2 5 mg, Oral, Daily, Lamar Gtz MD, 2 5 mg at 03/24/21 5598    bumetanide (BUMEX) injection 2 mg, 2 mg, Intravenous, BID, Lamar Gtz MD, Stopped at 03/24/21 2735    finasteride (PROSCAR) tablet 5 mg, 5 mg, Oral, Daily, Amy Pool MD, 5 mg at 03/24/21 7589    hydrOXYzine HCL (ATARAX) tablet 25 mg, 25 mg, Oral, Q6H PRN, Yaneli Chauhan Prechtel, DO, 25 mg at 03/23/21 2110    loratadine (CLARITIN) tablet 10 mg, 10 mg, Oral, Daily, Vrial MONSON Prechtel, DO, 10 mg at 03/24/21 0941    pantoprazole (PROTONIX) EC tablet 40 mg, 40 mg, Oral, Daily, Amy Pool MD, 40 mg at 03/24/21 0600    This note was completed in part utilizing [employer] Software  Grammatical errors, random word insertions, spelling mistakes, and incomplete sentences may be an occasional consequence of this system secondary to software limitations, ambient noise, and hardware issues    If you have any questions or concerns about the content, text, or information contained within the body of this dictation, please contact the provider for clarification      Aydee Zamorano DO, [facility]  3/24/2021 10:01 AM

## 2021-03-24 NOTE — CASE MANAGEMENT
Cm reviewed pt care coordination rounds with Dr Davide Galvan  Pt is a tentative d/c tomorrow pending cards clearance  Cm will f/u for final medical clearance and dcp needs

## 2021-03-24 NOTE — PROGRESS NOTES
Follow up Consultation    Nephrology   Pushpa Barkley 80 y o  male MRN: [id number]  Unit/Bed#: E4 -01 Encounter: [id number]      Physician Requesting Consult: Nany Diaz MD        ASSESSMENT/PLAN:      [place] Acute kidney injury (POA) on CKD stage 4:  - SUDHIR most likely secondary to cardiorenal syndrome and subsequent ATN  - After review of records In [facility] as well as Care everywhere it appears that the patient has a baseline Creatinine of 1 6-1 9 mg/dL  - patient was admitted with a creatinine of 2 48 mg/dL on 03/09/2021   - patient's creatinine today is at 2 43 mg/dL, appears leveled off  May need to accept higher baseline creatinine to maintain euvolemia   - diuretics per Cardiology  - held IV diuretics from this a m while awaiting Cardiology  Recommend placing on p o  Diuretics if okay with Cardiology  - check BMP in a m   - Check renal ultrasound to r/o hydronephrosis, obstruction if renal parameters continue to deteriorate  - Await renal recovery  - Optimize hemodynamic status to avoid delay in renal recovery  - Place on a renal diet when allowed diet order    - Avoid nephrotoxins, adjust meds to appropriate GFR   - Strict I/O   - Daily weights  - Urinary retention protocol if patient does not have a Aals  - Most likely has underlying CKD secondary to age-related nephron loss plus cardiorenal syndrome  - will need to set up patient for follow up with Nephrology as an outpatient post hospitalization   - for nephrology as an outpatient patient follows up with no nephrologist     Blood pressure:  - current medications:  Bisoprolol 2 5 mg p o  Q day, Bumex 2 mg IVbid  - recommendations:  IV Bumex held this a m  Recommend changing over to p o  Diuretics if okay with Cardiology to likely torsemide 40 mg p o  Q day     - Optimize hemodynamics   - Maintain MAP > 65mmHg  - Avoid BP fluctuations       H/H/anemia:  - most recent hemoglobin at 13 9 grams/deciliter  - maintain hemoglobin greater than 8 grams/deciliter     Acid-base electrolytes:    o Hypokalemia:  Due to diuretics  Most recent potassium 3 9  o  Acid-base:    - Most recent bicarb at 29     Volume status:  o  Clinically appears euvolemic to minimally hypervolemic  Recommend changing over to p o  Diuretics if okay with Cardiology possibly to torsemide 40 mg p o  Q day     Proteinuria:   o Most recent UA with no protein as of 03/10/2021     Other medical problems:  o Diabetes:  Management per primary team   On insulin  o Acute on chronic CHF:  Management per primary team   Follow-up with cardiology diuretics per Cardiology recommend changing over to p o  Diuretics  Most recent echo with EF of 25%  o BPH:  On Proscar      Thanks for the consult  Will continue to follow  Please call with questions/ concerns  Above-mentioned orders and Plan in terms of acute kidney injury was discussed with the team in [address] Dinh MD, FASN, 3/24/2021, 10:09 AM              Objective :     Patient seen and examined in his room no overnight events blood pressures remain borderline low and stable remains afebrile urine output 825 cc plus  Weight decreased by approximately 1 lb in the last 24 hours  Patient really wants to go home  PHYSICAL EXAM  BP 94/55 (BP Location: Right arm)   Pulse 69   Temp (!) 96 7 °F (35 9 °C) (Temporal)   Resp 18   Ht 5' 9" (1 753 m)   Wt 72 4 kg (159 lb 9 8 oz)   SpO2 100%   BMI 23 57 kg/m²   Temp (24hrs), Av 1 °F (36 2 °C), Min:96 7 °F (35 9 °C), Max:97 3 °F (36 3 °C)        Intake/Output Summary (Last 24 hours) at 3/24/2021 1009  Last data filed at 3/24/2021 0602  Gross per 24 hour   Intake 120 ml   Output 825 ml   Net -705 ml       I/O last 24 hours: In: 120 [P O :120]  Out: 825 [Urine:825]      Current Weight: Weight - Scale: 72 4 kg (159 lb 9 8 oz)  First Weight: Weight - Scale: 82 6 kg (182 lb 1 6 oz)  Physical Exam  Vitals signs and nursing note reviewed     Constitutional:       General: He is not in acute distress  Appearance: Normal appearance  He is normal weight  He is not ill-appearing, toxic-appearing or diaphoretic  HENT:      Head: Normocephalic and atraumatic  Mouth/Throat:      Mouth: Mucous membranes are moist       Pharynx: Oropharynx is clear  No oropharyngeal exudate  Eyes:      General: No scleral icterus  Conjunctiva/sclera: Conjunctivae normal    Neck:      Musculoskeletal: Normal range of motion and neck supple  Cardiovascular:      Rate and Rhythm: Normal rate  Heart sounds: Normal heart sounds  No murmur  Pulmonary:      Effort: Pulmonary effort is normal  No respiratory distress  Breath sounds: Normal breath sounds  No wheezing  Abdominal:      General: There is no distension  Palpations: Abdomen is soft  There is no mass  Tenderness: There is no abdominal tenderness  Musculoskeletal:         General: Swelling present  Comments: Plus one edema bilateral lower extremity, left lower extremity dressing in place  Skin:     General: Skin is warm  Coloration: Skin is not jaundiced  Neurological:      General: No focal deficit present  Mental Status: He is alert and oriented to person, place, and time  Psychiatric:         Mood and Affect: Mood normal          Behavior: Behavior normal              Review of Systems   Constitutional: Negative for chills and fatigue  HENT: Negative for congestion  Respiratory: Negative for cough, shortness of breath and wheezing  Cardiovascular: Positive for leg swelling  Gastrointestinal: Negative for abdominal pain, constipation, diarrhea, nausea and vomiting  Genitourinary: Negative for flank pain  Musculoskeletal: Negative for back pain  Skin: Positive for wound  Neurological: Negative for headaches  Psychiatric/Behavioral: Negative for agitation and confusion  All other systems reviewed and are negative        Scheduled Meds:  Current Facility-Administered Medications Medication Dose Route Frequency Provider Last Rate    apixaban  2 5 mg Oral BID Aislinn Villasenor MD      bisoprolol  2 5 mg Oral Daily Eleanor Spencer MD      bumetanide  2 mg Intravenous BID Eleanor Spencer MD      finasteride  5 mg Oral Daily Aislinn Villasenor MD      hydrOXYzine HCL  25 mg Oral Q6H PRN Arya Mendez Prechtel, DO      loratadine  10 mg Oral Daily Viral MONSON Prechtel, DO      pantoprazole  40 mg Oral Daily Aislinn Villasenor MD         PRN Meds: hydrOXYzine HCL    Continuous Infusions:       Invasive Devices: Invasive Devices     Peripheral Intravenous Line            Peripheral IV 03/22/21 Left;Ventral (anterior) Forearm 2 days                  LABORATORY:    Results from last 7 days   Lab Units 03/24/21  0533 03/23/21  0519 03/22/21  0614 03/21/21  0940 03/21/21  0406 03/20/21  0602 03/19/21  0606 03/18/21  1203   WBC Thousand/uL  --   --   --   --   --   --  6 67  --    HEMOGLOBIN g/dL  --   --   --   --   --   --  13 9  --    HEMATOCRIT %  --   --   --   --   --   --  43 4  --    PLATELETS Thousands/uL  --   --   --   --   --   --  214  --    POTASSIUM mmol/L 3 9 4 1 4 0 3 4* 3 4* 3 9 4 0 3 0*   CHLORIDE mmol/L 101 99* 100 98* 100 98* 96* 95*   CO2 mmol/L 29 30 32 31 33* 32 33* 31   BUN mg/dL 94* 94* 79* 68* 71* 74* 76* 83*   CREATININE mg/dL 2 43* 2 33* 2 29* 2 12* 2 10* 2 04* 2 27* 2 36*   CALCIUM mg/dL 9 3 9 3 9 1 9 5 9 5 9 5 9 7 10 3*   MAGNESIUM mg/dL  --  2 1 2 0  --  2 1 2 2  --  2 1      rest all reviewed    RADIOLOGY:  XR chest 1 view portable   Final Result by Stephie Moralez DO (03/09 1842)      No acute cardiopulmonary disease  Workstation performed: [id number]           Rest all reviewed    Portions of the record may have been created with voice recognition software  Occasional wrong word or "sound a like" substitutions may have occurred due to the inherent limitations of voice recognition software   Read the chart carefully and recognize, using context, where substitutions have occurred  If you have any questions, please contact the dictating provider

## 2021-03-24 NOTE — PHYSICAL THERAPY NOTE
03/24/21 1116   PT Last Visit   PT Visit Date 03/24/21   Note Type   Note Type Treatment   Pain Assessment   Pain Assessment Tool Pain Assessment not indicated - pt denies pain   Restrictions/Precautions   Weight Bearing Precautions Per Order No   Other Precautions Chair Alarm; Bed Alarm; Fall Risk;Multiple lines;Telemetry   General   Chart Reviewed Yes   Family/Caregiver Present No   Cognition   Overall Cognitive Status WFL   Arousal/Participation Alert; Cooperative   Attention Attends with cues to redirect   Orientation Level Oriented X4   Memory Decreased recall of precautions   Following Commands Follows one step commands without difficulty   Comments pt agreed to PT session-pleasant & cooperative   Subjective   Subjective "I would like to walk & see how I do  I was so tired yesterday but feel much better today "   Bed Mobility   Additional Comments pt OOB in bedside chair to begin & end session  All needs in reach & alarm on   Transfers   Sit to Stand 5  Supervision   Additional items Assist x 1; Armrests; Increased time required;Verbal cues   Stand to Sit 5  Supervision   Additional items Assist x 1; Armrests; Increased time required;Verbal cues   Ambulation/Elevation   Gait pattern Forward Flexion;Decreased foot clearance; Inconsistent chantelle; Short stride; Excessively slow   Gait Assistance 5  Supervision   Additional items Assist x 1;Verbal cues; Tactile cues   Assistive Device Rolling walker   Distance 500 feet   Stair Management Assistance Not tested   Balance   Static Sitting Good   Dynamic Sitting Good   Static Standing Fair   Dynamic Standing Fair   Ambulatory Fair -   Endurance Deficit   Endurance Deficit No   Activity Tolerance   Activity Tolerance Patient tolerated treatment well   Nurse Made Aware yes   Exercises   Hip Flexion Sitting;AROM; Bilateral  (2 x 15)   Hip Abduction Sitting;AROM; Bilateral  (2 x 15)   Hip Adduction Sitting;AROM; Bilateral  (isometric 2 x 15)   Knee AROM [employer] Sitting;AROM; Bilateral  (2 x 15)   Ankle Pumps Sitting;AROM; Bilateral  (2 x 15)   Assessment   Prognosis Good   Problem List Decreased strength;Decreased range of motion;Decreased endurance; Impaired balance;Decreased mobility; Decreased skin integrity   Assessment Pt seen for PT treatment session this date with interventions consisting of gait training w/ emphasis on improving pt's ability to ambulate level surfaces x 500 feet with close S provided by therapist with RW and Therapeutic exercise consisting of: AROM 2 sets of 15 reps B LE in sitting position  Pt agreeable to PT treatment session upon arrival, pt found seated OOB in chair, in no apparent distress  In comparison to previous session, pt with significant improvements in amb distance & ex tolerance  Post session: pt returned back to recliner, chair alarm engaged and all needs in reach Continue to recommend Home PT with family support at time of d/c in order to maximize pt's functional independence and safety w/ mobility  Pt continues to be functioning below baseline level, and remains limited 2* factors listed above and including decreased strength, ROM & safe functional mobility  PT will continue to see pt while here in order to address the deficits listed above and provide interventions consistent w/ POC in effort to achieve STGs  Goals   Patient Goals to go home   PT Treatment Day 5   Plan   Treatment/Interventions Functional transfer training;LE strengthening/ROM; Therapeutic exercise; Endurance training;Patient/family training;Equipment eval/education; Bed mobility;Gait training   Progress Progressing toward goals   PT Frequency   (3-5 x week)   Recommendation   PT Discharge Recommendation Home with skilled therapy; Return to previous environment with social support   PT - OK to Discharge Yes  (when med cleared)   John 8 in Bed Without Bedrails 4   Lying on Back to Sitting on Edge of Flat Bed 4   Moving Bed to Chair 3 Standing Up From Chair 3   Walk in Room 3   Climb 3-5 Stairs 3   Basic Mobility Inpatient Raw Score 20   Basic Mobility Standardized Score 43 99   Dhara Ness, PTA

## 2021-03-24 NOTE — PROGRESS NOTES
Ankur Peck  Progress Note - Ana Aritaa 6/16/1927, 80 y o  male MRN: [id number]  Unit/Bed#: E4 -01 Encounter: [id number]  Primary Care Provider: VARSHA Hansen MD   Date and time admitted to hospital: 3/9/2021 12:52 PM    * Cellulitis of left leg  Assessment & Plan  80year old male admitted due to cellulitis of his left leg   - Completed antibiotic therapy    Acute on chronic systolic CHF (congestive heart failure) (Formerly [facility])  Assessment & Plan  Wt Readings from Last 3 Encounters:   03/24/21 72 4 kg (159 lb 9 8 oz)   03/09/21 81 7 kg (180 lb 3 2 oz)   02/26/21 83 5 kg (184 lb)     EF 25%  - Transitioned to PO  Anticipate discharge in 24 hours    A-fib [facility])  Assessment & Plan  S/p PPM   - Continue bisoprolol  - Continue Eliquis    Acute kidney injury superimposed on chronic kidney disease ([facility] [address] )  Assessment & Plan  Might have to maintain a higher level of creatinine to maintain euvolemia  Recent Labs     03/22/21  0614 03/23/21  0519 03/24/21  0533   BUN 79* 94* 94*   CREATININE 2 29* 2 33* 2 43*   EGFR 24 23 22               BPH (benign prostatic hyperplasia)  Assessment & Plan  Continue finasteride      VTE Pharmacologic Prophylaxis:   Pharmacologic: Apixaban (Eliquis)  Mechanical VTE Prophylaxis in Place: Yes    Patient Centered Rounds: I have performed bedside rounds with nursing staff today  Discussions with Specialists or Other Care Team Provider: Nursing, renal    Education and Discussions with Family / Patient: patient, left voicemail for daughter    Time Spent for Care: 30 minutes  More than 50% of total time spent on counseling and coordination of care as described above      Current Length of Stay: 15 day(s)    Current Patient Status: Inpatient   Certification Statement: The patient will continue to require additional inpatient hospital stay due to transition to po diuretics, possible dc in 24 hours    Discharge Plan: active    Code Status: Level 3 - DNAR and DNI      Subjective:   Patient seen and examined at bedside  He is comfortable and is looking forward to his eventual discharge possibly in the next 24 hours  Denies any acute complaints overnight  Objective:     Vitals:   Temp (24hrs), Av 1 °F (36 2 °C), Min:96 7 °F (35 9 °C), Max:97 3 °F (36 3 °C)    Temp:  [96 7 °F (35 9 °C)-97 3 °F (36 3 °C)] 97 3 °F (36 3 °C)  HR:  [69-73] 73  Resp:  [18] 18  BP: ()/(51-63) 93/51  SpO2:  [95 %-100 %] 96 %  Body mass index is 23 57 kg/m²  Input and Output Summary (last 24 hours): Intake/Output Summary (Last 24 hours) at 3/24/2021 1215  Last data filed at 3/24/2021 0602  Gross per 24 hour   Intake 120 ml   Output 825 ml   Net -705 ml       Physical Exam:     Physical Exam  Vitals signs reviewed  Constitutional:       General: He is not in acute distress  Appearance: He is not ill-appearing  HENT:      Head: Normocephalic  Nose: Nose normal       Mouth/Throat:      Mouth: Mucous membranes are moist    Eyes:      General: No scleral icterus  Extraocular Movements: Extraocular movements intact  Cardiovascular:      Rate and Rhythm: Normal rate  Pulmonary:      Effort: Pulmonary effort is normal  No respiratory distress  Abdominal:      General: There is no distension  Palpations: Abdomen is soft  Tenderness: There is no abdominal tenderness  Musculoskeletal:      Right lower leg: Edema present  Left lower leg: Edema present  Skin:     General: Skin is warm  Neurological:      Mental Status: He is alert  Mental status is at baseline     Psychiatric:         Mood and Affect: Mood normal          Behavior: Behavior normal        Additional Data:     Labs:    Results from last 7 days   Lab Units 21  0606   WBC Thousand/uL 6 67   HEMOGLOBIN g/dL 13 9   HEMATOCRIT % 43 4   PLATELETS Thousands/uL 214     Results from last 7 days   Lab Units 21  0533   SODIUM mmol/L 141   POTASSIUM mmol/L 3 9   CHLORIDE mmol/L 101 CO2 mmol/L 29   BUN mg/dL 94*   CREATININE mg/dL 2 43*   ANION GAP mmol/L 11   CALCIUM mg/dL 9 3   GLUCOSE RANDOM mg/dL 97         Results from last 7 days   Lab Units 03/17/21  2144 03/17/21  1602   POC GLUCOSE mg/dl 130 166*                   * I Have Reviewed All Lab Data Listed Above  * Additional Pertinent Lab Tests Reviewed: Juan [address] 66 Admission Reviewed    Imaging:    Imaging Reports Reviewed Today Include: No new imaging    Recent Cultures (last 7 days):           Last 24 Hours Medication List:   Current Facility-Administered Medications   Medication Dose Route Frequency Provider Last Rate    apixaban  2 5 mg Oral BID Judie Li MD      bisoprolol  2 5 mg Oral Daily Dee Nix MD      finasteride  5 mg Oral Daily Judie Li MD      hydrOXYzine HCL  25 mg Oral Q6H PRN Alberto Heathhtel,       loratadine  10 mg Oral Daily Viral Kirby,       pantoprazole  40 mg Oral Daily Judie Li MD      torsemide  40 mg Oral Daily Levar Yeager DO          Today, Patient Was Seen By: Estefani Euceda MD    ** Please Note: Dictation voice to text software may have been used in the creation of this document   **

## 2021-03-25 NOTE — PLAN OF CARE
Problem: PHYSICAL THERAPY ADULT  Goal: Performs mobility at highest level of function for planned discharge setting  See evaluation for individualized goals  Description: Treatment/Interventions: Functional transfer training, Elevations, LE strengthening/ROM, Therapeutic exercise, Endurance training, Patient/family training, Bed mobility, Gait training, Spoke to nursing, OT  Equipment Recommended: Walker(Pt owns walker)       See flowsheet documentation for full assessment, interventions and recommendations  Outcome: Progressing  Note: Prognosis: Good  Problem List: Decreased strength, Decreased range of motion, Decreased endurance, Impaired balance, Decreased mobility, Decreased skin integrity  Assessment: Pt seen for PT treatment session this date with interventions consisting of gait training w/ emphasis on improving pt's ability to ambulate level surfaces x 250 feet with close S provided by therapist with RW and Therapeutic exercise consisting of: AROM 5 - 3 x 15 reps each B LE in sitting and standing with B UE support position  Pt agreeable to PT treatment session upon arrival, pt found seated OOB in chair, in no apparent distress  In comparison to previous session, pt with improvements in ex tolerance  Post session: pt returned back to recliner, chair alarm engaged and all needs in reach Continue to recommend Home PT with family support at time of d/c in order to maximize pt's functional independence and safety w/ mobility  Pt continues to be functioning below baseline level, and remains limited 2* factors listed above and including decreased strength, endurance & safe functional mobility  PT will continue to see pt while here in order to address the deficits listed above and provide interventions consistent w/ POC in effort to achieve STGs    Barriers to Discharge: None  Barriers to Discharge Comments: stairs to enter  PT Discharge Recommendation: Home with skilled therapy, Return to previous environment with social support     PT - OK to Discharge: Yes(when med cleared)    See flowsheet documentation for full assessment

## 2021-03-25 NOTE — DISCHARGE INSTRUCTIONS
Heart Failure   WHAT YOU NEED TO KNOW:   Heart failure (HF) is a condition that does not allow your heart to fill or pump properly  Not enough oxygen in your blood gets to your organs and tissues  Fluid may not move through your body properly  Fluid builds up and causes swelling and difficulty breathing  This is known as congestive heart failure  HF may start in the left or right ventricle  HF is often caused by damage or injury to your heart  The damage may be caused by other heart problems, diabetes, or high blood pressure  The damage may have also been caused by an infection  HF is a long-term condition that tends to get worse over time  It is important to manage your health to improve your quality of life  DISCHARGE INSTRUCTIONS:   Call your local emergency number (911 in the [address] [facility]) if:   · You have any of the following signs of a heart attack:      ? Squeezing, pressure, or pain in your chest    ? You may  also have any of the following:     § Discomfort or pain in your back, neck, jaw, stomach, or arm    § Shortness of breath    § Nausea or vomiting    § Lightheadedness or a sudden cold sweat      Call your doctor if:   · Your heartbeat is fast, slow, or uneven all the time  · You have symptoms of worsening HF:      ? Shortness of breath at rest, at night, or that is getting worse in any way    ? Weight gain of 3 or more pounds (1 4 kg) in a day, or more than your healthcare provider says is okay    ? More swelling in your legs or ankles    ? Abdominal pain or swelling    ? More coughing    ? Loss of appetite    ? Feeling tired all the time    · You feel hopeless or depressed, or you have lost interest in things you used to enjoy  · You often feel worried or afraid  · You have questions or concerns about your condition or care  Medicines:   · Medicines  may be given to help regulate your heart rhythm and lower your blood pressure  You may also need medicines to help decrease extra fluids  Medicines, such as NSAIDs, may be stopped because they are causing your HF to become worse  · Take your medicine as directed  Contact your healthcare provider if you think your medicine is not helping or if you have side effects  Tell him or her if you are allergic to any medicine  Keep a list of the medicines, vitamins, and herbs you take  Include the amounts, and when and why you take them  Bring the list or the pill bottles to follow-up visits  Carry your medicine list with you in case of an emergency  Follow up with your doctor within 7 days and as directed: You may need to return for other tests  You may need home health care  A healthcare provider will monitor your vital signs, weight, and make sure your medicines are working  Write down your questions so you remember to ask them during your visits  Go to cardiac rehab if directed:  Cardiac rehab is a program run by specialists who will help you safely strengthen your heart  In the program you will learn about exercise, relaxation, stress management, and heart-healthy nutrition  Manage HF:   · Do not smoke  Nicotine and other chemicals in cigarettes and cigars can cause lung and heart damage  Ask your healthcare provider for information if you currently smoke and need help to quit  E-cigarettes or smokeless tobacco still contain nicotine  Talk to your healthcare provider before you use these products  · Do not drink alcohol or use illegal drugs  Alcohol and drugs can increase your risk for high blood pressure, diabetes, and coronary artery disease  · Eat heart-healthy foods and limit sodium (salt)  Eat more fresh fruits and vegetables  Eat fewer canned and processed foods  Replace butter and margarine with heart-healthy oils such as olive oil and canola oil  Other heart-healthy foods include walnuts, whole-grain breads, low-fat dairy products, beans, and lean meats  Fatty fish such as salmon and tuna are also heart healthy   Ask how much salt you can eat each day  · Manage any chronic health conditions you have  These include high blood pressure, diabetes, obesity, high cholesterol, metabolic syndrome, and COPD  You will have fewer symptoms if you manage these health conditions  Follow your healthcare provider's recommendations and follow up with him or her regularly  · Drink liquids as directed  You may need to limit the amount of liquids you drink if you have fluid buildup  Ask how much liquid to drink each day and which liquids are best for you  · Maintain a healthy weight  Being overweight can increase your risk for high blood pressure, diabetes, and coronary artery disease  These conditions can make your symptoms worse  Ask your healthcare provider how much you should weigh  Ask him or her to help you create a weight loss plan if you are overweight  · Stay active  Activity can help keep your symptoms from getting worse  Walking is a type of physical activity that helps maintain your strength and improve your mood  Physical activity also helps you manage your weight  Work with your healthcare provider to create an exercise plan that is right for you  · Weigh yourself every morning  Use the same scale, in the same spot  Do this after you use the bathroom, but before you eat or drink  Wear the same type of clothing each time  Write down your weight and call your healthcare provider if you have a sudden weight gain  Swelling and weight gain are signs of fluid buildup  · Get vaccines as directed  Get a flu shot every year  You may also need the pneumonia vaccine  The flu and pneumonia can be severe for a person who has HF  Vaccines protect you from these infections  Join a support group:  HF can be difficult to manage  It may be helpful to talk with others who have HF  You may learn how to better manage your condition or get emotional support   For more information:  · Gina Davidson Lazaro Bashir   Phone: 9- 119 - [phone]  Web Address: https://www [employer]/  [address] [facility] [address] Information is for End User's use only and may not be sold, redistributed or otherwise used for commercial purposes  All illustrations and images included in CareNotes® are the copyrighted property of A D A M , Inc  or [address] [place], [place] [address]nakul kelli   The above information is an  only  It is not intended as medical advice for individual conditions or treatments  Talk to your doctor, nurse or pharmacist before following any medical regimen to see if it is safe and effective for you

## 2021-03-25 NOTE — ASSESSMENT & PLAN NOTE
Might have to maintain a higher level of creatinine to maintain euvolemia      Recent Labs     03/23/21  0519 03/24/21  0533 03/25/21  0439   BUN 94* 94* 93*   CREATININE 2 33* 2 43* 2 71*   EGFR 23 22 19

## 2021-03-25 NOTE — PROGRESS NOTES
Follow up Consultation    Nephrology   Pau Petersen 80 y o  male MRN: [id number]  Unit/Bed#: E4 -01 Encounter: [id number]      Physician Requesting Consult: Lucila Gann MD        ASSESSMENT/PLAN:      Rama Rendon Acute kidney injury (POA) on CKD stage 4:  - SUDHIR most likely secondary to cardiorenal syndrome and subsequent ATN  - After review of records In [facility] as well as Care everywhere it appears that the patient has a baseline Creatinine of 1 6-1 9 mg/dL  - patient was admitted with a creatinine of 2 48 mg/dL on 03/09/2021   - patient's creatinine today is at 2 71 mg/dL,May need to accept higher baseline creatinine to maintain euvolemia   - diuretics per Cardiology  - recommend torsemide 40 mg p o  Q day and p r n  Metolazone 2 5 mg as per Cardiology  - check BMP in a m   - Check renal ultrasound to r/o hydronephrosis, obstruction if renal parameters continue to deteriorate  - Await renal recovery  - Optimize hemodynamic status to avoid delay in renal recovery  - Place on a renal diet when allowed diet order    - Avoid nephrotoxins, adjust meds to appropriate GFR   - Strict I/O   - Daily weights  - Urinary retention protocol if patient does not have a Alas  - Most likely has underlying CKD secondary to age-related nephron loss plus cardiorenal syndrome  - will need to set up patient for follow up with Nephrology as an outpatient post hospitalization   - for nephrology as an outpatient patient follows up with no nephrologist  - message sent to the office to arrange for outpatient follow-up in 1 week with blood work prior to the visit  - stable for discharge from renal standpoint when medically cleared     Blood pressure:  - current medications:  Bisoprolol 2 5 mg p o  Q day, torsemide 40 mg p o  Q day   - recommendations: With torsemide 40 mg p o  Q day and p r n   Metolazone upon discharge management per Cardiology   - Optimize hemodynamics   - Maintain MAP > 65mmHg  - Avoid BP fluctuations   H/H/anemia:  - most recent hemoglobin at 13 9 grams/deciliter  - maintain hemoglobin greater than 8 grams/deciliter     Acid-base electrolytes:    o Hypokalemia:  Due to diuretics  Most recent potassium 4 2  o  Acid-base:    - Most recent bicarb at 30     Volume status:  o  Clinically appears euvolemic to minimally hypervolemic  Agree with torsemide 40 mg p o  Q day and p r n  Metolazone     Proteinuria:   o Most recent UA with no protein as of 03/10/2021     Other medical problems:  o Diabetes:  Management per primary team   On insulin  o Acute on chronic CHF:  Management per primary team   Follow-up with cardiology diuretics  Most recent echo with EF of 25%  o BPH:  On Proscar      Thanks for the consult  Will continue to follow  Please call with questions/ concerns  Above-mentioned orders and Plan in terms of acute kidney injury was discussed with the team in [address] MD, [facility], 3/25/2021, 10:48 AM              Objective :     Patient seen and examined in his room no overnight events hemodynamically stable remains afebrile did not get any diuretics yesterday  Weight slightly increased today by 3 lb  Wanting to go home  PHYSICAL EXAM  BP 96/58 (BP Location: Right arm)   Pulse 69   Temp (!) 96 4 °F (35 8 °C) (Temporal)   Resp 20   Ht 5' 9" (1 753 m)   Wt 73 6 kg (162 lb 4 1 oz)   SpO2 96%   BMI 23 96 kg/m²   Temp (24hrs), Av 2 °F (36 2 °C), Min:96 4 °F (35 8 °C), Max:98 2 °F (36 8 °C)      No intake or output data in the 24 hours ending 21 1048    No intake/output data recorded  Current Weight: Weight - Scale: 73 6 kg (162 lb 4 1 oz)  First Weight: Weight - Scale: 82 6 kg (182 lb 1 6 oz)  Physical Exam  Vitals signs and nursing note reviewed  Constitutional:       General: He is not in acute distress  Appearance: Normal appearance  He is normal weight  He is not ill-appearing, toxic-appearing or diaphoretic     HENT:      Head: Normocephalic and atraumatic  Mouth/Throat:      Mouth: Mucous membranes are moist       Pharynx: Oropharynx is clear  No oropharyngeal exudate  Eyes:      General: No scleral icterus  Conjunctiva/sclera: Conjunctivae normal    Neck:      Musculoskeletal: Normal range of motion and neck supple  Cardiovascular:      Rate and Rhythm: Normal rate  Heart sounds: Normal heart sounds  No friction rub  Pulmonary:      Effort: Pulmonary effort is normal  No respiratory distress  Breath sounds: Normal breath sounds  No wheezing  Abdominal:      General: There is no distension  Palpations: Abdomen is soft  There is no mass  Tenderness: There is no abdominal tenderness  Musculoskeletal:         General: Swelling present  Comments: Plus one edema bilateral lower extremity left lower extremity with dressing   Skin:     General: Skin is warm  Coloration: Skin is not jaundiced  Neurological:      General: No focal deficit present  Mental Status: He is alert and oriented to person, place, and time  Psychiatric:         Mood and Affect: Mood normal          Behavior: Behavior normal              Review of Systems   Constitutional: Negative for appetite change, chills and fatigue  HENT: Negative for congestion  Respiratory: Negative for cough, shortness of breath and wheezing  Cardiovascular: Positive for leg swelling  Gastrointestinal: Negative for abdominal pain, constipation, diarrhea, nausea and vomiting  Genitourinary: Negative for difficulty urinating and dysuria  Musculoskeletal: Negative for back pain  Skin: Negative for pallor  Neurological: Negative for headaches  Psychiatric/Behavioral: Negative for agitation and confusion  All other systems reviewed and are negative        Scheduled Meds:  Current Facility-Administered Medications   Medication Dose Route Frequency Provider Last Rate    apixaban  2 5 mg Oral BID Sunitha Davis MD      bisoprolol  2 5 mg Oral Daily Danni Alfred MD      finasteride  5 mg Oral Daily Tata Baengas MD      hydrOXYzine HCL  25 mg Oral Q6H PRN Amberly Kirby DO      loratadine  10 mg Oral Daily Viral Kirby DO      pantoprazole  40 mg Oral Daily Tata Banegas MD         PRN Meds: hydrOXYzine HCL    Continuous Infusions:       Invasive Devices: Invasive Devices     Peripheral Intravenous Line            Peripheral IV 03/22/21 Left;Ventral (anterior) Forearm 3 days                  LABORATORY:    Results from last 7 days   Lab Units 03/25/21  0439 03/24/21  0533 03/23/21  0519 03/22/21  0614 03/21/21  0940 03/21/21  0406 03/20/21  0602 03/19/21  0606 03/18/21  1203   WBC Thousand/uL  --   --   --   --   --   --   --  6 67  --    HEMOGLOBIN g/dL  --   --   --   --   --   --   --  13 9  --    HEMATOCRIT %  --   --   --   --   --   --   --  43 4  --    PLATELETS Thousands/uL  --   --   --   --   --   --   --  214  --    POTASSIUM mmol/L 4 2 3 9 4 1 4 0 3 4* 3 4* 3 9 4 0 3 0*   CHLORIDE mmol/L 102 101 99* 100 98* 100 98* 96* 95*   CO2 mmol/L 30 29 30 32 31 33* 32 33* 31   BUN mg/dL 93* 94* 94* 79* 68* 71* 74* 76* 83*   CREATININE mg/dL 2 71* 2 43* 2 33* 2 29* 2 12* 2 10* 2 04* 2 27* 2 36*   CALCIUM mg/dL 9 3 9 3 9 3 9 1 9 5 9 5 9 5 9 7 10 3*   MAGNESIUM mg/dL  --   --  2 1 2 0  --  2 1 2 2  --  2 1      rest all reviewed    RADIOLOGY:  XR chest 1 view portable   Final Result by Sisi Beltran DO (03/09 1842)      No acute cardiopulmonary disease  Workstation performed: [id number]           Rest all reviewed    Portions of the record may have been created with voice recognition software  Occasional wrong word or "sound a like" substitutions may have occurred due to the inherent limitations of voice recognition software  Read the chart carefully and recognize, using context, where substitutions have occurred  If you have any questions, please contact the dictating provider

## 2021-03-25 NOTE — PLAN OF CARE
Problem: Potential for Falls  Goal: Patient will remain free of falls  Description: INTERVENTIONS:  - Assess patient frequently for physical needs  -  Identify cognitive and physical deficits and behaviors that affect risk of falls    -  [place] fall precautions as indicated by assessment   - Educate patient/family on patient safety including physical limitations  - Instruct patient to call for assistance with activity based on assessment  - Modify environment to reduce risk of injury  - Consider OT/PT consult to assist with strengthening/mobility  3/25/2021 0141 by Leatha Babcock RN  Outcome: Progressing  3/25/2021 0141 by Leatha Babcock RN  Outcome: Progressing     Problem: PAIN - ADULT  Goal: Verbalizes/displays adequate comfort level or baseline comfort level  Description: Interventions:  - Encourage patient to monitor pain and request assistance  - Assess pain using appropriate pain scale  - Administer analgesics based on type and severity of pain and evaluate response  - Implement non-pharmacological measures as appropriate and evaluate response  - Consider cultural and social influences on pain and pain management  - Notify physician/advanced practitioner if interventions unsuccessful or patient reports new pain  3/25/2021 0141 by Leatha Babcock RN  Outcome: Progressing  3/25/2021 0141 by Leatha Babcock RN  Outcome: Progressing     Problem: INFECTION - ADULT  Goal: Absence or prevention of progression during hospitalization  Description: INTERVENTIONS:  - Assess and monitor for signs and symptoms of infection  - Monitor lab/diagnostic results  - Monitor all insertion sites, i e  indwelling lines, tubes, and drains  - Monitor endotracheal if appropriate and nasal secretions for changes in amount and color  - [place] appropriate cooling/warming therapies per order  - Administer medications as ordered  - Instruct and encourage patient and family to use good hand hygiene technique  - Identify and instruct in appropriate isolation precautions for identified infection/condition  3/25/2021 0141 by Shannan Claude, RN  Outcome: Progressing  3/25/2021 0141 by Shannan Claude, RN  Outcome: Progressing     Problem: SAFETY ADULT  Goal: Maintain or return to baseline ADL function  Description: INTERVENTIONS:  -  Assess patient's ability to carry out ADLs; assess patient's baseline for ADL function and identify physical deficits which impact ability to perform ADLs (bathing, care of mouth/teeth, toileting, grooming, dressing, etc )  - Assess/evaluate cause of self-care deficits   - Assess range of motion  - Assess patient's mobility; develop plan if impaired  - Assess patient's need for assistive devices and provide as appropriate  - Encourage maximum independence but intervene and supervise when necessary  - Involve family in performance of ADLs  - Assess for home care needs following discharge   - Consider OT consult to assist with ADL evaluation and planning for discharge  - Provide patient education as appropriate  3/25/2021 0141 by Shannan Claude, RN  Outcome: Progressing  3/25/2021 0141 by Shannan Claude, RN  Outcome: Progressing  Goal: Maintain or return mobility status to optimal level  Description: INTERVENTIONS:  - Assess patient's baseline mobility status (ambulation, transfers, stairs, etc )    - Identify cognitive and physical deficits and behaviors that affect mobility  - Identify mobility aids required to assist with transfers and/or ambulation (gait belt, sit-to-stand, lift, walker, cane, etc )  - [place] fall precautions as indicated by assessment  - Record patient progress and toleration of activity level on Mobility SBAR; progress patient to next Phase/Stage  - Instruct patient to call for assistance with activity based on assessment  - Consider rehabilitation consult to assist with strengthening/weightbearing, etc   3/25/2021 0141 by Shannan Claude, RN  Outcome: Progressing  3/25/2021 0141 by Shannan Claude, RN  Outcome: Progressing     Problem: DISCHARGE PLANNING  Goal: Discharge to home or other facility with appropriate resources  Description: INTERVENTIONS:  - Identify barriers to discharge w/patient and caregiver  - Arrange for needed discharge resources and transportation as appropriate  - Identify discharge learning needs (meds, wound care, etc )  - Arrange for interpretive services to assist at discharge as needed  - Refer to Case Management Department for coordinating discharge planning if the patient needs post-hospital services based on physician/advanced practitioner order or complex needs related to functional status, cognitive ability, or social support system  3/25/2021 0141 by Deandre Iglesias RN  Outcome: Progressing  3/25/2021 0141 by Deandre Iglesias RN  Outcome: Progressing     Problem: Knowledge Deficit  Goal: Patient/family/caregiver demonstrates understanding of disease process, treatment plan, medications, and discharge instructions  Description: Complete learning assessment and assess knowledge base    Interventions:  - Provide teaching at level of understanding  - Provide teaching via preferred learning methods  3/25/2021 0141 by Deandre Iglesias RN  Outcome: Progressing  3/25/2021 0141 by Deandre Iglesias RN  Outcome: Progressing     Problem: SKIN/TISSUE INTEGRITY - ADULT  Goal: Skin integrity remains intact  Description: INTERVENTIONS  - Identify patients at risk for skin breakdown  - Assess and monitor skin integrity  - Assess and monitor nutrition and hydration status  - Monitor labs (i e  albumin)  - Assess for incontinence   - Turn and reposition patient  - Assist with mobility/ambulation  - Relieve pressure over bony prominences  - Avoid friction and shearing  - Provide appropriate hygiene as needed including keeping skin clean and dry  - Evaluate need for skin moisturizer/barrier cream  - Collaborate with interdisciplinary team (i e  Nutrition, Rehabilitation, etc )   - Patient/family teaching  3/25/2021 0141 by Keyonna Ambrosio RN  Outcome: Progressing  3/25/2021 0141 by Kyeonna Ambrosio RN  Outcome: Progressing  Goal: Incision(s), wounds(s) or drain site(s) healing without S/S of infection  Description: INTERVENTIONS  - Assess and document risk factors for skin impairment   - Assess and document dressing, incision, wound bed, drain sites and surrounding tissue  - Consider nutrition services referral as needed  - Oral mucous membranes remain intact  - Provide patient/ family education  3/25/2021 0141 by Keyonna Ambrosio RN  Outcome: Progressing  3/25/2021 0141 by Keyonna Ambrosio RN  Outcome: Progressing  Goal: Oral mucous membranes remain intact  Description: INTERVENTIONS  - Assess oral mucosa and hygiene practices  - Implement preventative oral hygiene regimen  - Implement oral medicated treatments as ordered  - Initiate Nutrition services referral as needed  3/25/2021 0141 by Keyonna Ambrosio RN  Outcome: Progressing  3/25/2021 0141 by Keyonna Ambrosio RN  Outcome: Progressing     Problem: CARDIOVASCULAR - ADULT  Goal: Maintains optimal cardiac output and hemodynamic stability  Description: INTERVENTIONS:  - Monitor I/O, vital signs and rhythm  - Monitor for S/S and trends of decreased cardiac output  - Administer and titrate ordered vasoactive medications to optimize hemodynamic stability  - Assess quality of pulses, skin color and temperature  - Assess for signs of decreased coronary artery perfusion  - Instruct patient to report change in severity of symptoms  3/25/2021 0141 by Keyonna Ambrosio RN  Outcome: Progressing  3/25/2021 0141 by Keyonna Ambrosio RN  Outcome: Progressing  Goal: Absence of cardiac dysrhythmias or at baseline rhythm  Description: INTERVENTIONS:  - Continuous cardiac monitoring, vital signs, obtain 12 lead EKG if ordered  - Administer antiarrhythmic and heart rate control medications as ordered  - Monitor electrolytes and administer replacement therapy as ordered  3/25/2021 0141 by Celeste Hollis RN  Outcome: Progressing  3/25/2021 0141 by Celeste Hollis RN  Outcome: Progressing     Problem: METABOLIC, FLUID AND ELECTROLYTES - ADULT  Goal: Electrolytes maintained within normal limits  Description: INTERVENTIONS:  - Monitor labs and assess patient for signs and symptoms of electrolyte imbalances  - Administer electrolyte replacement as ordered  - Monitor response to electrolyte replacements, including repeat lab results as appropriate  - Instruct patient on fluid and nutrition as appropriate  3/25/2021 0141 by Celeste Hollis RN  Outcome: Progressing  3/25/2021 0141 by Celeste Hollis RN  Outcome: Progressing  Goal: Fluid balance maintained  Description: INTERVENTIONS:  - Monitor labs   - Monitor I/O and WT  - Instruct patient on fluid and nutrition as appropriate  - Assess for signs & symptoms of volume excess or deficit  3/25/2021 0141 by Celeste Hollis RN  Outcome: Progressing  3/25/2021 0141 by Celeste Hollis RN  Outcome: Progressing

## 2021-03-25 NOTE — CASE MANAGEMENT
Cm reviewed pt care coordination rounds with Dr Radha Mejia  Pt is medically cleared for d/c today with [facility]  Ambulatory referral was sent to OP CM to f/u for HF management  Daughter will  after 3:30 pm  Care team made aware  IMM was reviewed and signed  Signature page was placed in scan bin

## 2021-03-25 NOTE — PROGRESS NOTES
Progress Note - Cardiology   Beau Mann 80 y o  male MRN: [id number]  Unit/Bed#: E4 -01 Encounter: [id number]      Assessment/Recommendations/Discussion:   1   acute on chronic systolic and diastolic heart failure   2  Nonischemic cardiomyopathy with ejection fraction 25%   3  Non MI troponin elevation secondary to CHF   4  Permanent atrial fibrillation with prior AV node ablation and [employer]/ [employer] CRT pacemaker   5  Acute kidney injury on chronic kidney disease    PLAN   Has made dramatic improvement with diuretics   Creatinine is elevated, we will need to accept a higher baseline level in order to keep him euvolemic  Will need outpatient follow-up with Nephrology in 1 week   Continue with p o  Torsemide 40 mg daily as well as rocks line 2 5 mg once a week   Continue Eliquis and plan for discharge home today  Discussed with Primary Medicine attending        Subjective:   HPI  Doing well, wants to go home, denies shortness of breath, lower extremity edema has improved significantly      Review of Systems: As noted in HPI  Rest of ROS is negative  Vitals:   BP 96/58 (BP Location: Right arm)   Pulse 69   Temp (!) 96 4 °F (35 8 °C) (Temporal)   Resp 20   Ht 5' 9" (1 753 m)   Wt 73 6 kg (162 lb 4 1 oz)   SpO2 96%   BMI 23 96 kg/m²   Vitals:    03/24/21 0600 03/25/21 0446   Weight: 72 4 kg (159 lb 9 8 oz) 73 6 kg (162 lb 4 1 oz)     No intake or output data in the 24 hours ending 03/25/21 1032    Physical Exam:  General appearance: alert and oriented, in no acute distress  Head: Normocephalic, without obvious abnormality, atraumatic  Eyes: conjunctivae/corneas clear  Anicteric    Neck: no adenopathy, no carotid bruit, no JVD  Lungs: clear to auscultation bilaterally  Heart: regular rate and rhythm, S1, S2 normal, no murmur, no click, rub or gallop  Abdomen:  soft, non-tender; bowel sounds normal; no masses,  no organomegaly  Extremities:  Still with 1 to 2+ edema  Skin: Skin color, texture, turgor normal  No rashes or lesions     TELEMETRY:   Lab Results:  Results from last 7 days   Lab Units 03/19/21  0606   WBC Thousand/uL 6 67   HEMOGLOBIN g/dL 13 9   HEMATOCRIT % 43 4   PLATELETS Thousands/uL 214     Results from last 7 days   Lab Units 03/25/21  0439   POTASSIUM mmol/L 4 2   CHLORIDE mmol/L 102   CO2 mmol/L 30   BUN mg/dL 93*   CREATININE mg/dL 2 71*   CALCIUM mg/dL 9 3     Results from last 7 days   Lab Units 03/25/21  0439   POTASSIUM mmol/L 4 2   CHLORIDE mmol/L 102   CO2 mmol/L 30   BUN mg/dL 93*   CREATININE mg/dL 2 71*   CALCIUM mg/dL 9 3           Medications:    Current Facility-Administered Medications:     apixaban (ELIQUIS) tablet 2 5 mg, 2 5 mg, Oral, BID, Carla Paez MD, 2 5 mg at 03/24/21 2109    bisoprolol (ZEBETA) tablet 2 5 mg, 2 5 mg, Oral, Daily, Jeffery Chew MD, 2 5 mg at 03/24/21 6480    finasteride (PROSCAR) tablet 5 mg, 5 mg, Oral, Daily, Carla Paez MD, 5 mg at 03/24/21 6964    hydrOXYzine HCL (ATARAX) tablet 25 mg, 25 mg, Oral, Q6H PRN, Anita Kirby DO, 25 mg at 03/24/21 2109    loratadine (CLARITIN) tablet 10 mg, 10 mg, Oral, Daily, Viral Kirby DO, 10 mg at 03/24/21 0941    pantoprazole (PROTONIX) EC tablet 40 mg, 40 mg, Oral, Daily, Carla Paez MD, 40 mg at 03/24/21 0600    This note was completed in part utilizing M-Modal Fluency Direct Software  Grammatical errors, random word insertions, spelling mistakes, and incomplete sentences may be an occasional consequence of this system secondary to software limitations, ambient noise, and hardware issues  If you have any questions or concerns about the content, text, or information contained within the body of this dictation, please contact the provider for clarification      Shai Hunt DO, [facility]  3/25/2021 10:32 AM

## 2021-03-25 NOTE — DISCHARGE SUMMARY
[address] [facility]  Discharge- Pushpa Barkley 6/16/1927, 80 y o  male MRN: [id number]  Unit/Bed#: E4 -01 Encounter: [id number]  Primary Care Provider: VARSHA Kessler MD   Date and time admitted to hospital: 3/9/2021 12:52 PM    * Cellulitis of left leg  Assessment & Plan  80year old male admitted due to cellulitis of his left leg   - Completed antibiotic therapy    Acute on chronic systolic CHF (congestive heart failure) ([facility])  Assessment & Plan  Wt Readings from Last 3 Encounters:   03/25/21 73 6 kg (162 lb 4 1 oz)   03/09/21 81 7 kg (180 lb 3 2 oz)   02/26/21 83 5 kg (184 lb)     EF 25%  - Euvolemic  Transitioned to PO  Discharge today  Outpatient renal and cardiology follow-up  [facility])  Assessment & Plan  S/p PPM   - Continue bisoprolol  - Continue Eliquis    Acute kidney injury superimposed on chronic kidney disease ([facility] [address] )  Assessment & Plan  Might have to maintain a higher level of creatinine to maintain euvolemia  Recent Labs     03/23/21  0519 03/24/21  0533 03/25/21  0439   BUN 94* 94* 93*   CREATININE 2 33* 2 43* 2 71*   EGFR 23 22 19               BPH (benign prostatic hyperplasia)  Assessment & Plan  Continue finasteride      Discharging Physician / Practitioner: Bettie Lopez MD  PCP: Enid Juarez MD  Admission Date:   Admission Orders (From admission, onward)     Ordered        03/09/21 1413  Inpatient Admission  Once                   Discharge Date: 03/25/21    Resolved Problems  Date Reviewed: 3/25/2021          Resolved    Hypokalemia 3/19/2021     Resolved by  Bettie Lopez MD          Consultations During Hospital Stay:  · Cardiology, renal    Procedures Performed:   · None    Significant Findings / Test Results:   · Imaging  XR Chest: No acute cardiopulmonary disease  · Echo    LEFT VENTRICLE:  The ventricle was mildly dilated  LVIDd 5 8 cm  Systolic function was markedly reduced by visual assessment   Ejection fraction was estimated to be 25 %   There was severe diffuse hypokinesis with distinct regional wall motion abnormalities  There was akinesis of the basal anteroseptal, basal inferior, basal inferolateral, and basal anterolateral wall(s)  Wall thickness was mildly increased  There was mild assymetrical hypertrophy of the septum      RIGHT VENTRICLE:  The ventricle was markedly dilated      RIGHT ATRIUM:  The atrium was markedly dilated      MITRAL VALVE:  There was moderate regurgitation      AORTIC VALVE:  The valve was trileaflet  Leaflets exhibited normal thickness, moderate to marked calcification, and normal cuspal separation  There was mild regurgitation  The valve was not well visualized      TRICUSPID VALVE:  There was moderate to severe regurgitation  Estimated peak PA pressure was at least 45- 50 mmHg, however this may be an underestimate given the severity of the TR seen on color Doppler investigation      PULMONIC VALVE:  There was mild regurgitation      IVC, HEPATIC VEINS:  The inferior vena cava was dilated  Respirophasic changes in dimension were absent  Incidental Findings:   · As written above    Test Results Pending at Discharge (will require follow up): · None     Outpatient Tests Requested:  · Follow-up: PCP, renal, cardiology  · CBC, BMP    Complications:  none    Reason for Admission: cellulitis, amos on ckd    Hospital Course:     Jose Carlos Pyle is a 80 y o  male patient who originally presented to the hospital on 3/9/2021 due to "infection"  58-year-old male admitted due to left lower extremity infection due to cellulitis  He was given IV antibiotic therapy  He also had Amos on CKD with acute on chronic systolic heart failure  He completed a course of antibiotic therapy  He was given IV diuresis  It took a while before we achieved euvolemia, unfortunately his creatinine trended upward  As per Renal, he might have to maintain a higher level of creatinine to maintain euvolemia    He was cleared for discharge today by both Renal and Cardiology  He was requested to follow up with his PCP, Renal, and Cardiology as scheduled    Patient agrees to follow-up with his providers as scheduled and to take his medications as prescribed  All questions were addressed  he understood the need to seek immediate medical attention should he develop any chest pain, shortness of breath, severe pain, fever, chills, or any other concerning symptoms  Please see above list of diagnoses and related plan for additional information  Condition at Discharge: fair     Discharge Day Visit / Exam:     Subjective:  Patient seen and examined at bedside  No acute events or complaints overnight  Comfortable  States that he is feeling much better and believes that we do not have to be "by the book with his management  "He is ready to go home and agrees to follow-up with his outpatient providers  Vitals: Blood Pressure: 93/59 (03/25/21 1229)  Pulse: 61 (03/25/21 1229)  Temperature: (!) 97 1 °F (36 2 °C) (03/25/21 1229)  Temp Source: Temporal (03/25/21 1229)  Respirations: 20 (03/25/21 1229)  Height: 5' 9" (175 3 cm) (03/09/21 1540)  Weight - Scale: 73 6 kg (162 lb 4 1 oz) (03/25/21 0446)  SpO2: 98 % (03/25/21 1229)  Exam:   Physical Exam  Vitals signs reviewed  HENT:      Head: Normocephalic  Nose: Nose normal       Mouth/Throat:      Mouth: Mucous membranes are dry  Eyes:      General: No scleral icterus  Extraocular Movements: Extraocular movements intact  Cardiovascular:      Rate and Rhythm: Normal rate  Pulmonary:      Effort: Pulmonary effort is normal  No respiratory distress  Breath sounds: No wheezing or rales  Abdominal:      General: There is no distension  Palpations: Abdomen is soft  Tenderness: There is no abdominal tenderness  Musculoskeletal:      Right lower leg: Edema present  Left lower leg: Edema present  Skin:     General: Skin is warm     Neurological:      Mental Status: He is alert  Mental status is at baseline  Psychiatric:         Mood and Affect: Mood normal          Behavior: Behavior normal        Discussion with Family: Called son Dante Gilliam and updated him    Discharge instructions/Information to patient and family:   See after visit summary for information provided to patient and family  Provisions for Follow-Up Care:  See after visit summary for information related to follow-up care and any pertinent home health orders  Disposition:     Home with [facility] Services (Reminder: Complete face to face encounter)    For Discharges to [facility] SNF:   · Not Applicable to this Patient - Not Applicable to this Patient    Planned Readmission: No     Discharge Statement:  I spent 40 minutes discharging the patient  This time was spent on the day of discharge  I had direct contact with the patient on the day of discharge  Greater than 50% of the total time was spent examining patient, answering all patient questions, arranging and discussing plan of care with patient as well as directly providing post-discharge instructions  Additional time then spent on discharge activities  Discharge Medications:  See after visit summary for reconciled discharge medications provided to patient and family        ** Please Note: This note has been constructed using a voice recognition system **

## 2021-03-25 NOTE — PHYSICAL THERAPY NOTE
03/25/21 1106   PT Last Visit   PT Visit Date 03/25/21   Note Type   Note Type Treatment   Pain Assessment   Pain Assessment Tool Pain Assessment not indicated - pt denies pain   Restrictions/Precautions   Weight Bearing Precautions Per Order No   Other Precautions Chair Alarm; Bed Alarm; Fall Risk;Multiple lines;Telemetry   General   Chart Reviewed Yes   Family/Caregiver Present No   Cognition   Overall Cognitive Status WFL   Arousal/Participation Alert; Cooperative   Attention Attends with cues to redirect   Orientation Level Oriented X4   Memory Decreased recall of precautions   Following Commands Follows one step commands without difficulty   Comments pt agreed to PT session   Subjective   Subjective "I'm supposed to be going home today so I hope they get everything I need set up "   Bed Mobility   Additional Comments pt OOB in chair to begin & end session  Alarm on & all needs in reach   Transfers   Sit to Stand 5  Supervision   Additional items Assist x 1; Armrests; Increased time required;Verbal cues   Stand to Sit 5  Supervision   Additional items Assist x 1; Armrests; Increased time required;Verbal cues   Ambulation/Elevation   Gait pattern Forward Flexion;Decreased foot clearance; Inconsistent chantelle; Short stride; Excessively slow   Gait Assistance 5  Supervision   Additional items Assist x 1;Verbal cues   Assistive Device Rolling walker   Distance 250 feet  (pt deferred further amb to save energy to go home)   Stair Management Assistance Not tested   Balance   Static Sitting Good   Dynamic Sitting Good   Static Standing Fair   Dynamic Standing Fair   Ambulatory Fair -   Endurance Deficit   Endurance Deficit No   Activity Tolerance   Activity Tolerance Patient tolerated treatment well   Exercises   Hip Flexion Sitting;AROM; Bilateral  (3x 15)   Hip Abduction Sitting;AROM; Bilateral  (3 x 15)   Hip Adduction Sitting;AROM; Bilateral  (30 reps)   Knee AROM [employer]; Bilateral  (3 x 15 )   Ankle Pumps Sitting;AROM; Bilateral  (3 x 15)   Marching Standing;5 reps;AROM; Bilateral   Assessment   Prognosis Good   Problem List Decreased strength;Decreased range of motion;Decreased endurance; Impaired balance;Decreased mobility; Decreased skin integrity   Assessment Pt seen for PT treatment session this date with interventions consisting of gait training w/ emphasis on improving pt's ability to ambulate level surfaces x 250 feet with close S provided by therapist with RW and Therapeutic exercise consisting of: AROM 5 - 3 x 15 reps each B LE in sitting and standing with B UE support position  Pt agreeable to PT treatment session upon arrival, pt found seated OOB in chair, in no apparent distress  In comparison to previous session, pt with improvements in ex tolerance  Post session: pt returned back to recliner, chair alarm engaged and all needs in reach Continue to recommend Home PT with family support at time of d/c in order to maximize pt's functional independence and safety w/ mobility  Pt continues to be functioning below baseline level, and remains limited 2* factors listed above and including decreased strength, endurance & safe functional mobility  PT will continue to see pt while here in order to address the deficits listed above and provide interventions consistent w/ POC in effort to achieve STGs  Goals   Patient Goals to go home today   PT Treatment Day 6   Plan   Treatment/Interventions Functional transfer training;LE strengthening/ROM; Elevations; Therapeutic exercise; Endurance training;Patient/family training;Equipment eval/education; Bed mobility;Gait training   Progress Progressing toward goals   PT Frequency   (3-5 x week)   Recommendation   PT Discharge Recommendation Home with skilled therapy; Return to previous environment with social support   PT - OK to Discharge Yes  (when med cleared)   John 8 in Bed Without Bedrails 4   Lying on Back to Sitting on Edge of Flat Bed 4   Moving Bed to Chair 3   Standing Up From Chair 3   Walk in Room 3   Climb 3-5 Stairs 3   Basic Mobility Inpatient Raw Score 20   Basic Mobility Standardized Score 43 99   Abimael Anne, PTA

## 2021-03-26 NOTE — TELEPHONE ENCOUNTER
Pt called, LMOM just wanted to let us know that he was D/C from the hospital yesterday from when we sent him from the office on the 9th

## 2021-03-26 NOTE — NURSING NOTE
[facility]  [facility], all belongings taken  Discharge teaching and instruction performed, pt and daughter verbalized understanding of the same

## 2021-03-29 NOTE — TELEPHONE ENCOUNTER
Good Morning Homa,    Can you please assist with scheduling with the CHF team     Thank you,  Jacey Andre

## 2021-04-07 NOTE — TELEPHONE ENCOUNTER
----- Message from Sue Sharma MD sent at 3/25/2021 11:06 AM EDT -----  Regarding: rosalina  Please schedule the patient for hospital discharge follow-up for acute kidney injury to be seen in 1 week  Please send the patient orders for renal function panel  prior to the visit     Adrianna cruz

## 2021-04-07 NOTE — TELEPHONE ENCOUNTER
I spoke to the patient and we scheduled a hospital follow up appointment with Jenny Gayle in the QO 4/12/21  He would like to do a telemed appointment  He had blood work on 3/30/21 and is getting more on 4/8/21

## 2021-04-12 NOTE — PROGRESS NOTES
Virtual Regular Visit  It was my intent to perform this visit via video technology but the patient was not able to do a video connection so the visit was completed via audio telephone only  Assessment/Plan:    Problem List Items Addressed This Visit     None        1  Recent SUDHIR on CKD IV: suspect acute kidney injury due to cardiorenal and ATN   Chronic kidney disease likely related to age-related nephron loss and cardiorenal syndrome  · Baseline creatinine around 1 6-1 9  · Creatinine peaked to 2 71 in the hospital and on discharge was down to 1 9  Most recent creatinine on 04/08 stable at 1 9  2  Hypertension: BP a little soft but holds his bisoprolol on days his BP is low   3  Chronic systolic CHF with EF 08%: weight trending down appropriately   · Continue torsemide 40mg daily and metolazone 2 5mg weekly  · Asked him to call if weight changes >5lbs in a week or 3lb in a day   · On a low sodium diet and fluid restriction   4  Hyponatremia: sodium 133 recently  Likely due to CHF   · Continue 1500 cc per day oral fluid restriction and diuretics as above  5  BPH:  On Proscar    Currently getting weekly BMPs by his primary doctor  Will repeat renal labs in 3 months and can follow-up with us at that time    Reason for visit is No chief complaint on file  Encounter provider Yessica Mann PA-C    Provider located at [address]  [address] [place] [address]      Recent Visits  No visits were found meeting these conditions  Showing recent visits within past 7 days and meeting all other requirements     Today's Visits  Date Type Provider Dept   04/12/21 Telemedicine Yessica Mann PA-C [facility]   Showing today's visits and meeting all other requirements     Future Appointments  No visits were found meeting these conditions     Showing future appointments within next 150 days and meeting all other requirements        The patient was identified by name and date of birth  Wilber Bynum was informed that this is a telemedicine visit and that the visit is being conducted through telephone  My office door was closed  No one else was in the room  He acknowledged consent and understanding of privacy and security of the video platform  The patient has agreed to participate and understands they can discontinue the visit at any time  Patient is aware this is a billable service  Subjective  Wilber yBnum is a 80 y o  male    Patient was admitted to [address] [place] [address] from 3/9-3/25 with left leg cellulitis  He was treated with IV antibiotics  Also found to have acute kidney injury and acute systolic CHF  He was given IV Lasix  Patient presents today for follow-up  Patient has been feeling very well since being discharged  He has been following with wound care for his cellulitis reports that his legs are mostly healed  In terms of his CHF he has been taking metolazone every Saturday and has lost about 5 lb in the past 2 days after taking the metolazone  He states that his edema has resolved and his shortness of breath has also resolved  He has been following a low-sodium diet and fluid restriction about 1500 cc per day  He has been working with physical therapy and feels very good exerting himself  He checks his blood pressure every day at home and reports that for the most part his systolic blood pressure is 1   On the days that it is less than 110 he does not take his beta-blocker  He has no urinary complaints  He has not use any NSAIDs  No recent nausea, vomiting or diarrhea        Past Medical History:   Diagnosis Date    Anemia     Atrial fibrillation (HCC)     BPH (benign prostatic hypertrophy)     Cancer (HCC)     COLON    Chronic kidney disease     Hypertension     Irregular heart beat     afib       Past Surgical History:   Procedure Laterality Date    BASAL CELL CARCINOMA EXCISION      CARDIAC PACEMAKER PLACEMENT      CARDIAC SURGERY      CARDIOVERSION      CARPAL TUNNEL RELEASE      CATARACT EXTRACTION      COLON SURGERY      COLONOSCOPY      ME COLONOSCOPY FLX DX W/COLLJ SPEC WHEN PFRMD N/A 11/30/2018    Procedure: COLONOSCOPY w egd  by dr Farhan Mcintyre;  Surgeon: Kodak West MD;  Location: [facility]; Service: Colorectal    ME LAP,SURG,COLECTOMY, PARTIAL, W/ANAST N/A 1/8/2019    Procedure: Left hemicolectomy, takedown splenic flexure, end to end transverse to sigmoid colon anastamosis; Surgeon: Kodak West MD;  Location: [facility];  Service: Colorectal       Current Outpatient Medications   Medication Sig Dispense Refill    apixaban (Eliquis) 2 5 mg Take 1 tablet (2 5 mg total) by mouth 2 (two) times a day 180 tablet 3    bisoprolol (ZEBETA) 5 mg tablet Take 0 5 tablets (2 5 mg total) by mouth daily (Patient taking differently: Take 2 5 mg by mouth daily Take >110) 15 tablet 0    finasteride (PROSCAR) 5 mg tablet Take 5 mg by mouth daily      hydrOXYzine HCL (ATARAX) 25 mg tablet Take 25 mg by mouth every 6 (six) hours as needed for itching      metolazone (ZAROXOLYN) 2 5 mg tablet Take 2 5 mg by mouth once a week       Multiple Vitamins-Minerals (CENTRUM SILVER 50+MEN PO) Take 1 tablet by mouth daily      pantoprazole (PROTONIX) 40 mg tablet Take 40 mg by mouth daily      torsemide (DEMADEX) 20 mg tablet Take 2 tablets (40 mg total) by mouth daily 60 tablet 0    CHLORPHENIRAMINE MALEATE PO Take by mouth every 4 (four) hours as needed       No current facility-administered medications for this visit  Allergies   Allergen Reactions    Lasix [Furosemide] Other (See Comments)     Weakness, decreased BP    Adhesive [Medical Tape] Rash    Neomycin-Bacitracin Zn-Polymyx Rash    Neosporin [Neomycin-Polymyxin-Gramicidin] Rash       Review of Systems:A complete review of systems was performed  Pertinent positives and negatives noted in the HPI  Otherwise the review of systems was negative  Video Exam    Vitals:    04/12/21 1409   BP: 106/58   BP Location: Left arm   Patient Position: Sitting   Cuff Size: Standard   Pulse: 70   Weight: 72 7 kg (160 lb 4 8 oz)   Height: 5' 9" (1 753 m)       Physical Exam  Pulmonary:      Effort: Pulmonary effort is normal    Neurological:      General: No focal deficit present  Mental Status: He is alert and oriented to person, place, and time  Psychiatric:         Mood and Affect: Mood normal          Thought Content: Thought content normal           I spent 10 minutes directly with the patient during this visit      Keri Eastonaronnoa [address] acknowledges that he has consented to an online visit or consultation  He understands that the online visit is based solely on information provided by him, and that, in the absence of a face-to-face physical evaluation by the physician, the diagnosis he receives is both limited and provisional in terms of accuracy and completeness  This is not intended to replace a full medical face-to-face evaluation by the physician  Elinor Salomon understands and accepts these terms

## 2021-05-07 NOTE — PROGRESS NOTES
Results for orders placed or performed in visit on 05/07/21   Cardiac EP device report    Narrative    SJM CRT-P (VVIR 70)/ NOT MRI CONDITIONAL  MERLIN TRANSMISSION: BATTERY VOLTAGE ADEQUATE (5 1-5 5 YRS)  BVP-94%  ALL AVAILABLE LEAD PARAMETERS WITHIN NORMAL LIMITS  NO NEW SIGNIFICANT HIGH RATE EPISODES  CORVUE IMPEDANCE MONITORING WITHIN NORMAL LIMITS  NORMAL DEVICE FUNCTION   GV

## 2021-05-11 NOTE — PATIENT INSTRUCTIONS
Increase torsemide to 20mg twice a day  Obtain blood work as ordered by PCP  We will refer you to cardiac rehab

## 2021-05-11 NOTE — PROGRESS NOTES
Advanced Heart Failure Outpatient Consult Note - Concepcion Zarco 80 y o  male MRN: [id number]    Encounter: [id number]      Assessment/Plan:    Patient Active Problem List    Diagnosis Date Noted    Ventricular tachycardia ([place] [id number] ) 03/21/2021    Alkalosis 03/19/2021    Hypercalcemia 03/16/2021    Hyperphosphatemia 03/15/2021    Acute on chronic systolic CHF (congestive heart failure) ([facility] [place] [facility] ) 03/11/2021    Cellulitis of left leg 03/09/2021    Acute kidney injury superimposed on chronic kidney disease ([place] [address] ) 03/09/2021    Lower extremity edema 09/09/2020    Gastroesophageal reflux disease with esophagitis 08/27/2019    Cancer of splenic flexure ([facility] [place] [facility] ) 12/13/2018    Malignant neoplasm of colon ([place] [address] ) 10/18/2018    Biventricular cardiac pacemaker in situ 05/03/2018    Anticoagulation adequate 05/03/2018    Stage 3 chronic kidney disease ([facility] [place] [facility] ) 02/08/2017    Dehydration 02/08/2017    Hypertensive kidney disease with chronic kidney disease stage III ([place] [address] ) 02/08/2017    A-fib ([place] [address] ) 02/08/2017    Cardiomyopathy ([place] [address] ) 02/08/2017    BPH (benign prostatic hyperplasia) 02/08/2017     Heart failure with reduced ejection fraction, Stage C, NYHA III  Etiology: Nonischemic  Per records, EF in the 25s dating back to 2005, cardiac cath at that time showed normal coronaries  Warm and wet on exam     Studies- personally reviewed by me    Echocardiogram 3/10/21  LVEF: 25%  LVIDd: 5 8cm  RV: markedly dilated, normal function  MR: moderate  PASP: 45-50mmHg, moderate to severe TR  RVOT:   Other: Mild AI    TTE 12/21/2018: LVEF 45%  RV mildly dilated, reduced systolic function  PASP 42mmHg     Diet:  2 g sodium diet  2000 mL fluid restriction    TTE 7/2016: LVEF lower limits of normal  TTE 3/2015: LVEF 29%  TTE 11/2014: LVEF 50%    Neurohormonal Blockade:  --Beta-Blocker: Bisoprolol 2 5mg daily  --ACEi, ARB or ARNi:  --Aldosterone Receptor Blocker:  --SGLT2 Inhibitor:  --Diuretic: Torsemide 40mg daily, metolazone 2 5mg weekly    Sudden Cardiac Death Risk Reduction:  --ICD: None    Electrical Resynchronization:  --Candidacy for BiV device: CRT-P  5/7/21: VVIR  BVP 94%  No new significant high rate episodes  CoreVue impedance monitoring within normal limits  Normal device function  Advanced Therapies (if appropriate):  --Age precludes    Permanent atrial fibrillation with prior AV node ablation s/p CRT-P 2015  Rate: bisoprolol 2 5mg daily  AC: apixaban 2 5mg BID  Chronic kidney disease  Hypertension, BP controlled    Today's Plan:  Increase torsemide to 20mg BID  Continue metolazone 2 5mg weekly  BMP as ordered by PCP  On low dose bisoprolol, BP limiting for uptitration/additional guideline directed medical therapy  Referral to cardiac rehab  Follow up in 2 weeks      HPI:   80year old male with chronic systolic heart failure, permanent atrial fibrillation s/p AVN ablation, PPM, CKD, HTN who is referred for heart failure management  He presented with worsening lower extremity edema and weight on 3/10/21 and was admitted for decompensated heart failure  Also with SUDHIR at that time with creatinine up to 2 4 from baseline of 1 9  Creatinine on discharge was 2 7  Torsemide dose has been decreased to 30mg from 40mg daily on outpatient follow up with PCP  Recently with increasing lower extremity edema  Also with fatigue that seems to improve with physical therapy  Past Medical History:   Diagnosis Date    Anemia     Atrial fibrillation (HCC)     BPH (benign prostatic hypertrophy)     Cancer (HCC)     COLON    Chronic kidney disease     Hypertension     Irregular heart beat     afib       Review of Systems   Constitutional: Positive for fatigue  Negative for chills and fever  HENT: Negative for ear pain and sore throat  Eyes: Negative for pain and visual disturbance  Respiratory: Negative for cough and shortness of breath  Cardiovascular: Positive for leg swelling  Negative for chest pain and palpitations  Gastrointestinal: Negative for abdominal pain and vomiting  Genitourinary: Negative for dysuria and hematuria  Musculoskeletal: Negative for arthralgias and back pain  Skin: Negative for color change and rash  Neurological: Negative for dizziness, seizures, syncope and light-headedness  All other systems reviewed and are negative  Allergies   Allergen Reactions    Lasix [Furosemide] Other (See Comments)     Weakness, decreased BP    Adhesive [Medical Tape] Rash    Neomycin-Bacitracin Zn-Polymyx Rash    Neosporin [Neomycin-Polymyxin-Gramicidin] Rash       Current Outpatient Medications:     apixaban (Eliquis) 2 5 mg, Take 1 tablet (2 5 mg total) by mouth 2 (two) times a day, Disp: 180 tablet, Rfl: 3    bisoprolol (ZEBETA) 5 mg tablet, Take 0 5 tablets (2 5 mg total) by mouth daily (Patient taking differently: Take 2 5 mg by mouth daily Take >110), Disp: 15 tablet, Rfl: 0    finasteride (PROSCAR) 5 mg tablet, Take 5 mg by mouth daily, Disp: , Rfl:     metolazone (ZAROXOLYN) 2 5 mg tablet, Take 2 5 mg by mouth once a week , Disp: , Rfl:     Multiple Vitamins-Minerals (CENTRUM SILVER 50+MEN PO), Take 1 tablet by mouth daily, Disp: , Rfl:     pantoprazole (PROTONIX) 40 mg tablet, Take 40 mg by mouth daily, Disp: , Rfl:     Potassium Chloride ER 20 MEQ TBCR, , Disp: , Rfl:     torsemide (DEMADEX) 20 mg tablet, Take 1 tablet (20 mg total) by mouth 2 (two) times a day, Disp: 60 tablet, Rfl: 1    CHLORPHENIRAMINE MALEATE PO, Take by mouth every 4 (four) hours as needed, Disp: , Rfl:     hydrOXYzine HCL (ATARAX) 25 mg tablet, Take 25 mg by mouth every 6 (six) hours as needed for itching, Disp: , Rfl:     Social History     Socioeconomic History    Marital status:       Spouse name: Not on file    Number of children: Not on file    Years of education: Not on file    Highest education level: Not on file   Occupational History    Not on file   Social Needs    Financial resource strain: Not on file    Food insecurity     Worry: Not on file     Inability: Not on file    Transportation needs     Medical: Not on file     Non-medical: Not on file   Tobacco Use    Smoking status: Never Smoker    Smokeless tobacco: Never Used   Substance and Sexual Activity    Alcohol use: Yes     Comment: occasional    Drug use: No    Sexual activity: Not on file   Lifestyle    Physical activity     Days per week: Not on file     Minutes per session: Not on file    Stress: Not on file   Relationships    Social connections     Talks on phone: Not on file     Gets together: Not on file     Attends Christian service: Not on file     Active member of club or organization: Not on file     Attends meetings of clubs or organizations: Not on file     Relationship status: Not on file    Intimate partner violence     Fear of current or ex partner: Not on file     Emotionally abused: Not on file     Physically abused: Not on file     Forced sexual activity: Not on file   Other Topics Concern    Not on file   Social History Narrative    Not on file       Family History   Problem Relation Age of Onset    Heart disease Father     Heart attack Father     Hypertension Father     Heart disease Brother     Heart attack Brother 64    Hypertension Brother     Heart attack Brother 67    Hypertension Brother     Arrhythmia Neg Hx     Asthma Neg Hx     Clotting disorder Neg Hx     Heart failure Neg Hx        Physical Exam:    Vitals: Blood pressure (!) 100/46, pulse 75, height 5' 9" (1 753 m), weight 80 2 kg (176 lb 12 8 oz), SpO2 98 %  , Body mass index is 26 11 kg/m² ,   Wt Readings from Last 3 Encounters:   05/11/21 80 2 kg (176 lb 12 8 oz)   04/12/21 72 7 kg (160 lb 4 8 oz)   03/25/21 73 6 kg (162 lb 4 1 oz)       Physical Exam  Constitutional:       General: He is not in acute distress  Appearance: Normal appearance  HENT:      Head: Normocephalic and atraumatic        Mouth/Throat:      Mouth: Mucous membranes are moist    Eyes:      General: No scleral icterus  Extraocular Movements: Extraocular movements intact  Neck:      Musculoskeletal: Neck supple  Cardiovascular:      Rate and Rhythm: Normal rate and regular rhythm  Pulses: Normal pulses  Heart sounds: S1 normal and S2 normal  No murmur  No friction rub  No gallop  Comments: Elevated JVP  Bilateral pitting lower extremity edema  Pulmonary:      Breath sounds: Normal breath sounds  Abdominal:      General: There is no distension  Palpations: Abdomen is soft  Tenderness: There is no abdominal tenderness  There is no guarding or rebound  Musculoskeletal: Normal range of motion  Skin:     General: Skin is warm and dry  Capillary Refill: Capillary refill takes less than 2 seconds  Neurological:      General: No focal deficit present  Mental Status: He is alert and oriented to person, place, and time     Psychiatric:         Mood and Affect: Mood normal          Labs & Results:    Lab Results   Component Value Date    WBC 6 67 03/19/2021    HGB 13 9 03/19/2021    HCT 43 4 03/19/2021    MCV 98 03/19/2021     03/19/2021     Lab Results   Component Value Date    SODIUM 133 (L) 05/05/2021    K 4 0 05/05/2021    CL 94 (L) 05/05/2021    CO2 32 05/05/2021    BUN 52 (H) 05/05/2021    CREATININE 1 94 (H) 05/05/2021    GLUC 98 03/25/2021    CALCIUM 9 5 05/05/2021     Lab Results   Component Value Date    NTBNP 18,573 (H) 03/09/2021      Lab Results   Component Value Date    CHOLESTEROL 147 10/06/2020    CHOLESTEROL 128 09/18/2019    CHOLESTEROL 136 10/02/2018     Lab Results   Component Value Date    HDL 30 (L) 10/06/2020    HDL 28 (L) 09/18/2019    HDL 28 (L) 10/02/2018     Lab Results   Component Value Date    TRIG 124 10/06/2020    TRIG 135 09/18/2019    TRIG 126 10/02/2018     Lab Results   Component Value Date    NONHDLC 117 10/06/2020    [place] 100 09/18/2019       EKG personally reviewed by Cyrus Moncada Aiyana Bean MD      Counseling / Coordination of Care  Time spent today 40 minutes  Greater than 50% of total time was spent with the patient and / or family counseling and / or coordination of care  We discussed diagnoses, most recent studies and any changes in treatment    Thank you for the opportunity to participate in the care of this patient      Dacia Macdonald MD  ADVANCED HEART FAILURE AND MECHANICAL CIRCULATORY SUPPORT  [facility]

## 2021-05-27 NOTE — PATIENT INSTRUCTIONS
Continue current torsemide and metolazone dose  Daily weights  Cardiac rehab  Obtain blood work (basic metabolic panel)

## 2021-05-27 NOTE — PROGRESS NOTES
Advanced Heart Failure Outpatient Progress Note - Wilber Bynum 80 y o  male MRN: [id number]    Encounter: [id number]      Assessment/Plan:    Patient Active Problem List    Diagnosis Date Noted    Ventricular tachycardia ([facility] ) 03/21/2021    Alkalosis 03/19/2021    Hypercalcemia 03/16/2021    Hyperphosphatemia 03/15/2021    Acute on chronic systolic CHF (congestive heart failure) ([facility] ) 03/11/2021    Cellulitis of left leg 03/09/2021    Acute kidney injury superimposed on chronic kidney disease ([facility] ) 03/09/2021    Lower extremity edema 09/09/2020    Gastroesophageal reflux disease with esophagitis 08/27/2019    Cancer of splenic flexure ([facility] ) 12/13/2018    Malignant neoplasm of colon ([place] [address] ) 10/18/2018    Biventricular cardiac pacemaker in situ 05/03/2018    Anticoagulation adequate 05/03/2018    Stage 3 chronic kidney disease ([facility] ) 02/08/2017    Dehydration 02/08/2017    Hypertensive kidney disease with chronic kidney disease stage III ([place] [address] ) 02/08/2017    A-fib ([facility] ) 02/08/2017    Cardiomyopathy ([place] [address] ) 02/08/2017    BPH (benign prostatic hyperplasia) 02/08/2017     Heart failure with reduced ejection fraction, Stage C, NYHA III  Etiology: Nonischemic  Per records, EF in the 25s dating back to 2005, cardiac cath at that time showed normal coronaries  Intravascularly euvolemic, dependent edema    Studies- personally reviewed by me    Echocardiogram 3/10/21  LVEF: 25%  LVIDd: 5 8cm  RV: markedly dilated, normal function  MR: moderate  PASP: 45-50mmHg, moderate to severe TR  RVOT:   Other: Mild AI    TTE 12/21/2018: LVEF 45%  RV mildly dilated, reduced systolic function  PASP 42mmHg       Diet:  2 g sodium diet  2000 mL fluid restriction    TTE 7/2016: LVEF lower limits of normal  TTE 3/2015: LVEF 29%  TTE 11/2014: LVEF 50%    Neurohormonal Blockade:  --Beta-Blocker: Bisoprolol 2 5mg daily  --ACEi, ARB or ARNi:  --Aldosterone Receptor Blocker:  --SGLT2 Inhibitor:  --Diuretic: Torsemide 20mg BID, metolazone 2 5mg weekly    Sudden Cardiac Death Risk Reduction:  --ICD: None    Electrical Resynchronization:  --Candidacy for BiV device: CRT-P  5/17/21: BVP 94%  2 HVR episodes, afib  Normal device function    Advanced Therapies (if appropriate):  --Age precludes    Permanent atrial fibrillation with prior AV node ablation s/p CRT-P 2015  Rate: bisoprolol 2 5mg daily  AC: apixaban 2 5mg BID  Chronic kidney disease, recent creatinine between 1 9-2  Hypertension, BP controlled    Today's Plan:  Continue torsemide 20mg BID  Continue metolazone 2 5mg weekly  Repeat BMP  On low dose bisoprolol, BP limiting for uptitration/additional guideline directed medical therapy  Cardiac rehab  Follow up in 6 weeks      HPI:   80year old male with chronic systolic heart failure, permanent atrial fibrillation s/p AVN ablation, PPM, CKD, HTN who is referred for heart failure management  He presented with worsening lower extremity edema and weight on 3/10/21 and was admitted for decompensated heart failure  Also with SUDHIR at that time with creatinine up to 2 4 from baseline of 1 9  Creatinine on discharge was 2 7  Torsemide dose has been decreased to 30mg from 40mg daily on outpatient follow up with PCP  Recently with increasing lower extremity edema  Also with fatigue that seems to improve with physical therapy  Interval History:   5/7/21: Overall better with less lower extremity edema  Energy level a little better  Has on and off days  No lightheadedness or palpitations  Past Medical History:   Diagnosis Date    Anemia     Atrial fibrillation (HCC)     BPH (benign prostatic hypertrophy)     Cancer (HCC)     COLON    Chronic kidney disease     Hypertension     Irregular heart beat     afib       Review of Systems   Constitutional: Positive for fatigue  Negative for chills and fever  HENT: Negative for ear pain and sore throat  Eyes: Negative for pain and visual disturbance     Respiratory: Negative for cough and shortness of breath  Cardiovascular: Positive for leg swelling  Negative for chest pain and palpitations  Gastrointestinal: Negative for abdominal pain and vomiting  Genitourinary: Negative for dysuria and hematuria  Musculoskeletal: Negative for arthralgias and back pain  Skin: Negative for color change and rash  Neurological: Negative for dizziness, seizures, syncope and light-headedness  All other systems reviewed and are negative  Allergies   Allergen Reactions    Lasix [Furosemide] Other (See Comments)     Weakness, decreased BP    Adhesive [Medical Tape] Rash    Neomycin-Bacitracin Zn-Polymyx Rash    Neosporin [Neomycin-Polymyxin-Gramicidin] Rash       Current Outpatient Medications:     apixaban (Eliquis) 2 5 mg, Take 1 tablet (2 5 mg total) by mouth 2 (two) times a day, Disp: 180 tablet, Rfl: 3    bisoprolol (ZEBETA) 5 mg tablet, Take 0 5 tablets (2 5 mg total) by mouth daily (Patient taking differently: Take 2 5 mg by mouth daily Take >110), Disp: 15 tablet, Rfl: 0    finasteride (PROSCAR) 5 mg tablet, Take 5 mg by mouth daily, Disp: , Rfl:     hydrOXYzine HCL (ATARAX) 25 mg tablet, Take 25 mg by mouth every 6 (six) hours as needed for itching, Disp: , Rfl:     metolazone (ZAROXOLYN) 2 5 mg tablet, Take 2 5 mg by mouth once a week , Disp: , Rfl:     Multiple Vitamins-Minerals (CENTRUM SILVER 50+MEN PO), Take 1 tablet by mouth daily, Disp: , Rfl:     pantoprazole (PROTONIX) 40 mg tablet, Take 40 mg by mouth daily, Disp: , Rfl:     Potassium Chloride ER 20 MEQ TBCR, , Disp: , Rfl:     torsemide (DEMADEX) 20 mg tablet, Take 1 tablet (20 mg total) by mouth 2 (two) times a day, Disp: 60 tablet, Rfl: 1    CHLORPHENIRAMINE MALEATE PO, Take by mouth every 4 (four) hours as needed, Disp: , Rfl:     Social History     Socioeconomic History    Marital status:       Spouse name: Not on file    Number of children: Not on file    Years of education: Not on file    Highest education level: Not on file   Occupational History    Not on file   Social Needs    Financial resource strain: Not on file    Food insecurity     Worry: Not on file     Inability: Not on file    Transportation needs     Medical: Not on file     Non-medical: Not on file   Tobacco Use    Smoking status: Never Smoker    Smokeless tobacco: Never Used   Substance and Sexual Activity    Alcohol use: Yes     Comment: occasional    Drug use: No    Sexual activity: Not on file   Lifestyle    Physical activity     Days per week: Not on file     Minutes per session: Not on file    Stress: Not on file   Relationships    Social connections     Talks on phone: Not on file     Gets together: Not on file     Attends [employer] service: Not on file     Active member of club or organization: Not on file     Attends meetings of clubs or organizations: Not on file     Relationship status: Not on file    Intimate partner violence     Fear of current or ex partner: Not on file     Emotionally abused: Not on file     Physically abused: Not on file     Forced sexual activity: Not on file   Other Topics Concern    Not on file   Social History Narrative    Not on file       Family History   Problem Relation Age of Onset    Heart disease Father     Heart attack Father     Hypertension Father     Heart disease Brother     Heart attack Brother 64    Hypertension Brother     Heart attack Brother 67    Hypertension Brother     Arrhythmia Neg Hx     Asthma Neg Hx     Clotting disorder Neg Hx     Heart failure Neg Hx        Physical Exam:    Vitals: Blood pressure 100/56, pulse 72, height 5' 9" (1 753 m), weight 77 3 kg (170 lb 6 4 oz), SpO2 98 %  , Body mass index is 25 16 kg/m² ,   Wt Readings from Last 3 Encounters:   05/27/21 77 3 kg (170 lb 6 4 oz)   05/11/21 80 2 kg (176 lb 12 8 oz)   04/12/21 72 7 kg (160 lb 4 8 oz)       Physical Exam  Constitutional:       General: He is not in acute distress       Appearance: Normal appearance  HENT:      Head: Normocephalic and atraumatic  Mouth/Throat:      Mouth: Mucous membranes are moist    Eyes:      General: No scleral icterus  Extraocular Movements: Extraocular movements intact  Neck:      Musculoskeletal: Neck supple  Cardiovascular:      Rate and Rhythm: Normal rate and regular rhythm  Pulses: Normal pulses  Heart sounds: S1 normal and S2 normal  No murmur  No friction rub  No gallop  Comments: Nonelevated JVP  1-2+ bilateral lower extremity edema  Pulmonary:      Breath sounds: Normal breath sounds  Abdominal:      General: There is no distension  Palpations: Abdomen is soft  Tenderness: There is no abdominal tenderness  There is no guarding or rebound  Musculoskeletal: Normal range of motion  Skin:     General: Skin is warm and dry  Capillary Refill: Capillary refill takes less than 2 seconds  Neurological:      General: No focal deficit present  Mental Status: He is alert and oriented to person, place, and time     Psychiatric:         Mood and Affect: Mood normal          Labs & Results:    Lab Results   Component Value Date    WBC 6 67 03/19/2021    HGB 13 9 03/19/2021    HCT 43 4 03/19/2021    MCV 98 03/19/2021     03/19/2021     Lab Results   Component Value Date    SODIUM 131 (L) 05/13/2021    K 4 1 05/13/2021    CL 96 (L) 05/13/2021    CO2 28 05/13/2021    BUN 59 (H) 05/13/2021    CREATININE 2 02 (H) 05/13/2021    GLUC 98 03/25/2021    CALCIUM 9 5 05/13/2021     Lab Results   Component Value Date    NTBNP 18,573 (H) 03/09/2021      Lab Results   Component Value Date    CHOLESTEROL 147 10/06/2020    CHOLESTEROL 128 09/18/2019    CHOLESTEROL 136 10/02/2018     Lab Results   Component Value Date    HDL 30 (L) 10/06/2020    HDL 28 (L) 09/18/2019    HDL 28 (L) 10/02/2018     Lab Results   Component Value Date    TRIG 124 10/06/2020    TRIG 135 09/18/2019    TRIG 126 10/02/2018     Lab Results   Component Value Date    [facility] 117 10/06/2020    [facility] 100 09/18/2019       EKG personally reviewed by Arabella Guadarrama MD      Counseling / Coordination of Care  Time spent today 25 minutes  Greater than 50% of total time was spent with the patient and / or family counseling and / or coordination of care  We discussed diagnoses, most recent studies and any changes in treatment    Thank you for the opportunity to participate in the care of this patient      Jocelyn Lee MD  ADVANCED HEART FAILURE AND MECHANICAL CIRCULATORY SUPPORT  [facility]

## 2021-06-03 NOTE — TELEPHONE ENCOUNTER
P/c'd returning your call about BMP  Taking torsemide 20 mg bid / no potassium  Said his CR dose go up and down  Took metolazone Saturday  ( 165 5 lbs) Wt today 165  3  Does not always drink enough  Denies SOB or edema  He states he feels better than he did last 5 days   He feels weak and will work on his exercise, but not tired  Has appt on 6/15/21 to get evaluated for rehab      Labs will be done 6/10/21, Bmp      Please advise

## 2021-06-15 NOTE — PROGRESS NOTES
CARDIAC REHAB ASSESSMENT    Today's date: Amaya 15, 2021  Patient name: Sandie Cruz     : 1927       MRN: [id number]  PCP: Ike Redding MD  Referring Physician: Luis Griffin MD  Cardiologist: Jose Antonio Rojas,     Dx:   Encounter Diagnosis   Name Primary?     Acute on chronic systolic CHF (congestive heart failure) ([facility] )        Date of onset: 3/9/2021  Cultural needs: none     Height:    Wt Readings from Last 1 Encounters:   21 77 3 kg (170 lb 6 4 oz)      Weight:   Ht Readings from Last 1 Encounters:   21 5' 9" (1 753 m)     Medical History:   Past Medical History:   Diagnosis Date    Anemia     Atrial fibrillation (HCC)     BPH (benign prostatic hypertrophy)     Cancer (HCC)     COLON    Chronic kidney disease     Hypertension     Irregular heart beat     afib         Physical Limitations: overall weakness    Fall Risk: Moderate   Comments: Patient uses walking assist device (walker/cane/rollator) does have issues with balance, no recent falls    Anginal Equivalent: None/denies angina   NTG use: No prescription    Risk Factors   Cholesterol: Yes  Smoking: Never used  HTN: Yes  DM: No  Obesity: No   Inactivity: Yes   Stress:  perceived  stress: 3/10   Stressors:none, relaxed wit what is happening and accepts what is going on and what he can do to improve it    Goals for Stress Management:Exercise, Spend time outside and Enjoy a hobby    Family History:  Family History   Problem Relation Age of Onset    Heart disease Father     Heart attack Father     Hypertension Father     Heart disease Brother     Heart attack Brother 64    Hypertension Brother     Heart attack Brother 67    Hypertension Brother     Arrhythmia Neg Hx     Asthma Neg Hx     Clotting disorder Neg Hx     Heart failure Neg Hx        Allergies: Lasix [furosemide], Adhesive [medical tape], Neomycin-bacitracin zn-polymyx, and Neosporin [neomycin-polymyxin-gramicidin]  ETOH:   Social History Substance and Sexual Activity   Alcohol Use Yes    Comment: occasional         Current Medications:   Current Outpatient Medications   Medication Sig Dispense Refill    apixaban (Eliquis) 2 5 mg Take 1 tablet (2 5 mg total) by mouth 2 (two) times a day 180 tablet 3    bisoprolol (ZEBETA) 5 mg tablet Take 0 5 tablets (2 5 mg total) by mouth daily (Patient taking differently: Take 2 5 mg by mouth daily Take >110) 15 tablet 0    CHLORPHENIRAMINE MALEATE PO Take by mouth every 4 (four) hours as needed      finasteride (PROSCAR) 5 mg tablet Take 5 mg by mouth daily      hydrOXYzine HCL (ATARAX) 25 mg tablet Take 25 mg by mouth every 6 (six) hours as needed for itching      metolazone (ZAROXOLYN) 2 5 mg tablet Take 2 5 mg by mouth once a week       Multiple Vitamins-Minerals (CENTRUM SILVER 50+MEN PO) Take 1 tablet by mouth daily      pantoprazole (PROTONIX) 40 mg tablet Take 40 mg by mouth daily      Potassium Chloride ER 20 MEQ TBCR       torsemide (DEMADEX) 20 mg tablet TAKE 1 TABLET BY MOUTH TWICE A DAY 60 tablet 1     No current facility-administered medications for this visit  Functional Status Prior to Diagnosis for Treatment   Occupation: retired  Recreation:   ADLs: Capable of performing light to moderate ADLs  [place]: Capable of performing light to moderate ADLs  Exercise: none      Current Functional Status  Occupation: retired  Recreation:   ADLs:Capable of performing light ADLs only  [place]: Capable of performing light ADLs only limited by Weakness, daughter living with him to help     Exercise: none      Patient Specific Goals:  Be ramin to walk up the stairs again, increase strength and stamina     Short Term Program Goals: increased strength improved energy/stamina with ADLs exercise 120-150 mins/wk    Long Term Goals: increased maximal walking duration  increased intial training workload  Improved [facility] Activity Status score  Improved functional capacity  Improved Quality of Life - [facility] score reduced  improved Rate Your Plate Score    Ability to reach goals/rehabilitation potential:  Very Good     Projected return to function: 8-12 weeks  Objective tests: sub-max NuStep ETT      Nutritional   Reviewed details of Rate your Plate  Discussed key elements of heart healthy eating  Reviewed patient goals for dietary modifications and their clinical implications  Reviewed most recent lipid profile  Goals for dietary modification: reduce portions of meat to 3 oz  increase fish intake  reduced fat cheese  increase whole grains  increase fruits and vegetables  Doing well watching sodium intake and monitors weight at home  Emotional/Social  Patient reports he/she is coping well with good social support and denies depression or anxiety  Not feeling depressed, but feeling frustrated and down about not being able to do as much and not having the energy to do so  Daughter came helped give medical history  Great support for him during all of this  Marital status:     Domestic Violence Screening: No    Comments: Patient went to Dr Weston Brothers office for edema and SOB, was sent to ED for CHF  Monitoring weight and BP at home everyday  Wants to get stronger while here, completed the program back in 2015 and is excited to get back to it

## 2021-06-15 NOTE — PROGRESS NOTES
Alvaro Dixon        Dear Dr Olvera Client,    Emotional well-being and depression is addressed in the Cardiac  rehab evaluation  To assess the severity of depression, patients are given the PHQ-9 Depression Questionnaire  This is to inform you that your patient Lainey Carmona scored a 6 which is interpreted as 5-9 = Mild Depression  Your patient was provided contact information for [employer] and has been encouraged to enroll in [employer]  A repeat PHQ -9 will be administered in 30 days to assess improvement  Thank you for your support of cardiopulmonary rehab      Sincerely,      Rosmery Garica, MS, CEP

## 2021-06-15 NOTE — PROGRESS NOTES
Cardiac Rehabilitation Plan of Care   Initial Care Plan          Today's date: 6/15/2021   # of Exercise Sessions Completed: 1-initial eval  Patient name: Swetha Bobby      : 1927  Age: 80 y o  MRN: [id number]  Referring Physician: Natalee Michaud MD  Cardiologist: Zander King MD and Maulik Shaikh DO  Provider: Igor Willoughby  Clinician: Maciej Ambrosio MS, CEP    Dx:   Encounter Diagnosis   Name Primary?  Acute on chronic systolic CHF (congestive heart failure) ([facility] [address] )      Date of onset: 3/9/2021      SUMMARY OF PROGRESS:  Today is Mr Todd Ho initial evaluation to begin Cardiac Rehab now 11 wks post hospitalization for CHF  The patient does not currently follow a formal exercise program at home  He has not resumed all ADLs without limitations and reporting weakness and fatigue  Depression screening using the PHQ-9 interprets the patient's score 5-9 = Mild Depression  ROOPA-7 screening tool for anxiety suggests 0-4  = Not anxious  When addressed, the patient denies having depression/anxiety  Patient reports excellent social/emotional support  Information to begin using [employer] was provided as well as contact information for counseling through [employer]  PHQ-9 score will be reassessed in 30 days  The patient is a non-smoker  Patient admits to 100% medication compliance  Patient reports the following physical limitations: overall weakness and balance issues  The patient completed an initial submaximal NuStep ETT  The patient completed 30 seconds of stage 3 (2 7METs) with test termination of RPE 6  Resting BP 92/50 with appropriate hemodynamic response to exercise reaching 100/50  Recovery BP was 112/64  Patient denied symptoms during exercise  Telemetry revealed v-paced with occ PVCs    Patient was counseled on exercise guidelines to achieve a minimum of 150 mins/wk of moderate intensity (RPE 4-6) exercise and encouraged to add 1-2 days of exercise on opposite days of cardiac rehab as tolerated  We discussed current dietary habits and goals of heart healthy eating for lipid management and CHF  The patient monitors home BP and weight  Patient's goals include: increasing his strength and stamina, walking up a flight of stairs, gaining for independence  The patient's CAD risk factors include:  inactivity, hypertension and hyperlipidemia  His education will focus on lifestyle modification/education specific to His needs  Patient will attend group education classes on heart healthy eating, reading food labels, stress management, risk factor reduction, understanding heart disease and common heart medications  Patient will attend 35 monitored exercise sessions, 3x/wk for 12-18 weeks beginning            Medication compliance: Yes   Comments: Pt reports to be compliant with medications  Fall Risk: Moderate   Comments: Patient uses walking assist device (walker/cane/rollator)    EKG Interpretation: V-paced, occ PVCs      EXERCISE ASSESSMENT and PLAN    Current Exercise Program in Rehab:       Frequency: 3 days/week Supplement with home exercise 2+ days/wk as tolerated       Minutes: 30-40        METS: 1 5-2 5        HR: RHR +30bpm   RPE: 4-5         Modalities: UBE, NuStep, Recumbent bike and Room walking      Exercise Progression 30 Day Goals :    Frequency: 3 days/week of cardiac rehab     Supplement with home exercise 2+ days/wk as tolerated    Minutes: 40                            >150 mins/wk of moderate intensity exercise   METS: 2 0-3 0   HR: RHR +30bpm    RPE: 4-6   Modalities: UBE, NuStep, Recumbent bike and Room walking    Strength trainin-3 days / week  12-15 repetitions  1-2 sets per modality   Will be added following 2-3 weeks of monitored exercise sessions   Modalities: Lateral Raise, Arm Curl, Sit to [employer] and Shoulder Shrugs    Home Exercise: none    Goals: 10% improvement in functional capacity - based on max METs achieved in fitness assessment, improved DASI score by 10%, Exercise 5 days/wk, >150mins/wk of moderate intensity exercise, Resume ADLs with increased strength and Attend Rehab regularly    Progression Toward Goals:  Reviewed Pt goals and determined plan of care    Education: benefit of exercise for CAD risk factors, AHA guidelines to achieve >150 mins/wk of moderate exercise and RPE scale   Plan:education on home exercise guidelines, home exercise 30+ mins 2 days opposite CR and Education class: Risk Factors for Heart Disease  Readiness to change: Preparation:  (Getting ready to change)       NUTRITION ASSESSMENT AND PLAN    Weight control:    Starting weight: 167 6   Current weight:     Waist circumference:    Startin 5   Current:      Diabetes: N/A  A1c:     last measured:     Lipid management: Discussed diet and lipid management and Last lipid profile 10/6/2020  Chol 147    HDL 30  LDL 92    Goals:HDL >40, Improved Rate Your Plate score  >46, reduce portion sizes of meat to 3oz or less, increase intake of fish, shellfish, reduce cheese intake or use reduced-fat, eat 3 or more servings of whole grains a day, Eat 4-5 cups of fruits and vegetables daily, eliminate butter, use fat-free dressings/malone or seldom use and continue to watch sodium intake    Progression Toward Goals: Reviewed Pt goals and determined plan of care    Education: heart healthy eating  low sodium diet  Plan: Education class: Reading Food Labels, Education Class: Heart Healthy Eating, replace butter with soft spreads made with olive oil, canola or yogurt, replace refined grain bread with whole grain bread, learn how to read food labels and keep added daily sugar <25g/day  Readiness to change: Action:  (Changing behavior)      PSYCHOSOCIAL ASSESSMENT AND PLAN    Emotional:  Depression assessment:  PHQ-9 = 5-9 = Mild Depression            Anxiety measure:  ROOPA-7 = 0-4  = Not anxious  Self-reported stress level:  1  Social support: Patient reports excellent emotional/social support from family    Goals:  PHQ-9 - reduced severity by one level, Feelings in [facility] Score < 3, Daily Activity in [facility] Score < 3 and Overall Health in [facility] Score < 3    Progression Toward Goals: Reviewed Pt goals and determined plan of care    Education: benefits of a positive support system and stress management techniques  Plan: Class: Stress and Your Health, Class: Relaxation, PHQ-9 >5 will refer to MD, Refer to Nathaniel Lanier, Reduce dependence  on family and Repeat PHQ-9 every 30 days if score >5  Readiness to change: Preparation:  (Getting ready to change)       OTHER CORE COMPONENTS     Tobacco:   Social History     Tobacco Use   Smoking Status Never Smoker   Smokeless Tobacco Never Used       Tobacco Use Intervention:   N/A:  Patient is a non-smoker     Anginal Symptoms:  None   NTG use: No prescription    Blood pressure:    Restin/50   Exercise: 100/50    Goals: reduced dietary sodium <2300mg and moderate intensity exercise >150 mins/wk    Progression Toward Goals: Reviewed Pt goals and determined plan of care    Education:  understanding high blood pressure and it's relationship to CAD and low sodium diet and HTN  Plan: Class: Understanding Heart Disease, Class: Common Heart Medications, engage in regular exercise, check labels for sodium content and monitor home BP  Readiness to change: Action:  (Changing behavior)

## 2021-06-16 NOTE — TELEPHONE ENCOUNTER
RODY-  Spoke with today for update  He is doing well   He started cardiac rehab yesterday  Wt today 164 lb  Did eat out yesterday  He did say the metolazone weekly is helping, also taking torsemide 20 mg bid  Will call if any S/S of CHF  Labs 6/11/21  Cr-2 37( 2 3 on 5/28)  K 3 7( 4 7)    He said Thank you for everything  Any changes?       Please advise

## 2021-07-12 NOTE — TELEPHONE ENCOUNTER
Spoke with patient and reminded her of labs needed for follow up on 7/16  Patient has verbalized understanding and will be completing labs today

## 2021-07-13 NOTE — PATIENT INSTRUCTIONS
Decrease torsemide to 20mg once a day  Follow up with nephrology as scheduled  Daily weights  Salt and fluid restriction  Compression stockings  Continue cardiac rehab

## 2021-07-13 NOTE — PROGRESS NOTES
Advanced Heart Failure Outpatient Progress Note - Nkechi Santos 80 y o  male MRN: [id number]    Encounter: [id number]      Assessment/Plan:    Patient Active Problem List    Diagnosis Date Noted    Ventricular tachycardia ([place] [address] ) 03/21/2021    Alkalosis 03/19/2021    Hypercalcemia 03/16/2021    Hyperphosphatemia 03/15/2021    Acute on chronic systolic CHF (congestive heart failure) ([facility] ) 03/11/2021    Cellulitis of left leg 03/09/2021    Acute kidney injury superimposed on chronic kidney disease ([facility] ) 03/09/2021    Lower extremity edema 09/09/2020    Gastroesophageal reflux disease with esophagitis 08/27/2019    Cancer of splenic flexure ([facility] ) 12/13/2018    Malignant neoplasm of colon ([place] [address] ) 10/18/2018    Biventricular cardiac pacemaker in situ 05/03/2018    Anticoagulation adequate 05/03/2018    Stage 3 chronic kidney disease ([facility] ) 02/08/2017    Dehydration 02/08/2017    Hypertensive kidney disease with chronic kidney disease stage III ([place] [address] ) 02/08/2017    A-fib ([place] [address] ) 02/08/2017    Cardiomyopathy ([place] [address] ) 02/08/2017    BPH (benign prostatic hyperplasia) 02/08/2017     Heart failure with reduced ejection fraction, Stage C, NYHA III  Etiology: Nonischemic  Per records, EF in the 25s dating back to 2005, cardiac cath at that time showed normal coronaries  Intravascularly euvolemic, dependent edema    Studies- personally reviewed by me    Echocardiogram 3/10/21  LVEF: 25%  LVIDd: 5 8cm  RV: markedly dilated, normal function  MR: moderate  PASP: 45-50mmHg, moderate to severe TR  RVOT:   Other: Mild AI    TTE 12/21/2018: LVEF 45%  RV mildly dilated, reduced systolic function  PASP 42mmHg       Diet:  2 g sodium diet  2000 mL fluid restriction    TTE 7/2016: LVEF lower limits of normal  TTE 3/2015: LVEF 29%  TTE 11/2014: LVEF 50%    Neurohormonal Blockade:  --Beta-Blocker: Bisoprolol 2 5mg daily - off, has not been taking for a while due to low blood pressure  --ACEi, ARB or ARNi: none  --Aldosterone Receptor Blocker: none  --SGLT2 Inhibitor: none  --Diuretic: Torsemide 20mg BID, metolazone 2 5mg weekly    Sudden Cardiac Death Risk Reduction:  --ICD: None    Electrical Resynchronization:  --Candidacy for BiV device: CRT-P  5/17/21: BVP 94%  2 HVR episodes, afib  Normal device function    6/28/2021:  Bi V pacing 91%  4 VHF episodes - NSVT vs RVR  Corvue impedance monitoring within normal limits  Normal device function  Advanced Therapies (if appropriate):  --Age precludes    Permanent atrial fibrillation with prior AV node ablation s/p CRT-P 2015  Rate: bisoprolol 2 5mg daily - not taking due to low blood pressure  AC: apixaban 2 5mg BID  Chronic kidney disease, recent creatinine between 1 9-2 3  Hypertension, BP controlled, on the low side    Today's Plan:  Decrease torsemide to 20mg daily  Continue metolazone 2 5mg weekly  BP limiting for guideline directed medical therapy  Cardiac rehab  Compression stockings  Follow up in 2 months      HPI:   80year old male with chronic systolic heart failure, permanent atrial fibrillation s/p AVN ablation, PPM, CKD, HTN who is referred for heart failure management  He presented with worsening lower extremity edema and weight on 3/10/21 and was admitted for decompensated heart failure  Also with SUDHIR at that time with creatinine up to 2 4 from baseline of 1 9  Creatinine on discharge was 2 7  Torsemide dose has been decreased to 30mg from 40mg daily on outpatient follow up with PCP  Recently with increasing lower extremity edema  Also with fatigue that seems to improve with physical therapy  5/7/21: Overall better with less lower extremity edema  Energy level a little better  Has on and off days  No lightheadedness or palpitations  Interval History:  7/13/21: Here for follow up with his daughter in law  Undergoing cardiac rehab  Labs  6/11 Na 137 K 3 7 Crea 2 37 stable from 2 33  Reports intermittent episodes of lightheadedness and fatigue    Reports pruritus with secondary excoriations  Family also concerned about depression and poor sleep  Advised follow-up with PCP regarding these  Past Medical History:   Diagnosis Date    Anemia     Atrial fibrillation (HCC)     BPH (benign prostatic hypertrophy)     Cancer (HCC)     COLON    Chronic kidney disease     Hypertension     Irregular heart beat     afib       Review of Systems   Constitutional: Positive for fatigue  Negative for chills and fever  HENT: Negative for ear pain and sore throat  Eyes: Negative for pain and visual disturbance  Respiratory: Negative for cough and shortness of breath  Cardiovascular: Positive for leg swelling  Negative for chest pain and palpitations  Gastrointestinal: Negative for abdominal pain and vomiting  Genitourinary: Negative for dysuria and hematuria  Musculoskeletal: Negative for arthralgias and back pain  Skin: Negative for color change and rash  Neurological: Positive for light-headedness  Negative for dizziness, seizures and syncope  All other systems reviewed and are negative  Allergies   Allergen Reactions    Lasix [Furosemide] Other (See Comments)     Weakness, decreased BP    Adhesive [Medical Tape] Rash    Neomycin-Bacitracin Zn-Polymyx Rash    Neosporin [Neomycin-Polymyxin-Gramicidin] Rash           Current Outpatient Medications:     apixaban (Eliquis) 2 5 mg, Take 1 tablet (2 5 mg total) by mouth 2 (two) times a day, Disp: 180 tablet, Rfl: 3    CHLORPHENIRAMINE MALEATE PO, Take by mouth every 4 (four) hours as needed, Disp: , Rfl:     finasteride (PROSCAR) 5 mg tablet, Take 5 mg by mouth daily, Disp: , Rfl:     hydrOXYzine HCL (ATARAX) 25 mg tablet, Take 25 mg by mouth every 6 (six) hours as needed for itching, Disp: , Rfl:     metolazone (ZAROXOLYN) 2 5 mg tablet, Take 2 5 mg by mouth once a week , Disp: , Rfl:     Multiple Vitamins-Minerals (CENTRUM SILVER 50+MEN PO), Take 1 tablet by mouth daily, Disp: , Rfl:    pantoprazole (PROTONIX) 40 mg tablet, Take 40 mg by mouth daily, Disp: , Rfl:     torsemide (DEMADEX) 20 mg tablet, TAKE 1 TABLET BY MOUTH TWICE A DAY, Disp: 60 tablet, Rfl: 1    Social History     Socioeconomic History    Marital status:      Spouse name: Not on file    Number of children: Not on file    Years of education: Not on file    Highest education level: Not on file   Occupational History    Not on file   Tobacco Use    Smoking status: Never Smoker    Smokeless tobacco: Never Used   Vaping Use    Vaping Use: Never used   Substance and Sexual Activity    Alcohol use: Yes     Comment: occasional    Drug use: No    Sexual activity: Not on file   Other Topics Concern    Not on file   Social History Narrative    Not on file     Social Determinants of Health     Financial Resource Strain:     Difficulty of Paying Living Expenses:    Food Insecurity:     Worried About Running Out of Food in the Last Year:     [address] in the Last Year:    Transportation Needs:     Lack of Transportation (Medical):      Lack of Transportation (Non-Medical):    Physical Activity:     Days of Exercise per Week:     Minutes of Exercise per Session:    Stress:     Feeling of Stress :    Social Connections:     Frequency of Communication with Friends and Family:     Frequency of Social Gatherings with Friends and Family:     Attends Confucianism Services:     Active Member of Clubs or Organizations:     Attends Club or Organization Meetings:     Marital Status:    Intimate Partner Violence:     Fear of Current or Ex-Partner:     Emotionally Abused:     Physically Abused:     Sexually Abused:        Family History   Problem Relation Age of Onset    Heart disease Father     Heart attack Father     Hypertension Father     Heart disease Brother     Heart attack Brother 64    Hypertension Brother     Heart attack Brother 67    Hypertension Brother     Arrhythmia Neg Hx     Asthma Neg Hx  Clotting disorder Neg Hx     Heart failure Neg Hx        Physical Exam:    Vitals: Blood pressure (!) 100/48, pulse 76, height 5' 9" (1 753 m), weight 76 kg (167 lb 9 6 oz), SpO2 96 %  , Body mass index is 24 75 kg/m² ,   Wt Readings from Last 3 Encounters:   07/13/21 76 kg (167 lb 9 6 oz)   05/27/21 77 3 kg (170 lb 6 4 oz)   05/11/21 80 2 kg (176 lb 12 8 oz)       Physical Exam  Constitutional:       General: He is not in acute distress  Appearance: Normal appearance  HENT:      Head: Normocephalic and atraumatic  Mouth/Throat:      Mouth: Mucous membranes are moist    Eyes:      General: No scleral icterus  Extraocular Movements: Extraocular movements intact  Cardiovascular:      Rate and Rhythm: Normal rate and regular rhythm  Pulses: Normal pulses  Heart sounds: S1 normal and S2 normal  No murmur heard  No friction rub  No gallop  Comments: Nonelevated JVP  1-2+ bilateral lower extremity edema  Pulmonary:      Breath sounds: Normal breath sounds  Abdominal:      General: There is no distension  Palpations: Abdomen is soft  Tenderness: There is no abdominal tenderness  There is no guarding or rebound  Musculoskeletal:         General: Normal range of motion  Cervical back: Neck supple  Skin:     General: Skin is warm and dry  Capillary Refill: Capillary refill takes less than 2 seconds  Neurological:      General: No focal deficit present  Mental Status: He is alert and oriented to person, place, and time     Psychiatric:         Mood and Affect: Mood normal          Labs & Results:    Lab Results   Component Value Date    WBC 7 75 07/12/2021    HGB 12 5 07/12/2021    HCT 39 3 07/12/2021    MCV 98 07/12/2021     07/12/2021     Lab Results   Component Value Date    SODIUM 137 06/11/2021    K 3 7 06/11/2021    CL 97 (L) 06/11/2021    CO2 36 (H) 06/11/2021    BUN 49 (H) 06/11/2021    CREATININE 2 37 (H) 06/11/2021    GLUC 124 06/11/2021

## 2021-07-14 NOTE — PROGRESS NOTES
Cardiac Rehabilitation Plan of Care   30 Day Reassessment          Today's date: 2021   # of Exercise Sessions Completed: 10  Patient name: Alexis Martinez      : 1927  Age: 80 y o  MRN: [id number]  Referring Physician: Kei Emerson MD  Cardiologist: Raoul Ramírez MD and Petar Lim DO  Provider: Ellis Wilson  Clinician: Bradley Abreu RN      Dx:   Encounter Diagnosis   Name Primary?  Acute on chronic systolic CHF (congestive heart failure) ([facility] [address] )      Date of onset: 3/9/2021      SUMMARY OF PROGRESS:  [facility] is compliant attending cardiac rehab exercise sessions 3x/wk  He tolerates 30-34 mins at 1 8 - 2 4 METs  Resting BP always well controlled, 94/50 - 110/62 with appropriate response to exercise reaching 100/50- 118/64  Paced rhythm on tele with occasional PVC's observed  RHR 62-78 - 110/62 ExHR 83 - 92  He is tolerating progression of intensity levels to maintain RPE 4-6  No cardiac complaints  He is progressing toward wt loss goals with a loss of 2 pounds  Patient has been working on  dietary modifications with the goal of rare red/processed meats, low fat dairy, reduced added sugars and refined flours  Depression screening using the PHQ-9 was reassessed  See attached PHQ-9/GAD7  The patient's score was 10-14 = Moderate Depression showing an DECLINE   ROOPA-7 was reassessed  The patient's score was 5-9 = Mild anxiety showing an DECLINE  When addressed, the patient admits to mild feelings of depression/anxiety  Denies need for medication  Patient reports excellent social/emotional support  Patient attends group educational classes on cardiac risk factor modification  He has not added home exercise  His exercise program will be progressed as tolerated to maintain RPE 4-6     Pt will continue to be educated on lifestyle modifications and encouraged to supplement with a home exercise program to reach the following goals: start home walking in house, continue stretching, attend CR in the next 30 days  Medication compliance: Yes   Comments: Pt reports to be compliant with medications  Fall Risk: Moderate   Comments: Patient uses walking assist device (walker/cane/rollator)    EKG Interpretation: V-paced, occ PVCs      EXERCISE ASSESSMENT and PLAN    Current Exercise Program in Rehab:       Frequency: 3 days/week Supplement with home exercise 2+ days/wk as tolerated       Minutes: 30-34       METS: 1 8-2 4        HR: RHR +30bpm   RPE: 4-5         Modalities: UBE, NuStep, Recumbent bike and Room walking      Exercise Progression 30 Day Goals :    Frequency: 3 days/week of cardiac rehab     Supplement with home exercise 2+ days/wk as tolerated    Minutes: 40                            >150 mins/wk of moderate intensity exercise   METS: 2 0-3 0   HR: RHR +30bpm    RPE: 4-6   Modalities: UBE, NuStep, Recumbent bike and Room walking    Strength trainin-3 days / week  12-15 repetitions  1-2 sets per modality   Will be added following 2-3 weeks of monitored exercise sessions   Modalities: Lateral Raise, Arm Curl, Sit to [employer] and Shoulder Shrugs    Home Exercise: none    Goals: 10% improvement in functional capacity - based on max METs achieved in fitness assessment, improved DASI score by 10%, Exercise 5 days/wk, >150mins/wk of moderate intensity exercise, Resume ADLs with increased strength and Attend Rehab regularly    Progression Toward Goals:  Pt is progressing and showing improvement  toward the following goals:  10% improvement in functional capacity - based on max METs achieved in fitness assessment, improved DASI score by 10%, Exercise 5 days/wk, >150mins/wk of moderate intensity exercise, Resume ADLs with increased strength and Attend Rehab regularly  , Will continue to educate and progress as tolerated      Education: benefit of exercise for CAD risk factors, AHA guidelines to achieve >150 mins/wk of moderate exercise and RPE scale   Plan:education on home exercise guidelines, home exercise 30+ mins 2 days opposite CR and Education class: Risk Factors for Heart Disease  Readiness to change: Preparation:  (Getting ready to change)       NUTRITION ASSESSMENT AND PLAN    Weight control:    Starting weight: 167 6   Current weight:   165  Waist circumference:    Startin 5   Current:      Diabetes: N/A  A1c:     last measured:     Lipid management: Discussed diet and lipid management and Last lipid profile 10/6/2020  Chol 147    HDL 30  LDL 92    Goals:HDL >40, Improved Rate Your Plate score  >56, reduce portion sizes of meat to 3oz or less, increase intake of fish, shellfish, reduce cheese intake or use reduced-fat, eat 3 or more servings of whole grains a day, Eat 4-5 cups of fruits and vegetables daily, eliminate butter, use fat-free dressings/malone or seldom use and continue to watch sodium intake    Progression Toward Goals: Pt is progressing and showing improvement  toward the following goals:  HDL >40, Improved Rate Your Plate score  >75, reduce portion sizes of meat to 3oz or less, increase intake of fish, shellfish, reduce cheese intake or use reduced-fat, eat 3 or more servings of whole grains a day, Eat 4-5 cups of fruits and vegetables daily, eliminate butter, use fat-free dressings/malone or seldom use and continue to watch sodium intake   , Will continue to educate and progress as tolerated      Education: heart healthy eating  low sodium diet  education class:  Label Reading  Plan: Education Class: Heart Healthy Eating, replace butter with soft spreads made with olive oil, canola or yogurt, replace refined grain bread with whole grain bread, learn how to read food labels and keep added daily sugar <25g/day  Readiness to change: Action:  (Changing behavior)      PSYCHOSOCIAL ASSESSMENT AND PLAN    Emotional:  Depression assessment:  PHQ-9 = 10-14 = Moderate Depression            Anxiety measure:  ROOPA-7 = 0-4  = Not anxious  Self-reported stress level: 1  Social support: Patient reports excellent emotional/social support from family    Goals:  PHQ-9 - reduced severity by one level, Feelings in [facility] Score < 3, Daily Activity in [facility] Score < 3 and Overall Health in [facility] Score < 3    Progression Toward Goals: Pt has not made progress toward the following goals: PHQ-9 - reduced severity by one level, Feelings in [facility] Score < 3, Daily Activity in [facility] Score < 3 and Overall Health in [facility] Score < 3  , Will continue to educate and progress as tolerated  Education: benefits of a positive support system and stress management techniques  Plan: Class: Stress and Your Health, Class: Relaxation, PHQ-9 >5 will refer to MD, Refer to Nathaniel Lanier, Reduce dependence  on family and Repeat PHQ-9 every 30 days if score >5  Readiness to change: Preparation:  (Getting ready to change)       OTHER CORE COMPONENTS     Tobacco:   Social History     Tobacco Use   Smoking Status Never Smoker   Smokeless Tobacco Never Used       Tobacco Use Intervention:   N/A:  Patient is a non-smoker     Anginal Symptoms:  None   NTG use: No prescription    Blood pressure:    Restin/50 - 110/62   Exercise: 100/50- 118/64    Goals: reduced dietary sodium <2300mg and moderate intensity exercise >150 mins/wk    Progression Toward Goals: Pt is progressing and showing improvement  toward the following goals:  reduced dietary sodium <2300mg and moderate intensity exercise >150 mins/wk   , Will continue to educate and progress as tolerated      Education:  understanding high blood pressure and it's relationship to CAD and low sodium diet and HTN  Plan: Class: Understanding Heart Disease, Class: Common Heart Medications, engage in regular exercise, check labels for sodium content and monitor home BP  Readiness to change: Action:  (Changing behavior)

## 2021-07-16 PROBLEM — N18.30 STAGE 3 CHRONIC KIDNEY DISEASE (HCC): Status: RESOLVED | Noted: 2017-02-08 | Resolved: 2021-01-01

## 2021-07-16 PROBLEM — N18.4 STAGE 4 CHRONIC KIDNEY DISEASE (HCC): Status: ACTIVE | Noted: 2021-01-01

## 2021-07-16 PROBLEM — N25.81 SECONDARY HYPERPARATHYROIDISM OF RENAL ORIGIN (HCC): Status: ACTIVE | Noted: 2021-01-01

## 2021-07-16 NOTE — PROGRESS NOTES
Virtual Regular Visit    Verification of patient location:    Patient is currently located in the state of [place]  Patient is currently located in a state in which I am licensed    Assessment/Plan:    Problem List Items Addressed This Visit        Endocrine    Secondary hyperparathyroidism of renal origin [facility])    Relevant Orders    Ambulatory referral to ckd education program    Basic metabolic panel    Vitamin D 25 hydroxy    PTH, intact    Renal function panel    Vitamin D 25 hydroxy    Phosphorus    Magnesium       Cardiovascular and Mediastinum    A-fib ([facility] [address] )    Cardiomyopathy ([facility] [address] )    Relevant Orders    Ambulatory referral to ckd education program    Basic metabolic panel    Vitamin D 25 hydroxy    PTH, intact    Renal function panel    Vitamin D 25 hydroxy    Phosphorus    Magnesium       Genitourinary    Hypertensive kidney disease with chronic kidney disease stage III ([facility] [address] )    Relevant Orders    Ambulatory referral to ckd education program    Basic metabolic panel    Vitamin D 25 hydroxy    PTH, intact    Renal function panel    Vitamin D 25 hydroxy    Phosphorus    Magnesium    Stage 4 chronic kidney disease ([facility] [address] ) - Primary    Relevant Orders    Ambulatory referral to ckd education program    Basic metabolic panel    Vitamin D 25 hydroxy    PTH, intact    Renal function panel    Vitamin D 25 hydroxy    Phosphorus    Magnesium               Reason for visit is   Chief Complaint   Patient presents with    Virtual Regular Visit    Chronic Kidney Disease    Virtual Regular Visit   patient with concern for COVID exposure, hence did not want to come in for actual appointment         Encounter provider Elizabeth David MD    Provider located at [address] [place] [id number]      Recent Visits  Date Type Provider Dept   07/12/21 Telephone Elizabeth David, [facility] recent visits within past 7 days and meeting all other requirements  Today's Visits  Date Type Provider Dept   07/16/21 Telemedicine Maria E Gu, [address] today's visits and meeting all other requirements  Future Appointments  No visits were found meeting these conditions  Showing future appointments within next 150 days and meeting all other requirements       The patient was identified by name and date of birth  Nkechi Santos was informed that this is a telemedicine visit and that the visit is being conducted through [address] [facility] and patient was informed that this is a secure, HIPAA-compliant platform  He agrees to proceed     My office door was closed  No one else was in the room  He acknowledged consent and understanding of privacy and security of the video platform  The patient has agreed to participate and understands they can discontinue the visit at any time  I informed the patient about the limitations of using virtual visit platform and that my medical advice and physical exam would be limited as a consequence of this  Patient was informed that this was a billable visit and they are aware of it and wished to proceed  Patient is aware this is a billable service  Subjective  Nkechi Santos is a 80 y o  male participated in virtual care with me today   Patient's son was present during the whole visit to help with any questions or concerns  As per patient his torsemdie decreased down to 20-mg once a day from today per Cardiology  161-164 lbs  Is pending a ct scan and colonoscopy  No recent hospitalizations  No NSAID use reports edema is improving reports lower extremity wounds are healing  He has upcoming appointments with GI and Cardiology  Happy to hear renal parameters are stable agreeable to attending CKD education/kidney smart          HPI     Past Medical History:   Diagnosis Date    Anemia     Atrial fibrillation ([address] )     BPH (benign prostatic hypertrophy)     Cancer (HCC)     COLON    Chronic kidney disease     Hypertension     Irregular heart beat     afib       Past Surgical History:   Procedure Laterality Date    BASAL CELL CARCINOMA EXCISION      CARDIAC PACEMAKER PLACEMENT      CARDIAC SURGERY      CARDIOVERSION      CARPAL TUNNEL RELEASE      CATARACT EXTRACTION      COLON SURGERY      COLONOSCOPY      MT COLONOSCOPY FLX DX W/COLLJ SPEC WHEN PFRMD N/A 11/30/2018    Procedure: COLONOSCOPY w egd  by dr Duncan Figueroa;  Surgeon: Claudia Francois MD;  Location: [facility]; Service: Colorectal    MT LAP,SURG,COLECTOMY, PARTIAL, W/ANAST N/A 1/8/2019    Procedure: Left hemicolectomy, takedown splenic flexure, end to end transverse to sigmoid colon anastamosis; Surgeon: Claudia Francois MD;  Location:  MAIN OR;  Service: Colorectal       Current Outpatient Medications   Medication Sig Dispense Refill    apixaban (Eliquis) 2 5 mg Take 1 tablet (2 5 mg total) by mouth 2 (two) times a day 180 tablet 3    finasteride (PROSCAR) 5 mg tablet Take 5 mg by mouth daily      hydrOXYzine HCL (ATARAX) 25 mg tablet Take 25 mg by mouth every 6 (six) hours as needed for itching      metolazone (ZAROXOLYN) 2 5 mg tablet Take 2 5 mg by mouth once a week       Multiple Vitamins-Minerals (CENTRUM SILVER 50+MEN PO) Take 1 tablet by mouth daily      pantoprazole (PROTONIX) 40 mg tablet Take 40 mg by mouth daily      torsemide (DEMADEX) 20 mg tablet TAKE 1 TABLET BY MOUTH TWICE A DAY (Patient taking differently: 20 mg daily ) 60 tablet 1    CHLORPHENIRAMINE MALEATE PO Take by mouth every 4 (four) hours as needed (Patient not taking: Reported on 7/16/2021)       No current facility-administered medications for this visit          Allergies   Allergen Reactions    Lasix [Furosemide] Other (See Comments)     Weakness, decreased BP    Adhesive [Medical Tape] Rash    Neomycin-Bacitracin Zn-Polymyx Rash    Neosporin [Neomycin-Polymyxin-Gramicidin] Rash       Review of Systems   Constitutional: Negative for appetite change, chills, fatigue and fever  HENT: Negative for congestion, postnasal drip, rhinorrhea and sore throat  Respiratory: Negative for cough, shortness of breath and wheezing  Cardiovascular: Negative for leg swelling  Gastrointestinal: Positive for constipation  Negative for abdominal pain, diarrhea, nausea and vomiting  Genitourinary: Negative for difficulty urinating, dysuria and hematuria  Musculoskeletal: Negative for back pain  Skin: Positive for wound  Negative for rash  Neurological: Negative for dizziness, light-headedness and headaches  Psychiatric/Behavioral: Negative for agitation and confusion  All other systems reviewed and are negative  Video Exam    Vitals:    07/16/21 1150   BP: 117/75   Pulse: 69   Weight: 74 3 kg (163 lb 12 8 oz)       Physical Exam  Vitals reviewed  Constitutional:       General: He is not in acute distress  Appearance: Normal appearance  He is not ill-appearing, toxic-appearing or diaphoretic  Comments: Physical exam performed by the patient on my behalf with my guidance as I did virtual visualization since this was a virtual visit through am [facility]  HENT:      Head: Normocephalic and atraumatic  Mouth/Throat:      Pharynx: No oropharyngeal exudate  Eyes:      General: No scleral icterus  Cardiovascular:      Pulses: Normal pulses  Pulmonary:      Effort: Pulmonary effort is normal  No respiratory distress  Breath sounds: No stridor  Abdominal:      General: There is no distension  Palpations: Abdomen is soft  Tenderness: There is no abdominal tenderness  Musculoskeletal:         General: Swelling present  Cervical back: Normal range of motion and neck supple  No rigidity  Skin:     Coloration: Skin is not jaundiced  Neurological:      General: No focal deficit present  Mental Status: He is alert and oriented to person, place, and time     Psychiatric:         Mood and Affect: Mood normal  Behavior: Behavior normal         Assessment/plan  22-year-old male with multiple comorbidities including a fib, HTN, colon cancer s/p hemicolectomy and CKD stage 4 for routine follow up  CKD stage IV:  -  after review of records In [facility] as well as Care everywhere patient had a baseline creatinine of 1 6-1 9mg/dL and now appears to have a new baseline cr of 1 9-2 4mg/dL  Most recent labs show a Creatinine of 2 37 mg/dL  Renal function remains stable, consistent with progression    - likely has underlying CKD secondary to hypertensive nephrosclerosis + age related nephron loss plus cardiorenal syndrome plus prior episode of SUDHIR Non dialysis requiring    - CT abd from 07/2019 showing no hydronephrosis  one or more simple cyst noted  - Acid base and lytes stable  - Clinically the patient appears to be minimally hypervolemic  Diuretics per Cardiology  Will get BMP in 2 weeks since torsemide dosage was just decreased  - Recommend to avoid use of NSAIDs, nephrotoxins  Caution advised with regards to exposure to IV contrast dye    - Discussed with the patient in depth his renal status, including the possible etiologies for CKD  - Advised the patient that when his GFR is close to 20mL/min then will start discussing about RRT(renal replacement therapy) options such as renal transplant, peritoneal dialysis and hemodialysis  - Informed the patient about the various options for Renal Replacement therapy  - Discussed with the patient how we need to work together to delay the progression of CKD with optimal BP control based on their age and co-morbidities, optimal BS control with HbA1c of <7% and trying to reduce proteinuria by the use of anti-proteinuric agents  - referral to CKD education/kidney smart placed on 07/16/2021    Hypertension:  - Patient is on torsemide 20mg po q day and metolazone 2 5mg weekly     - diuretics per Cardiology  - Goal BP of < 140/90 based on age and comorbidities  - Instructed to follow low sodium (2gm)diet  - hold acei/arb due to low bp and to preserve residual renal function  Hemoglobin:  - Goal Hb of 10-12 g/dL  - Most recent labs suggestive of 12 5gm /dl    - f/u with heme onc    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:  - Based on patients CKD stage following is the goal of therapy  - Maintain calcium phosphorus product of < 55   - Stage 4 CKD - Goal Ca 8 5-10 mg/dL , goal Phos 2 7-4 6 mg/dL  , goal iPTH  pg/mL Patient is currently not at goal   - Continue patient on no vitamin-D  - Patients' most recent ipth of 121 6 as of 7/12/21  - check vitamin-D with blood work in 2 weeks to see if patient may benefit from supplementation  - check vit D and ipth prior to next visit    Proteinuria:  - most recent protein cr ratio of 200 mg as of 7/12/21  - check protein creatinine ratio  - currently on therapy for proteinuria with     Lipids:  - goal LDL less than 70  - management per primary team    BPH:  - management as per Primary team  - on proscar    afib s/p ablation / CHF:  - Management as per primary team  - f/u with cardiology  - most recent echo with ef of 25% as of 3/10/21  - on torsemide, metolazone and eliquis  - diuretic adjustment per Cardiology    Nutrition:  - Encouraged patient to follow a renal diet comprising of moderate potassium, low phosphorus and protein restriction to 0 8gm/kg  - Will check serum albumin with next blood work  Followup:  - Patient is to follow-up in 4 months, with lab work to be performed in 2 weeks and then again in a few days prior to the next visit  Advised patient to call me in 10 days to review the results if they do not hear back from me, as I may have not received the results      I spent 25 minutes with patient today in which greater than 50% of the time was spent in counseling/coordination of care regarding  Patient's blood pressure, patient's diet and patient's follow-up as well as CKD progression and need for renal replacement therapy with CKD progression  VIRTUAL VISIT DISCLAIMER      Debbie Washington verbally agrees to participate in [employer]  Pt is aware that [employer] could be limited without vital signs or the ability to perform a full hands-on physical exam  Darrion Bernard understands he or the provider may request at any time to terminate the video visit and request the patient to seek care or treatment in person      Norma Omalley MD, FASN, 7/16/2021, 12:37 PM

## 2021-07-16 NOTE — PATIENT INSTRUCTIONS
- get blood work for BMP and vitamin-D in 2 weeks and call to discuss results if you do not hear back from me with the results  - attend CKD education/kidney smart  They will call you to set up an appointment  It can be done over the phone also if needed  - please get blood work done 2 weeks prior to the next visit  - office will call and schedule the next appointment in a few weeks  - continue close follow-up with cardiology for diuretic adjustments    - Please call me in 10 days after having your blood work done to review the results if you do not hear back from me or my office, as I may have not received the results  - please remember to perform blood work prior to the next visit  - Please call if the blood pressure top number is greater than 150 or less than 110 consistently  - Please call if you are gaining more than 2lbs in 2 days for adjustment of water pills   ~ Please AVOID the following pain medications  LIST OF NSAIDS (NONSTEROIDAL ANTI-INFLAMMATORY DRUGS) AND KENDRICK-2 INHIBITORS    DIFLUNISAL (DOLOBID)  IBUPROFEN (MOTRIN, ADVIL)  FLURBIPROFEN (ANSAID)  KETOPROFEN (ORUDIS, ORUVAIL)  FENOPROFEN (NALFON)  NABUMETONE (RELAFEN)  PIROXICAM (FELDENE)  NAPROXEN (ALEVE, NAPROSYN, NAPRELAN, ANAPROX)  DICLOFENAC (VOLTAREN, CATAFLAM)  INDOMETHACIN (INDOCIN)  SULINDAC (CLINORIL)  TOLMETIN (TOLETIN)  ETODOLAC (LODINE)  MELOXICAM (MOBIC)  KETOROLAC (TORADOL)  OXAPROZIN (DAYPRO)  CELECOXIB (CELEBREX)    Phosphorus diet  Follow a low phosphorus diet      Avoid these higher phosphorus foods: Choose these lower phosphorus foods:   Milk, pudding or yogurt (from animals and from many soy varieties) Rice milk (unfortified), nondairy creamer (if it doesn't have terms in the ingredients list that contain the letters "phos")   Hard cheeses, ricotta or cottage cheese, fat-free cream cheese Regular and low-fat cream cheese   Ice cream or frozen yogurt Sherbet or frozen fruit pops   Soups made with higher phosphorus ingredients (milk, dried peas, beans, lentils) Soups made with lower phosphorus ingredients (broth- or water-based with other lower phosphorus ingredients)   Whole grains, including whole-grain breads, crackers, cereal, rice and pasta Refined grains, including white bread, crackers, cereals, rice and pasta   Quick breads, biscuits, cornbread, muffins, pancakes or waffles Homemade refined (white) dinner rolls, bagels or English muffins   Dried peas (split, black-eyed), beans (black, garbanzo, lima, kidney, navy, montgomery) or lentils Green peas (canned, frozen), green beans or wax beans   Organ meats, walleye, pollock or sardines Lean beef, pork, lamb, poultry or other fish   Nuts and seeds Popcorn   Peanut butter and other nut butters Jam, jelly or honey   Chocolate, including chocolate drinks Carob (chocolate-flavored) candy, hard candy or gumdrops   Meghana and pepper-type sodas, flavored brink, bottled teas (if a term in the ingredients list contains the letters "phos") Lemon-lime soda, ginger ale or root beer, plain water     Follow a moderate potassium diet

## 2021-07-27 PROBLEM — D64.9 ANEMIA: Status: ACTIVE | Noted: 2021-01-01

## 2021-07-27 PROBLEM — I48.21 PERMANENT ATRIAL FIBRILLATION (HCC): Status: ACTIVE | Noted: 2017-02-08

## 2021-07-27 PROBLEM — R77.8 ELEVATED TROPONIN: Status: ACTIVE | Noted: 2021-01-01

## 2021-07-27 NOTE — ASSESSMENT & PLAN NOTE
Without complaints of chest pain    Likely secondary to cardiorenal syndrome  · Trend trop, EKG, telemetry monitoring

## 2021-07-27 NOTE — ASSESSMENT & PLAN NOTE
Prior AV node ablation    S/p [employer] CRT pacemaker   EKG V paced, rate 75   · Continue Eliquis and bisoprolol

## 2021-07-27 NOTE — ASSESSMENT & PLAN NOTE
Presents with gradual worsening of lower extremity edema, exertional dyspnea, orthopnea  Torsemide recently decreased down to 20 mg daily  Attends cardiac rehab but states he has not been able to go this week due to swelling  Echo 3/2021 EF 25%, moderate to severe TR  BNP 38,000  Prior 18,000  Troponin 0 31   Home regimen: torsemide 20 mg QD, zaroxolyn 2 5 mg weekly  Per recent note, not taking bisoprolol due to low blood pressures    · Bumex 1 mg x1 dose now, await response    I&O, standing weights    FR, sodium restriction   Nephro, cards consulted

## 2021-07-27 NOTE — H&P
[facility]  H&P- Ed Anderson 6/16/1927, 80 y o  male MRN: [id number]  Unit/Bed#: ED 15 Encounter: [id number]  Primary Care Provider: VARSHA Lopes Asa, MD   Date and time admitted to hospital: 7/26/2021 10:54 PM    Acute on chronic systolic CHF (congestive heart failure) ([facility] )  Assessment & Plan  Presents with gradual worsening of lower extremity edema, exertional dyspnea, orthopnea  Torsemide recently decreased down to 20 mg daily  Attends cardiac rehab but states he has not been able to go this week due to swelling  Echo 3/2021 EF 25%, moderate to severe TR  BNP 38,000  Prior 18,000  Troponin 0 31   Home regimen: torsemide 20 mg QD, zaroxolyn 2 5 mg weekly  Per recent note, not taking bisoprolol due to low blood pressures  · Bumex 1 mg x1 dose now, await response    I&O, standing weights    FR, sodium restriction   Nephro, cards consulted    Acute kidney injury superimposed on chronic kidney disease ([facility] )  Assessment & Plan  Baseline creatinine 2 to 2 4  Creatinine admission 3 64  Suspect due to cardiorenal syndrome  Torsemide recently decreased to 20 mg daily  Maintained on zaroxolyn 2 5 mg weekly  Recent visit with Dr Trenton Hong of nephrology  · Bladder scans, retention protocol, I&O, standing weights  · Avoid nephrotoxins, hypotension  · Check renal ultrasound  · Nephrology consulted    Elevated troponin  Assessment & Plan  Without complaints of chest pain  Likely secondary to cardiorenal syndrome  · Trend trop, EKG, telemetry monitoring     Anemia  Assessment & Plan  Denies any active bleeding  · CBC in AM     Permanent atrial fibrillation [facility])  Assessment & Plan  Prior AV node ablation  S/p Abbott/[employer] CRT pacemaker   EKG V paced, rate 75   · Continue Eliquis and bisoprolol    VTE Pharmacologic Prophylaxis: VTE Score: 4 Moderate Risk (Score 3-4) - Pharmacological DVT Prophylaxis Ordered: apixaban (Eliquis)    Code Status: Prior full  Discussion with family: Updated  (daughter) at bedside  Anticipated Length of Stay: Patient will be admitted on an inpatient basis with an anticipated length of stay of greater than 2 midnights secondary to IV diuresis  Total Time for Visit, including Counseling / Coordination of Care: 70 minutes Greater than 50% of this total time spent on direct patient counseling and coordination of care  Chief Complaint: lower extremity edema     History of Present Illness:  Lucero Ayno is a 80 y o  male with a PMH of atrial fibrillation on eliquis, cardiomyopathy, CKD 4, BPH, colon ca s/p left hemicolectomy (2019), cardioversion who presents with worsening lower extremity edema and scrotal edema  He reports this was gradual   Torsemide was recently decreased to 20 mg daily  He tried taking extra doses of torsemide with improvement but swelling came back  He also notes draining areas mostly localized to left shin  He notes exertional dyspnea, orthopnea  He denies any recent fever, chills, chest pain, palpitation, n/v/d/abdominal pain, dysuria, calf tenderness  He notes decreased urinary output  Patient is admitted as inpatient for IV diuresis  Cardiology and nephrology consult is requested  Review of Systems:  Review of Systems   Respiratory: Positive for shortness of breath  Negative for cough, chest tightness and wheezing  Cardiovascular: Positive for leg swelling  Negative for chest pain and palpitations  Gastrointestinal: Negative for abdominal distention  Skin: Positive for wound  All other systems reviewed and are negative        Past Medical and Surgical History:   Past Medical History:   Diagnosis Date    Anemia     Atrial fibrillation ([address] )     BPH (benign prostatic hypertrophy)     Cancer (HCC)     COLON    Chronic kidney disease     Hypertension     Irregular heart beat     afib       Past Surgical History:   Procedure Laterality Date    BASAL CELL CARCINOMA EXCISION      CARDIAC PACEMAKER PLACEMENT      CARDIAC SURGERY      CARDIOVERSION      CARPAL TUNNEL RELEASE      CATARACT EXTRACTION      COLON SURGERY      COLONOSCOPY      [place] COLONOSCOPY FLX DX W/COLLJ SPEC WHEN PFRMD N/A 11/30/2018    Procedure: COLONOSCOPY w egd  by dr Paolo Solis;  Surgeon: Webb Cogan, MD;  Location: [facility]; Service: Colorectal    [place] LAP,SURG,COLECTOMY, PARTIAL, W/ANAST N/A 1/8/2019    Procedure: Left hemicolectomy, takedown splenic flexure, end to end transverse to sigmoid colon anastamosis; Surgeon: Webb Cogan, MD;  Location: [facility];  Service: Colorectal       Meds/Allergies:  Prior to Admission medications    Medication Sig Start Date End Date Taking? Authorizing Provider   apixaban (Eliquis) 2 5 mg Take 1 tablet (2 5 mg total) by mouth 2 (two) times a day 9/9/20   Hamilton Moe,    CHLORPHENIRAMINE MALEATE PO Take by mouth every 4 (four) hours as needed  Patient not taking: Reported on 7/16/2021    Historical Provider, MD   finasteride (PROSCAR) 5 mg tablet Take 5 mg by mouth daily    Historical Provider, MD   hydrOXYzine HCL (ATARAX) 25 mg tablet Take 25 mg by mouth every 6 (six) hours as needed for itching    Historical Provider, MD   metolazone (ZAROXOLYN) 2 5 mg tablet Take 2 5 mg by mouth once a week  2/5/21   Historical Provider, MD   Multiple Vitamins-Minerals (CENTRUM SILVER 50+MEN PO) Take 1 tablet by mouth daily    Historical Provider, MD   pantoprazole (PROTONIX) 40 mg tablet Take 40 mg by mouth daily    Historical Provider, MD   torsemide (DEMADEX) 20 mg tablet TAKE 1 TABLET BY MOUTH TWICE A DAY  Patient taking differently: 20 mg daily  7/10/21   Gavin Sandhu MD     I have reviewed home medications with patient personally  Allergies:    Allergies   Allergen Reactions    Lasix [Furosemide] Other (See Comments)     Weakness, decreased BP    Adhesive [Medical Tape] Rash    Neomycin-Bacitracin Zn-Polymyx Rash    Neosporin [Neomycin-Polymyxin-Gramicidin] Rash Social History:  Marital Status:    Occupation:   Patient Pre-hospital Living Situation: Home with daughter  Patient Pre-hospital Level of Mobility: walks with walker  Patient Pre-hospital Diet Restrictions:   Substance Use History:   Social History     Substance and Sexual Activity   Alcohol Use Yes    Comment: occasional     Social History     Tobacco Use   Smoking Status Never Smoker   Smokeless Tobacco Never Used     Social History     Substance and Sexual Activity   Drug Use No       Family History:  Family History   Problem Relation Age of Onset    Heart disease Father     Heart attack Father     Hypertension Father     Heart disease Brother     Heart attack Brother 64    Hypertension Brother     Heart attack Brother 67    Hypertension Brother     Arrhythmia Neg Hx     Asthma Neg Hx     Clotting disorder Neg Hx     Heart failure Neg Hx        Physical Exam:     Vitals:   Blood Pressure: 108/55 (07/27/21 0244)  Pulse: 70 (07/27/21 0244)  Temperature: 98 7 °F (37 1 °C) (07/26/21 2322)  Temp Source: Oral (07/26/21 2322)  Respirations: 16 (07/27/21 0244)  SpO2: 98 % (07/27/21 0244)    Physical Exam  Constitutional:       General: He is not in acute distress  Appearance: He is not ill-appearing  HENT:      Head: Normocephalic and atraumatic  Right Ear: External ear normal       Left Ear: External ear normal       Nose: Nose normal       Mouth/Throat:      Mouth: Mucous membranes are moist       Pharynx: Oropharynx is clear  Eyes:      Extraocular Movements: Extraocular movements intact  Conjunctiva/sclera: Conjunctivae normal    Cardiovascular:      Rate and Rhythm: Normal rate and regular rhythm  Pulses: Normal pulses  Heart sounds: Murmur heard  Pulmonary:      Effort: Pulmonary effort is normal  No respiratory distress  Breath sounds: Rales present  No wheezing or rhonchi  Abdominal:      General: Bowel sounds are normal  There is no distension  Palpations: Abdomen is soft  Tenderness: There is no abdominal tenderness  Musculoskeletal:         General: Normal range of motion  Cervical back: Normal range of motion  No rigidity  Right lower leg: Edema (+3) present  Left lower leg: Edema (+3) present  Skin:     General: Skin is warm and dry  Capillary Refill: Capillary refill takes less than 2 seconds  Comments: Weeping to b/l le  L>R  Not appearing infected  Neurological:      General: No focal deficit present  Mental Status: He is alert and oriented to person, place, and time  Psychiatric:         Mood and Affect: Mood normal          Behavior: Behavior normal           Additional Data:     Lab Results:  Results from last 7 days   Lab Units 07/27/21  0030   WBC Thousand/uL 6 80   HEMOGLOBIN g/dL 11 8*   HEMATOCRIT % 35 8*   PLATELETS Thousands/uL 152   NEUTROS PCT % 72   LYMPHS PCT % 14   MONOS PCT % 11   EOS PCT % 3     Results from last 7 days   Lab Units 07/27/21  0030   SODIUM mmol/L 133*   POTASSIUM mmol/L 3 2*   CHLORIDE mmol/L 92*   CO2 mmol/L 29   BUN mg/dL 99*   CREATININE mg/dL 3 64*   ANION GAP mmol/L 12   CALCIUM mg/dL 9 0   ALBUMIN g/dL 3 1*   TOTAL BILIRUBIN mg/dL 0 98   ALK PHOS U/L 102   ALT U/L 30   AST U/L 39   GLUCOSE RANDOM mg/dL 140                 Results from last 7 days   Lab Units 07/27/21  0030   LACTIC ACID mmol/L 2 4*       Imaging: Reviewed radiology reports from this admission including: chest xray  XR chest 1 view portable   ED Interpretation by Wayne Moya DO (07/27 1313)   No acute cardiopulmonary findings  EKG and Other Studies Reviewed on Admission:   · EKG: Paced rhythm  HR 75     ** Please Note: This note has been constructed using a voice recognition system   **

## 2021-07-27 NOTE — CONSULTS
Consultation - Cardiology   Delfina Sutherland 80 y o  male MRN: [id number]  Unit/Bed#: ED 15 Encounter: [id number]  07/27/21  10:31 AM      Assessment:  Active Problems:    Permanent atrial fibrillation (HCC)    Acute kidney injury superimposed on chronic kidney disease ([address] )    Acute on chronic systolic CHF (congestive heart failure) (HCC)    Anemia    Elevated troponin      Plan:  Acute on chronic congestive heart failure  3/21 echo EF of 25% PA pressure of 45-50mmHG  Echo in 2018 showed EF of 45%  TTE 03/2015:  LVEF of from 50% to 29%  Home diuretics: Torsemide 20 mg daily, metolazone 2 5 mg weekly every Saturday    Plan:  · Patient is not on Ace/Arb due to low blood pressure  · Patient got Bumex 1 mg IV at 3:15 a m  Urine output 750 per patient  · Will increase Bumex 2 mg IV BID  · Recheck BMP in a m  · Strict I&O  · Fluid restriction 1500  · 2 g sodium diet  · Daily weight standing scale only    Elevated troponin likely type 2 MI secondary to CHF  Recent Labs     07/27/21  0030 07/27/21  0737   TROPONINI 0 31* 0 34*     · Stop trending troponin    Hypokalemia  Recent Labs     07/27/21  0030   K 3 2*   · Receive 60 mEq of potassium        Permanent atrial fibrillation  · Rate control bisoprolol 2 5 mg daily was not taking due to low blood pressure cardiology is aware  · Continue apixaban 2 5 mg b i d  SUDHIR on top of CKD  Likely cardiorenal  Baseline creatinine seems to be 1 9-2 3  · IV diuresis  · Awaiting Nephrology recommendation      History of Present Illness   Physician Requesting Consult: Natasha Iyer MD  Reason for Consult / Principal Problem:  SUDHIR SUPERIMPOSED ON CKD, ACUTE CHRONIC HEART FAILURE    HPI: Delfina Sutherland is a 80 y o male came in for leg edema and scrotal edema   Past medical history of AFib with ablation in 2015 on Eliquis 2 5 mg b i d , cardiomyopathy, CKD stage 4, BPH, colon cancer status post left hemicolectomy ( 2019), Biventricular cardiac pacemaker in 2018, EF of 20% since 2005, cardiac catheterization showed normal coronaries at that time  On TTE on 12/2018 showed EF of 45% and reduced systolic function  03/10/2021 EF of 25% moderate to severe tricuspid regurgitation  Patient was taking torsemide 20 mg BID, with Zaroxolyn 2 5 mg weekly  Has not been taking bisoprolol due to low blood pressures  Was last seen on the hospital on 03/09/21 for cellulitis of the left leg and acute and chronic CHF, admitted for 16 days  07/13/2020 follows with cardiology Dr Ruben Foley, per records supposed to be taking torsemide 20 mg b i d but was changed to daily due to soft blood pressure, cardiology aware of patient stopping bisoprolol  With metolazone 2 5 mg weekly  For according to the patient for for 6 months he has not been the same, has been generally weak, used to be able to do goal but now barely stand up, he reported that his weight has been acceptable however when the torsemide was decreased to once a day he noticed a gradual increase on his leg edema increasing up to his scrotum, patient denies shortness of breath, dizziness, fever, diarrhea, nor orthopnea  Patient reported increase of 5 lb in the past 2 weeks  Patient reports good compliance with diet, medication, and fluid restriction  Baseline creatinine 1 9-2 3    Inpatient consult to Cardiology  Consult performed by: Ara Bobby MD  Consult ordered by: IAIN Ryder          Inpatient consult to Cardiology     Performed by  Ara Bobby MD     Authorized by IAIN Ryder              Review of Systems:    Review of Systems   Constitutional: Positive for weight gain  Musculoskeletal: Positive for joint swelling and muscle weakness  All other systems reviewed and are negative        Historical Information   Past Medical History:   Diagnosis Date    Anemia     Atrial fibrillation (HCC)     BPH (benign prostatic hypertrophy)     Cancer (HCC)     COLON    Chronic kidney disease     Hypertension     Irregular heart beat     afib     Past Surgical History:   Procedure Laterality Date    BASAL CELL CARCINOMA EXCISION      CARDIAC PACEMAKER PLACEMENT      CARDIAC SURGERY      CARDIOVERSION      CARPAL TUNNEL RELEASE      CATARACT EXTRACTION      COLON SURGERY      COLONOSCOPY      [place] COLONOSCOPY FLX DX W/COLLJ SPEC WHEN PFRMD N/A 11/30/2018    Procedure: COLONOSCOPY w egd  by dr Maco Miller;  Surgeon: Lamar Phillips MD;  Location: [facility]; Service: Colorectal    [place] LAP,SURG,COLECTOMY, PARTIAL, W/ANAST N/A 1/8/2019    Procedure: Left hemicolectomy, takedown splenic flexure, end to end transverse to sigmoid colon anastamosis; Surgeon: Lamar Phillips MD;  Location: [facility];  Service: Colorectal     Social History     Substance and Sexual Activity   Alcohol Use Yes    Comment: occasional     Social History     Substance and Sexual Activity   Drug Use No     Social History     Tobacco Use   Smoking Status Never Smoker   Smokeless Tobacco Never Used       Family History:   Family History   Problem Relation Age of Onset    Heart disease Father     Heart attack Father     Hypertension Father     Heart disease Brother     Heart attack Brother 64    Hypertension Brother     Heart attack Brother 67    Hypertension Brother     Arrhythmia Neg Hx     Asthma Neg Hx     Clotting disorder Neg Hx     Heart failure Neg Hx        Meds/Allergies   all current active meds have been reviewed  Allergies   Allergen Reactions    Lasix [Furosemide] Other (See Comments)     Weakness, decreased BP    Adhesive [Medical Tape] Rash    Neomycin-Bacitracin Zn-Polymyx Rash    Neosporin [Neomycin-Polymyxin-Gramicidin] Rash       Objective   Vitals: Blood pressure 104/56, pulse 74, temperature 98 7 °F (37 1 °C), temperature source Oral, resp  rate 16, SpO2 98 %  , There is no height or weight on file to calculate BMI ,   Orthostatic Blood Pressures      Most Recent Value   Blood Pressure  104/56 filed at 07/27/2021 0743   Patient Position - Orthostatic VS  Lying filed at 07/27/2021 9980          Systolic (25JOP), IBO:139 , Min:104 , DJN:442     Diastolic (90WDQ), MVL:33, Min:55, Max:56      No intake or output data in the 24 hours ending 07/27/21 1031    Invasive Devices     Peripheral Intravenous Line            Peripheral IV 03/22/21 Left;Ventral (anterior) Forearm 127 days                    Physical Exam:    Physical Exam  HENT:      Head: Normocephalic  Nose: Nose normal       Mouth/Throat:      Mouth: Mucous membranes are moist    Eyes:      Extraocular Movements: Extraocular movements intact  Pupils: Pupils are equal, round, and reactive to light  Neck:      Comments: Positive JVD  Cardiovascular:      Rate and Rhythm: Normal rate  Rhythm irregular  Pulses: Normal pulses  Heart sounds: Normal heart sounds  Pulmonary:      Effort: Pulmonary effort is normal       Comments: Lung sounds clear  Abdominal:      General: Abdomen is flat  Bowel sounds are normal       Palpations: Abdomen is soft  Musculoskeletal:         General: Normal range of motion  Cervical back: Normal range of motion  Right lower leg: Edema present  Left lower leg: Edema present  Comments: Swelling up to the upper thighs, scrotal edema   Skin:     General: Skin is warm and dry  Capillary Refill: Capillary refill takes less than 2 seconds  Neurological:      General: No focal deficit present  Mental Status: He is alert and oriented to person, place, and time  Psychiatric:         Mood and Affect: Mood normal          GEN: Alert and oriented x 3, in no acute distress  HEENT: Sclera anicteric, conjunctivae pink, mucous membranes moist   NECK: Supple, no carotid bruits, no significant JVD  HEART: Regular rhythm, normal S1 and S2, no murmurs, clicks, gallops or rubs     LUNGS: Clear to auscultation bilaterally; no wheezes, rales, or rhonchi   ABDOMEN: Soft, nontender, nondistended, normoactive bowel sounds  EXTREMITIES: Skin warm and well perfused, no clubbing, cyanosis, or edema  NEURO: No focal findings  SKIN: Normal without suspicious lesions on exposed skin      Lab Results:     Troponins:   Results from last 7 days   Lab Units 21  0737 21  0030   TROPONIN I ng/mL 0 34* 0 31*       CBC with diff:   Results from last 7 days   Lab Units 21  0434 21  0030   WBC Thousand/uL 6 51 6 80   HEMOGLOBIN g/dL 11 1* 11 8*   HEMATOCRIT % 33 3* 35 8*   MCV fL 93 93   PLATELETS Thousands/uL 171 152   MCH pg 31 1 30 7   MCHC g/dL 33 3 33 0   RDW % 13 2 13 3   MPV fL 11 7 10 8   NRBC AUTO /100 WBCs 0 0         CMP:   Results from last 7 days   Lab Units 21  0030   POTASSIUM mmol/L 3 2*   CHLORIDE mmol/L 92*   CO2 mmol/L 29   BUN mg/dL 99*   CREATININE mg/dL 3 64*   CALCIUM mg/dL 9 0   AST U/L 39   ALT U/L 30   ALK PHOS U/L 102   EGFR ml/min/1 73sq m 13       Magnesium:   Results from last 7 days   Lab Units 21  0737   MAGNESIUM mg/dL 2 4       Coags:       TSH:       Lipid Profile:       Hgb A1c:       Cardiac testing:   Results for orders placed during the hospital encounter of 18    Echo complete with contrast if indicated    [address] Dennis [address]  [address]  [phone]    Transthoracic Echocardiogram  2D, M-mode, Doppler, and Color Doppler    Study date:  21-Dec-2018    Patient: Nicole Sow  MR number: [id number]  Account number: [de-identified]  : 1927  Age: 80 years  Gender: Male  Status: Outpatient  [facility]: [address] [place] [address] [facility]  Height: 69 in  Weight: 202 lb  BP: 102/ 64 mmHg    Indications: Atrial fibrillation    Diagnoses: I48 0 - Atrial fibrillation    Sonographer:  JOSEFINA Gross  Primary Physician:  Nilo Lynn MD  Referring Physician:  Hugh Gallo DO  Group:  [id number]  Cardiology Fellow:  Baltazar Deal MD  Interpreting Physician:  DO ELLIS Chatman    LEFT VENTRICLE:  Systolic function was mildly reduced  Ejection fraction was estimated to be 45 %  There was mild diffuse hypokinesis with regional variations including basal to mid inferolateral and basal inferior hypokinesis  There was mild concentric hypertrophy  RIGHT VENTRICLE:  The ventricle was mildly dilated  Systolic function was reduced  LEFT ATRIUM:  The atrium was mildly to moderately dilated  RIGHT ATRIUM:  The atrium was moderately dilated  TRICUSPID VALVE:  There was moderate regurgitation  Estimated peak PA pressure was 42 mmHg  HISTORY: PRIOR HISTORY: Hypertension, atrial fibrillation, pacemaker, cardiomyopathy, chronic kidney disease    PROCEDURE: The study was performed in the [address] [facility]  This was a routine study  The transthoracic approach was used  The study included complete 2D imaging, M-mode, complete spectral Doppler, and color Doppler  Image  quality was adequate  LEFT VENTRICLE: Size was normal  Systolic function was mildly reduced  Ejection fraction was estimated to be 45 %  There was mild diffuse hypokinesis with regional variations including basal to mid inferolateral and basal inferior  hypokinesis Wall thickness was mildly increased  There was mild concentric hypertrophy  DOPPLER: Transmitral flow pattern: atrial fibrillation  RIGHT VENTRICLE: The ventricle was mildly dilated  Systolic function was reduced  Wall thickness was normal  A pacing wire was present  LEFT ATRIUM: The atrium was mildly to moderately dilated  RIGHT ATRIUM: The atrium was moderately dilated  A pacing wire was present  MITRAL VALVE: Valve structure was normal  There was normal leaflet separation  DOPPLER: The transmitral velocity was within the normal range  There was no evidence for stenosis  There was no significant regurgitation  AORTIC VALVE: The valve was trileaflet   Leaflets exhibited mildly increased thickness, mild calcification, and normal cuspal separation  DOPPLER: Transaortic velocity was within the normal range  There was no evidence for stenosis  There  was no significant regurgitation  TRICUSPID VALVE: The valve structure was normal  There was normal leaflet separation  DOPPLER: The transtricuspid velocity was within the normal range  There was no evidence for stenosis  There was moderate regurgitation  Estimated peak PA  pressure was 42 mmHg  PULMONIC VALVE: Leaflets exhibited normal thickness, no calcification, and normal cuspal separation  DOPPLER: The transpulmonic velocity was within the normal range  There was trace regurgitation  PERICARDIUM: There was no pericardial effusion  The pericardium was normal in appearance  AORTA: The root exhibited normal size  The ascending aorta was upper normal in size  SYSTEMIC VEINS: IVC: The inferior vena cava was normal in size  Respirophasic changes were normal     SYSTEM MEASUREMENT TABLES    2D  %FS: 17 03 %  Ao Diam: 3 79 cm  EDV(Teich): 129 52 ml  EF Biplane: 42 45 %  EF(Cube): 42 88 %  EF(Teich): 35 35 %  ESV(Cube): 80 33 ml  ESV(Teich): 83 74 ml  IVSd: 1 31 cm  LA Area: 24 22 cm2  LA Diam: 4 28 cm  LVEDV MOD A2C: 220 9 ml  LVEDV MOD A4C: 205 11 ml  LVEDV MOD BP: 215 2 ml  LVEF MOD A2C: 40 58 %  LVEF MOD A4C: 44 97 %  LVESV MOD A2C: 131 27 ml  LVESV MOD A4C: 112 87 ml  LVESV MOD BP: 123 84 ml  LVIDd: 5 2 cm  LVIDs: 4 31 cm  LVLd A2C: 9 46 cm  LVLd A4C: 9 25 cm  LVLs A2C: 8 73 cm  LVLs A4C: 8 41 cm  LVPWd: 1 31 cm  RA Area: 23 9 cm2  RV Diam : 5 16 cm  SV MOD A2C: 89 63 ml  SV MOD A4C: 92 23 ml  SV(Cube): 60 3 ml  SV(Teich): 45 78 ml    CW  TR Vmax: 3 08 m/s  TR maxP 97 mmHg    MM  TAPSE: 1 41 cm    [employer] Accredited Echocardiography Laboratory    Prepared and electronically signed by    Elvis Kennedy DO  Signed 21-Dec-2018 13:06:44    No results found for this or any previous visit      No results found for this or any previous visit  No results found for this or any previous visit  Imaging:  XR chest 1 view portable    Result Date: 7/27/2021  Narrative: CHEST INDICATION:   weakness, fluid overload, concern for pleural effusion  COMPARISON:  3/9/2021; 6/17/2019; 10/21/2005; 7/31/2019 EXAM PERFORMED/VIEWS:  XR CHEST PORTABLE FINDINGS:  Left-sided chest wall intracardiac device is identified  Leads are intact  Heart shadow is enlarged but unchanged from prior exam  Atherosclerotic calcifications noted  The lungs are clear  No pneumothorax or pleural effusion  Well-circumscribed sclerotic lesion associated with the anterior aspect left 7th rib is unchanged from prior chest CT from 2019 possibly a bone island or potentially an enchondroma  Osseous structures elsewhere are stable  Impression: Stable cardiomegaly Workstation performed: [id number]     Cardiac EP device report    Result Date: 6/28/2021  Narrative: SJM CRT-P (VVIR 70)/ NOT MRI CONDITIONAL NON-BILLABLE MERLIN TRANSMISSION: BATTERY VOLTAGE ADEQUATE (4 5 YRS)  BP: 91%  ALL AVAILABLE LEAD PARAMETERS WITHIN NORMAL LIMITS  4 VHR EPISODES W/ EGRMS SHOWING NSVT vs RVR 18 BEATS @ 186 BM, 12 BEATS @ 200 BPM, 9 BEATS  @ 196 BPM, 8 BEATS  @212 BPM  PT TAKES ZEBETA, ELIQUIS  EF: 25% (ECHO 3/10/21)  CORVUE IMPEDANCE MONITORING WITHIN NORMAL LIMITS  BI-V PACEMAKER FUNCTIONING APPROPRIATELY    [facility]

## 2021-07-27 NOTE — ED PROVIDER NOTES
History  Chief Complaint   Patient presents with    Edema     bilat lower legs with blisters     Mr Maximino Bill is a 66-year-old male presenting with a chief complaint or lower extremity edema  States he has a history of congestive heart failure, takes fluid pills, may have missed a few doses in the past few weeks  He notes that he took a few extra furosemide tablets the other day, which helped, however the fluid has returned  Notes that he has edema to the level of scrotum, with redness, weeping, and blistering on the distal lower extremities, left greater than right  Also complains of fatigue, shortness of breath with minimal exertion, lightheadedness, weakness, chills for the past several days  Prior to Admission Medications   Prescriptions Last Dose Informant Patient Reported? Taking?    CHLORPHENIRAMINE MALEATE PO  Self Yes No   Sig: Take by mouth every 4 (four) hours as needed   Patient not taking: Reported on 7/16/2021   Multiple Vitamins-Minerals (CENTRUM SILVER 50+MEN PO)  Self Yes No   Sig: Take 1 tablet by mouth daily   apixaban (Eliquis) 2 5 mg  Self No No   Sig: Take 1 tablet (2 5 mg total) by mouth 2 (two) times a day   finasteride (PROSCAR) 5 mg tablet  Self Yes No   Sig: Take 5 mg by mouth daily   hydrOXYzine HCL (ATARAX) 25 mg tablet  Self Yes No   Sig: Take 25 mg by mouth every 6 (six) hours as needed for itching   metolazone (ZAROXOLYN) 2 5 mg tablet  Self Yes No   Sig: Take 2 5 mg by mouth once a week    pantoprazole (PROTONIX) 40 mg tablet  Self Yes No   Sig: Take 40 mg by mouth daily   torsemide (DEMADEX) 20 mg tablet   No No   Sig: TAKE 1 TABLET BY MOUTH TWICE A DAY   Patient taking differently: 20 mg daily       Facility-Administered Medications: None       Past Medical History:   Diagnosis Date    Anemia     Atrial fibrillation (HCC)     BPH (benign prostatic hypertrophy)     Cancer (HCC)     COLON    Chronic kidney disease     Hypertension     Irregular heart beat     afib Past Surgical History:   Procedure Laterality Date    BASAL CELL CARCINOMA EXCISION      CARDIAC PACEMAKER PLACEMENT      CARDIAC SURGERY      CARDIOVERSION      CARPAL TUNNEL RELEASE      CATARACT EXTRACTION      COLON SURGERY      COLONOSCOPY      NH COLONOSCOPY FLX DX W/COLLJ SPEC WHEN PFRMD N/A 11/30/2018    Procedure: COLONOSCOPY w egd  by dr Bindu Brown;  Surgeon: Tory Aj MD;  Location: [facility]; Service: Colorectal    NH LAP,SURG,COLECTOMY, PARTIAL, W/ANAST N/A 1/8/2019    Procedure: Left hemicolectomy, takedown splenic flexure, end to end transverse to sigmoid colon anastamosis; Surgeon: Tory Aj MD;  Location: [facility];  Service: Colorectal       Family History   Problem Relation Age of Onset    Heart disease Father     Heart attack Father     Hypertension Father     Heart disease Brother     Heart attack Brother 64    Hypertension Brother     Heart attack Brother 67    Hypertension Brother     Arrhythmia Neg Hx     Asthma Neg Hx     Clotting disorder Neg Hx     Heart failure Neg Hx      I have reviewed and agree with the history as documented  E-Cigarette/Vaping    E-Cigarette Use Never User      E-Cigarette/Vaping Substances    Nicotine No     THC No     CBD No     Flavoring No     Other No     Unknown No      Social History     Tobacco Use    Smoking status: Never Smoker    Smokeless tobacco: Never Used   Vaping Use    Vaping Use: Never used   Substance Use Topics    Alcohol use: Yes     Comment: occasional    Drug use: No        Review of Systems   Constitutional: Positive for activity change, chills and fatigue  Negative for appetite change, diaphoresis and fever  HENT: Positive for postnasal drip, rhinorrhea and sore throat  Negative for congestion, sneezing and trouble swallowing  Eyes: Negative for visual disturbance  Respiratory: Positive for shortness of breath  Negative for cough and wheezing           Shortness of breath with minimal exertion  Cardiovascular: Positive for leg swelling  Negative for chest pain and palpitations  Gastrointestinal: Negative  Negative for abdominal distention, abdominal pain, constipation, diarrhea, nausea and vomiting  Genitourinary: Positive for scrotal swelling  Negative for decreased urine volume, difficulty urinating, dysuria, frequency, hematuria and testicular pain  Musculoskeletal: Negative for arthralgias and myalgias  Skin: Positive for color change  Negative for pallor  Neurological: Positive for weakness and light-headedness  Negative for dizziness, syncope, numbness and headaches  Hematological: Negative  Does not bruise/bleed easily  Psychiatric/Behavioral: Positive for confusion  Intermittent confusion per daughter  All other systems reviewed and are negative  Physical Exam  ED Triage Vitals [07/26/21 2322]   Temperature Pulse Respirations Blood Pressure SpO2   98 7 °F (37 1 °C) 60 16 133/55 98 %      Temp Source Heart Rate Source Patient Position - Orthostatic VS BP Location FiO2 (%)   Oral Monitor Lying Right arm --      Pain Score       --             Orthostatic Vital Signs  Vitals:    07/26/21 2322   BP: 133/55   Pulse: 60   Patient Position - Orthostatic VS: Lying       Physical Exam  Vitals and nursing note reviewed  Constitutional:       General: He is not in acute distress  Appearance: Normal appearance  He is not diaphoretic  HENT:      Head: Normocephalic and atraumatic  Right Ear: External ear normal       Left Ear: External ear normal       Nose: Rhinorrhea present  Mouth/Throat:      Mouth: Mucous membranes are moist       Pharynx: Oropharynx is clear  No oropharyngeal exudate or posterior oropharyngeal erythema  Eyes:      General: No scleral icterus  Extraocular Movements: Extraocular movements intact  Conjunctiva/sclera: Conjunctivae normal    Cardiovascular:      Rate and Rhythm: Regular rhythm  Bradycardia present  Pulses: Normal pulses  Heart sounds: Normal heart sounds  No murmur heard  No friction rub  No gallop  Pulmonary:      Effort: Pulmonary effort is normal  No respiratory distress  Breath sounds: Rhonchi present  No wheezing or rales  Abdominal:      General: Abdomen is flat  Bowel sounds are normal  There is no distension  Palpations: Abdomen is soft  Tenderness: There is no abdominal tenderness  There is no right CVA tenderness, left CVA tenderness, guarding or rebound  Musculoskeletal:         General: Normal range of motion  Cervical back: Normal range of motion  No tenderness  Right lower leg: Edema present  Left lower leg: Edema present  Lymphadenopathy:      Cervical: No cervical adenopathy  Skin:     General: Skin is cool and dry  Capillary Refill: Capillary refill takes less than 2 seconds  Coloration: Skin is not cyanotic  Findings: Erythema present  Comments: Erthematous, 10 cm x 4 cm area of blistering, weeping(clear fluid) anterior lower left extremity without bleeding or purulent discharge  Right lower extremity with 2x2 cm erythematous patch on anterior shin with minimal weeping noted  Neurological:      General: No focal deficit present  Mental Status: He is alert  Psychiatric:         Mood and Affect: Mood normal          Behavior: Behavior normal          ED Medications  Medications - No data to display    Diagnostic Studies  Results Reviewed     Procedure Component Value Units Date/Time    NT-BNP PRO [[id number]]  (Abnormal) Collected: 07/27/21 0030    Lab Status: Final result Specimen: Blood from Arm, Left Updated: 07/27/21 0211     NT-proBNP 34,109 pg/mL     Lactic acid, plasma [[id number]]  (Abnormal) Collected: 07/27/21 0030    Lab Status: Final result Specimen: Blood from Arm, Left Updated: 07/27/21 0153     LACTIC ACID 2 4 mmol/L     Narrative:      Result may be elevated if tourniquet was used during collection  Lactic acid 2 Hours [[id number]]     Lab Status: No result Specimen: Blood     Blood culture #1 [[id number]] Collected: 07/27/21 0029    Lab Status:  In process Specimen: Blood from Arm, Left Updated: 07/27/21 0134    Troponin I [[id number]]  (Abnormal) Collected: 07/27/21 0030    Lab Status: Final result Specimen: Blood from Arm, Left Updated: 07/27/21 0120     Troponin I 0 31 ng/mL     Comprehensive metabolic panel [[id number]]  (Abnormal) Collected: 07/27/21 0030    Lab Status: Final result Specimen: Blood from Arm, Left Updated: 07/27/21 0118     Sodium 133 mmol/L      Potassium 3 2 mmol/L      Chloride 92 mmol/L      CO2 29 mmol/L      ANION GAP 12 mmol/L      BUN 99 mg/dL      Creatinine 3 64 mg/dL      Glucose 140 mg/dL      Calcium 9 0 mg/dL      Corrected Calcium 9 7 mg/dL      AST 39 U/L      ALT 30 U/L      Alkaline Phosphatase 102 U/L      Total Protein 7 2 g/dL      Albumin 3 1 g/dL      Total Bilirubin 0 98 mg/dL      eGFR 13 ml/min/1 73sq m     Narrative:      [place] guidelines for Chronic Kidney Disease (CKD):     Stage 1 with normal or high GFR (GFR > 90 mL/min/1 73 square meters)    Stage 2 Mild CKD (GFR = 60-89 mL/min/1 73 square meters)    Stage 3A Moderate CKD (GFR = 45-59 mL/min/1 73 square meters)    Stage 3B Moderate CKD (GFR = 30-44 mL/min/1 73 square meters)    Stage 4 Severe CKD (GFR = 15-29 mL/min/1 73 square meters)    Stage 5 End Stage CKD (GFR <15 mL/min/1 73 square meters)  Note: GFR calculation is accurate only with a steady state creatinine    CBC and differential [[id number]]  (Abnormal) Collected: 07/27/21 0030    Lab Status: Final result Specimen: Blood from Arm, Left Updated: 07/27/21 0047     WBC 6 80 Thousand/uL      RBC 3 84 Million/uL      Hemoglobin 11 8 g/dL      Hematocrit 35 8 %      MCV 93 fL      MCH 30 7 pg      MCHC 33 0 g/dL      RDW 13 3 %      MPV 10 8 fL      Platelets 593 Thousands/uL      nRBC 0 /100 WBCs      Neutrophils Relative 72 %      Immat GRANS % 0 %      Lymphocytes Relative 14 %      Monocytes Relative 11 %      Eosinophils Relative 3 %      Basophils Relative 0 %      Neutrophils Absolute 4 85 Thousands/µL      Immature Grans Absolute 0 03 Thousand/uL      Lymphocytes Absolute 0 93 Thousands/µL      Monocytes Absolute 0 75 Thousand/µL      Eosinophils Absolute 0 22 Thousand/µL      Basophils Absolute 0 02 Thousands/µL     Procalcitonin with AM Reflex [[id number]] Collected: 07/27/21 0030    Lab Status: In process Specimen: Blood from Arm, Left Updated: 07/27/21 0042    Blood culture #2 [[id number]] Updated: 07/27/21 0041    Lab Status: In process Specimen: Blood                  XR chest 1 view portable   ED Interpretation by Aron Baltazar DO (07/27 9607)   No acute cardiopulmonary findings  Procedures  ECG 12 Lead Documentation Only    Date/Time: 7/27/2021 1:42 AM  Performed by: Aron Baltazar DO  Authorized by: Aron Baltazar DO     Indications / Diagnosis:  Weakness, exertional dyspnea, edema  ECG reviewed by me, the ED Provider: yes    Patient location:  ED  Interpretation:     Interpretation: abnormal    Rate:     ECG rate:  75    ECG rate assessment: normal    Rhythm:     Rhythm: paced    Pacing:     Capture:  Intermittent    Type of pacing:  Ventricular  Ectopy:     Ectopy: PVCs      PVCs:  Frequent  QRS:     QRS axis:  Left    QRS intervals: Wide  ST segments:     ST segments:  Normal  T waves:     T waves: inverted      Inverted:  I, aVR, aVL, V1, V2 and V3          ED Course  ED Course as of Jul 27 0226   Tue Jul 27, 2021   0100 Troponin I(!): 0 31   0136 SUDHIR, baseline 2 0-2 4   Creatinine(!): 3 64   0155 Likely 2/2 fluid overload     LACTIC ACID(!!): 2 4   0216 NT-proBNP(!): 34,109                                       MDM  Number of Diagnoses or Management Options  Acute kidney injury superimposed on chronic kidney disease ([facility] [address] ): new and requires workup  Acute on chronic heart failure with reduced ejection fraction and diastolic dysfunction ([address] ): new and requires workup  Peripheral edema: established and worsening  Diagnosis management comments: Mr Micheline Marin is a 80 yom with history of HFrEF, CKD stage 3, presenting with weeping edema to midcalf, edema to level of scrotum, with exertional dyspnea, fatigue, and generalized weakness  Physical exam: VS stable,  significant edema to level of scrotum with mild scrotal swelling, LS CTA bilaterally  EKG: ventricular paced rhythm consistent with prior  LABS: CBC with mild acute anemia  CMP with elevated creatinine, 3 64(baseline 2 0-2 4), troponin mildly elevated, 0 31, BNP 95615, lactic 2 4  CXR: no acute cardiopulmonary findings  Given troponin mildly bumped, EKG at baseline, no chest pain, troponin likely bumped due to stress on heart from fluid overload  Lactic acid likely elevated for same reason, sepsis unlikely  Did not start medication interventions in the ED due to delicate cardiorenal balance  Impression: SUDHIR in setting of CKD stage 3, Acute exacerbation of chronic HFrEF  Admit to inpatient for medical management of the above         Amount and/or Complexity of Data Reviewed  Clinical lab tests: ordered and reviewed  Tests in the radiology section of CPT®: reviewed and ordered  Tests in the medicine section of CPT®: reviewed  Decide to obtain previous medical records or to obtain history from someone other than the patient: yes  Obtain history from someone other than the patient: yes  Review and summarize past medical records: yes  Discuss the patient with other providers: yes  Independent visualization of images, tracings, or specimens: yes    Patient Progress  Patient progress: stable      Disposition  Final diagnoses:   Acute kidney injury superimposed on chronic kidney disease ([address] )   Peripheral edema   Acute on chronic heart failure with reduced ejection fraction and diastolic dysfunction ([address] )     Time reflects when diagnosis was documented in both MDM as applicable and the Disposition within this note     Time User Action Codes Description Comment    7/27/2021  1:53 AM Elwyn Fresh Add [N17 9,  N18 9] Acute kidney injury superimposed on chronic kidney disease ([facility] [address] )     7/27/2021  1:53 AM Elwyn Fresh Add [R60 9] Peripheral edema     7/27/2021  1:54 AM Elwyn Fresh Add [I50 43] Acute on chronic heart failure with reduced ejection fraction and diastolic dysfunction [facility])       ED Disposition     None      Follow-up Information    None         Patient's Medications   Discharge Prescriptions    No medications on file     No discharge procedures on file  PDMP Review     None           ED Provider  Attending physically available and evaluated Armond Crew  I managed the patient along with the ED Attending      Electronically Signed by         Dillon Mason DO  07/27/21 4526

## 2021-07-27 NOTE — TELEPHONE ENCOUNTER
Pt's daughter called, wants to make you aware that Samantha Oreilly was admitted to [facility] last night

## 2021-07-27 NOTE — PLAN OF CARE
Problem: Nutrition/Hydration-ADULT  Goal: Nutrient/Hydration intake appropriate for improving, restoring or maintaining nutritional needs  Description: Monitor and assess patient's nutrition/hydration status for malnutrition  Collaborate with interdisciplinary team and initiate plan and interventions as ordered  Monitor patient's weight and dietary intake as ordered or per policy  Utilize nutrition screening tool and intervene as necessary  Determine patient's food preferences and provide high-protein, high-caloric foods as appropriate       INTERVENTIONS:  - Monitor oral intake, urinary output, labs, and treatment plans  - Assess nutrition and hydration status and recommend course of action  - Evaluate amount of meals eaten  - Assist patient with eating if necessary   - Allow adequate time for meals  - Recommend/ encourage appropriate diets, oral nutritional supplements, and vitamin/mineral supplements  - Order, calculate, and assess calorie counts as needed  - Recommend, monitor, and adjust tube feedings and TPN/PPN based on assessed needs  - Assess need for intravenous fluids  - Provide specific nutrition/hydration education as appropriate  - Include patient/family/caregiver in decisions related to nutrition  Outcome: Progressing     Problem: MOBILITY - ADULT  Goal: Maintain or return to baseline ADL function  Description: INTERVENTIONS:  -  Assess patient's ability to carry out ADLs; assess patient's baseline for ADL function and identify physical deficits which impact ability to perform ADLs (bathing, care of mouth/teeth, toileting, grooming, dressing, etc )  - Assess/evaluate cause of self-care deficits   - Assess range of motion  - Assess patient's mobility; develop plan if impaired  - Assess patient's need for assistive devices and provide as appropriate  - Encourage maximum independence but intervene and supervise when necessary  - Involve family in performance of ADLs  - Assess for home care needs following discharge   - Consider OT consult to assist with ADL evaluation and planning for discharge  - Provide patient education as appropriate  Outcome: Progressing  Goal: Maintains/Returns to pre admission functional level  Description: INTERVENTIONS:  - Perform BMAT or MOVE assessment daily    - Set and communicate daily mobility goal to care team and patient/family/caregiver  - Collaborate with rehabilitation services on mobility goals if consulted  - Perform Range of Motion 3 times a day  - Reposition patient every 2 hours    - Dangle patient 3 times a day  - Stand patient 3 times a day  - Ambulate patient 3 times a day  - Out of bed to chair 3 times a day   - Out of bed for meals 3 times a day  - Out of bed for toileting  - Record patient progress and toleration of activity level   Outcome: Progressing

## 2021-07-27 NOTE — ASSESSMENT & PLAN NOTE
Baseline creatinine 2 to 2 4  Creatinine admission 3 64  Suspect due to cardiorenal syndrome  Torsemide recently decreased to 20 mg daily  Maintained on zaroxolyn 2 5 mg weekly    Recent visit with Dr Storm Burton of nephrology  · Bladder scans, retention protocol, I&O, standing weights  · Avoid nephrotoxins, hypotension  · Check renal ultrasound  · Nephrology consulted

## 2021-07-27 NOTE — CASE MANAGEMENT
LOS: 1  Readmission: none  Bundle: none  Unplanned Readmission Risk: 25 (green)    Patient admitted due to acute on chronic systolic CHF  Met with patient at bedside to complete case management assessment  Patient's daughter, Clayton Mckenna, also present  Patient presents as alert, oriented x4 and agreeable to assessment with daughter presents  Patient lives at home in a [address] - remains on first floor  Patient has a ramp access through front door  Reports that he uses a rollator in and out of the house  Patient does have a walker and cane if needed  Patient also has a commode and lift recliner which he often sleeps on  May benefit from a hospital bed if he qualifies  Patient is independent with ADLs, but family is able to assist  Daughter currently staying with patient  Patient has history of home care services through [address] and history of rehab at [facility] in 2019  Patient's daughter, Clayton Mckenna, has POA (financial and medical)  Patient has five total children, but three listed are the ones who live locally  Patient's PCP is Dr Ranjith Ashley  Primary pharmacy is [facility] and [facility] for mail-order meds  Confirms that he has an Rx policy and denies any issues obtaining meds  Patient has received both COVID vaccines through [facility] - around January; Francesco Castro  Family can provide copy of vaccine record and will bring in tomorrow  Denies any history of mental health diagnoses or substance abuse  PT/OT evals pending  Patient aware that this writer will return to discuss discharge recommendations - would be agreeable to rehab if recommended - aware that auth would be needed for same  Preference for home care services would be [facility] - referral sent with request for them to follow for SN, PT/OT       GABY Hensley  7/27/2021  5:12 PM

## 2021-07-27 NOTE — CONSULTS
[address] 80 y o  male MRN: [id number]  Unit/Bed#: S -01 Encounter: [id number]    ASSESSMENT and PLAN:  1  Acute kidney injury (POA) on CKD:  · Creatinine 3 64 on admission  · Urinalysis:  [place]  · Etiology:    · Volume overload/decreased effective circulating volume/ cardiorenal   Poor renal reserve  In  · Blood pressure has been lower than usual and he is no longer on bisoprolol  · Plan/recommendations:  · Patient reports less edema after given IV Bumex  He feels that his legs are improving already  · He has no unusual shortness of breath at this time  No indication to escalate diuretic dose  · Will continue to monitor renal function  · In light of constipation will monitor for urinary retention  · Check labs in the a m  2  Chronic kidney disease, stage IV:  · Outpatient nephrologist:  Dr David Velarde  · Most recent baseline creatinine 1 9-2 4  Previously 1 6-1 9 mg/dL  · Last urine protein creatinine ratio 0 2 g on 07/12/2021  · Etiology:  Hypertensive nephrosclerosis, age-related nephron loss plus cardiorenal syndrome  Prior episodes of acute kidney injury contributing/poor renal reserve  3  Acute on chronic CHF:  Cardiology following  · Chest x-ray on admission:  Stable cardiomegaly  Lungs clear  · Baseline weight between 282999  Outpatient torsemide dose 20 mg once a day/twice a day  Previously on metolazone 2 5 mg weekly  · Agapito Rodriguez reports to me that torsemide dose was decreased to once a day since he was doing so well with maintaining dry weight  · He follows a fluid restriction of 1500 mL per day  · He follows a low-salt diet  · Echocardiogram March 2021:  Ejection fraction 25%, severe diffuse hypokinesis with distinct regional wall motion abnormalities  Wall thickness mildly increased, moderate MR  Moderate to severe TR  · Dilated RV with normal function  · On Bumex 1 mg IV t i d   Monitor response    · Daily weight  · Fluid restriction  4  Electrolytes:  · Hypokalemia:  Patient due for 40 mEq of [place]  · Hyponatremia  Mild  Likely volume mediated  5  Hypertension:  · Blood pressure soft  · Bisoprolol 0 previously discontinued  6  CKD MBD/secondary hyperparathyroidism of renal origin:    · A most recent  6  · Outpatient management  Monitoring vitamin-D levels, phosphorus and calcium :    · Current phosphorus level 4 5 which is within goal for CKD stage 4    7  Atrial fibrillation:  On Eliquis  No longer on bisoprolol      HISTORY OF PRESENT ILLNESS:  Requesting Physician: Susan Christopher MD  Reason for Consult:  Acute kidney injury on CKD    Ed Monica is a 80 y o  male with a history of CKD stage 4, atrial fibrillation, cardiomyopathy, colon cancer status post left hemicolectomy, biventricular pacemaker, who was admitted to [facility] after presenting with increasing edema and 5 lb weight gain  Patient tells me that he generally weighs 161 lb  As his weight trends up E is usually due for his weekly dose of metolazone  He did notice that lately it is take in several days for his weight to decrease after taking metolazone  He denies change in diet, no increase in salt intake  No increase in water intake  Follows fluid restriction of 1500 mL a day or less  Complains primarily of constipation and reports to me that he came to the hospital due to open wound left tibial area which has been weeping  He also reports more muscle weakness which he states has been coming on gradually despite working with physical therapy  Creatinine above baseline on admission, 3 5 prompting nephrology consult       PAST MEDICAL HISTORY:  Past Medical History:   Diagnosis Date    Anemia     Atrial fibrillation ([address] )     BPH (benign prostatic hypertrophy)     Cancer (HCC)     COLON    Chronic kidney disease     Hypertension     Irregular heart beat     afib       PAST SURGICAL HISTORY:  Past Surgical History:   Procedure Laterality Date    BASAL CELL CARCINOMA EXCISION      CARDIAC PACEMAKER PLACEMENT      CARDIAC SURGERY      CARDIOVERSION      CARPAL TUNNEL RELEASE      CATARACT EXTRACTION      COLON SURGERY      COLONOSCOPY      DE COLONOSCOPY FLX DX W/COLLJ [address] WHEN PFRMD N/A 11/30/2018    Procedure: COLONOSCOPY w egd  by dr Mohamud Velarde;  Surgeon: Meri Hudson MD;  Location: [facility]; Service: Colorectal    DE LAP,SURG,COLECTOMY, PARTIAL, W/ANAST N/A 1/8/2019    Procedure: Left hemicolectomy, takedown splenic flexure, end to end transverse to sigmoid colon anastamosis;   Surgeon: Meri Hudson MD;  Location: [facility];  Service: Colorectal       ALLERGIES:  Allergies   Allergen Reactions    Lasix [Furosemide] Other (See Comments)     Weakness, decreased BP    Adhesive [Medical Tape] Rash    Neomycin-Bacitracin Zn-Polymyx Rash    Neosporin [Neomycin-Polymyxin-Gramicidin] Rash       SOCIAL HISTORY:  Social History     Substance and Sexual Activity   Alcohol Use Yes    Comment: occasional     Social History     Substance and Sexual Activity   Drug Use No     Social History     Tobacco Use   Smoking Status Never Smoker   Smokeless Tobacco Never Used       FAMILY HISTORY:  Family History   Problem Relation Age of Onset    Heart disease Father     Heart attack Father     Hypertension Father     Heart disease Brother     Heart attack Brother 64    Hypertension Brother     Heart attack Brother 67    Hypertension Brother     Arrhythmia Neg Hx     Asthma Neg Hx     Clotting disorder Neg Hx     Heart failure Neg Hx        MEDICATIONS:    Current Facility-Administered Medications:     apixaban (ELIQUIS) tablet 2 5 mg, 2 5 mg, Oral, BID, JACKELINE HernandezNP, 2 5 mg at 07/27/21 8667    bumetanide (BUMEX) injection 1 mg, 1 mg, Intravenous, TID (diuretic), Riya Pate MD    finasteride (PROSCAR) tablet 5 mg, 5 mg, Oral, Daily, IAIN Hernandez, 5 mg at 07/27/21 0838    hydrOXYzine HCL (ATARAX) tablet 25 mg, 25 mg, Oral, Q6H PRN, IIAN Schulz    multivitamin-minerals (CENTRUM) tablet 1 tablet, 1 tablet, Oral, Daily, IAIN Schulz, 1 tablet at 07/27/21 0838    pantoprazole (PROTONIX) EC tablet 40 mg, 40 mg, Oral, Daily, IAIN Schulz, 40 mg at 07/27/21 0736    potassium chloride (K-DUR,KLOR-CON) CR tablet 40 mEq, 40 mEq, Oral, Once, Hilario Lefort, MD    senna-docusate sodium (SENOKOT S) 8 6-50 mg per tablet 1 tablet, 1 tablet, Oral, BID, Silviano Campos MD    REVIEW OF SYSTEMS:  Constitutional:  Reports easy fatigue  Weakness which has been somewhat progressive  HENT: Negative for postnasal drip  Eyes: Negative for visual disturbance  Respiratory:  No cough or shortness of breath at rest  Cardiovascular: Negative for chest pain, palpitations  Positive for leg swelling  Gastrointestinal:  Positive for constipation  No nausea vomiting  Genitourinary: No dysuria, hematuria  Musculoskeletal:  Reports decline in ability to perform activities of daily living due to weakness  Leg weakness, arm weakness  Arthralgia  Skin: Negative for rash  Lower extremity wound  Neurological: Negative for focal weakness, headaches, dizziness  Hematological: Negative for bleeding  Psychiatric/Behavioral: Negative for confusion  All the systems were reviewed and were negative except as documented on the HPI  PHYSICAL EXAM:  Current Weight:    First Weight:    Vitals:    07/26/21 2322 07/27/21 0244 07/27/21 0743   BP: 133/55 108/55 104/56   BP Location: Right arm Right arm Right arm   Pulse: 60 70 74   Resp: 16 16 16   Temp: 98 7 °F (37 1 °C)     TempSrc: Oral     SpO2: 98% 98% 98%     No intake or output data in the 24 hours ending 07/27/21 1052  Physical Exam  Constitutional:       General: He is not in acute distress  Appearance: Normal appearance  He is well-developed  He is not ill-appearing or toxic-appearing  HENT:      Head: Normocephalic and atraumatic  Nose: No congestion  Mouth/Throat:      Mouth: Mucous membranes are moist       Pharynx: No oropharyngeal exudate  Eyes:      General: No scleral icterus  Left eye: No discharge  Extraocular Movements: Extraocular movements intact  Conjunctiva/sclera: Conjunctivae normal    Neck:      Vascular: No carotid bruit or JVD  Trachea: No tracheal deviation  Cardiovascular:      Rate and Rhythm: Normal rate  Rhythm irregular  Heart sounds: Murmur heard  No friction rub  No gallop  Comments: Left lower extremity pretibial area has several small blisters and 1 area that has opened with loss of epidermis  The area appears clean with no unusual drainage  There are no blistered areas noted on the right lower extremity  Pulmonary:      Effort: Pulmonary effort is normal  No respiratory distress  Breath sounds: Normal breath sounds  No stridor  No wheezing or rales  Abdominal:      General: Bowel sounds are normal  There is no distension  Palpations: Abdomen is soft  There is no mass  Tenderness: There is no abdominal tenderness  There is no guarding or rebound  Musculoskeletal:         General: Tenderness and signs of injury present  Normal range of motion  Cervical back: Normal range of motion and neck supple  No rigidity or tenderness  Right lower le+ Pitting Edema present  Left lower le+ Pitting Edema present  Skin:     General: Skin is warm and dry  Coloration: Skin is not jaundiced or pale  Findings: No erythema or rash  Neurological:      General: No focal deficit present  Mental Status: He is alert and oriented to person, place, and time  Psychiatric:         Mood and Affect: Mood normal          Behavior: Behavior normal          Thought Content:  Thought content normal          Judgment: Judgment normal            Invasive Devices:      Lab Results:   Results from last 7 days   Lab Units 21  0737 21  0434 21  0030 WBC Thousand/uL  --  6 51 6 80   HEMOGLOBIN g/dL  --  11 1* 11 8*   HEMATOCRIT %  --  33 3* 35 8*   PLATELETS Thousands/uL  --  171 152   POTASSIUM mmol/L  --   --  3 2*   CHLORIDE mmol/L  --   --  92*   CO2 mmol/L  --   --  29   BUN mg/dL  --   --  99*   CREATININE mg/dL  --   --  3 64*   CALCIUM mg/dL  --   --  9 0   MAGNESIUM mg/dL 2 4  --   --    PHOSPHORUS mg/dL  --   --  4 5*   ALK PHOS U/L  --   --  102   ALT U/L  --   --  30   AST U/L  --   --  39     Other Studies:

## 2021-07-28 PROBLEM — E87.1 HYPONATREMIA: Status: ACTIVE | Noted: 2021-01-01

## 2021-07-28 NOTE — ASSESSMENT & PLAN NOTE
Presents with gradual worsening of lower extremity edema, exertional dyspnea, orthopnea  Torsemide recently decreased down to 20 mg daily  Attends cardiac rehab but states he has not been able to go this week due to swelling  Echo 3/2021 EF 25%, moderate to severe TR  BNP 38,000  Prior 18,000  Troponin 0 31   Home regimen: torsemide 20 mg QD, zaroxolyn 2 5 mg weekly  Per recent note, not taking bisoprolol due to low blood pressures    · Bumex 1 mg x1 dose now, await response    I&O, standing weights    FR, sodium restriction   Nephro, cards consulted    7/28 - currently on IV Bumex 2 mg BID w/ Aldactone 12 5 mg daily - fluid restrictions w/ low-sodium diet enforced - appreciate cardiology input

## 2021-07-28 NOTE — UTILIZATION REVIEW
Initial Clinical Review    Admission: Date/Time/Statement:   Admission Orders (From admission, onward)     Ordered        07/27/21 0229  Inpatient Admission  Once                   Orders Placed This Encounter   Procedures    Inpatient Admission     Standing Status:   Standing     Number of Occurrences:   1     Order Specific Question:   Level of Care     Answer:   Med Surg [16]     Order Specific Question:   Estimated length of stay     Answer:   More than 2 Midnights     Order Specific Question:   Certification     Answer:   I certify that inpatient services are medically necessary for this patient for a duration of greater than two midnights  See H&P and MD Progress Notes for additional information about the patient's course of treatment  ED Arrival Information     Expected Arrival Acuity    - 7/26/2021 22:54 Urgent         Means of arrival Escorted by Service Admission type    Ambulance [place] Hospitalist Urgent         Arrival complaint    Edema        Chief Complaint   Patient presents with    Edema     bilat lower legs with blisters       Initial Presentation: 80 y o  male presents to the ED from home with worsening lower extremity edema and scrotal edema, exertional dyspnea, orthopnea and decreased urinary output  PMH of atrial fibrillation on Eliquis, pacemaker, cardiomyopathy, CKD 4, BPH, colon ca s/p left hemicolectomy (2019) and cardioversion  Torsemide was recently decreased to 20 mg daily  He tried taking extra doses of torsemide with improvement but swelling came back  He also notes draining areas,  mostly localized to left shin  ED labs revealed elevated BNP , troponin, lactic acid and creatinine (baseline 2-2 4)  Physical exam: rales, +3 B/L LE edema  Weeping B/L LE, L>R       Plan: Inpatient Med Surg admission for evaluation and treatment of acute on chronic CHF, SUDHIR on CKD and elevated troponin: Bumex 1 mg x 1 dose now, monitor response, fluid and sodium restriction, I/O, standing weight  Avoid nephrotoxins, hypotension, check renal US,  consult Nephrology  Telemetry, trend troponin, consult Cardiology  7/27 Cardiology consult:  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 congestive heart failure/acute kidney injury/cardiorenal syndrome  Non MI troponin elevation in setting of CHR  History of nonischemic cardiomyopathy with his most recent ejection fraction at 25%-biventricular dilatation and dysfunction, on torsemide 20 mg b i d , metolazone 2 5 mg once weekly with baseline creatinine up to 2 3 and a baseline dry weight around 76 kg  Most recently torsemide dose was decreased to 20 mg daily by his primary cardiologist for borderline blood pressures  Has also had cardiac cachexia and has been losing caloric weight   Since the decrease in the dose of torsemide, has developed volume overload-about a 5 lb weight gain, shortness of breath and lower extremity edema with blistering  On exam has JVD and bilateral presacral and lower extremity edema  Also with evidence of hypoperfusion with lactic acidosis, acute kidney injury with a creatinine of 3 6, [place] 34[address],  mildly elevated troponin around 0 3-showing a flat pattern  Start Bumex 2 mg IV BID  Nephrology consult: SUDHIR, suspect etiology is cardiorenal syndrome, check PVR, monitor weights with IV diuresis, monitor renal function  7/27 Physical Therapy recommending post acute rehab         ED Triage Vitals   Temperature Pulse Respirations Blood Pressure SpO2   07/26/21 2322 07/26/21 2322 07/26/21 2322 07/26/21 2322 07/26/21 2322   98 7 °F (37 1 °C) 60 16 133/55 98 %      Temp Source Heart Rate Source Patient Position - Orthostatic VS BP Location FiO2 (%)   07/26/21 2322 07/26/21 2322 07/26/21 2322 07/26/21 2322 --   Oral Monitor Lying Right arm       Pain Score       07/27/21 1139       No Pain          Wt Readings from Last 1 Encounters:   07/28/21 80 3 kg (177 lb 0 5 oz)     Additional Vital Signs: Date/Time  Temp  Pulse  Resp  BP  MAP (mmHg)  SpO2  O2 Device   07/28/21 0700  97 5 °F (36 4 °C)  71  18  110/59  77  96 %  None (Room air)   07/27/21 2116  --  --  --  --  --  --  None (Room air)   07/27/21 2030  97 6 °F (36 4 °C)  68  16  108/60  79  99 %  None (Room air)   07/27/21 1823  --  70  --  117/57  --  --  --   07/27/21 1500  98 3 °F (36 8 °C)  71  18  104/58  74  97 %  None (Room air)   07/27/21 1155  98 °F (36 7 °C)  75  18  103/58  75  96 %  None (Room air)   07/27/21 0743  --  74  16  104/56  --  98 %  None (Room air)   07/27/21 0244  --  70  16  108/55  --  98 %  None (Room air)         Pertinent Labs/Diagnostic Test Results:     7/27 CXR:  Stable cardiomegaly      7/27 EKG:      Patient location:  ED   Interpretation:     Interpretation: abnormal     Rate:     ECG rate:  75     ECG rate assessment: normal     Rhythm:     Rhythm: paced     Pacing:     Capture:  Intermittent     Type of pacing:  Ventricular   Ectopy:     Ectopy: PVCs       PVCs:  Frequent   QRS:     QRS axis:  Left     QRS intervals:  Wide   ST segments:     ST segments:  Normal   T waves:     T waves: inverted       Inverted:  I, aVR, aVL, V1, V2 and V3          Results from last 7 days   Lab Units 07/27/21  0434 07/27/21  0030   WBC Thousand/uL 6 51 6 80   HEMOGLOBIN g/dL 11 1* 11 8*   HEMATOCRIT % 33 3* 35 8*   PLATELETS Thousands/uL 171 152   NEUTROS ABS Thousands/µL 4 53 4 85         Results from last 7 days   Lab Units 07/28/21  0603 07/27/21  0737 07/27/21  0030   SODIUM mmol/L 136  --  133*   POTASSIUM mmol/L 3 0*  --  3 2*   CHLORIDE mmol/L 95*  --  92*   CO2 mmol/L 32  --  29   ANION GAP mmol/L 9  --  12   BUN mg/dL 96*  --  99*   CREATININE mg/dL 3 36*  --  3 64*   EGFR ml/min/1 73sq m 15  --  13   CALCIUM mg/dL 9 1  --  9 0   MAGNESIUM mg/dL 2 3 2 4  --    PHOSPHORUS mg/dL  --   --  4 5*     Results from last 7 days   Lab Units 07/27/21  0030   AST U/L 39   ALT U/L 30   ALK PHOS U/L 102   TOTAL PROTEIN g/dL 7 2 ALBUMIN g/dL 3 1*   TOTAL BILIRUBIN mg/dL 0 98         Results from last 7 days   Lab Units 07/28/21  0603 07/27/21  0030   GLUCOSE RANDOM mg/dL 164* 140           Results from last 7 days   Lab Units 07/27/21  1150 07/27/21  0737 07/27/21  0030   TROPONIN I ng/mL 0 30* 0 34* 0 31*       Results from last 7 days   Lab Units 07/27/21  0030   PROCALCITONIN ng/ml 0 09     Results from last 7 days   Lab Units 07/28/21  0012 07/27/21  2206 07/27/21  1954 07/27/21  1620 07/27/21  1351 07/27/21  1150 07/27/21  0738   LACTIC ACID mmol/L 2 6* 3 6* 2 9* 2 5* 3 0* 2 3* 2 7*             Results from last 7 days   Lab Units 07/27/21  0030   NT-PRO BNP pg/mL 34,109*             Results from last 7 days   Lab Units 07/27/21  0358   CLARITY UA  Clear   COLOR UA  Yellow   SPEC GRAV UA  1 015   PH UA  6 5   GLUCOSE UA mg/dl Negative   KETONES UA mg/dl Negative   BLOOD UA  Negative   PROTEIN UA mg/dl Negative   NITRITE UA  Negative   BILIRUBIN UA  Negative   UROBILINOGEN UA E U /dl 0 2   LEUKOCYTES UA  Negative       Results from last 7 days   Lab Units 07/27/21  0029 07/26/21  2340   BLOOD CULTURE  No Growth at 24 hrs  No Growth at 24 hrs                 ED Treatment:   Medication Administration from 07/26/2021 2250 to 07/27/2021 1050       Date/Time Order Dose Route Action     07/27/2021 0344 bumetanide (BUMEX) injection 1 mg 1 mg Intravenous Given     07/27/2021 0838 apixaban (ELIQUIS) tablet 2 5 mg 2 5 mg Oral Given     07/27/2021 0838 finasteride (PROSCAR) tablet 5 mg 5 mg Oral Given     07/27/2021 3766 multivitamin-minerals (CENTRUM) tablet 1 tablet 1 tablet Oral Given     07/27/2021 0736 pantoprazole (PROTONIX) EC tablet 40 mg 40 mg Oral Given     07/27/2021 0436 potassium chloride (K-DUR,KLOR-CON) CR tablet 20 mEq 20 mEq Oral Given        Past Medical History:   Diagnosis Date    Anemia     Atrial fibrillation (HCC)     BPH (benign prostatic hypertrophy)     Cancer (HCC)     COLON    Chronic kidney disease     Hypertension     Irregular heart beat     afib     Present on Admission:   Acute on chronic systolic CHF (congestive heart failure) (HCC)   Acute kidney injury superimposed on chronic kidney disease (HCC)   Permanent atrial fibrillation (HCC)      Admitting Diagnosis: Edema [R60 9]  Peripheral edema [R60 9]  Acute kidney injury superimposed on chronic kidney disease ([facility] [address] ) [N17 9, N18 9]  Acute on chronic heart failure with reduced ejection fraction and diastolic [facility] [I50 43]  Age/Sex: 80 y o  male       Admission Orders:    Telemetry, PT/OT, BMP and mag in a m  x 3 days, check bladder scan  Consult Wound Care  Daily bassem, I/O  Cardiovascular diet, 2 gm sodium, 1500 ml fluid restriction  Scheduled Medications:    apixaban, 2 5 mg, Oral, BID  bumetanide, 2 mg, Intravenous, BID  finasteride, 5 mg, Oral, Daily  multivitamin-minerals, 1 tablet, Oral, Daily  pantoprazole, 40 mg, Oral, Daily  potassium chloride, 40 mEq, Oral, BID  potassium chloride, 40 mEq, Oral, Daily  senna-docusate sodium, 1 tablet, Oral, BID      Continuous IV Infusions: None  PRN Meds:  hydrOXYzine HCL, 25 mg, Oral, Q6H PRN        IP CONSULT TO NUTRITION SERVICES  IP CONSULT TO CARDIOLOGY  IP CONSULT TO NEPHROLOGY    Network Utilization Review Department  ATTENTION: Please call with any questions or concerns to [phone] and carefully listen to the prompts so that you are directed to the right person  All voicemails are confidential   Daysi Cha all requests for admission clinical reviews, approved or denied determinations and any other requests to dedicated fax number below belonging to the campus where the patient is receiving treatment   List of dedicated fax numbers for the Facilities:  [address] DENIALS (Administrative/Medical Necessity) [phone]   [address] [place] [address] (Maternity/NICU/Pediatrics) [phone]   5000 [facility] [phone]   8049 [facility] [phone]   [facility] [address] [facility] [facility] [address] [address] [address] [phone] 4601 [facility] [phone]

## 2021-07-28 NOTE — PLAN OF CARE
Problem: PHYSICAL THERAPY ADULT  Goal: Performs mobility at highest level of function for planned discharge setting  See evaluation for individualized goals  Description: Treatment/Interventions: LE strengthening/ROM, Functional transfer training, Endurance training, Therapeutic exercise, Patient/family training, Equipment eval/education, Bed mobility, Gait training          See flowsheet documentation for full assessment, interventions and recommendations  Outcome: Progressing  Note: Prognosis: Fair  Problem List: Decreased strength, Decreased range of motion, Decreased endurance, Impaired balance, Decreased mobility, Impaired judgement, Impaired vision, Impaired hearing, Decreased skin integrity  Assessment: Pt is a 80 y o  male seen for PT evaluation s/p admit to [address] [place] [address] on 7/26/2021  Pt was admitted with a primary dx of: edema, peripheral edema, acute chronic injury superimposed on chronic kidney disease, acute on chronic heart failure withy reduced ejection fraction and diastolic dysfunction  PT now consulted for assessment of mobility and d/c needs  Pt with Up and OOB as tolerated  orders  Pts current comorbidities effecting treatment include: anemia, Afib, HTN, CKD, BPH, history of colon cancer  Pts current clinical presentation is Unstable/ Unpredictable (high complexity) due to Ongoing medical management for primary dx, Increased reliance on more restrictive AD compared to baseline, Decreased activity tolerance compared to baseline, Fall risk, Increased assistance needed from caregiver at current time  Prior to admission, pt was modified independent with rollator at home with his daughter  Upon evaluation, pt is currently requiring; min assist x 1 for transfers and min assist x 1 for ambulation 35 ft w/ RW   Pt presents at PT eval functioning below baseline and currently w/ overall mobility deficits 2* to: BLE weakness, decreased ROM, impaired balance, decreased endurance, gait deviations, decreased activity tolerance compared to baseline, decreased functional mobility tolerance compared to baseline, decreased safety awareness, impaired judgement, fall risk  Pt currently at a fall risk 2* to impairments listed above  Pt will continue to benefit from skilled acute PT interventions to address stated impairments; to maximize functional mobility; for ongoing pt/ family training; and DME needs  At conclusion of PT session chair alarm engaged, all needs in reach and pt returned back in recliner chair with phone and call bell within reach  Pt denies any further questions at this time  Recommend post acute rehabilitation upon hospital D/C  PT Discharge Recommendation: Post acute rehabilitation services          See flowsheet documentation for full assessment

## 2021-07-28 NOTE — PROGRESS NOTES
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  Assessment & Plan  Presents with gradual worsening of lower extremity edema, exertional dyspnea, orthopnea  Torsemide recently decreased down to 20 mg daily  Attends cardiac rehab but states he has not been able to go this week due to swelling  Echo 3/2021 EF 25%, moderate to severe TR  BNP 38,000  Prior 18,000  Troponin 0 31   Home regimen: torsemide 20 mg QD, zaroxolyn 2 5 mg weekly  Per recent note, not taking bisoprolol due to low blood pressures    · Bumex 1 mg x1 dose now, await response    I&O, standing weights    FR, sodium restriction   Nephro, cards consulted    7/28 - currently on IV Bumex 2 mg BID w/ Aldactone 12 5 mg daily - fluid restrictions w/ low-sodium diet enforced - appreciate cardiology input    Acute kidney injury - Chronic kidney disease stage 3  Assessment & Plan  Baseline creatinine approximately 1 9-2 3 - presented yesterday @ 3 64 w/ improvement to 3 36 today  In the setting of CHF exacerbation on diuresis & possible underlying cardiorenal syndrome  Monitor renal function and urine output -> limit/avoid nephrotoxins if possible -> note diuretic use  Nephrology following    Atrial fibrillation  Assessment & Plan  S/p pacemaker  Previously on beta-blockade with Bisoprolol discontinuing a setting due to borderline hypotensive states  On Eliquis for anticoagulation    Hyponatremia  Assessment & Plan  Serum sodium normalized @ 136 today  In the setting of heart failure on diuresis    Hypokalemia  Assessment & Plan  Monitor/replete serum potassium  Serum magnesium normal    Anemia of chronic disease  Assessment & Plan  Monitor H/H    Elevated troponin  Assessment & Plan  Likely a non-MI troponin elevation in the setting of CHF exacerbation  Troponin peak of 0 34        DVT Prophylaxis:  Eliquis       Patient Centered Rounds:  I have performed bedside rounds and discussed plan of care with nursing today  Discussions with Specialists or Other Care Team Provider:  see above assessments if applicable    Education and Discussions with Family / Patient: Patient, along with daughter, at bedside today  Time Spent for Care:  32 minutes  More than 50% of total time spent on counseling and coordination of care as described above  Current Length of Stay: 1 day(s)    Current Patient Status: Inpatient   Certification Statement:  Patient will continue to require additional hospital stay due to assessments as noted above  Code Status: Level 1 - Full Code        Subjective:     Sitting upright in chair at the time my encounter  States his shortness of breath has quite improved on intravenous diuretics compared to yesterday  Overall, he remains pleasant and hopeful spirits  Denies chest pain at this time  Objective:     Vitals:   Temp (24hrs), Av 8 °F (36 6 °C), Min:97 5 °F (36 4 °C), Max:98 3 °F (36 8 °C)    Temp:  [97 5 °F (36 4 °C)-98 3 °F (36 8 °C)] 97 5 °F (36 4 °C)  HR:  [68-71] 71  Resp:  [16-18] 18  BP: (104-117)/(57-60) 110/59  SpO2:  [96 %-99 %] 96 %  Body mass index is 26 14 kg/m²  Input and Output Summary (last 24 hours):        Intake/Output Summary (Last 24 hours) at 2021 1456  Last data filed at 2021 1100  Gross per 24 hour   Intake 840 ml   Output 1200 ml   Net -360 ml       Physical Exam:     GENERAL:  Weak/fatigued  HEAD:  Normocephalic - atraumatic  EYES: PERRL - EOMI   MOUTH:  Mucosa moist  NECK:  Supple - full range of motion  CARDIAC:  Rate controlled - S1/S2 positive  PULMONARY:  Diminished at the bases - nonlabored respirations at rest  ABDOMEN:  Soft - nontender/nondistended - active bowel sounds  MUSCULOSKELETAL:  Motor strength/range of motion deconditioned  NEUROLOGIC:  Alert/oriented at baseline  SKIN:  Chronic wrinkles/blemishes - distal LLE blisters w/ mild surrounding erythema  PSYCHIATRIC:  Mood/affect pleasant      Additional Data:     Labs & Recent Cultures:    Results from last 7 days   Lab Units 07/27/21  0434   WBC Thousand/uL 6 51   HEMOGLOBIN g/dL 11 1*   HEMATOCRIT % 33 3*   PLATELETS Thousands/uL 171   NEUTROS PCT % 70   LYMPHS PCT % 15   MONOS PCT % 11   EOS PCT % 4     Results from last 7 days   Lab Units 07/28/21  0603 07/27/21  0030   POTASSIUM mmol/L 3 0* 3 2*   CHLORIDE mmol/L 95* 92*   CO2 mmol/L 32 29   BUN mg/dL 96* 99*   CREATININE mg/dL 3 36* 3 64*   CALCIUM mg/dL 9 1 9 0   ALK PHOS U/L  --  102   ALT U/L  --  30   AST U/L  --  39                 Results from last 7 days   Lab Units 07/28/21  1034 07/28/21  0012 07/27/21  2206 07/27/21  1954 07/27/21  1620 07/27/21  0030   LACTIC ACID mmol/L  --  2 6* 3 6* 2 9* 2 5* 2 4*   PROCALCITONIN ng/ml 0 07  --   --   --   --  0 09         Results from last 7 days   Lab Units 07/27/21  0029 07/26/21  2340   BLOOD CULTURE  No Growth at 24 hrs  No Growth at 24 hrs           Last 24 Hours Medication List:   Current Facility-Administered Medications   Medication Dose Route Frequency Provider Last Rate    apixaban  2 5 mg Oral BID IAIN Handy      bumetanide  2 mg Intravenous BID Danni Rosenthal MD      finasteride  5 mg Oral Daily IAIN Handy      hydrOXYzine HCL  25 mg Oral Q6H PRN IAIN Handy      multivitamin-minerals  1 tablet Oral Daily Monse Handy      pantoprazole  40 mg Oral Daily IAIN Handy      potassium chloride  40 mEq Oral BID Blake Winslow MD      potassium chloride  40 mEq Oral Daily Fabricio Sanchez MD      senna-docusate sodium  1 tablet Oral BID Danni Rosenthal MD      spironolactone  12 5 mg Oral Daily Nanette Bess MD                    ** Please Note: This note is constructed using a voice recognition dictation system  An occasional wrong word/phrase or sound-a-like substitution may have been picked up by dictation device due to the inherent limitations of voice recognition software  Read the chart carefully and recognize, using reasonable context, where substitutions may have occurred  **

## 2021-07-28 NOTE — ASSESSMENT & PLAN NOTE
Baseline creatinine approximately 1 9-2 3 - presented yesterday @ 3 64 w/ improvement to 3 36 today  In the setting of CHF exacerbation on diuresis & possible underlying cardiorenal syndrome  Monitor renal function and urine output -> limit/avoid nephrotoxins if possible -> note diuretic use  Nephrology following

## 2021-07-28 NOTE — PLAN OF CARE
Problem: Nutrition/Hydration-ADULT  Goal: Nutrient/Hydration intake appropriate for improving, restoring or maintaining nutritional needs  Description: Monitor and assess patient's nutrition/hydration status for malnutrition  Collaborate with interdisciplinary team and initiate plan and interventions as ordered  Monitor patient's weight and dietary intake as ordered or per policy  Utilize nutrition screening tool and intervene as necessary  Determine patient's food preferences and provide high-protein, high-caloric foods as appropriate       INTERVENTIONS:  - Monitor oral intake, urinary output, labs, and treatment plans  - Assess nutrition and hydration status and recommend course of action  - Evaluate amount of meals eaten  - Assist patient with eating if necessary   - Allow adequate time for meals  - Recommend/ encourage appropriate diets, oral nutritional supplements, and vitamin/mineral supplements  - Order, calculate, and assess calorie counts as needed  - Recommend, monitor, and adjust tube feedings and TPN/PPN based on assessed needs  - Assess need for intravenous fluids  - Provide specific nutrition/hydration education as appropriate  - Include patient/family/caregiver in decisions related to nutrition  Outcome: Progressing     Problem: MOBILITY - ADULT  Goal: Maintain or return to baseline ADL function  Description: INTERVENTIONS:  -  Assess patient's ability to carry out ADLs; assess patient's baseline for ADL function and identify physical deficits which impact ability to perform ADLs (bathing, care of mouth/teeth, toileting, grooming, dressing, etc )  - Assess/evaluate cause of self-care deficits   - Assess range of motion  - Assess patient's mobility; develop plan if impaired  - Assess patient's need for assistive devices and provide as appropriate  - Encourage maximum independence but intervene and supervise when necessary  - Involve family in performance of ADLs  - Assess for home care needs following discharge   - Consider OT consult to assist with ADL evaluation and planning for discharge  - Provide patient education as appropriate  Outcome: Progressing  Goal: Maintains/Returns to pre admission functional level  Description: INTERVENTIONS:  - Perform BMAT or MOVE assessment daily    - Set and communicate daily mobility goal to care team and patient/family/caregiver  - Collaborate with rehabilitation services on mobility goals if consulted  - Perform Range of Motion  times a day  - Reposition patient every  hours    - Dangle patient  times a day  - Stand patient  times a day  - Ambulate patient  times a day  - Out of bed to chair  times a day   - Out of bed for meals  times a day  - Out of bed for toileting  - Record patient progress and toleration of activity level   Outcome: Progressing     Problem: Prexisting or High Potential for Compromised Skin Integrity  Goal: Skin integrity is maintained or improved  Description: INTERVENTIONS:  - Identify patients at risk for skin breakdown  - Assess and monitor skin integrity  - Assess and monitor nutrition and hydration status  - Monitor labs   - Assess for incontinence   - Turn and reposition patient  - Assist with mobility/ambulation  - Relieve pressure over bony prominences  - Avoid friction and shearing  - Provide appropriate hygiene as needed including keeping skin clean and dry  - Evaluate need for skin moisturizer/barrier cream  - Collaborate with interdisciplinary team   - Patient/family teaching  - Consider wound care consult   Outcome: Progressing     Problem: Potential for Falls  Goal: Patient will remain free of falls  Description: INTERVENTIONS:  - Educate patient/family on patient safety including physical limitations  - Instruct patient to call for assistance with activity   - Consult OT/PT to assist with strengthening/mobility   - Keep Call bell within reach  - Keep bed low and locked with side rails adjusted as appropriate  - Keep care items and personal belongings within reach  - Initiate and maintain comfort rounds  - Make Fall Risk Sign visible to staff  - Offer Toileting every  Hours, in advance of need  - Initiate/Maintain alarm  - Obtain necessary fall risk management equipment: - Apply yellow socks and bracelet for high fall risk patients  - Consider moving patient to room near nurses station  Outcome: Progressing

## 2021-07-28 NOTE — ASSESSMENT & PLAN NOTE
S/p pacemaker  Previously on beta-blockade with Bisoprolol discontinuing a setting due to borderline hypotensive states  On Eliquis for anticoagulation

## 2021-07-28 NOTE — PROGRESS NOTES
Progress Note - Cardiology   Myles Gore 80 y o  male MRN: [id number]  Unit/Bed#: S -01 Encounter: [id number]  07/28/21  3:12 PM    Impression and Plan:      80year-old with history of permanent atrial fibrillation, nonischemic cardiomyopathy with his most recent ejection fraction at 25%-biventricular dilatation and dysfunction, on torsemide 20 mg b i d , metolazone 2 5 mg once weekly with baseline creatinine up to 2 3 and a baseline dry weight around 76 kg  Most recently torsemide dose was decreased to 20 mg daily by his primary cardiologist for borderline blood pressures  Has also had cardiac cachexia and has been losing caloric weight      Since the decrease in the dose of torsemide, has developed volume overload-about a 5 lb weight gain, shortness of breath and lower extremity edema with blistering  On exam has JVD and bilateral presacral and lower extremity edema      Also with evidence of hypoperfusion with lactic acidosis, acute kidney injury with a creatinine of 3 6, [place] 333[address],  mildly elevated troponin around 0 3-showing a flat pattern  renal function is improving      Plan:     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 congestive heart failure/acute kidney injury/cardiorenal syndrome:  Volume overloaded, predominantly right-sided, lungs are clear to auscultation, effective diuresis might improve his renal function further,  Started with Bumex 2 mg IV b i d   received a total of 3 mg yesterday with improvement in renal function, input output charting and weights are not accurate  Has not been taking his beta-blocker due to soft blood pressures  Not on Ace inhibition at baseline due to  tenuous renal function  continue same dose of diuretic  He is status post biventricular pacemaker  No evidence of a low output state on exam      Troponin elevation:  Overall showing a flat pattern-this is a non MI troponin elevation in the setting of CHF    Not an acute coronary syndrome      Hypertension:  Borderline blood pressures recently  Continue to follow for now  permanent atrial fibrillation:  Not an acute issue, on Eliquis for anticoagulation    Overall prognosis in a 80year-old with a severe cardiomyopathy and CKD is very poor  Discuss this with patient and his daughter and will consult palliative care also    They would like to have him go for rehabilitation       ===================================================================    Chief Complaint:   Chief Complaint   Patient presents with    Edema     bilat lower legs with blisters         Subjective/Objective     Subjective:   Denies any complaints    Objective:  No distress    Patient Active Problem List   Diagnosis    Dehydration    Hypertensive kidney disease with chronic kidney disease stage III [facility]    Atrial fibrillation    [facility]    BPH (benign prostatic hyperplasia)    Biventricular cardiac pacemaker in situ    Anticoagulation adequate    [facility] of [facility]    Cancer of splenic flexure (HCC)    Gastroesophageal reflux disease with esophagitis    Lower extremity edema    Cellulitis of left leg    Acute kidney injury - Chronic kidney disease stage 3    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    Hypokalemia    Hyperphosphatemia    Hypercalcemia    Alkalosis    Ventricular tachycardia (HCC)    Stage 4 chronic kidney disease ([facility]    Secondary hyperparathyroidism of renal origin ([facility]    Anemia of chronic disease    Elevated troponin    Hyponatremia       Vitals: /59 (BP Location: Left arm)   Pulse 71   Temp 97 5 °F (36 4 °C) (Oral)   Resp 18   Wt 80 3 kg (177 lb 0 5 oz)   SpO2 96%   BMI 26 14 kg/m²     I/O this shift:  In: 360 [P O :360]  Out: 550 [Urine:550]  Wt Readings from Last 3 Encounters:   07/28/21 80 3 kg (177 lb 0 5 oz)   07/16/21 74 3 kg (163 lb 12 8 oz)   07/13/21 76 kg (167 lb 9 6 oz)       Intake/Output Summary (Last 24 hours) at 7/28/2021 1512  Last data filed at 7/28/2021 1100  Gross per 24 hour   Intake 840 ml   Output 1200 ml   Net -360 ml     I/O last 3 completed shifts: In: 5 [P O :720]  Out: 850 [Urine:850]    Invasive Devices     Peripheral Intravenous Line            Peripheral IV Left;Ventral (anterior) Forearm -- days    Peripheral IV 03/22/21 Left;Ventral (anterior) Forearm 128 days                  Physical Exam:  GEN: Bel Gunn appears  Chronically ill, alert and oriented x 3, pleasant and cooperative   HEENT: pupils equal, round, and reactive to light; extraocular muscles intact  NECK: supple, no carotid bruits , elevated JVD present  HEART: regular rhythm, normal S1 and S2,  murmur, no clicks, gallops or rubs   LUNGS: clear to auscultation bilaterally; no wheezes or rhonchi,  no rales  ABDOMEN/GI: normal bowel sounds, soft, no tenderness, no distention  EXTREMITIES/Musculoskeltal: peripheral pulses normal; no clubbing, cyanosis,  Bilateral mild lower extremity edema  NEURO: no focal motor findings   SKIN: normal without suspicious lesions on exposed skin              Lab Results:   Results from last 7 days   Lab Units 07/27/21  1150 07/27/21  0737 07/27/21  0030   TROPONIN I ng/mL 0 30* 0 34* 0 31*     Results from last 7 days   Lab Units 07/27/21  0434 07/27/21  0030   WBC Thousand/uL 6 51 6 80   HEMOGLOBIN g/dL 11 1* 11 8*   HEMATOCRIT % 33 3* 35 8*   PLATELETS Thousands/uL 171 152         Results from last 7 days   Lab Units 07/28/21  0603 07/27/21  0030   POTASSIUM mmol/L 3 0* 3 2*   CHLORIDE mmol/L 95* 92*   CO2 mmol/L 32 29   BUN mg/dL 96* 99*   CREATININE mg/dL 3 36* 3 64*   CALCIUM mg/dL 9 1 9 0   ALK PHOS U/L  --  102   ALT U/L  --  30   AST U/L  --  39         Imaging: I have personally reviewed pertinent reports      EKG/Telemtry:  No events    Scheduled Meds:  Current Facility-Administered Medications   Medication Dose Route Frequency Provider Last Rate    apixaban  2 5 mg Oral BID Romayne Cozier, IAIN      bumetanide  2 mg Intravenous BID Idania Tong MD      finasteride  5 mg Oral Daily Wale Mcgovern, IAIN      hydrOXYzine HCL  25 mg Oral Q6H PRN Wale Mcgovern, IAIN      multivitamin-minerals  1 tablet Oral Daily [facility]      pantoprazole  40 mg Oral Daily [place]      potassium chloride  40 mEq Oral BID Aguila Earl MD      potassium chloride  40 mEq Oral Daily Art Means MD      senna-docusate sodium  1 tablet Oral BID Idania Tong MD      spironolactone  12 5 mg Oral Daily Obie Roberts MD       Continuous Infusions:      patient is on apixaban for prophylaxis and AFib    This note was completed in part utilizing m-modal fluency direct voice recognition software  Grammatical errors, random word insertion, spelling mistakes, occasional wrong word or "sound-alike" substitutions and incomplete sentences may be an occasional consequence of the system secondary to software limitations, ambient noise and hardware issues  At the time of dictation, efforts were made to edit, clarify and /or correct errors  Please read the chart carefully and recognize, using context, where substitutions have occurred  If you have any questions or concerns about the context, text or information contained within the body of this dictation, please contact myself, the provider, for further clarification

## 2021-07-28 NOTE — CASE MANAGEMENT
PT cris recommending rehab at discharge  Met with patient and daughter, Robina Alvarez, to discuss same  Patient provided with e-mail listing for local skilled facilities - relays preferences for [facility], [address]  Referrals sent to all three; awaiting responses  [facility]  Did provide copy of POA paperwork which was labeled and placed in bin to be scanned to chart  Anticipate rehab at discharge - [facility] updated re: same  Will need insurance auth for same      GABY Parada  7/28/2021  12:51 PM

## 2021-07-28 NOTE — PHYSICAL THERAPY NOTE
Physical Therapy Evaluation    Patient's Name: Jd Garduno    Admitting Diagnosis  Edema [R60 9]  Peripheral edema [R60 9]  Acute kidney injury superimposed on chronic kidney disease (RUST 75 ) [N17 9, N18 9]  Acute on chronic heart failure with reduced ejection fraction and diastolic dysfunction (RUST 75 ) [I50 43]    Problem List  Patient Active Problem List   Diagnosis    Dehydration    Hypertensive kidney disease with chronic kidney disease stage III ([place] [address] )    Permanent atrial fibrillation (HCC)    Cardiomyopathy ([place] [address] )    BPH (benign prostatic hyperplasia)    Biventricular cardiac pacemaker in situ    Anticoagulation adequate    Malignant neoplasm of colon ([place] [address] )    Cancer of splenic flexure ([place] [address] )    Gastroesophageal reflux disease with esophagitis    Lower extremity edema    Cellulitis of left leg    Acute kidney injury superimposed on chronic kidney disease ([place] [address] )    Acute on chronic systolic CHF (congestive heart failure) (HCC)    Hyperphosphatemia    Hypercalcemia    Alkalosis    Ventricular tachycardia (HCC)    Stage 4 chronic kidney disease ([place] [address] )    Secondary hyperparathyroidism of renal origin ([place] [address] )    Anemia    Elevated troponin       Past Medical History  Past Medical History:   Diagnosis Date    Anemia     Atrial fibrillation (HCC)     BPH (benign prostatic hypertrophy)     Cancer ([place] [address] )     COLON    Chronic kidney disease     Hypertension     Irregular heart beat     afib       Past Surgical History  Past Surgical History:   Procedure Laterality Date    BASAL CELL CARCINOMA EXCISION      CARDIAC PACEMAKER PLACEMENT      CARDIAC SURGERY      CARDIOVERSION      CARPAL TUNNEL RELEASE      CATARACT EXTRACTION      COLON SURGERY      COLONOSCOPY      TN COLONOSCOPY FLX DX W/COLLJ SPEC WHEN PFRMD N/A 11/30/2018    Procedure: COLONOSCOPY w egd  by dr Bartolo Marks;  Surgeon: Carmina Stanton MD;  Location: [facility];   Service: Colorectal    TN LAP,SURG,COLECTOMY, PARTIAL, Jerelene Bees N/A 1/8/2019    Procedure: Left hemicolectomy, takedown splenic flexure, end to end transverse to sigmoid colon anastamosis; Surgeon: Wiliam Pittman MD;  Location: [facility];  Service: Colorectal        07/28/21 0911   PT Last Visit   PT Visit Date 07/28/21   Note Type   Note type Evaluation   Pain Assessment   Pain Assessment Tool 0-10   Pain Score No Pain   Home Living   Type of Home House   Home Layout Two level;Performs ADLs on one level; Able to live on main level with bedroom/bathroom;Stairs to enter with rails  (2 CELESTINO)   Bathroom Shower/Tub Walk-in shower   Bathroom Toilet Raised   Bathroom Equipment Grab bars in shower; Shower chair;Grab bars around toilet   [facility] Walker;Cane  (Rollator)   Prior Function   Level of [place] Modified independent with wheelchair   Lives With Daughter  (dtr currently living w/ patient)   [place] in the last 6 months 0  (patient states near falls )   Vocational Retired   Comments no longer drives   Restrictions/Precautions   [facility] Bearing Precautions Per Order No   Other Precautions Chair Alarm; Bed Alarm; Fall Risk;Hard of hearing   General   Additional Pertinent History Pt with history of recent hospitalizations  Patient was recieiving [facility] and [address] [place] [address] services and states he was feeling better, but noticed a decrease in strength recently  Family/Caregiver Present No   Cognition   Arousal/Participation Cooperative   Orientation Level Oriented X4   Following Commands Follows one step commands with increased time or repetition   RLE Assessment   RLE Assessment WFL  (grossly 3+/5)   LLE Assessment   LLE Assessment WFL  (grossly 3+/5)   Bed Mobility   Additional Comments unable to assess, patient up in chair when PT enteres for evaluation and at conclusion of session   Transfers   Sit to Stand 4  Minimal assistance   Additional items Assist x 1; Armrests; Increased time required;Verbal cues  (cues for hand placement)   Stand to Sit 4  Minimal assistance   Additional items Assist x 1; Armrests; Increased time required   Ambulation/Elevation   Gait pattern Narrow KRISH; Forward Flexion;Decreased foot clearance; Short stride; Excessively slow   Gait Assistance 4  Minimal assist   Additional items Assist x 1   Assistive Device Rolling walker   Distance 35 ft x 1  (pt unable to progress further secondary to fatigue)   Stair Management Assistance Not tested   Balance   Static Sitting Fair   Static Standing Poor +   Ambulatory Poor +  (RW)   Endurance Deficit   Endurance Deficit Yes   Endurance Deficit Description pt limited by MICAH SLOIS fatigue   Activity Tolerance   Activity Tolerance Patient limited by fatigue   Nurse Made Aware cleared by KEY Valiente prior to session   Assessment   Prognosis Fair   Problem List Decreased strength;Decreased range of motion;Decreased endurance; Impaired balance;Decreased mobility; Impaired judgement; Impaired vision; Impaired hearing;Decreased skin integrity   Assessment Pt is a 80 y o  male seen for PT evaluation s/p admit to [facility]"" on 7/26/2021  Pt was admitted with a primary dx of: edema, peripheral edema, acute chronic injury superimposed on chronic kidney disease, acute on chronic heart failure withy reduced ejection fraction and diastolic dysfunction  PT now consulted for assessment of mobility and d/c needs  Pt with Up and OOB as tolerated  orders  Pts current comorbidities effecting treatment include: anemia, Afib, HTN, CKD, BPH, history of colon cancer  Pts current clinical presentation is Unstable/ Unpredictable (high complexity) due to Ongoing medical management for primary dx, Increased reliance on more restrictive AD compared to baseline, Decreased activity tolerance compared to baseline, Fall risk, Increased assistance needed from caregiver at current time   Prior to admission, pt was modified independent with rollator at home with his daughter  Upon evaluation, pt is currently requiring; min assist x 1 for transfers and min assist x 1 for ambulation 35 ft w/ RW  Pt presents at PT eval functioning below baseline and currently w/ overall mobility deficits 2* to: BLE weakness, decreased ROM, impaired balance, decreased endurance, gait deviations, decreased activity tolerance compared to baseline, decreased functional mobility tolerance compared to baseline, decreased safety awareness, impaired judgement, fall risk  Pt currently at a fall risk 2* to impairments listed above  Pt will continue to benefit from skilled acute PT interventions to address stated impairments; to maximize functional mobility; for ongoing pt/ family training; and DME needs  At conclusion of PT session chair alarm engaged, all needs in reach and pt returned back in recliner chair with phone and call bell within reach  Pt denies any further questions at this time  Recommend post acute rehabilitation upon hospital D/C  Goals   Patient Goals to go home and get stronger   STG Expiration Date 08/07/21   Short Term Goal #1 In 10 days pt will be able to: 1  Demonstrate ability to perform all aspects of bed mobility modified independently to increase functional independence  2  Perform functional transfers with modified independently to facilitate safe return to previous living environment  3   Ambulate 75 ft with RW and modified independently with stable vitals to improve safety with household distances and reduce fall risk  4  Improve LE strength grades by 1 to increase ease of functional mobility with transfers and gait  5  Pt will demonstrate improved balance by one grade in order to decrease risk of falls  6  Climb 2 steps with superivison and 1 HR to simulate entrance to home  7  Improve Barthel Index to score of 80 for improved functional indepdnence and decreased caregiver burden  PT Treatment Day 0   Plan   Treatment/Interventions LE strengthening/ROM; Functional transfer training; Endurance training; Therapeutic exercise;Patient/family training;Equipment eval/education; Bed mobility;Gait training   PT Frequency Other (Comment)  (3-5x/wk)   Recommendation   PT Discharge Recommendation Post acute rehabilitation services   AM-PAC Basic Mobility Inpatient   Turning in Bed Without Bedrails 2   Lying on Back to Sitting on Edge of Flat Bed 2   Moving Bed to Chair 3   Standing Up From Chair 3   Walk in Room 2   Climb 3-5 Stairs 1   Basic Mobility Inpatient Raw Score 13   Basic Mobility Standardized Score 33 99   Barthel Index   Feeding 10   Bathing 0   Grooming Score 0   Dressing Score 5   Bladder Score 10   Bowels Score 10   Toilet Use Score 5   Transfers (Bed/Chair) Score 10   Mobility (Level Surface) Score 0   Stairs Score 5   Barthel Index Score 55     The patient's AM-PAC Basic Mobility Inpatient Short Form Raw Score is 13, Standardized Score is 33 99  A standardized score less than 42 9 suggests the patient may benefit from discharge to post-acute rehabilitation services  Please also refer to the recommendation of the Physical Therapist for safe discharge planning          Elie Walker, PT, DPT

## 2021-07-28 NOTE — PROGRESS NOTES
[address] [facility] NOTE   Jeremy Cordova 80 y o  male MRN: [id number]  Unit/Bed#: S -01 Encounter: [id number]  Reason for Consult:  Acute kidney injury on CKD    Summary:  Jeremy Cordova is a 80 y o  male with a history of CKD stage 4, atrial fibrillation, cardiomyopathy, colon cancer status post left hemicolectomy, biventricular pacemaker, who was admitted to [facility] after presenting with increasing edema and 5 lb weight gain  He also reported left tibial leg wound with some surrounding small blisters  He is also noted a decline in functional status despite working with physical therapy  He had a prolonged hospitalization earlier this year in March for 3 weeks due to acute CHF, cellulitis left leg  Creatinine 3 5 on admission prompting nephrology consult  ASSESSMENT and PLAN:  1  Acute kidney injury (POA) on CKD:  · Creatinine 3 64 on admission  · Urinalysis:  [place]  · Etiology:    ? Volume overload/decreased effective circulating volume/ cardiorenal   Poor renal reserve  ? Blood pressure has been lower than usual and he is no longer on bisoprolol  · On IV Bumex  Creatinine declining, 3 36  · Plan/recommendations:  ? Patient reports less edema after given IV Bumex  He feels that his legs are improving already  ? He has no unusual shortness of breath at this time  No indication to escalate diuretic dose  ? Will continue to monitor renal function  ? In light of constipation will monitor for urinary retention  ? Check labs in the a m  2  Chronic kidney disease, stage IV:  · Outpatient nephrologist:  Dr Daysi Chopra  · Most recent baseline creatinine 1 9-2 4  Previously 1 6-1 9 mg/dL  · Last urine protein creatinine ratio 0 2 g on 07/12/2021  · Etiology:  Hypertensive nephrosclerosis, age-related nephron loss plus cardiorenal syndrome  Prior episodes of acute kidney injury contributing/poor renal reserve  3  Acute on chronic CHF:    · Chest x-ray on admission:  Stable cardiomegaly    Lungs clear   · Baseline weight between 161-164 per patient usually 161 with adequate diuresis  Outpatient torsemide dose 20 mg once a day/twice a day  Previously on metolazone 2 5 mg weekly  ? He follows a fluid restriction of 1500 mL per day  ? He follows a low-salt diet  · Echocardiogram March 2021:  Ejection fraction 25%, severe diffuse hypokinesis with distinct regional wall motion abnormalities  Wall thickness mildly increased, moderate MR  Moderate to severe TR  Dilated RV  · Bumex dose increased to 2 mg IV b i d  by Cardiology  · Daily weight  · Fluid restriction  · Discussed plan with Cardiology  4  Electrolytes:  · Hypokalemia:   Receiving oral replacement  · Hyponatremia  Mild  Likely volume mediated  Resolved with diuresis  5  Hypertension:  · Blood pressure soft  · Bisoprolol previously discontinued  6  CKD MBD/secondary hyperparathyroidism of renal origin:    · Most recent  6  · Outpatient management  Monitoring vitamin-D levels, phosphorus and calcium :    · Last phosphorus level 4 5 which is within goal for CKD stage 4    7  Atrial fibrillation:  On Eliquis  No longer on bisoprolol  8  BPH on finasteride       DISPOSITION:  Spoke with Cardiology  Continue diuretics  Monitor renal function    SUBJECTIVE / 24H INTERVAL HISTORY:  Patient still concern due to overall weakness and poor functional status  No unusual shortness of breath  No chest pain  No nausea vomiting diarrhea  Urinating more on IV Bumex      OBJECTIVE:  Current Weight: Weight - Scale: 80 3 kg (177 lb 0 5 oz)  Vitals:    07/27/21 1823 07/27/21 2030 07/28/21 0600 07/28/21 0700   BP: 117/57 108/60  110/59   BP Location: Left arm Left arm  Left arm   Pulse: 70 68  71   Resp:  16  18   Temp:  97 6 °F (36 4 °C)  97 5 °F (36 4 °C)   TempSrc:  Oral  Oral   SpO2:  99%  96%   Weight:   80 3 kg (177 lb 0 5 oz)        Intake/Output Summary (Last 24 hours) at 7/28/2021 1142  Last data filed at 7/28/2021 0535  Gross per 24 hour Intake 480 ml   Output 850 ml   Net -370 ml     General: NAD  Skin: no rash  Skin warm and dry  Eyes: anicteric sclera  No redness or drainage  ENT: moist mucous membrane  Oropharynx clear  Neck: supple  No JVD  Chest: CTA b/l, no ronchii, no wheeze, no rubs, no rales  Normal effort  CVS: s1s2, no murmur, no gallop, no rub  Abdomen: soft, nontender, nl sounds nondistended  Extremities:  Lower extremity edema primarily pretibial with several small blisters bilaterally  The left lower extremity is wrapped with Kerlix due to open area    : no crockett  Neuro: AAOX3  Psych: normal affect  Medications:    Current Facility-Administered Medications:     apixaban (ELIQUIS) tablet 2 5 mg, 2 5 mg, Oral, BID, IAIN Hernandez, 2 5 mg at 07/28/21 0936    bumetanide (BUMEX) injection 2 mg, 2 mg, Intravenous, BID, Riya Pate MD, 2 mg at 07/28/21 5654    finasteride (PROSCAR) tablet 5 mg, 5 mg, Oral, Daily, JACKELINE HernandezNP, 5 mg at 07/28/21 7940    hydrOXYzine HCL (ATARAX) tablet 25 mg, 25 mg, Oral, Q6H PRN, IAIN Hernandez    multivitamin-minerals (CENTRUM) tablet 1 tablet, 1 tablet, Oral, Daily, JACKELINE HernandezNP, 1 tablet at 07/28/21 0936    pantoprazole (PROTONIX) EC tablet 40 mg, 40 mg, Oral, Daily, JACKELINE HernandezNP, 40 mg at 07/28/21 0531    potassium chloride (K-DUR,KLOR-CON) CR tablet 40 mEq, 40 mEq, Oral, BID, Natasha Iyer MD, 40 mEq at 07/28/21 0936    potassium chloride (K-DUR,KLOR-CON) CR tablet 40 mEq, 40 mEq, Oral, Daily, Nikita August MD, 40 mEq at 07/28/21 0939    senna-docusate sodium (SENOKOT S) 8 6-50 mg per tablet 1 tablet, 1 tablet, Oral, BID, Riya Pate MD, 1 tablet at 07/27/21 9818    Laboratory Results:  Results from last 7 days   Lab Units 07/28/21  0603 07/27/21  0737 07/27/21  0434 07/27/21  0030   WBC Thousand/uL  --   --  6 51 6 80   HEMOGLOBIN g/dL  --   --  11 1* 11 8*   HEMATOCRIT %  --   --  33 3* 35 8*   PLATELETS Thousands/uL  --   --  171 152 POTASSIUM mmol/L 3 0*  --   --  3 2*   CHLORIDE mmol/L 95*  --   --  92*   CO2 mmol/L 32  --   --  29   BUN mg/dL 96*  --   --  99*   CREATININE mg/dL 3 36*  --   --  3 64*   CALCIUM mg/dL 9 1  --   --  9 0   MAGNESIUM mg/dL 2 3 2 4  --   --    PHOSPHORUS mg/dL  --   --   --  4 5*

## 2021-07-29 PROBLEM — E87.6 HYPOKALEMIA: Status: RESOLVED | Noted: 2021-01-01 | Resolved: 2021-01-01

## 2021-07-29 NOTE — CONSULTS
Consult Note - Wound   Katherin Hein 80 y o  male MRN: [id number]  Unit/Bed#: S -01 Encounter: [id number]      Assessment:   Wound care nurse virtual consult  Left lower leg partial thickness skin erosion due to ruptured blisters  Blisters developed from edema due to acute exacerbation of CHF  Wound/Skin Care Plan:   1  Cleanse left lower leg wound ruptured blister wounds with normal saline  Cover with single layer Xeroform the dry gauze  Secure with gauze roll and tape  Secure with tape  Change once a day and as needed  2  Moisturize skin daily  3  Pressure redistribution cushion to chair  4  Offload heels from bed  5  Wound care nurse follow-up

## 2021-07-29 NOTE — CONSULTS
Consultation - Palliative and Supportive Care   Manpreet Mensah 80 y o  male [id number]    Patient Active Problem List   Diagnosis    Dehydration    Hypertensive kidney disease with chronic kidney disease stage III ([facility] )    Atrial fibrillation    Cardiomyopathy ([facility] )    BPH (benign prostatic hyperplasia)    Biventricular cardiac pacemaker in situ    Anticoagulation adequate    Malignant neoplasm of colon ([facility] )    Cancer of splenic flexure ([facility] )    Gastroesophageal reflux disease with esophagitis    Lower extremity edema    Cellulitis of left leg    Acute kidney injury - Chronic kidney disease stage 3    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    Hypokalemia    Hyperphosphatemia    Hypercalcemia    Alkalosis    Ventricular tachycardia (HCC)    Stage 4 chronic kidney disease ([facility] )    Secondary hyperparathyroidism of renal origin ([place] [address] )    Anemia of chronic disease    Elevated troponin    Hyponatremia     Active issues specifically addressed today include: wound pain; goals of care; CKD in setting of advanced combined CHF  Cardiac cachexia, cardiorenal syndrome  Plan:  1  Symptom management - poor tolerance of exercise and eating; dyspnea; wound pain and peripheral perfusion changes  No palliative symptom management today  Will continue to monitor patient and treat accordingly  If symptoms arise the following should be considered:   - GFR too poor for morphine   - BPs too poor for other opioids at current level of care  - Consider gabapentin in restricted dosing, given poor renal clearance  2  Goals   · Full care with no limits at this time  · Disease focused care  Will continue discussions regarding [address] as patient's clinical presentation evolves  · Patient and family wish to proceed with plan for STR  · While patient is in STR family will investigate HHA and attempt to get that arranged    · Will consider a relationship with the outpatient Palliative Medicine Team and they understand symptoms may arise and these goals of care conversations will need to continue  · They are familiar with hospice services as close family friend is a hospice nurse who has discuss this with them many times in the past   Also patient's spouse passed away on hospice  Code Status: FULL - Level 1   Decisional apparatus:  Patient is competent on my exam today  If competence is lost, patient's substitute decision maker is shared equally and independently to pt's two children Gennie Meigs and [employer]  Patient has 5 adult children who are all actively involved in his care and will assit Gennie Meigs and Reji in decision making  Advance Directive / Living Will / POLST:  PoA on file from 1/9/19 - coPoAs shared by children Gennie Meigs and Reji  This document seems to instruct comfort cares given the terminal nature of pt's cardiorenal injuries, but full discretion is given to either agent to act as they might deem appropriate  Son Carmenza Linder - [phone], co-equal PoA  Daughter Estefania Casey - , co-equal PoA     I have reviewed the patient's controlled substance dispensing history in the Prescription Drug Monitoring Program in compliance with the [facility] regulations before prescribing any controlled substances  We appreciate the invitation to be involved in this patient's care  Please do not hesitate to reach our on call provider through our clinic answering service at  should you have acute symptom control concerns  LIZET Chong, IAIN, [facility]  Palliative and Supportive Care  Clinic/Answering Service: [phone]  You can find me on [employer]! IDENTIFICATION:  Inpatient consult to Palliative Care  Consult performed by: IAIN Chong  Consult ordered by:  Melinda Galeana MD        Physician Requesting Consult: Lorna Rogers MD  Reason for Consult / Principal Problem: goals  Hx and PE limited by: fatigue    HISTORY OF PRESENT ILLNESS:       Samantha Oreilly Nohemi Diop is a 80 y o  male who presents with severe edema consistent with HF exacerbation  Since admission, it is described that he has advanced and unhelpful interactions between his baseline renal disease and his advanced heart failure  He has been advised of the morbidity and mortality of this scenario by expert Nephro and Cards providers; we are asked to help family discuss their goals and limits  Upon chart review, we find a PoA/AD on file that gives full discretion in cares to pt's children as above noted, but very clearly states that -- in multiple cases of unrecoverable or terminal illness -- the pt identified a desire NOT to be intervened upon aggressively, but rather offered pain and comfort medications  Prior to our visit, it is not clear if the children have been offered an opportunity to contextualize the document with pt's current orders for full cares/full code, nor with the current post-hospital plan for rehab  Patient seen with Jania Flower and Jessica Pyle present  Goals of care are discussed  They wish to current with medical care and the plan to proceed to STR  Their ultimate goal is to get back home with intermittent from his children and a home health aide  They are very firmly year with hospice as patient's wife passed away on hospice and a very close friend of her family and hospice nurse  They are not currently interested in hospice but will keep in mind for the future  Discussed now patient relationship with palliative medicine which there also willing to consider as patient went likely need continued goals care conversation based on his comorbidities  Today, patient denies symptoms requiring management specifically pain, nausea, vomiting, diarrhea and constipation  He reports he is mentally handling things well and does not struggle with anxiety  Sleeps well,  Has been eating better here than at home    He feels much better than when he was admitted and feels ready to proceed with discharge to rehab  He is in agreement with with family that the best way to proceed is STR to home with a HHA  Review of Systems   All other systems reviewed and are negative  Past Medical History:   Diagnosis Date    Anemia     Atrial fibrillation (HCC)     BPH (benign prostatic hypertrophy)     Cancer (HCC)     COLON    Chronic kidney disease     Hypertension     Irregular heart beat     afib     Past Surgical History:   Procedure Laterality Date    BASAL CELL CARCINOMA EXCISION      CARDIAC PACEMAKER PLACEMENT      CARDIAC SURGERY      CARDIOVERSION      CARPAL TUNNEL RELEASE      CATARACT EXTRACTION      COLON SURGERY      COLONOSCOPY      SD COLONOSCOPY FLX DX W/COLLJ SPEC WHEN PFRMD N/A 11/30/2018    Procedure: COLONOSCOPY w egd  by dr Kleber Severino;  Surgeon: Jesusita Petersen MD;  Location: [facility]; Service: Colorectal    SD LAP,SURG,COLECTOMY, PARTIAL, W/ANAST N/A 1/8/2019    Procedure: Left hemicolectomy, takedown splenic flexure, end to end transverse to sigmoid colon anastamosis; Surgeon: Jesusita Petersen MD;  Location: [facility];  Service: Colorectal     Social History     Socioeconomic History    Marital status:       Spouse name: Not on file    Number of children: Not on file    Years of education: Not on file    Highest education level: Not on file   Occupational History    Not on file   Tobacco Use    Smoking status: Never Smoker    Smokeless tobacco: Never Used   Vaping Use    Vaping Use: Never used   Substance and Sexual Activity    Alcohol use: Yes     Comment: occasional    Drug use: No    Sexual activity: Not on file   Other Topics Concern    Not on file   Social History Narrative    Not on file     Social Determinants of Health     Financial Resource Strain:     Difficulty of Paying Living Expenses:    Food Insecurity:     Worried About Running Out of Food in the Last Year:     [address] in the Last Year:    Transportation Needs:     Lack of Transportation (Medical):  Lack of Transportation (Non-Medical):    Physical Activity:     Days of Exercise per Week:     Minutes of Exercise per Session:    Stress:     Feeling of Stress :    Social Connections:     Frequency of Communication with Friends and Family:     Frequency of Social Gatherings with Friends and Family:     Attends Gnosticism Services:     Active Member of Clubs or Organizations:     Attends Club or Organization Meetings:     Marital Status:    Intimate Partner Violence:     Fear of Current or Ex-Partner:     Emotionally Abused:     Physically Abused:     Sexually Abused:      Family History   Problem Relation Age of Onset    Heart disease Father     Heart attack Father     Hypertension Father     Heart disease Brother     Heart attack Brother 64    Hypertension Brother     Heart attack Brother 67    Hypertension Brother     Arrhythmia Neg Hx     Asthma Neg Hx     Clotting disorder Neg Hx     Heart failure Neg Hx        MEDICATIONS / ALLERGIES:    current meds:   Current Facility-Administered Medications   Medication Dose Route Frequency    apixaban (ELIQUIS) tablet 2 5 mg  2 5 mg Oral BID    bumetanide (BUMEX) injection 2 mg  2 mg Intravenous BID    finasteride (PROSCAR) tablet 5 mg  5 mg Oral Daily    hydrOXYzine HCL (ATARAX) tablet 25 mg  25 mg Oral Q6H PRN    multivitamin-minerals (CENTRUM) tablet 1 tablet  1 tablet Oral Daily    pantoprazole (PROTONIX) EC tablet 40 mg  40 mg Oral Daily    senna-docusate sodium (SENOKOT S) 8 6-50 mg per tablet 1 tablet  1 tablet Oral BID    spironolactone (ALDACTONE) tablet 12 5 mg  12 5 mg Oral Daily       Allergies   Allergen Reactions    Lasix [Furosemide] Other (See Comments)     Weakness, decreased BP    Adhesive [Medical Tape] Rash    Neomycin-Bacitracin Zn-Polymyx Rash    Neosporin [Neomycin-Polymyxin-Gramicidin] Rash       OBJECTIVE:    Physical Exam  Physical Exam  Vitals reviewed  Constitutional:       Appearance: He is normal weight  HENT:      Head: Normocephalic  Mouth/Throat:      Mouth: Mucous membranes are moist       Pharynx: Oropharynx is clear  Eyes:      Conjunctiva/sclera: Conjunctivae normal       Pupils: Pupils are equal, round, and reactive to light  Cardiovascular:      Rate and Rhythm: Normal rate  Pulmonary:      Effort: Pulmonary effort is normal    Abdominal:      Palpations: Abdomen is soft  Musculoskeletal:         General: Normal range of motion  Cervical back: Normal range of motion  Comments: BLE +2 edema   Skin:     General: Skin is warm and dry  Neurological:      General: No focal deficit present  Mental Status: He is alert and oriented to person, place, and time  Psychiatric:         Mood and Affect: Mood normal          Behavior: Behavior normal          Thought Content: Thought content normal          Judgment: Judgment normal          Lab Results:   I have personally reviewed pertinent labs  , CBC:   Lab Results   Component Value Date    HGB 12 1 07/29/2021    HCT 37 3 07/29/2021   , CMP:   Lab Results   Component Value Date    SODIUM 134 (L) 07/29/2021    K 4 1 07/29/2021    CL 97 (L) 07/29/2021    CO2 30 07/29/2021    BUN 94 (H) 07/29/2021    CREATININE 3 42 (H) 07/29/2021    CALCIUM 9 2 07/29/2021    EGFR 15 07/29/2021   , BMP:  Lab Results   Component Value Date    SODIUM 134 (L) 07/29/2021    K 4 1 07/29/2021    CL 97 (L) 07/29/2021    CO2 30 07/29/2021    BUN 94 (H) 07/29/2021    CREATININE 3 42 (H) 07/29/2021    GLUC 158 (H) 07/29/2021    CALCIUM 9 2 07/29/2021    AGAP 7 07/29/2021    EGFR 15 07/29/2021     As noted above, morphine not safe d/t renal injury  Multiple deranged lytes    Imaging Studies: reviewed reports  EKG, Pathology, and Other Studies: reviewed reports    Counseling / Coordination of Care    Total floor / unit time spent today 90+ minutes   Greater than 50% of total time was spent with the patient and / or family counseling and / or coordination of care  A description of the counseling / coordination of care: review and assessment of decisional apparatus and capacity, applicable legal codes and extant documents; consideration of hospice versus STR; chart review; symptom pursuit; supportive listening; anticipatory guidance

## 2021-07-29 NOTE — ASSESSMENT & PLAN NOTE
· Presented with gradual worsening of lower extremity edema, exertional dyspnea, orthopnea  Torsemide recently decreased down to 20 mg daily  Attends cardiac rehab but states he had not been able to go the week preceding admission  · Echo 3/2021 EF 25%, moderate to severe TR  BNP 38,000  Prior 18,000  Troponin 0 31   · Home regimen: torsemide 20 mg QD, zaroxolyn 2 5 mg weekly    Per recent note, not taking bisoprolol due to low blood pressures  · Currently on Bumex 2 mg IV b i d    Continue I&O, standing weights, sodium restriction, fluid restriction

## 2021-07-29 NOTE — PROGRESS NOTES
Progress Note - Cardiology   [facility] 80 y o  male MRN: [id number]  Unit/Bed#: S -01 Encounter: [id number]  07/29/21  11:00 AM    Impression and Plan:      80year-old with history of permanent atrial fibrillation, nonischemic cardiomyopathy with his most recent ejection fraction at 25%-biventricular dilatation and dysfunction, on torsemide 20 mg b i d , metolazone 2 5 mg once weekly with baseline creatinine up to 2 3 and a baseline dry weight around 76 kg  Most recently torsemide dose was decreased to 20 mg daily by his primary cardiologist for borderline blood pressures  Has also had cardiac cachexia and has been losing caloric weight      Since the decrease in the dose of torsemide, has developed volume overload-about a 5 lb weight gain, shortness of breath and lower extremity edema with blistering  On exam has JVD and bilateral presacral and lower extremity edema      Also with evidence of hypoperfusion with lactic acidosis, acute kidney injury with a creatinine of 3 6, [place] [address],  mildly elevated troponin around 0 3-showing a flat pattern  renal function is improving      Plan:     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 congestive heart failure/acute kidney injury/cardiorenal syndrome:  Volume overloaded, predominantly right-sided, basal rales on exam today, bilateral lower extremity edema-improving  Has been on Bumex 2 mg IV b i d   Has not been taking his beta-blocker due to soft blood pressures  Not on Ace inhibition at baseline due to  tenuous renal function  continue same dose of diuretic at this time  Creatinine has risen slightly but remains around 2 4    He is status post biventricular pacemaker  No evidence of a low output state on exam      Troponin elevation:  Overall showing a flat pattern-this is a non MI troponin elevation in the setting of CHF  Not an acute coronary syndrome      Hypertension:  Borderline blood pressures recently    Continue to follow for now      permanent atrial fibrillation:  Not an acute issue, on Eliquis for anticoagulation    Overall prognosis in a 80year-old with a severe cardiomyopathy and CKD is very poor  I discussed this with patient and his daughter yesterday and will consult palliative care also  They would like to have him go for rehabilitation       ===================================================================    Chief Complaint:   Chief Complaint   Patient presents with    Edema     bilat lower legs with blisters         Subjective/Objective     Subjective:   Denies any complaints    Objective:  No distress    Patient Active Problem List   Diagnosis    Dehydration    Hypertensive [facility] disease with chronic [facility] disease [facility] III [facility]    Atrial fibrillation    Cardiomyopathy [facility]    BPH (benign prostatic hyperplasia)    Biventricular cardiac pacemaker in situ    Anticoagulation adequate    Malignant neoplasm of colon ([facility]    Cancer of splenic flexure (HCC)    Gastroesophageal reflux disease with esophagitis    Lower extremity edema    Cellulitis of left leg    Acute kidney injury - Chronic kidney disease stage 3    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    Hypokalemia    Hyperphosphatemia    Hypercalcemia    Alkalosis    Ventricular tachycardia (HCC)    Stage 4 chronic kidney disease ([facility]    Secondary hyperparathyroidism of renal origin ([facility]    Anemia of chronic disease    Elevated troponin    Hyponatremia       Vitals: /55 (BP Location: Left arm)   Pulse 68   Temp 97 6 °F (36 4 °C) (Oral)   Resp 20   Wt 73 1 kg (161 lb 2 5 oz)   SpO2 96%   BMI 23 80 kg/m²     I/O this shift:   In: 420 [P O :420]  Out: 125 [Urine:125]  Wt Readings from Last 3 Encounters:   07/29/21 73 1 kg (161 lb 2 5 oz)   07/16/21 74 3 kg (163 lb 12 8 oz)   07/13/21 76 kg (167 lb 9 6 oz)       Intake/Output Summary (Last 24 hours) at 7/29/2021 1100  Last data filed at 7/29/2021 0934  Gross per 24 hour Intake 540 ml   Output 500 ml   Net 40 ml     I/O last 3 completed shifts: In: 960 [P O :960]  Out: 1375 [Urine:1375]    Invasive Devices     Peripheral Intravenous Line            Peripheral IV Left;Ventral (anterior) Forearm -- days    Peripheral IV 03/22/21 Left;Ventral (anterior) Forearm 129 days                  Physical Exam:  GEN: Melody Tavarez appears  Chronically ill, alert and oriented x 3, pleasant and cooperative   HEENT: pupils equal, round, and reactive to light; extraocular muscles intact  NECK: supple, no carotid bruits , elevated JVD present  HEART: regular rhythm, normal S1 and S2,  murmur, no clicks, gallops or rubs   LUNGS: clear to auscultation bilaterally; no wheezes or rhonchi,  basal rales  ABDOMEN/GI: normal bowel sounds, soft, no tenderness, no distention  EXTREMITIES/Musculoskeltal: peripheral pulses normal; no clubbing, cyanosis,  Bilateral mild lower extremity edema  NEURO: no focal motor findings   SKIN: normal without suspicious lesions on exposed skin              Lab Results:   Results from last 7 days   Lab Units 07/27/21  1150 07/27/21  0737 07/27/21  0030   TROPONIN I ng/mL 0 30* 0 34* 0 31*     Results from last 7 days   Lab Units 07/29/21  0621 07/27/21  0434 07/27/21  0030   WBC Thousand/uL  --  6 51 6 80   HEMOGLOBIN g/dL 12 1 11 1* 11 8*   HEMATOCRIT % 37 3 33 3* 35 8*   PLATELETS Thousands/uL  --  171 152         Results from last 7 days   Lab Units 07/29/21  0621 07/28/21  0603 07/27/21  0030   POTASSIUM mmol/L 4 1 3 0* 3 2*   CHLORIDE mmol/L 97* 95* 92*   CO2 mmol/L 30 32 29   BUN mg/dL 94* 96* 99*   CREATININE mg/dL 3 42* 3 36* 3 64*   CALCIUM mg/dL 9 2 9 1 9 0   ALK PHOS U/L  --   --  102   ALT U/L  --   --  30   AST U/L  --   --  39         Imaging: I have personally reviewed pertinent reports      EKG/Telemtry:  No events    Scheduled Meds:  Current Facility-Administered Medications   Medication Dose Route Frequency Provider Last Rate    apixaban  2 5 mg Oral BID IAIN Alvarenga      bumetanide  2 mg Intravenous BID Yoav Alfred MD      finasteride  5 mg Oral Daily IAIN Alvarenga      hydrOXYzine HCL  25 mg Oral Q6H PRN IAIN Alvarenga      multivitamin-minerals  1 tablet Oral Daily [place] Mary, [address]      pantoprazole  40 mg Oral Daily Phil Mary, [address]      senna-docusate sodium  1 tablet Oral BID Yoav Alfred MD      spironolactone  12 5 mg Oral Daily Timothy Lynn MD       Continuous Infusions:      patient is on apixaban for prophylaxis and AFib    This note was completed in part utilizing [employer] fluency direct voice recognition software  Grammatical errors, random word insertion, spelling mistakes, occasional wrong word or "sound-alike" substitutions and incomplete sentences may be an occasional consequence of the system secondary to software limitations, ambient noise and hardware issues  At the time of dictation, efforts were made to edit, clarify and /or correct errors  Please read the chart carefully and recognize, using context, where substitutions have occurred  If you have any questions or concerns about the context, text or information contained within the body of this dictation, please contact myself, the provider, for further clarification

## 2021-07-29 NOTE — PROGRESS NOTES
[address] [facility] NOTE   Ying Diaz 80 y o  male MRN: [id number]  Unit/Bed#: S -01 Encounter: [id number]  Reason for Consult:  Acute kidney injury on CKD    Summary:  Arie Walls a 80 y  o  male with a history of CKD stage 4, atrial fibrillation, cardiomyopathy, colon cancer status post left hemicolectomy, biventricular pacemaker, who was admitted to [facility] after presenting with increasing edema and 5 lb weight gain  He also reported left tibial leg wound with some surrounding small blisters  He is also noted a decline in functional status despite working with physical therapy  He had a prolonged hospitalization earlier this year in March for 3 weeks due to acute CHF, cellulitis left leg  Creatinine 3 5 on admission prompting nephrology consult      ASSESSMENT and PLAN:  1  Acute kidney injury (POA) on CKD:  · Creatinine 3 64 on admission  · Urinalysis:  Walker  · Etiology:    ? Volume overload/decreased effective circulating volume/ cardiorenal   Poor renal reserve  ? Blood pressure has been lower than usual and he is no longer on bisoprolol  · Volume overload: On IV Bumex per Cardiology  · Modest response  · Diuretic resistance likely  · Creatinine somewhat fluctuant but plateauing near 3 4  · Plan/recommendations:  ? Unlikely to return to prior baseline; will need to tolerate higher baseline for cardiac benefit/euvolemia  ? Per Dr Rick Cross is note he discussed goals of care with patient and daughter on 07/28  Unlikely to benefit from hemodialysis considering advanced age  3  Chronic kidney disease, stage IV:  · Outpatient nephrologist:  Dr Maty Keith  · Most recent baseline creatinine 1 9-2 4   Previously 1 6-1 9 mg/dL  · Last urine protein creatinine ratio 0 2 g on 07/12/2021  · Etiology:  Hypertensive nephrosclerosis, age-related nephron loss plus cardiorenal syndrome   Prior episodes of acute kidney injury contributing/poor renal reserve    3  Acute on chronic CHF:    · Chest x-ray on admission:  Stable cardiomegaly   Lungs clear  · Baseline weight usually near 161 lb  Current weight 161 lb  Bed scale weight  Patient did not feel strong enough to stand  · Outpatient torsemide dose 20 mg once a day/twice a day   Previously on metolazone 2 5 mg weekly  · Echocardiogram March 2021:  Ejection fraction 25%, severe diffuse hypokinesis with distinct regional wall motion abnormalities   Wall thickness mildly increased, moderate MR   Moderate to severe TR  Dilated RV  · Elevated lactic acid/poor perfusion likely  · Bumex dose 2 mg IV b i d  12 5 mg of spirolactone daily added on 07/28  · Fluid restriction  · Keep output greater than intake  4  Electrolytes:  · Hypokalemia:   Receiving oral replacement/now on spirolactone  Current potassium acceptable 4 1  · Keep potassium level near 4 0  · Patient due for a dose of oral potassium tomorrow will hold until a m  Labs checked since patient is now on spirolactone  · Hyponatremia   Mild  Volume mediated, elevated blood sugar  Monitor  5  Hypertension:  · Blood pressure soft but stable  · Bisoprolol previously discontinued  6  CKD MBD/secondary hyperparathyroidism of renal origin:    · Most recent  6     · Outpatient management   Monitoring vitamin-D levels, phosphorus and calcium  :    · Last phosphorus level 4 5 which is within goal for CKD stage 4    7  Atrial fibrillation:  On Eliquis  8  BPH on finasteride      SUBJECTIVE / 24H INTERVAL HISTORY:  No acute complaints  Trying to keep active and exercise as tolerated  No unusual shortness of breath      OBJECTIVE:  Current Weight: Weight - Scale: 73 1 kg (161 lb 2 5 oz)  Vitals:    07/28/21 1500 07/28/21 2257 07/29/21 0600 07/29/21 0757   BP: 115/91 108/64  108/55   BP Location: Right arm Left arm  Left arm   Pulse: 72 72  68   Resp: 18 18  20   Temp: 97 9 °F (36 6 °C) 98 1 °F (36 7 °C)  97 6 °F (36 4 °C)   TempSrc: Oral Oral  Oral   SpO2: 97% 97%  96%   Weight:   73 1 kg (161 lb 2 5 oz)        Intake/Output Summary (Last 24 hours) at 7/29/2021 1046  Last data filed at 7/29/2021 0934  Gross per 24 hour   Intake 780 ml   Output 950 ml   Net -170 ml     General: NAD, comfortably sitting in the chair  Daughter visiting  Skin: no rash  Skin warm and dry  Eyes: anicteric sclera  External exam normal  ENT: moist mucous membrane  Oropharynx clear  Neck: supple, no JVD appreciated  Chest:  Lungs clear bilaterally  Anteriorly a few crackles noted    CVS: s1s2, no murmur, no gallop, no rub  Abdomen: soft, nontender, nl sounds  Extremities: no edema LE b/l  : no crockett  Neuro: AAOX3  Psych: normal affect  Medications:    Current Facility-Administered Medications:     apixaban (ELIQUIS) tablet 2 5 mg, 2 5 mg, Oral, BID, Umu Shivanit, CRNP, 2 5 mg at 07/29/21 8358    bumetanide (BUMEX) injection 2 mg, 2 mg, Intravenous, BID, Man Leon MD, 2 mg at 07/29/21 1003    finasteride (PROSCAR) tablet 5 mg, 5 mg, Oral, Daily, Umufelipe Peña, CRNP, 5 mg at 07/29/21 9012    hydrOXYzine HCL (ATARAX) tablet 25 mg, 25 mg, Oral, Q6H PRN, Umu Parrisht, CRNP, 25 mg at 07/29/21 0916    multivitamin-minerals (CENTRUM) tablet 1 tablet, 1 tablet, Oral, Daily, Umu Peña, CRNP, 1 tablet at 07/28/21 0936    pantoprazole (PROTONIX) EC tablet 40 mg, 40 mg, Oral, Daily, Umu Pelt, CRNP, 40 mg at 07/29/21 0529    potassium chloride (K-DUR,KLOR-CON) CR tablet 40 mEq, 40 mEq, Oral, Daily, Aidee Cuevas MD, 40 mEq at 07/29/21 0918    senna-docusate sodium (SENOKOT S) 8 6-50 mg per tablet 1 tablet, 1 tablet, Oral, BID, Man Leon MD, 1 tablet at 07/27/21 1825    spironolactone (ALDACTONE) tablet 12 5 mg, 12 5 mg, Oral, Daily, Carlos Montenegro MD, 12 5 mg at 07/29/21 7571    Laboratory Results:  Results from last 7 days   Lab Units 07/29/21  0621 07/28/21  0603 07/27/21  0737 07/27/21  0434 07/27/21  0030   WBC Thousand/uL  --   --   --  6 51 6 80   HEMOGLOBIN g/dL 12 1  --   --  11 1* 11 8*   HEMATOCRIT % 37  3  --   --  33 3* 35 8*   PLATELETS Thousands/uL  --   --   --  171 152   POTASSIUM mmol/L 4 1 3 0*  --   --  3 2*   CHLORIDE mmol/L 97* 95*  --   --  92*   CO2 mmol/L 30 32  --   --  29   BUN mg/dL 94* 96*  --   --  99*   CREATININE mg/dL 3 42* 3 36*  --   --  3 64*   CALCIUM mg/dL 9 2 9 1  --   --  9 0   MAGNESIUM mg/dL 2 4 2 3 2 4  --   --    PHOSPHORUS mg/dL  --   --   --   --  4 5*

## 2021-07-29 NOTE — ASSESSMENT & PLAN NOTE
· Mild and currently stable at 135 corrected sodium  · Likely secondary to volume overload in the setting of heart failure  · Continue diuretics and monitor

## 2021-07-29 NOTE — PROGRESS NOTES
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  Assessment & Plan  · Presented with gradual worsening of lower extremity edema, exertional dyspnea, orthopnea  Torsemide recently decreased down to 20 mg daily  Attends cardiac rehab but states he had not been able to go the week preceding admission  · Echo 3/2021 EF 25%, moderate to severe TR  BNP 38,000  Prior 18,000  Troponin 0 31   · Home regimen: torsemide 20 mg QD, zaroxolyn 2 5 mg weekly  Per recent note, not taking bisoprolol due to low blood pressures  · Currently on Bumex 2 mg IV b i d    Continue I&O, standing weights, sodium restriction, fluid restriction     Acute kidney injury - Chronic kidney disease stage 3  Assessment & Plan  · Baseline creatinine approximately 1 9-2 3 - presented with creatinine elevated to 3 64  Creatinine today is 3 42  · Patient on IV Bumex 2 mg b i d     Will likely need higher creatinine baseline 2 maintain euvolemic state in the setting of cardiomyopathy with acute on chronic CHF, advanced age and overall poor prognosis  · Continue diuretics at current dose, monitor renal function closely  Nephrology following, appreciate input  · Elevated lactic acid likely secondary to poor perfusion  No concern for sepsis at this time    Will discontinue serial checks    Elevated troponin  Assessment & Plan  · Likely non-MI troponin elevation in the setting of CHF exacerbation  · Troponin peak of 0 34 and remained flat  · Continue management of acute CHF    Hyponatremia  Assessment & Plan  · Mild and currently stable at 135 corrected sodium  · Likely secondary to volume overload in the setting of heart failure  · Continue diuretics and monitor    Anemia of chronic disease  Assessment & Plan  · Hemoglobin stable at 12 1 today    Atrial fibrillation  Assessment & Plan  · S/p biventricular pacemaker  · Previously on beta-blockade with Bisoprolol however this was discontinued secondary to borderline hypotensive states  · On Eliquis for anticoagulation    Hypokalemia-resolved as of 2021  Assessment & Plan  · Resolved      VTE Pharmacologic Prophylaxis:   Pharmacologic: Apixaban (Eliquis)  Mechanical VTE Prophylaxis in Place: Yes    Patient Centered Rounds: I have performed bedside rounds with nursing staff today  Discussions with Specialists or Other Care Team Provider:  Nursing    Education and Discussions with Family / Patient:  Patient/updated patient's daughter Lindsey Arcos on phone, she had no questions    Current Length of Stay: 2 day(s)    Current Patient Status: Inpatient   Certification Statement: The patient will continue to require additional inpatient hospital stay due to Above diagnosis and care plan    Discharge Plan:  Pending clinical improvement, discharge planning for post acute rehab    Code Status: Level 1 - Full Code    Subjective:   Patient seen and evaluated  He reports dyspnea with exertion but denies any chest pain or palpitations  No fever or chills, tolerating p o  He reports needing to use the bathroom a lot overnight to void post diuretics  He reports pruritic rash on right hand which is chronic and he follows outpatient with Dermatology  He is currently receiving Atarax p r n  And reports this helps with pruritus  Objective:     Vitals:   Temp (24hrs), Av 9 °F (36 6 °C), Min:97 6 °F (36 4 °C), Max:98 1 °F (36 7 °C)    Temp:  [97 6 °F (36 4 °C)-98 1 °F (36 7 °C)] 97 6 °F (36 4 °C)  HR:  [68-72] 68  Resp:  [18-20] 20  BP: (108-115)/(55-91) 108/55  SpO2:  [96 %-97 %] 96 %  Body mass index is 23 8 kg/m²  Input and Output Summary (last 24 hours):        Intake/Output Summary (Last 24 hours) at 2021 1420  Last data filed at 2021 1330  Gross per 24 hour   Intake 760 ml   Output 650 ml   Net 110 ml       Physical Exam:  General Appearance:    Alert, pleasant cooperative, no distress, appropriately responsive    Head:    Normocephalic, atraumatic   Eyes:    Conjunctiva/corneas clear, EOM's intact   Neck:   Supple   Resp:     Bibasal rales, L>R, no wheezing    Heart:    Irregular, paced rhythm noted on telemetry with multiple PVCs   Abdomen:     Soft, non-tender, nondistended   Extremities:   +1-2 bilateral lower extremity edema   Neurologic:  nonfocal         Additional Data:     Labs:    Results from last 7 days   Lab Units 07/29/21  0621 07/27/21  0434   WBC Thousand/uL  --  6 51   HEMOGLOBIN g/dL 12 1 11 1*   HEMATOCRIT % 37 3 33 3*   PLATELETS Thousands/uL  --  171   NEUTROS PCT %  --  70   LYMPHS PCT %  --  15   MONOS PCT %  --  11   EOS PCT %  --  4     Results from last 7 days   Lab Units 07/29/21  0621 07/27/21  0030   POTASSIUM mmol/L 4 1 3 2*   CHLORIDE mmol/L 97* 92*   CO2 mmol/L 30 29   BUN mg/dL 94* 99*   CREATININE mg/dL 3 42* 3 64*   CALCIUM mg/dL 9 2 9 0   ALK PHOS U/L  --  102   ALT U/L  --  30   AST U/L  --  39           * I Have Reviewed All Lab Data Listed Above  * Additional Pertinent Lab Tests Reviewed: [facility]    Cultures:   Blood Culture:   Lab Results   Component Value Date    BLOODCX No Growth at 48 hrs  07/27/2021    BLOODCX No Growth at 48 hrs  07/26/2021    BLOODCX No Growth After 5 Days  03/09/2021    BLOODCX No Growth After 5 Days   03/09/2021     Urine Culture:   Lab Results   Component Value Date    URINECX <10,000 cfu/ml Mixed Contaminants X2 02/08/2017     Sputum Culture: No components found for: SPUTUMCX  Wound Culture: No results found for: WOUNDCULT    Last 24 Hours Medication List:   Current Facility-Administered Medications   Medication Dose Route Frequency Provider Last Rate    apixaban  2 5 mg Oral BID IAIN Boyce      bumetanide  2 mg Intravenous BID Kirby Nino MD      [facility]  [facility]      hydrOXYzine HCL  25 mg Oral Q6H PRN [facility] MA[facility]      multivitamin-minerals  1 tablet Oral Daily [place], [place]      pantoprazole  40 mg Oral Daily [place], [place]      senna-docusate sodium  1 tablet Oral BID Kirby Nino MD      spironolactone  12 5 mg Oral Daily Liban Pink MD          Today, Patient Was Seen By: Cyndi Carney MD    ** Please Note: Dragon 360 Dictation voice to text software may have been used in the creation of this document   **

## 2021-07-29 NOTE — ASSESSMENT & PLAN NOTE
· Likely non-MI troponin elevation in the setting of CHF exacerbation  · Troponin peak of 0 34 and remained flat  · Continue management of acute CHF

## 2021-07-29 NOTE — ASSESSMENT & PLAN NOTE
· Baseline creatinine approximately 1 9-2 3 - presented with creatinine elevated to 3 64  Creatinine today is 3 42  · Patient on IV Bumex 2 mg b i d     Will likely need higher creatinine baseline 2 maintain euvolemic state in the setting of cardiomyopathy with acute on chronic CHF, advanced age and overall poor prognosis  · Continue diuretics at current dose, monitor renal function closely  Nephrology following, appreciate input  · Elevated lactic acid likely secondary to poor perfusion  No concern for sepsis at this time    Will discontinue serial checks

## 2021-07-30 NOTE — PROGRESS NOTES
Progress Note - Cardiology   Jeremy Cordova 80 y o  male MRN: [id number]  Unit/Bed#: S -01 Encounter: [id number]  07/30/21  12:45 PM    Impression and Plan:      80year-old with history of permanent atrial fibrillation, nonischemic cardiomyopathy with his most recent ejection fraction at 25%-biventricular dilatation and dysfunction, on torsemide 20 mg b i d , metolazone 2 5 mg once weekly with baseline creatinine up to 2 3 and a baseline dry weight around 76 kg  Most recently torsemide dose was decreased to 20 mg daily by his primary cardiologist for borderline blood pressures  Has also had cardiac cachexia and has been losing caloric weight      Since the decrease in the dose of torsemide, has developed volume overload-about a 5 lb weight gain, shortness of breath and lower extremity edema with blistering  On exam has JVD and bilateral presacral and lower extremity edema      Also with evidence of hypoperfusion with lactic acidosis, acute kidney injury with a creatinine of 3 6, [place] [address],  mildly elevated troponin around 0 3-showing a flat pattern  renal function is improving      Plan:     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 congestive heart failure/acute kidney injury/cardiorenal syndrome:  Volume overloaded, predominantly right-sided, still with bilateral lower extremity edema  Unfortunately input output charting and weights are not accurate  Renal function is steadily improving and clinically slow improvement, will continue Bumex IV 2 b i d  Has not been taking his beta-blocker due to soft blood pressures  Not on Ace inhibition at baseline due to  tenuous renal function  continue same dose of diuretic at this time  Creatinine has risen slightly but remains around 2 4    He is status post biventricular pacemaker  No evidence of a low output state on exam      Troponin elevation:  Overall showing a flat pattern-this is a non MI troponin elevation in the setting of CHF  Not an acute coronary syndrome      Hypertension:  Borderline blood pressures recently  Continue to follow for now  permanent atrial fibrillation:  Not an acute issue, on Eliquis for anticoagulation    Overall prognosis in a 80year-old with a severe cardiomyopathy and CKD is very poor  I discussed this with patient and his daughter yesterday and will consult palliative care also    They would like to have him go for rehabilitation       ===================================================================    Chief Complaint:   Chief Complaint   Patient presents with    Edema     bilat lower legs with blisters         Subjective/Objective     Subjective:   Denies any complaints    Objective:  No distress    Patient Active Problem List   Diagnosis    Dehydration    Hypertensive kidney disease with chronic kidney disease stage III [facility]    Atrial fibrillation    [facility]    BPH (benign prostatic hyperplasia)    Biventricular cardiac pacemaker in situ    Anticoagulation adequate    [facility] of [facility]    Cancer of splenic flexure (HCC)    Gastroesophageal reflux disease with esophagitis    Lower extremity edema    Cellulitis of left leg    Acute kidney injury - Chronic kidney disease stage 3    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    Hyperphosphatemia    Hypercalcemia    Alkalosis    Ventricular tachycardia (HCC)    Stage 4 chronic kidney disease ([facility]    Secondary hyperparathyroidism of renal origin ([facility]    Anemia of chronic disease    Elevated troponin    Hyponatremia       Vitals: /56 (BP Location: Left arm)   Pulse 63   Temp 97 6 °F (36 4 °C) (Oral)   Resp 18   Wt 74 8 kg (164 lb 12 8 oz)   SpO2 96%   BMI 24 34 kg/m²     I/O this shift:  In: 240 [P O :240]  Out: 300 [Urine:300]  Wt Readings from Last 3 Encounters:   07/30/21 74 8 kg (164 lb 12 8 oz)   07/16/21 74 3 kg (163 lb 12 8 oz)   07/13/21 76 kg (167 lb 9 6 oz) Intake/Output Summary (Last 24 hours) at 7/30/2021 1245  Last data filed at 7/30/2021 0801  Gross per 24 hour   Intake 700 ml   Output 500 ml   Net 200 ml     I/O last 3 completed shifts: In: 1000 [P O :1000]  Out: 750 [Urine:750]    Invasive Devices     Peripheral Intravenous Line            Peripheral IV Left;Ventral (anterior) Forearm -- days    Peripheral IV 03/22/21 Left;Ventral (anterior) Forearm 130 days                  Physical Exam:  GEN: Sahra Kelsey appears  Chronically ill, alert and oriented x 3, pleasant and cooperative   HEENT: pupils equal, round, and reactive to light; extraocular muscles intact  NECK: supple, no carotid bruits , elevated JVD present  HEART: regular rhythm, normal S1 and S2,  murmur, no clicks, gallops or rubs   LUNGS: clear to auscultation bilaterally; no wheezes or rhonchi,  basal rales  ABDOMEN/GI: normal bowel sounds, soft, no tenderness, no distention  EXTREMITIES/Musculoskeltal: peripheral pulses normal; no clubbing, cyanosis,  Bilateral mild lower extremity edema  NEURO: no focal motor findings   SKIN: normal without suspicious lesions on exposed skin              Lab Results:   Results from last 7 days   Lab Units 07/27/21  1150 07/27/21  0737 07/27/21  0030   TROPONIN I ng/mL 0 30* 0 34* 0 31*     Results from last 7 days   Lab Units 07/29/21  0621 07/27/21  0434 07/27/21  0030   WBC Thousand/uL  --  6 51 6 80   HEMOGLOBIN g/dL 12 1 11 1* 11 8*   HEMATOCRIT % 37 3 33 3* 35 8*   PLATELETS Thousands/uL  --  171 152         Results from last 7 days   Lab Units 07/30/21  0455 07/29/21  0621 07/28/21  0603 07/27/21  0030   POTASSIUM mmol/L 4 2 4 1 3 0* 3 2*   CHLORIDE mmol/L 96* 97* 95* 92*   CO2 mmol/L 29 30 32 29   BUN mg/dL 93* 94* 96* 99*   CREATININE mg/dL 3 21* 3 42* 3 36* 3 64*   CALCIUM mg/dL 9 5 9 2 9 1 9 0   ALK PHOS U/L  --   --   --  102   ALT U/L  --   --   --  30   AST U/L  --   --   --  39         Imaging: I have personally reviewed pertinent reports  EKG/Telemtry:  No events    Scheduled Meds:  Current Facility-Administered Medications   Medication Dose Route Frequency Provider Last Rate    apixaban  2 5 mg Oral BID Julio Cesar JulianIAIN      bumetanide  2 mg Intravenous BID Luisa Francois MD      finasteride  5 mg Oral Daily Julio Cesar Julian, CRNP      hydrOXYzine HCL  25 mg Oral Q6H PRN Julio Cesar Julian, CRNP      multivitamin-minerals  1 tablet Oral Daily Julio Cesar Julian, CRNP      pantoprazole  40 mg Oral Daily Julio Cesar Julian, [address]      senna-docusate sodium  1 tablet Oral BID Luisa Francois MD      spironolactone  12 5 mg Oral Daily Alberto Doe MD       Continuous Infusions:      patient is on apixaban for prophylaxis and AFib    This note was completed in part utilizing m-modal fluency direct voice recognition software  Grammatical errors, random word insertion, spelling mistakes, occasional wrong word or "sound-alike" substitutions and incomplete sentences may be an occasional consequence of the system secondary to software limitations, ambient noise and hardware issues  At the time of dictation, efforts were made to edit, clarify and /or correct errors  Please read the chart carefully and recognize, using context, where substitutions have occurred  If you have any questions or concerns about the context, text or information contained within the body of this dictation, please contact myself, the provider, for further clarification

## 2021-07-30 NOTE — CASE MANAGEMENT
Auth information provided by Geri Lloyd at [facility]: accepting MD: Dr Kelsi Mccarty-  NPI: [id number]; Facility NPI# [id number]  Call made to [facility] Medicare to initiate insurance authorization for STR [id number]); spoke with [facility]  Auth number initiated and ref# is [id number]  Reports clinical will be requested once nurse assigned; fax number will be provided at that time  Met with patient's daughter at bedside - provided listing of local private-duty agencies for future reference  Patient and daughter aware that authorization has been started and case management will keep them informed of outcome       GABY Mcarthur  7/30/2021  2:46 PM

## 2021-07-30 NOTE — CASE MANAGEMENT
Bed offer received from [facility]; responses still pending from [facility] and [facility]  Call made to daughter, Nkechi Watkins ([phone]), to discuss referral - denies any preference between the three facilities and is accepting of bed offer at [facility]  Message sent to [facility] relaying same  Aware of anticipated d/c for this weekend and auth needed       GABY Kirby  7/30/2021  12:18 PM

## 2021-07-30 NOTE — OCCUPATIONAL THERAPY NOTE
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  Active Problems:    Atrial fibrillation    Acute kidney injury - Chronic kidney disease stage 3    Anemia of chronic disease    Elevated troponin    Hyponatremia    Past Medical History  Past Medical History:   Diagnosis Date    Anemia     Atrial fibrillation ([address] )     BPH (benign prostatic hypertrophy)     Cancer (HCC)     COLON    Chronic kidney disease     Hypertension     Irregular heart beat     afib     Past Surgical History  Past Surgical History:   Procedure Laterality Date    BASAL CELL CARCINOMA EXCISION      CARDIAC PACEMAKER PLACEMENT      CARDIAC SURGERY      CARDIOVERSION      CARPAL TUNNEL RELEASE      CATARACT EXTRACTION      COLON SURGERY      COLONOSCOPY      IL COLONOSCOPY FLX DX W/COLLJ SPEC WHEN PFRMD N/A 11/30/2018    Procedure: COLONOSCOPY w egd  by dr Vince Zepeda;  Surgeon: Herbert Rajan MD;  Location: [facility]; Service: Colorectal    IL LAP,SURG,COLECTOMY, PARTIAL, W/ANAST N/A 1/8/2019    Procedure: Left hemicolectomy, takedown splenic flexure, end to end transverse to sigmoid colon anastamosis; Surgeon: Herbert Rajan MD;  [facility];  Service: Colorectal         07/30/21 1226   OT Last Visit   OT Visit Date 07/30/21  (Friday)   Note Type   Note type Evaluation  (& tx (1106-9453))   Restrictions/Precautions   Weight Bearing Precautions Per Order No   Other Precautions Fall Risk;Hard of hearing;Telemetry; Chair Alarm; Bed Alarm   Pain Assessment   Pain Assessment Tool Pain Assessment not indicated - pt denies pain   Home Living   Type of Home House  ([place] [address] Code Style)   Home Layout Two level;Performs ADLs on one level; Able to live on main level with bedroom/bathroom   Bathroom Shower/Tub Walk-in shower   Bathroom Toilet Raised   Bathroom Equipment Grab bars in shower; Shower chair;Grab bars around toilet   [address] Walker;Cane;Grab bars  (rollator)   Prior Function   Level of [place] Independent with ADLs and functional mobility   Lives With Daughter  (came back into town on Monday; other children A)   Receives Help From Family   ADL Assistance Independent   IADLs Needs assistance  (cleaning, cooking, laundry)   Falls in the last 6 months 0   Vocational Retired   Comments Most children live near by, daughter stays w/ patient, recently went to [facility]) and pt reported other children provided A  Lifestyle   Autonomy I w/ ADLs, functional mobility   Reciprocal Relationships pt daughter stays with him, other children nearby if A is needed   Service to Others Pt was a ,    Intrinsic Gratification Pt reports he used to like to golf   Psychosocial   Psychosocial (WDL) WDL   Length of Time/Family Visitation   (reports daughter is coming into visit)   Subjective   Subjective "my wife had Parkinson's so I have a bunch of equipment that came in handy"   ADL   Where Assessed Chair   Eating Assistance 6  Modified independent   Eating Deficit Setup   Grooming Assistance 4  Minimal Assistance  (progressed to S)   Grooming Deficit Setup;Supervision/safety; Increased time to complete  (standing at sink)   UB Bathing Assistance Unable to assess   LB Bathing Assistance Unable to assess   UB Dressing Assistance 5  Supervision/Setup   UB Dressing Deficit Setup;Supervision/safety; Increased time to complete   LB Dressing Assistance 4  Minimal Assistance   LB Dressing Deficit Setup;Supervision/safety; Increased time to complete   Toileting Assistance  Unable to assess   Bed Mobility   Supine to Sit Unable to assess   Sit to Supine Unable to assess   Additional Comments OOB in chair upon arrival  end eval, pt seated on toilet   Transfers   Sit to Stand 5  Supervision   Additional items Assist x 1; Armrests; Increased time required;Verbal cues   Stand to Sit 5  Supervision   Additional items Assist x 1; Increased time required;Armrests; Verbal cues   Toilet transfer Unable to assess   Functional Mobility   Functional Mobility 5  Supervision   Additional Comments completed short distances in hospital room chair <> bathroom, no LOB, demonstration/reports of fatigue   Additional items Rolling walker   Balance   Static Sitting Fair   Static Standing Fair -   Ambulatory Fair  (w/ RW)   Activity Tolerance   Activity Tolerance Patient tolerated treatment well   Nurse Made Aware per Justo MACKEY pt appropriate to see   RUE Assessment   RUE Assessment WFL   RUE Strength   RUE Overall Strength Within Functional Limits - able to perform ADL tasks with strength   LUE Assessment   LUE Assessment WFL   LUE Strength   LUE Overall Strength Within Functional Limits - able to perform ADL tasks with strength   Hand Function   Gross Motor Coordination Functional   Fine Motor Coordination Functional   Sensation   Light Touch No apparent deficits   Vision-Basic Assessment   Current Vision   (reports wearing glasses intermittently)   Cognition   Overall Cognitive Status   (will continue to assess)   Arousal/Participation Cooperative;Arousable   Attention Attends with cues to redirect   Orientation Level Oriented to person;Oriented to situation   Memory Decreased recall of recent events;Decreased short term memory   Following Commands Follows one step commands with increased time or repetition   Comments identified by full name,   pt agreeable to participate in session  Pt reported social history, home set up w/o difficulty  Pt was able to report medications, doctor recommendations for him  Could not recall what he did for his 89th birthday but could provide general information for 89th birthday  Pt was able to recall daughter just came back from [facility])   Pt attention was able to be redirected, demonstrated good initation, executive functioning skills   Assessment   Limitation Decreased ADL status; Decreased Safe judgement during ADL;Decreased endurance;Decreased high-level ADLs   Prognosis Good   Assessment Pt is a 80 y o  male  [facility]2021 d/t LE edema/blistering  OT and up and OOB as tolerated orders are documented  The pt's PMH impacting occupational performance includes a-fib, colon cancer, HTN, carpal tunnel release, cardiac surgery, pacemaker placement, depression, CHF   PTA pt lived in [facility], w/ main floor set up  Pt reported WIS w/ grab bars, shower chair, standard toilet seat w/ commode placed over top  Pt uses RW at baseline  Pt reported I w/ ADLs, A w/ IADLs  Deficit areas limiting the patient currently includes increased fatigue, decreased standing tolerance/endurance, decreased safety awareness and decreased standing balance  Personal factors impacting pt currently includes advanced age  Currently the pt is engaging in eating tasks w/ mod I, grooming w/ min A, UBD w/ S, LBD w/ min A  Pt demonstrated STS transfers w/ S, functional mobility w/ S using RW  Pt is currently functioning below baseline level of I and would benefit from occupational therapy services while admitted in acute care  When pt is medically stable, recommendation for discharge is post acute rehab vs home w/ home OT services pending progress made while in acute care, activity tolerance, activity engagement  OT will follow while in acute care  The patient's raw score on the AM-PAC Daily Activity inpatient short form is 19, standardized score is 40 22, greater than 39 4  Patients at this level are likely to benefit from discharge to home  Please refer to the recommendation of the Occupational Therapist for safe discharge planning  OT recommendation does not coincide w/ [facility] recommendation d/t activity tolerance, activity endurance, decreased balance     Goals   Patient Goals to start walking more   Plan   Treatment Interventions ADL retraining;Functional transfer training;UE strengthening/ROM; Endurance training;Patient/family training;Equipment evaluation/education; Compensatory technique education;Continued evaluation; Energy conservation; Activityengagement   Goal Expiration Date 08/06/21   OT Frequency 2-3x/wk   Additional Treatment Session   Start Time 6562   End Time 1226   Treatment Assessment Pt seen for skilled OT session from [id number]  Pt cooperative and agreeable to session  Pt demonstrated improvement w/ increased activity tolerance/endurance, activity engagement  Pt engaged in toileting tasks w/ min A to manage underwear down  Demonstrated toilet transfer w/ min A d/t safety concerns, grab bar use, verbal cues  Pt demonstrated grooming standing at sink w/ S to wash hands  Pt engaged in functional mobility w/ S using RW  Pt engaged in STS transfers w/ S  End session, pt seated in chair w/ callbell, phone within reach, lunch tray in front of him, needs met  OT will continue to follow while in acute care  Continue to recommend post acute rehab vs home w/ home OT services pending progress made in acute care, medical optimization, activity tolerance/enduranc at this time     Additional Treatment Day 1   Recommendation   OT Discharge Recommendation Home with home health rehabilitation  (vs post acute rehab)   Equipment Recommended Raised toilet seat ($);Shower/Tub chair with back ($)   AM-PAC Daily Activity Inpatient   Lower Body Dressing 3   Bathing 3   Toileting 3   Upper Body Dressing 2   Grooming 4   Eating 4   Daily Activity Raw Score 19   Daily Activity Standardized Score (Calc for Raw Score >=11) 40 22   AM-PAC Applied Cognition Inpatient   Following a Speech/Presentation 3   Understanding Ordinary Conversation 4   Taking Medications 3   Remembering Where Things Are Placed or Put Away 3   Remembering List of 4-5 Errands 3   Taking Care of Complicated Tasks 3   Applied Cognition Raw Score 19   Applied Cognition Standardized Score 39 77   Barthel Index   Feeding 10   Bathing 0 Grooming Score 5   Dressing Score 5   Bladder Score 10   Bowels Score 10   Toilet Use Score 5   Transfers (Bed/Chair) Score 10   Mobility (Level Surface) Score 0   Stairs Score 5   Barthel Index Score 60         Goals:  Bed mobility:    - Pt will actively participate and acknowledge continued evaluation and assessment of bed mobility to assist w/ DC planning    Functional Mobility/Transfers:    -Pt will engage in functional mobility of household distances using least restrictive device w/ mod I to reduce risk of falls    -Pt will actively engage and initiate functional transfers to chair, bed, and toilet w/ mod I to reduce caregiver burden    -Pt will actively attend and engage in education of pacing and energy conservation w/ 2 verbal prompts or cues to increase occupational performance    -Pt will demonstrate increase activity tolerance and endurance, actively engaging for 10-15 minutes w/ no breaks while completing functional ADL task    ADL tasks:    -Pt will demonstrate grooming tasks standing at sink w/ mod I to increase occupational performance    -Pt will participate in UBD w/ mod I seated EOB vs chair to reduce caregiver burden    -Pt will engage in LBD w/ mod I using LHAE as needed to max I w/ ADLs    -Pt will increase standing balance to fair+ and standing tolerance to 6-10 minutes while engaged in ADL task    -Pt will complete toileting w/ mod I to return home      Cognition:    -Pt will consistently follow and accurately follow two step directions w/o additional vcs or prompts to increase occupational performance, activity engagement        Burton Ramos , OTS

## 2021-07-30 NOTE — PLAN OF CARE
Problem: Nutrition/Hydration-ADULT  Goal: Nutrient/Hydration intake appropriate for improving, restoring or maintaining nutritional needs  Description: Monitor and assess patient's nutrition/hydration status for malnutrition  Collaborate with interdisciplinary team and initiate plan and interventions as ordered  Monitor patient's weight and dietary intake as ordered or per policy  Utilize nutrition screening tool and intervene as necessary  Determine patient's food preferences and provide high-protein, high-caloric foods as appropriate       INTERVENTIONS:  - Monitor oral intake, urinary output, labs, and treatment plans  - Assess nutrition and hydration status and recommend course of action  - Evaluate amount of meals eaten  - Assist patient with eating if necessary   - Allow adequate time for meals  - Recommend/ encourage appropriate diets, oral nutritional supplements, and vitamin/mineral supplements  - Order, calculate, and assess calorie counts as needed  - Recommend, monitor, and adjust tube feedings and TPN/PPN based on assessed needs  - Assess need for intravenous fluids  - Provide specific nutrition/hydration education as appropriate  - Include patient/family/caregiver in decisions related to nutrition  Outcome: Progressing     Problem: MOBILITY - ADULT  Goal: Maintain or return to baseline ADL function  Description: INTERVENTIONS:  -  Assess patient's ability to carry out ADLs; assess patient's baseline for ADL function and identify physical deficits which impact ability to perform ADLs (bathing, care of mouth/teeth, toileting, grooming, dressing, etc )  - Assess/evaluate cause of self-care deficits   - Assess range of motion  - Assess patient's mobility; develop plan if impaired  - Assess patient's need for assistive devices and provide as appropriate  - Encourage maximum independence but intervene and supervise when necessary  - Involve family in performance of ADLs  - Assess for home care needs following discharge   - Consider OT consult to assist with ADL evaluation and planning for discharge  - Provide patient education as appropriate  Outcome: Progressing  Goal: Maintains/Returns to pre admission functional level  Description: INTERVENTIONS:  - Perform BMAT or MOVE assessment daily    - Set and communicate daily mobility goal to care team and patient/family/caregiver  - Collaborate with rehabilitation services on mobility goals if consulted  - Perform Range of Motion  times a day  - Reposition patient every  hours    - Dangle patient  times a day  - Stand patient  times a day  - Ambulate patient  times a day  - Out of bed to chair  times a day   - Out of bed for meals  times a day  - Out of bed for toileting  - Record patient progress and toleration of activity level   Outcome: Progressing     Problem: Prexisting or High Potential for Compromised Skin Integrity  Goal: Skin integrity is maintained or improved  Description: INTERVENTIONS:  - Identify patients at risk for skin breakdown  - Assess and monitor skin integrity  - Assess and monitor nutrition and hydration status  - Monitor labs   - Assess for incontinence   - Turn and reposition patient  - Assist with mobility/ambulation  - Relieve pressure over bony prominences  - Avoid friction and shearing  - Provide appropriate hygiene as needed including keeping skin clean and dry  - Evaluate need for skin moisturizer/barrier cream  - Collaborate with interdisciplinary team   - Patient/family teaching  - Consider wound care consult   Outcome: Progressing     Problem: Potential for Falls  Goal: Patient will remain free of falls  Description: INTERVENTIONS:  - Educate patient/family on patient safety including physical limitations  - Instruct patient to call for assistance with activity   - Consult OT/PT to assist with strengthening/mobility   - Keep Call bell within reach  - Keep bed low and locked with side rails adjusted as appropriate  - Keep care items and personal belongings within reach  - Initiate and maintain comfort rounds  - Make Fall Risk Sign visible to staff  - Offer Toileting every  Hours, in advance of need  - Initiate/Maintain alarm  - Obtain necessary fall risk management equipment:   - Apply yellow socks and bracelet for high fall risk patients  - Consider moving patient to room near nurses station  Outcome: Progressing     Problem: Nutrition/Hydration-ADULT  Goal: Nutrient/Hydration intake appropriate for improving, restoring or maintaining nutritional needs  Description: Monitor and assess patient's nutrition/hydration status for malnutrition  Collaborate with interdisciplinary team and initiate plan and interventions as ordered  Monitor patient's weight and dietary intake as ordered or per policy  Utilize nutrition screening tool and intervene as necessary  Determine patient's food preferences and provide high-protein, high-caloric foods as appropriate       INTERVENTIONS:  - Monitor oral intake, urinary output, labs, and treatment plans  - Assess nutrition and hydration status and recommend course of action  - Evaluate amount of meals eaten  - Assist patient with eating if necessary   - Allow adequate time for meals  - Recommend/ encourage appropriate diets, oral nutritional supplements, and vitamin/mineral supplements  - Order, calculate, and assess calorie counts as needed  - Recommend, monitor, and adjust tube feedings and TPN/PPN based on assessed needs  - Assess need for intravenous fluids  - Provide specific nutrition/hydration education as appropriate  - Include patient/family/caregiver in decisions related to nutrition  Outcome: Progressing     Problem: MOBILITY - ADULT  Goal: Maintain or return to baseline ADL function  Description: INTERVENTIONS:  -  Assess patient's ability to carry out ADLs; assess patient's baseline for ADL function and identify physical deficits which impact ability to perform ADLs (bathing, care of mouth/teeth, toileting, grooming, dressing, etc )  - Assess/evaluate cause of self-care deficits   - Assess range of motion  - Assess patient's mobility; develop plan if impaired  - Assess patient's need for assistive devices and provide as appropriate  - Encourage maximum independence but intervene and supervise when necessary  - Involve family in performance of ADLs  - Assess for home care needs following discharge   - Consider OT consult to assist with ADL evaluation and planning for discharge  - Provide patient education as appropriate  Outcome: Progressing  Goal: Maintains/Returns to pre admission functional level  Description: INTERVENTIONS:  - Perform BMAT or MOVE assessment daily    - Set and communicate daily mobility goal to care team and patient/family/caregiver  - Collaborate with rehabilitation services on mobility goals if consulted  - Perform Range of Motion  times a day  - Reposition patient every  hours    - Dangle patient  times a day  - Stand patient  times a day  - Ambulate patient  times a day  - Out of bed to chair  times a day   - Out of bed for meals  times a day  - Out of bed for toileting  - Record patient progress and toleration of activity level   Outcome: Progressing     Problem: Prexisting or High Potential for Compromised Skin Integrity  Goal: Skin integrity is maintained or improved  Description: INTERVENTIONS:  - Identify patients at risk for skin breakdown  - Assess and monitor skin integrity  - Assess and monitor nutrition and hydration status  - Monitor labs   - Assess for incontinence   - Turn and reposition patient  - Assist with mobility/ambulation  - Relieve pressure over bony prominences  - Avoid friction and shearing  - Provide appropriate hygiene as needed including keeping skin clean and dry  - Evaluate need for skin moisturizer/barrier cream  - Collaborate with interdisciplinary team   - Patient/family teaching  - Consider wound care consult   Outcome: Progressing Problem: Potential for Falls  Goal: Patient will remain free of falls  Description: INTERVENTIONS:  - Educate patient/family on patient safety including physical limitations  - Instruct patient to call for assistance with activity   - Consult OT/PT to assist with strengthening/mobility   - Keep Call bell within reach  - Keep bed low and locked with side rails adjusted as appropriate  - Keep care items and personal belongings within reach  - Initiate and maintain comfort rounds  - Make Fall Risk Sign visible to staff  - Offer Toileting every  Hours, in advance of need  - Initiate/Maintain alarm  - Obtain necessary fall risk management equipment:  - Apply yellow socks and bracelet for high fall risk patients  - Consider moving patient to room near nurses station  Outcome: Progressing

## 2021-07-30 NOTE — PROGRESS NOTES
[facility] PROGRESS NOTE   Delfina Sutherland 80 y o  male MRN: [id number]  Unit/Bed#: S -01 Encounter: [id number]  Reason for Consult: SUDHIR    ASSESSMENT and PLAN:  1  Acute kidney injury (POA)  · Admission creatinine 3 64 on 7/27/21   · Suspect etiology is cardiorenal syndrome  [place] UA  · Creatinine stable at 3 2 to 3 4 the past 3 days  · Stable creatinine today at 3 21     · He will likely settle at a baseline creatinine higher than his previous baseline  2  Chronic kidney disease, stage III  · Baseline creatinine 1 9 to 2 4    · Follows with Dr Jesus Buckner in the office  3  Congestive heart failure:  · March 2021 echo - EF 25% with diffuse hypokinesis  · Currently on Bumex 2 mg IV BID  · Spironolactone 12 5 mg daily added this admission  · Baseline weight is 161 lbs but he is 164 lbs today  · Will plan to give Metolazone 5 mg PO x 1 tomorrow  4  Hypertension:  BP is at goal  Not on meds  DISPOSITION:  · Continue Bumex 2 mg IV BID  · Give Metolazone 5 mg PO x 1 tomorrow  · Renal function is stable  · Will likely benefit from ACM on discharge  SUBJECTIVE / 24H INTERVAL HISTORY:  Working with PT  Feels a little better overall - not where he wants to be yet  No CP or SOB       OBJECTIVE:  Current Weight: Weight - Scale: 74 8 kg (164 lb 12 8 oz)  Vitals:    07/29/21 2153 07/30/21 0552 07/30/21 0801 07/30/21 1540   BP: 114/55  110/56 102/58   BP Location: Left arm  Left arm Right arm   Pulse: 80  63 71   Resp: 18  18 16   Temp: 97 9 °F (36 6 °C)  97 6 °F (36 4 °C) 98 2 °F (36 8 °C)   TempSrc: Oral  Oral Oral   SpO2: 96%  96% 95%   Weight:  74 8 kg (164 lb 12 8 oz)         Intake/Output Summary (Last 24 hours) at 7/30/2021 1602  Last data filed at 7/30/2021 1326  Gross per 24 hour   Intake 660 ml   Output 650 ml   Net 10 ml     General: conscious, cooperative, no distress  Skin: dry  Eyes: pink conjunctivae  ENT: moist mucous membranes  Chest/Lungs: equal chest expansion, crackles in lower LF    CVS: distinct heart sounds, normal rate, regular rhythm, no rub  Abdomen: soft, non tender, non distended, normal bowel sounds  Extremities: (+) leg edema  : no crockett catheter  Neuro: awake, alert     Psych: appropriate affect    Medications:    Current Facility-Administered Medications:     apixaban (ELIQUIS) tablet 2 5 mg, 2 5 mg, Oral, BID, IAIN Mchugh, 2 5 mg at 07/30/21 1005    bumetanide (BUMEX) injection 2 mg, 2 mg, Intravenous, BID, Abelardo Mosqueda MD, 2 mg at 07/30/21 1006    finasteride (PROSCAR) tablet 5 mg, 5 mg, Oral, Daily, IAIN Mchugh, 5 mg at 07/30/21 1005    hydrOXYzine HCL (ATARAX) tablet 25 mg, 25 mg, Oral, Q6H PRN, IAIN Mchugh, 25 mg at 07/29/21 4926    multivitamin-minerals (CENTRUM) tablet 1 tablet, 1 tablet, Oral, Daily, IAIN Mchugh, 1 tablet at 07/28/21 0936    pantoprazole (PROTONIX) EC tablet 40 mg, 40 mg, Oral, Daily, IAIN Mchugh, 40 mg at 07/30/21 0500    senna-docusate sodium (SENOKOT S) 8 6-50 mg per tablet 1 tablet, 1 tablet, Oral, BID, Abelardo Mosqueda MD, 1 tablet at 07/27/21 1825    spironolactone (ALDACTONE) tablet 12 5 mg, 12 5 mg, Oral, Daily, Piper Shah MD, 12 5 mg at 07/30/21 1005    Laboratory Results:  Results from last 7 days   Lab Units 07/30/21  0455 07/29/21  0621 07/28/21  0603 07/27/21  0737 07/27/21  0434 07/27/21  0030   WBC Thousand/uL  --   --   --   --  6 51 6 80   HEMOGLOBIN g/dL  --  12 1  --   --  11 1* 11 8*   HEMATOCRIT %  --  37 3  --   --  33 3* 35 8*   PLATELETS Thousands/uL  --   --   --   --  171 152   POTASSIUM mmol/L 4 2 4 1 3 0*  --   --  3 2*   CHLORIDE mmol/L 96* 97* 95*  --   --  92*   CO2 mmol/L 29 30 32  --   --  29   BUN mg/dL 93* 94* 96*  --   --  99*   CREATININE mg/dL 3 21* 3 42* 3 36*  --   --  3 64*   CALCIUM mg/dL 9 5 9 2 9 1  --   --  9 0   MAGNESIUM mg/dL 2 4 2 4 2 3 2 4  --   --    PHOSPHORUS mg/dL  --   --   --   --   --  4 5*

## 2021-07-30 NOTE — ASSESSMENT & PLAN NOTE
· Mild and currently resolved    Sodium level today 136  · Likely secondary to volume overload in the setting of heart failure  · Continue diuretics and monitor

## 2021-07-30 NOTE — CASE MANAGEMENT
SILVA Cespedes [facility] and [facility] are all reviewing referral, but no bed offers received at this time  Per MD, patient may be ready over the weekend  Messages sent to each facility in [facility] to follow-up on bed offers/denials so that same can be discussed with family and insurance authorization initiated  Awaiting responses from facilities re: bed offers/denials      GABY Kirby  7/30/2021  10:14 AM

## 2021-07-30 NOTE — ASSESSMENT & PLAN NOTE
· S/p biventricular pacemaker  · Previously on beta-blockade with Bisoprolol however this was discontinued secondary to borderline hypotensive states  · Continue Eliquis for anticoagulation

## 2021-07-30 NOTE — PLAN OF CARE
Problem: OCCUPATIONAL THERAPY ADULT  Goal: Performs self-care activities at highest level of function for planned discharge setting  See evaluation for individualized goals  Description: Treatment Interventions: ADL retraining, Functional transfer training, UE strengthening/ROM, Endurance training, Patient/family training, Equipment evaluation/education, Compensatory technique education, Continued evaluation, Energy conservation, Activityengagement  Equipment Recommended: Raised toilet seat ($), Shower/Tub chair with back ($)       See flowsheet documentation for full assessment, interventions and recommendations  Note: Limitation: Decreased ADL status, Decreased Safe judgement during ADL, Decreased endurance, Decreased high-level ADLs  Prognosis: Good  Assessment: Pt is a 80 y o  male  [facility]21 d/t LE edema/blistering  OT and up and OOB as tolerated orders are documented  The pt's PMH impacting occupational performance includes a-fib, colon cancer, HTN, carpal tunnel release, cardiac surgery, pacemaker placement, depression, CHF   PTA pt lived in [facility], w/ main floor set up  Pt reported WIS w/ grab bars, shower chair, standard toilet seat w/ commode placed over top  Pt uses RW at baseline  Pt reported I w/ ADLs, A w/ IADLs  Deficit areas limiting the patient currently includes increased fatigue, decreased standing tolerance/endurance, decreased safety awareness and decreased standing balance  Personal factors impacting pt currently includes advanced age  Currently the pt is engaging in eating tasks w/ mod I, grooming w/ min A, UBD w/ S, LBD w/ min A  Pt demonstrated STS transfers w/ S, functional mobility w/ S using RW  Pt is currently functioning below baseline level of I and would benefit from occupational therapy services while admitted in acute care   When pt is medically stable, recommendation for discharge is post acute rehab vs home w/ home OT services pending progress made while in acute care, activity tolerance, activity engagement  OT will follow while in acute care  The patient's raw score on the AM-PAC Daily Activity inpatient short form is 19, standardized score is 40 22, greater than 39 4  Patients at this level are likely to benefit from discharge to home  Please refer to the recommendation of the Occupational Therapist for safe discharge planning  OT recommendation does not coincide w/ [facility] recommendation d/t activity tolerance, activity endurance, decreased balance       OT Discharge Recommendation: Home with home health rehabilitation (vs post acute rehab)

## 2021-07-30 NOTE — PROGRESS NOTES
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  Assessment & Plan  · Presented with gradual worsening of lower extremity edema, exertional dyspnea, orthopnea  Torsemide recently decreased down to 20 mg daily  Attends cardiac rehab but states he had not been able to go the week preceding admission  · Echo 3/2021 EF 25%, moderate to severe TR  BNP 38,000  Prior 18,000  Troponin 0 31   · Home regimen: torsemide 20 mg QD, zaroxolyn 2 5 mg weekly  Per recent note, not taking bisoprolol due to low blood pressures  · Currently on Bumex 2 mg IV b i d  renal function although elevated from his baseline has been stable   Continue I&O, standing weights, sodium restriction, fluid restriction     Acute kidney injury - Chronic kidney disease stage 3  Assessment & Plan  · Baseline creatinine approximately 1 9-2 3 - presented with creatinine elevated to 3 64  Creatinine today is 3 21  · Patient on IV Bumex 2 mg b i d     Will likely need higher creatinine baseline to maintain euvolemic state in the setting of cardiomyopathy with acute on chronic CHF, advanced age and overall poor prognosis  · Continue diuretics at current dose, monitor renal function closely  Nephrology following, appreciate input  · Elevated lactic acid likely secondary to poor perfusion  No concern for sepsis at this time  Serial checks discontinued yesterday    Elevated troponin  Assessment & Plan  · Likely non-MI troponin elevation in the setting of CHF exacerbation  · Troponin peak of 0 34 and remained flat  · Continue management of acute CHF    Hyponatremia  Assessment & Plan  · Mild and currently resolved    Sodium level today 136  · Likely secondary to volume overload in the setting of heart failure  · Continue diuretics and monitor    Anemia of chronic disease  Assessment & Plan  · Hemoglobin stable at 12 1 on 21    Atrial fibrillation  Assessment & Plan  · S/p biventricular pacemaker  · Previously on beta-blockade with Bisoprolol however this was discontinued secondary to borderline hypotensive states  · Continue Eliquis for anticoagulation      VTE Pharmacologic Prophylaxis:   Pharmacologic: Apixaban (Eliquis)  Mechanical VTE Prophylaxis in Place: Yes    Patient Centered Rounds: I have performed bedside rounds with nursing staff today  Discussions with Specialists or Other Care Team Provider:  Nursing    Education and Discussions with Family / Patient:  Patient/unable to reach patient's daughter on phone    Current Length of Stay: 3 day(s)    Current Patient Status: Inpatient   Certification Statement: The patient will continue to require additional inpatient hospital stay due to Above diagnosis and care plan    Discharge Plan:  Anticipating discharge to rehab this weekend if cleared from the cardiac and nephrology standpoint    Code Status: Level 1 - Full Code      Subjective:   No new complaints or acute overnight events  Patient reports frequent voiding overnight due to diuretics  Denies chest pain  Shortness of breath with exertion only, non at rest   Tolerating his diet  No nausea, vomiting, no cough  Objective:     Vitals:   Temp (24hrs), Av 8 °F (36 6 °C), Min:97 6 °F (36 4 °C), Max:97 9 °F (36 6 °C)    Temp:  [97 6 °F (36 4 °C)-97 9 °F (36 6 °C)] 97 6 °F (36 4 °C)  HR:  [63-80] 63  Resp:  [18] 18  BP: (110-114)/(55-56) 110/56  SpO2:  [96 %] 96 %  Body mass index is 24 34 kg/m²  Input and Output Summary (last 24 hours):        Intake/Output Summary (Last 24 hours) at 2021 1153  Last data filed at 2021 0801  Gross per 24 hour   Intake 700 ml   Output 550 ml   Net 150 ml       Physical Exam:  General Appearance:    Alert, chronically ill-appearing, pleasant, cooperative, no distress, appropriately responsive    Head:    Normocephalic, mucous membranes moist   Eyes:    Conjunctiva/corneas clear, EOM's intact   Neck:   Supple   Resp:     Bibasal crackles R>L     Heart:    Regular rate and rhythm, S1 and S2    Abdomen:     Soft, non-tender, nondistended   Extremities:  +1 bilateral lower extremity edema   Neurologic:  nonfocal         Additional Data:     Labs:    Results from last 7 days   Lab Units 07/29/21  0621 07/27/21  0434   WBC Thousand/uL  --  6 51   HEMOGLOBIN g/dL 12 1 11 1*   HEMATOCRIT % 37 3 33 3*   PLATELETS Thousands/uL  --  171   NEUTROS PCT %  --  70   LYMPHS PCT %  --  15   MONOS PCT %  --  11   EOS PCT %  --  4     Results from last 7 days   Lab Units 07/30/21  0455 07/27/21  0030   POTASSIUM mmol/L 4 2 3 2*   CHLORIDE mmol/L 96* 92*   CO2 mmol/L 29 29   BUN mg/dL 93* 99*   CREATININE mg/dL 3 21* 3 64*   CALCIUM mg/dL 9 5 9 0   ALK PHOS U/L  --  102   ALT U/L  --  30   AST U/L  --  39           * I Have Reviewed All Lab Data Listed Above  * Additional Pertinent Lab Tests Reviewed: [facility]    Cultures:   Blood Culture:   Lab Results   Component Value Date    BLOODCX No Growth at 72 hrs  07/27/2021    BLOODCX No Growth at 72 hrs  07/26/2021    BLOODCX No Growth After 5 Days  03/09/2021    BLOODCX No Growth After 5 Days   03/09/2021     Urine Culture:   Lab Results   Component Value Date    URINECX <10,000 cfu/ml Mixed Contaminants X2 02/08/2017     Sputum Culture: No components found for: SPUTUMCX  Wound Culture: No results found for: WOUNDCULT    Last 24 Hours Medication List:   Current Facility-Administered Medications   Medication Dose Route Frequency Provider Last Rate    apixaban  2 5 mg Oral BID IAIN Quinn      bumetanide  2 mg Intravenous BID Shanda Bro MD      finasteride  5 mg Oral Daily IAIN Quinn      hydrOXYzine HCL  25 mg Oral Q6H PRN IAIN Quinn      multivitamin-minerals  1 tablet Oral Daily Fortunato Archuleta, CRNP      pantoprazole  40 mg Oral Daily Fortunato Archuleta, [address]      senna-docusate sodium  1 tablet Oral BID Mary Suggs MD      spironolactone  12 5 mg Oral Daily Evens Matamoros MD          Today, Patient Was Seen By: Vicenta Sexton MD    ** Please Note: Dragon 360 Dictation voice to text software may have been used in the creation of this document   **

## 2021-07-30 NOTE — ASSESSMENT & PLAN NOTE
· Baseline creatinine approximately 1 9-2 3 - presented with creatinine elevated to 3 64  Creatinine today is 3 21  · Patient on IV Bumex 2 mg b i d     Will likely need higher creatinine baseline to maintain euvolemic state in the setting of cardiomyopathy with acute on chronic CHF, advanced age and overall poor prognosis  · Continue diuretics at current dose, monitor renal function closely  Nephrology following, appreciate input  · Elevated lactic acid likely secondary to poor perfusion  No concern for sepsis at this time    Serial checks discontinued yesterday

## 2021-07-30 NOTE — PHYSICAL THERAPY NOTE
PHYSICAL THERAPY NOTE          Patient Name: Shannon Bourgeois  FXEUG'P Date: 7/30/2021 07/30/21 1326   PT Last Visit   PT Visit Date 07/30/21   Note Type   Note Type Treatment   Pain Assessment   Pain Assessment Tool 0-10   Pain Score No Pain   Patient's Stated Pain Goal No pain   Hospital Pain Intervention(s) Repositioned; Ambulation/increased activity; Elevated; Emotional support; Rest   Multiple Pain Sites No   Restrictions/Precautions   Weight Bearing Precautions Per Order No   Other Precautions Chair Alarm; Bed Alarm; Fall Risk;Hard of hearing   General   Chart Reviewed Yes   Response to Previous Treatment Patient with no complaints from previous session  Family/Caregiver Present Yes   Cognition   Overall Cognitive Status Unable to assess   Arousal/Participation Alert; Responsive; Cooperative   Attention Within functional limits   Orientation Level Oriented X4   Memory Within functional limits   Following Commands Follows one step commands with increased time or repetition   Comments pt was pleasant and cooperative throughout tx session    Subjective   Subjective pt was agreeable to participate in PT intervention    Bed Mobility   Rolling R Unable to assess   Rolling L Unable to assess   Supine to Sit Unable to assess   Sit to Supine Unable to assess   Additional Comments pt was seated OOB in the recliner pre/post tx session    Transfers   Sit to Stand 5  Supervision   Additional items Increased time required; Impulsive;Armrests   Stand to Sit 5  Supervision   Additional items Armrests; Increased time required;Verbal cues   Toilet transfer Unable to assess   Ambulation/Elevation   Gait pattern Narrow KRISH; Foward flexed;Decreased foot clearance; Short stride; Excessively slow   Gait Assistance 4  Minimal assist   Additional items Assist x 1;Verbal cues   Assistive Device Rolling walker   Distance 30'x2    Stair Management Assistance 4 Minimal assist   Additional items Assist x 1;Verbal cues; Increased time required   Balance   Static Sitting Fair   Static Standing Fair -   Ambulatory Fair   Endurance Deficit   Endurance Deficit Yes   Endurance Deficit Description pt limited by fatigue   Activity Tolerance   Activity Tolerance Patient tolerated treatment well   Nurse Made Aware Spoke to RN Ryann Annamaria    Exercises   Quad Sets Sitting;15 reps;AAROM; Bilateral  (long sit position )   Hip Adduction Sitting;15 reps;AROM; Bilateral  (use of pillow )   Knee AROM Short Arc Quad Sitting;15 reps;AROM; Bilateral   Knee AROM Long Arc Quad Sitting;15 reps;AROM; Bilateral   Ankle Pumps Sitting;15 reps;AROM; Bilateral   Marching Sitting;15 reps;AROM; Bilateral   Balance training  standing marches (B) x20 with RW UE support    Assessment   Prognosis Fair   Problem List Decreased strength;Decreased range of motion;Decreased endurance; Impaired balance;Decreased mobility; Impaired hearing; Impaired judgement; Impaired vision;Decreased skin integrity   Assessment pt began tx session seated OOB in the recliner and was agreeable to participate in PT intervention  progress was noted this tx session as pt was able to perform all transfers to and from RW with a decrease in level of assistance required /s with VC's for hand placement while descending back into recliner  while standing with rW pt was able to tolerate 3 min of static standing balance with RW and no LOB and was able to maintainbalance while performing dynamic standing balance while completing standing marches (B) x20 with RW UE support and no LOB  pt continues to remain consistant with min Ax for all ambulation with RW  pt was able to increase ambulation distance in todays tx session to 30'x2 with no LOB but require VC's for RW management  pt was also able to complete 4 steps with L sdied hand rail with min Ax1 and VC's for foot placement   pt would benefit from continued focus on OOB mobility with progression of ambuation and continuation of stair trials  post tx pt ooB in the recliner with call bell in hand and chair alarm activated   Goals   Patient Goals to walk more    STG Expiration Date 08/07/21   PT Treatment Day 1   Plan   Treatment/Interventions Functional transfer training;LE strengthening/ROM; Therapeutic exercise; Endurance training   PT Frequency Other (Comment)  (3-5x week )   Recommendation   PT Discharge Recommendation Post acute rehabilitation services   AM-PAC Basic Mobility Inpatient   Turning in Bed Without Bedrails 2   Lying on Back to Sitting on Edge of Flat Bed 2   Moving Bed to Chair 3   Standing Up From Chair 3   Walk in Room 3   Climb 3-5 Stairs 3   Basic Mobility Inpatient Raw Score 16   Basic Mobility Standardized Score 38 32     Otoniel Marks, PTA

## 2021-07-30 NOTE — PROGRESS NOTES
Dennise Manjarrez      Dear Dr Ines Sanchez,    Thank you for referring your patient to our Cardiac  rehabilitation program  The patient has completed 10  visits  However, rehab is being discontinued at this time due to his current hospitalization  Please contact us at [phone] if you have any questions about this patents case or if/when it is appropriate to re-start the patients rehab program at a later date  Thank you for your continued support of cardiac rehabilitation         Sincerely,      Rocky Long, MS, CEP, CCRP

## 2021-07-30 NOTE — ASSESSMENT & PLAN NOTE
· Presented with gradual worsening of lower extremity edema, exertional dyspnea, orthopnea  Torsemide recently decreased down to 20 mg daily  Attends cardiac rehab but states he had not been able to go the week preceding admission  · Echo 3/2021 EF 25%, moderate to severe TR  BNP 38,000  Prior 18,000  Troponin 0 31   · Home regimen: torsemide 20 mg QD, zaroxolyn 2 5 mg weekly    Per recent note, not taking bisoprolol due to low blood pressures  · Currently on Bumex 2 mg IV b i d  renal function although elevated from his baseline has been stable   Continue I&O, standing weights, sodium restriction, fluid restriction

## 2021-07-30 NOTE — PLAN OF CARE
Problem: PHYSICAL THERAPY ADULT  Goal: Performs mobility at highest level of function for planned discharge setting  See evaluation for individualized goals  Description: Treatment/Interventions: Functional transfer training, LE strengthening/ROM, Therapeutic exercise, Endurance training          See flowsheet documentation for full assessment, interventions and recommendations  Outcome: Progressing  Note: Prognosis: Fair  Problem List: Decreased strength, Decreased range of motion, Decreased endurance, Impaired balance, Decreased mobility, Impaired hearing, Impaired judgement, Impaired vision, Decreased skin integrity  Assessment: pt began tx session seated OOB in the recliner and was agreeable to participate in PT intervention  progress was noted this tx session as pt was able to perform all transfers to and from RW with a decrease in level of assistance required /s with VC's for hand placement while descending back into recliner  while standing with rW pt was able to tolerate 3 min of static standing balance with RW and no LOB and was able to maintainbalance while performing dynamic standing balance while completing standing marches (B) x20 with RW UE support and no LOB  pt continues to remain consistant with min Ax for all ambulation with RW  pt was able to increase ambulation distance in todays tx session to 30'x2 with no LOB but require VC's for RW management  pt was also able to complete 4 steps with L sdied hand rail with min Ax1 and VC's for foot placement  pt would benefit from continued focus on OOB mobility with progression of ambuation and continuation of stair trials  post tx pt ooB in the recliner with call bell in hand and chair alarm activated           PT Discharge Recommendation: Post acute rehabilitation services          See flowsheet documentation for full assessment

## 2021-07-31 PROBLEM — E87.1 HYPONATREMIA: Status: RESOLVED | Noted: 2021-01-01 | Resolved: 2021-01-01

## 2021-07-31 NOTE — ASSESSMENT & PLAN NOTE
· Presented with gradual worsening of lower extremity edema, exertional dyspnea, orthopnea  Torsemide recently decreased down to 20 mg daily  Attends cardiac rehab but states he had not been able to go the week preceding admission  · Echo 3/2021 EF 25%, moderate to severe TR  BNP 38,000  Prior 18,000  Troponin 0 31   · Home regimen: torsemide 20 mg QD, zaroxolyn 2 5 mg weekly  Per recent note, not taking bisoprolol due to low blood pressures  · Currently on Bumex 2 mg IV b i d  Received 1 dose Zaroxolyn today  Renal function although elevated from his baseline has been relatively stable    Will recheck labs in the morning   Continue I&O, standing weights, sodium restriction, fluid restriction

## 2021-07-31 NOTE — ASSESSMENT & PLAN NOTE
· S/p biventricular pacemaker    Heart rate controlled  · Previously on beta-blockade with Bisoprolol however this was discontinued secondary to borderline hypotensive states  · Continue Eliquis for anticoagulation

## 2021-07-31 NOTE — ASSESSMENT & PLAN NOTE
· Mild and currently resolved  · Likely secondary to volume overload in the setting of heart failure

## 2021-07-31 NOTE — ASSESSMENT & PLAN NOTE
· Baseline creatinine approximately 1 9-2 3 - presented with creatinine elevated to 3 64  Creatinine today is 3 21  · Patient on IV Bumex 2 mg b i d     Will likely need higher creatinine baseline to maintain euvolemic state in the setting of cardiomyopathy with acute on chronic CHF, advanced age and overall poor prognosis  · Continue diuretics at current dose, monitor renal function closely  Nephrology following, appreciate input  · Elevated lactic acid likely secondary to poor perfusion  No concern for sepsis at this time    Serial checks discontinued

## 2021-07-31 NOTE — PROGRESS NOTES
[facility] PROGRESS NOTE   Wayne Rivera 80 y o  male MRN: [id number]  Unit/Bed#: S -01 Encounter: [id number]  Reason for Consult: SUDHIR    ASSESSMENT and PLAN:  1  Acute kidney injury (POA)  · Admission creatinine 3 64 on 7/27/21   · Suspect etiology is cardiorenal syndrome  Vilas UA  · Creatinine stable at 3 2 to 3 4 the past 3 days prior to today  · No labs today  · He will likely settle at a baseline creatinine higher than his previous baseline  2  Chronic kidney disease, stage III  · Baseline creatinine 1 9 to 2 4    · Follows with Dr Jessi Fletcher in the office  3  Congestive heart failure:  · March 2021 echo - EF 25% with diffuse hypokinesis  · Currently on Bumex 2 mg IV BID  · Spironolactone 12 5 mg daily added this admission  · Baseline weight is 161 lbs but he remains 164 lbs today  · Metolazone 5 mg PO x 1 today  · Continue Bumex 2 mg IV BID  4  Hypertension:  BP is at goal  Not on meds  DISPOSITION:  · Continue Bumex 2 mg IV BID  · Metolazone 5 mg PO x 1 today  · Hopeful weight loss with Metolazone  · Keep O>I    SUBJECTIVE / 24H INTERVAL HISTORY:  Feeling stronger a little  No CP or SOB       OBJECTIVE:  Current Weight: Weight - Scale: 74 4 kg (164 lb 0 4 oz)  Vitals:    07/30/21 2138 07/31/21 0600 07/31/21 0710 07/31/21 0939   BP: 110/60  99/58 111/60   BP Location: Right arm  Left arm Right arm   Pulse: 72  (!) 41 83   Resp: 16  18    Temp: 98 °F (36 7 °C)  98 2 °F (36 8 °C)    TempSrc: Oral  Oral    SpO2: 95%  96%    Weight:  74 4 kg (164 lb 0 4 oz)         Intake/Output Summary (Last 24 hours) at 7/31/2021 1106  Last data filed at 7/31/2021 0900  Gross per 24 hour   Intake 540 ml   Output 1250 ml   Net -710 ml     General: conscious, cooperative, no distress  Skin: dry  Eyes: pink conjunctivae  ENT: moist mucous membranes  Chest/Lungs: equal chest expansion, crackles on L lower LF    CVS: distinct heart sounds, normal rate, regular rhythm  Abdomen: soft, non tender, non distended, normal bowel sounds  Extremities: (+) leg edema  : no crockett catheter  Neuro: awake, alert     Psych: appropriate affect    Medications:    Current Facility-Administered Medications:     apixaban (ELIQUIS) tablet 2 5 mg, 2 5 mg, Oral, BID, Juliano Greenwood, JACKELINENP, 2 5 mg at 07/31/21 0932    bumetanide (BUMEX) injection 2 mg, 2 mg, Intravenous, BID, Brandyn Ulrich MD, 2 mg at 07/31/21 0941    finasteride (PROSCAR) tablet 5 mg, 5 mg, Oral, Daily, IAIN Wall, 5 mg at 07/31/21 0633    hydrOXYzine HCL (ATARAX) tablet 25 mg, 25 mg, Oral, Q6H PRN, IAIN Wall, 25 mg at 07/29/21 6726    multivitamin-minerals (CENTRUM) tablet 1 tablet, 1 tablet, Oral, Daily, IAIN Wall, 1 tablet at 07/28/21 0936    pantoprazole (PROTONIX) EC tablet 40 mg, 40 mg, Oral, Daily, IAIN Wall, 40 mg at 07/31/21 0501    senna-docusate sodium (SENOKOT S) 8 6-50 mg per tablet 1 tablet, 1 tablet, Oral, BID, Brandyn Ulrich MD, 1 tablet at 07/27/21 1825    spironolactone (ALDACTONE) tablet 12 5 mg, 12 5 mg, Oral, Daily, Carmelina Henderson MD, 12 5 mg at 07/31/21 3490    Laboratory Results:  Results from last 7 days   Lab Units 07/30/21  0455 07/29/21  0621 07/28/21  0603 07/27/21  0737 07/27/21  0434 07/27/21  0030   WBC Thousand/uL  --   --   --   --  6 51 6 80   HEMOGLOBIN g/dL  --  12 1  --   --  11 1* 11 8*   HEMATOCRIT %  --  37 3  --   --  33 3* 35 8*   PLATELETS Thousands/uL  --   --   --   --  171 152   POTASSIUM mmol/L 4 2 4 1 3 0*  --   --  3 2*   CHLORIDE mmol/L 96* 97* 95*  --   --  92*   CO2 mmol/L 29 30 32  --   --  29   BUN mg/dL 93* 94* 96*  --   --  99*   CREATININE mg/dL 3 21* 3 42* 3 36*  --   --  3 64*   CALCIUM mg/dL 9 5 9 2 9 1  --   --  9 0   MAGNESIUM mg/dL 2 4 2 4 2 3 2 4  --   --    PHOSPHORUS mg/dL  --   --   --   --   --  4 5*

## 2021-07-31 NOTE — PROGRESS NOTES
Progress Note - Cardiology   Rahul Tubbs 80 y o  male MRN: [id number]  Unit/Bed#: S -01 Encounter: [id number]  07/31/21  10:35 AM    Impression and Plan:      80year-old with history of permanent atrial fibrillation, nonischemic cardiomyopathy with his most recent ejection fraction at 25%-biventricular dilatation and dysfunction, on torsemide 20 mg b i d , metolazone 2 5 mg once weekly with baseline creatinine up to 2 3 and a baseline dry weight around 76 kg  Most recently torsemide dose was decreased to 20 mg daily by his primary cardiologist for borderline blood pressures  Has also had cardiac cachexia and has been losing caloric weight      Since the decrease in the dose of torsemide, has developed volume overload-about a 5 lb weight gain, shortness of breath and lower extremity edema with blistering  On exam has JVD and bilateral presacral and lower extremity edema      Also with evidence of hypoperfusion with lactic acidosis, acute kidney injury with a creatinine of 3 6, [place] [address],  mildly elevated troponin around 0 3-showing a flat pattern  renal function is improving  Slowly diuresing all input output charting and weights have not been accurate     Plan:     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 congestive heart failure/acute kidney injury/cardiorenal syndrome:  Volume overloaded, predominantly right-sided, still with bilateral lower extremity edema  He is negative hour 0 5-0 7 L since yesterday, received only 1 dose of Bumex 2 mg IV  Renal function is steadily improving and clinically slow improvement, will continue Bumex IV 2 b i d  Has not been taking his beta-blocker due to soft blood pressures  Not on Ace inhibition at baseline due to  tenuous renal function  continue same dose of diuretic at this time  Creatinine is slowly improving    He is status post biventricular pacemaker    No evidence of a low output state on exam      Troponin elevation:  Overall showing a flat pattern-this is a non MI troponin elevation in the setting of CHF  Not an acute coronary syndrome      Hypertension:  Borderline blood pressures recently  Continue to follow for now  permanent atrial fibrillation:  Not an acute issue, on Eliquis for anticoagulation    Overall prognosis in a 80year-old with a severe cardiomyopathy and CKD is very poor  I discussed this with patient and his daughter yesterday and will consult palliative care also    They would like to have him go for rehabilitation       ===================================================================    Chief Complaint:   Chief Complaint   Patient presents with    Edema     bilat lower legs with blisters         Subjective/Objective     Subjective:   Denies any complaints    Objective:  No distress    Patient Active Problem List   Diagnosis    Dehydration    [facility] with [facility]    Atrial fibrillation    Cardiomyopathy ([facility] )    BPH (benign prostatic hyperplasia)    Biventricular cardiac pacemaker in situ    Anticoagulation adequate    Malignant neoplasm of colon [facility]    Cancer of splenic flexure (HCC)    Gastroesophageal reflux disease with esophagitis    Lower extremity edema    Cellulitis of left leg    Acute kidney injury - Chronic kidney disease stage 3    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    Hyperphosphatemia    Hypercalcemia    Alkalosis    Ventricular tachycardia (HCC)    Stage 4 [facility]    Secondary hyperparathyroidism of renal origin ([facility] )    Anemia of chronic disease    Elevated troponin    Hyponatremia       Vitals: /60 (BP Location: Right arm)   Pulse 83   Temp 98 2 °F (36 8 °C) (Oral)   Resp 18   Wt 74 4 kg (164 lb 0 4 oz)   SpO2 96%   BMI 24 22 kg/m²     I/O this shift:  In: 240 [P O :240]  Out: 300 [Urine:300]  Wt Readings from Last 3 Encounters:   07/31/21 74 4 kg (164 lb 0 4 oz) 07/16/21 74 3 kg (163 lb 12 8 oz)   07/13/21 76 kg (167 lb 9 6 oz)       Intake/Output Summary (Last 24 hours) at 7/31/2021 1035  Last data filed at 7/31/2021 0900  Gross per 24 hour   Intake 540 ml   Output 1250 ml   Net -710 ml     I/O last 3 completed shifts:   In: 540 [P O :540]  Out: 1350 [Urine:1350]    Invasive Devices     Peripheral Intravenous Line            Peripheral IV Left;Ventral (anterior) Forearm -- days    Peripheral IV 03/22/21 Left;Ventral (anterior) Forearm 131 days                  Physical Exam:  GEN: Katherin Hein appears  Chronically ill, alert and oriented x 3, pleasant and cooperative   HEENT: pupils equal, round, and reactive to light; extraocular muscles intact  NECK: supple, no carotid bruits , elevated JVD present  HEART: regular rhythm, normal S1 and S2,  murmur, no clicks, gallops or rubs   LUNGS: clear to auscultation bilaterally; no wheezes or rhonchi,  basal rales  ABDOMEN/GI: normal bowel sounds, soft, no tenderness, no distention  EXTREMITIES/Musculoskeltal: peripheral pulses normal; no clubbing, cyanosis,  Bilateral mild lower extremity edema with mild blistering  NEURO: no focal motor findings   SKIN: normal without suspicious lesions on exposed skin              Lab Results:   Results from last 7 days   Lab Units 07/27/21  1150 07/27/21  0737 07/27/21  0030   TROPONIN I ng/mL 0 30* 0 34* 0 31*     Results from last 7 days   Lab Units 07/29/21  0621 07/27/21  0434 07/27/21  0030   WBC Thousand/uL  --  6 51 6 80   HEMOGLOBIN g/dL 12 1 11 1* 11 8*   HEMATOCRIT % 37 3 33 3* 35 8*   PLATELETS Thousands/uL  --  171 152         Results from last 7 days   Lab Units 07/30/21  0455 07/29/21  0621 07/28/21  0603 07/27/21  0030   POTASSIUM mmol/L 4 2 4 1 3 0* 3 2*   CHLORIDE mmol/L 96* 97* 95* 92*   CO2 mmol/L 29 30 32 29   BUN mg/dL 93* 94* 96* 99*   CREATININE mg/dL 3 21* 3 42* 3 36* 3 64*   CALCIUM mg/dL 9 5 9 2 9 1 9 0   ALK PHOS U/L  --   --   --  102   ALT U/L  --   --   --  30 AST U/L  --   --   --  39         Imaging: I have personally reviewed pertinent reports  EKG/Telemtry:  No events    Scheduled Meds:  Current Facility-Administered Medications   Medication Dose Route Frequency Provider Last Rate    apixaban  2 5 mg Oral BID Cheryl Milling, CRNP      bumetanide  2 mg Intravenous BID Cesario Barger MD      finasteride  5 mg Oral Daily Cheryl Milling, CRNP      hydrOXYzine HCL  25 mg Oral Q6H PRN Cheryl Milling, CRNP      multivitamin-minerals  1 tablet Oral Daily Cheryl Milling, CRNP      pantoprazole  40 mg Oral Daily Cheryl Milling, [address]      senna-docusate sodium  1 tablet Oral BID Cesario Barger MD      spironolactone  12 5 mg Oral Daily Barrington Herron MD       Continuous Infusions:      patient is on apixaban for prophylaxis and AFib    This note was completed in part utilizing [employer] fluency direct voice recognition software  Grammatical errors, random word insertion, spelling mistakes, occasional wrong word or "sound-alike" substitutions and incomplete sentences may be an occasional consequence of the system secondary to software limitations, ambient noise and hardware issues  At the time of dictation, efforts were made to edit, clarify and /or correct errors  Please read the chart carefully and recognize, using context, where substitutions have occurred  If you have any questions or concerns about the context, text or information contained within the body of this dictation, please contact myself, the provider, for further clarification

## 2021-07-31 NOTE — PROGRESS NOTES
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  Assessment & Plan  · Presented with gradual worsening of lower extremity edema, exertional dyspnea, orthopnea  Torsemide recently decreased down to 20 mg daily  Attends cardiac rehab but states he had not been able to go the week preceding admission  · Echo 3/2021 EF 25%, moderate to severe TR  BNP 38,000  Prior 18,000  Troponin 0 31   · Home regimen: torsemide 20 mg QD, zaroxolyn 2 5 mg weekly  Per recent note, not taking bisoprolol due to low blood pressures  · Currently on Bumex 2 mg IV b i d  Received 1 dose Zaroxolyn today  Renal function although elevated from his baseline has been relatively stable  Will recheck labs in the morning   Continue I&O, standing weights, sodium restriction, fluid restriction     Acute kidney injury - Chronic kidney disease stage 3  Assessment & Plan  · Baseline creatinine approximately 1 9-2 3 - presented with creatinine elevated to 3 64  Creatinine today is 3 21  · Patient on IV Bumex 2 mg b i d     Will likely need higher creatinine baseline to maintain euvolemic state in the setting of cardiomyopathy with acute on chronic CHF, advanced age and overall poor prognosis  · Continue diuretics at current dose, monitor renal function closely  Nephrology following, appreciate input  · Elevated lactic acid likely secondary to poor perfusion  No concern for sepsis at this time    Serial checks discontinued    Elevated troponin  Assessment & Plan  · Likely non-MI troponin elevation in the setting of CHF exacerbation  · Troponin peak of 0 34 and remained flat  · Continue management of acute CHF    Hyponatremia-resolved as of 7/31/2021  Assessment & Plan  · Mild and currently resolved  · Likely secondary to volume overload in the setting of heart failure    Anemia of chronic disease  Assessment & Plan  · Hemoglobin stable at 12 1 on 21  · Recheck labs tomorrow    Atrial fibrillation  Assessment & Plan  · S/p biventricular pacemaker  Heart rate controlled  · Previously on beta-blockade with Bisoprolol however this was discontinued secondary to borderline hypotensive states  · Continue Eliquis for anticoagulation      VTE Pharmacologic Prophylaxis:   Pharmacologic: Apixaban (Eliquis)  Mechanical VTE Prophylaxis in Place: Yes    Patient Centered Rounds: I have performed bedside rounds with nursing staff today  Discussions with Specialists or Other Care Team Provider:  Nursing    Education and Discussions with Family / Patient:  Patient/updated patient's daughter Ron Chawla on phone  Current Length of Stay: 4 day(s)    Current Patient Status: Inpatient   Certification Statement: The patient will continue to require additional inpatient hospital stay due to Above diagnosis and care plan    Discharge Plan:  Anticipate discharge to rehab possibly Monday    Code Status: Level 1 - Full Code      Subjective:   Patient in good spirits this morning  He offers no new complaints  Denies chest pain or shortness of breath  Continues to report frequent urination due to diuretics  No dysuria    Objective:     Vitals:   Temp (24hrs), Av 1 °F (36 7 °C), Min:98 °F (36 7 °C), Max:98 2 °F (36 8 °C)    Temp:  [98 °F (36 7 °C)-98 2 °F (36 8 °C)] 98 2 °F (36 8 °C)  HR:  [41-83] 83  Resp:  [16-18] 18  BP: ()/(58-60) 111/60  SpO2:  [95 %-96 %] 96 %  Body mass index is 24 22 kg/m²  Input and Output Summary (last 24 hours):        Intake/Output Summary (Last 24 hours) at 2021 1243  Last data filed at 2021 0900  Gross per 24 hour   Intake 540 ml   Output 1000 ml   Net -460 ml       Physical Exam:  General Appearance:    Alert, pleasant, no acute distress, appropriately responsive and follows commands  Head:    Normocephalic, mucous membranes moist   Eyes:    Conjunctiva/corneas clear, EOM's intact   Neck:   Supple   Resp:     Faint bibasal rales, otherwise clear, no wheezing    Heart:    Regular rate and rhythm, S1 and S2    Abdomen:     Soft, non-tender, nondistended   Extremities:   Improving bilateral lower extremity edema, + chronic stasis changes   Neurologic:  nonfocal         Additional Data:     Labs:    Results from last 7 days   Lab Units 07/29/21  0621 07/27/21  0434   WBC Thousand/uL  --  6 51   HEMOGLOBIN g/dL 12 1 11 1*   HEMATOCRIT % 37 3 33 3*   PLATELETS Thousands/uL  --  171   NEUTROS PCT %  --  70   LYMPHS PCT %  --  15   MONOS PCT %  --  11   EOS PCT %  --  4     Results from last 7 days   Lab Units 07/30/21  0455 07/27/21  0030   POTASSIUM mmol/L 4 2 3 2*   CHLORIDE mmol/L 96* 92*   CO2 mmol/L 29 29   BUN mg/dL 93* 99*   CREATININE mg/dL 3 21* 3 64*   CALCIUM mg/dL 9 5 9 0   ALK PHOS U/L  --  102   ALT U/L  --  30   AST U/L  --  39           * I Have Reviewed All Lab Data Listed Above  * Additional Pertinent Lab Tests Reviewed: [facility]    Cultures:   Blood Culture:   Lab Results   Component Value Date    BLOODCX No Growth After 4 Days  07/27/2021    BLOODCX No Growth After 4 Days  07/26/2021    BLOODCX No Growth After 5 Days  03/09/2021    BLOODCX No Growth After 5 Days   03/09/2021     Urine Culture:   Lab Results   Component Value Date    URINECX <10,000 cfu/ml Mixed Contaminants X2 02/08/2017     Sputum Culture: No components found for: SPUTUMCX  Wound Culture: No results found for: WOUNDCULT    Last 24 Hours Medication List:   Current Facility-Administered Medications   Medication Dose Route Frequency Provider Last Rate    apixaban  2 5 mg Oral BID [place] Friedman      bumetanide  2 mg Intravenous BID Yoav Alfred MD      finasteride  5 mg Oral Daily [place] Friedman      hydrOXYzine HCL  25 mg Oral Q6H PRN [facility] Larose      multivitamin-minerals  1 tablet Oral Daily [place], [place]      pantoprazole  40 mg Oral Daily [place], [place]      senna-docusate sodium  1 tablet Oral BID Kirby Nino MD      spironolactone  12 5 mg Oral Daily Liban Pink MD          Today, Patient Was Seen By: Cyndi Carney MD    ** Please Note: Dragon 360 Dictation voice to text software may have been used in the creation of this document   **

## 2021-08-01 NOTE — PROGRESS NOTES
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  Assessment & Plan  · Presented with gradual worsening of lower extremity edema, exertional dyspnea, orthopnea  Torsemide recently decreased down to 20 mg daily  Attends cardiac rehab but states he had not been able to go the week preceding admission  · Echo 3/2021 EF 25%, moderate to severe TR  BNP 38,000  Prior 18,000  Troponin 0 31   · Home regimen: torsemide 20 mg QD, zaroxolyn 2 5 mg weekly  Per recent note, not taking bisoprolol due to low blood pressures  · Remains on Bumex 2 mg IV b i d  Received 1 dose Zaroxolyn 7/31/21  Renal function although elevated from his baseline has been relatively stable with creatinine 3 19 today  Will recheck labs in the morning   Continue I&O, standing weights, sodium restriction, fluid restriction    Overall continues to diurese well with decreasing weight    Acute kidney injury - Chronic kidney disease stage 3  Assessment & Plan  · Baseline creatinine approximately 1 9-2 3 - presented with creatinine elevated to 3 64  Creatinine today is 3 21  · Patient on IV Bumex 2 mg b i d     Likely needs higher creatinine baseline to maintain euvolemic state in the setting of cardiomyopathy with acute on chronic CHF, advanced age and overall poor prognosis  · Continue diuretics at current dose, monitor renal function closely  Nephrology following, appreciate input  · Elevated lactic acid likely secondary to poor perfusion  No concern for sepsis at this time    Serial checks discontinued    Elevated troponin  Assessment & Plan  · Likely non-MI troponin elevation in the setting of CHF exacerbation  · Troponin peak of 0 34 and remained flat  · Continue management of acute CHF    Anemia of chronic disease  Assessment & Plan  · Hemoglobin stable at 12 1 on 21  · Will recheck labs 21    Atrial fibrillation  Assessment & Plan  · S/p biventricular pacemaker  Heart rate controlled  · Previously on beta-blockade with Bisoprolol however this was discontinued secondary to borderline hypotensive states  Blood pressure has been stable in the low 100s  · Continue Eliquis for anticoagulation      VTE Pharmacologic Prophylaxis:   Pharmacologic: Apixaban (Eliquis)  Mechanical VTE Prophylaxis in Place: No    Patient Centered Rounds: I have performed bedside rounds with nursing staff today  Discussions with Specialists or Other Care Team Provider:  Nursing/Cardiology    Education and Discussions with Family / Patient:  Patient/updated patient's daughter Adam Weeks on phone, all questions answered    Current Length of Stay: 5 day(s)    Current Patient Status: Inpatient   Certification Statement: The patient will continue to require additional inpatient hospital stay due to Above diagnosis and care plan    Discharge Plan:  Anticipate he should be stable for discharge to rehab on 21    Code Status: Level 1 - Full Code    Subjective:   No new complaints or acute overnight events  He denies shortness of breath at rest   He feels he might be pushing himself too much and does report shortness of breath with exertion  He denies chest pain    Objective:     Vitals:   Temp (24hrs), Av °F (36 7 °C), Min:97 7 °F (36 5 °C), Max:98 2 °F (36 8 °C)    Temp:  [97 7 °F (36 5 °C)-98 2 °F (36 8 °C)] 97 9 °F (36 6 °C)  HR:  [45-75] 45  Resp:  [16-18] 18  BP: (101-124)/(54-70) 115/54  SpO2:  [96 %-98 %] 97 %  Body mass index is 23 83 kg/m²  Input and Output Summary (last 24 hours):        Intake/Output Summary (Last 24 hours) at 2021 0959  Last data filed at 2021 0900  Gross per 24 hour   Intake 1360 ml   Output 1770 ml   Net -410 ml       Physical Exam:  General Appearance:    Alert, pleasant, cooperative, no distress, appropriately responsive    Head:    Normocephalic, mucous membranes moist   Eyes:    Conjunctiva/corneas clear, EOM's intact   Neck:   Supple   Resp:     L>R faint bibasal rales overall decreased, otherwise clear    Heart:    Regular rate and rhythm, S1 and S2    Abdomen:     Soft, non-tender, nondistended   Extremities:   Multiple skin rash/scabs, +1-2 decreasing bilateral lower extremity edema with chronic stasis changes   Neurologic:  nonfocal         Additional Data:     Labs:    Results from last 7 days   Lab Units 07/29/21  0621 07/27/21  0434   WBC Thousand/uL  --  6 51   HEMOGLOBIN g/dL 12 1 11 1*   HEMATOCRIT % 37 3 33 3*   PLATELETS Thousands/uL  --  171   NEUTROS PCT %  --  70   LYMPHS PCT %  --  15   MONOS PCT %  --  11   EOS PCT %  --  4     Results from last 7 days   Lab Units 08/01/21  0430 07/27/21  0030   POTASSIUM mmol/L 3 7 3 2*   CHLORIDE mmol/L 95* 92*   CO2 mmol/L 34* 29   BUN mg/dL 87* 99*   CREATININE mg/dL 3 19* 3 64*   CALCIUM mg/dL 9 3 9 0   ALK PHOS U/L  --  102   ALT U/L  --  30   AST U/L  --  39           * I Have Reviewed All Lab Data Listed Above  * Additional Pertinent Lab Tests Reviewed: [facility]    Cultures:   Blood Culture:   Lab Results   Component Value Date    BLOODCX No Growth After 5 Days  07/27/2021    BLOODCX No Growth After 5 Days  07/26/2021    BLOODCX No Growth After 5 Days  03/09/2021    BLOODCX No Growth After 5 Days   03/09/2021     Urine Culture:   Lab Results   Component Value Date    URINECX <10,000 cfu/ml Mixed Contaminants X2 02/08/2017     Sputum Culture: No components found for: SPUTUMCX  Wound Culture: No results found for: WOUNDCULT    Last 24 Hours Medication List:   Current Facility-Administered Medications   Medication Dose Route Frequency Provider Last Rate    apixaban  2 5 mg Oral BID IAIN Juan      bumetanide  2 mg Intravenous BID MD Cleo Kerr finasteride  5 mg Oral Daily Romayne Cozier, CRNP      hydrOXYzine HCL  25 mg Oral Q6H PRN Romayne Cozier, CRNP      multivitamin-minerals  1 tablet Oral Daily Romayne Cozier, [address]      pantoprazole  40 mg Oral Daily Romayne Cozier, [address]      senna-docusate sodium  1 tablet Oral BID Natasha Kearney MD      spironolactone  12 5 mg Oral Daily Geovanna Muse MD          Today, Patient Was Seen By: Don Meneses MD    ** Please Note: Dragon 360 Dictation voice to text software may have been used in the creation of this document   **

## 2021-08-01 NOTE — PROGRESS NOTES
[facility] 50 PROGRESS NOTE   Rahul Tubbs 80 y o  male MRN: [id number]  Unit/Bed#: S -01 Encounter: [id number]  Reason for Consult: SUDHIR    ASSESSMENT and PLAN:  1  Acute kidney injury (POA)  · Admission creatinine 3 64 on 7/27/21   · Suspect etiology is cardiorenal syndrome  [place] UA  · Creatinine has been stable between 3 2 and 3 4 over the past 4 days  · Renal function is stable today  Creatinine 3 19   · He is likely to settle at a higher baseline creatinine in light of cardiorenal syndrome  2  Chronic kidney disease, stage III  · Baseline creatinine 1 9 to 2 4    · Follows with Dr Kim Garcia in the office  · Will need a higher baseline creatinine to maintain euvolemia  3  Congestive heart failure:  · March 2021 echo - EF 25% with diffuse hypokinesis  · Currently on Bumex 2 mg IV BID  · Spironolactone 12 5 mg daily added this admission  · Baseline weight is 161 lbs - same as today  · S/p Metolazone 5 mg PO x 1 on 7/31/21  · Diuretics per cardiology  4  Hypertension:  BP is at goal  Not on meds  DISPOSITION:  · Stable renal function  · Will likely need a higher baseline creatinine in light of cardiorenal syndrome  · Diuretics per Cardiology  SUBJECTIVE / 24H INTERVAL HISTORY:  Denies CP or SOB  Feeling a little better       OBJECTIVE:  Current Weight: Weight - Scale: 73 2 kg (161 lb 6 oz)  Vitals:    08/01/21 0551 08/01/21 0804 08/01/21 0900 08/01/21 1044   BP:  105/54 115/54 120/57   BP Location:  Left arm Left arm    Pulse:  (!) 45  71   Resp:  18     Temp:  97 9 °F (36 6 °C)     TempSrc:  Oral     SpO2:  97%     Weight: 73 2 kg (161 lb 6 oz)          Intake/Output Summary (Last 24 hours) at 8/1/2021 1305  Last data filed at 8/1/2021 0900  Gross per 24 hour   Intake 1120 ml   Output 1470 ml   Net -350 ml     General: conscious, cooperative, no distress  Skin: dry  Eyes: pink conjunctivae  ENT: moist mucous membranes  Chest/Lungs: equal chest expansion, crackles in bases    CVS: distinct heart sounds, normal rate, regular rhythm, no rub  Abdomen: soft, non tender, non distended, normal bowel sounds  Extremities: (+) leg edema  : no crockett catheter  Neuro: awake, alert     Psych: appropriate affect    Medications:    Current Facility-Administered Medications:     apixaban (ELIQUIS) tablet 2 5 mg, 2 5 mg, Oral, BID, IAIN Dupree, 2 5 mg at 08/01/21 0834    bumetanide (BUMEX) injection 1 mg, 1 mg, Intravenous, Once, Gayla Garza MD    bumetanide (BUMEX) injection 2 mg, 2 mg, Intravenous, BID, Sami Walls MD, 2 mg at 08/01/21 1046    finasteride (PROSCAR) tablet 5 mg, 5 mg, Oral, Daily, IAIN Dupree, 5 mg at 08/01/21 7514    hydrOXYzine HCL (ATARAX) tablet 25 mg, 25 mg, Oral, Q6H PRN, IAIN Dupree, 25 mg at 07/29/21 6735    multivitamin-minerals (CENTRUM) tablet 1 tablet, 1 tablet, Oral, Daily, IAIN Dupree, 1 tablet at 08/01/21 0835    pantoprazole (PROTONIX) EC tablet 40 mg, 40 mg, Oral, Daily, IAIN Dupree, 40 mg at 08/01/21 1886    senna-docusate sodium (SENOKOT S) 8 6-50 mg per tablet 1 tablet, 1 tablet, Oral, BID, Sami Walls MD, 1 tablet at 07/27/21 1825    spironolactone (ALDACTONE) tablet 12 5 mg, 12 5 mg, Oral, Daily, Christiano Bush MD, 12 5 mg at 08/01/21 1045    Laboratory Results:  Results from last 7 days   Lab Units 08/01/21  0430 07/30/21  0455 07/29/21  0621 07/28/21  0603 07/27/21  0737 07/27/21  0434 07/27/21  0030   WBC Thousand/uL  --   --   --   --   --  6 51 6 80   HEMOGLOBIN g/dL  --   --  12 1  --   --  11 1* 11 8*   HEMATOCRIT %  --   --  37 3  --   --  33 3* 35 8*   PLATELETS Thousands/uL  --   --   --   --   --  171 152   POTASSIUM mmol/L 3 7 4 2 4 1 3 0*  --   --  3 2*   CHLORIDE mmol/L 95* 96* 97* 95*  --   --  92*   CO2 mmol/L 34* 29 30 32  --   --  29   BUN mg/dL 87* 93* 94* 96*  --   --  99*   CREATININE mg/dL 3 19* 3 21* 3 42* 3 36*  --   --  3 64*   CALCIUM mg/dL 9 3 9 5 9 2 9 1  --   -- 9  0   MAGNESIUM mg/dL  --  2 4 2 4 2 3 2 4  --   --    PHOSPHORUS mg/dL  --   --   --   --   --   --  4 5*

## 2021-08-01 NOTE — ASSESSMENT & PLAN NOTE
· Presented with gradual worsening of lower extremity edema, exertional dyspnea, orthopnea  Torsemide recently decreased down to 20 mg daily  Attends cardiac rehab but states he had not been able to go the week preceding admission  · Echo 3/2021 EF 25%, moderate to severe TR  BNP 38,000  Prior 18,000  Troponin 0 31   · Home regimen: torsemide 20 mg QD, zaroxolyn 2 5 mg weekly  Per recent note, not taking bisoprolol due to low blood pressures  · Remains on Bumex 2 mg IV b i d  Received 1 dose Zaroxolyn 7/31/21  Renal function although elevated from his baseline has been relatively stable with creatinine 3 19 today    Will recheck labs in the morning   Continue I&O, standing weights, sodium restriction, fluid restriction    Overall continues to diurese well with decreasing weight

## 2021-08-01 NOTE — PHYSICAL THERAPY NOTE
PHYSICAL THERAPY TREATMENT    Patient Name: Alexandria Peralta  MPMKE'T Date: 21 1009   PT Last Visit   PT Visit Date 21   Note Type   Note Type Treatment   Pain Assessment   Pain Assessment Tool Pain Assessment not indicated - pt denies pain   Pain Score No Pain   Restrictions/Precautions   Weight Bearing Precautions Per Order No   Other Precautions Chair Alarm; Bed Alarm; Fall Risk;Hard of hearing   General   Chart Reviewed Yes   Response to Previous Treatment Patient with no complaints from previous session  Family/Caregiver Present No   Cognition   Overall Cognitive Status WFL   Arousal/Participation Cooperative   Attention Within functional limits   Comments Pt identified by name and   Subjective   Subjective Agrees to PT treatment and is pleasant and cooperative throughout session  "I don't know how much to push myself "   Bed Mobility   Supine to Sit Unable to assess  (OOB in chair pre/post session with alarm intact)   Transfers   Sit to Stand 5  Supervision   Additional items Armrests; Increased time required;Verbal cues   Stand to Sit 5  Supervision   Additional items Increased time required;Verbal cues;Armrests   Ambulation/Elevation   Gait pattern Narrow KRSIH; Forward Flexion;Decreased foot clearance; Short stride; Excessively slow   Gait Assistance 4  Minimal assist   Additional items Assist x 1;Verbal cues   Assistive Device Rolling walker   Distance 55` x1 and 60` x1   Balance   Static Sitting Fair +   Static Standing Fair   Dynamic Standing Leeanne Fraley [id number] -  (with RW)   Endurance Deficit   Endurance Deficit Yes   Endurance Deficit Description limited ambulation distance, fatigue   Activity Tolerance   Activity Tolerance Patient tolerated treatment well   Nurse Made Aware Per RN, pt appropriate to treat   Exercises   Hip Abduction Sitting;20 reps;AROM; Bilateral   Hip Adduction Sitting;20 reps;AROM; Bilateral   Knee AROM [place] [employer] reps;AROM; Bilateral  (x2)   Ankle Pumps Sitting;20 reps;AROM; Bilateral  (x2)   Squat Standing;15 reps;AROM; Bilateral  (with RW UE support; x1 PT corrected LOB)   Marching Standing;20 reps;AROM; Bilateral;Sitting  (x2; with RW UE support)   Assessment   Prognosis Fair   Problem List Decreased strength;Decreased range of motion;Decreased endurance; Impaired balance;Decreased mobility; Impaired hearing; Impaired judgement; Impaired vision;Decreased skin integrity   Assessment Pt agrees to PT treatment and is pleasant and cooperative throughout session  Progress noted, as pt is able to tolerate increased ambulation distance  Continues to require min/CGA for ambulation and is primarily limited by generalized fatigue  Responds well to verbal cues for upright posture during gait as well as education for RW management  Education also provided on energy conservation throughout the day  Seated rest breaks required between ambulation trials as well as between most LE exercise sets  Will continue to benefit from ongoing skilled PT to maximize his functional mobility and increase his level of independence  Recommending rehab at time of discharge when medically appropriate  Goals   Patient Goals to walk better   STG Expiration Date 08/07/21   PT Treatment Day 2   Plan   Treatment/Interventions Functional transfer training;LE strengthening/ROM; Elevations; Therapeutic exercise; Endurance training;Patient/family training;Equipment eval/education; Bed mobility;Gait training   Progress Progressing toward goals   PT Frequency Other (Comment)  (3-5x/week)   Recommendation   PT Discharge Recommendation Post acute rehabilitation services   AM-PAC Basic Mobility Inpatient   Turning in Bed Without Bedrails 2   Lying on Back to Sitting on Edge of Flat Bed 2   Moving Bed to Chair 3   Standing Up From Chair 3   Walk in Room 3   Climb 3-5 Stairs 3   Basic Mobility Inpatient Raw Score 16   Basic Mobility Standardized Score 38 32   The patient's AM-PAC Basic Mobility Inpatient Short Form Raw Score is 16, Standardized Score is 38 32  A standardized score less than 42 9 suggests the patient may benefit from discharge to post-acute rehabilitation services  Please also refer to the recommendation of the Physical Therapist for safe discharge planning      Linwood Corea, PT,DPT

## 2021-08-01 NOTE — ASSESSMENT & PLAN NOTE
· Baseline creatinine approximately 1 9-2 3 - presented with creatinine elevated to 3 64  Creatinine today is 3 21  · Patient on IV Bumex 2 mg b i d     Likely needs higher creatinine baseline to maintain euvolemic state in the setting of cardiomyopathy with acute on chronic CHF, advanced age and overall poor prognosis  · Continue diuretics at current dose, monitor renal function closely  Nephrology following, appreciate input  · Elevated lactic acid likely secondary to poor perfusion  No concern for sepsis at this time    Serial checks discontinued

## 2021-08-01 NOTE — PROGRESS NOTES
Progress Note - Cardiology   Lavonne Lighter 80 y o  male MRN: [id number]  Unit/Bed#: S -01 Encounter: [id number]  08/01/21  10:19 AM    Impression and Plan:      80year-old with history of permanent atrial fibrillation, nonischemic cardiomyopathy with his most recent ejection fraction at 25%-biventricular dilatation and dysfunction, on torsemide 20 mg b i d , metolazone 2 5 mg once weekly with baseline creatinine up to 2 3 and a baseline dry weight around 76 kg  Most recently torsemide dose was decreased to 20 mg daily by his primary cardiologist for borderline blood pressures  Has also had cardiac cachexia and has been losing caloric weight      Since the decrease in the dose of torsemide, has developed volume overload-about a 5 lb weight gain, shortness of breath and lower extremity edema with blistering  On exam has JVD and bilateral presacral and lower extremity edema      Also with evidence of hypoperfusion with lactic acidosis, acute kidney injury with a creatinine of 3 6, [place] [address], mildly elevated troponin around 0 3-showing a flat pattern  renal function is improving  Slowly diuresing all input output charting and weights have not been accurate     Plan:     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 congestive heart failure/acute kidney injury/cardiorenal syndrome:  Volume overloaded, predominantly right-sided, still with bilateral lower extremity edema  He is negative by about 0 5 L since yesterday, received both doses of 2 mg IV Bumex yesterday     Renal function is steadily improving and clinically slow improvement, will continue Bumex IV 2 b i d   But given additional 2 mg today    Has not been taking his beta-blocker due to soft blood pressures  Not on Ace inhibition at baseline due to  tenuous renal function  continue same dose of diuretic at this time  Creatinine is slowly improving    He is status post biventricular pacemaker    No evidence of a low output state on exam      Troponin elevation:  Overall showing a flat pattern-this is a non MI troponin elevation in the setting of CHF  Not an acute coronary syndrome      Hypertension:  Borderline blood pressures recently  Continue to follow for now  permanent atrial fibrillation:  Not an acute issue, on Eliquis for anticoagulation    Overall prognosis in a 80year-old with a severe cardiomyopathy and CKD is very poor  I discussed this with patient and his daughter yesterday and will consult palliative care also    They would like to have him go for rehabilitation       ===================================================================    Chief Complaint:   Chief Complaint   Patient presents with    Edema     bilat lower legs with blisters         Subjective/Objective     Subjective:   Denies any complaints    Objective:  No distress    Patient Active Problem List   Diagnosis    Dehydration    Hypertensive kidney disease with chronic kidney disease stage III ([facility] )    Atrial fibrillation    Cardiomyopathy ([facility] )    BPH (benign prostatic hyperplasia)    Biventricular cardiac pacemaker in situ    Anticoagulation adequate    Malignant neoplasm of colon ([facility] )    Cancer of splenic flexure (HCC)    Gastroesophageal reflux disease with esophagitis    Lower extremity edema    Cellulitis of left leg    Acute kidney injury - Chronic kidney disease stage 3    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    Hyperphosphatemia    Hypercalcemia    Alkalosis    Ventricular tachycardia (HCC)    Stage 4 chronic kidney disease ([facility] )    Secondary hyperparathyroidism of renal origin ([facility] )    Anemia of chronic disease    Elevated troponin       Vitals: /54 (BP Location: Left arm)   Pulse (!) 45   Temp 97 9 °F (36 6 °C) (Oral)   Resp 18   Wt 73 2 kg (161 lb 6 oz)   SpO2 97%   BMI 23 83 kg/m²     I/O this shift:  In: 200 [P O :200]  Out: 300 [Urine:300]  Wt Readings from Last 3 Encounters:   08/01/21 73 2 kg (161 lb 6 oz)   07/16/21 74 3 kg (163 lb 12 8 oz)   07/13/21 76 kg (167 lb 9 6 oz)       Intake/Output Summary (Last 24 hours) at 8/1/2021 1019  Last data filed at 8/1/2021 0900  Gross per 24 hour   Intake 1360 ml   Output 1770 ml   Net -410 ml     I/O last 3 completed shifts:   In: 1400 [P O :1400]  Out: 2170 [Urine:2170]    Invasive Devices     Peripheral Intravenous Line            Peripheral IV Left;Ventral (anterior) Forearm -- days    Peripheral IV 03/22/21 Left;Ventral (anterior) Forearm 132 days                  Physical Exam:  GEN: Lucero Ayon appears  Chronically ill, alert and oriented x 3, pleasant and cooperative   HEENT: pupils equal, round, and reactive to light; extraocular muscles intact  NECK: supple, no carotid bruits , elevated JVD present  HEART: regular rhythm, normal S1 and S2,  murmur, no clicks, gallops or rubs   LUNGS: clear to auscultation bilaterally; no wheezes or rhonchi,  basal rales  ABDOMEN/GI: normal bowel sounds, soft, no tenderness, no distention  EXTREMITIES/Musculoskeltal: peripheral pulses normal; no clubbing, cyanosis,  Bilateral mild lower extremity edema with mild blistering  NEURO: no focal motor findings   SKIN: normal without suspicious lesions on exposed skin              Lab Results:   Results from last 7 days   Lab Units 07/27/21  1150 07/27/21  0737 07/27/21  0030   TROPONIN I ng/mL 0 30* 0 34* 0 31*     Results from last 7 days   Lab Units 07/29/21  0621 07/27/21  0434 07/27/21  0030   WBC Thousand/uL  --  6 51 6 80   HEMOGLOBIN g/dL 12 1 11 1* 11 8*   HEMATOCRIT % 37 3 33 3* 35 8*   PLATELETS Thousands/uL  --  171 152         Results from last 7 days   Lab Units 08/01/21  0430 07/30/21  0455 07/29/21  0621 07/27/21  0030   POTASSIUM mmol/L 3 7 4 2 4 1 3 2*   CHLORIDE mmol/L 95* 96* 97* 92*   CO2 mmol/L 34* 29 30 29   BUN mg/dL 87* 93* 94* 99*   CREATININE mg/dL 3 19* 3 21* 3 42* 3 64*   CALCIUM mg/dL 9 3 9 5 9 2 9 0   ALK PHOS U/L  --   --   --  102   ALT U/L --   --   --  30   AST U/L  --   --   --  39         Imaging: I have personally reviewed pertinent reports  EKG/Telemtry:  No events    Scheduled Meds:  Current Facility-Administered Medications   Medication Dose Route Frequency Provider Last Rate    apixaban  2 5 mg Oral BID Julio Cesar Julian, CRMAINE      bumetanide  2 mg Intravenous BID Luisa Francois MD      finasteride  5 mg Oral Daily Julio Cesar Julian, CRNP      hydrOXYzine HCL  25 mg Oral Q6H PRN Julio Cesar Julian, CRNP      multivitamin-minerals  1 tablet Oral Daily Julio Cesar Julian, CRNP      pantoprazole  40 mg Oral Daily Julio Cesar Julian, [address]      senna-docusate sodium  1 tablet Oral BID Luisa Francois MD      spironolactone  12 5 mg Oral Daily Alberto Doe MD       Continuous Infusions:      patient is on apixaban for prophylaxis and AFib    This note was completed in part utilizing [id number] fluency direct voice recognition software  Grammatical errors, random word insertion, spelling mistakes, occasional wrong word or "sound-alike" substitutions and incomplete sentences may be an occasional consequence of the system secondary to software limitations, ambient noise and hardware issues  At the time of dictation, efforts were made to edit, clarify and /or correct errors  Please read the chart carefully and recognize, using context, where substitutions have occurred  If you have any questions or concerns about the context, text or information contained within the body of this dictation, please contact myself, the provider, for further clarification

## 2021-08-01 NOTE — PLAN OF CARE
Problem: PHYSICAL THERAPY ADULT  Goal: Performs mobility at highest level of function for planned discharge setting  See evaluation for individualized goals  Description: Treatment/Interventions: Functional transfer training, LE strengthening/ROM, Elevations, Therapeutic exercise, Endurance training, Patient/family training, Equipment eval/education, Bed mobility, Gait training          See flowsheet documentation for full assessment, interventions and recommendations  Outcome: Progressing  Note: Prognosis: Fair  Problem List: Decreased strength, Decreased range of motion, Decreased endurance, Impaired balance, Decreased mobility, Impaired hearing, Impaired judgement, Impaired vision, Decreased skin integrity  Assessment: Pt agrees to PT treatment and is pleasant and cooperative throughout session  Progress noted, as pt is able to tolerate increased ambulation distance  Continues to require min/CGA for ambulation and is primarily limited by generalized fatigue  Responds well to verbal cues for upright posture during gait as well as education for RW management  Education also provided on energy conservation throughout the day  Seated rest breaks required between ambulation trials as well as between most LE exercise sets  Will continue to benefit from ongoing skilled PT to maximize his functional mobility and increase his level of independence  Recommending rehab at time of discharge when medically appropriate  PT Discharge Recommendation: Post acute rehabilitation services          See flowsheet documentation for full assessment

## 2021-08-01 NOTE — ASSESSMENT & PLAN NOTE
· S/p biventricular pacemaker  Heart rate controlled  · Previously on beta-blockade with Bisoprolol however this was discontinued secondary to borderline hypotensive states    Blood pressure has been stable in the low 100s  · Continue Eliquis for anticoagulation

## 2021-08-02 NOTE — PROGRESS NOTES
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  Assessment & Plan  · Presented with gradual worsening of lower extremity edema, exertional dyspnea, orthopnea  Torsemide recently decreased down to 20 mg daily  Attends cardiac rehab but states he had not been able to go the week preceding admission  · Echo 3/2021 EF 25%, moderate to severe TR  BNP 38,000  Prior 18,000  Troponin 0 31   · Home regimen: torsemide 20 mg QD, zaroxolyn 2 5 mg weekly  Per recent note, not taking bisoprolol due to low blood pressures  · Remains on Bumex 2 mg IV b i d  Received 1 dose Zaroxolyn 7/31/21  Renal function although elevated from his baseline has been relatively stable with creatinine 3 06 today   Continue I&O, standing weights, sodium restriction, fluid restriction    Overall continues to diurese well with decreasing weight  Maintaining IV Bumex today   Medically stable for discharge to rehab when bed available  Likely tomorrow 8/3/21   He will be discharged on torsemide 30 mg daily and 20 mg p r n  Per Cardiology recommendations    Acute kidney injury - Chronic kidney disease stage 3  Assessment & Plan  · Baseline creatinine approximately 1 9-2 3 - presented with creatinine elevated to 3 64  Creatinine today is 3 06  · Patient on IV Bumex 2 mg b i d     Likely needs higher creatinine baseline to maintain euvolemic state in the setting of cardiomyopathy with acute on chronic CHF, advanced age and overall poor prognosis  · Continue diuretics at current dose, monitor renal function closely  Nephrology following, appreciate input  · Elevated lactic acid likely secondary to poor perfusion  No concern for sepsis at this time    Serial checks discontinued  · Stable from Nephrology standpoint    Elevated troponin  Assessment & Plan  · Likely non-MI troponin elevation in the setting of CHF exacerbation  · Troponin peak of 0 34 and remained flat  · Continue management of acute CHF    Anemia of chronic disease  Assessment & Plan  · Hemoglobin stable at 11 5 today    Atrial fibrillation  Assessment & Plan  · S/p biventricular pacemaker  Heart rate controlled  · Previously on beta-blockade with Bisoprolol however this was discontinued secondary to borderline hypotensive states  · Continue Eliquis for anticoagulation      VTE Pharmacologic Prophylaxis:   Pharmacologic: Apixaban (Eliquis)  Mechanical VTE Prophylaxis in Place: Yes    Patient Centered Rounds: I have performed bedside rounds with nursing staff today  Discussions with Specialists or Other Care Team Provider:  Nursing/cardiology    Education and Discussions with Family / Patient:  Patient/unable to reach daughter on phone today   was able to update family on status of discharge plans awaiting insurance authorization    Current Length of Stay: 6 day(s)    Current Patient Status: Inpatient   Certification Statement: The patient will continue to require additional inpatient hospital stay due to Awaiting rehab placement    Discharge Plan:  Anticipate discharge to rehab tomorrow  Pending insurance auth    Code Status: Level 1 - Full Code      Subjective:   No new complaints, no acute overnight events    Objective:     Vitals:   Temp (24hrs), Av °F (36 7 °C), Min:97 7 °F (36 5 °C), Max:98 3 °F (36 8 °C)    Temp:  [97 7 °F (36 5 °C)-98 3 °F (36 8 °C)] 98 3 °F (36 8 °C)  HR:  [75-81] 77  Resp:  [16] 16  BP: ()/(53-65) 91/55  SpO2:  [95 %-97 %] 97 %  Body mass index is 23 51 kg/m²  Input and Output Summary (last 24 hours):        Intake/Output Summary (Last 24 hours) at 2021 1600  Last data filed at 2021 1300  Gross per 24 hour   Intake 920 ml   Output 550 ml   Net 370 ml       Physical Exam:  General Appearance:    Alert, always pleasant, cooperative, no distress, appropriately responsive    Head:    Normocephalic, mucous membranes moist   Eyes:    Conjunctiva/corneas clear, EOM's intact   Neck:   Supple   Resp:     Clear bilaterally, no rales or wheeze    Heart:    Regular rate and rhythm, S1 and S2    Abdomen:     Soft, non-tender, nondistended   Extremities:   Multiple skin rash/scabs, +1-2 bilateral lower extremity decreasing edema with chronic stasis changes   Neurologic:  nonfocal         Additional Data:     Labs:    Results from last 7 days   Lab Units 08/02/21  0529 07/27/21  0434   WBC Thousand/uL 7 08 6 51   HEMOGLOBIN g/dL 11 5* 11 1*   HEMATOCRIT % 35 1* 33 3*   PLATELETS Thousands/uL 155 171   NEUTROS PCT %  --  70   LYMPHS PCT %  --  15   MONOS PCT %  --  11   EOS PCT %  --  4     Results from last 7 days   Lab Units 08/02/21  0529 07/27/21  0030   POTASSIUM mmol/L 3 3* 3 2*   CHLORIDE mmol/L 93* 92*   CO2 mmol/L 32 29   BUN mg/dL 86* 99*   CREATININE mg/dL 3 06* 3 64*   CALCIUM mg/dL 9 2 9 0   ALK PHOS U/L  --  102   ALT U/L  --  30   AST U/L  --  39           * I Have Reviewed All Lab Data Listed Above  * Additional Pertinent Lab Tests Reviewed: [facility] Admission Reviewed    Cultures:   Blood Culture:   Lab Results   Component Value Date    BLOODCX No Growth After 5 Days  07/27/2021    BLOODCX No Growth After 5 Days  07/26/2021    BLOODCX No Growth After 5 Days  03/09/2021    BLOODCX No Growth After 5 Days   03/09/2021     Urine Culture:   Lab Results   Component Value Date    URINECX <10,000 cfu/ml Mixed Contaminants X2 02/08/2017     Sputum Culture: No components found for: SPUTUMCX  Wound Culture: No results found for: WOUNDCULT    Last 24 Hours Medication List:   Current Facility-Administered Medications   Medication Dose Route Frequency Provider Last Rate    apixaban  2 5 mg Oral BID IAIN Juan      bumetanide  1 mg Intravenous Once Sherral Closs, MD      bumetanide  2 mg Intravenous BID Patricia Lauren MD      finasteride  5 mg Oral Daily IAIN Gonzales      hydrOXYzine HCL  25 mg Oral Q6H PRN IAIN Gonzales      multivitamin-minerals  1 tablet Oral Daily Elverna Goldie, [address]      pantoprazole  40 mg Oral Daily Cayetano Amezcua, [address]      senna-docusate sodium  1 tablet Oral BID Patricia Lauren MD      spironolactone  12 5 mg Oral Daily Mario Gold MD          Today, Patient Was Seen By: Kedar Rodriguez MD    ** Please Note: Dragon 360 Dictation voice to text software may have been used in the creation of this document   **

## 2021-08-02 NOTE — SOCIAL WORK
LSW spoke with Tete who advised plan will be for pt to be d/c to [facility] once auth is obtained  Auth request still being reviewed by pt's insurance at this time  LSW will follow

## 2021-08-02 NOTE — ASSESSMENT & PLAN NOTE
· Baseline creatinine approximately 1 9-2 3 - presented with creatinine elevated to 3 64  Creatinine today is 3 06  · Patient on IV Bumex 2 mg b i d     Likely needs higher creatinine baseline to maintain euvolemic state in the setting of cardiomyopathy with acute on chronic CHF, advanced age and overall poor prognosis  · Continue diuretics at current dose, monitor renal function closely  Nephrology following, appreciate input  · Elevated lactic acid likely secondary to poor perfusion  No concern for sepsis at this time    Serial checks discontinued  · Stable from Nephrology standpoint

## 2021-08-02 NOTE — ASSESSMENT & PLAN NOTE
· Presented with gradual worsening of lower extremity edema, exertional dyspnea, orthopnea  Torsemide recently decreased down to 20 mg daily  Attends cardiac rehab but states he had not been able to go the week preceding admission  · Echo 3/2021 EF 25%, moderate to severe TR  BNP 38,000  Prior 18,000  Troponin 0 31   · Home regimen: torsemide 20 mg QD, zaroxolyn 2 5 mg weekly  Per recent note, not taking bisoprolol due to low blood pressures  · Remains on Bumex 2 mg IV b i d  Received 1 dose Zaroxolyn 7/31/21  Renal function although elevated from his baseline has been relatively stable with creatinine 3 06 today   Continue I&O, standing weights, sodium restriction, fluid restriction    Overall continues to diurese well with decreasing weight  Maintaining IV Bumex today   Medically stable for discharge to rehab when bed available  Likely tomorrow 8/3/21   He will be discharged on torsemide 30 mg daily and 20 mg p r n   Per Cardiology recommendations

## 2021-08-02 NOTE — PROGRESS NOTES
Progress Note - Cardiology   Azul Rodas 80 y o  male MRN: [id number]  Unit/Bed#: S -01 Encounter: [id number]  08/02/21  12:48 PM    Impression and Plan:      80year-old with history of permanent atrial fibrillation, nonischemic cardiomyopathy with his most recent ejection fraction at 25%-biventricular dilatation and dysfunction, on torsemide 20 mg b i d , metolazone 2 5 mg once weekly with baseline creatinine up to 2 3 and a baseline dry weight around 76 kg  Most recently torsemide dose was decreased to 20 mg daily by his primary cardiologist for borderline blood pressures  Has also had cardiac cachexia and has been losing caloric weight      Since the decrease in the dose of torsemide, has developed volume overload-about a 5 lb weight gain, shortness of breath and lower extremity edema with blistering  On exam has JVD and bilateral presacral and lower extremity edema      Also with evidence of hypoperfusion with lactic acidosis, acute kidney injury with a creatinine of 3 6, [place] [address], mildly elevated troponin around 0 3-showing a flat pattern  renal function is improving  Slowly diuresing all input output charting and weights have not been accurate     Plan:     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 congestive heart failure/acute kidney injury/cardiorenal syndrome:  Volume overloaded, predominantly right-sided, still with bilateral lower extremity edema  He is negative by about 0 5 L since yesterday, received a total of 4 mg IV Bumex yesterday   Hypotensive this morning  This is as good as it gets, still has mild volume overload-edema,  Further volume removal will potentiate hypotension  He was originally on torsemide 40 mg daily-decreased to 20 mg daily for borderline blood pressures, admitted with heart failure, should go home on 30 mg daily, with an additional 20 mg as needed for weight gain/increasing edema    Weight is currently 159 lb/72 kg-can use as discharge weight, still has mild lower extremity edema bilaterally    Has not been taking his beta-blocker due to soft blood pressures  Not on Ace inhibition at baseline due to  tenuous renal function  continue same dose of diuretic at this time  Creatinine is slowly improving    He is status post biventricular pacemaker  No evidence of a low output state on exam      Troponin elevation:  Overall showing a flat pattern-this is a non MI troponin elevation in the setting of CHF  Not an acute coronary syndrome      Hypertension:  Borderline blood pressures recently  Continue to follow for now  permanent atrial fibrillation:  Not an acute issue, on Eliquis for anticoagulation    Overall prognosis in a 80year-old with a severe cardiomyopathy and CKD is very poor  I discussed this with patient and his daughter yesterday and consulted palliative care also-will see them as an outpatient   Family/daughter would like to have him go for rehabilitation       ===================================================================    Chief Complaint:   Chief Complaint   Patient presents with    Edema     bilat lower legs with blisters         Subjective/Objective     Subjective:   Denies any complaints    Objective:  No distress    Patient Active Problem List   Diagnosis    Dehydration    Hypertensive kidney disease with chronic kidney disease stage III ([facility] [address] [facility]    Atrial fibrillation    Cardiomyopathy ([facility] [facility]    BPH (benign prostatic hyperplasia)    Biventricular cardiac pacemaker in situ    Anticoagulation adequate    Malignant neoplasm of colon ([address]    Cancer of splenic flexure ([facility] [address] [facility]    Gastroesophageal reflux disease with esophagitis    Lower extremity edema    Cellulitis of left leg    Acute kidney injury - Chronic kidney disease stage 3    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    Hyperphosphatemia    Hypercalcemia    Alkalosis    Ventricular tachycardia (HCC)    Stage 4 chronic kidney disease ([address]    Secondary hyperparathyroidism of renal origin ([facility] [address] [facility]    Anemia of chronic disease    Elevated troponin       Vitals: BP 96/55 (BP Location: Right arm)   Pulse 80   Temp 97 7 °F (36 5 °C) (Oral)   Resp 16   Wt 72 2 kg (159 lb 2 8 oz)   SpO2 95%   BMI 23 51 kg/m²     I/O this shift:  In: 240 [P O :240]  Out: -   Wt Readings from Last 3 Encounters:   08/02/21 72 2 kg (159 lb 2 8 oz)   07/16/21 74 3 kg (163 lb 12 8 oz)   07/13/21 76 kg (167 lb 9 6 oz)       Intake/Output Summary (Last 24 hours) at 8/2/2021 1248  Last data filed at 8/2/2021 1100  Gross per 24 hour   Intake 980 ml   Output 1150 ml   Net -170 ml     I/O last 3 completed shifts:   In: 8251 [P O :1620]  Out: 2570 [Urine:2570]    Invasive Devices     Peripheral Intravenous Line            Peripheral IV Left;Ventral (anterior) Forearm -- days    Peripheral IV 03/22/21 Left;Ventral (anterior) Forearm 133 days                  Physical Exam:  GEN: Shannon Bourgeois appears  Chronically ill, alert and oriented x 3, pleasant and cooperative   HEENT: pupils equal, round, and reactive to light; extraocular muscles intact  NECK: supple, no carotid bruits , elevated JVD present  HEART: regular rhythm, normal S1 and S2,  murmur, no clicks, gallops or rubs   LUNGS: clear to auscultation bilaterally; no wheezes or rhonchi, lungs are clear to auscultation today  ABDOMEN/GI: normal bowel sounds, soft, no tenderness, no distention  EXTREMITIES/Musculoskeltal: peripheral pulses normal; no clubbing, cyanosis,  Bilateral mild lower extremity edema -improved  NEURO: no focal motor findings   SKIN: normal without suspicious lesions on exposed skin              Lab Results:   Results from last 7 days   Lab Units 07/27/21  1150 07/27/21  0737 07/27/21  0030   TROPONIN I ng/mL 0 30* 0 34* 0 31*     Results from last 7 days   Lab Units 08/02/21  0529 07/29/21  0621 07/27/21  0434 07/27/21  0030   WBC Thousand/uL 7 08  --  6 51 6 80   HEMOGLOBIN g/dL 11 5* 12 1 11 1* 11 8* HEMATOCRIT % 35 1* 37 3 33 3* 35 8*   PLATELETS Thousands/uL 155  --  171 152         Results from last 7 days   Lab Units 08/02/21  0529 08/01/21  0430 07/30/21  0455 07/27/21  0030   POTASSIUM mmol/L 3 3* 3 7 4 2 3 2*   CHLORIDE mmol/L 93* 95* 96* 92*   CO2 mmol/L 32 34* 29 29   BUN mg/dL 86* 87* 93* 99*   CREATININE mg/dL 3 06* 3 19* 3 21* 3 64*   CALCIUM mg/dL 9 2 9 3 9 5 9 0   ALK PHOS U/L  --   --   --  102   ALT U/L  --   --   --  30   AST U/L  --   --   --  39         Imaging: I have personally reviewed pertinent reports  EKG/Telemtry:  No events    Scheduled Meds:  Current Facility-Administered Medications   Medication Dose Route Frequency Provider Last Rate    apixaban  2 5 mg Oral BID IAIN Gonzales      bumetanide  1 mg Intravenous Once Sherral Closs, MD      bumetanide  2 mg Intravenous BID Patricia Lauren MD      finasteride  5 mg Oral Daily IAIN Gonzales      hydrOXYzine HCL  25 mg Oral Q6H PRN IAIN Gonzales      multivitamin-minerals  1 tablet Oral Daily Cayetano Amezcua CRMAINE      pantoprazole  40 mg Oral Daily Cayetano Amezcua, [address]      senna-docusate sodium  1 tablet Oral BID Patricia Lauren MD      spironolactone  12 5 mg Oral Daily Mario Gold MD       Continuous Infusions:      patient is on apixaban for prophylaxis and AFib    This note was completed in part utilizing m-[employer]ancha fluency direct voice recognition software  Grammatical errors, random word insertion, spelling mistakes, occasional wrong word or "sound-alike" substitutions and incomplete sentences may be an occasional consequence of the system secondary to software limitations, ambient noise and hardware issues  At the time of dictation, efforts were made to edit, clarify and /or correct errors  Please read the chart carefully and recognize, using context, where substitutions have occurred    If you have any questions or concerns about the context, text or information contained within the body of this dictation, please contact myself, the provider, for further clarification

## 2021-08-02 NOTE — PROGRESS NOTES
[address] [facility] NOTE   Azul Rodas 80 y o  male MRN: [id number]  Unit/Bed#: S -01 Encounter: [id number]  Reason for Consult:  Acute kidney injury on CKD    Summary:  Darvin White a 80 y  o  male with a history of CKD stage 4, atrial fibrillation, cardiomyopathy, colon cancer status post left hemicolectomy, biventricular pacemaker, who was admitted to [facility] after presenting with increasing edema and 5 lb weight gain   He also reported left tibial leg wound with some surrounding small blisters  [facility] is also noted a decline in functional status despite working with physical therapy   He had a prolonged hospitalization earlier this year in March for 3 weeks due to acute CHF, cellulitis left leg  Creatinine 3 5 on admission prompting nephrology consult      ASSESSMENT and PLAN:  1  Acute kidney injury (POA) on CKD:  · Creatinine 3 64 on admission  · Urinalysis:  [place]  · Etiology:    ? Volume overload/decreased effective circulating volume/ cardiorenal   Poor renal reserve  · Volume overload: On IV Bumex per Cardiology  ? Slow improvement in volume status  ? Diuretic resistance likely  · Creatinine slowly decreasing, currently 3 06  · Plan/recommendations:  ? Unlikely to return to prior baseline; will need to tolerate higher baseline for cardiac benefit/euvolemia  ? Per Dr Diana Rodarte is note he discussed goals of care with patient and daughter on 07/28  Unlikely to benefit from hemodialysis considering advanced age  3  Chronic kidney disease, stage IV:  · Outpatient nephrologist:  Dr Page Magana  · Most recent baseline creatinine 1 9-2 4   Previously 1 6-1 9 mg/dL  · Last urine protein creatinine ratio 0 2 g on 07/12/2021  · Etiology:  Hypertensive nephrosclerosis, age-related nephron loss plus cardiorenal syndrome   Prior episodes of acute kidney injury contributing/poor renal reserve  3  Acute on chronic CHF:    · Chest x-ray on admission:  Stable cardiomegaly   Lungs clear    · Baseline weight usually near 161 lb   currently weight 159 lb  · Outpatient torsemide dose 20 mg once a day/twice a day   Previously on metolazone 2 5 mg weekly  · Echocardiogram March 2021:  Ejection fraction 25%, severe diffuse hypokinesis with distinct regional wall motion abnormalities   Wall thickness mildly increased, moderate MR   Moderate to severe TR  Dilated RV    · Biventricular pacemaker in place  · Bumex dose 2 mg IV b i d  12 5 mg of spirolactone daily added on 07/28  · Fluid restriction  · Keep output greater than intake  4  Electrolytes:  · Hypokalemia:    ? Potassium 3 3  ? On spirolactone  ? Give K Dur 40 mEq  · Hyponatremia   Resolved  5  Hypertension:  · Blood pressure lower this morning  · On diuretic  6  CKD MBD/secondary hyperparathyroidism of renal origin:    · Most recent  6     · Outpatient management   Monitoring vitamin-D levels, phosphorus and calcium  :    · Last phosphorus level 4 5 which is within goal for CKD stage 4    7  Atrial fibrillation:  On Eliquis  8  BPH on finasteride     DISPOSITION:  Diuretic per Cardiology  Monitor renal function  Stable from a renal standpoint    SUBJECTIVE / 24H INTERVAL HISTORY:  No acute complaints  Stated that his blood pressure was lower this morning and he felt weak but feels better now  Comfortable at rest   No unusual shortness of breath  OBJECTIVE:  Current Weight: Weight - Scale: 72 2 kg (159 lb 2 8 oz)  Vitals:    08/01/21 1753 08/01/21 2150 08/02/21 0555 08/02/21 0757   BP: 110/65 110/53  96/55   BP Location: Right arm Right arm  Right arm   Pulse: 81 75  80   Resp:  16  16   Temp:  97 9 °F (36 6 °C)  97 7 °F (36 5 °C)   TempSrc:  Oral  Oral   SpO2:  97%  95%   Weight:   72 2 kg (159 lb 2 8 oz)        Intake/Output Summary (Last 24 hours) at 8/2/2021 1212  Last data filed at 8/2/2021 0601  Gross per 24 hour   Intake 740 ml   Output 1150 ml   Net -410 ml     General: NAD, sitting out of bed in the chair  Skin: no rash    Left lower extremity wrapped due to wound  Eyes: anicteric sclera  No redness or drainage  ENT: moist mucous membrane  Oropharynx clear  Neck: supple  No JVD  Chest: CTA b/l, no ronchii, no wheeze, no rubs, no rales  Normal effort  CVS: s1s2, no murmur, no gallop, no rub  Normal rate, regular rhythm  Abdomen: soft, nontender, nl sounds  Extremities: 2+ edema LE b/l  Left calf wrapped with Kerlix    Blisters appear smaller  : no crockett  Neuro: AAOX3  Psych: normal affect  Medications:    Current Facility-Administered Medications:     apixaban (ELIQUIS) tablet 2 5 mg, 2 5 mg, Oral, BID, Deloras Leisure, CRNP, 2 5 mg at 08/02/21 0955    bumetanide (BUMEX) injection 1 mg, 1 mg, Intravenous, Once, Vashti Harada, MD    bumetanide (BUMEX) injection 2 mg, 2 mg, Intravenous, BID, Candi Hood MD, 1 mg at 08/02/21 0955    finasteride (PROSCAR) tablet 5 mg, 5 mg, Oral, Daily, Deloras Leisure, CRNP, 5 mg at 08/02/21 0955    hydrOXYzine HCL (ATARAX) tablet 25 mg, 25 mg, Oral, Q6H PRN, Deloras Leisure, CRNP, 25 mg at 08/02/21 1003    multivitamin-minerals (CENTRUM) tablet 1 tablet, 1 tablet, Oral, Daily, Deloras Leisure, CRNP, 1 tablet at 08/01/21 0835    pantoprazole (PROTONIX) EC tablet 40 mg, 40 mg, Oral, Daily, Deloras Leisure, CRNP, 40 mg at 08/02/21 0525    senna-docusate sodium (SENOKOT S) 8 6-50 mg per tablet 1 tablet, 1 tablet, Oral, BID, Candi Hood MD, 1 tablet at 07/27/21 1825    spironolactone (ALDACTONE) tablet 12 5 mg, 12 5 mg, Oral, Daily, Arabella Irwin MD, 12 5 mg at 08/01/21 1045    Laboratory Results:  Results from last 7 days   Lab Units 08/02/21  0529 08/01/21  0430 07/30/21  0455 07/29/21  0621 07/28/21  0603 07/27/21  0737 07/27/21  0434 07/27/21  0030   WBC Thousand/uL 7 08  --   --   --   --   --  6 51 6 80   HEMOGLOBIN g/dL 11 5*  --   --  12 1  --   --  11 1* 11 8*   HEMATOCRIT % 35 1*  --   --  37 3  --   --  33 3* 35 8*   PLATELETS Thousands/uL 155  --   --   --   --   --  171 152   POTASSIUM mmol/L 3  3* 3 7 4 2 4 1 3 0*  --   --  3 2*   CHLORIDE mmol/L 93* 95* 96* 97* 95*  --   --  92*   CO2 mmol/L 32 34* 29 30 32  --   --  29   BUN mg/dL 86* 87* 93* 94* 96*  --   --  99*   CREATININE mg/dL 3 06* 3 19* 3 21* 3 42* 3 36*  --   --  3 64*   CALCIUM mg/dL 9 2 9 3 9 5 9 2 9 1  --   --  9 0   MAGNESIUM mg/dL  --   --  2 4 2 4 2 3 2 4  --   --    PHOSPHORUS mg/dL  --   --   --   --   --   --   --  4 5*

## 2021-08-02 NOTE — PLAN OF CARE
Problem: Nutrition/Hydration-ADULT  Goal: Nutrient/Hydration intake appropriate for improving, restoring or maintaining nutritional needs  Description: Monitor and assess patient's nutrition/hydration status for malnutrition  Collaborate with interdisciplinary team and initiate plan and interventions as ordered  Monitor patient's weight and dietary intake as ordered or per policy  Utilize nutrition screening tool and intervene as necessary  Determine patient's food preferences and provide high-protein, high-caloric foods as appropriate       INTERVENTIONS:  - Monitor oral intake, urinary output, labs, and treatment plans  - Assess nutrition and hydration status and recommend course of action  - Evaluate amount of meals eaten  - Assist patient with eating if necessary   - Allow adequate time for meals  - Recommend/ encourage appropriate diets, oral nutritional supplements, and vitamin/mineral supplements  - Order, calculate, and assess calorie counts as needed  - Recommend, monitor, and adjust tube feedings and TPN/PPN based on assessed needs  - Assess need for intravenous fluids  - Provide specific nutrition/hydration education as appropriate  - Include patient/family/caregiver in decisions related to nutrition  Outcome: Progressing     Problem: MOBILITY - ADULT  Goal: Maintain or return to baseline ADL function  Description: INTERVENTIONS:  -  Assess patient's ability to carry out ADLs; assess patient's baseline for ADL function and identify physical deficits which impact ability to perform ADLs (bathing, care of mouth/teeth, toileting, grooming, dressing, etc )  - Assess/evaluate cause of self-care deficits   - Assess range of motion  - Assess patient's mobility; develop plan if impaired  - Assess patient's need for assistive devices and provide as appropriate  - Encourage maximum independence but intervene and supervise when necessary  - Involve family in performance of ADLs  - Assess for home care needs following discharge   - Consider OT consult to assist with ADL evaluation and planning for discharge  - Provide patient education as appropriate  Outcome: Progressing  Goal: Maintains/Returns to pre admission functional level  Description: INTERVENTIONS:  - Perform BMAT or MOVE assessment daily    - Set and communicate daily mobility goal to care team and patient/family/caregiver  - Collaborate with rehabilitation services on mobility goals if consulted  - Perform Range of Motion  times a day  - Reposition patient every  hours    - Dangle patient  times a day  - Stand patient  times a day  - Ambulate patient  times a day  - Out of bed to chair  times a day   - Out of bed for meals  times a day  - Out of bed for toileting  - Record patient progress and toleration of activity level   Outcome: Progressing     Problem: Prexisting or High Potential for Compromised Skin Integrity  Goal: Skin integrity is maintained or improved  Description: INTERVENTIONS:  - Identify patients at risk for skin breakdown  - Assess and monitor skin integrity  - Assess and monitor nutrition and hydration status  - Monitor labs   - Assess for incontinence   - Turn and reposition patient  - Assist with mobility/ambulation  - Relieve pressure over bony prominences  - Avoid friction and shearing  - Provide appropriate hygiene as needed including keeping skin clean and dry  - Evaluate need for skin moisturizer/barrier cream  - Collaborate with interdisciplinary team   - Patient/family teaching  - Consider wound care consult   Outcome: Progressing     Problem: Potential for Falls  Goal: Patient will remain free of falls  Description: INTERVENTIONS:  - Educate patient/family on patient safety including physical limitations  - Instruct patient to call for assistance with activity   - Consult OT/PT to assist with strengthening/mobility   - Keep Call bell within reach  - Keep bed low and locked with side rails adjusted as appropriate  - Keep care items and personal belongings within reach  - Initiate and maintain comfort rounds  - Make Fall Risk Sign visible to staff  - Offer Toileting every  Hours, in advance of need  - Initiate/Maintain alarm  - Obtain necessary fall risk management equipment:   - Apply yellow socks and bracelet for high fall risk patients  - Consider moving patient to room near nurses station  Outcome: Progressing     Problem: METABOLIC, FLUID AND ELECTROLYTES - ADULT  Goal: Electrolytes maintained within normal limits  Description: INTERVENTIONS:  - Monitor labs and assess patient for signs and symptoms of electrolyte imbalances  - Administer electrolyte replacement as ordered  - Monitor response to electrolyte replacements, including repeat lab results as appropriate  - Instruct patient on fluid and nutrition as appropriate  Outcome: Progressing  Goal: Fluid balance maintained  Description: INTERVENTIONS:  - Monitor labs   - Monitor I/O and WT  - Instruct patient on fluid and nutrition as appropriate  - Assess for signs & symptoms of volume excess or deficit  Outcome: Progressing

## 2021-08-03 NOTE — ASSESSMENT & PLAN NOTE
· Presented with gradual worsening of lower extremity edema, exertional dyspnea, orthopnea  Torsemide recently decreased down to 20 mg daily  Attends cardiac rehab but states he had not been able to go the week preceding admission  · Echo 3/2021 EF 25%, moderate to severe TR  BNP 38,000  Prior 18,000  Troponin 0 31   · Home regimen: torsemide 20 mg QD, zaroxolyn 2 5 mg weekly  Per recent note, not taking bisoprolol due to low blood pressures  · Remains on Bumex 2 mg IV b i d  Received 1 dose Zaroxolyn 7/31/21  Renal function although elevated from his baseline has been relatively stable with creatinine 3 06 today   Continue I&O, standing weights, sodium restriction, fluid restriction    Overall continues to diurese well with decreasing weight  Maintaining IV Bumex today   Medically stable for discharge to rehab when bed available   He will be discharged on torsemide 30 mg daily and 20 mg p r n   Per Cardiology recommendations

## 2021-08-03 NOTE — PHYSICAL THERAPY NOTE
PHYSICAL THERAPY TREATMENT     Patient Name: Alexandria Peralta  BOMMQ'S Date: 8/3/2021     08/03/21 1230   PT Last Visit   PT Visit Date 21   Note Type   Note Type Treatment   Pain Assessment   Pain Assessment Tool Pain Assessment not indicated - pt denies pain   Pain Score No Pain   Restrictions/Precautions   Weight Bearing Precautions Per Order No   Other Precautions Chair Alarm; Bed Alarm; Fall Risk;Hard of hearing   General   Chart Reviewed Yes   Response to Previous Treatment Patient with no complaints from previous session  Family/Caregiver Present No   Cognition   Overall Cognitive Status WFL   Arousal/Participation Cooperative   Attention Within functional limits   Comments Pt identified by name and   Subjective   Subjective Agrees to PT treatment, however reports significant weakness that started yesterday  Bed Mobility   Supine to Sit Unable to assess  (OOB in chair pre/post session with alarm intact)   Transfers   Sit to Stand 4  Minimal assistance   Additional items Assist x 1; Armrests; Increased time required;Verbal cues   Stand to Sit 4  Minimal assistance   Additional items Assist x 1; Armrests; Increased time required;Verbal cues   Additional Comments initially blocked L knee as pt reports knees buckling yesterday, however no buckling noted this session   Ambulation/Elevation   Gait pattern Improper Weight shift; Forward Flexion;Decreased foot clearance; Short stride; Excessively slow   Gait Assistance 4  Minimal assist   Additional items Assist x 1;Verbal cues   Assistive Device Rolling walker   Distance 3` x2 on and off commode; pt declines further ambulation due to weakness and fear    Balance   Static Sitting Fair +   Static Standing Fair -   Dynamic Standing Poor +   Ambulatory Poor +  (with RW)   Endurance Deficit   Endurance Deficit Yes   Endurance Deficit Description limited ambulation distance, fatigue   Activity Tolerance   Activity Tolerance Patient limited by fatigue Nurse Made Aware Per RN, pt appropriate to treat   Exercises   Hip Abduction Sitting;15 reps;AROM; Bilateral  (x2)   Hip Adduction Sitting;15 reps;AROM; Bilateral  (x2)   Knee AROM Long Arc Quad Sitting;15 reps;AROM; Bilateral  (x2)   Ankle Pumps Sitting;15 reps;AROM; Bilateral  (x2)   Assessment   Prognosis Fair   Problem List Decreased strength;Decreased range of motion;Decreased endurance; Impaired balance;Decreased mobility; Impaired hearing; Impaired judgement; Impaired vision;Decreased skin integrity   Assessment Pt agrees to PT treatment and is pleasant/cooperative throughout session  However, pt reports increased weakness compared to last session and requiring increased assist for mobility yesterday  Requires increased assist for sit to stand transfer and decreased ambulation tolerance  No buckling noted during session, however x1 LOB posteriorly  Will continue to benefit from ongoing skilled PT to maximize his functional mobility and increase his level of independence  Recommending rehab at time of discharge when medically appropriate  Goals   Patient Goals to get stronger   STG Expiration Date 08/07/21   PT Treatment Day 3   Plan   Treatment/Interventions Functional transfer training;LE strengthening/ROM; Therapeutic exercise; Endurance training;Patient/family training;Equipment eval/education; Bed mobility;Gait training   Progress No functional improvements   PT Frequency   (3-5x/week)   Recommendation   PT Discharge Recommendation Post acute rehabilitation services   AM-PAC Basic Mobility Inpatient   Turning in Bed Without Bedrails 2   Lying on Back to Sitting on Edge of Flat Bed 2   Moving Bed to Chair 3   Standing Up From Chair 3   Walk in Room 3   Climb 3-5 Stairs 2   Basic Mobility Inpatient Raw Score 15   Basic Mobility Standardized Score 36 97   The patient's AM-PAC Basic Mobility Inpatient Short Form Raw Score is 16, Standardized Score is 38 32  A standardized score less than 42 9 suggests the patient may benefit from discharge to post-acute rehabilitation services  Please also refer to the recommendation of the Physical Therapist for safe discharge planning      Manuel Barraza PT,DPT

## 2021-08-03 NOTE — CASE MANAGEMENT
CM was notified that patient is medically cleared for discharge and authorization was obtained for patient to discharge to [facility]  Patient requires [address] transportation  CM sent transportation request to [facility] via [address]  Per nursing and PT note, patient transfers and ambulates with RW, is not on O2 and is alert and oriented  Awaiting response on transport time  Patient needs a Covid swab prior to admission to [facility]

## 2021-08-03 NOTE — NURSING NOTE
Attempted to call report x3 to [facility]  Unsuccessful  Transport set up for 1700  Transport here now for patient  Left RN call back number on discharge papers for facility

## 2021-08-03 NOTE — TELEPHONE ENCOUNTER
Ángel Turpin calling from [facility] regarding new admission orders     Paged Dr Lizette Man

## 2021-08-03 NOTE — DISCHARGE SUMMARY
[facility]  Discharge- Mak Kendrick 6/16/1927, 80 y o  male MRN: [id number]  Unit/Bed#: S -01 Encounter: [id number]  Primary Care Provider: VARSHA Bills MD   Date and time admitted to hospital: 7/26/2021 10:54 PM    No new Assessment & Plan notes have been filed under this hospital service since the last note was generated  Service: Hospitalist      Discharging Physician / Practitioner: Fang Urbano MD  PCP: Gay Harrington MD  Admission Date:   Admission Orders (From admission, onward)     Ordered        07/27/21 0229  Inpatient Admission  Once                   Discharge Date: 08/03/21    Medical Problems     Resolved Problems  Date Reviewed: 8/3/2021        Resolved    Hypokalemia 7/29/2021     Resolved by  Harriett Man MD    Hyponatremia 7/31/2021     Resolved by  Harriett Man MD                Consultations During Hospital Stay:  · Cardiology    Procedures Performed:   · none    Significant Findings / Test Results:   · none    Incidental Findings:   · none     Test Results Pending at Discharge (will require follow up):   · none     Outpatient Tests Requested:  · none    Complications:  none    Reason for Admission:  Acute congestive heart failure    Hospital Course:     Mak Kendrick is a 80 y o  male patient who originally presented to the hospital on 7/26/2021 due to acute congestive heart failure  Was treated with IV Bumex with significant improvement  Also noted to have SUDHIR secondary cardiorenal with improvement  Was transition to oral torsemide 30 mg daily  Will have torsemide 20 mg p r n  For weight gain  Was evaluated by PT/OT recommended rehab      Please see above list of diagnoses and related plan for additional information       Condition at Discharge: stable     Discharge Day Visit / Exam:     * Please refer to separate progress note for these details *    Discussion with Family: pt    Discharge instructions/Information to patient and family:   See after visit summary for information provided to patient and family  Provisions for Follow-Up Care:  See after visit summary for information related to follow-up care and any pertinent home health orders  Disposition:     Home    For Discharges to [facility] SNF:   · Not Applicable to this Patient - Not Applicable to this Patient    Planned Readmission: none     Discharge Statement:  I spent 60 minutes discharging the patient  This time was spent on the day of discharge  I had direct contact with the patient on the day of discharge  Greater than 50% of the total time was spent examining patient, answering all patient questions, arranging and discussing plan of care with patient as well as directly providing post-discharge instructions  Additional time then spent on discharge activities  Discharge Medications:  See after visit summary for reconciled discharge medications provided to patient and family        ** Please Note: This note has been constructed using a voice recognition system **

## 2021-08-03 NOTE — PLAN OF CARE
Problem: PHYSICAL THERAPY ADULT  Goal: Performs mobility at highest level of function for planned discharge setting  See evaluation for individualized goals  Description: Treatment/Interventions: Functional transfer training, LE strengthening/ROM, Therapeutic exercise, Endurance training, Patient/family training, Equipment eval/education, Bed mobility, Gait training          See flowsheet documentation for full assessment, interventions and recommendations  Outcome: Not Progressing  Note: Prognosis: Fair  Problem List: Decreased strength, Decreased range of motion, Decreased endurance, Impaired balance, Decreased mobility, Impaired hearing, Impaired judgement, Impaired vision, Decreased skin integrity  Assessment: Pt agrees to PT treatment and is pleasant/cooperative throughout session  However, pt reports increased weakness compared to last session and requiring increased assist for mobility yesterday  Requires increased assist for sit to stand transfer and decreased ambulation tolerance  No buckling noted during session, however x1 LOB posteriorly  Will continue to benefit from ongoing skilled PT to maximize his functional mobility and increase his level of independence  Recommending rehab at time of discharge when medically appropriate  PT Discharge Recommendation: Post acute rehabilitation services          See flowsheet documentation for full assessment

## 2021-08-03 NOTE — CASE MANAGEMENT
Pt for d/c to [facility] today, to be transported by [employer] at [facility] via wheelchair [place]  CM made Pt and daughter aware of their financial responsibility for the transport  CM completed facility transfer form  CM notified Pt, Pt's daughter Rika Blake RN and facility Jonathan Goldman of d/c arrangements  CM presented, reviewed and obtained Pt's signature to [facility] letter

## 2021-08-03 NOTE — PROGRESS NOTES
[facility] PROGRESS NOTE   Eliuly Kendrick 80 y o  male MRN: [id number]  Unit/Bed#: S -01 Encounter: [id number]  Reason for Consult:     Summary:  Leticia Long a 80 y  o  male with a history of CKD stage 4, atrial fibrillation, cardiomyopathy, colon cancer status post left hemicolectomy, biventricular pacemaker, who was admitted to [facility] after presenting with increasing edema and 5 lb weight gain   He also reported left tibial leg wound with some surrounding small blisters  Wale Revering is also noted a decline in functional status despite working with physical therapy   He had a prolonged hospitalization earlier this year in March for 3 weeks due to acute CHF, cellulitis left leg  Creatinine 3 5 on admission prompting nephrology consult      ASSESSMENT and PLAN:  1  Acute kidney injury (POA) on CKD:  · Creatinine 3 64 on admission  · Urinalysis:  [place]  · Etiology:    ? Volume overload/decreased effective circulating volume/ cardiorenal   Poor renal reserve  · Volume overload:  Diuretics per Cardiology  ? Some degree of diuretic resistance noted  ? Being discharged on torsemide 30 mg daily with as needed extra dose of torsemide 20 mg for weight gain  · Creatinine plateauing in the low threes which is likely baseline  · Plan/recommendations:  ? Outpatient follow-up  ? Message sent to the office for follow-up and blood work  2  Chronic kidney disease, stage IV:  · Outpatient nephrologist:  Dr Cosmo Mariscal  · Most recent baseline creatinine 1 9-2 4   Previously 1 6-1 9 mg/dL  · Last urine protein creatinine ratio 0 2 g on 07/12/2021  · Etiology:  Hypertensive nephrosclerosis, age-related nephron loss plus cardiorenal syndrome   Prior episodes of acute kidney injury contributing/poor renal reserve  3  Acute on chronic CHF:    · Chest x-ray on admission:  Stable cardiomegaly   Lungs clear  · Baseline weight usually near 161 lb    Current weight 159 lb  · Outpatient diuretic regime:  torsemide dose 20 mg once a day/twice a day and metolazone once a week  · Being discharged on torsemide 30 mg daily with extra dose of 20 mg if needed for weight gain  · Echocardiogram March 2021:  Ejection fraction 25%, severe diffuse hypokinesis with distinct regional wall motion abnormalities   Wall thickness mildly increased, moderate MR   Moderate to severe TR  Dilated RV    · Biventricular pacemaker in place  · Fluid restriction  · Low-salt diet  4  Electrolytes:  · Hypokalemia:    ? Oral replacement  ? Continue spirolactone  · Hyponatremia   Resolved with diuresis likely volume mediated  5  Blood pressure:  · Acceptable  6  CKD MBD/secondary hyperparathyroidism of renal origin:    · Most recent  6     · Outpatient management   Monitoring vitamin-D levels, phosphorus and calcium  :    · Last phosphorus level 4 5 which is within goal for CKD stage 4    7  Atrial fibrillation:  On Eliquis  8  BPH on finasteride       DISPOSITION:  Stable for discharge  Follow-up request sent to the office arrangements made for follow-up blood work  Spoke with patient and daughter regarding discharge plan    SUBJECTIVE / 24H INTERVAL HISTORY:  No acute complaints  No unusual shortness of breath  No chest pain  No nausea vomiting diarrhea  Appetite intact  OBJECTIVE:  Current Weight: Weight - Scale: 72 3 kg (159 lb 6 4 oz)  Vitals:    08/03/21 0700 08/03/21 0838 08/03/21 0924 08/03/21 1027   BP: 92/54 96/58 102/51 113/57   BP Location: Right arm Left arm Right arm Right arm   Pulse: 76  72 72   Resp: 18      Temp: 98 4 °F (36 9 °C)      TempSrc: Oral      SpO2: 95%      Weight:           Intake/Output Summary (Last 24 hours) at 8/3/2021 1515  Last data filed at 8/3/2021 1300  Gross per 24 hour   Intake 960 ml   Output 400 ml   Net 560 ml     General: NAD, comfortably sitting in the chair  Skin: no rash, warm and dry  Eyes: anicteric sclera, no redness or drainage  ENT: moist mucous membrane  Oropharynx clear  Neck: supple    No JVD  Chest: Chest clear bilaterally slightly decreased at the bases  Normal effort at rest  CVS: s1s2, no murmur, no gallop, no rub  Normal rate, regular rhythm  Abdomen: soft, nontender, nl sounds  No distension  Extremities:  +1 edema LE b/l    Left calf wrapped with Kerlix  : no crockett  Neuro: AAOX3  Psych: normal affect  Medications:    Current Facility-Administered Medications:     apixaban (ELIQUIS) tablet 2 5 mg, 2 5 mg, Oral, BID, Priyanka Shayan, CRNP, 2 5 mg at 08/03/21 0830    bumetanide (BUMEX) injection 1 mg, 1 mg, Intravenous, Once, Eddi Velasquez MD    bumetanide (BUMEX) injection 2 mg, 2 mg, Intravenous, BID, Nancy Noe MD, 2 mg at 08/03/21 1041    finasteride (PROSCAR) tablet 5 mg, 5 mg, Oral, Daily, Priyanka Shayan, CRNP, 5 mg at 08/03/21 9747    hydrOXYzine HCL (ATARAX) tablet 25 mg, 25 mg, Oral, Q6H PRN, Priyanka Shayan, CRNP, 25 mg at 08/02/21 1003    multivitamin-minerals (CENTRUM) tablet 1 tablet, 1 tablet, Oral, Daily, Priyanka Shayan, CRNP, 1 tablet at 08/01/21 0835    pantoprazole (PROTONIX) EC tablet 40 mg, 40 mg, Oral, Daily, Priyanka Shayan, CRNP, 40 mg at 08/03/21 5335    senna-docusate sodium (SENOKOT S) 8 6-50 mg per tablet 1 tablet, 1 tablet, Oral, BID, Nancy Noe MD, 1 tablet at 07/27/21 1825    spironolactone (ALDACTONE) tablet 12 5 mg, 12 5 mg, Oral, Daily, Nena Wang MD, 12 5 mg at 08/03/21 1041    Laboratory Results:  Results from last 7 days   Lab Units 08/03/21  0546 08/02/21  0529 08/01/21  0430 07/30/21  0455 07/29/21  0621 07/28/21  0603   WBC Thousand/uL  --  7 08  --   --   --   --    HEMOGLOBIN g/dL  --  11 5*  --   --  12 1  --    HEMATOCRIT %  --  35 1*  --   --  37 3  --    PLATELETS Thousands/uL  --  155  --   --   --   --    POTASSIUM mmol/L 2 9* 3 3* 3 7 4 2 4 1 3 0*   CHLORIDE mmol/L 92* 93* 95* 96* 97* 95*   CO2 mmol/L 32 32 34* 29 30 32   BUN mg/dL 85* 86* 87* 93* 94* 96*   CREATININE mg/dL 3 11* 3 06* 3 19* 3 21* 3 42* 3 36*   CALCIUM mg/dL 9 0 9 2 9 3 9 5 9 2 9 1   MAGNESIUM mg/dL  --   --   --  2 4 2 4 2 3

## 2021-08-03 NOTE — PLAN OF CARE
Problem: Nutrition/Hydration-ADULT  Goal: Nutrient/Hydration intake appropriate for improving, restoring or maintaining nutritional needs  Description: Monitor and assess patient's nutrition/hydration status for malnutrition  Collaborate with interdisciplinary team and initiate plan and interventions as ordered  Monitor patient's weight and dietary intake as ordered or per policy  Utilize nutrition screening tool and intervene as necessary  Determine patient's food preferences and provide high-protein, high-caloric foods as appropriate       INTERVENTIONS:  - Monitor oral intake, urinary output, labs, and treatment plans  - Assess nutrition and hydration status and recommend course of action  - Evaluate amount of meals eaten  - Assist patient with eating if necessary   - Allow adequate time for meals  - Recommend/ encourage appropriate diets, oral nutritional supplements, and vitamin/mineral supplements  - Order, calculate, and assess calorie counts as needed  - Recommend, monitor, and adjust tube feedings and TPN/PPN based on assessed needs  - Assess need for intravenous fluids  - Provide specific nutrition/hydration education as appropriate  - Include patient/family/caregiver in decisions related to nutrition  Outcome: Progressing     Problem: MOBILITY - ADULT  Goal: Maintain or return to baseline ADL function  Description: INTERVENTIONS:  -  Assess patient's ability to carry out ADLs; assess patient's baseline for ADL function and identify physical deficits which impact ability to perform ADLs (bathing, care of mouth/teeth, toileting, grooming, dressing, etc )  - Assess/evaluate cause of self-care deficits   - Assess range of motion  - Assess patient's mobility; develop plan if impaired  - Assess patient's need for assistive devices and provide as appropriate  - Encourage maximum independence but intervene and supervise when necessary  - Involve family in performance of ADLs  - Assess for home care needs following discharge   - Consider OT consult to assist with ADL evaluation and planning for discharge  - Provide patient education as appropriate  Outcome: Progressing  Goal: Maintains/Returns to pre admission functional level  Description: INTERVENTIONS:  - Perform BMAT or MOVE assessment daily    - Set and communicate daily mobility goal to care team and patient/family/caregiver  - Collaborate with rehabilitation services on mobility goals if consulted  - Perform Range of Motion 3 times a day  - Reposition patient every 4 hours    - Dangle patient 3 times a day  - Stand patient 3 times a day  - Ambulate patient 3 times a day  - Out of bed to chair 3 times a day   - Out of bed for meals 3 times a day  - Out of bed for toileting  - Record patient progress and toleration of activity level   Outcome: Progressing     Problem: Prexisting or High Potential for Compromised Skin Integrity  Goal: Skin integrity is maintained or improved  Description: INTERVENTIONS:  - Identify patients at risk for skin breakdown  - Assess and monitor skin integrity  - Assess and monitor nutrition and hydration status  - Monitor labs   - Assess for incontinence   - Turn and reposition patient  - Assist with mobility/ambulation  - Relieve pressure over bony prominences  - Avoid friction and shearing  - Provide appropriate hygiene as needed including keeping skin clean and dry  - Evaluate need for skin moisturizer/barrier cream  - Collaborate with interdisciplinary team   - Patient/family teaching  - Consider wound care consult   Outcome: Progressing     Problem: Potential for Falls  Goal: Patient will remain free of falls  Description: INTERVENTIONS:  - Educate patient/family on patient safety including physical limitations  - Instruct patient to call for assistance with activity   - Consult OT/PT to assist with strengthening/mobility   - Keep Call bell within reach  - Keep bed low and locked with side rails adjusted as appropriate  - Keep care items and personal belongings within reach  - Initiate and maintain comfort rounds  - Make Fall Risk Sign visible to staff  - Offer Toileting every 4 Hours, in advance of need  - Initiate/Maintain bed/chair alarm  - Apply yellow socks and bracelet for high fall risk patients  - Consider moving patient to room near nurses station  Outcome: Progressing     Problem: METABOLIC, FLUID AND ELECTROLYTES - ADULT  Goal: Electrolytes maintained within normal limits  Description: INTERVENTIONS:  - Monitor labs and assess patient for signs and symptoms of electrolyte imbalances  - Administer electrolyte replacement as ordered  - Monitor response to electrolyte replacements, including repeat lab results as appropriate  - Instruct patient on fluid and nutrition as appropriate  Outcome: Progressing  Goal: Fluid balance maintained  Description: INTERVENTIONS:  - Monitor labs   - Monitor I/O and WT  - Instruct patient on fluid and nutrition as appropriate  - Assess for signs & symptoms of volume excess or deficit  Outcome: Progressing

## 2021-08-03 NOTE — PROGRESS NOTES
[facility]  Progress Note - Shannon Cos 6/16/1927, 80 y o  male MRN: [id number]  Unit/Bed#: S -01 Encounter: [id number]  Primary Care Provider: H Kieth Gitelman, MD   Date and time admitted to hospital: 7/26/2021 10:54 PM    Elevated troponin  Assessment & Plan  · Likely non-MI troponin elevation in the setting of CHF exacerbation  · Troponin peak of 0 34 and remained flat  · Continue management of acute CHF    Anemia of chronic disease  Assessment & Plan  · Hemoglobin stable at 11 5 today    Acute kidney injury - Chronic kidney disease stage 3  Assessment & Plan  · Baseline creatinine approximately 1 9-2 3 - presented with creatinine elevated to 3 64  Creatinine today is 3 06  · Patient on IV Bumex 2 mg b i d     Likely needs higher creatinine baseline to maintain euvolemic state in the setting of cardiomyopathy with acute on chronic CHF, advanced age and overall poor prognosis  · Continue diuretics at current dose, monitor renal function closely  Nephrology following, appreciate input  · Elevated lactic acid likely secondary to poor perfusion  No concern for sepsis at this time  Serial checks discontinued  · Stable from Nephrology standpoint    Atrial fibrillation  Assessment & Plan  · S/p biventricular pacemaker  Heart rate controlled  · Previously on beta-blockade with Bisoprolol however this was discontinued secondary to borderline hypotensive states  · Continue Eliquis for anticoagulation    * 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  Assessment & Plan  · Presented with gradual worsening of lower extremity edema, exertional dyspnea, orthopnea  Torsemide recently decreased down to 20 mg daily  Attends cardiac rehab but states he had not been able to go the week preceding admission  · Echo 3/2021 EF 25%, moderate to severe TR  BNP 38,000  Prior 18,000  Troponin 0 31   · Home regimen: torsemide 20 mg QD, zaroxolyn 2 5 mg weekly    Per recent note, not taking bisoprolol due to low blood pressures  · Remains on Bumex 2 mg IV b i d  Received 1 dose Zaroxolyn 21  Renal function although elevated from his baseline has been relatively stable with creatinine 3 06 today   Continue I&O, standing weights, sodium restriction, fluid restriction    Overall continues to diurese well with decreasing weight  Maintaining IV Bumex today   Medically stable for discharge to rehab when bed available   He will be discharged on torsemide 30 mg daily and 20 mg p r n  Per Cardiology recommendations        VTE Pharmacologic Prophylaxis:   Pharmacologic: Apixaban (Eliquis)  Mechanical VTE Prophylaxis in Place: Yes    Patient Centered Rounds: I have performed bedside rounds with nursing staff today  Discussions with Specialists or Other Care Team Provider: cm, nursing    Education and Discussions with Family / Patient: pt    Time Spent for Care: 30 minutes  More than 50% of total time spent on counseling and coordination of care as described above  Current Length of Stay: 7 day(s)    Current Patient Status: Inpatient   Certification Statement: The patient will continue to require additional inpatient hospital stay due to Medically stable for discharge awaiting placement    Discharge Plan:  Awaiting placement    Code Status: Level 1 - Full Code      Subjective:   No acute complaints    Objective:     Vitals:   Temp (24hrs), Av 2 °F (36 8 °C), Min:98 °F (36 7 °C), Max:98 4 °F (36 9 °C)    Temp:  [98 °F (36 7 °C)-98 4 °F (36 9 °C)] 98 4 °F (36 9 °C)  HR:  [70-77] 72  Resp:  [16-18] 18  BP: ()/(51-60) 113/57  SpO2:  [95 %-98 %] 95 %  Body mass index is 23 54 kg/m²  Input and Output Summary (last 24 hours):        Intake/Output Summary (Last 24 hours) at 8/3/2021 1130  Last data filed at 8/3/2021 0950  Gross per 24 hour   Intake 960 ml   Output 400 ml   Net 560 ml       Physical Exam:     Physical Exam  Constitutional:       General: He is not in acute distress  Appearance: He is well-developed  He is not diaphoretic  HENT:      Head: Normocephalic and atraumatic  Nose: Nose normal       Mouth/Throat:      Pharynx: No oropharyngeal exudate  Eyes:      General: No scleral icterus  Conjunctiva/sclera: Conjunctivae normal    Cardiovascular:      Rate and Rhythm: Normal rate and regular rhythm  Heart sounds: Normal heart sounds  No murmur heard  No friction rub  No gallop  Pulmonary:      Effort: Pulmonary effort is normal  No respiratory distress  Breath sounds: Normal breath sounds  No wheezing or rales  Chest:      Chest wall: No tenderness  Abdominal:      General: Bowel sounds are normal  There is no distension  Palpations: Abdomen is soft  Tenderness: There is no abdominal tenderness  There is no guarding  Musculoskeletal:         General: No tenderness or deformity  Normal range of motion  Cervical back: Normal range of motion and neck supple  Skin:     General: Skin is warm and dry  Findings: No erythema  Neurological:      Mental Status: He is alert  Mental status is at baseline  Additional Data:     Labs:    Results from last 7 days   Lab Units 08/02/21  0529   WBC Thousand/uL 7 08   HEMOGLOBIN g/dL 11 5*   HEMATOCRIT % 35 1*   PLATELETS Thousands/uL 155     Results from last 7 days   Lab Units 08/03/21  0546   SODIUM mmol/L 136   POTASSIUM mmol/L 2 9*   CHLORIDE mmol/L 92*   CO2 mmol/L 32   BUN mg/dL 85*   CREATININE mg/dL 3 11*   ANION GAP mmol/L 12   CALCIUM mg/dL 9 0   GLUCOSE RANDOM mg/dL 122                 Results from last 7 days   Lab Units 07/29/21  1153 07/29/21  0621 07/28/21  1034 07/28/21  0012 07/27/21  2206   LACTIC ACID mmol/L 2 8* 2 1*  --  2 6* 3 6*   PROCALCITONIN ng/ml  --   --  0 07  --   --            * I Have Reviewed All Lab Data Listed Above  * Additional Pertinent Lab Tests Reviewed:  All Labs Within Last 24 Hours Reviewed    Imaging:    Imaging Reports Reviewed Today Include: na  Imaging Personally Reviewed by Myself Includes:  na    Recent Cultures (last 7 days):           Last 24 Hours Medication List:   Current Facility-Administered Medications   Medication Dose Route Frequency Provider Last Rate    apixaban  2 5 mg Oral BID IAIN Gunderson      bumetanide  1 mg Intravenous Once Art Means MD      bumetanide  2 mg Intravenous BID Idania Tong MD      finasteride  5 mg Oral Daily IAIN Gunderson      hydrOXYzine HCL  25 mg Oral Q6H PRN IAIN Gunderson      multivitamin-minerals  1 tablet Oral Daily IAIN Gunderson      pantoprazole  40 mg Oral Daily Kathy Gunderson      senna-docusate sodium  1 tablet Oral BID Idania Tong MD      spironolactone  12 5 mg Oral Daily Obie Roberts MD          Today, Patient Was Seen By: Oc Gomez MD    ** Please Note: Dictation voice to text software may have been used in the creation of this document   **

## 2021-08-03 NOTE — CASE MANAGEMENT
CM received a call from the Pt's insurance carrier [facility] [phone], providing auth# [id number] for the Pt's admission to [facility] today  CM made [facility] aware of the obtained auth, COVID test in progress and pending transportation arrangements  Gale Padron confirmed she also received notification from [facility] on the [address]

## 2021-08-04 NOTE — ASSESSMENT & PLAN NOTE
· +2 non pitting edema BLLE  · BLLE blistering  · Left lower extremity with weeping, continue with wound care  · Elevate legs as much as possible  · Torsemide

## 2021-08-04 NOTE — ASSESSMENT & PLAN NOTE
· +2 non pitting  · Left lower leg weeping  · Cleanse daily with NSS, apply adaptic to weeping areas, cover with dry gauze, wrap with stretch guaze, do not tape skin  · Monitor for s/s infection

## 2021-08-04 NOTE — UTILIZATION REVIEW
Notification of Discharge   This is a Notification of Discharge from our facility [address]  Please be advised that this patient has been discharge from our facility  Below you will find the admission and discharge date and time including the patients disposition  UTILIZATION REVIEW CONTACT:  Darlean Lefort  Utilization   Network Utilization Review Department  Phone: [phone] x carefully listen to the prompts  All voicemails are confidential   Email: [email or web address]     PHYSICIAN ADVISORY SERVICES:  FOR [id number] REVIEW - MEDICAL NECESSITY DENIAL  Phone: [phone]  Fax: [phone]  Email: [email or web address]     PRESENTATION DATE: 7/26/2021 10:54 PM  OBERVATION ADMISSION DATE:   INPATIENT ADMISSION DATE: 7/27/21  2:28 AM   DISCHARGE DATE: 8/3/2021  5:22 PM  DISPOSITION: Non [facility]/S[facility] Non [facility] SN[facility]/SNU      IMPORTANT INFORMATION:  Send all requests for admission clinical reviews, approved or denied determinations and any other requests to dedicated fax number below belonging to the campus where the patient is receiving treatment   List of dedicated fax numbers:  [address] [place] [address] DENIALS (Administrative/Medical Necessity) [phone]   [address] [place] [address] (Maternity/NICU/Pediatrics) [phone]   Deepali George [phone]   [facility] [phone]   [facility] [phone]   [facility] [address] [facility] [phone]   [facility]  [phone]   [address] [place] [address], [facility]  [address] [facility] [address] [facility] [phone]

## 2021-08-04 NOTE — ASSESSMENT & PLAN NOTE
· Labs are improving  · Continue to avoid nephrotoxins as much as possible  · monitor BMP periodically for renal function

## 2021-08-04 NOTE — PROGRESS NOTES
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  Assessment & Plan:  · +2 non pitting edema BLLE  · BLLE blistering  · Left lower extremity with weeping, continue with wound care  · Elevate legs as much as possible  · Torsemide         Orders:  -     torsemide (DEMADEX) 10 mg tablet; Take 3 tablets (30 mg total) by mouth daily    4  Bilateral lower extremity edema  Assessment & Plan:  · +2 non pitting  · Left lower leg weeping  · Cleanse daily with NSS, apply adaptic to weeping areas, cover with dry gauze, wrap with stretch guaze, do not tape skin  · Monitor for s/s infection        No orders of the defined types were placed in this encounter  History of Present Illness     Lucero Ayon 80 y o  male seen for follow-up of care and treatment plan for ambulatory dysfunction doing well at STR, afib controlled with eliquis, SUDHIR on CDK labs improving, CHF exacerbation improving, BLLE edema with left side weeping healing    The following portions of the patient's history were reviewed and updated as appropriate: allergies, current medications, past family history, past medical history, past social history, past surgical history and problem list     Review of Systems     Review of Systems   Constitutional: Negative for chills and fever  HENT: Negative for ear pain and sore throat  Eyes: Negative for pain and visual disturbance     Respiratory: Negative for cough, shortness of breath and wheezing  Cardiovascular: Positive for leg swelling  Negative for chest pain and palpitations  Gastrointestinal: Negative for abdominal pain and vomiting  Genitourinary: Negative for dysuria and hematuria  Musculoskeletal: Positive for gait problem  Negative for arthralgias and back pain  Skin: Positive for wound (left lower extremity weeping)  Negative for color change and rash  Neurological: Positive for weakness  Negative for seizures and syncope  All other systems reviewed and are negative  Active Problem List     Patient Active Problem List   Diagnosis    Dehydration    Hypertensive kidney disease with chronic kidney disease stage III (HCC)    Atrial fibrillation    Cardiomyopathy ([facility] )    BPH (benign prostatic hyperplasia)    Biventricular cardiac pacemaker in situ    Anticoagulation adequate    Malignant neoplasm of colon ([facility] )    [facility] splenic flexure ([facility])    Gastroesophageal reflux disease with esophagitis    Bilateral lower extremity edema    Cellulitis of left leg    Acute kidney injury - Chronic kidney disease stage 3    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    Hyperphosphatemia    Hypercalcemia    Alkalosis    Ventricular tachycardia (HCC)    Stage 4 chronic kidney disease ([facility] )    Secondary hyperparathyroidism of renal origin ([facility])    Anemia of chronic disease    Elevated troponin       Objective     /53   Pulse 74   Temp 97 8 °F (36 6 °C)   Resp 18   Wt 73 4 kg (161 lb 12 8 oz)   BMI 23 89 kg/m²     Physical Exam  Vitals reviewed  Constitutional:       Appearance: He is well-developed  HENT:      Head: Normocephalic and atraumatic  Eyes:      Conjunctiva/sclera: Conjunctivae normal    Cardiovascular:      Rate and Rhythm: Normal rate and regular rhythm  Heart sounds: Normal heart sounds, S1 normal and S2 normal  No murmur heard  Pulmonary:      Effort: Pulmonary effort is normal  No respiratory distress  Breath sounds: Normal breath sounds  No wheezing  Abdominal:      General: Bowel sounds are normal  There is no distension  Palpations: Abdomen is soft  Tenderness: There is no abdominal tenderness  Musculoskeletal:         General: Normal range of motion  Cervical back: Normal range of motion  Right lower leg: Edema present  Left lower leg: Edema present  Skin:     General: Skin is warm and dry  Neurological:      Mental Status: He is alert  Psychiatric:         Attention and Perception: Attention normal          Mood and Affect: Mood normal          Speech: Speech normal          Behavior: Behavior normal          Thought Content: Thought content normal          Pertinent Laboratory/Diagnostic Studies:  HFM Labs Reviewed    Current Medications   Medication list reviewed and updated today  Please see paper chart for updated medication list      Federica Jacksondain  :10 PM      Name: Jd Garduno  : 1927  MRN: [id number]  DOS: 2021    Diagnoses:   Diagnosis ICD-10-CM Associated Orders   1  Atrial fibrillation  I48 21    2  Acute kidney injury - Chronic kidney disease stage 3  N17 9     N18 9    3  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

## 2021-08-05 PROBLEM — N18.9 ACUTE KIDNEY INJURY SUPERIMPOSED ON CHRONIC KIDNEY DISEASE (HCC): Status: ACTIVE | Noted: 2021-01-01

## 2021-08-05 PROBLEM — N17.9 ACUTE KIDNEY INJURY SUPERIMPOSED ON CHRONIC KIDNEY DISEASE (HCC): Status: RESOLVED | Noted: 2021-01-01 | Resolved: 2021-01-01

## 2021-08-05 PROBLEM — Z71.89 GOALS OF CARE, COUNSELING/DISCUSSION: Status: ACTIVE | Noted: 2021-01-01

## 2021-08-05 PROBLEM — L03.116 CELLULITIS OF LEFT LEG: Status: RESOLVED | Noted: 2021-01-01 | Resolved: 2021-01-01

## 2021-08-05 PROBLEM — N18.9 ACUTE KIDNEY INJURY SUPERIMPOSED ON CHRONIC KIDNEY DISEASE (HCC): Status: RESOLVED | Noted: 2021-01-01 | Resolved: 2021-01-01

## 2021-08-05 PROBLEM — N17.9 ACUTE KIDNEY INJURY SUPERIMPOSED ON CHRONIC KIDNEY DISEASE (HCC): Status: ACTIVE | Noted: 2021-01-01

## 2021-08-05 PROBLEM — E86.0 DEHYDRATION: Status: RESOLVED | Noted: 2017-02-08 | Resolved: 2021-01-01

## 2021-08-05 PROBLEM — R77.8 ELEVATED TROPONIN: Status: RESOLVED | Noted: 2021-01-01 | Resolved: 2021-01-01

## 2021-08-05 NOTE — TELEPHONE ENCOUNTER
----- Message from Thang Boy Nancy sent at 8/3/2021  3:06 PM EDT -----  Labs are in system,   Thank you   ----- Message -----  From: IAIN Melo  Sent: 8/3/2021   2:58 PM EDT  To: [facility] [place] [facility]    Patient discharged from [address] [place] [address] on 08/03/2021  Will need follow-up appointment with Dr Kellen Beltran for management of CKD  Please have patient go for follow-up labs 3-5 days after discharge

## 2021-08-05 NOTE — PROGRESS NOTES
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  Assessment & Plan:  · He has improved after treatment during his acute care hospitalization  · Cardiology recommended torsemide 30 mg daily with as needed additional 20 mg for weight gain   · Will continue his care in collaboration with the cardiology service   · Will consult a multi-disciplinary therapy team for evaluation and treatment to assist him in attaining his highest level of functioning  · He wishes for his 30 mg of torsemide to be given in separate doses of 20 mg and 10 mg later in the day, because he does not feel with well with 30 mg at 1 time  · Will continue with clinical and periodic laboratory monitoring for change in his condition  · Will continue his care in collaboration with the cardiology service   · He will follow up with his Cardiology Service and PCP upon discharge            2  Acute kidney injury superimposed on chronic kidney disease ([facility] [address] )  Assessment & Plan:  · He will be admitted to the rehabilitation facility on the acute kidney injury pathway  · Will continue with adequate hydration and avoidance of hypotension   · Will continue with avoidance of nephrotoxic medications/supplements   · Will continue his care and collaboration with the nephrology service   · Will continue with clinical and periodic laboratory monitoring for change in his condition  · He will follow up with his nephrology service and PCP upon discharge      3   Atrial fibrillation  Assessment & Plan:  · Status post AV hiram ablation and biventricular pacemaker placement   · He does not require rate control   · He continues on apixaban for anticoagulation  · Will continue his care in collaboration with his cardiology service   · Will continue to monitor for change in his condition  · He will follow up with Cardiology Service and PCP upon discharge      4  Hypokalemia  Assessment & Plan:  · Secondary to his diuretic therapy  · Will initiate potassium chloride 10 mEq daily  · Will continue with periodic laboratory monitoring for change in his condition   · He will follow up with his PCP upon discharge      5  Anemia, unspecified type  Assessment & Plan:  · His laboratory values remain stable   · Will continue with clinical and periodic laboratory monitoring for change in his condition   · He will follow up with his PCP upon discharge      6  Biventricular cardiac pacemaker in situ  Assessment & Plan:  · He is following with his cardiology service for care  · We will continue to monitor for change in his condition  · He will follow-up with cardiology service upon discharge      7  Goals of care, counseling/discussion  Assessment & Plan:  · As discussed with him during my visit on August 5, 2021, he wishes for his resuscitation status to be "do not resuscitate"        All medications and routine orders were reviewed and updated as needed  Plan discussed with:  Patient and nursing staff  CC: PCP    Chief Complaint     He is seen for a visit to perform a history and physical exam to be admitted to the rehabilitation facility  History of Present Illness     He is a pleasant 80year-old gentleman with the comorbidities of 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 heart failure, acute kidney injury superimposed on stage 4 chronic kidney disease, permanent atrial fibrillation, hypokalemia, anemia of chronic disease, biventricular cardiac pacemaker, and colon cancer status post left hemicolectomy who is seen for a visit to perform a history and physical exam to be admitted to the rehabilitation facility    He presented to the emergency room on July 26, 2021 with complaint of increasing leg edema, fatigue, and shortness of breath on exertion not responding to oral diuretic therapy  He [facility] from July 26, 2021 through August 3, 2021  He was seen in consultation by the Cardiology, Nephrology, and palliative and supportive care services  He was treated for acute on chronic combined systolic and diastolic congestive heart failure and acute kidney injury superimposed on stage 4 chronic kidney disease  He is felt to have cardiorenal syndrome  Initially, he was treated with intravenous diuretic therapy  Cardiology recommended outpatient therapy with torsemide 30 mg daily and an additional 20 mg for weight gain  Nephrology noted that his creatinine has plateaued at a higher baseline  He continues with bilateral lower extremity edema  He reports having good days and bad days depending on how he reacts to his diuretic therapy  Prior to hospitalization he was receiving metolazone once weekly  This was discontinued and he was discharged on spironolactone 12 5 mg daily  He notes that today is his son's birthday  Laboratory testing was performed earlier today  He endorses deciding on a resuscitation status of "do not resuscitate"  He reports that he and his family came to this decision  He was felt stable to be transferred to the rehabilitation facility on August 3, 2021      HISTORY:  Past Medical History:   Diagnosis Date    Acute kidney injury - Chronic kidney disease stage 3 3/9/2021    Anemia     Atrial fibrillation (HCC)     BPH (benign prostatic hypertrophy)     Cancer (HCC)     COLON    Chronic kidney disease     Elevated troponin 7/27/2021    Hypertension     Irregular heart beat     afib     Past Surgical History:   Procedure Laterality Date    BASAL CELL CARCINOMA EXCISION      CARDIAC PACEMAKER PLACEMENT      CARDIAC SURGERY      CARDIOVERSION      CARPAL TUNNEL RELEASE      CATARACT EXTRACTION      COLON SURGERY      COLONOSCOPY      NM COLONOSCOPY FLX DX W/COLLJ SPEC WHEN PFRMD N/A 11/30/2018    Procedure: COLONOSCOPY w egd  by dr Bonnie Forrester;  Surgeon: Scottie Vergara MD;  Location: [facility]; Service: Colorectal    DE LAP,SURG,COLECTOMY, PARTIAL, W/ANAST N/A 1/8/2019    Procedure: Left hemicolectomy, takedown splenic flexure, end to end transverse to sigmoid colon anastamosis; Surgeon: Scottie Vergara MD;  Location: [facility];  Service: Colorectal     Family History   Problem Relation Age of Onset    Heart disease Father     Heart attack Father     Hypertension Father     Heart disease Brother     Heart attack Brother 64    Hypertension Brother     Heart attack Brother 67    Hypertension Brother     Arrhythmia Neg Hx     Asthma Neg Hx     Clotting disorder Neg Hx     Heart failure Neg Hx      Social History     Socioeconomic History    Marital status:      Spouse name: Not on file    Number of children: Not on file    Years of education: Not on file    Highest education level: Not on file   Occupational History    Not on file   Tobacco Use    Smoking status: Never Smoker    Smokeless tobacco: Never Used   Vaping Use    Vaping Use: Never used   Substance and Sexual Activity    Alcohol use: Yes     Comment: occasional    Drug use: No    Sexual activity: Not on file   Other Topics Concern    Not on file   Social History Narrative    Not on file     Social Determinants of Health     Financial Resource Strain:     Difficulty of Paying Living Expenses:    Food Insecurity:     Worried About Running Out of Food in the Last Year:     [address] in the Last Year:    Transportation Needs:     Lack of Transportation (Medical):      Lack of Transportation (Non-Medical):    Physical Activity:     Days of Exercise per Week:     Minutes of Exercise per Session:    Stress:     Feeling of Stress :    Social Connections:     Frequency of Communication with Friends and Family:     Frequency of Social Gatherings with Friends and Family:     Attends [employer] Services:     Active Member of Clubs or Organizations:     Attends Club or Organization Meetings:     Marital Status:    Intimate Partner Violence:     Fear of Current or Ex-Partner:     Emotionally Abused:     Physically Abused:     Sexually Abused: Allergies: Allergies   Allergen Reactions    Lasix [Furosemide] Other (See Comments)     Weakness, decreased BP    Adhesive [Medical Tape] Rash    Neomycin-Bacitracin Zn-Polymyx Rash    Neosporin [Neomycin-Polymyxin-Gramicidin] Rash       Review of Systems     Review of Systems   Constitutional: Positive for fatigue  Negative for appetite change and fever  Has good days and bad days in terms of his energy level  HENT: Positive for hearing loss  Negative for dental problem and trouble swallowing  Reports full dentures  Notes some hearing loss, but does not have hearing aids  Eyes: Negative for visual disturbance  Respiratory: Negative for shortness of breath  Cardiovascular: Positive for leg swelling  Negative for chest pain and palpitations  Gastrointestinal: Negative for abdominal pain and constipation  Genitourinary: Negative for difficulty urinating  Nocturia   Musculoskeletal: Negative for arthralgias  Skin: Positive for rash  Neurological: Negative for headaches  Psychiatric/Behavioral: Positive for sleep disturbance  Negative for dysphoric mood  The patient is not nervous/anxious  Clinically with trouble sleeping secondary to need to urinate  Medications and orders     All medications reviewed and updated in Nursing Home EMR  Objective     Vitals:   Weight 160 lb, temperature 98 1° F, pulse 52, blood pressure 142/92, pulse oximetry 90% on room air  Physical Exam  Vitals reviewed  Constitutional:       General: He is awake  Appearance: He is well-developed  He is not toxic-appearing or diaphoretic        Comments: He appears comfortable sitting in an easy chair, younger than his stated age, and frail  HENT:      Mouth/Throat:      Mouth: Mucous membranes are moist    Eyes:      General: No scleral icterus  Extraocular Movements: Extraocular movements intact  Conjunctiva/sclera: Conjunctivae normal    Cardiovascular:      Rate and Rhythm: Normal rate  Rhythm irregular  Heart sounds: Normal heart sounds  No murmur heard  No friction rub  No gallop  Comments: There is moderate amount of edema to just above his knees, bilaterally  Pulmonary:      Effort: Pulmonary effort is normal  No respiratory distress  Breath sounds: Normal breath sounds  No stridor  No wheezing, rhonchi or rales  Abdominal:      General: Abdomen is flat  There is no distension  Palpations: Abdomen is soft  There is no mass  Tenderness: There is no abdominal tenderness  There is no guarding or rebound  Musculoskeletal:         General: No signs of injury  Cervical back: Neck supple  Right lower leg: Edema present  Left lower leg: Edema present  Skin:     Findings: Bruising present  Comments: Bilateral bruising on upper extremities  Neurological:      Mental Status: He is alert and oriented to person, place, and time  Psychiatric:         Mood and Affect: Mood normal          Speech: Speech is tangential          Behavior: Behavior normal  Behavior is cooperative  Cognition and Memory: Cognition normal          Pertinent Laboratory/Diagnostic Studies: The following labs/studies were reviewed please see chart or hospital paperwork for details      August 5, 2021:     BMP:  Sodium 135, potassium 3 1, BUN 90, creatinine 3 07, fasting blood sugar 116,    CBC with diff:  WBC count 8 8, hemoglobin 11 7, hematocrit 35 7, platelet count [id number]    Lab Results   Component Value Date    WBC 7 08 08/02/2021    HGB 11 5 (L) 08/02/2021    HCT 35 1 (L) 08/02/2021    MCV 93 08/02/2021     08/02/2021 Lab Results   Component Value Date    SODIUM 136 08/03/2021    K 2 9 (L) 08/03/2021    CL 92 (L) 08/03/2021    CO2 32 08/03/2021    BUN 85 (H) 08/03/2021    CREATININE 3 11 (H) 08/03/2021    GLUC 122 08/03/2021    CALCIUM 9 0 08/03/2021     Lab Results   Component Value Date    NMV6RBTJYNYP 2 930 10/06/2020    TSH 2 29 10/02/2018     July 27, 2021:     Portable chest x-ray:     FINDINGS:  Left-sided chest wall intracardiac device is identified  Leads are intact      Heart shadow is enlarged but unchanged from prior exam  Atherosclerotic calcifications noted       The lungs are clear  No pneumothorax or pleural effusion      Well-circumscribed sclerotic lesion associated with the anterior aspect left 7th rib is unchanged from prior chest CT from 2019 possibly a bone island or potentially an enchondroma  Osseous structures elsewhere are stable      IMPRESSION:     Stable cardiomegaly    March 10, 2021:     Transthoracic 2D echocardiogram:     SUMMARY     LEFT VENTRICLE:  The ventricle was mildly dilated  LVIDd 5 8 cm  Systolic function was markedly reduced by visual assessment  Ejection fraction was estimated to be 25 %  There was severe diffuse hypokinesis with distinct regional wall motion abnormalities  There was akinesis of the basal anteroseptal, basal inferior, basal inferolateral, and basal anterolateral wall(s)  Wall thickness was mildly increased  There was mild assymetrical hypertrophy of the septum      RIGHT VENTRICLE:  The ventricle was markedly dilated      RIGHT ATRIUM:  The atrium was markedly dilated      MITRAL VALVE:  There was moderate regurgitation      AORTIC VALVE:  The valve was trileaflet  Leaflets exhibited normal thickness, moderate to marked calcification, and normal cuspal separation  There was mild regurgitation  The valve was not well visualized      TRICUSPID VALVE:  There was moderate to severe regurgitation    Estimated peak PA pressure was at least 45- 50 mmHg, however this may be an underestimate given the severity of the TR seen on color Doppler investigation      PULMONIC VALVE:  There was mild regurgitation      IVC, HEPATIC VEINS:  The inferior vena cava was dilated  - His admission order summary was reviewed and signed  Treatment plan reviewed with patient and nursing staff      GEE Franz   8/5/2021 10:30 PM

## 2021-08-06 NOTE — ASSESSMENT & PLAN NOTE
· As discussed with him during my visit on August 5, 2021, he wishes for his resuscitation status to be "do not resuscitate"

## 2021-08-06 NOTE — ASSESSMENT & PLAN NOTE
· He will be admitted to the rehabilitation facility on the acute kidney injury pathway  · Will continue with adequate hydration and avoidance of hypotension   · Will continue with avoidance of nephrotoxic medications/supplements   · Will continue his care and collaboration with the nephrology service   · Will continue with clinical and periodic laboratory monitoring for change in his condition  · He will follow up with his nephrology service and PCP upon discharge

## 2021-08-06 NOTE — TELEPHONE ENCOUNTER
Called patient and left message to schedule hospital follow up with Dr Ney Slaughter in [facility]

## 2021-08-06 NOTE — ASSESSMENT & PLAN NOTE
· Status post AV hiram ablation and biventricular pacemaker placement   · He does not require rate control   · He continues on apixaban for anticoagulation  · Will continue his care in collaboration with his cardiology service   · Will continue to monitor for change in his condition  · He will follow up with Cardiology Service and PCP upon discharge Opt out

## 2021-08-06 NOTE — ASSESSMENT & PLAN NOTE
· Secondary to his diuretic therapy  · Will initiate potassium chloride 10 mEq daily  · Will continue with periodic laboratory monitoring for change in his condition   · He will follow up with his PCP upon discharge

## 2021-08-06 NOTE — ASSESSMENT & PLAN NOTE
· He has improved after treatment during his acute care hospitalization  · Cardiology recommended torsemide 30 mg daily with as needed additional 20 mg for weight gain   · Will continue his care in collaboration with the cardiology service   · Will consult a multi-disciplinary therapy team for evaluation and treatment to assist him in attaining his highest level of functioning  · He wishes for his 30 mg of torsemide to be given in separate doses of 20 mg and 10 mg later in the day, because he does not feel with well with 30 mg at 1 time  · Will continue with clinical and periodic laboratory monitoring for change in his condition  · Will continue his care in collaboration with the cardiology service   · He will follow up with his Cardiology Service and PCP upon discharge

## 2021-08-06 NOTE — ASSESSMENT & PLAN NOTE
· He is following with his cardiology service for care  · We will continue to monitor for change in his condition  · He will follow-up with cardiology service upon discharge

## 2021-08-06 NOTE — TELEPHONE ENCOUNTER
Patients daughter had called asking about what the fluid restrictions are for her father  I had left message that the restriction is 1500 CC a day of oral intake  I left our office number in case of further questions

## 2021-08-06 NOTE — ASSESSMENT & PLAN NOTE
· His laboratory values remain stable   · Will continue with clinical and periodic laboratory monitoring for change in his condition   · He will follow up with his PCP upon discharge

## 2021-08-08 NOTE — TELEPHONE ENCOUNTER
Dr Pérez Garcia Please call Natalia Awan at [facility] at [phone]  Regarding PT:  Delfina Sutherland   [phone] he has Blood in Urine

## 2021-08-10 PROBLEM — L89.301 PRESSURE INJURY OF BUTTOCK, STAGE 1: Status: ACTIVE | Noted: 2021-01-01

## 2021-08-10 PROBLEM — R26.2 AMBULATORY DYSFUNCTION: Status: ACTIVE | Noted: 2021-01-01

## 2021-08-10 NOTE — PATIENT INSTRUCTIONS
Orders Placed This Encounter   Procedures    Wound cleansing and dressings     Wound:  Sacrum  Discontinue previous wound order  Cleanse the sacrum and buttocks with soap and water, pat dry  Apply hydraguard to wound bed, sacrum and buttocks  Frequency : twice a day and prn for soiling  Offload all wounds  Turn and reposition frequently, maximum of every two hours  Instruct / Assist with weight shifting  when in chair  Increase protein intake  Monitor for any sign of infection or worsening, inform PCP or patient's primary physician in your facility       Standing Status:   Future     Standing Expiration Date:   8/10/2022

## 2021-08-10 NOTE — LETTER
Patient:  Delfina Sutherland   6/16/1927           IAIN Paulino saw Delfina Sutherland for a wound care visit on 8/10/2021  See below for information relating to this visit  Chief Complaint   Patient presents with    New Patient Visit     Buttocks        Assessment/Plan:  1  Pressure injury of buttock, stage 1, unspecified laterality  Assessment & Plan:  Bilateral buttock - red, non blanchable  hydraguard - local wound care  - offload  - Follow up in two weeks    Orders:  -     Wound cleansing and dressings; Future    2  Ambulatory dysfunction  -     Wound cleansing and dressings; Future           Orders:  Delfina Sutherland  6/16/1927  Orders Placed This Encounter   Procedures    Wound cleansing and dressings     Wound:  Sacrum  Discontinue previous wound order  Cleanse the sacrum and buttocks with soap and water, pat dry  Apply hydraguard to wound bed, sacrum and buttocks  Frequency : twice a day and prn for soiling  Offload all wounds  Turn and reposition frequently, maximum of every two hours  Instruct / Assist with weight shifting  when in chair  Increase protein intake  Monitor for any sign of infection or worsening, inform PCP or patient's primary physician in your facility  Standing Status:   Future     Standing Expiration Date:   8/10/2022         Follow Up:  Return in about 2 weeks (around 8/24/2021)  [address] hours are 8:00 am - 4:30 pm Monday through Friday  The center phone number is [phone]  You can also contact me directly thru my email at [facility]  [email or web address] or thru tiger text  If it is an emergency, please contact the PCP or patient's attending physician in your facility       Sincerely,    Electronically signed by IAIN Paulino    Patient : Delfina Sutherland    6/16/1927

## 2021-08-10 NOTE — PROGRESS NOTES
[address] [facility]   [facility]       Patient ID: Arpit Bishop is a 80 y o  male Date of Birth 6/16/1927     Location of Service: [facility]    Performed wound round with: Nurse supervisor      Chief Complaint   Patient presents with    New Patient Visit     Buttocks       Wound Instructions:  Orders Placed This Encounter   Procedures    Wound cleansing and dressings     Wound:  Sacrum  Discontinue previous wound order  Cleanse the sacrum and buttocks with soap and water, pat dry  Apply hydraguard to wound bed, sacrum and buttocks  Frequency : twice a day and prn for soiling  Offload all wounds  Turn and reposition frequently, maximum of every two hours  Instruct / Assist with weight shifting  when in chair  Increase protein intake  Monitor for any sign of infection or worsening, inform PCP or patient's primary physician in your facility  Standing Status:   Future     Standing Expiration Date:   8/10/2022       Allergies  Lasix [furosemide], Adhesive [medical tape], Neomycin-bacitracin zn-polymyx, and Neosporin [neomycin-polymyxin-gramicidin]      Assessment & Plan:  1  Pressure injury of buttock, stage 1, unspecified laterality  Assessment & Plan:  Bilateral buttock - red, non blanchable  hydraguard - local wound care  - offload  - Follow up in two weeks    Orders:  -     Wound cleansing and dressings; Future    2  Ambulatory dysfunction  -     Wound cleansing and dressings; Future           Subjective: This  is a 66-year-old male referred to our services for wound on her on his buttocks  Information was provided by patient  Location Buttocks  Onset prior to 8/3/2021  Duration more than 7 days  Context pressure ulcer  Quality open wound  Severity no sign of infection  Associated signs and symptoms denies pain  Modifying factors no current treatment        Review of Systems   Constitutional: Negative  Skin: Positive for wound          See HPI   All other systems reviewed and are negative  Objective: There were no vitals taken for this visit  Physical Exam  Constitutional:       Appearance: Normal appearance  HENT:      Head: Normocephalic and atraumatic  Nose: Nose normal       Mouth/Throat:      Mouth: Mucous membranes are moist    Eyes:      Conjunctiva/sclera: Conjunctivae normal    Cardiovascular:      Rate and Rhythm: Normal rate  Pulses: Normal pulses  Pulmonary:      Effort: Pulmonary effort is normal    Abdominal:      General: Bowel sounds are normal    Musculoskeletal:      Cervical back: Normal range of motion  Right lower leg: Edema present  Left lower leg: Edema present  Comments: LROM   Skin:     General: Skin is dry  Findings: Lesion present  Comments: Buttocks - red, non blanchable, not measurable  No sign of infection   Neurological:      Mental Status: He is alert and oriented to person, place, and time  Psychiatric:         Mood and Affect: Mood normal          Behavior: Behavior normal          Thought Content: Thought content normal               Procedures           Patient's care was coordinated with nursing facility staff  Recent vitals, labs and updated medications were reviewed on EMR or chart system of facility   Past Medical, surgical, social, medication and allergy history and patient's previous records were reviewed and updated as appropriate:     Patient Active Problem List   Diagnosis    Hypert[facility]    Atrial fibrillation    Cardiomyopathy [facility]    BPH (benign prostatic hyperplasia)    Biventricular cardiac pacemaker in situ    Anticoagulation adequate    Malignant neoplasm of colon ([facility] )    Cancer of splenic flexure ([facility] )    Gastroesophageal reflux disease with esophagitis    Bilateral lower extremity edema    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    Hypokalemia    Hyperphosphatemia    Hypercalcemia    Alkalosis    Ventricular tachycardia (HCC)    Acute kidney injury superimposed on chronic kidney disease (HCC)    Secondary hyperparathyroidism of renal origin ([facility]    Anemia of chronic disease    Goals of care, counseling/discussion    Pressure injury of buttock, stage 1    Ambulatory dysfunction     Past Medical History:   Diagnosis Date    Acute kidney injury - Chronic kidney disease stage 3 3/9/2021    Anemia     Atrial fibrillation (HCC)     BPH (benign prostatic hypertrophy)     Cancer (HCC)     COLON    Chronic kidney disease     Elevated troponin 7/27/2021    Hypertension     Irregular heart beat     afib     Past Surgical History:   Procedure Laterality Date    BASAL CELL CARCINOMA EXCISION      CARDIAC PACEMAKER PLACEMENT      CARDIAC SURGERY      CARDIOVERSION      CARPAL TUNNEL RELEASE      CATARACT EXTRACTION      COLON SURGERY      COLONOSCOPY      AL COLONOSCOPY FLX DX W/COLLJ SPEC WHEN PFRMD N/A 11/30/2018    Procedure: COLONOSCOPY w egd  by dr Frankey Leyden;  Surgeon: Tam Bowman MD;  Location: [facility]; Service: Colorectal    AL LAP,SURG,COLECTOMY, PARTIAL, W/ANAST N/A 1/8/2019    Procedure: Left hemicolectomy, takedown splenic flexure, end to end transverse to sigmoid colon anastamosis; Surgeon: Tam Bowman MD;  Location: [facility];  Service: Colorectal     Social History     Socioeconomic History    Marital status:       Spouse name: None    Number of children: None    Years of education: None    Highest education level: None   Occupational History    None   Tobacco Use    Smoking status: Never Smoker    Smokeless tobacco: Never Used   Vaping Use    Vaping Use: Never used   Substance and Sexual Activity    Alcohol use: Yes     Comment: occasional    Drug use: No    Sexual activity: None   Other Topics Concern    None   Social History Narrative    None     Social Determinants of Health     Financial Resource Strain:     Difficulty of Paying Living Expenses:    Food Insecurity:     Worried About Running Out of Food in the Last Year:     [address] in the Last Year:    Transportation Needs:     Lack of Transportation (Medical):      Lack of Transportation (Non-Medical):    Physical Activity:     Days of Exercise per Week:     Minutes of Exercise per Session:    Stress:     Feeling of Stress :    Social Connections:     Frequency of Communication with Friends and Family:     Frequency of Social Gatherings with Friends and Family:     Attends Jainism Services:     Active Member of Clubs or Organizations:     Attends Club or Organization Meetings:     Marital Status:    Intimate Partner Violence:     Fear of Current or Ex-Partner:     Emotionally Abused:     Physically Abused:     Sexually Abused:         Current Outpatient Medications:     apixaban (Eliquis) 2 5 mg, Take 1 tablet (2 5 mg total) by mouth 2 (two) times a day, Disp: 180 tablet, Rfl: 3    finasteride (PROSCAR) 5 mg tablet, Take 5 mg by mouth daily, Disp: , Rfl:     hydrOXYzine HCL (ATARAX) 25 mg tablet, Take 25 mg by mouth every 6 (six) hours as needed for itching, Disp: , Rfl:     Multiple Vitamins-Minerals (CENTRUM SILVER 50+MEN PO), Take 1 tablet by mouth daily, Disp: , Rfl:     pantoprazole (PROTONIX) 40 mg tablet, Take 40 mg by mouth daily, Disp: , Rfl:     potassium chloride (K-DUR,KLOR-CON) 10 mEq tablet, Take 1 tablet by mouth daily, Disp: , Rfl:     senna-docusate sodium (SENOKOT S) 8 6-50 mg per tablet, Take 1 tablet by mouth 2 (two) times a day, Disp: 60 tablet, Rfl: 0    spironolactone (ALDACTONE) 25 mg tablet, Take 0 5 tablets (12 5 mg total) by mouth daily, Disp: 30 tablet, Rfl: 0    torsemide (DEMADEX) 10 mg tablet, Take 3 tablets (30 mg total) by mouth daily, Disp: 90 tablet, Rfl: 1    torsemide (DEMADEX) 20 mg tablet, Take 1 tablet (20 mg total) by mouth daily as needed (for weight gain > 5 lbs), Disp: 30 tablet, Rfl: 0  Family History   Problem Relation Age of Onset    Heart disease Father     Heart attack Father     Hypertension Father     Heart disease Brother     Heart attack Brother 64    Hypertension Brother     Heart attack Brother 67    Hypertension Brother     Arrhythmia Neg Hx     Asthma Neg Hx     Clotting disorder Neg Hx     Heart failure Neg Hx               Coordination of Care: Supervisor aware of the treatment plan    Patient / Staff education : Patient / Staff was given education on sign of infection and pressure ulcer prevention  Patient/ Staff verbalized understanding     Follow up :  Return in about 2 weeks (around 8/24/2021)  Voice-recognition software may have been used in the preparation of this document  Occasional wrong word or "sound-alike" substitutions may have occurred due to the inherent limitations of voice recognition software  Interpretation should be guided by context        IAIN Soares

## 2021-08-10 NOTE — ASSESSMENT & PLAN NOTE
Bilateral buttock - red, non blanchable  hydraguard - local wound care  - offload  - Follow up in two weeks

## 2021-08-10 NOTE — ASSESSMENT & PLAN NOTE
· 3lb weight gain 8/8 to 8/9  · Increased edema BLLE and c/o dyspnea  · RN reported daughter concerned  · 40mg demedex x 1 dose ordered stat, BMP ordered,   · Continue to monitor weights, 8/10/21 decreased by 1lb  · Changed torsemide to 30mg

## 2021-08-10 NOTE — ASSESSMENT & PLAN NOTE
· Doing well at [facility]  · Continue with restorative care   · Assist with ADL and IADL  · Fall precautions  · PT/OT

## 2021-08-10 NOTE — PROGRESS NOTES
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  Assessment & Plan:  · 3lb weight gain 8/8 to 8/9  · Increased edema BLLE and c/o dyspnea  · RN reported daughter concerned  · 40mg demedex x 1 dose ordered stat, BMP ordered,   · Continue to monitor weights, 8/10/21 decreased by 1lb  · Changed torsemide to 30mg           2  Ambulatory dysfunction  Assessment & Plan:  · Doing well at [facility]  · Continue with restorative care   · Assist with ADL and IADL  · Fall precautions  · PT/OT        No orders of the defined types were placed in this encounter  History of Present Illness     Melody Tavarez 80 y o  male seen for follow-up of care and treatment plan for ambulatory dysfunction doing well at [facility],      The following portions of the patient's history were reviewed and updated as appropriate: allergies, current medications, past family history, past medical history, past social history, past surgical history and problem list     Review of Systems     Review of Systems   Constitutional: Negative for chills and fever  HENT: Negative for ear pain and sore throat  Eyes: Negative for pain and visual disturbance  Respiratory: Negative for cough, shortness of breath and wheezing  Cardiovascular: Positive for leg swelling  Negative for chest pain and palpitations  Gastrointestinal: Negative for abdominal pain and vomiting  Genitourinary: Negative for dysuria and hematuria  Musculoskeletal: Positive for gait problem  Negative for arthralgias and back pain  Skin: Negative for color change and rash  Neurological: Positive for weakness  Negative for seizures and syncope  All other systems reviewed and are negative        Active Problem List     Patient Active Problem List   Diagnosis  Hypertensive kidney disease with chronic kidney disease stage III (HCC)    Atrial fibrillation    [facility]    BPH (benign prostatic hyperplasia)    Biventricular cardiac pacemaker in situ    Anticoagulation adequate    Malignant neoplasm of colon [facility] )    Cancer of splenic flexure (HCC)    Gastroesophageal reflux disease with esophagitis    Bilateral lower extremity edema    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    Hypokalemia    Hyperphosphatemia    Hypercalcemia    Alkalosis    Ventricular tachycardia (HCC)    Acute kidney injury superimposed on chronic kidney disease (HCC)    Secondary hyperparathyroidism of renal origin ([facility] )    Anemia of chronic disease    Goals of care, counseling/discussion    Pressure injury of buttock, stage 1    Ambulatory dysfunction       Objective     /55   Pulse 60   Temp (!) 97 1 °F (36 2 °C)   Resp 16   Wt 77 1 kg (170 lb)   BMI 25 10 kg/m²     Physical Exam  Vitals reviewed  Constitutional:       Appearance: He is well-developed  HENT:      Head: Normocephalic and atraumatic  Eyes:      Conjunctiva/sclera: Conjunctivae normal    Cardiovascular:      Rate and Rhythm: Normal rate and regular rhythm  Heart sounds: Normal heart sounds, S1 normal and S2 normal  No murmur heard  Pulmonary:      Effort: Pulmonary effort is normal  No respiratory distress  Breath sounds: Normal breath sounds  No wheezing  Abdominal:      General: Bowel sounds are normal  There is no distension  Palpations: Abdomen is soft  Tenderness: There is no abdominal tenderness  Musculoskeletal:         General: Normal range of motion  Cervical back: Normal range of motion  Right lower leg: Edema present  Left lower leg: Edema present  Comments: Generalized weakness   Skin:     General: Skin is warm and dry  Neurological:      Mental Status: He is alert     Psychiatric:         Mood and Affect: Mood normal          Speech: Speech normal          Behavior: Behavior normal          Thought Content: Thought content normal          Pertinent Laboratory/Diagnostic Studies:  [facility] Labs Reviewed    Current Medications   Medication list reviewed and updated today  Please see paper chart for updated medication list      Harwinton Sigifredo  8/10/36368:[id number] PM      Name: Adaline Aroldo  : 1927  MRN: [id number]  DOS: 8/10/2021    Diagnoses:   Diagnosis ICD-10-CM Associated Orders   1  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

## 2021-08-12 PROBLEM — N18.4 STAGE 4 CHRONIC KIDNEY DISEASE (HCC): Status: ACTIVE | Noted: 2021-01-01

## 2021-08-12 NOTE — PROGRESS NOTES
Nephrology Follow up Consultation  Howard Gómez 80 y o  male MRN: [id number]            BACKGROUND:  Howard Gómez is a 80 y o male who was referred by VARSHA Nunn MD for evaluation of Follow-up and Chronic Kidney Disease    ASSESSMENT / PLAN:   80 y o   male with pmh of multiple co-morbidities including a fib, HTN, colon cancer s/p hemicolectomy and CKD stage 4 presents to the office for routine follow up  CKD stage IV:  -  after review of records In [facility] as well as Care everywhere patient had a new baseline creatinine of 1 9-2 4mg/dL  Most recent labs show a Creatinine of 2 67 mg/dL on 08/09/2021  Renal function remains stable and improving towards baseline  - status post episode of acute kidney injury at the end of July 2021 with peak creatinine of 3 6 mg/dL  Acute kidney injury was thought to be secondary to cardiorenal syndrome as well as volume overload patient was treated with IV Bumex   - likely has underlying CKD secondary to hypertensive nephrosclerosis + age related nephron loss plus cardiorenal syndrome plus prior episode of SUDHIR Non dialysis requiring    - discussed with the patient that we would wait a period of 3 months to see where his new baseline creatinine would be  We will may need to accept a higher baseline creatinine for euvolemia   - CT abd from 07/2019 showing no hydronephrosis  one or more simple cyst noted  - Acid base and lytes stable except for mild hyponatremia most recent serum sodium 132 mEq as of 08/09/2021  Likely secondary to cardiac disease   - Clinically the patient appears to be minimally hypervolemic  Currently on torsemide will add metolazone 5 mg p o  Q weekly     - Recommend to avoid use of NSAIDs, nephrotoxins  Caution advised with regards to exposure to IV contrast dye    - Discussed with the patient in depth his renal status, including the possible etiologies for CKD     - Advised the patient that when his GFR is close to 20mL/min then will start discussing about RRT(renal replacement therapy) options such as renal transplant, peritoneal dialysis and hemodialysis  - Informed the patient about the various options for Renal Replacement therapy  Patient has had a discussion with regards to renal replacement therapy that based on his age and comorbidities he may not benefit from renal replacement therapy if the need arises  - Discussed with the patient how we need to work together to delay the progression of CKD with optimal BP control based on their age and co-morbidities, optimal BS control with HbA1c of <7% and trying to reduce proteinuria by the use of anti-proteinuric agents  - referral to CKD education/kidney smart placed on 07/16/2021, will cancel due to change in family plans and patient's plans   - referral to hospice and palliative care placed on 8/12/21  Patient and family do not want to pursue further blood work or hospitalizations  Would like medical management and would like patient to be made comfortable at home with hospice  He continues to gain fluid and is increasing in weight  Have increased his diuretics temporarily for now however he is aware that after while the absorption of the p o  Diuretics may decrease  He is not willing to go back to the hospital in need for IV diuretics  This as he just wants to be comfortable  I respect his decision  Hypertension:  - Patient is on torsemide 30 mg po q day and Aldactone 12 5 mg p o  q day, K-Dur 10 mEq p o  q day  - add metolazone 5 mg p o  Q weekly  - continue follow-up with cardiology for now  Patient does not want repeat blood work is or hospitalizations at this time would prefer hospice at home   - Goal BP of < 140/90 based on age and comorbidities  - Instructed to follow low sodium (2gm)diet  - hold acei/arb due to low bp and to preserve residual renal function      Hemoglobin/anemia:  - Goal Hb of 10-12 g/dL  - Most recent labs suggestive of 12 0 gm /dl    - f/u with heme onc    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:  - Based on patients CKD stage following is the goal of therapy  - Maintain calcium phosphorus product of < 55   - Stage 4 CKD - Goal Ca 8 5-10 mg/dL , goal Phos 2 7-4 6 mg/dL  , goal iPTH  pg/mL Patient is currently not at goal   - Continue patient on no vitamin-D  - Patients' most recent ipth of 121 6 as of 7/12/21    Proteinuria:  - most recent protein cr ratio of 200 mg as of 7/12/21  - check protein creatinine ratio    Lipids:  - goal LDL less than 70  - management per primary team    BPH:  - management as per Primary team  - on proscar    afib s/p ablation / CHF:  - Management as per primary team  - f/u with cardiology  - most recent echo with ef of 25% as of 3/10/21  - on torsemide, metolazone and eliquis  - diuretic adjustment per Cardiology  - baseline dry weight was around 76 kilos  Nutrition:  - Encouraged patient to follow a renal diet comprising of moderate potassium, low phosphorus and protein restriction to 0 8gm/kg  - Will check serum albumin with next blood work  Followup:  - Patient is to follow-up jyoti Andres Rm Patient does not want any further blood work or hospitalizations is interested in home with hospice  Referrals were placed advised patient and daughter to call me with updates and if they have any issues with those appointments  Elodia Carlson MD, [facility], 8/12/2021, 12:06 PM             SUBJECTIVE: 80 y o  male presents to the office for routine follow-up  Presents with daughter overall feels well however has been gaining weight and increasing edema  Is going to be discharged from rehab in going home later this week  Appreciative and thankful for all the care information that he has gotten  Wishes to go home with hospice care does not wish further blood work or hospitalizations understands that dialysis would not be appropriate    Understands his cardiac status is poor and after while his p o  Medications may have decreased absorption and may knee made IV at diuretics which she does not want to go to the hospital for  No NSAID use  Continues to have lower extremity wounds  Do have follow-up appointments with cardiology upcoming  Wish that hospice and palliative could be arranged sooner  Review of Systems   Constitutional: Negative for chills, fatigue and fever  HENT: Negative for congestion, rhinorrhea and sore throat  Respiratory: Negative for cough, shortness of breath and wheezing  Cardiovascular: Positive for leg swelling  Gastrointestinal: Negative for abdominal pain, constipation, diarrhea, nausea and vomiting  Genitourinary: Negative for difficulty urinating, dysuria and hematuria  Musculoskeletal: Negative for back pain  Skin: Positive for wound  Negative for rash  Neurological: Negative for dizziness, light-headedness and headaches  Psychiatric/Behavioral: Negative for agitation and confusion  All other systems reviewed and are negative        PAST MEDICAL HISTORY:  Past Medical History:   Diagnosis Date    Acute kidney injury - Chronic kidney disease stage 3 3/9/2021    Anemia     Atrial fibrillation (HCC)     BPH (benign prostatic hypertrophy)     Cancer (HCC)     COLON    Chronic kidney disease     Elevated troponin 7/27/2021    Hypertension     Irregular heart beat     afib       PROBLEM LIST    Patient Active Problem List   Diagnosis    Hypertensive kidney disease with chronic kidney disease stage III ([facility]    Atrial fibrillation    Cardiomyopathy ([facility]    BPH (benign prostatic hyperplasia)    Biventricular cardiac pacemaker in situ    Anticoagulation adequate    Malignant neoplasm of colon ([facility]    Cancer of splenic flexure (HCC)    Gastroesophageal reflux disease with esophagitis    Bilateral lower extremity edema    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    Hypokalemia    Hyperphosphatemia    Hypercalcemia    Alkalosis    Ventricular tachycardia (HCC)    Acute kidney injury superimposed on chronic kidney disease ([facility]    Secondary hyperparathyroidism of renal origin ([facility]    Anemia of chronic disease    Goals of care, counseling/discussion    Pressure injury of buttock, stage 1    Ambulatory dysfunction    Stage 4 chronic kidney disease ([facility] )       PAST SURGICAL HISTORY:  Past Surgical History:   Procedure Laterality Date    BASAL CELL CARCINOMA EXCISION      CARDIAC PACEMAKER PLACEMENT      CARDIAC SURGERY      CARDIOVERSION      CARPAL TUNNEL RELEASE      CATARACT EXTRACTION      COLON SURGERY      COLONOSCOPY      OK COLONOSCOPY FLX DX W/COLLJ SPEC WHEN PFRMD N/A 11/30/2018    Procedure: COLONOSCOPY w egd  by dr Vince Zepeda;  Surgeon: Herbert Rajan MD;  Location: [facility]; Service: Colorectal    OK LAP,SURG,COLECTOMY, PARTIAL, W/ANAST N/A 1/8/2019    Procedure: Left hemicolectomy, takedown splenic flexure, end to end transverse to sigmoid colon anastamosis; Surgeon: Herbert Rajan MD;  Location: [facility];  Service: Colorectal       SOCIAL HISTORY :   reports that he has never smoked  He has never used smokeless tobacco  He reports current alcohol use  He reports that he does not use drugs      FAMILY HISTORY:  Family History   Problem Relation Age of Onset    Heart disease Father     Heart attack Father     Hypertension Father     Heart disease Brother     Heart attack Brother 64    Hypertension Brother     Heart attack Brother 67    Hypertension Brother     Arrhythmia Neg Hx     Asthma Neg Hx     Clotting disorder Neg Hx     Heart failure Neg Hx        ALLERGIES:  Allergies   Allergen Reactions    Lasix [Furosemide] Other (See Comments)     Weakness, decreased BP    Adhesive [Medical Tape] Rash    Neomycin-Bacitracin Zn-Polymyx Rash    Neosporin [Neomycin-Polymyxin-Gramicidin] Rash           PHYSICAL EXAM:  Vitals:    08/12/21 0927   BP: 106/58   BP Location: Left arm Patient Position: Sitting   Cuff Size: Standard   Pulse: (!) 54   Resp: 18   Weight: 79 4 kg (175 lb)   Height: 5' 9" (1 753 m)     Body mass index is 25 84 kg/m²  Physical Exam  Vitals reviewed  Constitutional:       General: He is not in acute distress  Appearance: Normal appearance  He is normal weight  He is not ill-appearing, toxic-appearing or diaphoretic  HENT:      Head: Normocephalic and atraumatic  Mouth/Throat:      Mouth: Mucous membranes are moist       Pharynx: Oropharynx is clear  No oropharyngeal exudate  Eyes:      General: No scleral icterus  Conjunctiva/sclera: Conjunctivae normal    Cardiovascular:      Rate and Rhythm: Normal rate  Heart sounds: Normal heart sounds  No friction rub  Pulmonary:      Effort: Pulmonary effort is normal  No respiratory distress  Breath sounds: Normal breath sounds  No wheezing  Abdominal:      General: There is no distension  Palpations: Abdomen is soft  There is no mass  Tenderness: There is no abdominal tenderness  There is no right CVA tenderness or left CVA tenderness  Comments: + 1pitting edema   Musculoskeletal:         General: Swelling present  Cervical back: Normal range of motion and neck supple  Comments: Bilateral +2 edema lower extremity and +1 upper extremity edema  + lower extremity wounds in wraps   Skin:     General: Skin is warm  Coloration: Skin is not jaundiced  Neurological:      General: No focal deficit present  Mental Status: He is alert and oriented to person, place, and time  Mental status is at baseline     Psychiatric:         Mood and Affect: Mood normal          Behavior: Behavior normal          LABORATORY DATA:     Results from last 6 Months   Lab Units 08/03/21  0546 08/02/21  0529 08/01/21  0430 07/30/21  0455 07/29/21  6708 07/28/21  0603 07/27/21  0434 07/27/21  0030 07/27/21  0030 07/12/21  1013 03/18/21  1203 03/17/21  0625   WBC Thousand/uL  --  7 08  -- --   --   --  6 51  --  6 80 7 75   < > 6 89   HEMOGLOBIN g/dL  --  11 5*  --   --  12 1  --  11 1*  --  11 8* 12 5   < > 13 5   HEMATOCRIT %  --  35 1*  --   --  37 3  --  33 3*  --  35 8* 39 3   < > 41 4   PLATELETS Thousands/uL  --  155  --   --   --   --  171  --  152 187   < > 232   POTASSIUM mmol/L 2 9* 3 3* 3 7 4 2 4 1 3 0*  --   --  3 2*  --   --  3 8   CHLORIDE mmol/L 92* 93* 95* 96* 97* 95*  --   --  92*  --   --  98*   CO2 mmol/L 32 32 34* 29 30 32  --   --  29  --   --  34*   BUN mg/dL 85* 86* 87* 93* 94* 96*  --   --  99*  --   --  78*   CREATININE mg/dL 3 11* 3 06* 3 19* 3 21* 3 42* 3 36*  --   --  3 64*  --   --  2 37*   CALCIUM mg/dL 9 0 9 2 9 3 9 5 9 2 9 1  --   --  9 0  --   --  9 5   MAGNESIUM mg/dL  --   --   --  2 4 2 4 2 3  --    < >  --  2 6  --   --    PHOSPHORUS mg/dL  --   --   --   --   --   --   --   --  4 5* 3 9  --  4 3*    < > = values in this interval not displayed          rest all reviewed    RADIOLOGY:  No orders to display     Rest all reviewed        MEDICATIONS:    Current Outpatient Medications:     apixaban (Eliquis) 2 5 mg, Take 1 tablet (2 5 mg total) by mouth 2 (two) times a day (Patient taking differently: Take 2 5 mg by mouth daily ), Disp: 180 tablet, Rfl: 3    finasteride (PROSCAR) 5 mg tablet, Take 5 mg by mouth daily, Disp: , Rfl:     hydrOXYzine HCL (ATARAX) 25 mg tablet, Take 25 mg by mouth every 6 (six) hours as needed for itching, Disp: , Rfl:     Multiple Vitamins-Minerals (CENTRUM SILVER 50+MEN PO), Take 1 tablet by mouth daily, Disp: , Rfl:     pantoprazole (PROTONIX) 40 mg tablet, Take 40 mg by mouth daily, Disp: , Rfl:     Potassium Chloride 10 % SOLN, Take 10 mEq by mouth daily, Disp: , Rfl:     senna-docusate sodium (SENOKOT S) 8 6-50 mg per tablet, Take 1 tablet by mouth 2 (two) times a day, Disp: 60 tablet, Rfl: 0    spironolactone (ALDACTONE) 25 mg tablet, Take 0 5 tablets (12 5 mg total) by mouth daily, Disp: 45 tablet, Rfl: 4    torsemide (DEMADEX) 20 mg tablet, Take 30 mg by mouth daily, Disp: , Rfl:     metolazone (ZAROXOLYN) 5 mg tablet, Take 1 tablet (5 mg total) by mouth once a week, Disp: 30 tablet, Rfl: 3    torsemide (DEMADEX) 10 mg tablet, Take 3 tablets (30 mg total) by mouth daily (Patient not taking: Reported on 8/12/2021), Disp: 90 tablet, Rfl: 1    torsemide (DEMADEX) 20 mg tablet, Take 1 tablet (20 mg total) by mouth daily as needed (for weight gain > 5 lbs) (Patient not taking: Reported on 8/12/2021), Disp: 30 tablet, Rfl: 0          Portions of the record may have been created with voice recognition software  Occasional wrong word or "sound a like" substitutions may have occurred due to the inherent limitations of voice recognition software  Read the chart carefully and recognize, using context, where substitutions have occurred  If you have any questions, please contact the dictating provider

## 2021-08-12 NOTE — PATIENT INSTRUCTIONS
- follow up with hospice and palliative care  - start metolazone 5mg once a week  - follow up with cardiology    - Please call me in 10 days after having your blood work done to review the results if you do not hear back from me or my office, as I may have not received the results  - please remember to perform blood work prior to the next visit  - Please call if the blood pressure top number is greater than 150 or less than 110 consistently  - Please call if you are gaining more than 2lbs in 2 days for adjustment of water pills   ~ Please AVOID the following pain medications    LIST OF NSAIDS (NONSTEROIDAL ANTI-INFLAMMATORY DRUGS) AND KENDRICK-2 INHIBITORS    DIFLUNISAL (DOLOBID)  IBUPROFEN (MOTRIN, ADVIL)  FLURBIPROFEN (ANSAID)  KETOPROFEN (ORUDIS, ORUVAIL)  FENOPROFEN (NALFON)  NABUMETONE (RELAFEN)  PIROXICAM (FELDENE)  NAPROXEN (ALEVE, NAPROSYN, NAPRELAN, ANAPROX)  DICLOFENAC (VOLTAREN, CATAFLAM)  INDOMETHACIN (INDOCIN)  SULINDAC (CLINORIL)  TOLMETIN (TOLETIN)  ETODOLAC (LODINE)  MELOXICAM (MOBIC)  KETOROLAC (TORADOL)  OXAPROZIN (DAYPRO)  CELECOXIB (CELEBREX)

## 2021-08-12 NOTE — ASSESSMENT & PLAN NOTE
·  Doing well at short-term rehab   · Continue with restorative care  · Assist with ADL and I ADL   · Fall precautions  · PT /OT

## 2021-08-12 NOTE — ASSESSMENT & PLAN NOTE
· Left lower extremity weeping  · Cleanse weeping area with normal saline daily  · Apply mL just sore but, ABD pad and wrap with gauze   · Wound care daily   · Elevate legs as much as possible  · Continue with 30 mg torsemide daily  · Continue with spironolactone  · Continue to monitor weights

## 2021-08-12 NOTE — PROGRESS NOTES
[address]   [phone]  [facility]  Short-term rehab acute Note        NAME: Sebastian Joy  AGE: 80 y o  SEX: male    DATE OF ENCOUNTER: 8/12/2021    Assessment and Plan     1  Ambulatory dysfunction  Assessment & Plan:  ·  Doing well at short-term rehab   · Continue with restorative care  · Assist with ADL and I ADL   · Fall precautions  · PT /OT      2  Bilateral lower extremity edema  Assessment & Plan:  · Left lower extremity weeping  · Cleanse weeping area with normal saline daily  · Apply mL just sore but, ABD pad and wrap with gauze   · Wound care daily   · Elevate legs as much as possible  · Continue with 30 mg torsemide daily  · Continue with spironolactone  · Continue to monitor weights          No orders of the defined types were placed in this encounter  History of Present Illness     Sebastian Joy 80 y o  male seen for follow-up of care and treatment plan for ambulatory dysfunction doing well at  Short-term rehab, bilateral lower extremity left side weeping controlled with wound care     The following portions of the patient's history were reviewed and updated as appropriate: allergies, current medications, past family history, past medical history, past social history, past surgical history and problem list     Review of Systems     Review of Systems   Constitutional: Negative for chills and fever  HENT: Negative for ear pain and sore throat  Eyes: Negative for pain and visual disturbance  Respiratory: Negative for cough and shortness of breath  Cardiovascular: Negative for chest pain and palpitations  Gastrointestinal: Negative for abdominal pain and vomiting  Genitourinary: Negative for dysuria and hematuria  Musculoskeletal: Positive for gait problem  Negative for arthralgias and back pain  Skin: Positive for wound ( left lower extremity)  Negative for color change and rash  Neurological: Positive for weakness  Negative for seizures and syncope  All other systems reviewed and are negative  Active Problem List     Patient Active Problem List   Diagnosis    Hypertensive kidney disease with chronic kidney disease stage III (HCC)    Atrial fibrillation    Cardiomyopathy [facility]    BPH (benign prostatic hyperplasia)    Biventricular cardiac pacemaker in situ    Anticoagulation adequate    Malignant neoplasm of colon ([facility] )    Cancer of splenic flexure (HCC)    Gastroesophageal reflux disease with esophagitis    Bilateral lower extremity edema    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    Hypokalemia    Hyperphosphatemia    Hypercalcemia    Alkalosis    Ventricular tachycardia (HCC)    Acute kidney injury superimposed on chronic kidney disease ([facility] )    Secondary hyperparathyroidism of renal origin ([facility] )    Anemia of chronic disease    Goals of care, counseling/discussion    Pressure injury of buttock, stage 1    Ambulatory dysfunction    Stage 4 chronic kidney disease (HCC)       Objective     /60   Pulse 69   Temp (!) 97 4 °F (36 3 °C)   Resp 18   Wt 79 8 kg (176 lb)   BMI 25 99 kg/m²     Physical Exam  Vitals reviewed  Constitutional:       Appearance: He is well-developed  HENT:      Head: Normocephalic and atraumatic  Eyes:      Conjunctiva/sclera: Conjunctivae normal    Cardiovascular:      Rate and Rhythm: Normal rate and regular rhythm  Heart sounds: Normal heart sounds, S1 normal and S2 normal  No murmur heard  Pulmonary:      Effort: Pulmonary effort is normal  No respiratory distress  Breath sounds: Normal breath sounds  No wheezing  Abdominal:      General: Bowel sounds are normal  There is no distension  Palpations: Abdomen is soft  Tenderness: There is no abdominal tenderness  Musculoskeletal:         General: Normal range of motion  Cervical back: Normal range of motion  Right lower leg: Edema present        Left lower leg: Edema ( weeping) present  Comments:  Generalized weakness   Skin:     General: Skin is warm and dry  Neurological:      Mental Status: He is alert  Psychiatric:         Attention and Perception: Attention normal          Mood and Affect: Mood normal          Speech: Speech normal          Behavior: Behavior normal          Thought Content: Thought content normal          Cognition and Memory: Cognition normal          Pertinent Laboratory/Diagnostic Studies:  HFM Labs Reviewed    Current Medications   Medication list reviewed and updated today  Please see paper chart for updated medication list      Xochitl Newton  /56094:[id number] PM      Name: Jodie Diaz  : 1927  MRN: [id number]  DOS: 2021    Diagnoses:   Diagnosis ICD-10-CM Associated Orders   1  Ambulatory dysfunction  R26 2    2   Bilateral lower extremity edema  R60 0

## 2021-08-13 NOTE — ASSESSMENT & PLAN NOTE
· Doing well at [facility]  · Continue with restorative care  · Assist with ADL and IADL  · Fall precuations  · PT/OT

## 2021-08-13 NOTE — PROGRESS NOTES
[address]  [address]   [phone]  [address] [place] [address] St: [facility] List: [facility]  POS: 31: SNF/Short Term Rehab    NAME: Gordon Roy  AGE: 80 y o  :1927 SEX: male YEX:[id number]  DATE OF ADMISSION: 8/3/21 DATE OF DISCHARGE: 8/15/21 DISCHARGE DISPOSITION: Stable    Reason for admission: Patient was admitted for rehabilitation after hospitalization for CHF  Course of stay: Patient received skilled nursing care, PT/OT/ST, dietary and  during their stay at [facility] with an overall improvement in functional status  PT/OT Report: Bed mobility supervision sit to transfer/ stand supervision, ambulates 120 ft with a rolling walker with supervision, 1 stair with rolling walker contact guard assist, upper body dressing/ bathing setup, lower body dressing / bathing supervision, toileting supervision hygiene and clothing management supervision    Discharge Medications: See discharge medication list which was reviewed and signed  Status at time of discharge: Stable    Today's Visit: 13:18 PM    Subjective: 80y o  year old male seen and examined today for discharge planning  Patient is scheduled to be discharged on 8/15/21 to home with the following services: VNA, PT and OT  Patient states that they are feeling well and appear comfortable in the room  They deny headache, dizziness, blurred vision, dyspnea, abdominal pain, nausea, vomiting and diarrhea  Vitals:   Vitals:    21 1514   BP: 104/76   Pulse: 76   Resp: 18   Temp: (!) 97 4 °F (36 3 °C)       Exam: Physical Exam  Vitals reviewed  Constitutional:       Appearance: He is well-developed  HENT:      Head: Normocephalic and atraumatic  Eyes:      Conjunctiva/sclera: Conjunctivae normal    Cardiovascular:      Rate and Rhythm: Normal rate and regular rhythm  Heart sounds: Normal heart sounds, S1 normal and S2 normal  No murmur heard  Pulmonary:      Effort: Pulmonary effort is normal  No respiratory distress  Breath sounds: Normal breath sounds  No wheezing  Abdominal:      General: Bowel sounds are normal  There is no distension  Palpations: Abdomen is soft  Tenderness: There is no abdominal tenderness  Musculoskeletal:         General: Normal range of motion  Cervical back: Normal range of motion  Right lower leg: Edema present  Left lower leg: Edema present  Comments: Generalized weakness   Skin:     General: Skin is warm and dry  Neurological:      Mental Status: He is alert  Psychiatric:         Attention and Perception: Attention normal          Mood and Affect: Mood normal          Speech: Speech normal          Behavior: Behavior normal          Thought Content: Thought content normal          Cognition and Memory: Cognition is impaired  Discussion with patient/family and further instructions:  -Fall precautions  -[place] precautions  -Bleeding precautions  -Monitor for signs/symptoms of infection  -Medication list was reviewed and signed  -DME form was completed    Follow-up Recommendations: Please follow-up with your primary care physician within 7-10 days of discharge to review medication changes and current status       Problem List Follow-up Recommendations:  Problem List Items Addressed This Visit        Cardiovascular and Mediastinum    Atrial fibrillation     · Controlled  · Continue with eliquis for anticoagulation         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 - Primary     · Plus two pitting edema bilateral extremities  · Left lower extremity with some weeping  · Wound care to open areas  · No dyspnea  · Elevate legs as much as possible  · Monitor weights daily  · Continue with torsemide                Musculoskeletal and Integument    Pressure injury of buttock, stage 1     · Slow healing  · Continue with wound care   · F/u with PCP Genitourinary    Stage 4 chronic kidney disease ([address] )     · Labs are at baseline for CKD4  · Continue to avoid nephrotoxins as much as possible  · Pt followed up with nephrology on 8/12/21  · Pt to be discharged to home with home hospice care  · Discussion with nephrology regarding hospice care  · Pt to be discharged to home with [address]            Other    Bilateral lower extremity edema     · +2 non pitting  · Weeping left lower leg, wound care  · Monitor for s/s infection  · Elevate legs as much as possible         Ambulatory dysfunction     · Doing well at STR  · Continue with restorative care  · Assist with ADL and IADL  · Fall precuations  · PT/OT               IAIN Dunn  8/13/20213:18 PM    Discharge  Statement   I spent 45 minutes minutes discharging the patient  This time was spent on the day of discharge  I had direct contact with the patient on the day of discharge

## 2021-08-13 NOTE — ASSESSMENT & PLAN NOTE
· +2 non pitting  · Weeping left lower leg, wound care  · Monitor for s/s infection  · Elevate legs as much as possible

## 2021-08-13 NOTE — ASSESSMENT & PLAN NOTE
· Labs are at baseline for CKD4  · Continue to avoid nephrotoxins as much as possible  · Pt followed up with nephrology on 8/12/21  · Pt to be discharged to home with home hospice care  · Discussion with nephrology regarding hospice care  · Pt to be discharged to home with [address]

## 2021-08-13 NOTE — ASSESSMENT & PLAN NOTE
· Plus two pitting edema bilateral extremities  · Left lower extremity with some weeping  · Wound care to open areas  · No dyspnea  · Elevate legs as much as possible  · Monitor weights daily  · Continue with torsemide

## 2021-08-13 NOTE — TELEPHONE ENCOUNTER
8/13/2021 Patients son Chapis Johnson) called and would like for you to give him a call, he has some follow up questions to ask about his father Agapito Rodriguez, that you saw yesterday   His best call back number is [phone]

## 2021-08-16 NOTE — PROGRESS NOTES
Results for orders placed or performed in visit on 08/12/21   Cardiac EP device report    Narrative    SJM CRT-P (VVIR 70)/ NOT MRI CONDITIONAL  DEVICE INTERROGATED IN THE [place] OFFICE/YEARLY  BATTERY ADEQUATE (4 5 YRS)  BVP 83% (<90%/VVIR 70)  ALL LEAD PARAMETERS WITHIN NORMAL LIMITS  4 DEVICE CLASSIFIED VT EPISODES (UP  BPM & 13 BEATS)  PT  TAKES ELIQUIS  EF 25% ([place], 2021)  SENT TO [facility]  FOR REVIEW  NORMAL DEVICE FUNCTION   PL

## 2021-08-16 NOTE — TELEPHONE ENCOUNTER
S/w Patients daughter Omar Zarate came home from facility yesterday on hospice  Cx'd Rosario's appt and Dr An Kay  Daughter would like to keep pacer on for now and continue to monitor

## 2021-08-16 NOTE — TELEPHONE ENCOUNTER
----- Message from [facility] sent at 8/16/2021 10:28 AM EDT -----  Regarding: cor-mazin crossed  Farrel Abler,  Pts cor-mazin crossed x 12 days, pt takes zaroxolyn, aldactone, demadex  EF: 25%  Gwendolyn Rodas   NON-BILLABLE MERLIN TRANSMISSION: BATTERY VOLTAGE ADEQUATE (4 4 YRS)  BP: 92%  ALL AVAILABLE LEAD PARAMETERS WITHIN NORMAL LIMITS  1 HVR EPISODE W/ EGRM SHOWING NSVT vs RVR 11 BEATS @ 187 BPM   PT TAKES ELIQUIS  EF: 25% (ECHO 3/10/21)  CORVUE IMPEDANCE THRESHOLD CROSSED X 12 DAYS  PT TAKES ZAROXOLYN, [address], DEMADEX  EF: 25% (ECHO 3/10/21)  TASK TO HF TEAM  PACEMAKER FUNCTIONING APPROPRIATELY   [address] [place] [address]

## 2022-05-09 NOTE — PLAN OF CARE
PAST MEDICAL HISTORY:  CAD (coronary artery disease)     Diabetes mellitus     Hypercholesteremia     Hypertension     Prostate ca s/p SEED 5 yrs ago    Sleep apnea on cpap at home     Problem: Potential for Falls  Goal: Patient will remain free of falls  Description: INTERVENTIONS:  - Assess patient frequently for physical needs  -  Identify cognitive and physical deficits and behaviors that affect risk of falls    -  [place] fall precautions as indicated by assessment   - Educate patient/family on patient safety including physical limitations  - Instruct patient to call for assistance with activity based on assessment  - Modify environment to reduce risk of injury  - Consider OT/PT consult to assist with strengthening/mobility  Outcome: Progressing     Problem: PAIN - ADULT  Goal: Verbalizes/displays adequate comfort level or baseline comfort level  Description: Interventions:  - Encourage patient to monitor pain and request assistance  - Assess pain using appropriate pain scale  - Administer analgesics based on type and severity of pain and evaluate response  - Implement non-pharmacological measures as appropriate and evaluate response  - Consider cultural and social influences on pain and pain management  - Notify physician/advanced practitioner if interventions unsuccessful or patient reports new pain  Outcome: Progressing     Problem: INFECTION - ADULT  Goal: Absence or prevention of progression during hospitalization  Description: INTERVENTIONS:  - Assess and monitor for signs and symptoms of infection  - Monitor lab/diagnostic results  - Monitor all insertion sites, i e  indwelling lines, tubes, and drains  - Monitor endotracheal if appropriate and nasal secretions for changes in amount and color  - [place] appropriate cooling/warming therapies per order  - Administer medications as ordered  - Instruct and encourage patient and family to use good hand hygiene technique  - Identify and instruct in appropriate isolation precautions for identified infection/condition  Outcome: Progressing     Problem: SAFETY ADULT  Goal: Maintain or return to baseline ADL function  Description: INTERVENTIONS:  -  Assess patient's ability to carry out ADLs; assess patient's baseline for ADL function and identify physical deficits which impact ability to perform ADLs (bathing, care of mouth/teeth, toileting, grooming, dressing, etc )  - Assess/evaluate cause of self-care deficits   - Assess range of motion  - Assess patient's mobility; develop plan if impaired  - Assess patient's need for assistive devices and provide as appropriate  - Encourage maximum independence but intervene and supervise when necessary  - Involve family in performance of ADLs  - Assess for home care needs following discharge   - Consider OT consult to assist with ADL evaluation and planning for discharge  - Provide patient education as appropriate  Outcome: Progressing  Goal: Maintain or return mobility status to optimal level  Description: INTERVENTIONS:  - Assess patient's baseline mobility status (ambulation, transfers, stairs, etc )    - Identify cognitive and physical deficits and behaviors that affect mobility  - Identify mobility aids required to assist with transfers and/or ambulation (gait belt, sit-to-stand, lift, walker, cane, etc )  - [place] fall precautions as indicated by assessment  - Record patient progress and toleration of activity level on Mobility SBAR; progress patient to next Phase/Stage  - Instruct patient to call for assistance with activity based on assessment  - Consider rehabilitation consult to assist with strengthening/weightbearing, etc   Outcome: Progressing     Problem: DISCHARGE PLANNING  Goal: Discharge to home or other facility with appropriate resources  Description: INTERVENTIONS:  - Identify barriers to discharge w/patient and caregiver  - Arrange for needed discharge resources and transportation as appropriate  - Identify discharge learning needs (meds, wound care, etc )  - Arrange for interpretive services to assist at discharge as needed  - Refer to Case Management Department for coordinating discharge planning if the patient needs post-hospital services based on physician/advanced practitioner order or complex needs related to functional status, cognitive ability, or social support system  Outcome: Progressing     Problem: Knowledge Deficit  Goal: Patient/family/caregiver demonstrates understanding of disease process, treatment plan, medications, and discharge instructions  Description: Complete learning assessment and assess knowledge base    Interventions:  - Provide teaching at level of understanding  - Provide teaching via preferred learning methods  Outcome: Progressing     Problem: SKIN/TISSUE INTEGRITY - ADULT  Goal: Skin integrity remains intact  Description: INTERVENTIONS  - Identify patients at risk for skin breakdown  - Assess and monitor skin integrity  - Assess and monitor nutrition and hydration status  - Monitor labs (i e  albumin)  - Assess for incontinence   - Turn and reposition patient  - Assist with mobility/ambulation  - Relieve pressure over bony prominences  - Avoid friction and shearing  - Provide appropriate hygiene as needed including keeping skin clean and dry  - Evaluate need for skin moisturizer/barrier cream  - Collaborate with interdisciplinary team (i e  Nutrition, Rehabilitation, etc )   - Patient/family teaching  Outcome: Progressing  Goal: Incision(s), wounds(s) or drain site(s) healing without S/S of infection  Description: INTERVENTIONS  - Assess and document risk factors for skin impairment   - Assess and document dressing, incision, wound bed, drain sites and surrounding tissue  - Consider nutrition services referral as needed  - Oral mucous membranes remain intact  - Provide patient/ family education  Outcome: Progressing  Goal: Oral mucous membranes remain intact  Description: INTERVENTIONS  - Assess oral mucosa and hygiene practices  - Implement preventative oral hygiene regimen  - Implement oral medicated treatments as ordered  - Initiate Nutrition services referral as needed  Outcome: Progressing     Problem: CARDIOVASCULAR - ADULT  Goal: Maintains optimal cardiac output and hemodynamic stability  Description: INTERVENTIONS:  - Monitor I/O, vital signs and rhythm  - Monitor for S/S and trends of decreased cardiac output  - Administer and titrate ordered vasoactive medications to optimize hemodynamic stability  - Assess quality of pulses, skin color and temperature  - Assess for signs of decreased coronary artery perfusion  - Instruct patient to report change in severity of symptoms  Outcome: Progressing  Goal: Absence of cardiac dysrhythmias or at baseline rhythm  Description: INTERVENTIONS:  - Continuous cardiac monitoring, vital signs, obtain 12 lead EKG if ordered  - Administer antiarrhythmic and heart rate control medications as ordered  - Monitor electrolytes and administer replacement therapy as ordered  Outcome: Progressing     Problem: METABOLIC, FLUID AND ELECTROLYTES - ADULT  Goal: Electrolytes maintained within normal limits  Description: INTERVENTIONS:  - Monitor labs and assess patient for signs and symptoms of electrolyte imbalances  - Administer electrolyte replacement as ordered  - Monitor response to electrolyte replacements, including repeat lab results as appropriate  - Instruct patient on fluid and nutrition as appropriate  Outcome: Progressing  Goal: Fluid balance maintained  Description: INTERVENTIONS:  - Monitor labs   - Monitor I/O and WT  - Instruct patient on fluid and nutrition as appropriate  - Assess for signs & symptoms of volume excess or deficit  Outcome: Progressing

## 2023-12-17 NOTE — PLAN OF CARE
Problem: Potential for Falls  Goal: Patient will remain free of falls  Description: INTERVENTIONS:  - Assess patient frequently for physical needs  -  Identify cognitive and physical deficits and behaviors that affect risk of falls    -  [place] fall precautions as indicated by assessment   - Educate patient/family on patient safety including physical limitations  - Instruct patient to call for assistance with activity based on assessment  - Modify environment to reduce risk of injury  - Consider OT/PT consult to assist with strengthening/mobility  Outcome: Progressing     Problem: PAIN - ADULT  Goal: Verbalizes/displays adequate comfort level or baseline comfort level  Description: Interventions:  - Encourage patient to monitor pain and request assistance  - Assess pain using appropriate pain scale  - Administer analgesics based on type and severity of pain and evaluate response  - Implement non-pharmacological measures as appropriate and evaluate response  - Consider cultural and social influences on pain and pain management  - Notify physician/advanced practitioner if interventions unsuccessful or patient reports new pain  Outcome: Progressing     Problem: INFECTION - ADULT  Goal: Absence or prevention of progression during hospitalization  Description: INTERVENTIONS:  - Assess and monitor for signs and symptoms of infection  - Monitor lab/diagnostic results  - Monitor all insertion sites, i e  indwelling lines, tubes, and drains  - Monitor endotracheal if appropriate and nasal secretions for changes in amount and color  - [place] appropriate cooling/warming therapies per order  - Administer medications as ordered  - Instruct and encourage patient and family to use good hand hygiene technique  - Identify and instruct in appropriate isolation precautions for identified infection/condition  Outcome: Progressing     Problem: SAFETY ADULT  Goal: Maintain or return to baseline ADL function  Description: INTERVENTIONS:  -  Assess patient's ability to carry out ADLs; assess patient's baseline for ADL function and identify physical deficits which impact ability to perform ADLs (bathing, care of mouth/teeth, toileting, grooming, dressing, etc )  - Assess/evaluate cause of self-care deficits   - Assess range of motion  - Assess patient's mobility; develop plan if impaired  - Assess patient's need for assistive devices and provide as appropriate  - Encourage maximum independence but intervene and supervise when necessary  - Involve family in performance of ADLs  - Assess for home care needs following discharge   - Consider OT consult to assist with ADL evaluation and planning for discharge  - Provide patient education as appropriate  Outcome: Progressing  Goal: Maintain or return mobility status to optimal level  Description: INTERVENTIONS:  - Assess patient's baseline mobility status (ambulation, transfers, stairs, etc )    - Identify cognitive and physical deficits and behaviors that affect mobility  - Identify mobility aids required to assist with transfers and/or ambulation (gait belt, sit-to-stand, lift, walker, cane, etc )  - [place] fall precautions as indicated by assessment  - Record patient progress and toleration of activity level on Mobility SBAR; progress patient to next Phase/Stage  - Instruct patient to call for assistance with activity based on assessment  - Consider rehabilitation consult to assist with strengthening/weightbearing, etc   Outcome: Progressing     Problem: DISCHARGE PLANNING  Goal: Discharge to home or other facility with appropriate resources  Description: INTERVENTIONS:  - Identify barriers to discharge w/patient and caregiver  - Arrange for needed discharge resources and transportation as appropriate  - Identify discharge learning needs (meds, wound care, etc )  - Arrange for interpretive services to assist at discharge as needed  - Refer to Case Management Department for coordinating discharge planning if the patient needs post-hospital services based on physician/advanced practitioner order or complex needs related to functional status, cognitive ability, or social support system  Outcome: Progressing     Problem: Knowledge Deficit  Goal: Patient/family/caregiver demonstrates understanding of disease process, treatment plan, medications, and discharge instructions  Description: Complete learning assessment and assess knowledge base    Interventions:  - Provide teaching at level of understanding  - Provide teaching via preferred learning methods  Outcome: Progressing     Problem: SKIN/TISSUE INTEGRITY - ADULT  Goal: Skin integrity remains intact  Description: INTERVENTIONS  - Identify patients at risk for skin breakdown  - Assess and monitor skin integrity  - Assess and monitor nutrition and hydration status  - Monitor labs (i e  albumin)  - Assess for incontinence   - Turn and reposition patient  - Assist with mobility/ambulation  - Relieve pressure over bony prominences  - Avoid friction and shearing  - Provide appropriate hygiene as needed including keeping skin clean and dry  - Evaluate need for skin moisturizer/barrier cream  - Collaborate with interdisciplinary team (i e  Nutrition, Rehabilitation, etc )   - Patient/family teaching  Outcome: Progressing  Goal: Incision(s), wounds(s) or drain site(s) healing without S/S of infection  Description: INTERVENTIONS  - Assess and document risk factors for skin impairment   - Assess and document dressing, incision, wound bed, drain sites and surrounding tissue  - Consider nutrition services referral as needed  - Oral mucous membranes remain intact  - Provide patient/ family education  Outcome: Progressing  Goal: Oral mucous membranes remain intact  Description: INTERVENTIONS  - Assess oral mucosa and hygiene practices  - Implement preventative oral hygiene regimen  - Implement oral medicated treatments as ordered  - Initiate Nutrition services referral as needed  Outcome: Progressing     Problem: CARDIOVASCULAR - ADULT  Goal: Maintains optimal cardiac output and hemodynamic stability  Description: INTERVENTIONS:  - Monitor I/O, vital signs and rhythm  - Monitor for S/S and trends of decreased cardiac output  - Administer and titrate ordered vasoactive medications to optimize hemodynamic stability  - Assess quality of pulses, skin color and temperature  - Assess for signs of decreased coronary artery perfusion  - Instruct patient to report change in severity of symptoms  Outcome: Progressing  Goal: Absence of cardiac dysrhythmias or at baseline rhythm  Description: INTERVENTIONS:  - Continuous cardiac monitoring, vital signs, obtain 12 lead EKG if ordered  - Administer antiarrhythmic and heart rate control medications as ordered  - Monitor electrolytes and administer replacement therapy as ordered  Outcome: Progressing     Problem: METABOLIC, FLUID AND ELECTROLYTES - ADULT  Goal: Electrolytes maintained within normal limits  Description: INTERVENTIONS:  - Monitor labs and assess patient for signs and symptoms of electrolyte imbalances  - Administer electrolyte replacement as ordered  - Monitor response to electrolyte replacements, including repeat lab results as appropriate  - Instruct patient on fluid and nutrition as appropriate  Outcome: Progressing  Goal: Fluid balance maintained  Description: INTERVENTIONS:  - Monitor labs   - Monitor I/O and WT  - Instruct patient on fluid and nutrition as appropriate  - Assess for signs & symptoms of volume excess or deficit  Outcome: Progressing Her/She

## 2024-06-28 NOTE — PLAN OF CARE
DISCHARGE PLANNING     Discharge to home or other facility with appropriate resources Progressing        INFECTION - ADULT     Absence or prevention of progression during hospitalization Progressing     Absence of fever/infection during neutropenic period Progressing        Knowledge Deficit     Patient/family/caregiver demonstrates understanding of disease process, treatment plan, medications, and discharge instructions Progressing        Nutrition/Hydration-ADULT     Nutrient/Hydration intake appropriate for improving, restoring or maintaining nutritional needs Progressing        PAIN - ADULT     Verbalizes/displays adequate comfort level or baseline comfort level Progressing        Potential for Falls     Patient will remain free of falls Progressing        Prexisting or High Potential for Compromised Skin Integrity     Skin integrity is maintained or improved Progressing        SAFETY ADULT     Patient will remain free of falls Progressing     Maintain or return to baseline ADL function Progressing     Maintain or return mobility status to optimal level Progressing insulin degludec (TRESIBA FLEXTOUCH) 100 UNIT/ML pen   Disp-15 mL, R-3,   Start: 05/22/2024 2/20/2024  [facility] Kym Veloz PA-C  Endocrinology, Diabetes, and Metabolism    Routed because:  request per pt call. she got 1 pen, 37 days supply 6/23/24.lost it. pharm states she can only  next pen 7/21. So  insurance will have to do over ride. Please take it from here. I have not called pt.     What on the order needs clarification? Patient lost the one and only pen that was given to her. The pharmacy needs a new prescription so that she can get more than one pen. Please call to discuss further with the patient.      Patient is totally out and has none.

## (undated) DEVICE — SUT VICRYL 0 UR-6 27 IN J603H

## (undated) DEVICE — 3M™ IOBAN™ 2 ANTIMICROBIAL INCISE DRAPE 6650EZ: Brand: IOBAN™ 2

## (undated) DEVICE — ADHESIVE SKN CLSR HISTOACRYL FLEX 0.5ML LF

## (undated) DEVICE — DRAPE FLUID WARMER (BIRD BATH)

## (undated) DEVICE — 40595 XL TRENDELENBURG POSITIONING KIT: Brand: 40595 XL TRENDELENBURG POSITIONING KIT

## (undated) DEVICE — SUT PROLENE 2-0 KS 30 IN 8623H

## (undated) DEVICE — WOUND RETRACTOR AND PROTECTOR: Brand: ALEXIS O WOUND PROTECTOR-RETRACTOR

## (undated) DEVICE — NEEDLE SCLERO INTERJECT 25G 240MM

## (undated) DEVICE — ENDOPATH XCEL BLADELESS TROCARS WITH STABILITY SLEEVES: Brand: ENDOPATH XCEL

## (undated) DEVICE — MAYO STAND COVER: Brand: CONVERTORS

## (undated) DEVICE — ANTI-FOG SOLUTION WITH FOAM PAD: Brand: DEVON

## (undated) DEVICE — INSUFLATION TUBING INSUFLOW (LEXION)

## (undated) DEVICE — ACCESS PLATFORM FOR MINIMALLY INVASIVE SURGERY.: Brand: GELPORT® LAPAROSCOPIC  SYSTEM

## (undated) DEVICE — TRAY FOLEY 16FR URIMETER SILICONE SURESTEP

## (undated) DEVICE — IRRIG ENDO FLO TUBING

## (undated) DEVICE — GLOVE SRG BIOGEL ECLIPSE 7.5

## (undated) DEVICE — INVIEW CLEAR LEGGINGS: Brand: CONVERTORS

## (undated) DEVICE — INTENDED FOR TISSUE SEPARATION, AND OTHER PROCEDURES THAT REQUIRE A SHARP SURGICAL BLADE TO PUNCTURE OR CUT.: Brand: BARD-PARKER SAFETY BLADES SIZE 15, STERILE

## (undated) DEVICE — STRL COTTON TIP APPLCTR 6IN PK: Brand: CARDINAL HEALTH

## (undated) DEVICE — BETHLEHEM MAJOR GENERAL PACK: Brand: CARDINAL HEALTH

## (undated) DEVICE — SUT VICRYL 2-0 SH 27 IN UNDYED J417H

## (undated) DEVICE — ENDOPATH XCEL UNIVERSAL TROCAR STABLILITY SLEEVES: Brand: ENDOPATH XCEL

## (undated) DEVICE — SUT VICRYL 0 REEL 54 IN J287G

## (undated) DEVICE — SUT VICRYL 0 CT-1 27 IN J260H

## (undated) DEVICE — GLOVE INDICATOR PI UNDERGLOVE SZ 8 BLUE

## (undated) DEVICE — CO2 AND WATER TUBING/CAP SET FOR OLYMPUS® SCOPES & UCR: Brand: ERBE

## (undated) DEVICE — 3000CC GUARDIAN II: Brand: GUARDIAN

## (undated) DEVICE — TRAVELKIT CONTAINS FIRST STEP KIT (200ML EP-4 KIT) AND SOILED SCOPE BAG - 1 KIT: Brand: TRAVELKIT CONTAINS FIRST STEP KIT AND SOILED SCOPE BAG

## (undated) DEVICE — INTENDED FOR TISSUE SEPARATION, AND OTHER PROCEDURES THAT REQUIRE A SHARP SURGICAL BLADE TO PUNCTURE OR CUT.: Brand: BARD-PARKER SAFETY BLADES SIZE 11, STERILE

## (undated) DEVICE — SUT MONOCRYL 4-0 PS-2 18 IN Y496G

## (undated) DEVICE — ENSEAL LAPAROSCOPIC TISSUE SEALER G2 CURVED JAW FOR USE WITH G2 GENERATOR 5MM DIAMETER 35CM SHAFT LENGTH: Brand: ENSEAL

## (undated) DEVICE — POOLE SUCTION HANDLE: Brand: CARDINAL HEALTH

## (undated) DEVICE — CHLORAPREP HI-LITE 26ML ORANGE

## (undated) DEVICE — INTENDED FOR TISSUE SEPARATION, AND OTHER PROCEDURES THAT REQUIRE A SHARP SURGICAL BLADE TO PUNCTURE OR CUT.: Brand: BARD-PARKER SAFETY BLADES SIZE 10, STERILE

## (undated) DEVICE — SUT PDS II 1 CTX 36 IN Z371T

## (undated) DEVICE — Device: Brand: DEFENDO AIR/WATER/SUCTION AND BIOPSY VALVE

## (undated) DEVICE — PENCIL ELECTROSURG E-Z CLEAN -0035H